# Patient Record
Sex: MALE | Race: WHITE | Employment: OTHER | URBAN - METROPOLITAN AREA
[De-identification: names, ages, dates, MRNs, and addresses within clinical notes are randomized per-mention and may not be internally consistent; named-entity substitution may affect disease eponyms.]

---

## 2017-01-04 PROBLEM — I48.91 ATRIAL FIBRILLATION WITH RVR (HCC): Status: ACTIVE | Noted: 2017-01-04

## 2017-01-04 PROBLEM — E87.20 LACTIC ACIDOSIS: Status: ACTIVE | Noted: 2017-01-04

## 2017-01-04 PROBLEM — A41.9 SEPSIS, UNSPECIFIED ORGANISM (HCC): Status: ACTIVE | Noted: 2017-01-04

## 2017-01-05 PROBLEM — E80.6 HYPERBILIRUBINEMIA: Status: ACTIVE | Noted: 2017-01-05

## 2017-01-10 ENCOUNTER — OFFICE VISIT (OUTPATIENT)
Dept: INTERNAL MEDICINE CLINIC | Age: 67
End: 2017-01-10

## 2017-01-10 VITALS
BODY MASS INDEX: 29.12 KG/M2 | RESPIRATION RATE: 16 BRPM | HEART RATE: 85 BPM | SYSTOLIC BLOOD PRESSURE: 102 MMHG | DIASTOLIC BLOOD PRESSURE: 70 MMHG | WEIGHT: 252 LBS | OXYGEN SATURATION: 97 %

## 2017-01-10 DIAGNOSIS — E79.0 ELEVATED URIC ACID IN BLOOD: ICD-10-CM

## 2017-01-10 DIAGNOSIS — M79.672 LEFT FOOT PAIN: ICD-10-CM

## 2017-01-10 DIAGNOSIS — A41.9 SEPSIS, DUE TO UNSPECIFIED ORGANISM: Primary | ICD-10-CM

## 2017-01-10 DIAGNOSIS — I48.91 ATRIAL FIBRILLATION, UNSPECIFIED TYPE (HCC): ICD-10-CM

## 2017-01-10 DIAGNOSIS — E80.6 HYPERBILIRUBINEMIA: ICD-10-CM

## 2017-01-10 DIAGNOSIS — E87.20 LACTIC ACIDOSIS: ICD-10-CM

## 2017-01-10 DIAGNOSIS — K80.20 CALCULUS OF GALLBLADDER WITHOUT CHOLECYSTITIS WITHOUT OBSTRUCTION: ICD-10-CM

## 2017-01-10 DIAGNOSIS — K06.9 DISEASE OF GINGIVA DUE TO RECURRENT ORAL HERPES SIMPLEX VIRUS (HSV) INFECTION: ICD-10-CM

## 2017-01-10 DIAGNOSIS — A41.9 SEPSIS, DUE TO UNSPECIFIED ORGANISM: ICD-10-CM

## 2017-01-10 DIAGNOSIS — B00.9 DISEASE OF GINGIVA DUE TO RECURRENT ORAL HERPES SIMPLEX VIRUS (HSV) INFECTION: ICD-10-CM

## 2017-01-10 LAB
A/G RATIO: 1.6 (ref 1.1–2.2)
ALBUMIN SERPL-MCNC: 4.4 G/DL (ref 3.4–5)
ALP BLD-CCNC: 58 U/L (ref 40–129)
ALT SERPL-CCNC: 23 U/L (ref 10–40)
ANION GAP SERPL CALCULATED.3IONS-SCNC: 17 MMOL/L (ref 3–16)
AST SERPL-CCNC: 25 U/L (ref 15–37)
BASOPHILS ABSOLUTE: 0.1 K/UL (ref 0–0.2)
BASOPHILS RELATIVE PERCENT: 0.9 %
BILIRUB SERPL-MCNC: 0.8 MG/DL (ref 0–1)
BUN BLDV-MCNC: 14 MG/DL (ref 7–20)
CALCIUM SERPL-MCNC: 9.7 MG/DL (ref 8.3–10.6)
CHLORIDE BLD-SCNC: 97 MMOL/L (ref 99–110)
CO2: 24 MMOL/L (ref 21–32)
CREAT SERPL-MCNC: 1 MG/DL (ref 0.8–1.3)
EOSINOPHILS ABSOLUTE: 0.2 K/UL (ref 0–0.6)
EOSINOPHILS RELATIVE PERCENT: 2 %
GFR AFRICAN AMERICAN: >60
GFR NON-AFRICAN AMERICAN: >60
GLOBULIN: 2.8 G/DL
GLUCOSE BLD-MCNC: 109 MG/DL (ref 70–99)
HCT VFR BLD CALC: 45.5 % (ref 40.5–52.5)
HEMOGLOBIN: 15.5 G/DL (ref 13.5–17.5)
INTERNATIONAL NORMALIZATION RATIO, POC: 1.5
LYMPHOCYTES ABSOLUTE: 1.3 K/UL (ref 1–5.1)
LYMPHOCYTES RELATIVE PERCENT: 12.3 %
MCH RBC QN AUTO: 32.2 PG (ref 26–34)
MCHC RBC AUTO-ENTMCNC: 34 G/DL (ref 31–36)
MCV RBC AUTO: 94.7 FL (ref 80–100)
MONOCYTES ABSOLUTE: 0.9 K/UL (ref 0–1.3)
MONOCYTES RELATIVE PERCENT: 8.7 %
NEUTROPHILS ABSOLUTE: 8 K/UL (ref 1.7–7.7)
NEUTROPHILS RELATIVE PERCENT: 76.1 %
PDW BLD-RTO: 13.7 % (ref 12.4–15.4)
PLATELET # BLD: 241 K/UL (ref 135–450)
PMV BLD AUTO: 8.2 FL (ref 5–10.5)
POTASSIUM SERPL-SCNC: 4.8 MMOL/L (ref 3.5–5.1)
PROTHROMBIN TIME, POC: NORMAL
RBC # BLD: 4.8 M/UL (ref 4.2–5.9)
SODIUM BLD-SCNC: 138 MMOL/L (ref 136–145)
TOTAL PROTEIN: 7.2 G/DL (ref 6.4–8.2)
URIC ACID, SERUM: 7.5 MG/DL (ref 3.5–7.2)
WBC # BLD: 10.6 K/UL (ref 4–11)

## 2017-01-10 PROCEDURE — 85610 PROTHROMBIN TIME: CPT | Performed by: INTERNAL MEDICINE

## 2017-01-10 PROCEDURE — 99213 OFFICE O/P EST LOW 20 MIN: CPT | Performed by: INTERNAL MEDICINE

## 2017-01-10 RX ORDER — ALLOPURINOL 100 MG/1
100 TABLET ORAL DAILY
Qty: 90 TABLET | Refills: 1 | Status: SHIPPED | OUTPATIENT
Start: 2017-01-10 | End: 2017-05-17 | Stop reason: SDUPTHER

## 2017-01-10 RX ORDER — ACYCLOVIR 400 MG/1
400 TABLET ORAL 3 TIMES DAILY
Qty: 30 TABLET | Refills: 3 | Status: SHIPPED | OUTPATIENT
Start: 2017-01-10 | End: 2017-05-12 | Stop reason: SDUPTHER

## 2017-02-15 ENCOUNTER — OFFICE VISIT (OUTPATIENT)
Dept: INTERNAL MEDICINE CLINIC | Age: 67
End: 2017-02-15

## 2017-02-15 VITALS
RESPIRATION RATE: 18 BRPM | SYSTOLIC BLOOD PRESSURE: 98 MMHG | HEART RATE: 97 BPM | DIASTOLIC BLOOD PRESSURE: 59 MMHG | BODY MASS INDEX: 29.01 KG/M2 | WEIGHT: 251 LBS

## 2017-02-15 DIAGNOSIS — E78.2 MIXED HYPERLIPIDEMIA: ICD-10-CM

## 2017-02-15 DIAGNOSIS — I42.0 DILATED CARDIOMYOPATHY (HCC): ICD-10-CM

## 2017-02-15 DIAGNOSIS — F51.01 PRIMARY INSOMNIA: ICD-10-CM

## 2017-02-15 DIAGNOSIS — Z00.00 ROUTINE GENERAL MEDICAL EXAMINATION AT A HEALTH CARE FACILITY: Primary | ICD-10-CM

## 2017-02-15 DIAGNOSIS — K63.5 COLONIC POLYP: ICD-10-CM

## 2017-02-15 DIAGNOSIS — J45.20 MILD INTERMITTENT ASTHMA WITHOUT COMPLICATION: ICD-10-CM

## 2017-02-15 DIAGNOSIS — I48.20 CHRONIC ATRIAL FIBRILLATION (HCC): ICD-10-CM

## 2017-02-15 DIAGNOSIS — K21.9 GASTROESOPHAGEAL REFLUX DISEASE WITHOUT ESOPHAGITIS: ICD-10-CM

## 2017-02-15 DIAGNOSIS — I48.91 ATRIAL FIBRILLATION WITH RVR (HCC): ICD-10-CM

## 2017-02-15 LAB
INTERNATIONAL NORMALIZATION RATIO, POC: 1.8
PROTHROMBIN TIME, POC: 21.2

## 2017-02-15 PROCEDURE — 85610 PROTHROMBIN TIME: CPT | Performed by: INTERNAL MEDICINE

## 2017-02-15 PROCEDURE — G0439 PPPS, SUBSEQ VISIT: HCPCS | Performed by: INTERNAL MEDICINE

## 2017-02-15 RX ORDER — FUROSEMIDE 20 MG/1
20 TABLET ORAL
Qty: 45 TABLET | Refills: 3 | Status: SHIPPED | OUTPATIENT
Start: 2017-02-15 | End: 2017-11-20 | Stop reason: SDUPTHER

## 2017-02-15 RX ORDER — POTASSIUM CHLORIDE 750 MG/1
10 TABLET, EXTENDED RELEASE ORAL
Qty: 45 TABLET | Refills: 1 | Status: SHIPPED | OUTPATIENT
Start: 2017-02-15 | End: 2017-11-20 | Stop reason: SDUPTHER

## 2017-02-15 RX ORDER — DILTIAZEM HYDROCHLORIDE 180 MG/1
CAPSULE, COATED, EXTENDED RELEASE ORAL
Qty: 180 CAPSULE | Refills: 1 | Status: SHIPPED | OUTPATIENT
Start: 2017-02-15 | End: 2017-08-17 | Stop reason: SDUPTHER

## 2017-02-15 RX ORDER — OMEPRAZOLE 20 MG/1
20 CAPSULE, DELAYED RELEASE ORAL DAILY
Qty: 90 CAPSULE | Refills: 1 | Status: SHIPPED | OUTPATIENT
Start: 2017-02-15 | End: 2017-08-17 | Stop reason: SDUPTHER

## 2017-02-15 RX ORDER — WARFARIN SODIUM 3 MG/1
TABLET ORAL
Qty: 90 TABLET | Refills: 1 | Status: SHIPPED | OUTPATIENT
Start: 2017-02-15 | End: 2017-10-16 | Stop reason: SDUPTHER

## 2017-02-15 RX ORDER — TRAZODONE HYDROCHLORIDE 50 MG/1
50 TABLET ORAL NIGHTLY PRN
Qty: 30 TABLET | Refills: 3 | Status: SHIPPED | OUTPATIENT
Start: 2017-02-15 | End: 2017-08-17 | Stop reason: SDUPTHER

## 2017-02-15 ASSESSMENT — ANXIETY QUESTIONNAIRES: GAD7 TOTAL SCORE: 0

## 2017-02-15 ASSESSMENT — PATIENT HEALTH QUESTIONNAIRE - PHQ9: SUM OF ALL RESPONSES TO PHQ QUESTIONS 1-9: 0

## 2017-02-15 ASSESSMENT — LIFESTYLE VARIABLES: HOW OFTEN DO YOU HAVE A DRINK CONTAINING ALCOHOL: 0

## 2017-02-16 DIAGNOSIS — Z00.00 ROUTINE GENERAL MEDICAL EXAMINATION AT A HEALTH CARE FACILITY: ICD-10-CM

## 2017-02-16 DIAGNOSIS — I48.20 CHRONIC ATRIAL FIBRILLATION (HCC): ICD-10-CM

## 2017-02-16 DIAGNOSIS — E78.2 MIXED HYPERLIPIDEMIA: ICD-10-CM

## 2017-02-16 LAB
A/G RATIO: 1.9 (ref 1.1–2.2)
ALBUMIN SERPL-MCNC: 4.4 G/DL (ref 3.4–5)
ALP BLD-CCNC: 55 U/L (ref 40–129)
ALT SERPL-CCNC: 17 U/L (ref 10–40)
ANION GAP SERPL CALCULATED.3IONS-SCNC: 13 MMOL/L (ref 3–16)
AST SERPL-CCNC: 22 U/L (ref 15–37)
BASOPHILS ABSOLUTE: 0.1 K/UL (ref 0–0.2)
BASOPHILS RELATIVE PERCENT: 1.2 %
BILIRUB SERPL-MCNC: 0.8 MG/DL (ref 0–1)
BUN BLDV-MCNC: 14 MG/DL (ref 7–20)
CALCIUM SERPL-MCNC: 9.8 MG/DL (ref 8.3–10.6)
CHLORIDE BLD-SCNC: 99 MMOL/L (ref 99–110)
CHOLESTEROL, TOTAL: 142 MG/DL (ref 0–199)
CO2: 28 MMOL/L (ref 21–32)
CREAT SERPL-MCNC: 1.2 MG/DL (ref 0.8–1.3)
EOSINOPHILS ABSOLUTE: 0.2 K/UL (ref 0–0.6)
EOSINOPHILS RELATIVE PERCENT: 4.5 %
GFR AFRICAN AMERICAN: >60
GFR NON-AFRICAN AMERICAN: >60
GLOBULIN: 2.3 G/DL
GLUCOSE BLD-MCNC: 113 MG/DL (ref 70–99)
HCT VFR BLD CALC: 44.9 % (ref 40.5–52.5)
HDLC SERPL-MCNC: 33 MG/DL (ref 40–60)
HEMOGLOBIN: 14.8 G/DL (ref 13.5–17.5)
HEPATITIS C ANTIBODY INTERPRETATION: NORMAL
LDL CHOLESTEROL CALCULATED: 71 MG/DL
LYMPHOCYTES ABSOLUTE: 0.9 K/UL (ref 1–5.1)
LYMPHOCYTES RELATIVE PERCENT: 16.5 %
MCH RBC QN AUTO: 32.4 PG (ref 26–34)
MCHC RBC AUTO-ENTMCNC: 33.1 G/DL (ref 31–36)
MCV RBC AUTO: 97.9 FL (ref 80–100)
MONOCYTES ABSOLUTE: 0.5 K/UL (ref 0–1.3)
MONOCYTES RELATIVE PERCENT: 10.3 %
NEUTROPHILS ABSOLUTE: 3.6 K/UL (ref 1.7–7.7)
NEUTROPHILS RELATIVE PERCENT: 67.5 %
PDW BLD-RTO: 15.7 % (ref 12.4–15.4)
PLATELET # BLD: 205 K/UL (ref 135–450)
PMV BLD AUTO: 7.8 FL (ref 5–10.5)
POTASSIUM SERPL-SCNC: 4.5 MMOL/L (ref 3.5–5.1)
RBC # BLD: 4.58 M/UL (ref 4.2–5.9)
SODIUM BLD-SCNC: 140 MMOL/L (ref 136–145)
TOTAL PROTEIN: 6.7 G/DL (ref 6.4–8.2)
TRIGL SERPL-MCNC: 191 MG/DL (ref 0–150)
TSH SERPL DL<=0.05 MIU/L-ACNC: 0.74 UIU/ML (ref 0.27–4.2)
VLDLC SERPL CALC-MCNC: 38 MG/DL
WBC # BLD: 5.3 K/UL (ref 4–11)

## 2017-05-12 DIAGNOSIS — K06.9 DISEASE OF GINGIVA DUE TO RECURRENT ORAL HERPES SIMPLEX VIRUS (HSV) INFECTION: ICD-10-CM

## 2017-05-12 DIAGNOSIS — B00.9 DISEASE OF GINGIVA DUE TO RECURRENT ORAL HERPES SIMPLEX VIRUS (HSV) INFECTION: ICD-10-CM

## 2017-05-12 RX ORDER — ACYCLOVIR 400 MG/1
400 TABLET ORAL 3 TIMES DAILY
Qty: 30 TABLET | Refills: 3 | Status: SHIPPED | OUTPATIENT
Start: 2017-05-12 | End: 2017-08-17 | Stop reason: SDUPTHER

## 2017-05-17 ENCOUNTER — OFFICE VISIT (OUTPATIENT)
Dept: INTERNAL MEDICINE CLINIC | Age: 67
End: 2017-05-17

## 2017-05-17 VITALS
WEIGHT: 260.8 LBS | BODY MASS INDEX: 30.18 KG/M2 | OXYGEN SATURATION: 96 % | HEIGHT: 78 IN | HEART RATE: 74 BPM | DIASTOLIC BLOOD PRESSURE: 62 MMHG | SYSTOLIC BLOOD PRESSURE: 132 MMHG

## 2017-05-17 DIAGNOSIS — D68.9 COAGULOPATHY (HCC): ICD-10-CM

## 2017-05-17 DIAGNOSIS — I48.20 CHRONIC ATRIAL FIBRILLATION (HCC): Primary | ICD-10-CM

## 2017-05-17 DIAGNOSIS — E79.0 ELEVATED URIC ACID IN BLOOD: ICD-10-CM

## 2017-05-17 DIAGNOSIS — J45.20 MILD INTERMITTENT ASTHMA WITHOUT COMPLICATION: ICD-10-CM

## 2017-05-17 PROBLEM — I48.91 ATRIAL FIBRILLATION WITH RVR (HCC): Status: RESOLVED | Noted: 2017-01-04 | Resolved: 2017-05-17

## 2017-05-17 PROBLEM — A41.9 SEPSIS, UNSPECIFIED ORGANISM (HCC): Status: RESOLVED | Noted: 2017-01-04 | Resolved: 2017-05-17

## 2017-05-17 PROBLEM — E87.20 LACTIC ACIDOSIS: Status: RESOLVED | Noted: 2017-01-04 | Resolved: 2017-05-17

## 2017-05-17 LAB
INTERNATIONAL NORMALIZATION RATIO, POC: 2.2
PROTHROMBIN TIME, POC: 26.3

## 2017-05-17 PROCEDURE — 85610 PROTHROMBIN TIME: CPT | Performed by: INTERNAL MEDICINE

## 2017-05-17 PROCEDURE — 3017F COLORECTAL CA SCREEN DOC REV: CPT | Performed by: INTERNAL MEDICINE

## 2017-05-17 PROCEDURE — 4040F PNEUMOC VAC/ADMIN/RCVD: CPT | Performed by: INTERNAL MEDICINE

## 2017-05-17 PROCEDURE — 1036F TOBACCO NON-USER: CPT | Performed by: INTERNAL MEDICINE

## 2017-05-17 PROCEDURE — 1123F ACP DISCUSS/DSCN MKR DOCD: CPT | Performed by: INTERNAL MEDICINE

## 2017-05-17 PROCEDURE — 99213 OFFICE O/P EST LOW 20 MIN: CPT | Performed by: INTERNAL MEDICINE

## 2017-05-17 PROCEDURE — G8417 CALC BMI ABV UP PARAM F/U: HCPCS | Performed by: INTERNAL MEDICINE

## 2017-05-17 PROCEDURE — G8427 DOCREV CUR MEDS BY ELIG CLIN: HCPCS | Performed by: INTERNAL MEDICINE

## 2017-05-17 RX ORDER — DIGOXIN 250 MCG
TABLET ORAL
Qty: 90 TABLET | Refills: 1 | Status: SHIPPED | OUTPATIENT
Start: 2017-05-17 | End: 2017-11-20 | Stop reason: SDUPTHER

## 2017-05-17 RX ORDER — ALLOPURINOL 100 MG/1
100 TABLET ORAL DAILY
Qty: 90 TABLET | Refills: 1 | Status: SHIPPED | OUTPATIENT
Start: 2017-05-17 | End: 2017-11-20 | Stop reason: SDUPTHER

## 2017-08-17 ENCOUNTER — OFFICE VISIT (OUTPATIENT)
Dept: INTERNAL MEDICINE CLINIC | Age: 67
End: 2017-08-17

## 2017-08-17 VITALS
SYSTOLIC BLOOD PRESSURE: 110 MMHG | DIASTOLIC BLOOD PRESSURE: 70 MMHG | RESPIRATION RATE: 18 BRPM | WEIGHT: 260 LBS | HEART RATE: 82 BPM | BODY MASS INDEX: 30.05 KG/M2

## 2017-08-17 DIAGNOSIS — E78.2 MIXED HYPERLIPIDEMIA: ICD-10-CM

## 2017-08-17 DIAGNOSIS — D68.9 COAGULOPATHY (HCC): ICD-10-CM

## 2017-08-17 DIAGNOSIS — J45.20 MILD INTERMITTENT ASTHMA WITHOUT COMPLICATION: Primary | ICD-10-CM

## 2017-08-17 DIAGNOSIS — I48.20 CHRONIC ATRIAL FIBRILLATION (HCC): ICD-10-CM

## 2017-08-17 DIAGNOSIS — K06.9 DISEASE OF GINGIVA DUE TO RECURRENT ORAL HERPES SIMPLEX VIRUS (HSV) INFECTION: ICD-10-CM

## 2017-08-17 DIAGNOSIS — F51.01 PRIMARY INSOMNIA: ICD-10-CM

## 2017-08-17 DIAGNOSIS — K21.9 GASTROESOPHAGEAL REFLUX DISEASE WITHOUT ESOPHAGITIS: ICD-10-CM

## 2017-08-17 DIAGNOSIS — B00.9 DISEASE OF GINGIVA DUE TO RECURRENT ORAL HERPES SIMPLEX VIRUS (HSV) INFECTION: ICD-10-CM

## 2017-08-17 PROCEDURE — 1123F ACP DISCUSS/DSCN MKR DOCD: CPT | Performed by: INTERNAL MEDICINE

## 2017-08-17 PROCEDURE — 4040F PNEUMOC VAC/ADMIN/RCVD: CPT | Performed by: INTERNAL MEDICINE

## 2017-08-17 PROCEDURE — 3017F COLORECTAL CA SCREEN DOC REV: CPT | Performed by: INTERNAL MEDICINE

## 2017-08-17 PROCEDURE — G8427 DOCREV CUR MEDS BY ELIG CLIN: HCPCS | Performed by: INTERNAL MEDICINE

## 2017-08-17 PROCEDURE — 1036F TOBACCO NON-USER: CPT | Performed by: INTERNAL MEDICINE

## 2017-08-17 PROCEDURE — G8417 CALC BMI ABV UP PARAM F/U: HCPCS | Performed by: INTERNAL MEDICINE

## 2017-08-17 PROCEDURE — 99214 OFFICE O/P EST MOD 30 MIN: CPT | Performed by: INTERNAL MEDICINE

## 2017-08-17 PROCEDURE — 85610 PROTHROMBIN TIME: CPT | Performed by: INTERNAL MEDICINE

## 2017-08-17 RX ORDER — ACYCLOVIR 400 MG/1
400 TABLET ORAL 3 TIMES DAILY
Qty: 30 TABLET | Refills: 3 | Status: SHIPPED | OUTPATIENT
Start: 2017-08-17 | End: 2017-08-27

## 2017-08-17 RX ORDER — TRAZODONE HYDROCHLORIDE 50 MG/1
50 TABLET ORAL NIGHTLY PRN
Qty: 30 TABLET | Refills: 3 | Status: SHIPPED | OUTPATIENT
Start: 2017-08-17 | End: 2017-11-20 | Stop reason: SDUPTHER

## 2017-08-17 RX ORDER — OMEPRAZOLE 20 MG/1
20 CAPSULE, DELAYED RELEASE ORAL DAILY
Qty: 90 CAPSULE | Refills: 1 | Status: SHIPPED | OUTPATIENT
Start: 2017-08-17 | End: 2017-11-20 | Stop reason: SDUPTHER

## 2017-08-17 RX ORDER — DILTIAZEM HYDROCHLORIDE 180 MG/1
CAPSULE, COATED, EXTENDED RELEASE ORAL
Qty: 180 CAPSULE | Refills: 1 | Status: SHIPPED | OUTPATIENT
Start: 2017-08-17 | End: 2017-11-20 | Stop reason: SDUPTHER

## 2017-08-18 DIAGNOSIS — E78.2 MIXED HYPERLIPIDEMIA: ICD-10-CM

## 2017-08-18 DIAGNOSIS — I48.20 CHRONIC ATRIAL FIBRILLATION (HCC): ICD-10-CM

## 2017-08-18 LAB
A/G RATIO: 2 (ref 1.1–2.2)
ALBUMIN SERPL-MCNC: 4.5 G/DL (ref 3.4–5)
ALP BLD-CCNC: 51 U/L (ref 40–129)
ALT SERPL-CCNC: 20 U/L (ref 10–40)
ANION GAP SERPL CALCULATED.3IONS-SCNC: 16 MMOL/L (ref 3–16)
AST SERPL-CCNC: 22 U/L (ref 15–37)
BILIRUB SERPL-MCNC: 0.6 MG/DL (ref 0–1)
BUN BLDV-MCNC: 14 MG/DL (ref 7–20)
CALCIUM SERPL-MCNC: 9.5 MG/DL (ref 8.3–10.6)
CHLORIDE BLD-SCNC: 102 MMOL/L (ref 99–110)
CHOLESTEROL, TOTAL: 134 MG/DL (ref 0–199)
CO2: 24 MMOL/L (ref 21–32)
CREAT SERPL-MCNC: 0.9 MG/DL (ref 0.8–1.3)
DIGOXIN LEVEL: 1 NG/ML (ref 0.8–2)
GFR AFRICAN AMERICAN: >60
GFR NON-AFRICAN AMERICAN: >60
GLOBULIN: 2.3 G/DL
GLUCOSE BLD-MCNC: 112 MG/DL (ref 70–99)
HCT VFR BLD CALC: 43.7 % (ref 40.5–52.5)
HDLC SERPL-MCNC: 31 MG/DL (ref 40–60)
HEMOGLOBIN: 15.1 G/DL (ref 13.5–17.5)
LDL CHOLESTEROL CALCULATED: 52 MG/DL
MCH RBC QN AUTO: 33 PG (ref 26–34)
MCHC RBC AUTO-ENTMCNC: 34.5 G/DL (ref 31–36)
MCV RBC AUTO: 95.6 FL (ref 80–100)
PDW BLD-RTO: 13.7 % (ref 12.4–15.4)
PLATELET # BLD: 207 K/UL (ref 135–450)
PMV BLD AUTO: 8 FL (ref 5–10.5)
POTASSIUM SERPL-SCNC: 4.4 MMOL/L (ref 3.5–5.1)
RBC # BLD: 4.57 M/UL (ref 4.2–5.9)
SODIUM BLD-SCNC: 142 MMOL/L (ref 136–145)
TOTAL PROTEIN: 6.8 G/DL (ref 6.4–8.2)
TRIGL SERPL-MCNC: 256 MG/DL (ref 0–150)
TSH SERPL DL<=0.05 MIU/L-ACNC: 0.91 UIU/ML (ref 0.27–4.2)
VLDLC SERPL CALC-MCNC: 51 MG/DL
WBC # BLD: 6.5 K/UL (ref 4–11)

## 2017-09-15 ENCOUNTER — OFFICE VISIT (OUTPATIENT)
Dept: INTERNAL MEDICINE CLINIC | Age: 67
End: 2017-09-15

## 2017-09-15 VITALS
HEART RATE: 78 BPM | RESPIRATION RATE: 17 BRPM | SYSTOLIC BLOOD PRESSURE: 130 MMHG | OXYGEN SATURATION: 96 % | DIASTOLIC BLOOD PRESSURE: 78 MMHG

## 2017-09-15 DIAGNOSIS — J45.21 MILD INTERMITTENT ASTHMA WITH ACUTE EXACERBATION: Primary | ICD-10-CM

## 2017-09-15 DIAGNOSIS — Z23 NEED FOR INFLUENZA VACCINATION: ICD-10-CM

## 2017-09-15 PROCEDURE — 1036F TOBACCO NON-USER: CPT | Performed by: INTERNAL MEDICINE

## 2017-09-15 PROCEDURE — 90662 IIV NO PRSV INCREASED AG IM: CPT | Performed by: INTERNAL MEDICINE

## 2017-09-15 PROCEDURE — G8417 CALC BMI ABV UP PARAM F/U: HCPCS | Performed by: INTERNAL MEDICINE

## 2017-09-15 PROCEDURE — G8427 DOCREV CUR MEDS BY ELIG CLIN: HCPCS | Performed by: INTERNAL MEDICINE

## 2017-09-15 PROCEDURE — 99213 OFFICE O/P EST LOW 20 MIN: CPT | Performed by: INTERNAL MEDICINE

## 2017-09-15 PROCEDURE — 1123F ACP DISCUSS/DSCN MKR DOCD: CPT | Performed by: INTERNAL MEDICINE

## 2017-09-15 PROCEDURE — G0008 ADMIN INFLUENZA VIRUS VAC: HCPCS | Performed by: INTERNAL MEDICINE

## 2017-09-15 PROCEDURE — 4040F PNEUMOC VAC/ADMIN/RCVD: CPT | Performed by: INTERNAL MEDICINE

## 2017-09-15 PROCEDURE — 3017F COLORECTAL CA SCREEN DOC REV: CPT | Performed by: INTERNAL MEDICINE

## 2017-09-15 RX ORDER — AZITHROMYCIN 250 MG/1
TABLET, FILM COATED ORAL
Qty: 6 TABLET | Refills: 0 | Status: SHIPPED | OUTPATIENT
Start: 2017-09-15 | End: 2017-11-08 | Stop reason: ALTCHOICE

## 2017-09-15 RX ORDER — CODEINE PHOSPHATE AND GUAIFENESIN 10; 100 MG/5ML; MG/5ML
5 SOLUTION ORAL 3 TIMES DAILY PRN
Qty: 150 ML | Refills: 0 | Status: SHIPPED | OUTPATIENT
Start: 2017-09-15 | End: 2017-11-08 | Stop reason: ALTCHOICE

## 2017-09-15 RX ORDER — PREDNISONE 1 MG/1
TABLET ORAL
Qty: 21 TABLET | Refills: 0 | Status: SHIPPED | OUTPATIENT
Start: 2017-09-15 | End: 2017-11-08 | Stop reason: ALTCHOICE

## 2017-10-16 DIAGNOSIS — I48.20 CHRONIC ATRIAL FIBRILLATION (HCC): ICD-10-CM

## 2017-10-16 RX ORDER — WARFARIN SODIUM 3 MG/1
TABLET ORAL
Qty: 90 TABLET | Refills: 1 | Status: SHIPPED | OUTPATIENT
Start: 2017-10-16 | End: 2018-02-20 | Stop reason: SDUPTHER

## 2017-11-08 ENCOUNTER — OFFICE VISIT (OUTPATIENT)
Dept: CARDIOLOGY CLINIC | Age: 67
End: 2017-11-08

## 2017-11-08 VITALS
HEART RATE: 68 BPM | BODY MASS INDEX: 31.19 KG/M2 | WEIGHT: 269.6 LBS | SYSTOLIC BLOOD PRESSURE: 110 MMHG | DIASTOLIC BLOOD PRESSURE: 80 MMHG | HEIGHT: 78 IN

## 2017-11-08 DIAGNOSIS — I48.91 ATRIAL FIBRILLATION, UNSPECIFIED TYPE (HCC): Primary | ICD-10-CM

## 2017-11-08 DIAGNOSIS — I35.0 AORTIC STENOSIS, MILD: ICD-10-CM

## 2017-11-08 DIAGNOSIS — E78.2 MIXED HYPERLIPIDEMIA: ICD-10-CM

## 2017-11-08 PROCEDURE — 4040F PNEUMOC VAC/ADMIN/RCVD: CPT | Performed by: INTERNAL MEDICINE

## 2017-11-08 PROCEDURE — 1036F TOBACCO NON-USER: CPT | Performed by: INTERNAL MEDICINE

## 2017-11-08 PROCEDURE — 99213 OFFICE O/P EST LOW 20 MIN: CPT | Performed by: INTERNAL MEDICINE

## 2017-11-08 PROCEDURE — G8484 FLU IMMUNIZE NO ADMIN: HCPCS | Performed by: INTERNAL MEDICINE

## 2017-11-08 PROCEDURE — G8427 DOCREV CUR MEDS BY ELIG CLIN: HCPCS | Performed by: INTERNAL MEDICINE

## 2017-11-08 PROCEDURE — 3017F COLORECTAL CA SCREEN DOC REV: CPT | Performed by: INTERNAL MEDICINE

## 2017-11-08 PROCEDURE — G8417 CALC BMI ABV UP PARAM F/U: HCPCS | Performed by: INTERNAL MEDICINE

## 2017-11-08 PROCEDURE — 1123F ACP DISCUSS/DSCN MKR DOCD: CPT | Performed by: INTERNAL MEDICINE

## 2017-11-08 NOTE — ASSESSMENT & PLAN NOTE
Clinically not symptomatic and no significant audible murmur. We will follow-up with a repeat echo next year.

## 2017-11-08 NOTE — ASSESSMENT & PLAN NOTE
Rate is well controlled on current medications and he is adequately anticoagulated for primary prevention of stroke.

## 2017-11-08 NOTE — PATIENT INSTRUCTIONS
Continue current cardiovascular medications which have been reviewed and discussed individually with you. Appropriate prescriptions if needed on this visit are addressed. After visit summery is provided. Questions answered and patient verbalizes understanding. Follow up in office in 12 with Echo quang assess AS and LA size, sooner if needed.

## 2017-11-08 NOTE — PROGRESS NOTES
Santiago Diego  1950  Pancho Taylor MD    Chief complaint and HPI:  Santiago Diego  is a 38-year-old pleasant gentleman follows up for primary prevention. Has a history of persistent atrial fibrillation and history of dilated cardiomyopathy which has resolved after better control of heart rate. He has been on long-term anticoagulation therapy in the follows up with PCP for ProTime regulation. He denies any increasing shortness of breath or dyspnea on exertion. He denies any orthopnea or paroxysmal nocturnal dyspnea. He denies any chest pain or any other cardiac symptoms. He denies any syncope or near-syncope. He denies any worsening leg swelling. He is physically very active he golfs regularly and walks for exercise. Rest of the Cardiovascular system review is otherwise unchanged from prior encounter. Past medical history:  has a past medical history of Aortic stenosis, mild; Asthma, intrinsic; Atrial fibrillation ([address] Cholelithiasis; Colonic polyp; DDD (degenerative disc disease), lumbar; Dilated cardiomyopathy ([address] GERD (gastroesophageal reflux disease); Gout; H/O cardiovascular stress test; History of cardiovascular stress test; History of complete electrocardiogram; History of echocardiogram; Hx of echocardiogram; Hyperlipidemia; Knee pain, bilateral; Knee pain, left; Long-term (current) use of anticoagulants; Peptic ulcer disease; and Testosterone deficiency. Past surgical history:  has a past surgical history that includes polypectomy (2/98- jaimie); polypectomy (9/10/02- dr Terrance Sampson); laparoscopy (3/08, dr Kimberly Espinoza); ventral hernia repair (10/08, dr Kimberly Espinoza); Wrist fracture surgery (Left, 2/6/14); and Inguinal hernia repair (Right, 11/2016).     Social History:   Social History   Substance Use Topics    Smoking status: Former Smoker     Packs/day: 1.00     Types: Cigarettes     Quit date: 1/1/2013    Smokeless tobacco: Never Used      Comment: reviewed 11/4/2015    Alcohol use Yes Comment: occasional wine/moderate     Family history: family history includes Diabetes in his father and mother; High Blood Pressure in his mother; Hypertension in his mother; Hypotension in his father; Other in his father and mother; Stroke in his brother. ALLERGIES:  Review of patient's allergies indicates no known allergies. Prior to Admission medications    Medication Sig Start Date End Date Taking? Authorizing Provider   warfarin (COUMADIN) 3 MG tablet Take 1 tablet by mouth  daily 10/16/17  Yes Hallie Swann MD   omeprazole (PRILOSEC) 20 MG delayed release capsule Take 1 capsule by mouth daily 8/17/17  Yes Hallie Swann MD   diltiazem (CARTIA XT) 180 MG extended release capsule TAKE ONE CAPSULE BY MOUTH TWICE A DAY 8/17/17  Yes Hallie Swann MD   traZODone (DESYREL) 50 MG tablet Take 1 tablet by mouth nightly as needed for Sleep Nightly as needed for sleep 8/17/17  Yes Hallie Swann MD   allopurinol (ZYLOPRIM) 100 MG tablet Take 1 tablet by mouth daily 5/17/17  Yes Hallie Swann MD   digoxin ([address] [employer]) 250 MCG tablet Take 1 tablet by mouth  daily 5/17/17  Yes Hallie Swann MD   fluticasone-salmeterol (ADVAIR DISKUS) 500-50 MCG/DOSE diskus inhaler Inhale 1 puff into the lungs 2 times daily Rinse mouth after use. 5/17/17  Yes Hallie Swann MD   potassium chloride (KLOR-CON M) 10 MEQ extended release tablet Take 1 tablet by mouth three times a week 01/04/17 Pt reports he take this on Mon/Wed/Fri 2/15/17  Yes Hallie Swann MD   furosemide (LASIX) 20 MG tablet Take 1 tablet by mouth three times a week 01/04/17 Pt reports he takes this on Mon/ Wed/Fri 2/15/17  Yes Hallie Swann MD   calcium-vitamin D (OSCAL-500) 500-200 MG-UNIT per tablet Take 1 tablet by mouth daily. Yes Historical Provider, MD     Constitutional:  /80   Pulse 68   Ht 6' 6\" (1.981 m)   Wt 269 lb 9.6 oz (122.3 kg)   BMI 31.16 kg/m²    Body mass index is 31.16 kg/m².   Wt Readings visit are addressed. After visit summery is provided. Questions answered and patient verbalizes understanding. Follow up in office in 12 with Echo quang assess AS and LA size, sooner if needed. Ivet Heller MD, 11/8/2017 11:58 AM     Please note this report has been partially produced using speech recognition software and may contain errors related to that system including errors in grammar, punctuation, and spelling, as well as words and phrases that may be inappropriate. If there are any questions or concerns please feel free to contact the dictating provider for clarification.

## 2017-11-20 ENCOUNTER — OFFICE VISIT (OUTPATIENT)
Dept: INTERNAL MEDICINE CLINIC | Age: 67
End: 2017-11-20

## 2017-11-20 VITALS
BODY MASS INDEX: 30.97 KG/M2 | DIASTOLIC BLOOD PRESSURE: 70 MMHG | RESPIRATION RATE: 16 BRPM | OXYGEN SATURATION: 95 % | SYSTOLIC BLOOD PRESSURE: 112 MMHG | HEART RATE: 87 BPM | WEIGHT: 268 LBS

## 2017-11-20 DIAGNOSIS — E78.2 MIXED HYPERLIPIDEMIA: ICD-10-CM

## 2017-11-20 DIAGNOSIS — K21.9 GASTROESOPHAGEAL REFLUX DISEASE WITHOUT ESOPHAGITIS: ICD-10-CM

## 2017-11-20 DIAGNOSIS — F51.01 PRIMARY INSOMNIA: ICD-10-CM

## 2017-11-20 DIAGNOSIS — I42.0 DILATED CARDIOMYOPATHY (HCC): ICD-10-CM

## 2017-11-20 DIAGNOSIS — I48.20 CHRONIC ATRIAL FIBRILLATION (HCC): Primary | ICD-10-CM

## 2017-11-20 DIAGNOSIS — J45.20 MILD INTERMITTENT ASTHMA WITHOUT COMPLICATION: ICD-10-CM

## 2017-11-20 DIAGNOSIS — E79.0 ELEVATED URIC ACID IN BLOOD: ICD-10-CM

## 2017-11-20 LAB
INTERNATIONAL NORMALIZATION RATIO, POC: 2
PROTHROMBIN TIME, POC: 24.3

## 2017-11-20 PROCEDURE — 85610 PROTHROMBIN TIME: CPT | Performed by: INTERNAL MEDICINE

## 2017-11-20 PROCEDURE — 4040F PNEUMOC VAC/ADMIN/RCVD: CPT | Performed by: INTERNAL MEDICINE

## 2017-11-20 PROCEDURE — 99214 OFFICE O/P EST MOD 30 MIN: CPT | Performed by: INTERNAL MEDICINE

## 2017-11-20 PROCEDURE — G8484 FLU IMMUNIZE NO ADMIN: HCPCS | Performed by: INTERNAL MEDICINE

## 2017-11-20 PROCEDURE — G8417 CALC BMI ABV UP PARAM F/U: HCPCS | Performed by: INTERNAL MEDICINE

## 2017-11-20 PROCEDURE — 1123F ACP DISCUSS/DSCN MKR DOCD: CPT | Performed by: INTERNAL MEDICINE

## 2017-11-20 PROCEDURE — G8427 DOCREV CUR MEDS BY ELIG CLIN: HCPCS | Performed by: INTERNAL MEDICINE

## 2017-11-20 PROCEDURE — 1036F TOBACCO NON-USER: CPT | Performed by: INTERNAL MEDICINE

## 2017-11-20 PROCEDURE — 3017F COLORECTAL CA SCREEN DOC REV: CPT | Performed by: INTERNAL MEDICINE

## 2017-11-20 RX ORDER — FUROSEMIDE 20 MG/1
20 TABLET ORAL
Qty: 45 TABLET | Refills: 3 | Status: SHIPPED | OUTPATIENT
Start: 2017-11-20 | End: 2018-06-07 | Stop reason: SDUPTHER

## 2017-11-20 RX ORDER — TRAZODONE HYDROCHLORIDE 50 MG/1
50 TABLET ORAL NIGHTLY PRN
Qty: 30 TABLET | Refills: 3 | Status: SHIPPED | OUTPATIENT
Start: 2017-11-20 | End: 2018-02-20 | Stop reason: SDUPTHER

## 2017-11-20 RX ORDER — DIGOXIN 250 MCG
TABLET ORAL
Qty: 90 TABLET | Refills: 1 | Status: SHIPPED | OUTPATIENT
Start: 2017-11-20 | End: 2018-06-07 | Stop reason: SDUPTHER

## 2017-11-20 RX ORDER — DILTIAZEM HYDROCHLORIDE 180 MG/1
CAPSULE, COATED, EXTENDED RELEASE ORAL
Qty: 180 CAPSULE | Refills: 1 | Status: SHIPPED | OUTPATIENT
Start: 2017-11-20 | End: 2018-02-20 | Stop reason: SDUPTHER

## 2017-11-20 RX ORDER — POTASSIUM CHLORIDE 750 MG/1
10 TABLET, EXTENDED RELEASE ORAL
Qty: 45 TABLET | Refills: 1 | Status: SHIPPED | OUTPATIENT
Start: 2017-11-20 | End: 2018-02-20 | Stop reason: SDUPTHER

## 2017-11-20 RX ORDER — OMEPRAZOLE 20 MG/1
20 CAPSULE, DELAYED RELEASE ORAL DAILY
Qty: 90 CAPSULE | Refills: 1 | Status: SHIPPED | OUTPATIENT
Start: 2017-11-20 | End: 2018-02-20 | Stop reason: SDUPTHER

## 2017-11-20 RX ORDER — ALLOPURINOL 100 MG/1
100 TABLET ORAL DAILY
Qty: 90 TABLET | Refills: 1 | Status: SHIPPED | OUTPATIENT
Start: 2017-11-20 | End: 2018-06-07 | Stop reason: SDUPTHER

## 2017-11-20 NOTE — PROGRESS NOTES
Demi Loomis  1950 11/20/17    SUBJECTIVE:  Asthma no recent wheezing or cough,     Afib- denies palpitations, INR ok at 2.0 on lower dose 3mg daily. GERD:  Is without sx of heartburn or dysphagia, abd pain. OBJECTIVE:    /70   Pulse 87   Resp 16   Wt 268 lb (121.6 kg)   SpO2 95%   BMI 30.97 kg/m²     Physical Exam   Constitutional: He appears well-developed and well-nourished. No distress. HENT:   Head: Normocephalic and atraumatic. Nose: Nose normal.   Mouth/Throat: Oropharynx is clear and moist. No oropharyngeal exudate. Eyes: Conjunctivae and EOM are normal. Pupils are equal, round, and reactive to light. Right eye exhibits no discharge. Left eye exhibits no discharge. No scleral icterus. Neck: Normal range of motion. Neck supple. No tracheal deviation present. Cardiovascular: Normal rate, normal heart sounds and intact distal pulses. Exam reveals no gallop and no friction rub. No murmur heard. IRR   Pulmonary/Chest: Effort normal and breath sounds normal. No respiratory distress. He has no wheezes. He has no rales. Abdominal: Soft. Bowel sounds are normal. He exhibits no distension and no mass. There is no tenderness. There is no rebound and no guarding. Musculoskeletal: He exhibits no edema or tenderness. Lymphadenopathy:     He has no cervical adenopathy. Neurological: He is alert. Skin: Skin is warm and dry. Psychiatric: He has a normal mood and affect. Vitals reviewed. ASSESSMENT:    1. Chronic atrial fibrillation ([address]    2. Dilated cardiomyopathy (HCC)    3. Elevated uric acid in blood    4. Gastroesophageal reflux disease without esophagitis    5. Mild intermittent asthma without complication    6. Primary insomnia    7. Mixed hyperlipidemia        PLAN:    Codie Barragan was seen today for 3 month follow-up.     Diagnoses and all orders for this visit:    Chronic atrial fibrillation ([address] -  Pt clinically w/o evidence of cardiovascular instability, cont

## 2018-01-04 ENCOUNTER — TELEPHONE (OUTPATIENT)
Dept: INTERNAL MEDICINE CLINIC | Age: 68
End: 2018-01-04

## 2018-01-04 ENCOUNTER — OFFICE VISIT (OUTPATIENT)
Dept: INTERNAL MEDICINE CLINIC | Age: 68
End: 2018-01-04

## 2018-01-04 VITALS
RESPIRATION RATE: 16 BRPM | BODY MASS INDEX: 30.97 KG/M2 | SYSTOLIC BLOOD PRESSURE: 114 MMHG | WEIGHT: 268 LBS | OXYGEN SATURATION: 93 % | HEART RATE: 79 BPM | DIASTOLIC BLOOD PRESSURE: 70 MMHG

## 2018-01-04 DIAGNOSIS — J45.21 MILD INTERMITTENT ASTHMA WITH ACUTE EXACERBATION: Primary | ICD-10-CM

## 2018-01-04 PROCEDURE — 3017F COLORECTAL CA SCREEN DOC REV: CPT | Performed by: INTERNAL MEDICINE

## 2018-01-04 PROCEDURE — 1123F ACP DISCUSS/DSCN MKR DOCD: CPT | Performed by: INTERNAL MEDICINE

## 2018-01-04 PROCEDURE — G8484 FLU IMMUNIZE NO ADMIN: HCPCS | Performed by: INTERNAL MEDICINE

## 2018-01-04 PROCEDURE — 99213 OFFICE O/P EST LOW 20 MIN: CPT | Performed by: INTERNAL MEDICINE

## 2018-01-04 PROCEDURE — 1036F TOBACCO NON-USER: CPT | Performed by: INTERNAL MEDICINE

## 2018-01-04 PROCEDURE — G8417 CALC BMI ABV UP PARAM F/U: HCPCS | Performed by: INTERNAL MEDICINE

## 2018-01-04 PROCEDURE — G8427 DOCREV CUR MEDS BY ELIG CLIN: HCPCS | Performed by: INTERNAL MEDICINE

## 2018-01-04 PROCEDURE — 4040F PNEUMOC VAC/ADMIN/RCVD: CPT | Performed by: INTERNAL MEDICINE

## 2018-01-04 RX ORDER — PREDNISONE 1 MG/1
TABLET ORAL
Qty: 36 TABLET | Refills: 0 | Status: SHIPPED | OUTPATIENT
Start: 2018-01-04 | End: 2018-02-20 | Stop reason: ALTCHOICE

## 2018-01-04 RX ORDER — AMOXICILLIN 500 MG/1
500 CAPSULE ORAL 2 TIMES DAILY
Qty: 14 CAPSULE | Refills: 0 | Status: SHIPPED | OUTPATIENT
Start: 2018-01-04 | End: 2018-01-11

## 2018-01-04 RX ORDER — CODEINE PHOSPHATE AND GUAIFENESIN 10; 100 MG/5ML; MG/5ML
5 SOLUTION ORAL 3 TIMES DAILY PRN
Qty: 150 ML | Refills: 0 | Status: SHIPPED | OUTPATIENT
Start: 2018-01-04 | End: 2018-01-11

## 2018-01-04 RX ORDER — AZITHROMYCIN 250 MG/1
TABLET, FILM COATED ORAL
Qty: 6 TABLET | Refills: 0 | Status: SHIPPED | OUTPATIENT
Start: 2018-01-04 | End: 2018-01-04

## 2018-01-04 NOTE — TELEPHONE ENCOUNTER
Severe drug interaction between [facility] & Digoxin. Can increase Digoxin levels and cause toxicity. Per Dr. Lucas Gitelman, send in Amoxicillin 500 BID for 7 days.

## 2018-01-04 NOTE — PROGRESS NOTES
Doron Black  1950 01/04/18    SUBJECTIVE:  3d hx of worsening sinus congestion and cough, wheezing. Sputum is clear, no fever or chills. Wife also ill the past two weeks. OBJECTIVE:    /70   Pulse 79   Resp 16   Wt 268 lb (121.6 kg)   SpO2 93%   BMI 30.97 kg/m²     Physical Exam   Constitutional: He appears well-developed and well-nourished. No distress. HENT:   Head: Normocephalic and atraumatic. Nose: Mucosal edema and rhinorrhea present. No nasal deformity. No epistaxis. Mouth/Throat: Oropharynx is clear and moist. No oropharyngeal exudate. Bilateral nasal congestion with clear discharge noted, no bilateral maxillary sinus tenderness   Eyes: Conjunctivae are normal. No scleral icterus. Neck: Neck supple. Cardiovascular: Normal rate, regular rhythm and normal heart sounds. No murmur heard. Pulmonary/Chest: Effort normal. No respiratory distress. He has wheezes. He has no rales. Abdominal: Soft. Bowel sounds are normal. He exhibits no distension. There is no tenderness. Lymphadenopathy:     He has no cervical adenopathy. Vitals reviewed. ASSESSMENT:    1. Mild intermittent asthma with acute exacerbation        PLAN:    Migdalia Miller was seen today for cough and congestion. Diagnoses and all orders for this visit:    Mild intermittent asthma with acute exacerbation- For ongoing sinusitis and asthma exacerbation will treat with course of atbs, steroid taper and as otherwise noted in medlist.  HAD INITIALLY PLANNED ZPAK BUT HAS INTERACTION W DIG, SWITCH TO AMOX    -     guaiFENesin-codeine (GUAIFENESIN AC) 100-10 MG/5ML liquid; Take 5 mLs by mouth 3 times daily as needed for Cough for up to 7 days. -     predniSONE (DELTASONE) 5 MG tablet; Take 8 pills, then 7,6,5,4,3,2,1  -     Discontinue: azithromycin (ZITHROMAX Z-ALMA) 250 MG tablet;  Take two tabs once then one tab daily till completed-- INTERACTS W DIG

## 2018-02-08 LAB
A/G RATIO: 1.9 (CALC) (ref 0.8–2.6)
ALBUMIN SERPL-MCNC: 4.6 GM/DL (ref 3.5–5.2)
ALP BLD-CCNC: 51 U/L (ref 23–144)
ALT SERPL-CCNC: 23 U/L (ref 0–60)
AST SERPL-CCNC: 25 U/L (ref 0–55)
BASOPHILS ABSOLUTE: 0 K/MM3 (ref 0–0.3)
BASOPHILS RELATIVE PERCENT: 0.5 % (ref 0–2)
BILIRUB SERPL-MCNC: 0.7 MG/DL (ref 0–1.2)
BUN / CREAT RATIO: 12 (CALC) (ref 7–25)
BUN BLDV-MCNC: 12 MG/DL (ref 3–29)
CALCIUM SERPL-MCNC: 9.6 MG/DL (ref 8.5–10.5)
CHLORIDE BLD-SCNC: 101 MEQ/L (ref 96–110)
CHOLESTEROL, TOTAL: 134 MG/DL
CO2: 29 MEQ/L (ref 19–32)
COPY(IES) SENT TO:: NORMAL
CREAT SERPL-MCNC: 1 MG/DL
DIGOXIN LEVEL: 0.9 NG/ML (ref 0.8–2)
EOSINOPHILS ABSOLUTE: 0.2 K/MM3 (ref 0–0.6)
EOSINOPHILS RELATIVE PERCENT: 3.8 % (ref 0–7)
GFR SERPL CREATININE-BSD FRML MDRD: 76 ML/MIN/1.73M2
GLOBULIN: 2.4 GM/DL (CALC) (ref 1.9–3.6)
GLUCOSE BLD-MCNC: 108 MG/DL
HCT VFR BLD CALC: 42.9 % (ref 41–50)
HDLC SERPL-MCNC: 31 MG/DL
HEMOGLOBIN: 14.9 G/DL (ref 13.8–17.2)
LDL CHOLESTEROL: 48 MG/DL (CALC)
LEUKOCYTES, UA: 5.2 K/MM3 (ref 3.8–10.8)
LYMPHOCYTES ABSOLUTE: 1 K/MM3 (ref 0.9–4.1)
LYMPHOCYTES RELATIVE PERCENT: 19.3 % (ref 14–51)
MCH RBC QN AUTO: 32.4 PG (ref 27–33)
MCHC RBC AUTO-ENTMCNC: 34.8 G/DL (ref 32–36)
MCV RBC AUTO: 93.2 FL (ref 80–100)
MONOCYTES ABSOLUTE: 0.6 K/MM3 (ref 0.2–1.1)
MONOCYTES RELATIVE PERCENT: 11.4 % (ref 0–14)
NEUTROPHILS ABSOLUTE: 3.4 K/MM3 (ref 1.5–7.8)
PDW BLD-RTO: 13.8 % (ref 9–15)
PLATELET # BLD: 219 K/MM3 (ref 130–400)
POTASSIUM SERPL-SCNC: 4.1 MEQ/L (ref 3.4–5.3)
RBC # BLD: 4.61 M/MM3 (ref 4.4–5.8)
SEGMENTED NEUTROPHILS RELATIVE PERCENT: 65 % (ref 40–76)
SODIUM BLD-SCNC: 145 MEQ/L (ref 135–148)
TOTAL PROTEIN: 7 GM/DL (ref 6–8.3)
TRIGL SERPL-MCNC: 276 MG/DL
TSH SERPL DL<=0.05 MIU/L-ACNC: 1.43 MICRO IU/ML (ref 0.4–4)
URIC ACID: 7.5 MG/DL (ref 4–8)
VLDLC SERPL CALC-MCNC: 55 MG/DL (CALC) (ref 4–38)

## 2018-02-20 ENCOUNTER — OFFICE VISIT (OUTPATIENT)
Dept: INTERNAL MEDICINE CLINIC | Age: 68
End: 2018-02-20

## 2018-02-20 VITALS
DIASTOLIC BLOOD PRESSURE: 72 MMHG | WEIGHT: 263 LBS | BODY MASS INDEX: 30.39 KG/M2 | SYSTOLIC BLOOD PRESSURE: 120 MMHG | HEART RATE: 74 BPM | RESPIRATION RATE: 16 BRPM | OXYGEN SATURATION: 96 %

## 2018-02-20 DIAGNOSIS — J45.20 MILD INTERMITTENT ASTHMA WITHOUT COMPLICATION: ICD-10-CM

## 2018-02-20 DIAGNOSIS — F51.01 PRIMARY INSOMNIA: ICD-10-CM

## 2018-02-20 DIAGNOSIS — K21.9 GASTROESOPHAGEAL REFLUX DISEASE WITHOUT ESOPHAGITIS: ICD-10-CM

## 2018-02-20 DIAGNOSIS — I42.0 DILATED CARDIOMYOPATHY (HCC): ICD-10-CM

## 2018-02-20 DIAGNOSIS — I48.91 ATRIAL FIBRILLATION, UNSPECIFIED TYPE (HCC): Primary | ICD-10-CM

## 2018-02-20 DIAGNOSIS — I48.20 CHRONIC ATRIAL FIBRILLATION (HCC): ICD-10-CM

## 2018-02-20 LAB
INTERNATIONAL NORMALIZATION RATIO, POC: 1.9
PROTHROMBIN TIME, POC: 22.7

## 2018-02-20 PROCEDURE — 3288F FALL RISK ASSESSMENT DOCD: CPT | Performed by: INTERNAL MEDICINE

## 2018-02-20 PROCEDURE — 4040F PNEUMOC VAC/ADMIN/RCVD: CPT | Performed by: INTERNAL MEDICINE

## 2018-02-20 PROCEDURE — 3017F COLORECTAL CA SCREEN DOC REV: CPT | Performed by: INTERNAL MEDICINE

## 2018-02-20 PROCEDURE — G8417 CALC BMI ABV UP PARAM F/U: HCPCS | Performed by: INTERNAL MEDICINE

## 2018-02-20 PROCEDURE — G8484 FLU IMMUNIZE NO ADMIN: HCPCS | Performed by: INTERNAL MEDICINE

## 2018-02-20 PROCEDURE — 99214 OFFICE O/P EST MOD 30 MIN: CPT | Performed by: INTERNAL MEDICINE

## 2018-02-20 PROCEDURE — 1123F ACP DISCUSS/DSCN MKR DOCD: CPT | Performed by: INTERNAL MEDICINE

## 2018-02-20 PROCEDURE — 85610 PROTHROMBIN TIME: CPT | Performed by: INTERNAL MEDICINE

## 2018-02-20 PROCEDURE — G8427 DOCREV CUR MEDS BY ELIG CLIN: HCPCS | Performed by: INTERNAL MEDICINE

## 2018-02-20 PROCEDURE — 1036F TOBACCO NON-USER: CPT | Performed by: INTERNAL MEDICINE

## 2018-02-20 PROCEDURE — G8510 SCR DEP NEG, NO PLAN REQD: HCPCS | Performed by: INTERNAL MEDICINE

## 2018-02-20 RX ORDER — POTASSIUM CHLORIDE 750 MG/1
10 TABLET, EXTENDED RELEASE ORAL
Qty: 45 TABLET | Refills: 1 | Status: ON HOLD | OUTPATIENT
Start: 2018-02-21 | End: 2018-06-05 | Stop reason: HOSPADM

## 2018-02-20 RX ORDER — OMEPRAZOLE 20 MG/1
20 CAPSULE, DELAYED RELEASE ORAL DAILY
Qty: 90 CAPSULE | Refills: 1 | Status: SHIPPED | OUTPATIENT
Start: 2018-02-20 | End: 2018-11-08 | Stop reason: SDUPTHER

## 2018-02-20 RX ORDER — DILTIAZEM HYDROCHLORIDE 180 MG/1
CAPSULE, COATED, EXTENDED RELEASE ORAL
Qty: 180 CAPSULE | Refills: 1 | Status: SHIPPED | OUTPATIENT
Start: 2018-02-20 | End: 2018-06-07 | Stop reason: SDUPTHER

## 2018-02-20 RX ORDER — TRAZODONE HYDROCHLORIDE 50 MG/1
50 TABLET ORAL NIGHTLY PRN
Qty: 30 TABLET | Refills: 3 | Status: SHIPPED | OUTPATIENT
Start: 2018-02-20 | End: 2018-06-07 | Stop reason: SDUPTHER

## 2018-02-20 RX ORDER — WARFARIN SODIUM 3 MG/1
TABLET ORAL
Qty: 90 TABLET | Refills: 1 | Status: SHIPPED | OUTPATIENT
Start: 2018-02-20 | End: 2018-08-24 | Stop reason: SDUPTHER

## 2018-02-20 ASSESSMENT — PATIENT HEALTH QUESTIONNAIRE - PHQ9
2. FEELING DOWN, DEPRESSED OR HOPELESS: 0
SUM OF ALL RESPONSES TO PHQ9 QUESTIONS 1 & 2: 0
SUM OF ALL RESPONSES TO PHQ QUESTIONS 1-9: 0
1. LITTLE INTEREST OR PLEASURE IN DOING THINGS: 0

## 2018-02-20 NOTE — PROGRESS NOTES
Sowmya Love  1950 02/20/18    SUBJECTIVE:    afib- INR sl low at 1.9, denies palpitations. rec for double coumadin dose today only. Recent dig level was ok on 2/8. Dil cardiomyopathy- no sx cp or sob noted ,no  ENRIQUE/PND/orthopnea. Dilation noted on echo 2016 but EF nl also at 60%. GERD:  Is without sx of heartburn or dysphagia, abd pain. Insomnia stable w prn trazodone. Asthma:  Patient denies significant chest pain/tightness, dyspnea, decreased exercise tolerance, cough, or wheezing on current regimen. Has cataract surgery planned 3/8, w Dr Diggs Figures, R then L for Apr.  Did not give rec holding coumadin. Did offer would be fine to hold coumadin 3d prior then restart 2d later. Total chol ok at 134 also 2/8. OBJECTIVE:    /72   Pulse 74   Resp 16   Wt 263 lb (119.3 kg)   SpO2 96%   BMI 30.39 kg/m²     Physical Exam   Constitutional: He appears well-developed and well-nourished. Neck: Neck supple. Cardiovascular: Normal rate and normal heart sounds. Exam reveals no gallop and no friction rub. No murmur heard. IRR   Pulmonary/Chest: Effort normal and breath sounds normal. No respiratory distress. He has no wheezes. He has no rales. Abdominal: Soft. Bowel sounds are normal. He exhibits no distension. There is no tenderness. Musculoskeletal: He exhibits no edema. Neurological: He is alert. Psychiatric: He has a normal mood and affect. Vitals reviewed. ASSESSMENT:    1. Atrial fibrillation, unspecified type ([address]    2. Dilated cardiomyopathy ([address]    3. Gastroesophageal reflux disease without esophagitis    4. Primary insomnia    5. Chronic atrial fibrillation (HCC)    6. Mild intermittent asthma without complication        PLAN:    Rowan Apodaca was seen today for 3 month follow-up. Diagnoses and all orders for this visit:    Atrial fibrillation, unspecified type ([address] - Pt clinically w/o evidence of cardiovascular instability, cont regimen.   inr sl low so

## 2018-03-08 ENCOUNTER — TELEPHONE (OUTPATIENT)
Dept: CARDIOLOGY CLINIC | Age: 68
End: 2018-03-08

## 2018-04-16 ENCOUNTER — ANTI-COAG VISIT (OUTPATIENT)
Dept: INTERNAL MEDICINE CLINIC | Age: 68
End: 2018-04-16

## 2018-05-17 PROBLEM — S32.411A: Status: ACTIVE | Noted: 2018-05-17

## 2018-05-17 PROBLEM — S32.414D CLOSED NONDISPLACED FRACTURE OF ANTERIOR WALL OF RIGHT ACETABULUM WITH ROUTINE HEALING: Status: ACTIVE | Noted: 2018-05-17

## 2018-05-22 ENCOUNTER — ANTI-COAG VISIT (OUTPATIENT)
Dept: INTERNAL MEDICINE CLINIC | Age: 68
End: 2018-05-22

## 2018-05-22 PROBLEM — S32.401G: Status: ACTIVE | Noted: 2018-05-22

## 2018-05-23 PROBLEM — R26.9 GAIT DISTURBANCE: Status: ACTIVE | Noted: 2018-05-22

## 2018-05-23 PROBLEM — R52 UNCONTROLLED PAIN: Status: ACTIVE | Noted: 2018-05-23

## 2018-05-23 PROBLEM — R29.898 LEG WEAKNESS, BILATERAL: Status: ACTIVE | Noted: 2018-05-23

## 2018-06-01 ENCOUNTER — ANTI-COAG VISIT (OUTPATIENT)
Dept: INTERNAL MEDICINE CLINIC | Age: 68
End: 2018-06-01

## 2018-06-06 ENCOUNTER — TELEPHONE (OUTPATIENT)
Dept: INTERNAL MEDICINE CLINIC | Age: 68
End: 2018-06-06

## 2018-06-06 ENCOUNTER — CARE COORDINATION (OUTPATIENT)
Dept: CASE MANAGEMENT | Age: 68
End: 2018-06-06

## 2018-06-06 DIAGNOSIS — S32.414D CLOSED NONDISPLACED FRACTURE OF ANTERIOR WALL OF RIGHT ACETABULUM WITH ROUTINE HEALING: Primary | ICD-10-CM

## 2018-06-07 ENCOUNTER — OFFICE VISIT (OUTPATIENT)
Dept: INTERNAL MEDICINE CLINIC | Age: 68
End: 2018-06-07

## 2018-06-07 VITALS
RESPIRATION RATE: 17 BRPM | SYSTOLIC BLOOD PRESSURE: 122 MMHG | DIASTOLIC BLOOD PRESSURE: 64 MMHG | BODY MASS INDEX: 29.47 KG/M2 | OXYGEN SATURATION: 95 % | WEIGHT: 255 LBS | HEART RATE: 88 BPM

## 2018-06-07 DIAGNOSIS — F51.01 PRIMARY INSOMNIA: ICD-10-CM

## 2018-06-07 DIAGNOSIS — S43.015A CLOSED ANTERIOR DISLOCATION OF LEFT SHOULDER, INITIAL ENCOUNTER: ICD-10-CM

## 2018-06-07 DIAGNOSIS — E79.0 ELEVATED URIC ACID IN BLOOD: ICD-10-CM

## 2018-06-07 DIAGNOSIS — I42.0 DILATED CARDIOMYOPATHY (HCC): ICD-10-CM

## 2018-06-07 DIAGNOSIS — I48.20 CHRONIC ATRIAL FIBRILLATION (HCC): ICD-10-CM

## 2018-06-07 DIAGNOSIS — S32.414D CLOSED NONDISPLACED FRACTURE OF ANTERIOR WALL OF RIGHT ACETABULUM WITH ROUTINE HEALING: Primary | ICD-10-CM

## 2018-06-07 DIAGNOSIS — S32.411A: ICD-10-CM

## 2018-06-07 PROCEDURE — 99495 TRANSJ CARE MGMT MOD F2F 14D: CPT | Performed by: INTERNAL MEDICINE

## 2018-06-07 RX ORDER — ALLOPURINOL 100 MG/1
100 TABLET ORAL DAILY
Qty: 90 TABLET | Refills: 1 | Status: SHIPPED | OUTPATIENT
Start: 2018-06-07 | End: 2018-11-08 | Stop reason: SDUPTHER

## 2018-06-07 RX ORDER — DIGOXIN 250 MCG
TABLET ORAL
Qty: 90 TABLET | Refills: 1 | Status: SHIPPED | OUTPATIENT
Start: 2018-06-07 | End: 2018-06-28 | Stop reason: SDUPTHER

## 2018-06-07 RX ORDER — DILTIAZEM HYDROCHLORIDE 180 MG/1
CAPSULE, COATED, EXTENDED RELEASE ORAL
Qty: 180 CAPSULE | Refills: 1 | Status: SHIPPED | OUTPATIENT
Start: 2018-06-07 | End: 2018-11-08 | Stop reason: SDUPTHER

## 2018-06-07 RX ORDER — OXYCODONE HYDROCHLORIDE AND ACETAMINOPHEN 5; 325 MG/1; MG/1
1 TABLET ORAL EVERY 6 HOURS PRN
Qty: 28 TABLET | Refills: 0 | Status: SHIPPED | OUTPATIENT
Start: 2018-06-07 | End: 2018-06-14

## 2018-06-07 RX ORDER — FUROSEMIDE 20 MG/1
20 TABLET ORAL
Qty: 45 TABLET | Refills: 3 | Status: SHIPPED | OUTPATIENT
Start: 2018-06-08 | End: 2018-11-08 | Stop reason: SDUPTHER

## 2018-06-07 RX ORDER — TRAZODONE HYDROCHLORIDE 50 MG/1
50 TABLET ORAL NIGHTLY PRN
Qty: 30 TABLET | Refills: 3 | Status: SHIPPED | OUTPATIENT
Start: 2018-06-07 | End: 2018-10-12 | Stop reason: SDUPTHER

## 2018-06-13 ENCOUNTER — CARE COORDINATION (OUTPATIENT)
Dept: CASE MANAGEMENT | Age: 68
End: 2018-06-13

## 2018-06-20 DIAGNOSIS — S32.401S CLOSED DISPLACED FRACTURE OF RIGHT ACETABULUM, UNSPECIFIED PORTION OF ACETABULUM, SEQUELA: Primary | ICD-10-CM

## 2018-06-20 RX ORDER — OXYCODONE HYDROCHLORIDE AND ACETAMINOPHEN 5; 325 MG/1; MG/1
1 TABLET ORAL EVERY 6 HOURS PRN
Qty: 28 TABLET | Refills: 0 | Status: SHIPPED | OUTPATIENT
Start: 2018-06-20 | End: 2018-06-28 | Stop reason: SDUPTHER

## 2018-06-22 ENCOUNTER — ANTI-COAG VISIT (OUTPATIENT)
Dept: INTERNAL MEDICINE CLINIC | Age: 68
End: 2018-06-22

## 2018-06-28 ENCOUNTER — OFFICE VISIT (OUTPATIENT)
Dept: INTERNAL MEDICINE CLINIC | Age: 68
End: 2018-06-28

## 2018-06-28 ENCOUNTER — ANTI-COAG VISIT (OUTPATIENT)
Dept: INTERNAL MEDICINE CLINIC | Age: 68
End: 2018-06-28

## 2018-06-28 VITALS
HEART RATE: 80 BPM | DIASTOLIC BLOOD PRESSURE: 68 MMHG | RESPIRATION RATE: 18 BRPM | OXYGEN SATURATION: 97 % | SYSTOLIC BLOOD PRESSURE: 104 MMHG

## 2018-06-28 DIAGNOSIS — I48.20 CHRONIC ATRIAL FIBRILLATION (HCC): ICD-10-CM

## 2018-06-28 DIAGNOSIS — S32.401S CLOSED DISPLACED FRACTURE OF RIGHT ACETABULUM, UNSPECIFIED PORTION OF ACETABULUM, SEQUELA: Primary | ICD-10-CM

## 2018-06-28 LAB
INTERNATIONAL NORMALIZATION RATIO, POC: 1.8
PROTHROMBIN TIME, POC: 21.6

## 2018-06-28 PROCEDURE — 3017F COLORECTAL CA SCREEN DOC REV: CPT | Performed by: INTERNAL MEDICINE

## 2018-06-28 PROCEDURE — G8427 DOCREV CUR MEDS BY ELIG CLIN: HCPCS | Performed by: INTERNAL MEDICINE

## 2018-06-28 PROCEDURE — 1111F DSCHRG MED/CURRENT MED MERGE: CPT | Performed by: INTERNAL MEDICINE

## 2018-06-28 PROCEDURE — 1123F ACP DISCUSS/DSCN MKR DOCD: CPT | Performed by: INTERNAL MEDICINE

## 2018-06-28 PROCEDURE — 85610 PROTHROMBIN TIME: CPT | Performed by: INTERNAL MEDICINE

## 2018-06-28 PROCEDURE — 1036F TOBACCO NON-USER: CPT | Performed by: INTERNAL MEDICINE

## 2018-06-28 PROCEDURE — 99213 OFFICE O/P EST LOW 20 MIN: CPT | Performed by: INTERNAL MEDICINE

## 2018-06-28 PROCEDURE — 4040F PNEUMOC VAC/ADMIN/RCVD: CPT | Performed by: INTERNAL MEDICINE

## 2018-06-28 PROCEDURE — G8417 CALC BMI ABV UP PARAM F/U: HCPCS | Performed by: INTERNAL MEDICINE

## 2018-06-28 RX ORDER — OXYCODONE HYDROCHLORIDE AND ACETAMINOPHEN 5; 325 MG/1; MG/1
1 TABLET ORAL EVERY 6 HOURS PRN
Qty: 28 TABLET | Refills: 0 | Status: SHIPPED | OUTPATIENT
Start: 2018-06-28 | End: 2018-07-23 | Stop reason: SDUPTHER

## 2018-06-28 RX ORDER — DIGOXIN 250 MCG
TABLET ORAL
Qty: 90 TABLET | Refills: 1 | Status: SHIPPED | OUTPATIENT
Start: 2018-06-28 | End: 2018-11-08 | Stop reason: SDUPTHER

## 2018-07-12 ENCOUNTER — TELEPHONE (OUTPATIENT)
Dept: INTERNAL MEDICINE CLINIC | Age: 68
End: 2018-07-12

## 2018-07-12 DIAGNOSIS — M25.551 PAIN OF RIGHT HIP JOINT: Primary | ICD-10-CM

## 2018-07-12 NOTE — TELEPHONE ENCOUNTER
We can order xray of R hip and pelvis, HE CAN DO AT [facility] AND [facility] MAKE SURE RESULTS ARE SENT TO DR COLLIER ALSO.   IF PAIN IS WORSENING, BEST ADVICE IS HE MAY NEED TO CALL FOR RE-EVAL W ORTHO- DR COLLIER OR ONE OF HIS GROUP, TO BE SEEN BEFORE THE WEEKEND

## 2018-07-13 DIAGNOSIS — M25.551 PAIN OF RIGHT HIP JOINT: ICD-10-CM

## 2018-07-23 DIAGNOSIS — S32.401S CLOSED DISPLACED FRACTURE OF RIGHT ACETABULUM, UNSPECIFIED PORTION OF ACETABULUM, SEQUELA: ICD-10-CM

## 2018-07-23 RX ORDER — OXYCODONE HYDROCHLORIDE AND ACETAMINOPHEN 5; 325 MG/1; MG/1
1 TABLET ORAL EVERY 6 HOURS PRN
Qty: 28 TABLET | Refills: 0 | Status: SHIPPED | OUTPATIENT
Start: 2018-07-23 | End: 2018-07-31 | Stop reason: SDUPTHER

## 2018-07-31 ENCOUNTER — OFFICE VISIT (OUTPATIENT)
Dept: INTERNAL MEDICINE CLINIC | Age: 68
End: 2018-07-31

## 2018-07-31 ENCOUNTER — ANTI-COAG VISIT (OUTPATIENT)
Dept: INTERNAL MEDICINE CLINIC | Age: 68
End: 2018-07-31

## 2018-07-31 VITALS
RESPIRATION RATE: 16 BRPM | SYSTOLIC BLOOD PRESSURE: 108 MMHG | WEIGHT: 250 LBS | DIASTOLIC BLOOD PRESSURE: 74 MMHG | BODY MASS INDEX: 28.89 KG/M2 | HEART RATE: 76 BPM | OXYGEN SATURATION: 97 %

## 2018-07-31 DIAGNOSIS — G89.4 CHRONIC PAIN SYNDROME: ICD-10-CM

## 2018-07-31 DIAGNOSIS — I48.91 ATRIAL FIBRILLATION, UNSPECIFIED TYPE (HCC): Primary | ICD-10-CM

## 2018-07-31 DIAGNOSIS — S32.401S CLOSED DISPLACED FRACTURE OF RIGHT ACETABULUM, UNSPECIFIED PORTION OF ACETABULUM, SEQUELA: ICD-10-CM

## 2018-07-31 LAB
INTERNATIONAL NORMALIZATION RATIO, POC: 2.2
PROTHROMBIN TIME, POC: 25.7

## 2018-07-31 PROCEDURE — G8417 CALC BMI ABV UP PARAM F/U: HCPCS | Performed by: INTERNAL MEDICINE

## 2018-07-31 PROCEDURE — 3017F COLORECTAL CA SCREEN DOC REV: CPT | Performed by: INTERNAL MEDICINE

## 2018-07-31 PROCEDURE — 99213 OFFICE O/P EST LOW 20 MIN: CPT | Performed by: INTERNAL MEDICINE

## 2018-07-31 PROCEDURE — 85610 PROTHROMBIN TIME: CPT | Performed by: INTERNAL MEDICINE

## 2018-07-31 PROCEDURE — 1101F PT FALLS ASSESS-DOCD LE1/YR: CPT | Performed by: INTERNAL MEDICINE

## 2018-07-31 PROCEDURE — 1036F TOBACCO NON-USER: CPT | Performed by: INTERNAL MEDICINE

## 2018-07-31 PROCEDURE — 4040F PNEUMOC VAC/ADMIN/RCVD: CPT | Performed by: INTERNAL MEDICINE

## 2018-07-31 PROCEDURE — G8427 DOCREV CUR MEDS BY ELIG CLIN: HCPCS | Performed by: INTERNAL MEDICINE

## 2018-07-31 PROCEDURE — 1123F ACP DISCUSS/DSCN MKR DOCD: CPT | Performed by: INTERNAL MEDICINE

## 2018-07-31 RX ORDER — OXYCODONE HYDROCHLORIDE AND ACETAMINOPHEN 5; 325 MG/1; MG/1
1 TABLET ORAL EVERY 6 HOURS PRN
Qty: 90 TABLET | Refills: 0 | Status: SHIPPED | OUTPATIENT
Start: 2018-07-31 | End: 2018-08-24 | Stop reason: SDUPTHER

## 2018-08-13 LAB
BASOPHILS ABSOLUTE: NORMAL /ΜL
BASOPHILS RELATIVE PERCENT: 0.1 %
BUN BLDV-MCNC: 12 MG/DL
CALCIUM SERPL-MCNC: 9.2 MG/DL
CHLORIDE BLD-SCNC: 100 MMOL/L
CO2: 29 MMOL/L
CREAT SERPL-MCNC: 1.07 MG/DL
EOSINOPHILS ABSOLUTE: NORMAL /ΜL
EOSINOPHILS RELATIVE PERCENT: 1.9 %
GFR CALCULATED: NORMAL
GLUCOSE BLD-MCNC: 147 MG/DL
HCT VFR BLD CALC: 41.3 % (ref 41–53)
HEMOGLOBIN: 14.9 G/DL (ref 13.5–17.5)
LYMPHOCYTES ABSOLUTE: NORMAL /ΜL
LYMPHOCYTES RELATIVE PERCENT: 11.4 %
MCH RBC QN AUTO: 32.3 PG
MCHC RBC AUTO-ENTMCNC: 36.2 G/DL
MCV RBC AUTO: 89.3 FL
MONOCYTES ABSOLUTE: NORMAL /ΜL
MONOCYTES RELATIVE PERCENT: 7.2 %
NEUTROPHILS ABSOLUTE: NORMAL /ΜL
NEUTROPHILS RELATIVE PERCENT: NORMAL %
PLATELET # BLD: 273 K/ΜL
PMV BLD AUTO: NORMAL FL
POTASSIUM SERPL-SCNC: 3.9 MMOL/L
RBC # BLD: NORMAL 10^6/ΜL
SODIUM BLD-SCNC: 140 MMOL/L
WBC # BLD: 8.2 10^3/ML

## 2018-08-23 ENCOUNTER — ANTI-COAG VISIT (OUTPATIENT)
Dept: INTERNAL MEDICINE CLINIC | Age: 68
End: 2018-08-23

## 2018-08-24 ENCOUNTER — TELEPHONE (OUTPATIENT)
Dept: INTERNAL MEDICINE CLINIC | Age: 68
End: 2018-08-24

## 2018-08-24 ENCOUNTER — OFFICE VISIT (OUTPATIENT)
Dept: INTERNAL MEDICINE CLINIC | Age: 68
End: 2018-08-24

## 2018-08-24 ENCOUNTER — ANTI-COAG VISIT (OUTPATIENT)
Dept: INTERNAL MEDICINE CLINIC | Age: 68
End: 2018-08-24

## 2018-08-24 VITALS
SYSTOLIC BLOOD PRESSURE: 108 MMHG | DIASTOLIC BLOOD PRESSURE: 60 MMHG | HEART RATE: 64 BPM | OXYGEN SATURATION: 97 % | RESPIRATION RATE: 16 BRPM | WEIGHT: 253 LBS | BODY MASS INDEX: 29.24 KG/M2

## 2018-08-24 DIAGNOSIS — S32.401S CLOSED DISPLACED FRACTURE OF RIGHT ACETABULUM, UNSPECIFIED PORTION OF ACETABULUM, SEQUELA: Primary | ICD-10-CM

## 2018-08-24 DIAGNOSIS — G89.4 CHRONIC PAIN SYNDROME: ICD-10-CM

## 2018-08-24 DIAGNOSIS — I48.91 ATRIAL FIBRILLATION, UNSPECIFIED TYPE (HCC): ICD-10-CM

## 2018-08-24 DIAGNOSIS — Z23 NEED FOR INFLUENZA VACCINATION: ICD-10-CM

## 2018-08-24 DIAGNOSIS — I48.20 CHRONIC ATRIAL FIBRILLATION (HCC): ICD-10-CM

## 2018-08-24 LAB
INTERNATIONAL NORMALIZATION RATIO, POC: 2.4
PROTHROMBIN TIME, POC: 28.1

## 2018-08-24 PROCEDURE — G0008 ADMIN INFLUENZA VIRUS VAC: HCPCS | Performed by: INTERNAL MEDICINE

## 2018-08-24 PROCEDURE — 1036F TOBACCO NON-USER: CPT | Performed by: INTERNAL MEDICINE

## 2018-08-24 PROCEDURE — 1123F ACP DISCUSS/DSCN MKR DOCD: CPT | Performed by: INTERNAL MEDICINE

## 2018-08-24 PROCEDURE — 99213 OFFICE O/P EST LOW 20 MIN: CPT | Performed by: INTERNAL MEDICINE

## 2018-08-24 PROCEDURE — 85610 PROTHROMBIN TIME: CPT | Performed by: INTERNAL MEDICINE

## 2018-08-24 PROCEDURE — 3017F COLORECTAL CA SCREEN DOC REV: CPT | Performed by: INTERNAL MEDICINE

## 2018-08-24 PROCEDURE — 4040F PNEUMOC VAC/ADMIN/RCVD: CPT | Performed by: INTERNAL MEDICINE

## 2018-08-24 PROCEDURE — G8427 DOCREV CUR MEDS BY ELIG CLIN: HCPCS | Performed by: INTERNAL MEDICINE

## 2018-08-24 PROCEDURE — 1101F PT FALLS ASSESS-DOCD LE1/YR: CPT | Performed by: INTERNAL MEDICINE

## 2018-08-24 PROCEDURE — G8417 CALC BMI ABV UP PARAM F/U: HCPCS | Performed by: INTERNAL MEDICINE

## 2018-08-24 PROCEDURE — 90662 IIV NO PRSV INCREASED AG IM: CPT | Performed by: INTERNAL MEDICINE

## 2018-08-24 RX ORDER — OXYCODONE HYDROCHLORIDE AND ACETAMINOPHEN 5; 325 MG/1; MG/1
1 TABLET ORAL EVERY 6 HOURS PRN
Qty: 90 TABLET | Refills: 0 | Status: SHIPPED | OUTPATIENT
Start: 2018-08-30 | End: 2018-11-08

## 2018-08-24 RX ORDER — WARFARIN SODIUM 3 MG/1
TABLET ORAL
Qty: 90 TABLET | Refills: 1 | Status: SHIPPED | OUTPATIENT
Start: 2018-08-24 | End: 2019-03-06 | Stop reason: SDUPTHER

## 2018-09-05 ENCOUNTER — OFFICE VISIT (OUTPATIENT)
Dept: CARDIOLOGY CLINIC | Age: 68
End: 2018-09-05

## 2018-09-05 VITALS
HEIGHT: 77 IN | HEART RATE: 66 BPM | BODY MASS INDEX: 30.32 KG/M2 | DIASTOLIC BLOOD PRESSURE: 64 MMHG | SYSTOLIC BLOOD PRESSURE: 118 MMHG | WEIGHT: 256.8 LBS

## 2018-09-05 DIAGNOSIS — Z01.818 PRE-OP EXAM: Primary | ICD-10-CM

## 2018-09-05 DIAGNOSIS — E80.6 HYPERBILIRUBINEMIA: ICD-10-CM

## 2018-09-05 DIAGNOSIS — I48.19 PERSISTENT ATRIAL FIBRILLATION (HCC): ICD-10-CM

## 2018-09-05 PROBLEM — I35.0 AORTIC STENOSIS, MILD: Status: RESOLVED | Noted: 2017-11-08 | Resolved: 2018-09-05

## 2018-09-05 PROCEDURE — 99214 OFFICE O/P EST MOD 30 MIN: CPT | Performed by: INTERNAL MEDICINE

## 2018-09-05 PROCEDURE — 1101F PT FALLS ASSESS-DOCD LE1/YR: CPT | Performed by: INTERNAL MEDICINE

## 2018-09-05 PROCEDURE — 1036F TOBACCO NON-USER: CPT | Performed by: INTERNAL MEDICINE

## 2018-09-05 PROCEDURE — G8417 CALC BMI ABV UP PARAM F/U: HCPCS | Performed by: INTERNAL MEDICINE

## 2018-09-05 PROCEDURE — 3017F COLORECTAL CA SCREEN DOC REV: CPT | Performed by: INTERNAL MEDICINE

## 2018-09-05 PROCEDURE — 1123F ACP DISCUSS/DSCN MKR DOCD: CPT | Performed by: INTERNAL MEDICINE

## 2018-09-05 PROCEDURE — 4040F PNEUMOC VAC/ADMIN/RCVD: CPT | Performed by: INTERNAL MEDICINE

## 2018-09-05 PROCEDURE — 93000 ELECTROCARDIOGRAM COMPLETE: CPT | Performed by: INTERNAL MEDICINE

## 2018-09-05 PROCEDURE — G8427 DOCREV CUR MEDS BY ELIG CLIN: HCPCS | Performed by: INTERNAL MEDICINE

## 2018-09-17 ENCOUNTER — OFFICE VISIT (OUTPATIENT)
Dept: INTERNAL MEDICINE CLINIC | Age: 68
End: 2018-09-17

## 2018-09-17 VITALS
HEART RATE: 60 BPM | OXYGEN SATURATION: 97 % | SYSTOLIC BLOOD PRESSURE: 118 MMHG | RESPIRATION RATE: 18 BRPM | DIASTOLIC BLOOD PRESSURE: 64 MMHG

## 2018-09-17 DIAGNOSIS — S32.414D CLOSED NONDISPLACED FRACTURE OF ANTERIOR WALL OF RIGHT ACETABULUM WITH ROUTINE HEALING: ICD-10-CM

## 2018-09-17 DIAGNOSIS — I48.19 PERSISTENT ATRIAL FIBRILLATION (HCC): ICD-10-CM

## 2018-09-17 DIAGNOSIS — J38.3 VOCAL CORD MASS: Primary | ICD-10-CM

## 2018-09-17 LAB
ANION GAP SERPL CALCULATED.3IONS-SCNC: 12 MMOL/L (ref 3–16)
BASOPHILS ABSOLUTE: 0.1 K/UL (ref 0–0.2)
BASOPHILS RELATIVE PERCENT: 0.6 %
BUN BLDV-MCNC: 14 MG/DL (ref 7–20)
CALCIUM SERPL-MCNC: 9.6 MG/DL (ref 8.3–10.6)
CHLORIDE BLD-SCNC: 97 MMOL/L (ref 99–110)
CO2: 31 MMOL/L (ref 21–32)
CREAT SERPL-MCNC: 1.1 MG/DL (ref 0.8–1.3)
EOSINOPHILS ABSOLUTE: 0.2 K/UL (ref 0–0.6)
EOSINOPHILS RELATIVE PERCENT: 2.1 %
GFR AFRICAN AMERICAN: >60
GFR NON-AFRICAN AMERICAN: >60
GLUCOSE BLD-MCNC: 104 MG/DL (ref 70–99)
HCT VFR BLD CALC: 38.2 % (ref 40.5–52.5)
HEMOGLOBIN: 12.9 G/DL (ref 13.5–17.5)
INTERNATIONAL NORMALIZATION RATIO, POC: 2.1
LYMPHOCYTES ABSOLUTE: 1.1 K/UL (ref 1–5.1)
LYMPHOCYTES RELATIVE PERCENT: 12 %
MCH RBC QN AUTO: 31.7 PG (ref 26–34)
MCHC RBC AUTO-ENTMCNC: 33.8 G/DL (ref 31–36)
MCV RBC AUTO: 93.8 FL (ref 80–100)
MONOCYTES ABSOLUTE: 0.9 K/UL (ref 0–1.3)
MONOCYTES RELATIVE PERCENT: 9.8 %
NEUTROPHILS ABSOLUTE: 6.9 K/UL (ref 1.7–7.7)
NEUTROPHILS RELATIVE PERCENT: 75.5 %
PDW BLD-RTO: 15.6 % (ref 12.4–15.4)
PLATELET # BLD: 241 K/UL (ref 135–450)
PMV BLD AUTO: 8.1 FL (ref 5–10.5)
POTASSIUM SERPL-SCNC: 4.1 MMOL/L (ref 3.5–5.1)
PROTHROMBIN TIME, POC: 31
RBC # BLD: 4.07 M/UL (ref 4.2–5.9)
SODIUM BLD-SCNC: 140 MMOL/L (ref 136–145)
WBC # BLD: 9.2 K/UL (ref 4–11)

## 2018-09-17 PROCEDURE — 3017F COLORECTAL CA SCREEN DOC REV: CPT | Performed by: INTERNAL MEDICINE

## 2018-09-17 PROCEDURE — G8417 CALC BMI ABV UP PARAM F/U: HCPCS | Performed by: INTERNAL MEDICINE

## 2018-09-17 PROCEDURE — 99214 OFFICE O/P EST MOD 30 MIN: CPT | Performed by: INTERNAL MEDICINE

## 2018-09-17 PROCEDURE — 1101F PT FALLS ASSESS-DOCD LE1/YR: CPT | Performed by: INTERNAL MEDICINE

## 2018-09-17 PROCEDURE — 4040F PNEUMOC VAC/ADMIN/RCVD: CPT | Performed by: INTERNAL MEDICINE

## 2018-09-17 PROCEDURE — G8427 DOCREV CUR MEDS BY ELIG CLIN: HCPCS | Performed by: INTERNAL MEDICINE

## 2018-09-17 PROCEDURE — 1036F TOBACCO NON-USER: CPT | Performed by: INTERNAL MEDICINE

## 2018-09-17 PROCEDURE — 1123F ACP DISCUSS/DSCN MKR DOCD: CPT | Performed by: INTERNAL MEDICINE

## 2018-09-17 PROCEDURE — 85610 PROTHROMBIN TIME: CPT | Performed by: INTERNAL MEDICINE

## 2018-09-17 RX ORDER — CELECOXIB 200 MG/1
200 CAPSULE ORAL DAILY
COMMUNITY
End: 2019-02-05

## 2018-09-17 NOTE — PROGRESS NOTES
Faxed referral to [facility] as an urgent request.
PLAN:    Murali Pepe was seen today for follow-up from hospital.    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    -     POCT INR  -     CBC Auto Differential; Future  -     BASIC METABOLIC PANEL; Future    Closed nondisplaced fracture of anterior wall of right acetabulum with routine healing- NOW S/P R THR AND HEALING WELL, FU LAB ESPEC FOR ANY POSTOP ANEMIA OR DEHYDRATION. SENDING FOR RECORDS  [place]  -     CBC Auto Differential; Future  -     BASIC METABOLIC PANEL;  Future

## 2018-09-19 ENCOUNTER — ANTI-COAG VISIT (OUTPATIENT)
Dept: INTERNAL MEDICINE CLINIC | Age: 68
End: 2018-09-19

## 2018-10-05 PROBLEM — Z01.818 PRE-OP EXAM: Status: RESOLVED | Noted: 2018-09-05 | Resolved: 2018-10-05

## 2018-10-12 ENCOUNTER — OFFICE VISIT (OUTPATIENT)
Dept: INTERNAL MEDICINE CLINIC | Age: 68
End: 2018-10-12
Payer: MEDICARE

## 2018-10-12 VITALS
HEART RATE: 80 BPM | RESPIRATION RATE: 18 BRPM | SYSTOLIC BLOOD PRESSURE: 114 MMHG | DIASTOLIC BLOOD PRESSURE: 64 MMHG | OXYGEN SATURATION: 97 %

## 2018-10-12 DIAGNOSIS — F51.01 PRIMARY INSOMNIA: ICD-10-CM

## 2018-10-12 DIAGNOSIS — I48.19 PERSISTENT ATRIAL FIBRILLATION (HCC): ICD-10-CM

## 2018-10-12 DIAGNOSIS — K52.9 ACUTE GASTROENTERITIS: Primary | ICD-10-CM

## 2018-10-12 LAB
INTERNATIONAL NORMALIZATION RATIO, POC: 1.4
PROTHROMBIN TIME, POC: 17.2

## 2018-10-12 PROCEDURE — 1101F PT FALLS ASSESS-DOCD LE1/YR: CPT | Performed by: INTERNAL MEDICINE

## 2018-10-12 PROCEDURE — 85610 PROTHROMBIN TIME: CPT | Performed by: INTERNAL MEDICINE

## 2018-10-12 PROCEDURE — G8417 CALC BMI ABV UP PARAM F/U: HCPCS | Performed by: INTERNAL MEDICINE

## 2018-10-12 PROCEDURE — G8482 FLU IMMUNIZE ORDER/ADMIN: HCPCS | Performed by: INTERNAL MEDICINE

## 2018-10-12 PROCEDURE — 1123F ACP DISCUSS/DSCN MKR DOCD: CPT | Performed by: INTERNAL MEDICINE

## 2018-10-12 PROCEDURE — 99213 OFFICE O/P EST LOW 20 MIN: CPT | Performed by: INTERNAL MEDICINE

## 2018-10-12 PROCEDURE — 3017F COLORECTAL CA SCREEN DOC REV: CPT | Performed by: INTERNAL MEDICINE

## 2018-10-12 PROCEDURE — 1036F TOBACCO NON-USER: CPT | Performed by: INTERNAL MEDICINE

## 2018-10-12 PROCEDURE — G8427 DOCREV CUR MEDS BY ELIG CLIN: HCPCS | Performed by: INTERNAL MEDICINE

## 2018-10-12 PROCEDURE — 4040F PNEUMOC VAC/ADMIN/RCVD: CPT | Performed by: INTERNAL MEDICINE

## 2018-10-12 RX ORDER — PROMETHAZINE HYDROCHLORIDE 25 MG/1
25 TABLET ORAL EVERY 8 HOURS PRN
Qty: 20 TABLET | Refills: 0 | Status: SHIPPED | OUTPATIENT
Start: 2018-10-12 | End: 2018-10-18

## 2018-10-12 RX ORDER — TRAZODONE HYDROCHLORIDE 50 MG/1
100 TABLET ORAL NIGHTLY PRN
Qty: 60 TABLET | Refills: 3 | Status: SHIPPED | OUTPATIENT
Start: 2018-10-12 | End: 2019-03-06 | Stop reason: SDUPTHER

## 2018-10-12 RX ORDER — DICYCLOMINE HYDROCHLORIDE 10 MG/1
10 CAPSULE ORAL 2 TIMES DAILY
Qty: 20 CAPSULE | Refills: 3 | Status: SHIPPED | OUTPATIENT
Start: 2018-10-12 | End: 2019-02-05

## 2018-10-12 NOTE — PROGRESS NOTES
Patient was instructed per Dr. Tavo Vargas to take Coumadin 6mg today only then resume at 3mg daily and recheck INR in one month.

## 2018-10-12 NOTE — PROGRESS NOTES
Echo Yarbrough  1950  10/12/18    SUBJECTIVE:    R hip now w/o pain. The past 3d [facility] [employer], onset of n/v. Took MOM w some loose stool but no fever, chills. One bout emesis only. Diffuse abd pain noted, sl better today. Appetite diminished. Afib- on coumadin, w decr appetite will check INR. Insomnia not better w 50mg trazodone, will now try 100mg prn    OBJECTIVE:    /64   Pulse 80   Resp 18   SpO2 97%     Physical Exam   Constitutional: He appears well-developed and well-nourished. Neck: Neck supple. Cardiovascular: Normal rate, regular rhythm and normal heart sounds. Exam reveals no gallop and no friction rub. No murmur heard. Pulmonary/Chest: Effort normal and breath sounds normal. No respiratory distress. He has no wheezes. He has no rales. Abdominal: Soft. Bowel sounds are normal. He exhibits no distension. There is no tenderness. There is no rebound. Musculoskeletal: He exhibits no edema. Neurological: He is alert. Psychiatric: He has a normal mood and affect. Vitals reviewed. ASSESSMENT:    1. Acute gastroenteritis    2. Persistent atrial fibrillation ([address]    3. Primary insomnia        PLAN:    Дмитрий Seay was seen today for nausea and other. Diagnoses and all orders for this visit:    Acute gastroenteritis- viral infection suspected, rec rx as below and fluids, rest.   GRAD INCR DIET AS WAYNE. To call back one week if not improving.      -     promethazine (PHENERGAN) 25 MG tablet; Take 1 tablet by mouth every 8 hours as needed for Nausea  -     dicyclomine (BENTYL) 10 MG capsule; Take 1 capsule by mouth 2 times daily    Persistent atrial fibrillation (HCC) - INR SL LOW 1.4, TO TAKE 2 TABS TODAY THEN BACK TO ONE DAY, RECHECK W VISIT ONE MONTH  -     POCT INR    Primary insomnia- TRIAL INCR FR 1-2 TABS QHS PRN  -     traZODone (DESYREL) 50 MG tablet;  Take 2 tablets by mouth nightly as needed for Sleep Nightly as needed for sleep

## 2018-10-18 ENCOUNTER — OFFICE VISIT (OUTPATIENT)
Dept: INTERNAL MEDICINE CLINIC | Age: 68
End: 2018-10-18
Payer: MEDICARE

## 2018-10-18 ENCOUNTER — HOSPITAL ENCOUNTER (OUTPATIENT)
Age: 68
Discharge: HOME | End: 2018-10-18
Payer: MEDICARE

## 2018-10-18 ENCOUNTER — HOSPITAL ENCOUNTER (OUTPATIENT)
Dept: GENERAL RADIOLOGY | Age: 68
Discharge: HOME | End: 2018-10-18
Payer: MEDICARE

## 2018-10-18 VITALS
RESPIRATION RATE: 16 BRPM | HEART RATE: 100 BPM | SYSTOLIC BLOOD PRESSURE: 120 MMHG | OXYGEN SATURATION: 95 % | TEMPERATURE: 97.7 F | DIASTOLIC BLOOD PRESSURE: 80 MMHG

## 2018-10-18 DIAGNOSIS — R14.0 ABDOMINAL BLOATING: ICD-10-CM

## 2018-10-18 DIAGNOSIS — R14.0 ABDOMINAL BLOATING: Primary | ICD-10-CM

## 2018-10-18 DIAGNOSIS — I48.19 PERSISTENT ATRIAL FIBRILLATION (HCC): ICD-10-CM

## 2018-10-18 LAB
A/G RATIO: 2 (ref 1.1–2.2)
ALBUMIN SERPL-MCNC: 4.8 G/DL (ref 3.4–5)
ALP BLD-CCNC: 80 U/L (ref 40–129)
ALT SERPL-CCNC: 14 U/L (ref 10–40)
AMYLASE: 41 U/L (ref 25–115)
ANION GAP SERPL CALCULATED.3IONS-SCNC: 15 MMOL/L (ref 3–16)
AST SERPL-CCNC: 17 U/L (ref 15–37)
BASOPHILS ABSOLUTE: 0.1 K/UL (ref 0–0.2)
BASOPHILS RELATIVE PERCENT: 1.1 %
BILIRUB SERPL-MCNC: 0.7 MG/DL (ref 0–1)
BILIRUBIN URINE: NEGATIVE
BLOOD, URINE: NEGATIVE
BUN BLDV-MCNC: 8 MG/DL (ref 7–20)
CALCIUM SERPL-MCNC: 10.3 MG/DL (ref 8.3–10.6)
CHLORIDE BLD-SCNC: 92 MMOL/L (ref 99–110)
CLARITY: CLEAR
CO2: 29 MMOL/L (ref 21–32)
COLOR: ABNORMAL
CREAT SERPL-MCNC: 1.1 MG/DL (ref 0.8–1.3)
CRYSTALS, UA: ABNORMAL /HPF
DIGOXIN LEVEL: 0.7 NG/ML (ref 0.8–2)
EOSINOPHILS ABSOLUTE: 0.2 K/UL (ref 0–0.6)
EOSINOPHILS RELATIVE PERCENT: 2.6 %
EPITHELIAL CELLS, UA: 1 /HPF (ref 0–5)
GFR AFRICAN AMERICAN: >60
GFR NON-AFRICAN AMERICAN: >60
GLOBULIN: 2.4 G/DL
GLUCOSE BLD-MCNC: 154 MG/DL (ref 70–99)
GLUCOSE URINE: NEGATIVE MG/DL
HCT VFR BLD CALC: 46 % (ref 40.5–52.5)
HEMOGLOBIN: 15.6 G/DL (ref 13.5–17.5)
HYALINE CASTS: 14 /LPF (ref 0–8)
KETONES, URINE: NEGATIVE MG/DL
LEUKOCYTE ESTERASE, URINE: NEGATIVE
LIPASE: 26 U/L (ref 13–60)
LYMPHOCYTES ABSOLUTE: 1.2 K/UL (ref 1–5.1)
LYMPHOCYTES RELATIVE PERCENT: 14.5 %
MCH RBC QN AUTO: 31.3 PG (ref 26–34)
MCHC RBC AUTO-ENTMCNC: 33.8 G/DL (ref 31–36)
MCV RBC AUTO: 92.5 FL (ref 80–100)
MICROSCOPIC EXAMINATION: YES
MONOCYTES ABSOLUTE: 0.9 K/UL (ref 0–1.3)
MONOCYTES RELATIVE PERCENT: 11.1 %
MUCUS: ABNORMAL /LPF
NEUTROPHILS ABSOLUTE: 5.8 K/UL (ref 1.7–7.7)
NEUTROPHILS RELATIVE PERCENT: 70.7 %
NITRITE, URINE: NEGATIVE
PDW BLD-RTO: 14.3 % (ref 12.4–15.4)
PH UA: 6
PLATELET # BLD: 267 K/UL (ref 135–450)
PMV BLD AUTO: 8.5 FL (ref 5–10.5)
POTASSIUM SERPL-SCNC: 3.5 MMOL/L (ref 3.5–5.1)
PROTEIN UA: ABNORMAL MG/DL
RBC # BLD: 4.98 M/UL (ref 4.2–5.9)
RBC UA: 4 /HPF (ref 0–4)
SODIUM BLD-SCNC: 136 MMOL/L (ref 136–145)
SPECIFIC GRAVITY UA: 1.02
TOTAL PROTEIN: 7.2 G/DL (ref 6.4–8.2)
URINE TYPE: ABNORMAL
UROBILINOGEN, URINE: 0.2 E.U./DL
WBC # BLD: 8.2 K/UL (ref 4–11)
WBC UA: 3 /HPF (ref 0–5)

## 2018-10-18 PROCEDURE — G8427 DOCREV CUR MEDS BY ELIG CLIN: HCPCS | Performed by: INTERNAL MEDICINE

## 2018-10-18 PROCEDURE — 1123F ACP DISCUSS/DSCN MKR DOCD: CPT | Performed by: INTERNAL MEDICINE

## 2018-10-18 PROCEDURE — 74018 RADEX ABDOMEN 1 VIEW: CPT

## 2018-10-18 PROCEDURE — 1036F TOBACCO NON-USER: CPT | Performed by: INTERNAL MEDICINE

## 2018-10-18 PROCEDURE — 3017F COLORECTAL CA SCREEN DOC REV: CPT | Performed by: INTERNAL MEDICINE

## 2018-10-18 PROCEDURE — 4040F PNEUMOC VAC/ADMIN/RCVD: CPT | Performed by: INTERNAL MEDICINE

## 2018-10-18 PROCEDURE — 1101F PT FALLS ASSESS-DOCD LE1/YR: CPT | Performed by: INTERNAL MEDICINE

## 2018-10-18 PROCEDURE — G8482 FLU IMMUNIZE ORDER/ADMIN: HCPCS | Performed by: INTERNAL MEDICINE

## 2018-10-18 PROCEDURE — G8417 CALC BMI ABV UP PARAM F/U: HCPCS | Performed by: INTERNAL MEDICINE

## 2018-10-18 PROCEDURE — 99213 OFFICE O/P EST LOW 20 MIN: CPT | Performed by: INTERNAL MEDICINE

## 2018-10-18 RX ORDER — ONDANSETRON 4 MG/1
4 TABLET, FILM COATED ORAL EVERY 12 HOURS PRN
Qty: 10 TABLET | Refills: 1 | Status: SHIPPED | OUTPATIENT
Start: 2018-10-18 | End: 2019-02-05

## 2018-10-19 DIAGNOSIS — R73.9 HYPERGLYCEMIA: Primary | ICD-10-CM

## 2018-10-19 LAB
ESTIMATED AVERAGE GLUCOSE: 108.3 MG/DL
HBA1C MFR BLD: 5.4 %

## 2018-10-23 ENCOUNTER — ANTI-COAG VISIT (OUTPATIENT)
Dept: INTERNAL MEDICINE CLINIC | Age: 68
End: 2018-10-23

## 2018-10-23 DIAGNOSIS — R73.9 HYPERGLYCEMIA: ICD-10-CM

## 2018-10-23 LAB
ANION GAP SERPL CALCULATED.3IONS-SCNC: 13 MMOL/L (ref 3–16)
BUN BLDV-MCNC: 7 MG/DL (ref 7–20)
CALCIUM SERPL-MCNC: 9.6 MG/DL (ref 8.3–10.6)
CHLORIDE BLD-SCNC: 101 MMOL/L (ref 99–110)
CO2: 30 MMOL/L (ref 21–32)
CREAT SERPL-MCNC: 1 MG/DL (ref 0.8–1.3)
GFR AFRICAN AMERICAN: >60
GFR NON-AFRICAN AMERICAN: >60
GLUCOSE BLD-MCNC: 121 MG/DL (ref 70–99)
POTASSIUM SERPL-SCNC: 3.8 MMOL/L (ref 3.5–5.1)
SODIUM BLD-SCNC: 144 MMOL/L (ref 136–145)

## 2018-10-24 ENCOUNTER — TELEPHONE (OUTPATIENT)
Dept: INTERNAL MEDICINE CLINIC | Age: 68
End: 2018-10-24

## 2018-10-24 LAB
ESTIMATED AVERAGE GLUCOSE: 105.4 MG/DL
HBA1C MFR BLD: 5.3 %

## 2018-10-24 NOTE — TELEPHONE ENCOUNTER
Patient's dentist calls- he had hip replacement 9/12/18. Does patient require any antibiotics prior to dental procedures? If so, please advise which ABX.

## 2018-10-24 NOTE — TELEPHONE ENCOUNTER
NORMALLY THIS IS REC BY ORTHOPEDICS BUT TO BE SAFE, IF NOT CLARIFIED BY THEM, REC AMOX 2000 MG 30MIN BEFORE PROCEDURE    WE CAN SEND SCRIPT IF NEEDED

## 2018-11-06 ENCOUNTER — TELEPHONE (OUTPATIENT)
Dept: CARDIOLOGY CLINIC | Age: 68
End: 2018-11-06

## 2018-11-06 ENCOUNTER — PROCEDURE VISIT (OUTPATIENT)
Dept: CARDIOLOGY CLINIC | Age: 68
End: 2018-11-06
Payer: MEDICARE

## 2018-11-06 DIAGNOSIS — I35.0 AORTIC STENOSIS, MILD: Primary | ICD-10-CM

## 2018-11-06 DIAGNOSIS — I48.91 ATRIAL FIBRILLATION, UNSPECIFIED TYPE (HCC): ICD-10-CM

## 2018-11-06 LAB
LV EF: 58 %
LVEF MODALITY: NORMAL

## 2018-11-06 PROCEDURE — 93306 TTE W/DOPPLER COMPLETE: CPT | Performed by: INTERNAL MEDICINE

## 2018-11-06 NOTE — TELEPHONE ENCOUNTER
Mildly dilated left ventricle with normal systolic function ejection   fraction is 55-60%.  Mild concentric left ventricular hypertrophy. Diastolic function could not   be evaluated due to arrhythmia.   Moderately to severely dilated left and right atrium and right ventricle.   Sclerotic and mildly stenotic aortic valve. The aortic valve area is 1.76   cm2 with a mean gradient of 15 mmHg.   Mitral annular calcification is present. Mild to Moderate mitral and mild   tricuspid regurgitation present.   Right ventricular systolic pressure of 35 mm Hg consistent with mild   pulmonary hypertension.   No evidence of pericardial effusion. Advised pt of echo results.

## 2018-11-08 ENCOUNTER — OFFICE VISIT (OUTPATIENT)
Dept: INTERNAL MEDICINE CLINIC | Age: 68
End: 2018-11-08
Payer: MEDICARE

## 2018-11-08 VITALS
RESPIRATION RATE: 18 BRPM | WEIGHT: 254 LBS | DIASTOLIC BLOOD PRESSURE: 72 MMHG | OXYGEN SATURATION: 97 % | HEART RATE: 68 BPM | SYSTOLIC BLOOD PRESSURE: 120 MMHG | BODY MASS INDEX: 30.12 KG/M2

## 2018-11-08 DIAGNOSIS — I48.20 CHRONIC ATRIAL FIBRILLATION (HCC): ICD-10-CM

## 2018-11-08 DIAGNOSIS — E78.2 HYPERLIPEMIA, MIXED: ICD-10-CM

## 2018-11-08 DIAGNOSIS — J45.20 MILD INTERMITTENT ASTHMA WITHOUT COMPLICATION: Primary | ICD-10-CM

## 2018-11-08 DIAGNOSIS — E79.0 ELEVATED URIC ACID IN BLOOD: ICD-10-CM

## 2018-11-08 DIAGNOSIS — I42.0 DILATED CARDIOMYOPATHY (HCC): ICD-10-CM

## 2018-11-08 DIAGNOSIS — S32.414D CLOSED NONDISPLACED FRACTURE OF ANTERIOR WALL OF RIGHT ACETABULUM WITH ROUTINE HEALING: ICD-10-CM

## 2018-11-08 DIAGNOSIS — K21.9 GASTROESOPHAGEAL REFLUX DISEASE WITHOUT ESOPHAGITIS: ICD-10-CM

## 2018-11-08 LAB
INTERNATIONAL NORMALIZATION RATIO, POC: 2.6
PROTHROMBIN TIME, POC: 27.4

## 2018-11-08 PROCEDURE — 99214 OFFICE O/P EST MOD 30 MIN: CPT | Performed by: INTERNAL MEDICINE

## 2018-11-08 PROCEDURE — G8482 FLU IMMUNIZE ORDER/ADMIN: HCPCS | Performed by: INTERNAL MEDICINE

## 2018-11-08 PROCEDURE — 85610 PROTHROMBIN TIME: CPT | Performed by: INTERNAL MEDICINE

## 2018-11-08 PROCEDURE — G8417 CALC BMI ABV UP PARAM F/U: HCPCS | Performed by: INTERNAL MEDICINE

## 2018-11-08 PROCEDURE — 1101F PT FALLS ASSESS-DOCD LE1/YR: CPT | Performed by: INTERNAL MEDICINE

## 2018-11-08 PROCEDURE — 1036F TOBACCO NON-USER: CPT | Performed by: INTERNAL MEDICINE

## 2018-11-08 PROCEDURE — 4040F PNEUMOC VAC/ADMIN/RCVD: CPT | Performed by: INTERNAL MEDICINE

## 2018-11-08 PROCEDURE — G8427 DOCREV CUR MEDS BY ELIG CLIN: HCPCS | Performed by: INTERNAL MEDICINE

## 2018-11-08 PROCEDURE — 1123F ACP DISCUSS/DSCN MKR DOCD: CPT | Performed by: INTERNAL MEDICINE

## 2018-11-08 PROCEDURE — 3017F COLORECTAL CA SCREEN DOC REV: CPT | Performed by: INTERNAL MEDICINE

## 2018-11-08 RX ORDER — FLUTICASONE PROPIONATE AND SALMETEROL 232; 14 UG/1; UG/1
1 POWDER, METERED RESPIRATORY (INHALATION) 2 TIMES DAILY
Qty: 1 EACH | Refills: 5 | Status: SHIPPED | OUTPATIENT
Start: 2018-11-08 | End: 2019-07-01 | Stop reason: SDUPTHER

## 2018-11-08 RX ORDER — FUROSEMIDE 20 MG/1
20 TABLET ORAL
Qty: 45 TABLET | Refills: 1 | Status: SHIPPED | OUTPATIENT
Start: 2018-11-09 | End: 2019-03-06 | Stop reason: SDUPTHER

## 2018-11-08 RX ORDER — DILTIAZEM HYDROCHLORIDE 180 MG/1
CAPSULE, COATED, EXTENDED RELEASE ORAL
Qty: 180 CAPSULE | Refills: 1 | Status: SHIPPED | OUTPATIENT
Start: 2018-11-08 | End: 2019-04-08 | Stop reason: SDUPTHER

## 2018-11-08 RX ORDER — OMEPRAZOLE 20 MG/1
20 CAPSULE, DELAYED RELEASE ORAL DAILY
Qty: 90 CAPSULE | Refills: 1 | Status: SHIPPED | OUTPATIENT
Start: 2018-11-08 | End: 2019-03-06 | Stop reason: SDUPTHER

## 2018-11-08 RX ORDER — DIGOXIN 250 MCG
TABLET ORAL
Qty: 90 TABLET | Refills: 1 | Status: SHIPPED | OUTPATIENT
Start: 2018-11-08 | End: 2019-07-01 | Stop reason: SDUPTHER

## 2018-11-08 RX ORDER — ALLOPURINOL 100 MG/1
100 TABLET ORAL DAILY
Qty: 90 TABLET | Refills: 1 | Status: SHIPPED | OUTPATIENT
Start: 2018-11-08 | End: 2019-07-01 | Stop reason: SDUPTHER

## 2018-11-29 ENCOUNTER — OFFICE VISIT (OUTPATIENT)
Dept: CARDIOLOGY CLINIC | Age: 68
End: 2018-11-29
Payer: MEDICARE

## 2018-11-29 VITALS
DIASTOLIC BLOOD PRESSURE: 68 MMHG | HEIGHT: 77 IN | HEART RATE: 78 BPM | SYSTOLIC BLOOD PRESSURE: 118 MMHG | BODY MASS INDEX: 31.01 KG/M2 | WEIGHT: 262.6 LBS

## 2018-11-29 DIAGNOSIS — I48.21 PERMANENT ATRIAL FIBRILLATION (HCC): Primary | ICD-10-CM

## 2018-11-29 DIAGNOSIS — I38 VHD (VALVULAR HEART DISEASE): ICD-10-CM

## 2018-11-29 PROCEDURE — 1036F TOBACCO NON-USER: CPT | Performed by: INTERNAL MEDICINE

## 2018-11-29 PROCEDURE — 4040F PNEUMOC VAC/ADMIN/RCVD: CPT | Performed by: INTERNAL MEDICINE

## 2018-11-29 PROCEDURE — 1123F ACP DISCUSS/DSCN MKR DOCD: CPT | Performed by: INTERNAL MEDICINE

## 2018-11-29 PROCEDURE — G8482 FLU IMMUNIZE ORDER/ADMIN: HCPCS | Performed by: INTERNAL MEDICINE

## 2018-11-29 PROCEDURE — G8427 DOCREV CUR MEDS BY ELIG CLIN: HCPCS | Performed by: INTERNAL MEDICINE

## 2018-11-29 PROCEDURE — 99213 OFFICE O/P EST LOW 20 MIN: CPT | Performed by: INTERNAL MEDICINE

## 2018-11-29 PROCEDURE — 3017F COLORECTAL CA SCREEN DOC REV: CPT | Performed by: INTERNAL MEDICINE

## 2018-11-29 PROCEDURE — G8417 CALC BMI ABV UP PARAM F/U: HCPCS | Performed by: INTERNAL MEDICINE

## 2018-11-29 PROCEDURE — 1101F PT FALLS ASSESS-DOCD LE1/YR: CPT | Performed by: INTERNAL MEDICINE

## 2018-11-29 NOTE — PROGRESS NOTES
reviewed 11/4/2015    Alcohol use Yes      Comment: occasional wine/moderate     Family history: family history includes Diabetes in his father; High Blood Pressure in his mother; Hypertension in his mother; Hypotension in his father; Other in his father and mother. ALLERGIES:  Patient has no known allergies. Prior to Admission medications    Medication Sig Start Date End Date Taking? Authorizing Provider   fluticasone-salmeterol (ADVAIR DISKUS) 500-50 MCG/DOSE diskus inhaler Inhale 1 puff into the lungs 2 times daily Rinse mouth after use.  11/8/18  Yes Brenda Ramirez MD   diltiazem (CARTIA XT) 180 MG extended release capsule TAKE ONE CAPSULE BY MOUTH TWICE A DAY 11/8/18  Yes Brenda Ramirez MD   furosemide (LASIX) 20 MG tablet Take 1 tablet by mouth three times a week 01/04/17 Pt reports he takes this on Mon/ Wed/Fri 11/9/18  Yes Brenda Ramirez MD   omeprazole (PRILOSEC) 20 MG delayed release capsule Take 1 capsule by mouth daily 11/8/18  Yes Brenda Ramirez MD   digoxin ([address] [employer] [address]) 250 MCG tablet Take 1 tablet by mouth  daily 11/8/18  Yes Brenda Ramirez MD   allopurinol (ZYLOPRIM) 100 MG tablet Take 1 tablet by mouth daily 11/8/18  Yes Brenda Ramirez MD   Fluticasone-Salmeterol (AIRDUO [address] 138/14) 363-57 MCG/ACT AEPB Inhale 1 puff into the lungs 2 times daily 11/8/18  Yes Brenda Ramirez MD   ondansetron (ZOFRAN) 4 MG tablet Take 1 tablet by mouth every 12 hours as needed for Nausea or Vomiting 10/18/18  Yes Brenda Ramirez MD   dicyclomine (BENTYL) 10 MG capsule Take 1 capsule by mouth 2 times daily 10/12/18  Yes Brenda Ramirez MD   traZODone (DESYREL) 50 MG tablet Take 2 tablets by mouth nightly as needed for Sleep Nightly as needed for sleep 10/12/18  Yes Brenda Ramirez MD   celecoxib (CELEBREX) 200 MG capsule Take 200 mg by mouth daily   Yes Historical Provider, MD   warfarin (COUMADIN) 3 MG tablet Take 1 tablet by mouth  daily 8/24/18  Yes Stanislaw Blue valve. The aortic valve area is 1.76   cm2 with a mean gradient of 15 mmHg.   Mitral annular calcification is present. Mild to Moderate mitral and mild   tricuspid regurgitation present.   Right ventricular systolic pressure of 35 mm Hg consistent with mild   pulmonary hypertension.   No evidence of pericardial effusion. We're to previous study from May mild aortic stenosis is not noted. LAB REVIEW:    CBC:   Lab Results   Component Value Date    WBC 8.2 10/18/2018    HGB 15.6 10/18/2018    HCT 46.0 10/18/2018     10/18/2018     Lipids:   Lab Results   Component Value Date    CHOL 134 02/08/2018    TRIG 276 (H) 02/08/2018    HDL 31 (L) 02/08/2018    LDLCALC 52 08/18/2017    LDLDIRECT 82 12/03/2013     Renal:   Lab Results   Component Value Date    BUN 7 10/23/2018    CREATININE 1.0 10/23/2018     10/23/2018    K 3.8 10/23/2018     PT/INR:   Lab Results   Component Value Date    INR 2.6 11/08/2018    INR 2.86 06/05/2018     IMPRESSION and RECOMMENDATIONS:      Atrial fibrillation ([address]  Rate is well controlled on current medications and he is adequately anticoagulated for primary prevention of stroke. VHD (valvular heart disease)  Asymptomatic. We will monitor clinically and repeat echo if needed in the future. Continue current cardiovascular medications which have been reviewed and discussed individually with you. Appropriate prescriptions if needed on this visit are addressed. After visit summery is provided. Questions answered and patient verbalizes understanding. Follow up in office in 6 months with ECG, sooner if needed. Killian Lopez MD, 11/29/2018 9:41 AM     Please note this report has been partially produced using speech recognition software and may contain errors related to that system including errors in grammar, punctuation, and spelling, as well as words and phrases that may be inappropriate.  If there are any questions or concerns please feel free to contact the dictating provider for clarification.

## 2019-01-07 ENCOUNTER — HOSPITAL ENCOUNTER (OUTPATIENT)
Dept: PHYSICAL THERAPY | Age: 69
Setting detail: THERAPIES SERIES
Discharge: HOME | End: 2019-01-07
Payer: MEDICARE

## 2019-01-07 PROCEDURE — 97140 MANUAL THERAPY 1/> REGIONS: CPT

## 2019-01-07 PROCEDURE — 97035 APP MDLTY 1+ULTRASOUND EA 15: CPT

## 2019-01-07 PROCEDURE — 97162 PT EVAL MOD COMPLEX 30 MIN: CPT

## 2019-01-07 PROCEDURE — 97110 THERAPEUTIC EXERCISES: CPT

## 2019-01-07 ASSESSMENT — PAIN DESCRIPTION - ORIENTATION: ORIENTATION: RIGHT

## 2019-01-07 ASSESSMENT — PAIN DESCRIPTION - LOCATION: LOCATION: HIP;LEG

## 2019-01-07 ASSESSMENT — PAIN DESCRIPTION - ONSET: ONSET: PROGRESSIVE

## 2019-01-07 ASSESSMENT — ACTIVITIES OF DAILY LIVING (ADL): EFFECT OF PAIN ON DAILY ACTIVITIES: AS ABOVE

## 2019-01-07 ASSESSMENT — PAIN DESCRIPTION - DESCRIPTORS: DESCRIPTORS: ACHING;SHARP

## 2019-01-07 ASSESSMENT — PAIN DESCRIPTION - FREQUENCY: FREQUENCY: CONTINUOUS

## 2019-01-07 ASSESSMENT — PAIN DESCRIPTION - PAIN TYPE: TYPE: CHRONIC PAIN

## 2019-01-07 ASSESSMENT — PAIN SCALES - GENERAL: PAINLEVEL_OUTOF10: 5

## 2019-01-07 ASSESSMENT — PAIN DESCRIPTION - PROGRESSION: CLINICAL_PROGRESSION: GRADUALLY WORSENING

## 2019-01-10 ENCOUNTER — HOSPITAL ENCOUNTER (OUTPATIENT)
Dept: PHYSICAL THERAPY | Age: 69
Setting detail: THERAPIES SERIES
Discharge: HOME | End: 2019-01-10
Payer: MEDICARE

## 2019-01-10 PROCEDURE — 97035 APP MDLTY 1+ULTRASOUND EA 15: CPT

## 2019-01-10 PROCEDURE — 97140 MANUAL THERAPY 1/> REGIONS: CPT

## 2019-01-12 ENCOUNTER — HOSPITAL ENCOUNTER (OUTPATIENT)
Dept: PHYSICAL THERAPY | Age: 69
Setting detail: THERAPIES SERIES
Discharge: HOME | End: 2019-01-12
Payer: MEDICARE

## 2019-01-12 PROCEDURE — 97140 MANUAL THERAPY 1/> REGIONS: CPT

## 2019-01-12 PROCEDURE — 97035 APP MDLTY 1+ULTRASOUND EA 15: CPT

## 2019-01-15 ENCOUNTER — HOSPITAL ENCOUNTER (OUTPATIENT)
Dept: PHYSICAL THERAPY | Age: 69
Setting detail: THERAPIES SERIES
Discharge: HOME | End: 2019-01-15
Payer: MEDICARE

## 2019-01-15 PROCEDURE — 97035 APP MDLTY 1+ULTRASOUND EA 15: CPT

## 2019-01-15 PROCEDURE — 97140 MANUAL THERAPY 1/> REGIONS: CPT

## 2019-01-16 ENCOUNTER — TELEPHONE (OUTPATIENT)
Dept: INTERNAL MEDICINE CLINIC | Age: 69
End: 2019-01-16

## 2019-01-17 ENCOUNTER — HOSPITAL ENCOUNTER (OUTPATIENT)
Dept: PHYSICAL THERAPY | Age: 69
Setting detail: THERAPIES SERIES
Discharge: HOME | End: 2019-01-17
Payer: MEDICARE

## 2019-01-17 PROCEDURE — 97035 APP MDLTY 1+ULTRASOUND EA 15: CPT

## 2019-01-17 PROCEDURE — 97110 THERAPEUTIC EXERCISES: CPT

## 2019-01-17 PROCEDURE — 97140 MANUAL THERAPY 1/> REGIONS: CPT

## 2019-01-18 ENCOUNTER — HOSPITAL ENCOUNTER (OUTPATIENT)
Dept: PHYSICAL THERAPY | Age: 69
Setting detail: THERAPIES SERIES
Discharge: HOME | End: 2019-01-18
Payer: MEDICARE

## 2019-01-18 PROCEDURE — 97140 MANUAL THERAPY 1/> REGIONS: CPT

## 2019-01-18 PROCEDURE — 97035 APP MDLTY 1+ULTRASOUND EA 15: CPT

## 2019-01-18 PROCEDURE — 97110 THERAPEUTIC EXERCISES: CPT

## 2019-01-22 ENCOUNTER — HOSPITAL ENCOUNTER (OUTPATIENT)
Dept: PHYSICAL THERAPY | Age: 69
Setting detail: THERAPIES SERIES
Discharge: HOME | End: 2019-01-22
Payer: MEDICARE

## 2019-01-22 PROCEDURE — 97035 APP MDLTY 1+ULTRASOUND EA 15: CPT

## 2019-01-22 PROCEDURE — 97140 MANUAL THERAPY 1/> REGIONS: CPT

## 2019-01-23 ENCOUNTER — HOSPITAL ENCOUNTER (OUTPATIENT)
Dept: PHYSICAL THERAPY | Age: 69
Setting detail: THERAPIES SERIES
Discharge: HOME | End: 2019-01-23
Payer: MEDICARE

## 2019-01-23 PROCEDURE — 97110 THERAPEUTIC EXERCISES: CPT

## 2019-01-23 PROCEDURE — 97140 MANUAL THERAPY 1/> REGIONS: CPT

## 2019-01-23 PROCEDURE — 97035 APP MDLTY 1+ULTRASOUND EA 15: CPT

## 2019-01-25 ENCOUNTER — HOSPITAL ENCOUNTER (OUTPATIENT)
Dept: PHYSICAL THERAPY | Age: 69
Setting detail: THERAPIES SERIES
Discharge: HOME | End: 2019-01-25
Payer: MEDICARE

## 2019-01-25 PROCEDURE — 97035 APP MDLTY 1+ULTRASOUND EA 15: CPT

## 2019-01-25 PROCEDURE — 97140 MANUAL THERAPY 1/> REGIONS: CPT

## 2019-01-25 PROCEDURE — 97110 THERAPEUTIC EXERCISES: CPT

## 2019-01-28 DIAGNOSIS — I48.20 CHRONIC ATRIAL FIBRILLATION (HCC): ICD-10-CM

## 2019-01-28 DIAGNOSIS — E79.0 ELEVATED URIC ACID IN BLOOD: ICD-10-CM

## 2019-01-28 DIAGNOSIS — E78.2 HYPERLIPEMIA, MIXED: ICD-10-CM

## 2019-01-28 LAB
A/G RATIO: 1.7 (ref 1.1–2.2)
ALBUMIN SERPL-MCNC: 4.3 G/DL (ref 3.4–5)
ALP BLD-CCNC: 57 U/L (ref 40–129)
ALT SERPL-CCNC: 16 U/L (ref 10–40)
ANION GAP SERPL CALCULATED.3IONS-SCNC: 18 MMOL/L (ref 3–16)
AST SERPL-CCNC: 20 U/L (ref 15–37)
BASOPHILS ABSOLUTE: 0.1 K/UL (ref 0–0.2)
BASOPHILS RELATIVE PERCENT: 0.8 %
BILIRUB SERPL-MCNC: 0.7 MG/DL (ref 0–1)
BUN BLDV-MCNC: 13 MG/DL (ref 7–20)
CALCIUM SERPL-MCNC: 9.8 MG/DL (ref 8.3–10.6)
CHLORIDE BLD-SCNC: 103 MMOL/L (ref 99–110)
CHOLESTEROL, TOTAL: 134 MG/DL (ref 0–199)
CO2: 25 MMOL/L (ref 21–32)
CREAT SERPL-MCNC: 1 MG/DL (ref 0.8–1.3)
DIGOXIN LEVEL: 0.8 NG/ML (ref 0.8–2)
EOSINOPHILS ABSOLUTE: 0.2 K/UL (ref 0–0.6)
EOSINOPHILS RELATIVE PERCENT: 3.2 %
GFR AFRICAN AMERICAN: >60
GFR NON-AFRICAN AMERICAN: >60
GLOBULIN: 2.5 G/DL
GLUCOSE BLD-MCNC: 131 MG/DL (ref 70–99)
HCT VFR BLD CALC: 42.6 % (ref 40.5–52.5)
HDLC SERPL-MCNC: 36 MG/DL (ref 40–60)
HEMOGLOBIN: 14.2 G/DL (ref 13.5–17.5)
LDL CHOLESTEROL CALCULATED: 57 MG/DL
LYMPHOCYTES ABSOLUTE: 0.9 K/UL (ref 1–5.1)
LYMPHOCYTES RELATIVE PERCENT: 13 %
MCH RBC QN AUTO: 30.4 PG (ref 26–34)
MCHC RBC AUTO-ENTMCNC: 33.5 G/DL (ref 31–36)
MCV RBC AUTO: 90.8 FL (ref 80–100)
MONOCYTES ABSOLUTE: 0.6 K/UL (ref 0–1.3)
MONOCYTES RELATIVE PERCENT: 8.7 %
NEUTROPHILS ABSOLUTE: 4.9 K/UL (ref 1.7–7.7)
NEUTROPHILS RELATIVE PERCENT: 74.3 %
PDW BLD-RTO: 14.9 % (ref 12.4–15.4)
PLATELET # BLD: 214 K/UL (ref 135–450)
PMV BLD AUTO: 7.9 FL (ref 5–10.5)
POTASSIUM SERPL-SCNC: 4.3 MMOL/L (ref 3.5–5.1)
RBC # BLD: 4.69 M/UL (ref 4.2–5.9)
SODIUM BLD-SCNC: 146 MMOL/L (ref 136–145)
TOTAL PROTEIN: 6.8 G/DL (ref 6.4–8.2)
TRIGL SERPL-MCNC: 203 MG/DL (ref 0–150)
TSH REFLEX: 1.08 UIU/ML (ref 0.27–4.2)
URIC ACID, SERUM: 7.6 MG/DL (ref 3.5–7.2)
VLDLC SERPL CALC-MCNC: 41 MG/DL
WBC # BLD: 6.6 K/UL (ref 4–11)

## 2019-01-29 DIAGNOSIS — R73.9 HYPERGLYCEMIA: ICD-10-CM

## 2019-01-29 DIAGNOSIS — R73.9 HYPERGLYCEMIA: Primary | ICD-10-CM

## 2019-01-29 LAB
ESTIMATED AVERAGE GLUCOSE: 128.4 MG/DL
HBA1C MFR BLD: 6.1 %

## 2019-02-05 ENCOUNTER — OFFICE VISIT (OUTPATIENT)
Dept: INTERNAL MEDICINE CLINIC | Age: 69
End: 2019-02-05
Payer: MEDICARE

## 2019-02-05 VITALS
DIASTOLIC BLOOD PRESSURE: 66 MMHG | WEIGHT: 276.6 LBS | SYSTOLIC BLOOD PRESSURE: 116 MMHG | RESPIRATION RATE: 16 BRPM | HEIGHT: 77 IN | BODY MASS INDEX: 32.66 KG/M2 | OXYGEN SATURATION: 94 % | HEART RATE: 70 BPM

## 2019-02-05 DIAGNOSIS — S80.11XS LEG HEMATOMA, RIGHT, SEQUELA: ICD-10-CM

## 2019-02-05 DIAGNOSIS — I48.91 ATRIAL FIBRILLATION, UNSPECIFIED TYPE (HCC): Primary | ICD-10-CM

## 2019-02-05 LAB
INTERNATIONAL NORMALIZATION RATIO, POC: 1.1
PROTHROMBIN TIME, POC: 12.8

## 2019-02-05 PROCEDURE — G8417 CALC BMI ABV UP PARAM F/U: HCPCS | Performed by: INTERNAL MEDICINE

## 2019-02-05 PROCEDURE — 3017F COLORECTAL CA SCREEN DOC REV: CPT | Performed by: INTERNAL MEDICINE

## 2019-02-05 PROCEDURE — 1123F ACP DISCUSS/DSCN MKR DOCD: CPT | Performed by: INTERNAL MEDICINE

## 2019-02-05 PROCEDURE — 1101F PT FALLS ASSESS-DOCD LE1/YR: CPT | Performed by: INTERNAL MEDICINE

## 2019-02-05 PROCEDURE — 4040F PNEUMOC VAC/ADMIN/RCVD: CPT | Performed by: INTERNAL MEDICINE

## 2019-02-05 PROCEDURE — 99213 OFFICE O/P EST LOW 20 MIN: CPT | Performed by: INTERNAL MEDICINE

## 2019-02-05 PROCEDURE — 85610 PROTHROMBIN TIME: CPT | Performed by: INTERNAL MEDICINE

## 2019-02-05 PROCEDURE — 1036F TOBACCO NON-USER: CPT | Performed by: INTERNAL MEDICINE

## 2019-02-05 PROCEDURE — G8482 FLU IMMUNIZE ORDER/ADMIN: HCPCS | Performed by: INTERNAL MEDICINE

## 2019-02-05 PROCEDURE — G8427 DOCREV CUR MEDS BY ELIG CLIN: HCPCS | Performed by: INTERNAL MEDICINE

## 2019-02-14 ENCOUNTER — ANTI-COAG VISIT (OUTPATIENT)
Dept: INTERNAL MEDICINE CLINIC | Age: 69
End: 2019-02-14

## 2019-02-21 DIAGNOSIS — J45.20 MILD INTERMITTENT ASTHMA WITHOUT COMPLICATION: ICD-10-CM

## 2019-03-06 ENCOUNTER — OFFICE VISIT (OUTPATIENT)
Dept: INTERNAL MEDICINE CLINIC | Age: 69
End: 2019-03-06
Payer: MEDICARE

## 2019-03-06 VITALS
SYSTOLIC BLOOD PRESSURE: 118 MMHG | OXYGEN SATURATION: 96 % | DIASTOLIC BLOOD PRESSURE: 62 MMHG | RESPIRATION RATE: 18 BRPM | BODY MASS INDEX: 31.9 KG/M2 | WEIGHT: 269 LBS | HEART RATE: 72 BPM

## 2019-03-06 DIAGNOSIS — I48.91 ATRIAL FIBRILLATION, UNSPECIFIED TYPE (HCC): ICD-10-CM

## 2019-03-06 DIAGNOSIS — I42.0 DILATED CARDIOMYOPATHY (HCC): ICD-10-CM

## 2019-03-06 DIAGNOSIS — I48.20 CHRONIC ATRIAL FIBRILLATION (HCC): Primary | ICD-10-CM

## 2019-03-06 DIAGNOSIS — F51.01 PRIMARY INSOMNIA: ICD-10-CM

## 2019-03-06 DIAGNOSIS — K21.9 GASTROESOPHAGEAL REFLUX DISEASE WITHOUT ESOPHAGITIS: ICD-10-CM

## 2019-03-06 LAB
INTERNATIONAL NORMALIZATION RATIO, POC: 1.4
PROTHROMBIN TIME, POC: 16.6

## 2019-03-06 PROCEDURE — 99213 OFFICE O/P EST LOW 20 MIN: CPT | Performed by: INTERNAL MEDICINE

## 2019-03-06 PROCEDURE — G8427 DOCREV CUR MEDS BY ELIG CLIN: HCPCS | Performed by: INTERNAL MEDICINE

## 2019-03-06 PROCEDURE — 1101F PT FALLS ASSESS-DOCD LE1/YR: CPT | Performed by: INTERNAL MEDICINE

## 2019-03-06 PROCEDURE — 85610 PROTHROMBIN TIME: CPT | Performed by: INTERNAL MEDICINE

## 2019-03-06 PROCEDURE — G8417 CALC BMI ABV UP PARAM F/U: HCPCS | Performed by: INTERNAL MEDICINE

## 2019-03-06 PROCEDURE — 3017F COLORECTAL CA SCREEN DOC REV: CPT | Performed by: INTERNAL MEDICINE

## 2019-03-06 PROCEDURE — 1036F TOBACCO NON-USER: CPT | Performed by: INTERNAL MEDICINE

## 2019-03-06 PROCEDURE — 4040F PNEUMOC VAC/ADMIN/RCVD: CPT | Performed by: INTERNAL MEDICINE

## 2019-03-06 PROCEDURE — 1123F ACP DISCUSS/DSCN MKR DOCD: CPT | Performed by: INTERNAL MEDICINE

## 2019-03-06 PROCEDURE — G8482 FLU IMMUNIZE ORDER/ADMIN: HCPCS | Performed by: INTERNAL MEDICINE

## 2019-03-06 RX ORDER — OMEPRAZOLE 20 MG/1
20 CAPSULE, DELAYED RELEASE ORAL DAILY
Qty: 90 CAPSULE | Refills: 1 | Status: SHIPPED | OUTPATIENT
Start: 2019-03-06 | End: 2019-10-16 | Stop reason: SDUPTHER

## 2019-03-06 RX ORDER — WARFARIN SODIUM 3 MG/1
TABLET ORAL
Qty: 135 TABLET | Refills: 1 | Status: SHIPPED | OUTPATIENT
Start: 2019-03-06 | End: 2019-10-16 | Stop reason: SDUPTHER

## 2019-03-06 RX ORDER — FUROSEMIDE 20 MG/1
20 TABLET ORAL
Qty: 45 TABLET | Refills: 1 | Status: SHIPPED | OUTPATIENT
Start: 2019-03-06 | End: 2019-04-08 | Stop reason: SDUPTHER

## 2019-03-06 RX ORDER — TRAZODONE HYDROCHLORIDE 50 MG/1
50 TABLET ORAL 2 TIMES DAILY
Qty: 180 TABLET | Refills: 1 | Status: SHIPPED | OUTPATIENT
Start: 2019-03-06 | End: 2019-07-01 | Stop reason: SDUPTHER

## 2019-03-06 ASSESSMENT — PATIENT HEALTH QUESTIONNAIRE - PHQ9
SUM OF ALL RESPONSES TO PHQ QUESTIONS 1-9: 0
SUM OF ALL RESPONSES TO PHQ QUESTIONS 1-9: 0
2. FEELING DOWN, DEPRESSED OR HOPELESS: 0
1. LITTLE INTEREST OR PLEASURE IN DOING THINGS: 0
SUM OF ALL RESPONSES TO PHQ9 QUESTIONS 1 & 2: 0

## 2019-04-08 ENCOUNTER — OFFICE VISIT (OUTPATIENT)
Dept: INTERNAL MEDICINE CLINIC | Age: 69
End: 2019-04-08
Payer: MEDICARE

## 2019-04-08 VITALS
HEART RATE: 70 BPM | OXYGEN SATURATION: 94 % | RESPIRATION RATE: 18 BRPM | SYSTOLIC BLOOD PRESSURE: 118 MMHG | DIASTOLIC BLOOD PRESSURE: 74 MMHG

## 2019-04-08 DIAGNOSIS — I48.91 ATRIAL FIBRILLATION, UNSPECIFIED TYPE (HCC): ICD-10-CM

## 2019-04-08 DIAGNOSIS — I48.20 CHRONIC ATRIAL FIBRILLATION (HCC): ICD-10-CM

## 2019-04-08 DIAGNOSIS — I42.0 DILATED CARDIOMYOPATHY (HCC): ICD-10-CM

## 2019-04-08 LAB
INTERNATIONAL NORMALIZATION RATIO, POC: 4.3
PROTHROMBIN TIME, POC: 51.5

## 2019-04-08 PROCEDURE — 1123F ACP DISCUSS/DSCN MKR DOCD: CPT | Performed by: INTERNAL MEDICINE

## 2019-04-08 PROCEDURE — 4040F PNEUMOC VAC/ADMIN/RCVD: CPT | Performed by: INTERNAL MEDICINE

## 2019-04-08 PROCEDURE — 3017F COLORECTAL CA SCREEN DOC REV: CPT | Performed by: INTERNAL MEDICINE

## 2019-04-08 PROCEDURE — 85610 PROTHROMBIN TIME: CPT | Performed by: INTERNAL MEDICINE

## 2019-04-08 PROCEDURE — G8427 DOCREV CUR MEDS BY ELIG CLIN: HCPCS | Performed by: INTERNAL MEDICINE

## 2019-04-08 PROCEDURE — G8417 CALC BMI ABV UP PARAM F/U: HCPCS | Performed by: INTERNAL MEDICINE

## 2019-04-08 PROCEDURE — 1036F TOBACCO NON-USER: CPT | Performed by: INTERNAL MEDICINE

## 2019-04-08 PROCEDURE — 99213 OFFICE O/P EST LOW 20 MIN: CPT | Performed by: INTERNAL MEDICINE

## 2019-04-08 RX ORDER — POTASSIUM CHLORIDE 750 MG/1
10 TABLET, EXTENDED RELEASE ORAL DAILY
Qty: 90 TABLET | Refills: 1 | Status: SHIPPED | OUTPATIENT
Start: 2019-04-08 | End: 2019-07-01 | Stop reason: SDUPTHER

## 2019-04-08 RX ORDER — FUROSEMIDE 20 MG/1
20 TABLET ORAL DAILY
Qty: 90 TABLET | Refills: 1 | Status: SHIPPED | OUTPATIENT
Start: 2019-04-08 | End: 2019-10-16 | Stop reason: SDUPTHER

## 2019-04-08 RX ORDER — DILTIAZEM HYDROCHLORIDE 180 MG/1
CAPSULE, COATED, EXTENDED RELEASE ORAL
Qty: 180 CAPSULE | Refills: 1 | Status: SHIPPED | OUTPATIENT
Start: 2019-04-08 | End: 2019-10-16 | Stop reason: SDUPTHER

## 2019-04-08 RX ORDER — WARFARIN SODIUM 1 MG/1
TABLET ORAL
Qty: 90 TABLET | Refills: 1 | Status: SHIPPED | OUTPATIENT
Start: 2019-04-08 | End: 2019-05-30

## 2019-05-03 ENCOUNTER — HOSPITAL ENCOUNTER (OUTPATIENT)
Dept: PHYSICAL THERAPY | Age: 69
Discharge: HOME | End: 2019-05-03

## 2019-05-03 NOTE — PROGRESS NOTES
Outpatient Physical Therapy           [place]           [x] Phone: [phone]   Fax: [phone]  [place]           [] Phone: [phone]   Fax: [phone]      To: Darnell       From: Margie Christina, PT     Patient: Henrik Quintana                  : 1950  Diagnosis:  R hip pain/bursitis    Date: 5/3/2019  Treatment Diagnosis: R ITB band, tightness, LE stiffness, weakness      []  Progress Note                [x]  Discharge Note    Evaluation Date:  19 Total Visits to date:   8 Cancels/No-shows to date:  0      Note below is from 19, pt not been in for over 30 days now, will d/c      Subjective:  Pain has not been too bad, feels the PT has really helped the bursitis. He can get up from chair better and go up stairs better. Plan of Care/Treatment to date:  [x] Therapeutic Exercise    [x] Modalities:  [x] Therapeutic Activity     [x] Ultrasound  [] Electrical Stimulation  [] Gait Training      [] Cervical Traction   [] Lumbar Traction  [x] Neuromuscular Re-education  [] Cold/hotpack [] Iontophoresis  [x] Instruction in HEP      Other:  [x] Manual Therapy       []  Vasopneumatic  [] Aquatic Therapy       []                    ? Objective/Significant Findings At Last Visit/Comments:  Hip ext in /SL now about 10°. Prone quad stretch 90° before stretching, 105° after. Hip abd 5-/5 now w/o pain on R hip. Assessment:   Pt tolerated Rx well and is making very good progress w/ most goals met including increased ROM/flexibility and strength for functional activity. He is independent w/ HEP and has surgery next week to remove the encapsulated blood/fluid in IT so we will hold PT at this and likely d/c but will hold 30 days pending procedure response. 0/0 pain after rx.       Goal Status:  [x] Achieved [] Partially Achieved  [] Not Achieved   Short term goals  Time Frame for Short term goals: 2 weeks, 19  Short term goal 1: Pt will be able to report at least 25% reduction in pain/symptoms Met    Short term goal 2: Pt will be able to advance HEP w/o increased symptoms Met     Long term goals  Time Frame for Long term goals : 4 weeks, 2/1/19  Long term goal 1: Pt will be independent w/ HEP and able to continue to manage his pain/make progress on his own after PT Met  Long term goal 2: Pt will be able to walk unlimited distances w/o pain Met  Long term goal 3: Pt will have improve hip ext and quad flexibility for improved functional mobility Met  Long term goal 4: Pt will have min pain w/ all usual activity Met       Frequency/Duration:  # Days per week: [] 1 day # Weeks: [] 1 week [] 4 weeks [] 8 weeks     [x] 2 days? [] 2 weeks [] 5 weeks [] 10 weeks     [] 3 days   [] 3 weeks [x] 6 weeks [] 12 weeks       Rehab Potential: [] Excellent [x] Good [] Fair  [] Poor         Patient Status: [] Continue per initial plan of Care        [x] Patient now discharged     [] Additional visits requested, Please re-certify for additional visits:      Requested frequency/duration:  /week for weeks    If we are requesting more visits, we fully anticipate the patient's condition is expected to improve within the treatment timeframe we are requesting. Electronically signed by:  April Green, PT, MPT, ATC  5/3/2019, 3:13 PM    5/3/2019, 3:13 PM  If you have any questions or concerns, please don't hesitate to call.   Thank you for your referral.

## 2019-05-08 ENCOUNTER — OFFICE VISIT (OUTPATIENT)
Dept: INTERNAL MEDICINE CLINIC | Age: 69
End: 2019-05-08
Payer: MEDICARE

## 2019-05-08 VITALS
WEIGHT: 265.4 LBS | SYSTOLIC BLOOD PRESSURE: 108 MMHG | OXYGEN SATURATION: 61 % | HEART RATE: 61 BPM | BODY MASS INDEX: 31.47 KG/M2 | DIASTOLIC BLOOD PRESSURE: 76 MMHG

## 2019-05-08 DIAGNOSIS — I48.91 ATRIAL FIBRILLATION, UNSPECIFIED TYPE (HCC): Primary | ICD-10-CM

## 2019-05-08 DIAGNOSIS — S32.411A: ICD-10-CM

## 2019-05-08 LAB
INTERNATIONAL NORMALIZATION RATIO, POC: 1.5
PROTHROMBIN TIME, POC: NORMAL

## 2019-05-08 PROCEDURE — 85610 PROTHROMBIN TIME: CPT | Performed by: INTERNAL MEDICINE

## 2019-05-08 PROCEDURE — 3017F COLORECTAL CA SCREEN DOC REV: CPT | Performed by: INTERNAL MEDICINE

## 2019-05-08 PROCEDURE — G8427 DOCREV CUR MEDS BY ELIG CLIN: HCPCS | Performed by: INTERNAL MEDICINE

## 2019-05-08 PROCEDURE — 4040F PNEUMOC VAC/ADMIN/RCVD: CPT | Performed by: INTERNAL MEDICINE

## 2019-05-08 PROCEDURE — G8417 CALC BMI ABV UP PARAM F/U: HCPCS | Performed by: INTERNAL MEDICINE

## 2019-05-08 PROCEDURE — 99213 OFFICE O/P EST LOW 20 MIN: CPT | Performed by: INTERNAL MEDICINE

## 2019-05-08 PROCEDURE — 1123F ACP DISCUSS/DSCN MKR DOCD: CPT | Performed by: INTERNAL MEDICINE

## 2019-05-08 PROCEDURE — 1036F TOBACCO NON-USER: CPT | Performed by: INTERNAL MEDICINE

## 2019-05-08 NOTE — PROGRESS NOTES
Sherrell Cast  1950 05/08/19    SUBJECTIVE:  atr fib, INR sl low now at 1.9, current dose is 3.5mg daily    F/u w Dr PATRICK BARRY is later this mo. His echo last yr had NL EF. Some miild ENRIQUE but getting better ,no sx cp or sob. Overall improved and can walk a mile easily      OBJECTIVE:    /76   Pulse 61   Wt 265 lb 6.4 oz (120.4 kg)   SpO2 (!) 61%   BMI 31.47 kg/m²     Physical Exam   Constitutional: He appears well-developed and well-nourished. Neck: Neck supple. Cardiovascular: Normal rate and normal heart sounds. Exam reveals no gallop and no friction rub. No murmur heard. IRR   Pulmonary/Chest: Effort normal and breath sounds normal. No respiratory distress. He has no wheezes. He has no rales. Abdominal: Soft. Bowel sounds are normal. He exhibits no distension. There is no tenderness. Musculoskeletal: He exhibits no edema. Neurological: He is alert. Psychiatric: He has a normal mood and affect. Vitals reviewed. ASSESSMENT:    1. Atrial fibrillation, unspecified type ([address]    2. Closed fracture of anterior lip of right acetabulum without additional fracture (HCC)        PLAN:  WALKING WELL NOW AND ALSO PRIOR LEG SWELLING RESOLVED. DENIES HIP PAIN. INR SL LOW AT 1.9- TO TAKE 6MG TODAY THEN RESUME 3.5MG DAILY. IF STILL LOW AT F/U APPT MAY NEED TO INCR TO 4MG DAILY AS NEXT STEP. Delores Dacosta was seen today for 1 month follow-up.     Diagnoses and all orders for this visit:    Atrial fibrillation, unspecified type ([address]  -     Cancel: POCT INR  -     POCT INR    Closed fracture of anterior lip of right acetabulum without additional fracture ([address]

## 2019-05-30 ENCOUNTER — OFFICE VISIT (OUTPATIENT)
Dept: CARDIOLOGY CLINIC | Age: 69
End: 2019-05-30
Payer: MEDICARE

## 2019-05-30 VITALS
WEIGHT: 267.6 LBS | HEART RATE: 78 BPM | HEIGHT: 77 IN | DIASTOLIC BLOOD PRESSURE: 70 MMHG | BODY MASS INDEX: 31.6 KG/M2 | SYSTOLIC BLOOD PRESSURE: 138 MMHG

## 2019-05-30 DIAGNOSIS — I48.21 PERMANENT ATRIAL FIBRILLATION (HCC): ICD-10-CM

## 2019-05-30 DIAGNOSIS — I42.0 DILATED CARDIOMYOPATHY (HCC): ICD-10-CM

## 2019-05-30 DIAGNOSIS — I38 VHD (VALVULAR HEART DISEASE): Primary | ICD-10-CM

## 2019-05-30 PROCEDURE — 99213 OFFICE O/P EST LOW 20 MIN: CPT | Performed by: NURSE PRACTITIONER

## 2019-05-30 PROCEDURE — 4040F PNEUMOC VAC/ADMIN/RCVD: CPT | Performed by: NURSE PRACTITIONER

## 2019-05-30 PROCEDURE — 1036F TOBACCO NON-USER: CPT | Performed by: NURSE PRACTITIONER

## 2019-05-30 PROCEDURE — G8417 CALC BMI ABV UP PARAM F/U: HCPCS | Performed by: NURSE PRACTITIONER

## 2019-05-30 PROCEDURE — G8427 DOCREV CUR MEDS BY ELIG CLIN: HCPCS | Performed by: NURSE PRACTITIONER

## 2019-05-30 PROCEDURE — 3017F COLORECTAL CA SCREEN DOC REV: CPT | Performed by: NURSE PRACTITIONER

## 2019-05-30 PROCEDURE — 1123F ACP DISCUSS/DSCN MKR DOCD: CPT | Performed by: NURSE PRACTITIONER

## 2019-05-30 NOTE — PROGRESS NOTES
[facility] ([facility], [address] [facility],[address]  Phone: [phone]    Fax [phone]                  Remi Marshall MD, Jeremiah Davis MD, [address], MD, MD Ankush Chaidez MD Mela Fairy, MD Julianne Orn, SLAVA Looney APRN  CARDIOLOGY  NOTE        5/30/2019    RE: Lucy Gayle  (1950)                               TO:  Dr. Neeta Zee MD  The primary cardiologist is Dr. Aye Layne    CC: VHD, Permanent Artial Fib, Dilated cardiomyopathy  Patient denies all of the following:  Chest Pain  Palpitations  Edema  Dizziness  Syncope      HPI: Thank you for involving me in taking care of your patient Lucy Gayle, who is a  77y.o. year old male with a history of VHD, Permanent Artial Fib, Dilated cardiomyopathy. He has noticed that within the last month he is getting short of breath going up the stairs. He states that it resolves with rest, but he has not been short of breath prior. His swelling has significantly decreased since starting Lasix weekly. He is not having any chest pain or pressure with the shortness of breath. He states that he is compliant with his medications and he is trying to do organized exercise of walking a few days a week.      Vitals:    05/30/19 0942   BP: 138/70   Pulse: 78       Current Outpatient Medications   Medication Sig Dispense Refill    diltiazem (CARTIA XT) 180 MG extended release capsule TAKE ONE CAPSULE BY MOUTH TWICE A  capsule 1    furosemide (LASIX) 20 MG tablet Take 1 tablet by mouth daily 90 tablet 1    potassium chloride (KLOR-CON M) 10 MEQ extended release tablet Take 1 tablet by mouth daily 90 tablet 1    omeprazole (PRILOSEC) 20 MG delayed release capsule Take 1 capsule by mouth daily 90 capsule 1    warfarin (COUMADIN) 3 MG tablet Take 1.5 tablet by mouth  daily 135 tablet 1    traZODone (DESYREL) 50 MG tablet Take 1 tablet by mouth mild stenosis,moderate left atrial dilation,mild mitral and tricusid regurg,EF55-60%    Hyperlipidemia     IFG (impaired fasting glucose)     2019- a1c 6.1 but fasting glc 131.     Knee pain, bilateral     Knee pain, left     Long-term (current) use of anticoagulants     Peptic ulcer disease     Pre-op evaluation 2018    Testosterone deficiency dx'd 2015    VHD (valvular heart disease) 2018    Mild aortic stenosis by echo 2018     Past Surgical History:   Procedure Laterality Date    HIP ARTHROPLASTY Right 2018    Dr Bradley Zamorano Right 2016    Dr Viktor Jalloh  3/08, dr Jorge Alvarado    lap assisted colon mass resection-benign    POLYPECTOMY  - jaimie    colonic polypectomy    POLYPECTOMY  9/10/02- dr Cheri Lira    colonic polypectomy    VENTRAL HERNIA REPAIR  10/08, dr [place] Left 14    ORIF Dr Pauly Thrasher     Family History   Problem Relation Age of Onset    Hypertension Mother     High Blood Pressure Mother     Other Mother         lung problems    Other Father         CHF, A. Fib    Hypotension Father     Diabetes Father      Social History     Tobacco Use    Smoking status: Former Smoker     Packs/day: 1.00     Types: Cigarettes     Last attempt to quit: 2013     Years since quittin.4    Smokeless tobacco: Never Used   Substance Use Topics    Alcohol use: Yes     Comment: occasional wine/moderate        Review of Systems - History obtained from the patient  General: negative for - fatigue, malaise, night sweats, weight gain  Psychological: negative for - anxiety, depression, sleep disturbances  Ophthalmic: negative for - blurry vision, loss of vision  ENT: negative for - headaches, vertigo, visual changes  Hematological and Lymphatic: negative for - bleeding problems, blood clots, bruising, fatigue or pallor  Endocrine: negative for - malaise/lethargy, palpitations, unexpected weight changes  Respiratory: negative for - cough, orthopnea, shortness of breath or tachypnea  Cardiovascular: negative for - chest pain, dyspnea on exertion, edema or palpitations  Gastrointestinal: no abdominal pain, change in bowel habits, or black or bloody stools  Genito-Urinary: no dysuria, trouble voiding, or hematuria  Musculoskeletal: negative for - gait disturbance, pain, swelling in bilateral leg, vascular discoloration noted. Neurological: negative for - confusion, dizziness, impaired coordination/balance or numbness/tingling    Objective:      Physical Exam:  /70   Pulse 78   Ht 6' 5\" (1.956 m)   Wt 267 lb 9.6 oz (121.4 kg)   BMI 31.73 kg/m²   Wt Readings from Last 3 Encounters:   05/30/19 267 lb 9.6 oz (121.4 kg)   05/08/19 265 lb 6.4 oz (120.4 kg)   03/06/19 269 lb (122 kg)     Body mass index is 31.73 kg/m². GENERAL - Alert, oriented, pleasant, in no apparent distress. Head unremarkable  Eyes - pupils equal and reactive to light - bilateral conjunctiva are pink: sclera are white   ENT - external ears intact, nose is intact:  tongue is midline pink and moist  Neck - Supple. No jugular venous distention noted. No carotid bruits appreciated. Cardiovascular - Normal S1 and S2:  murmur appreciated Yes, 2/6 aortic Root area, No gallop. Regular rate- Yes,  rhythm regular-Yes. Extremities - No cyanosis, clubbing, +1 edema to lower legs. Pulmonary - No respiratory distress. No wheezes or rales. Chest is clear  Pulses: Bilateral radial and pedal pulses normal  Abdomen -  Soft no tenderness, non distended   Musculoskeletal - Normal movement of all extremities   Neurologic - alert and oriented: There are no gross focal neurologic abnormalities.    Skin-  No rash: No echymosis   Affect- normal mood and pleasant       DATA:  Lab Results   Component Value Date    CKTOTAL 54 01/06/2017     BNP:  No results found for: BNP  PT/INR:  No results found for: Aureliant  Lab Results   Component Value Date LABA1C 6.1 01/28/2019    LABA1C 5.3 10/23/2018     Lab Results   Component Value Date    CHOL 134 01/28/2019    TRIG 203 (H) 01/28/2019    HDL 36 (L) 01/28/2019    LDLCALC 57 01/28/2019    LDLDIRECT 82 12/03/2013     Lab Results   Component Value Date    ALT 16 01/28/2019    AST 20 01/28/2019     TSH:    Lab Results   Component Value Date    TSH 1.430 02/08/2018         Assessment/ Plan:     Dilated Cardiomyopathy   Monitor weight daily   Limit sodium to <2000mg a day   Limit water to <64 oz in a day   Call the office if a weight gain of >3 lbs in 2 days or > 5 lbs in a week is noted. VHD  · Echo 11/6/2018 showed Mildly dilated left ventricle with normal systolic function ejection   fraction is 55-60%.  Mild concentric left ventricular hypertrophy. Diastolic function could not be evaluated due to arrhythmia.   Moderately to severely dilated left and right atrium and right ventricle.   Sclerotic and mildly stenotic aortic valve. The aortic valve area is 1.76 cm2 with a mean gradient of 15 mmHg.   Mitral annular calcification is present. Mild to Moderate mitral and mild tricuspid regurgitation present.   Right ventricular systolic pressure of 35 mm Hg consistent with mild pulmonary hypertension.   No evidence of pericardial effusion. · Echo ordered dt new shortness of breath     Atrial fib    Rate controlled yes.  He is  on anticoagulation. He has been having difficulty maintaining INR. He is going to discuss changing from coumadin to another medication in July with Dr. Blaire Newberry.  Continue medications     Patient seen, interviewed and examined. Testing was reviewed. Patient is encouraged to exercise even a brisk walk for 30 minutes at least 3 to 4 times a week. Lifestyle and risk factor modificatons discussed. Various goals are discussed and questions answered. Continue current medications. Appropriate prescriptions are addressed. Questions answered and patient verbalizes understanding.      Call

## 2019-05-30 NOTE — PROGRESS NOTES
[id number] Score for Atrial Fibrillation Stroke Risk   Risk   Factors  Component Value   C CHF No 0   H HTN No 0   A2 Age >= 76 No,  (67 y.o.) 0   D DM No 0   S2 Prior Stroke/TIA No 0   V Vascular Disease No 0   A Age 74-69 Yes,  (67 y.o.) 1   Sc Sex male 0    [id number]  Score  1   Score last updated 8/69/08 3:62 AM    Click here for a link to the UpToDate guideline \"Atrial Fibrillation: Anticoagulation therapy to prevent embolization    Disclaimer: Risk Score calculation is dependent on accuracy of patient problem list and past encounter diagnosis.

## 2019-05-31 ENCOUNTER — TELEPHONE (OUTPATIENT)
Dept: CARDIOLOGY CLINIC | Age: 69
End: 2019-05-31

## 2019-05-31 NOTE — TELEPHONE ENCOUNTER
Called patient to schedule Echo, no answer. Left message for patient to call office to schedule.  medicare NO AUTH NEEDED OK TO SCHEDULE

## 2019-06-13 ENCOUNTER — PROCEDURE VISIT (OUTPATIENT)
Dept: CARDIOLOGY CLINIC | Age: 69
End: 2019-06-13
Payer: MEDICARE

## 2019-06-13 DIAGNOSIS — I38 VHD (VALVULAR HEART DISEASE): Primary | ICD-10-CM

## 2019-06-13 DIAGNOSIS — I42.0 DILATED CARDIOMYOPATHY (HCC): ICD-10-CM

## 2019-06-13 LAB
LV EF: 58 %
LVEF MODALITY: NORMAL

## 2019-06-13 PROCEDURE — 93306 TTE W/DOPPLER COMPLETE: CPT | Performed by: INTERNAL MEDICINE

## 2019-06-17 ENCOUNTER — TELEPHONE (OUTPATIENT)
Dept: CARDIOLOGY CLINIC | Age: 69
End: 2019-06-17

## 2019-06-17 NOTE — TELEPHONE ENCOUNTER
Notified pt of results( last echo AS was 1.76 cm).            Summary   LV function and size are normal, Ejection Fraction 55-60 %.   Mild concentric left ventricular hypertrophy.   Diastolic dysfunction could not be evaluated due to arrhythmia.   Moderately dilated left and right atrium and right ventricle.   Aortic sclerosis with mild stenosis. The aortic valve area is 1.59 cm2 with   a mean gradient of 16 mmHg.   RVSP= 32 mmHg. IVC appears dilated, but maintains respiratory changes.   No evidence of pericardial effusion.

## 2019-07-01 ENCOUNTER — OFFICE VISIT (OUTPATIENT)
Dept: INTERNAL MEDICINE CLINIC | Age: 69
End: 2019-07-01
Payer: MEDICARE

## 2019-07-01 VITALS
OXYGEN SATURATION: 94 % | DIASTOLIC BLOOD PRESSURE: 64 MMHG | SYSTOLIC BLOOD PRESSURE: 130 MMHG | RESPIRATION RATE: 16 BRPM | HEART RATE: 64 BPM

## 2019-07-01 DIAGNOSIS — E79.0 ELEVATED URIC ACID IN BLOOD: ICD-10-CM

## 2019-07-01 DIAGNOSIS — J45.20 MILD INTERMITTENT ASTHMA WITHOUT COMPLICATION: ICD-10-CM

## 2019-07-01 DIAGNOSIS — I48.20 CHRONIC ATRIAL FIBRILLATION (HCC): ICD-10-CM

## 2019-07-01 DIAGNOSIS — I42.0 DILATED CARDIOMYOPATHY (HCC): ICD-10-CM

## 2019-07-01 DIAGNOSIS — I48.91 ATRIAL FIBRILLATION, UNSPECIFIED TYPE (HCC): Primary | ICD-10-CM

## 2019-07-01 DIAGNOSIS — F51.01 PRIMARY INSOMNIA: ICD-10-CM

## 2019-07-01 DIAGNOSIS — E78.2 HYPERLIPEMIA, MIXED: ICD-10-CM

## 2019-07-01 LAB
INTERNATIONAL NORMALIZATION RATIO, POC: 2.2
PROTHROMBIN TIME, POC: 26.5

## 2019-07-01 PROCEDURE — 3017F COLORECTAL CA SCREEN DOC REV: CPT | Performed by: INTERNAL MEDICINE

## 2019-07-01 PROCEDURE — G8427 DOCREV CUR MEDS BY ELIG CLIN: HCPCS | Performed by: INTERNAL MEDICINE

## 2019-07-01 PROCEDURE — 4040F PNEUMOC VAC/ADMIN/RCVD: CPT | Performed by: INTERNAL MEDICINE

## 2019-07-01 PROCEDURE — 99213 OFFICE O/P EST LOW 20 MIN: CPT | Performed by: INTERNAL MEDICINE

## 2019-07-01 PROCEDURE — 1036F TOBACCO NON-USER: CPT | Performed by: INTERNAL MEDICINE

## 2019-07-01 PROCEDURE — 85610 PROTHROMBIN TIME: CPT | Performed by: INTERNAL MEDICINE

## 2019-07-01 PROCEDURE — G8417 CALC BMI ABV UP PARAM F/U: HCPCS | Performed by: INTERNAL MEDICINE

## 2019-07-01 PROCEDURE — 1123F ACP DISCUSS/DSCN MKR DOCD: CPT | Performed by: INTERNAL MEDICINE

## 2019-07-01 RX ORDER — FLUTICASONE PROPIONATE AND SALMETEROL 232; 14 UG/1; UG/1
1 POWDER, METERED RESPIRATORY (INHALATION) 2 TIMES DAILY
Qty: 1 EACH | Refills: 5 | Status: SHIPPED | OUTPATIENT
Start: 2019-07-01 | End: 2019-08-14 | Stop reason: SDUPTHER

## 2019-07-01 RX ORDER — DIGOXIN 250 MCG
250 TABLET ORAL DAILY
Qty: 90 TABLET | Refills: 1 | Status: SHIPPED | OUTPATIENT
Start: 2019-07-01 | End: 2019-10-16 | Stop reason: SDUPTHER

## 2019-07-01 RX ORDER — POTASSIUM CHLORIDE 750 MG/1
10 TABLET, EXTENDED RELEASE ORAL DAILY
Qty: 90 TABLET | Refills: 1 | Status: SHIPPED | OUTPATIENT
Start: 2019-07-01 | End: 2020-01-16 | Stop reason: SDUPTHER

## 2019-07-01 RX ORDER — TRAZODONE HYDROCHLORIDE 50 MG/1
50 TABLET ORAL 2 TIMES DAILY
Qty: 180 TABLET | Refills: 1 | Status: SHIPPED | OUTPATIENT
Start: 2019-07-01 | End: 2019-11-07 | Stop reason: SDUPTHER

## 2019-07-01 RX ORDER — ALLOPURINOL 100 MG/1
100 TABLET ORAL DAILY
Qty: 90 TABLET | Refills: 1 | Status: SHIPPED | OUTPATIENT
Start: 2019-07-01 | End: 2019-10-16 | Stop reason: SDUPTHER

## 2019-07-01 NOTE — PROGRESS NOTES
blood-  REMAINS STABLE IN REMISSION W CURRENTSUPPRESSIVE THERAPY. WILL MONITOR URIC ACID LEVELS PERIODICALLY- prob annual check, Ur Ac in Jan 7.6. Beth Montana -     allopurinol (ZYLOPRIM) 100 MG tablet; Take 1 tablet by mouth daily    Mild intermittent asthma without complication - Asthma clinically stable w/o ongoing symptoms, will continue inhaler/medical regimen. -     Fluticasone-Salmeterol (Deana Rivers 432/72) 874-37 MCG/ACT AEPB; Inhale 1 puff into the lungs 2 times daily    Dilated cardiomyopathy ([facility]- Pt clinically w/o evidence of cardiovascular instability, cont regimen. -     potassium chloride (KLOR-CON M) 10 MEQ extended release tablet; Take 1 tablet by mouth daily  -     CBC Auto Differential; Future    Hyperlipemia, mixed - Pt will continue to work on a low fat diet and also exercise, wt loss as appropriate. Will continue periodic monitoring of fasting lipid profile, glucose, liver function.    -     Lipid Panel; Future  -     Comprehensive Metabolic Panel;  Future

## 2019-07-30 DIAGNOSIS — R73.01 IFG (IMPAIRED FASTING GLUCOSE): ICD-10-CM

## 2019-07-30 DIAGNOSIS — I42.0 DILATED CARDIOMYOPATHY (HCC): ICD-10-CM

## 2019-07-30 DIAGNOSIS — E78.2 HYPERLIPEMIA, MIXED: ICD-10-CM

## 2019-07-30 DIAGNOSIS — I48.20 CHRONIC ATRIAL FIBRILLATION (HCC): ICD-10-CM

## 2019-07-30 DIAGNOSIS — I48.91 ATRIAL FIBRILLATION, UNSPECIFIED TYPE (HCC): ICD-10-CM

## 2019-07-30 DIAGNOSIS — R73.01 IFG (IMPAIRED FASTING GLUCOSE): Primary | ICD-10-CM

## 2019-07-30 LAB
A/G RATIO: 2.3 (ref 1.1–2.2)
ALBUMIN SERPL-MCNC: 4.5 G/DL (ref 3.4–5)
ALP BLD-CCNC: 49 U/L (ref 40–129)
ALT SERPL-CCNC: 15 U/L (ref 10–40)
ANION GAP SERPL CALCULATED.3IONS-SCNC: 15 MMOL/L (ref 3–16)
AST SERPL-CCNC: 19 U/L (ref 15–37)
BASOPHILS ABSOLUTE: 0.1 K/UL (ref 0–0.2)
BASOPHILS RELATIVE PERCENT: 0.9 %
BILIRUB SERPL-MCNC: 0.5 MG/DL (ref 0–1)
BUN BLDV-MCNC: 16 MG/DL (ref 7–20)
CALCIUM SERPL-MCNC: 9.4 MG/DL (ref 8.3–10.6)
CHLORIDE BLD-SCNC: 106 MMOL/L (ref 99–110)
CHOLESTEROL, TOTAL: 116 MG/DL (ref 0–199)
CO2: 26 MMOL/L (ref 21–32)
CREAT SERPL-MCNC: 1 MG/DL (ref 0.8–1.3)
DIGOXIN LEVEL: 0.9 NG/ML (ref 0.8–2)
EOSINOPHILS ABSOLUTE: 0.2 K/UL (ref 0–0.6)
EOSINOPHILS RELATIVE PERCENT: 4.2 %
GFR AFRICAN AMERICAN: >60
GFR NON-AFRICAN AMERICAN: >60
GLOBULIN: 2 G/DL
GLUCOSE BLD-MCNC: 123 MG/DL (ref 70–99)
HCT VFR BLD CALC: 39 % (ref 40.5–52.5)
HDLC SERPL-MCNC: 31 MG/DL (ref 40–60)
HEMOGLOBIN: 12.8 G/DL (ref 13.5–17.5)
INR BLD: 3.02 (ref 0.86–1.14)
LDL CHOLESTEROL CALCULATED: 38 MG/DL
LYMPHOCYTES ABSOLUTE: 0.9 K/UL (ref 1–5.1)
LYMPHOCYTES RELATIVE PERCENT: 15.3 %
MCH RBC QN AUTO: 27.7 PG (ref 26–34)
MCHC RBC AUTO-ENTMCNC: 32.8 G/DL (ref 31–36)
MCV RBC AUTO: 84.5 FL (ref 80–100)
MONOCYTES ABSOLUTE: 0.5 K/UL (ref 0–1.3)
MONOCYTES RELATIVE PERCENT: 8.9 %
NEUTROPHILS ABSOLUTE: 4.1 K/UL (ref 1.7–7.7)
NEUTROPHILS RELATIVE PERCENT: 70.7 %
PDW BLD-RTO: 16.3 % (ref 12.4–15.4)
PLATELET # BLD: 195 K/UL (ref 135–450)
PMV BLD AUTO: 8.1 FL (ref 5–10.5)
POTASSIUM SERPL-SCNC: 4.3 MMOL/L (ref 3.5–5.1)
PROTHROMBIN TIME: 34.4 SEC (ref 9.8–13)
RBC # BLD: 4.61 M/UL (ref 4.2–5.9)
SODIUM BLD-SCNC: 147 MMOL/L (ref 136–145)
TOTAL PROTEIN: 6.5 G/DL (ref 6.4–8.2)
TRIGL SERPL-MCNC: 233 MG/DL (ref 0–150)
TSH SERPL DL<=0.05 MIU/L-ACNC: 0.96 UIU/ML (ref 0.27–4.2)
VLDLC SERPL CALC-MCNC: 47 MG/DL
WBC # BLD: 5.8 K/UL (ref 4–11)

## 2019-07-31 LAB
ESTIMATED AVERAGE GLUCOSE: 142.7 MG/DL
HBA1C MFR BLD: 6.6 %

## 2019-08-01 ENCOUNTER — ANTI-COAG VISIT (OUTPATIENT)
Dept: INTERNAL MEDICINE CLINIC | Age: 69
End: 2019-08-01

## 2019-08-14 ENCOUNTER — OFFICE VISIT (OUTPATIENT)
Dept: INTERNAL MEDICINE CLINIC | Age: 69
End: 2019-08-14
Payer: MEDICARE

## 2019-08-14 VITALS
WEIGHT: 263 LBS | BODY MASS INDEX: 31.19 KG/M2 | DIASTOLIC BLOOD PRESSURE: 80 MMHG | SYSTOLIC BLOOD PRESSURE: 116 MMHG | RESPIRATION RATE: 16 BRPM

## 2019-08-14 DIAGNOSIS — I48.91 ATRIAL FIBRILLATION, UNSPECIFIED TYPE (HCC): ICD-10-CM

## 2019-08-14 DIAGNOSIS — I42.0 DILATED CARDIOMYOPATHY (HCC): ICD-10-CM

## 2019-08-14 DIAGNOSIS — J45.20 MILD INTERMITTENT ASTHMA WITHOUT COMPLICATION: ICD-10-CM

## 2019-08-14 DIAGNOSIS — E11.40 TYPE 2 DIABETES MELLITUS WITH DIABETIC NEUROPATHY, WITHOUT LONG-TERM CURRENT USE OF INSULIN (HCC): Primary | ICD-10-CM

## 2019-08-14 DIAGNOSIS — F51.01 PRIMARY INSOMNIA: ICD-10-CM

## 2019-08-14 LAB
INTERNATIONAL NORMALIZATION RATIO, POC: 2.2
PROTHROMBIN TIME, POC: 26.8

## 2019-08-14 PROCEDURE — 3044F HG A1C LEVEL LT 7.0%: CPT | Performed by: INTERNAL MEDICINE

## 2019-08-14 PROCEDURE — G8427 DOCREV CUR MEDS BY ELIG CLIN: HCPCS | Performed by: INTERNAL MEDICINE

## 2019-08-14 PROCEDURE — 85610 PROTHROMBIN TIME: CPT | Performed by: INTERNAL MEDICINE

## 2019-08-14 PROCEDURE — 1036F TOBACCO NON-USER: CPT | Performed by: INTERNAL MEDICINE

## 2019-08-14 PROCEDURE — 2022F DILAT RTA XM EVC RTNOPTHY: CPT | Performed by: INTERNAL MEDICINE

## 2019-08-14 PROCEDURE — 99214 OFFICE O/P EST MOD 30 MIN: CPT | Performed by: INTERNAL MEDICINE

## 2019-08-14 PROCEDURE — G8417 CALC BMI ABV UP PARAM F/U: HCPCS | Performed by: INTERNAL MEDICINE

## 2019-08-14 PROCEDURE — 1123F ACP DISCUSS/DSCN MKR DOCD: CPT | Performed by: INTERNAL MEDICINE

## 2019-08-14 PROCEDURE — 3017F COLORECTAL CA SCREEN DOC REV: CPT | Performed by: INTERNAL MEDICINE

## 2019-08-14 PROCEDURE — 4040F PNEUMOC VAC/ADMIN/RCVD: CPT | Performed by: INTERNAL MEDICINE

## 2019-08-14 RX ORDER — TRAZODONE HYDROCHLORIDE 50 MG/1
50 TABLET ORAL 2 TIMES DAILY
Qty: 180 TABLET | Refills: 1 | Status: CANCELLED | OUTPATIENT
Start: 2019-08-14

## 2019-08-14 RX ORDER — LISINOPRIL 2.5 MG/1
2.5 TABLET ORAL DAILY
Qty: 90 TABLET | Refills: 3 | Status: SHIPPED | OUTPATIENT
Start: 2019-08-14 | End: 2019-08-19

## 2019-08-14 RX ORDER — FUROSEMIDE 20 MG/1
20 TABLET ORAL DAILY
Qty: 90 TABLET | Refills: 1 | Status: CANCELLED | OUTPATIENT
Start: 2019-08-14 | End: 2019-11-12

## 2019-08-14 RX ORDER — FLUTICASONE PROPIONATE AND SALMETEROL 232; 14 UG/1; UG/1
1 POWDER, METERED RESPIRATORY (INHALATION) 2 TIMES DAILY
Qty: 1 EACH | Refills: 5 | Status: SHIPPED | OUTPATIENT
Start: 2019-08-14 | End: 2019-09-05

## 2019-08-14 RX ORDER — SIMVASTATIN 5 MG
5 TABLET ORAL EVERY EVENING
Qty: 90 TABLET | Refills: 3 | Status: SHIPPED | OUTPATIENT
Start: 2019-08-14 | End: 2020-05-28

## 2019-08-14 RX ORDER — POTASSIUM CHLORIDE 750 MG/1
10 TABLET, EXTENDED RELEASE ORAL DAILY
Qty: 90 TABLET | Refills: 1 | Status: CANCELLED | OUTPATIENT
Start: 2019-08-14

## 2019-08-14 NOTE — PATIENT INSTRUCTIONS
too.  · Use cookbooks or online recipes to plan several main meals. Plan some quick meals for busy nights. You also can double some recipes that freeze well. Then you can save half for other busy nights when you don't have time to cook. · Make sure you have the ingredients you need for your recipes. If you're running low on basic items, put these items on your shopping list too. · List foods that you use to make breakfasts, lunches, and snacks. List plenty of fruits and vegetables. · Post this list on the refrigerator. Add to it as you think of more things you need. · Take the list to the store to do your weekly shopping. Follow-up care is a key part of your treatment and safety. Be sure to make and go to all appointments, and call your doctor if you are having problems. It's also a good idea to know your test results and keep a list of the medicines you take. Where can you learn more? Go to [email or web address] org and sign in to your [email or web address] account. Enter G719 in the eSpace box to learn more about \"Learning About Meal Planning for Diabetes. \"     If you do not have an account, please click on the \"Sign Up Now\" link. Current as of: July 25, 2018  Content Version: 12.1  © 4147-8096 Healthwise, Incorporated. Care instructions adapted under license by [facility] ([facility]). If you have questions about a medical condition or this instruction, always ask your healthcare professional. [place] [address] any warranty or liability for your use of this information. Patient Education         Diabetes: Food and Your Blood Sugar (00:43)  Your health professional recommends that you watch this short online health video. Learn how your body turns carbohydrate foods into blood sugar. How to watch the video    Scan the QR code   OR Visit the website    https://hwi. se/r/[email or web address]   Current as of: July 25, 2018  Content Version: 12.1  © 2157-0241 Healthwise,

## 2019-08-14 NOTE — PROGRESS NOTES
Merlin Curiel  1950 08/14/19    SUBJECTIVE:    Father w DM late in life, his glc in Jan was 131 but then last mo 123, a1c in Jan 6.1 but now POSITIVE AT 6.6. Lab Results   Component Value Date    LABA1C 6.6 07/30/2019    LABA1C 6.1 01/28/2019    LABA1C 5.3 10/23/2018     Lab Results   Component Value Date    GLUF 107 (H) 01/06/2017    LABMICR YES 10/18/2018    LDLCALC 38 07/30/2019    CREATININE 1.0 07/30/2019     atr fib, seeing Dr Ayse Thomas. INR 2.2. Denies palpitations but w chr irreg rhythm. Hypertension: Stable. Denies CP, SOB, cough, visual changes, dizziness, palpitations or HA. Asthma:  Patient denies significant chest pain/tightness, cough, or wheezing on current regimen. SOME ENRIQUE W STAIRS, GOLF. USING INHALER PRN. OBJECTIVE:    /80   Resp 16   Wt 263 lb (119.3 kg)   BMI 31.19 kg/m²     Physical Exam   Constitutional: He appears well-developed and well-nourished. No distress. HENT:   Head: Normocephalic and atraumatic. Nose: Nose normal.   Mouth/Throat: Oropharynx is clear and moist. No oropharyngeal exudate. Eyes: Pupils are equal, round, and reactive to light. Conjunctivae and EOM are normal. Right eye exhibits no discharge. Left eye exhibits no discharge. No scleral icterus. Neck: Normal range of motion. Neck supple. No tracheal deviation present. Cardiovascular: Normal rate, normal heart sounds and intact distal pulses. Exam reveals no gallop and no friction rub. No murmur heard. irr   Pulmonary/Chest: Effort normal and breath sounds normal. No respiratory distress. He has no wheezes. He has no rales. Abdominal: Soft. Bowel sounds are normal. He exhibits no distension and no mass. There is no tenderness. There is no rebound and no guarding. Musculoskeletal: He exhibits no edema or tenderness. Lymphadenopathy:     He has no cervical adenopathy. Neurological: He is alert. He has normal reflexes. Skin: Skin is warm and dry.    FOOT EXAM  Visual insomnia    Dilated cardiomyopathy ([facility]- CONT F/U CARDIO AND NO SIGNS/SX CHF AT THIS TIME    Controlled type 2 diabetes mellitus without complication, without long-term current use of insulin ([facility]

## 2019-08-19 ENCOUNTER — TELEPHONE (OUTPATIENT)
Dept: INTERNAL MEDICINE CLINIC | Age: 69
End: 2019-08-19

## 2019-09-05 DIAGNOSIS — J45.20 MILD INTERMITTENT ASTHMA WITHOUT COMPLICATION: ICD-10-CM

## 2019-09-16 ENCOUNTER — ANTI-COAG VISIT (OUTPATIENT)
Dept: INTERNAL MEDICINE CLINIC | Age: 69
End: 2019-09-16

## 2019-09-18 ENCOUNTER — OFFICE VISIT (OUTPATIENT)
Dept: INTERNAL MEDICINE CLINIC | Age: 69
End: 2019-09-18
Payer: MEDICARE

## 2019-09-18 VITALS
HEART RATE: 81 BPM | TEMPERATURE: 98.7 F | OXYGEN SATURATION: 95 % | RESPIRATION RATE: 18 BRPM | WEIGHT: 259 LBS | DIASTOLIC BLOOD PRESSURE: 60 MMHG | BODY MASS INDEX: 30.71 KG/M2 | SYSTOLIC BLOOD PRESSURE: 130 MMHG

## 2019-09-18 DIAGNOSIS — J45.21 MILD INTERMITTENT ASTHMA WITH ACUTE EXACERBATION: Primary | ICD-10-CM

## 2019-09-18 DIAGNOSIS — I48.21 PERMANENT ATRIAL FIBRILLATION (HCC): ICD-10-CM

## 2019-09-18 PROCEDURE — 1036F TOBACCO NON-USER: CPT | Performed by: INTERNAL MEDICINE

## 2019-09-18 PROCEDURE — 85610 PROTHROMBIN TIME: CPT | Performed by: INTERNAL MEDICINE

## 2019-09-18 PROCEDURE — G8417 CALC BMI ABV UP PARAM F/U: HCPCS | Performed by: INTERNAL MEDICINE

## 2019-09-18 PROCEDURE — 99213 OFFICE O/P EST LOW 20 MIN: CPT | Performed by: INTERNAL MEDICINE

## 2019-09-18 PROCEDURE — 1123F ACP DISCUSS/DSCN MKR DOCD: CPT | Performed by: INTERNAL MEDICINE

## 2019-09-18 PROCEDURE — 96372 THER/PROPH/DIAG INJ SC/IM: CPT | Performed by: INTERNAL MEDICINE

## 2019-09-18 PROCEDURE — 4040F PNEUMOC VAC/ADMIN/RCVD: CPT | Performed by: INTERNAL MEDICINE

## 2019-09-18 PROCEDURE — 3017F COLORECTAL CA SCREEN DOC REV: CPT | Performed by: INTERNAL MEDICINE

## 2019-09-18 PROCEDURE — G8427 DOCREV CUR MEDS BY ELIG CLIN: HCPCS | Performed by: INTERNAL MEDICINE

## 2019-09-18 RX ORDER — METHYLPREDNISOLONE ACETATE 80 MG/ML
80 INJECTION, SUSPENSION INTRA-ARTICULAR; INTRALESIONAL; INTRAMUSCULAR; SOFT TISSUE ONCE
Status: COMPLETED | OUTPATIENT
Start: 2019-09-18 | End: 2019-09-18

## 2019-09-18 RX ORDER — AZITHROMYCIN 250 MG/1
250 TABLET, FILM COATED ORAL SEE ADMIN INSTRUCTIONS
Qty: 6 TABLET | Refills: 0 | Status: SHIPPED | OUTPATIENT
Start: 2019-09-18 | End: 2019-09-23

## 2019-09-18 RX ORDER — PREDNISONE 1 MG/1
TABLET ORAL
Qty: 36 TABLET | Refills: 0 | Status: SHIPPED | OUTPATIENT
Start: 2019-09-18 | End: 2019-10-16 | Stop reason: ALTCHOICE

## 2019-09-18 RX ADMIN — METHYLPREDNISOLONE ACETATE 80 MG: 80 INJECTION, SUSPENSION INTRA-ARTICULAR; INTRALESIONAL; INTRAMUSCULAR; SOFT TISSUE at 09:02

## 2019-09-30 RX ORDER — LEVOFLOXACIN 500 MG/1
500 TABLET, FILM COATED ORAL DAILY
Qty: 5 TABLET | Refills: 0 | Status: SHIPPED | OUTPATIENT
Start: 2019-09-30 | End: 2019-10-05

## 2019-09-30 RX ORDER — PREDNISONE 1 MG/1
TABLET ORAL
Qty: 72 TABLET | Refills: 0 | Status: SHIPPED | OUTPATIENT
Start: 2019-09-30 | End: 2019-10-16 | Stop reason: ALTCHOICE

## 2019-10-01 ENCOUNTER — ANTI-COAG VISIT (OUTPATIENT)
Dept: INTERNAL MEDICINE CLINIC | Age: 69
End: 2019-10-01

## 2019-10-14 DIAGNOSIS — E11.40 TYPE 2 DIABETES MELLITUS WITH DIABETIC NEUROPATHY, WITHOUT LONG-TERM CURRENT USE OF INSULIN (HCC): ICD-10-CM

## 2019-10-14 LAB
A/G RATIO: 2.9 (ref 1.1–2.2)
ALBUMIN SERPL-MCNC: 4.7 G/DL (ref 3.4–5)
ALP BLD-CCNC: 54 U/L (ref 40–129)
ALT SERPL-CCNC: 27 U/L (ref 10–40)
ANION GAP SERPL CALCULATED.3IONS-SCNC: 13 MMOL/L (ref 3–16)
AST SERPL-CCNC: 19 U/L (ref 15–37)
BILIRUB SERPL-MCNC: 0.7 MG/DL (ref 0–1)
BUN BLDV-MCNC: 20 MG/DL (ref 7–20)
CALCIUM SERPL-MCNC: 9.7 MG/DL (ref 8.3–10.6)
CHLORIDE BLD-SCNC: 101 MMOL/L (ref 99–110)
CHOLESTEROL, TOTAL: 145 MG/DL (ref 0–199)
CO2: 30 MMOL/L (ref 21–32)
CREAT SERPL-MCNC: 1 MG/DL (ref 0.8–1.3)
CREATININE URINE: 153.6 MG/DL (ref 39–259)
GFR AFRICAN AMERICAN: >60
GFR NON-AFRICAN AMERICAN: >60
GLOBULIN: 1.6 G/DL
GLUCOSE BLD-MCNC: 113 MG/DL (ref 70–99)
HDLC SERPL-MCNC: 71 MG/DL (ref 40–60)
LDL CHOLESTEROL CALCULATED: 44 MG/DL
MICROALBUMIN UR-MCNC: <1.2 MG/DL
MICROALBUMIN/CREAT UR-RTO: NORMAL MG/G (ref 0–30)
POTASSIUM SERPL-SCNC: 4.3 MMOL/L (ref 3.5–5.1)
SODIUM BLD-SCNC: 144 MMOL/L (ref 136–145)
TOTAL PROTEIN: 6.3 G/DL (ref 6.4–8.2)
TRIGL SERPL-MCNC: 148 MG/DL (ref 0–150)
VLDLC SERPL CALC-MCNC: 30 MG/DL

## 2019-10-15 LAB
DIABETIC RETINOPATHY: NEGATIVE
ESTIMATED AVERAGE GLUCOSE: 145.6 MG/DL
HBA1C MFR BLD: 6.7 %

## 2019-10-16 ENCOUNTER — OFFICE VISIT (OUTPATIENT)
Dept: INTERNAL MEDICINE CLINIC | Age: 69
End: 2019-10-16
Payer: MEDICARE

## 2019-10-16 VITALS
RESPIRATION RATE: 16 BRPM | HEART RATE: 77 BPM | SYSTOLIC BLOOD PRESSURE: 122 MMHG | OXYGEN SATURATION: 97 % | BODY MASS INDEX: 30.59 KG/M2 | DIASTOLIC BLOOD PRESSURE: 70 MMHG | WEIGHT: 258 LBS

## 2019-10-16 DIAGNOSIS — K21.9 GASTROESOPHAGEAL REFLUX DISEASE WITHOUT ESOPHAGITIS: ICD-10-CM

## 2019-10-16 DIAGNOSIS — J45.20 MILD INTERMITTENT ASTHMA WITHOUT COMPLICATION: ICD-10-CM

## 2019-10-16 DIAGNOSIS — I48.21 PERMANENT ATRIAL FIBRILLATION (HCC): Primary | ICD-10-CM

## 2019-10-16 DIAGNOSIS — E79.0 ELEVATED URIC ACID IN BLOOD: ICD-10-CM

## 2019-10-16 DIAGNOSIS — I42.0 DILATED CARDIOMYOPATHY (HCC): ICD-10-CM

## 2019-10-16 DIAGNOSIS — I48.20 CHRONIC ATRIAL FIBRILLATION (HCC): ICD-10-CM

## 2019-10-16 DIAGNOSIS — Z23 NEED FOR INFLUENZA VACCINATION: ICD-10-CM

## 2019-10-16 DIAGNOSIS — E11.9 CONTROLLED TYPE 2 DIABETES MELLITUS WITHOUT COMPLICATION, WITHOUT LONG-TERM CURRENT USE OF INSULIN (HCC): ICD-10-CM

## 2019-10-16 LAB
INTERNATIONAL NORMALIZATION RATIO, POC: 2.5
PROTHROMBIN TIME, POC: 29.8

## 2019-10-16 PROCEDURE — G8427 DOCREV CUR MEDS BY ELIG CLIN: HCPCS | Performed by: INTERNAL MEDICINE

## 2019-10-16 PROCEDURE — 3017F COLORECTAL CA SCREEN DOC REV: CPT | Performed by: INTERNAL MEDICINE

## 2019-10-16 PROCEDURE — 3044F HG A1C LEVEL LT 7.0%: CPT | Performed by: INTERNAL MEDICINE

## 2019-10-16 PROCEDURE — 4040F PNEUMOC VAC/ADMIN/RCVD: CPT | Performed by: INTERNAL MEDICINE

## 2019-10-16 PROCEDURE — G0008 ADMIN INFLUENZA VIRUS VAC: HCPCS | Performed by: INTERNAL MEDICINE

## 2019-10-16 PROCEDURE — 99214 OFFICE O/P EST MOD 30 MIN: CPT | Performed by: INTERNAL MEDICINE

## 2019-10-16 PROCEDURE — 1123F ACP DISCUSS/DSCN MKR DOCD: CPT | Performed by: INTERNAL MEDICINE

## 2019-10-16 PROCEDURE — 85610 PROTHROMBIN TIME: CPT | Performed by: INTERNAL MEDICINE

## 2019-10-16 PROCEDURE — 2022F DILAT RTA XM EVC RTNOPTHY: CPT | Performed by: INTERNAL MEDICINE

## 2019-10-16 PROCEDURE — G8482 FLU IMMUNIZE ORDER/ADMIN: HCPCS | Performed by: INTERNAL MEDICINE

## 2019-10-16 PROCEDURE — 90662 IIV NO PRSV INCREASED AG IM: CPT | Performed by: INTERNAL MEDICINE

## 2019-10-16 PROCEDURE — G8417 CALC BMI ABV UP PARAM F/U: HCPCS | Performed by: INTERNAL MEDICINE

## 2019-10-16 PROCEDURE — 1036F TOBACCO NON-USER: CPT | Performed by: INTERNAL MEDICINE

## 2019-10-16 RX ORDER — DIGOXIN 250 MCG
250 TABLET ORAL DAILY
Qty: 90 TABLET | Refills: 1 | Status: SHIPPED | OUTPATIENT
Start: 2019-10-16 | End: 2020-04-15 | Stop reason: SDUPTHER

## 2019-10-16 RX ORDER — OMEPRAZOLE 20 MG/1
20 CAPSULE, DELAYED RELEASE ORAL DAILY
Qty: 90 CAPSULE | Refills: 1 | Status: SHIPPED | OUTPATIENT
Start: 2019-10-16 | End: 2020-04-15 | Stop reason: SDUPTHER

## 2019-10-16 RX ORDER — WARFARIN SODIUM 3 MG/1
TABLET ORAL
Qty: 135 TABLET | Refills: 1 | Status: SHIPPED | OUTPATIENT
Start: 2019-10-16 | End: 2020-05-20 | Stop reason: DRUGHIGH

## 2019-10-16 RX ORDER — FUROSEMIDE 20 MG/1
20 TABLET ORAL DAILY
Qty: 90 TABLET | Refills: 1 | Status: SHIPPED | OUTPATIENT
Start: 2019-10-16 | End: 2020-01-16 | Stop reason: SDUPTHER

## 2019-10-16 RX ORDER — ALLOPURINOL 100 MG/1
100 TABLET ORAL DAILY
Qty: 90 TABLET | Refills: 1 | Status: SHIPPED | OUTPATIENT
Start: 2019-10-16 | End: 2020-04-15 | Stop reason: SDUPTHER

## 2019-10-16 RX ORDER — WARFARIN SODIUM 1 MG/1
1 TABLET ORAL DAILY
Qty: 90 TABLET | Refills: 1 | Status: SHIPPED | OUTPATIENT
Start: 2019-10-16 | End: 2020-04-15 | Stop reason: SDUPTHER

## 2019-10-16 RX ORDER — DILTIAZEM HYDROCHLORIDE 180 MG/1
CAPSULE, COATED, EXTENDED RELEASE ORAL
Qty: 180 CAPSULE | Refills: 1 | Status: SHIPPED | OUTPATIENT
Start: 2019-10-16 | End: 2020-04-15 | Stop reason: SDUPTHER

## 2019-10-23 ENCOUNTER — TELEPHONE (OUTPATIENT)
Dept: CARDIOLOGY CLINIC | Age: 69
End: 2019-10-23

## 2019-11-06 ENCOUNTER — OFFICE VISIT (OUTPATIENT)
Dept: CARDIOLOGY CLINIC | Age: 69
End: 2019-11-06
Payer: MEDICARE

## 2019-11-06 VITALS
HEART RATE: 58 BPM | SYSTOLIC BLOOD PRESSURE: 120 MMHG | BODY MASS INDEX: 31.2 KG/M2 | DIASTOLIC BLOOD PRESSURE: 60 MMHG | WEIGHT: 264.2 LBS | HEIGHT: 77 IN

## 2019-11-06 DIAGNOSIS — I47.20 VT (VENTRICULAR TACHYCARDIA): ICD-10-CM

## 2019-11-06 DIAGNOSIS — E11.21 CONTROLLED TYPE 2 DIABETES MELLITUS WITH DIABETIC NEPHROPATHY, WITHOUT LONG-TERM CURRENT USE OF INSULIN (HCC): ICD-10-CM

## 2019-11-06 DIAGNOSIS — R06.02 SOB (SHORTNESS OF BREATH): Primary | ICD-10-CM

## 2019-11-06 DIAGNOSIS — I48.19 PERSISTENT ATRIAL FIBRILLATION (HCC): ICD-10-CM

## 2019-11-06 PROCEDURE — 3044F HG A1C LEVEL LT 7.0%: CPT | Performed by: INTERNAL MEDICINE

## 2019-11-06 PROCEDURE — 93000 ELECTROCARDIOGRAM COMPLETE: CPT | Performed by: INTERNAL MEDICINE

## 2019-11-06 PROCEDURE — 1036F TOBACCO NON-USER: CPT | Performed by: INTERNAL MEDICINE

## 2019-11-06 PROCEDURE — G8427 DOCREV CUR MEDS BY ELIG CLIN: HCPCS | Performed by: INTERNAL MEDICINE

## 2019-11-06 PROCEDURE — 1123F ACP DISCUSS/DSCN MKR DOCD: CPT | Performed by: INTERNAL MEDICINE

## 2019-11-06 PROCEDURE — G8482 FLU IMMUNIZE ORDER/ADMIN: HCPCS | Performed by: INTERNAL MEDICINE

## 2019-11-06 PROCEDURE — G8417 CALC BMI ABV UP PARAM F/U: HCPCS | Performed by: INTERNAL MEDICINE

## 2019-11-06 PROCEDURE — 4040F PNEUMOC VAC/ADMIN/RCVD: CPT | Performed by: INTERNAL MEDICINE

## 2019-11-06 PROCEDURE — 2022F DILAT RTA XM EVC RTNOPTHY: CPT | Performed by: INTERNAL MEDICINE

## 2019-11-06 PROCEDURE — 3017F COLORECTAL CA SCREEN DOC REV: CPT | Performed by: INTERNAL MEDICINE

## 2019-11-06 PROCEDURE — 99213 OFFICE O/P EST LOW 20 MIN: CPT | Performed by: INTERNAL MEDICINE

## 2019-11-07 DIAGNOSIS — F51.01 PRIMARY INSOMNIA: ICD-10-CM

## 2019-11-07 RX ORDER — TRAZODONE HYDROCHLORIDE 50 MG/1
50 TABLET ORAL 2 TIMES DAILY
Qty: 180 TABLET | Refills: 1 | Status: SHIPPED | OUTPATIENT
Start: 2019-11-07 | End: 2019-11-08 | Stop reason: SDUPTHER

## 2019-11-08 DIAGNOSIS — F51.01 PRIMARY INSOMNIA: ICD-10-CM

## 2019-11-08 RX ORDER — TRAZODONE HYDROCHLORIDE 50 MG/1
100 TABLET ORAL NIGHTLY
Qty: 180 TABLET | Refills: 1 | Status: SHIPPED | OUTPATIENT
Start: 2019-11-08 | End: 2020-04-15 | Stop reason: SDUPTHER

## 2020-01-06 LAB
A/G RATIO: 2 (ref 1.1–2.2)
ALBUMIN SERPL-MCNC: 4.5 G/DL (ref 3.4–5)
ALP BLD-CCNC: 46 U/L (ref 40–129)
ALT SERPL-CCNC: 15 U/L (ref 10–40)
ANION GAP SERPL CALCULATED.3IONS-SCNC: 18 MMOL/L (ref 3–16)
AST SERPL-CCNC: 16 U/L (ref 15–37)
BASOPHILS ABSOLUTE: 0 K/UL (ref 0–0.2)
BASOPHILS RELATIVE PERCENT: 0.6 %
BILIRUB SERPL-MCNC: 0.7 MG/DL (ref 0–1)
BUN BLDV-MCNC: 14 MG/DL (ref 7–20)
CALCIUM SERPL-MCNC: 9.4 MG/DL (ref 8.3–10.6)
CHLORIDE BLD-SCNC: 100 MMOL/L (ref 99–110)
CHOLESTEROL, TOTAL: 108 MG/DL (ref 0–199)
CO2: 24 MMOL/L (ref 21–32)
CREAT SERPL-MCNC: 1.2 MG/DL (ref 0.8–1.3)
CREATININE URINE: 155.1 MG/DL (ref 39–259)
EOSINOPHILS ABSOLUTE: 0.2 K/UL (ref 0–0.6)
EOSINOPHILS RELATIVE PERCENT: 3 %
GFR AFRICAN AMERICAN: >60
GFR NON-AFRICAN AMERICAN: 60
GLOBULIN: 2.2 G/DL
GLUCOSE BLD-MCNC: 136 MG/DL (ref 70–99)
HCT VFR BLD CALC: 40 % (ref 40.5–52.5)
HDLC SERPL-MCNC: 33 MG/DL (ref 40–60)
HEMOGLOBIN: 13.6 G/DL (ref 13.5–17.5)
LDL CHOLESTEROL CALCULATED: 37 MG/DL
LYMPHOCYTES ABSOLUTE: 0.9 K/UL (ref 1–5.1)
LYMPHOCYTES RELATIVE PERCENT: 12.8 %
MCH RBC QN AUTO: 29.6 PG (ref 26–34)
MCHC RBC AUTO-ENTMCNC: 34.1 G/DL (ref 31–36)
MCV RBC AUTO: 87 FL (ref 80–100)
MICROALBUMIN UR-MCNC: <1.2 MG/DL
MICROALBUMIN/CREAT UR-RTO: NORMAL MG/G (ref 0–30)
MONOCYTES ABSOLUTE: 0.6 K/UL (ref 0–1.3)
MONOCYTES RELATIVE PERCENT: 9 %
NEUTROPHILS ABSOLUTE: 5.3 K/UL (ref 1.7–7.7)
NEUTROPHILS RELATIVE PERCENT: 74.6 %
PDW BLD-RTO: 15.4 % (ref 12.4–15.4)
PLATELET # BLD: 209 K/UL (ref 135–450)
PMV BLD AUTO: 8.2 FL (ref 5–10.5)
POTASSIUM SERPL-SCNC: 4.1 MMOL/L (ref 3.5–5.1)
RBC # BLD: 4.6 M/UL (ref 4.2–5.9)
SODIUM BLD-SCNC: 142 MMOL/L (ref 136–145)
TOTAL PROTEIN: 6.7 G/DL (ref 6.4–8.2)
TRIGL SERPL-MCNC: 192 MG/DL (ref 0–150)
TSH SERPL DL<=0.05 MIU/L-ACNC: 1.56 UIU/ML (ref 0.27–4.2)
VLDLC SERPL CALC-MCNC: 38 MG/DL
WBC # BLD: 7 K/UL (ref 4–11)

## 2020-01-06 PROCEDURE — 36415 COLL VENOUS BLD VENIPUNCTURE: CPT | Performed by: INTERNAL MEDICINE

## 2020-01-07 DIAGNOSIS — E11.21 CONTROLLED TYPE 2 DIABETES MELLITUS WITH DIABETIC NEPHROPATHY, WITHOUT LONG-TERM CURRENT USE OF INSULIN (HCC): ICD-10-CM

## 2020-01-07 LAB
ESTIMATED AVERAGE GLUCOSE: 137 MG/DL
HBA1C MFR BLD: 6.4 %

## 2020-01-16 ENCOUNTER — OFFICE VISIT (OUTPATIENT)
Dept: INTERNAL MEDICINE CLINIC | Age: 70
End: 2020-01-16
Payer: MEDICARE

## 2020-01-16 VITALS
HEART RATE: 65 BPM | SYSTOLIC BLOOD PRESSURE: 124 MMHG | OXYGEN SATURATION: 95 % | DIASTOLIC BLOOD PRESSURE: 66 MMHG | BODY MASS INDEX: 31.29 KG/M2 | HEIGHT: 77 IN | WEIGHT: 265 LBS

## 2020-01-16 LAB
INTERNATIONAL NORMALIZATION RATIO, POC: 2
PROTHROMBIN TIME, POC: 23.8

## 2020-01-16 PROCEDURE — 1123F ACP DISCUSS/DSCN MKR DOCD: CPT | Performed by: INTERNAL MEDICINE

## 2020-01-16 PROCEDURE — G8482 FLU IMMUNIZE ORDER/ADMIN: HCPCS | Performed by: INTERNAL MEDICINE

## 2020-01-16 PROCEDURE — 3017F COLORECTAL CA SCREEN DOC REV: CPT | Performed by: INTERNAL MEDICINE

## 2020-01-16 PROCEDURE — 85610 PROTHROMBIN TIME: CPT | Performed by: INTERNAL MEDICINE

## 2020-01-16 PROCEDURE — 3044F HG A1C LEVEL LT 7.0%: CPT | Performed by: INTERNAL MEDICINE

## 2020-01-16 PROCEDURE — 4040F PNEUMOC VAC/ADMIN/RCVD: CPT | Performed by: INTERNAL MEDICINE

## 2020-01-16 PROCEDURE — G0439 PPPS, SUBSEQ VISIT: HCPCS | Performed by: INTERNAL MEDICINE

## 2020-01-16 RX ORDER — FLUTICASONE PROPIONATE AND SALMETEROL 232; 14 UG/1; UG/1
1 POWDER, METERED RESPIRATORY (INHALATION) 2 TIMES DAILY
Qty: 1 EACH | Refills: 5 | Status: SHIPPED | OUTPATIENT
Start: 2020-01-16 | End: 2020-01-16 | Stop reason: SDUPTHER

## 2020-01-16 RX ORDER — POTASSIUM CHLORIDE 750 MG/1
10 TABLET, EXTENDED RELEASE ORAL DAILY
Qty: 90 TABLET | Refills: 1 | Status: SHIPPED | OUTPATIENT
Start: 2020-01-16 | End: 2020-09-21

## 2020-01-16 RX ORDER — FLUTICASONE PROPIONATE AND SALMETEROL 232; 14 UG/1; UG/1
1 POWDER, METERED RESPIRATORY (INHALATION) 2 TIMES DAILY
Qty: 1 EACH | Refills: 5 | Status: SHIPPED | OUTPATIENT
Start: 2020-01-16 | End: 2020-07-16

## 2020-01-16 RX ORDER — FUROSEMIDE 20 MG/1
20 TABLET ORAL DAILY
Qty: 90 TABLET | Refills: 1 | Status: SHIPPED | OUTPATIENT
Start: 2020-01-16 | End: 2020-04-15 | Stop reason: SDUPTHER

## 2020-01-16 ASSESSMENT — LIFESTYLE VARIABLES
AUDIT-C TOTAL SCORE: 4
HOW OFTEN DO YOU HAVE SIX OR MORE DRINKS ON ONE OCCASION: 0
HAVE YOU OR SOMEONE ELSE BEEN INJURED AS A RESULT OF YOUR DRINKING: 0
HOW MANY STANDARD DRINKS CONTAINING ALCOHOL DO YOU HAVE ON A TYPICAL DAY: 0
HOW OFTEN DURING THE LAST YEAR HAVE YOU FAILED TO DO WHAT WAS NORMALLY EXPECTED FROM YOU BECAUSE OF DRINKING: 0
HOW OFTEN DO YOU HAVE A DRINK CONTAINING ALCOHOL: 4
HAS A RELATIVE, FRIEND, DOCTOR, OR ANOTHER HEALTH PROFESSIONAL EXPRESSED CONCERN ABOUT YOUR DRINKING OR SUGGESTED YOU CUT DOWN: 0
HOW OFTEN DURING THE LAST YEAR HAVE YOU BEEN UNABLE TO REMEMBER WHAT HAPPENED THE NIGHT BEFORE BECAUSE YOU HAD BEEN DRINKING: 0
HOW OFTEN DURING THE LAST YEAR HAVE YOU HAD A FEELING OF GUILT OR REMORSE AFTER DRINKING: 0
AUDIT TOTAL SCORE: 4
HOW OFTEN DURING THE LAST YEAR HAVE YOU FOUND THAT YOU WERE NOT ABLE TO STOP DRINKING ONCE YOU HAD STARTED: 0
HOW OFTEN DURING THE LAST YEAR HAVE YOU NEEDED AN ALCOHOLIC DRINK FIRST THING IN THE MORNING TO GET YOURSELF GOING AFTER A NIGHT OF HEAVY DRINKING: 0

## 2020-01-16 ASSESSMENT — PATIENT HEALTH QUESTIONNAIRE - PHQ9
SUM OF ALL RESPONSES TO PHQ QUESTIONS 1-9: 0
SUM OF ALL RESPONSES TO PHQ QUESTIONS 1-9: 0

## 2020-01-16 NOTE — PROGRESS NOTES
Habits/Nutrition:  Health Habits/Nutrition  Do you exercise for at least 20 minutes 2-3 times per week?: Yes  Have you lost any weight without trying in the past 3 months?: No  Do you eat fewer than 2 meals per day?: No  Have you seen a dentist within the past year?: Yes  Body mass index is 31.22 kg/m². Health Habits/Nutrition Interventions:  · losing wt w DM diet    Safety:  Safety  Do you have working smoke detectors?: Yes  Have all throw rugs been removed or fastened?: (!) No  Do you have non-slip mats or surfaces in all bathtubs/showers?: (!) No  Do all of your stairways have a railing or banister?: Yes  Are your doorways, halls and stairs free of clutter?: Yes  Do you always fasten your seatbelt when you are in a car?: Yes  Safety Interventions:  · to secure throw rugs and tub is safe as walk in shower    Personalized Preventive Plan   Current Health Maintenance Status  Immunization History   Administered Date(s) Administered    Influenza 09/10/2012, 09/05/2013    Influenza, High Dose (Fluzone 65 yrs and older) 09/11/2014, 11/15/2016, 09/15/2017, 08/24/2018, 10/16/2019    Pneumococcal Conjugate 13-valent ([id number]) 11/15/2016    Pneumococcal Polysaccharide (Uheevmtfx63) 09/05/2013, 12/03/2015    Tdap (Boostrix, Adacel) 09/11/2014    Zoster Live (Zostavax) 08/17/2016        Health Maintenance   Topic Date Due    Shingles Vaccine (2 of 3) 10/12/2016    Diabetic foot exam  08/14/2020    A1C test (Diabetic or Prediabetic)  01/06/2021    Diabetic microalbuminuria test  01/06/2021    Lipid screen  01/06/2021    Diabetic retinal exam  10/15/2021    DTaP/Tdap/Td vaccine (2 - Td) 09/11/2024    Colon cancer screen colonoscopy  03/08/2027    Flu vaccine  Completed    Pneumococcal 65+ years Vaccine  Completed    AAA screen  Completed    Hepatitis C screen  Completed     Recommendations for Preventive Services Due: see orders and patient instructions/AVS.  .   Recommended screening schedule for the next 5-10 years is provided to the patient in written form: see Patient Jayesh Alicia was seen today for medicare awv. Diagnoses and all orders for this visit:    Routine general medical examination at a health care facility - remains independent, functional and active, no indications/needs for other interventions noted at this time- except as noted below and also findings noted on screening medicare questions. Dilated cardiomyopathy ([address] - Pt clinically w/o evidence of cardiovascular instability, cont regimen. SL ENRIQUE, CV AND PUL STATUS STABLE SO TO CONT WORK W REGULAR EXERCISE AND CONDITIONING, CONT F/U DR Cris Nina  -     furosemide (LASIX) 20 MG tablet; Take 1 tablet by mouth daily  -     potassium chloride (KLOR-CON M) 10 MEQ extended release tablet; Take 1 tablet by mouth daily  -     CBC Auto Differential; Future    Mild intermittent asthma without complication- Asthma clinically stable w/o ongoing symptoms, will continue inhaler/medical regimen. -     Discontinue: Fluticasone-Salmeterol (Leotis Diver 074/76) 654-11 MCG/ACT AEPB; Inhale 1 puff into the lungs 2 times daily  -     Fluticasone-Salmeterol (Leotis Diver 437/07) 491-44 MCG/ACT AEPB; Inhale 1 puff into the lungs 2 times daily    Permanent atrial fibrillation - Pt clinically w/o evidence of cardiovascular instability, cont regimen.    -     POCT INR  -     MAGNESIUM; Future  -     DIGOXIN LEVEL; Future    Type 2 diabetes mellitus with diabetic neuropathy, without long-term current use of insulin (HCC) - GOOD WORK W LOWER SUGARS, CONT DIET. DM relatively well controlled and will continue current regimen. Screening reviewed. See orders.    -     CBC Auto Differential; Future  -     Lipid Panel; Future  -     Comprehensive Metabolic Panel; Future  -     Hemoglobin A1C; Future    Idiopathic gout of foot, unspecified chronicity, unspecified laterality- NO RECENT FLARES, MONITOR UR ACID  -     URIC ACID;  Future    Coagulopathy

## 2020-03-11 ENCOUNTER — OFFICE VISIT (OUTPATIENT)
Dept: INTERNAL MEDICINE CLINIC | Age: 70
End: 2020-03-11
Payer: MEDICARE

## 2020-03-11 VITALS
SYSTOLIC BLOOD PRESSURE: 132 MMHG | DIASTOLIC BLOOD PRESSURE: 74 MMHG | TEMPERATURE: 98.5 F | HEART RATE: 65 BPM | OXYGEN SATURATION: 96 % | RESPIRATION RATE: 16 BRPM

## 2020-03-11 LAB
INTERNATIONAL NORMALIZATION RATIO, POC: 3
PROTHROMBIN TIME, POC: 36.6
S PYO AG THROAT QL: NORMAL

## 2020-03-11 PROCEDURE — G8482 FLU IMMUNIZE ORDER/ADMIN: HCPCS | Performed by: INTERNAL MEDICINE

## 2020-03-11 PROCEDURE — 99213 OFFICE O/P EST LOW 20 MIN: CPT | Performed by: INTERNAL MEDICINE

## 2020-03-11 PROCEDURE — G8427 DOCREV CUR MEDS BY ELIG CLIN: HCPCS | Performed by: INTERNAL MEDICINE

## 2020-03-11 PROCEDURE — G8417 CALC BMI ABV UP PARAM F/U: HCPCS | Performed by: INTERNAL MEDICINE

## 2020-03-11 PROCEDURE — 1123F ACP DISCUSS/DSCN MKR DOCD: CPT | Performed by: INTERNAL MEDICINE

## 2020-03-11 PROCEDURE — 4040F PNEUMOC VAC/ADMIN/RCVD: CPT | Performed by: INTERNAL MEDICINE

## 2020-03-11 PROCEDURE — 3017F COLORECTAL CA SCREEN DOC REV: CPT | Performed by: INTERNAL MEDICINE

## 2020-03-11 PROCEDURE — 1036F TOBACCO NON-USER: CPT | Performed by: INTERNAL MEDICINE

## 2020-03-11 PROCEDURE — 85610 PROTHROMBIN TIME: CPT | Performed by: INTERNAL MEDICINE

## 2020-03-11 PROCEDURE — 87880 STREP A ASSAY W/OPTIC: CPT | Performed by: INTERNAL MEDICINE

## 2020-03-11 RX ORDER — PREDNISONE 1 MG/1
TABLET ORAL
Qty: 36 TABLET | Refills: 0 | Status: SHIPPED | OUTPATIENT
Start: 2020-03-11 | End: 2020-03-17 | Stop reason: SDUPTHER

## 2020-03-11 RX ORDER — AZITHROMYCIN 250 MG/1
250 TABLET, FILM COATED ORAL SEE ADMIN INSTRUCTIONS
Qty: 6 TABLET | Refills: 0 | Status: SHIPPED | OUTPATIENT
Start: 2020-03-11 | End: 2020-03-17 | Stop reason: SDUPTHER

## 2020-03-17 ENCOUNTER — TELEPHONE (OUTPATIENT)
Dept: INTERNAL MEDICINE CLINIC | Age: 70
End: 2020-03-17

## 2020-03-17 RX ORDER — PREDNISONE 1 MG/1
TABLET ORAL
Qty: 36 TABLET | Refills: 0 | Status: SHIPPED | OUTPATIENT
Start: 2020-03-17 | End: 2020-04-15

## 2020-03-17 RX ORDER — AZITHROMYCIN 250 MG/1
250 TABLET, FILM COATED ORAL SEE ADMIN INSTRUCTIONS
Qty: 6 TABLET | Refills: 0 | Status: SHIPPED | OUTPATIENT
Start: 2020-03-17 | End: 2020-03-22

## 2020-03-17 NOTE — TELEPHONE ENCOUNTER
[id number] Vanna Fitzpatrick for one more round zpak and pred, would have him hold two days of coumadin just in case protime is rising w additional abx.

## 2020-03-17 NOTE — TELEPHONE ENCOUNTER
Pt is due to complete the zpak and prednisone tomorrow. He still has cough, no fever, congestion etc. He would like another round of meds or if you advise otherwise. Please advise.

## 2020-04-15 ENCOUNTER — OFFICE VISIT (OUTPATIENT)
Dept: INTERNAL MEDICINE CLINIC | Age: 70
End: 2020-04-15
Payer: MEDICARE

## 2020-04-15 VITALS
OXYGEN SATURATION: 92 % | WEIGHT: 268 LBS | DIASTOLIC BLOOD PRESSURE: 68 MMHG | BODY MASS INDEX: 31.57 KG/M2 | RESPIRATION RATE: 16 BRPM | SYSTOLIC BLOOD PRESSURE: 119 MMHG | HEART RATE: 63 BPM

## 2020-04-15 LAB
INTERNATIONAL NORMALIZATION RATIO, POC: 2.3
PROTHROMBIN TIME, POC: 27.3

## 2020-04-15 PROCEDURE — 1123F ACP DISCUSS/DSCN MKR DOCD: CPT | Performed by: INTERNAL MEDICINE

## 2020-04-15 PROCEDURE — 4040F PNEUMOC VAC/ADMIN/RCVD: CPT | Performed by: INTERNAL MEDICINE

## 2020-04-15 PROCEDURE — 99214 OFFICE O/P EST MOD 30 MIN: CPT | Performed by: INTERNAL MEDICINE

## 2020-04-15 PROCEDURE — G8417 CALC BMI ABV UP PARAM F/U: HCPCS | Performed by: INTERNAL MEDICINE

## 2020-04-15 PROCEDURE — 85610 PROTHROMBIN TIME: CPT | Performed by: INTERNAL MEDICINE

## 2020-04-15 PROCEDURE — 1036F TOBACCO NON-USER: CPT | Performed by: INTERNAL MEDICINE

## 2020-04-15 PROCEDURE — 3017F COLORECTAL CA SCREEN DOC REV: CPT | Performed by: INTERNAL MEDICINE

## 2020-04-15 PROCEDURE — G8427 DOCREV CUR MEDS BY ELIG CLIN: HCPCS | Performed by: INTERNAL MEDICINE

## 2020-04-15 RX ORDER — DIGOXIN 250 MCG
250 TABLET ORAL DAILY
Qty: 90 TABLET | Refills: 1 | Status: SHIPPED | OUTPATIENT
Start: 2020-04-15 | End: 2020-11-25 | Stop reason: SDUPTHER

## 2020-04-15 RX ORDER — OMEPRAZOLE 20 MG/1
20 CAPSULE, DELAYED RELEASE ORAL DAILY
Qty: 90 CAPSULE | Refills: 1 | Status: SHIPPED | OUTPATIENT
Start: 2020-04-15 | End: 2020-11-16 | Stop reason: SDUPTHER

## 2020-04-15 RX ORDER — PREDNISONE 10 MG/1
10 TABLET ORAL DAILY
Qty: 30 TABLET | Refills: 1 | Status: SHIPPED | OUTPATIENT
Start: 2020-04-15 | End: 2020-05-20 | Stop reason: SDUPTHER

## 2020-04-15 RX ORDER — FUROSEMIDE 20 MG/1
20 TABLET ORAL DAILY
Qty: 90 TABLET | Refills: 1 | Status: SHIPPED | OUTPATIENT
Start: 2020-04-15 | End: 2020-10-05 | Stop reason: SDUPTHER

## 2020-04-15 RX ORDER — DILTIAZEM HYDROCHLORIDE 180 MG/1
CAPSULE, COATED, EXTENDED RELEASE ORAL
Qty: 180 CAPSULE | Refills: 1 | Status: SHIPPED | OUTPATIENT
Start: 2020-04-15 | End: 2020-11-25 | Stop reason: SDUPTHER

## 2020-04-15 RX ORDER — TRAZODONE HYDROCHLORIDE 50 MG/1
100 TABLET ORAL NIGHTLY
Qty: 180 TABLET | Refills: 1 | Status: SHIPPED | OUTPATIENT
Start: 2020-04-15 | End: 2020-11-25 | Stop reason: SDUPTHER

## 2020-04-15 RX ORDER — WARFARIN SODIUM 1 MG/1
0.5 TABLET ORAL DAILY
Qty: 90 TABLET | Refills: 1 | Status: SHIPPED | OUTPATIENT
Start: 2020-04-15 | End: 2020-11-25 | Stop reason: SDUPTHER

## 2020-04-15 RX ORDER — ALLOPURINOL 100 MG/1
100 TABLET ORAL DAILY
Qty: 90 TABLET | Refills: 1 | Status: SHIPPED | OUTPATIENT
Start: 2020-04-15 | End: 2020-11-25 | Stop reason: SDUPTHER

## 2020-04-15 NOTE — PROGRESS NOTES
(tr) present. Lymphadenopathy:      Cervical: No cervical adenopathy. Skin:     General: Skin is warm and dry. Neurological:      Mental Status: He is alert. Psychiatric:         Mood and Affect: Mood normal.         ASSESSMENT:    1. Mild intermittent asthma without complication    2. Gastroesophageal reflux disease without esophagitis    3. Dilated cardiomyopathy ([address]    4. Primary insomnia    5. Chronic atrial fibrillation    6. Elevated uric acid in blood    7. Permanent atrial fibrillation        PLAN:    Betsy Oakley was seen today for office visit for anticoagulation management and shortness of breath. Diagnoses and all orders for this visit:    Mild intermittent asthma without complication - W PRIOR STRONG RESPONSE TO PRED TAPER, SUSPECT HAS ONGOING UNCONTROLLED ASTHMA DESPITE LABA. WILL TRY LOW DOSE DAILY PRED 10MG, HOWEVER IF CONTINUES TO HAVE ENRIQUE W/O IMPROVEMENT IN NEXT COUPLE OF WEEKS WE DISCUSSED THEN PLAN FOR CT CARDIAC SCORING FOR R/O CAD/ISCHEMIA, EVEN THOUGH HAD BENIGN STRESS TESTING 2018. -     predniSONE (DELTASONE) 10 MG tablet; Take 1 tablet by mouth daily    Gastroesophageal reflux disease without esophagitis - GERD controlled on meds and will refill, monitor for any recurrent or worsening sx.    -     omeprazole (PRILOSEC) 20 MG delayed release capsule; Take 1 capsule by mouth daily    Dilated cardiomyopathy ([address]- **we could also consider screen BNP if SOB persists w/o improvement on pred 10mg daily in two weeks. -     furosemide (LASIX) 20 MG tablet; Take 1 tablet by mouth daily    Primary insomnia- The current medical regimen is effective;  continue present plan and medications. -     traZODone (DESYREL) 50 MG tablet; Take 2 tablets by mouth nightly    Chronic atrial fibrillation- inr ok and cont coumadin dosing, rf meds  -     warfarin (COUMADIN) 1 MG tablet;  Take 0.5 tablets by mouth daily  -     dilTIAZem (CARTIA XT) 180 MG extended release capsule; TAKE ONE CAPSULE BY MOUTH

## 2020-05-20 ENCOUNTER — OFFICE VISIT (OUTPATIENT)
Dept: INTERNAL MEDICINE CLINIC | Age: 70
End: 2020-05-20
Payer: MEDICARE

## 2020-05-20 VITALS
OXYGEN SATURATION: 96 % | RESPIRATION RATE: 18 BRPM | SYSTOLIC BLOOD PRESSURE: 128 MMHG | BODY MASS INDEX: 31.81 KG/M2 | HEART RATE: 84 BPM | WEIGHT: 270 LBS | DIASTOLIC BLOOD PRESSURE: 68 MMHG

## 2020-05-20 LAB
HBA1C MFR BLD: 7.2 %
INTERNATIONAL NORMALIZATION RATIO, POC: 2.8
PROTHROMBIN TIME, POC: 33.8

## 2020-05-20 PROCEDURE — G8417 CALC BMI ABV UP PARAM F/U: HCPCS | Performed by: INTERNAL MEDICINE

## 2020-05-20 PROCEDURE — 3051F HG A1C>EQUAL 7.0%<8.0%: CPT | Performed by: INTERNAL MEDICINE

## 2020-05-20 PROCEDURE — 1036F TOBACCO NON-USER: CPT | Performed by: INTERNAL MEDICINE

## 2020-05-20 PROCEDURE — 83036 HEMOGLOBIN GLYCOSYLATED A1C: CPT | Performed by: INTERNAL MEDICINE

## 2020-05-20 PROCEDURE — G8427 DOCREV CUR MEDS BY ELIG CLIN: HCPCS | Performed by: INTERNAL MEDICINE

## 2020-05-20 PROCEDURE — 3017F COLORECTAL CA SCREEN DOC REV: CPT | Performed by: INTERNAL MEDICINE

## 2020-05-20 PROCEDURE — 99214 OFFICE O/P EST MOD 30 MIN: CPT | Performed by: INTERNAL MEDICINE

## 2020-05-20 PROCEDURE — 1123F ACP DISCUSS/DSCN MKR DOCD: CPT | Performed by: INTERNAL MEDICINE

## 2020-05-20 PROCEDURE — 4040F PNEUMOC VAC/ADMIN/RCVD: CPT | Performed by: INTERNAL MEDICINE

## 2020-05-20 PROCEDURE — 2022F DILAT RTA XM EVC RTNOPTHY: CPT | Performed by: INTERNAL MEDICINE

## 2020-05-20 PROCEDURE — 85610 PROTHROMBIN TIME: CPT | Performed by: INTERNAL MEDICINE

## 2020-05-20 RX ORDER — PREDNISONE 10 MG/1
10 TABLET ORAL DAILY
Qty: 30 TABLET | Refills: 1 | Status: CANCELLED | OUTPATIENT
Start: 2020-05-20 | End: 2020-06-19

## 2020-05-20 RX ORDER — PREDNISONE 10 MG/1
10 TABLET ORAL DAILY
Qty: 90 TABLET | Refills: 0 | Status: SHIPPED | OUTPATIENT
Start: 2020-05-20 | End: 2020-07-16

## 2020-05-20 RX ORDER — WARFARIN SODIUM 3 MG/1
TABLET ORAL
Qty: 90 TABLET | Refills: 1 | Status: SHIPPED
Start: 2020-05-20 | End: 2020-10-05 | Stop reason: SDUPTHER

## 2020-05-20 NOTE — PROGRESS NOTES
Eran Crowell  1950 05/20/20    SUBJECTIVE:    Asthma- exertion and SOB/wheezing much better since been on pred 10mg/daily. We discussed that there are significant risks w chr use, incl acceleration of osteoporosis, DM/hyperglycemia, HTN, cataracts. He will try to use up to 10mg daily but also prn, so sometimes can try 5mg or none. Does not have a pulmonologist currently, we will also seek an opinion from Dr Leena Hdez. Atrial Fibrillation:  Patient currently denies palpitations, dizziness, syncope, chest pain and shortness of breath. INR ok at 2.8. DM- due for f/u lab, will check a1c today espec since has been on pred. Lab Results   Component Value Date    LABA1C 6.4 01/06/2020    LABA1C 6.7 10/14/2019    LABA1C 6.6 07/30/2019     Lab Results   Component Value Date    GLUF 107 (H) 01/06/2017    LABMICR <1.20 01/06/2020    LDLCALC 37 01/06/2020    CREATININE 1.2 01/06/2020       OBJECTIVE:    /68   Pulse 84   Resp 18   Wt 270 lb (122.5 kg)   SpO2 96%   BMI 31.81 kg/m²     Physical Exam  Vitals signs reviewed. Constitutional:       General: He is not in acute distress. Appearance: He is well-developed. HENT:      Head: Normocephalic and atraumatic. Nose: Nose normal.      Mouth/Throat:      Mouth: Mucous membranes are moist.      Pharynx: Oropharynx is clear. No oropharyngeal exudate. Eyes:      General: No scleral icterus. Right eye: No discharge. Left eye: No discharge. Conjunctiva/sclera: Conjunctivae normal.      Pupils: Pupils are equal, round, and reactive to light. Neck:      Musculoskeletal: Normal range of motion and neck supple. Thyroid: No thyromegaly. Vascular: No JVD. Trachea: No tracheal deviation. Cardiovascular:      Rate and Rhythm: Normal rate. Rhythm irregular. Heart sounds: Normal heart sounds. No murmur. No friction rub. No gallop.     Pulmonary:      Effort: Pulmonary effort is normal. No respiratory distress. Breath sounds: Normal breath sounds. No wheezing or rales. Abdominal:      General: Bowel sounds are normal. There is no distension. Palpations: Abdomen is soft. There is no mass. Tenderness: There is no abdominal tenderness. There is no guarding or rebound. Musculoskeletal:         General: No tenderness. Lymphadenopathy:      Cervical: No cervical adenopathy. Skin:     General: Skin is warm and dry. Findings: No rash. Neurological:      Mental Status: He is alert. Psychiatric:         Mood and Affect: Mood normal.         ASSESSMENT:    1. Controlled type 2 diabetes mellitus with diabetic nephropathy, without long-term current use of insulin ([address]    2. Permanent atrial fibrillation    3. Mild intermittent asthma without complication    4. Chronic atrial fibrillation        PLAN:    Wilbert Sapp was seen today for office visit for anticoagulation management. Diagnoses and all orders for this visit:    Controlled type 2 diabetes mellitus with diabetic nephropathy, without long-term current use of insulin ([address]- HIS A1C IS HIGHER TO 7.2 FR PRIOR 6S RANGE SO PRED IS CAUSING WORSENING CONTROL. HE WILL CONT WORK ON DIET, IF A1C RISES FURTHER MAY NEED ADJUSTMENT OF MEDS. ALSO SEE BELOW  -     POCT glycosylated hemoglobin (Hb A1C)  -     Comprehensive Metabolic Panel; Future  -     CBC Auto Differential; Future  -     Lipid Panel; Future  -     Hemoglobin A1C; Future  -     Microalbumin / Creatinine Urine Ratio; Future    Permanent atrial fibrillation - Pt clinically w/o evidence of cardiovascular instability, cont regimen.    -     POCT INR  -     Comprehensive Metabolic Panel; Future  -     CBC Auto Differential; Future    Mild intermittent asthma without complication- CLEARLY SX MUCH BETTER CONTROLLED ON PRED BUT WILL ALSO ASK DR MULLEN FOR EVAL/INPUT OF OTHER OPTIONS ARE PRESENT TO FURTHER OPTIMIZE OTHER MEDS AND PERHAPS HE CAN LATER WEAN PRED DOSING BACK TO PRN.   - predniSONE (DELTASONE) 10 MG tablet; Take 1 tablet by mouth daily  -     Mary Martinez MD, Pulmonology, [place]    Chronic atrial fibrillation  -     warfarin (COUMADIN) 3 MG tablet; Take daily  -     TSH without Reflex;  Future

## 2020-05-28 ENCOUNTER — INITIAL CONSULT (OUTPATIENT)
Dept: PULMONOLOGY | Age: 70
End: 2020-05-28
Payer: MEDICARE

## 2020-05-28 VITALS
DIASTOLIC BLOOD PRESSURE: 72 MMHG | HEART RATE: 87 BPM | OXYGEN SATURATION: 93 % | WEIGHT: 268 LBS | RESPIRATION RATE: 16 BRPM | SYSTOLIC BLOOD PRESSURE: 116 MMHG | BODY MASS INDEX: 31.64 KG/M2 | HEIGHT: 77 IN

## 2020-05-28 PROCEDURE — G8926 SPIRO NO PERF OR DOC: HCPCS | Performed by: NURSE PRACTITIONER

## 2020-05-28 PROCEDURE — 3023F SPIROM DOC REV: CPT | Performed by: NURSE PRACTITIONER

## 2020-05-28 PROCEDURE — G8427 DOCREV CUR MEDS BY ELIG CLIN: HCPCS | Performed by: NURSE PRACTITIONER

## 2020-05-28 PROCEDURE — 99213 OFFICE O/P EST LOW 20 MIN: CPT | Performed by: NURSE PRACTITIONER

## 2020-05-28 PROCEDURE — G8417 CALC BMI ABV UP PARAM F/U: HCPCS | Performed by: NURSE PRACTITIONER

## 2020-05-28 RX ORDER — ALBUTEROL SULFATE 90 UG/1
2 AEROSOL, METERED RESPIRATORY (INHALATION) EVERY 6 HOURS PRN
Qty: 1 INHALER | Refills: 3 | Status: SHIPPED | OUTPATIENT
Start: 2020-05-28 | End: 2020-12-16 | Stop reason: SDUPTHER

## 2020-05-28 RX ORDER — SIMVASTATIN 5 MG
TABLET ORAL
COMMUNITY
Start: 2020-05-15 | End: 2020-11-18 | Stop reason: SINTOL

## 2020-05-28 RX ORDER — MONTELUKAST SODIUM 10 MG/1
10 TABLET ORAL DAILY
Qty: 30 TABLET | Refills: 3 | Status: SHIPPED | OUTPATIENT
Start: 2020-05-28 | End: 2020-09-21 | Stop reason: SDUPTHER

## 2020-05-28 ASSESSMENT — SLEEP AND FATIGUE QUESTIONNAIRES
HOW LIKELY ARE YOU TO NOD OFF OR FALL ASLEEP WHILE SITTING AND TALKING TO SOMEONE: 0
SLEEP APNEA SCREENER SCORE: 1
DO YOU WAKE UP MORE THAN ONCE A NIGHT TO URINATE: 0
DO YOU HAVE CONGESTIVE HEART FAILURE (CHF), HYPERTENSION, CORONARY DISEASE, CARDIAC ARRYTHMIAS, DIABETES, OR HAD A STROKE: 0
HOW LIKELY ARE YOU TO NOD OFF OR FALL ASLEEP WHILE WATCHING TV: 2
HOW LIKELY ARE YOU TO NOD OFF OR FALL ASLEEP WHILE SITTING QUIETLY AFTER LUNCH WITHOUT ALCOHOL: 0
DO YOU WAKE UP TIRED, ARE YOU TIRED DURING THE DAY, OR DO YOU TAKE NAPS: 1
HAVE YOU BEEN TOLD YOU SNORE, EVEN INTERMITTENTLY: 0
HOW LIKELY ARE YOU TO NOD OFF OR FALL ASLEEP WHILE LYING DOWN TO REST IN THE AFTERNOON WHEN CIRCUMSTANCES PERMIT: 0
ESS TOTAL SCORE: 4
HOW LIKELY ARE YOU TO NOD OFF OR FALL ASLEEP WHILE SITTING INACTIVE IN A PUBLIC PLACE: 0
HOW LIKELY ARE YOU TO NOD OFF OR FALL ASLEEP WHEN YOU ARE A PASSENGER IN A CAR FOR AN HOUR WITHOUT A BREAK: 0
HAVE YOU EVER BEEN TOLD YOU STOP BREATHING OR HOLD YOUR BREATH WHILE SLEEPING: 0
NECK CIRCUMFERENCE (INCHES): 16.5
HOW LIKELY ARE YOU TO NOD OFF OR FALL ASLEEP WHILE SITTING AND READING: 2
HOW LIKELY ARE YOU TO NOD OFF OR FALL ASLEEP IN A CAR, WHILE STOPPED FOR A FEW MINUTES IN TRAFFIC: 0

## 2020-05-28 NOTE — PROGRESS NOTES
Subjective:   CHIEF COMPLAINT / HPI:       Pola Irving is a 65 y.o. male with history of asthma, COPD, A. 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 States that he is a former smoker, quit 7 years ago, states he is occasionally exposed to secondhand smoke. He denies ever having a baseline CT scan lung screen. He is currently on Advair and albuterol, initiated by his primary care provider at the time. He states he is rinsing his mouth out well after using the Advair, he states he uses albuterol rescue inhaler some days once other days 2-3 times. He states his use of his albuterol rescue inhaler depends upon his shortness of breath. He has noticed that he has had increased shortness of breath over the last several months. States he does have an intermittent cough, he states the cough does not interfere with his normal activities nor rest.  He does not recall ever having a pulmonary function test.    States he has been practicing social distancing during this coronavirus pandemic. He has been wearing a mask when he goes out in public, washing his hands frequently or using hand .   He denies any recent known contact with persons with upper respiratory infection, positive for coronavirus, under investigation for possible coronavirus exposure      Influenza 10/16/2019  Pneumococcal immunization: 9/5/2013 -Pneumovax 23, 12/3/2015 -Pneumovax 23, 11/15/2016 Prevnar 13  Former Smoker quit 7 years ago,  Smoked 1 ppd for 39 years, denies use of smokeless products or vapor  PCP Skip Sheets Md    Past Medical History:  Past Medical History:   Diagnosis Date    Aortic stenosis, mild 11/8/2017    By echo 10/2016    Asthma, intrinsic     Atrial fibrillation ([facility]     Cholelithiasis     Dr Truman Bradford evaluating    Colonic polyp 05/2009    Dr Branden Duran- also done 4/16/2012- AND 3/8/17-TUBULAR Current Medications:      Current Outpatient Medications:     simvastatin (ZOCOR) 5 MG tablet, , Disp: , Rfl:     albuterol sulfate HFA (PROVENTIL HFA) 108 (90 Base) MCG/ACT inhaler, Inhale 2 puffs into the lungs every 6 hours as needed for Wheezing, Disp: 1 Inhaler, Rfl: 3    Spacer/Aero-Holding Chambers ALICE, 1 Device by Does not apply route daily as needed (use with albuterol rescue inhaler), Disp: 1 Device, Rfl: 0    montelukast (SINGULAIR) 10 MG tablet, Take 1 tablet by mouth daily, Disp: 30 tablet, Rfl: 3    warfarin (COUMADIN) 3 MG tablet, Take daily, Disp: 90 tablet, Rfl: 1    predniSONE (DELTASONE) 10 MG tablet, Take 1 tablet by mouth daily, Disp: 90 tablet, Rfl: 0    omeprazole (PRILOSEC) 20 MG delayed release capsule, Take 1 capsule by mouth daily, Disp: 90 capsule, Rfl: 1    furosemide (LASIX) 20 MG tablet, Take 1 tablet by mouth daily, Disp: 90 tablet, Rfl: 1    traZODone (DESYREL) 50 MG tablet, Take 2 tablets by mouth nightly, Disp: 180 tablet, Rfl: 1    warfarin (COUMADIN) 1 MG tablet, Take 0.5 tablets by mouth daily, Disp: 90 tablet, Rfl: 1    dilTIAZem (CARTIA XT) 180 MG extended release capsule, TAKE ONE CAPSULE BY MOUTH TWICE A DAY, Disp: 180 capsule, Rfl: 1    allopurinol (ZYLOPRIM) 100 MG tablet, Take 1 tablet by mouth daily, Disp: 90 tablet, Rfl: 1    digoxin (DIGOX) 250 MCG tablet, Take 1 tablet by mouth daily Take 1 tablet by mouth  daily, Disp: 90 tablet, Rfl: 1    potassium chloride (KLOR-CON M) 10 MEQ extended release tablet, Take 1 tablet by mouth daily, Disp: 90 tablet, Rfl: 1    fluticasone-salmeterol (ADVAIR DISKUS) 500-50 MCG/DOSE diskus inhaler, Inhale 1 puff into the lungs 2 times daily Rinse mouth after use., Disp: 1 Inhaler, Rfl: 5    calcium-vitamin D (OSCAL-500) 500-200 MG-UNIT per tablet, Take 1 tablet by mouth daily. , Disp: , Rfl:     Fluticasone-Salmeterol (AIRDUO RESPICLICK 539/68) 823-39 MCG/ACT AEPB, Inhale 1 puff into the lungs 2 times daily, Disp: 1 each, Rfl: 5    Allergies   Allergen Reactions    Lisinopril      Chest tightness       Social History:    Social History     Socioeconomic History    Marital status:      Spouse name: None    Number of children: None    Years of education: None    Highest education level: None   Occupational History    Occupation: [employer]   Social Needs    Financial resource strain: None    Food insecurity     Worry: None     Inability: None    Transportation needs     Medical: None     Non-medical: None   Tobacco Use    Smoking status: Former Smoker     Packs/day: 1.00     Years: 39.00     Pack years: 39.00     Types: Cigarettes     Start date:      Last attempt to quit: 2013     Years since quittin.4    Smokeless tobacco: Never Used   Substance and Sexual Activity    Alcohol use: Yes     Comment: occasional wine/moderate    Drug use: No    Sexual activity: Yes     Partners: Female   Lifestyle    Physical activity     Days per week: None     Minutes per session: None    Stress: None   Relationships    Social connections     Talks on phone: None     Gets together: None     Attends Shinto service: None     Active member of club or organization: None     Attends meetings of clubs or organizations: None     Relationship status: None    Intimate partner violence     Fear of current or ex partner: None     Emotionally abused: None     Physically abused: None     Forced sexual activity: None   Other Topics Concern    None   Social History Narrative    Exercise:    Seat Belt :       Family History:    Family History   Problem Relation Age of Onset    Hypertension Mother     High Blood Pressure Mother     Other Mother         lung problems    Other Father         CHF, A. Fib    Hypotension Father     Diabetes Father          REVIEW OF SYSTEMS:    CONSTITUTIONAL:  negative for fevers, chills, diaphoresis, activity change, appetite change, night sweats and unexpected weight change. axillary or supraclavicular adenopathy. No cervical adenopathy  EXTREMITIES: No edema, no inflammation, no tenderness, no clubbing of digits  NEURO: Oriented X 3, No focal deficits  SKIN: warm and dry    LAB:CBC with Differential:    Lab Results   Component Value Date    WBC 8.0 05/29/2020    RBC 4.74 05/29/2020    HGB 13.2 05/29/2020    HCT 42.8 05/29/2020     05/29/2020    MCV 90.3 05/29/2020    MCH 27.8 05/29/2020    MCHC 30.8 05/29/2020    RDW 16.2 05/29/2020    SEGSPCT 87.4 05/29/2020    BANDSPCT 6 01/04/2017    LYMPHOPCT 5.8 05/29/2020    MONOPCT 5.1 05/29/2020    EOSPCT 1.9 08/13/2018    BASOPCT 0.5 05/29/2020    MONOSABS 0.4 05/29/2020    LYMPHSABS 0.5 05/29/2020    EOSABS 0.0 05/29/2020    BASOSABS 0.0 05/29/2020    DIFFTYPE AUTOMATED DIFFERENTIAL 05/29/2020     BMP:    Lab Results   Component Value Date     01/06/2020    K 4.1 01/06/2020     01/06/2020    CO2 24 01/06/2020    BUN 14 01/06/2020    CREATININE 1.2 01/06/2020    CALCIUM 9.4 01/06/2020    GFRAA >60 01/06/2020    GFRAA >60 05/22/2013    LABGLOM 60 01/06/2020    LABGLOM 76 02/08/2018    GLUCOSE 136 01/06/2020     Hepatic Function Panel:    Lab Results   Component Value Date    ALKPHOS 46 01/06/2020    ALT 15 01/06/2020    AST 16 01/06/2020    PROT 6.7 01/06/2020    PROT 6.6 12/21/2012    BILITOT 0.7 01/06/2020    BILIDIR 0.5 01/06/2017     ABG:  No results found for: XHU0ESE, BEART, H1ZREYZB, PHART, THGBART, XGZ3URU, PO2ART, QFG4EAC    RADIOLOGY:  No recent cardiopulmonary x-rays  CT of chest to be obtained    PFT: To be obtained    Assessment      1. Mild asthma without complication, unspecified whether persistent  2. Chronic obstructive pulmonary disease, unspecified COPD type ([facility]  3. Former smoker  4. Pre-op testing  5. Health care maintenance    Plan:  Eric Joseph has never had a pulmonary function test we will order pulmonary function test to be completed within the next few weeks.   He is aware he will need to have a COVID-19 screen 48 hours to 72 hours prior to his pulmonary function test.    This time we will continue his Advair, I will add Singulair and albuterol rescue inhaler with spacer until we are aware of his PFT results. At that time I will make what ever bronchodilator therapy changes need to be made. He is advised to continue rinsing his mouth out well after using his Advair. He is a former smoker quit 7 years ago has a 39-year pack history, he is never had a CT lung screening. We will proceed with a CT of his chest to assess for pulmonary disease. I have low suspicion at this time that he has a lung cancer however we will get CT of his chest to check for nodule and masses additionally. We have discussed the need to maintain yearly flu immunization, pneumococcal vaccination. We have discussed Coronavirus precaution including handwashing practice, wiping items touched in public such as gas pumps, door handles, shopping carts, etc. Self monitoring for infection - fever, chills, cough, SOB. Should they develop symptoms they should call office for further instructions. Return in about 6 weeks (around 7/9/2020) for Follow Up, PFT. This dictation was performed with a verbal recognition program and it was checked for errors. It is possible that there are still dictated errors within this office note. Any errors should be brought immediately to my attention for correction. All efforts were made to ensure that this office note is accurate.        Electronically signed by SLAVA Chen CNP on 6/30/2020 at 12:47 PM

## 2020-05-29 ENCOUNTER — HOSPITAL ENCOUNTER (OUTPATIENT)
Age: 70
Discharge: HOME | End: 2020-05-29
Payer: MEDICARE

## 2020-05-29 ENCOUNTER — HOSPITAL ENCOUNTER (OUTPATIENT)
Dept: CT IMAGING | Age: 70
Discharge: HOME | End: 2020-05-29
Payer: MEDICARE

## 2020-05-29 LAB
BASOPHILS ABSOLUTE: 0 K/CU MM
BASOPHILS RELATIVE PERCENT: 0.5 % (ref 0–1)
DIFFERENTIAL TYPE: ABNORMAL
EOSINOPHILS ABSOLUTE: 0 K/CU MM
EOSINOPHILS RELATIVE PERCENT: 0.4 % (ref 0–3)
HCT VFR BLD CALC: 42.8 % (ref 42–52)
HEMOGLOBIN: 13.2 GM/DL (ref 13.5–18)
IMMATURE NEUTROPHIL %: 0.8 % (ref 0–0.43)
LYMPHOCYTES ABSOLUTE: 0.5 K/CU MM
LYMPHOCYTES RELATIVE PERCENT: 5.8 % (ref 24–44)
MCH RBC QN AUTO: 27.8 PG (ref 27–31)
MCHC RBC AUTO-ENTMCNC: 30.8 % (ref 32–36)
MCV RBC AUTO: 90.3 FL (ref 78–100)
MONOCYTES ABSOLUTE: 0.4 K/CU MM
MONOCYTES RELATIVE PERCENT: 5.1 % (ref 0–4)
NUCLEATED RBC %: 0 %
PDW BLD-RTO: 16.2 % (ref 11.7–14.9)
PLATELET # BLD: 232 K/CU MM (ref 140–440)
PMV BLD AUTO: 10.2 FL (ref 7.5–11.1)
RBC # BLD: 4.74 M/CU MM (ref 4.6–6.2)
SEGMENTED NEUTROPHILS ABSOLUTE COUNT: 7 K/CU MM
SEGMENTED NEUTROPHILS RELATIVE PERCENT: 87.4 % (ref 36–66)
TOTAL IMMATURE NEUTOROPHIL: 0.06 K/CU MM
TOTAL NUCLEATED RBC: 0 K/CU MM
WBC # BLD: 8 K/CU MM (ref 4–10.5)

## 2020-05-29 PROCEDURE — 85025 COMPLETE CBC W/AUTO DIFF WBC: CPT

## 2020-05-29 PROCEDURE — 36415 COLL VENOUS BLD VENIPUNCTURE: CPT

## 2020-05-29 PROCEDURE — 71250 CT THORAX DX C-: CPT

## 2020-05-29 PROCEDURE — 86003 ALLG SPEC IGE CRUDE XTRC EA: CPT

## 2020-05-29 PROCEDURE — 84075 ASSAY ALKALINE PHOSPHATASE: CPT

## 2020-05-30 ENCOUNTER — TELEPHONE (OUTPATIENT)
Dept: FAMILY MEDICINE CLINIC | Age: 70
End: 2020-05-30

## 2020-05-30 NOTE — TELEPHONE ENCOUNTER
Spoke with Jimmy Ordaz concerning his CT scan and his CBC results. He states he has his PFT scheduled for June 9 and is aware he needs to have his COVID testing prior to his PFT.

## 2020-06-01 LAB
2000687N OAK TREE IGE: <0.1 KU/L
ALLERGEN ASPERGILLUS FUMIGATUS IGE: <0.1 KU/L
ALLERGEN BERMUDA GRASS IGE: <0.1 KU/L
ALLERGEN BIRCH IGE: <0.1 KU/L
ALLERGEN CAT DANDER IGE: 0.1 KU/L
ALLERGEN COMMON SHORT RAGWEED IGE: 0.63 KU/L
ALLERGEN DOG DANDER IGE: <0.1 KU/L
ALLERGEN ELM IGE: <0.1 KU/L
ALLERGEN FUNGI/MOLD A. ALTERNATA IGE: <0.1 KU/L
ALLERGEN FUNGI/MOLD M.RACEMOSUS IGE: <0.1 KU/L
ALLERGEN GERMAN COCKROACH IGE: 0.12 KU/L
ALLERGEN HORMODENDRUM HORDEI IGE: <0.1 KU/L
ALLERGEN MAPLE/BOX ELDER IGE: <0.1 KU/L
ALLERGEN MITE DUST FARINAE IGE: 0.24 KU/L
ALLERGEN MITE DUST PTERONYSSINUS IGE: 0.25 KU/L
ALLERGEN MOUNTAIN CEDAR: <0.1 KU/L
ALLERGEN MOUSE EPITHELIA IGE: <0.1 KU/L
ALLERGEN PECAN TREE IGE: <0.1 KU/L
ALLERGEN PENICILLIUM NOTATUM: <0.1 KU/L
ALLERGEN PIGWEED ROUGH IGE: <0.1 KU/L
ALLERGEN RUSSIAN THISTLE IGE: <0.1 KU/L
ALLERGEN SHEEP SORREL (W18) IGE: <0.1 KU/L
ALLERGEN TIMOTHY GRASS: <0.1 KU/L
ALLERGEN TREE SYCAMORE: <0.1 KU/L
ALLERGEN WALNUT TREE IGE: <0.1 KU/L
ALLERGEN WHITE MULBERRY TREE, IGE: <0.1 KU/L
ALLERGEN, TREE, WHITE ASH IGE: <0.1 KU/L
COTTONWOOD TREE: <0.1 KU/L
IMMUNOCAP SCORE: NORMAL
IMMUNOGLOBULIN E: 58 KU/L

## 2020-06-01 NOTE — PROGRESS NOTES
Attempted to call patient in regards to needing a COVID-19 test before having his PFT completed.   No answer

## 2020-06-04 ENCOUNTER — HOSPITAL ENCOUNTER (OUTPATIENT)
Age: 70
Setting detail: SPECIMEN
Discharge: HOME | End: 2020-06-04
Payer: MEDICARE

## 2020-06-04 PROCEDURE — U0002 COVID-19 LAB TEST NON-CDC: HCPCS

## 2020-06-05 LAB
SARS-COV-2: NOT DETECTED
SOURCE: NORMAL

## 2020-06-09 ENCOUNTER — TELEPHONE (OUTPATIENT)
Dept: PULMONOLOGY | Age: 70
End: 2020-06-09

## 2020-06-09 ENCOUNTER — HOSPITAL ENCOUNTER (OUTPATIENT)
Dept: PULMONOLOGY | Age: 70
Discharge: HOME | End: 2020-06-09
Payer: MEDICARE

## 2020-06-09 ENCOUNTER — TELEPHONE (OUTPATIENT)
Dept: INTERNAL MEDICINE CLINIC | Age: 70
End: 2020-06-09

## 2020-06-09 LAB
DLCO %PRED: 43 %
DLCO PRED: NORMAL
DLCO/VA %PRED: NORMAL
DLCO/VA PRED: NORMAL
DLCO/VA: NORMAL
DLCO: NORMAL
EXPIRATORY TIME-POST: NORMAL
EXPIRATORY TIME: NORMAL
FEF 25-75% %CHNG: NORMAL
FEF 25-75% %PRED-POST: NORMAL
FEF 25-75% %PRED-PRE: NORMAL
FEF 25-75% PRED: NORMAL
FEF 25-75%-POST: NORMAL
FEF 25-75%-PRE: NORMAL
FEV1 %PRED-POST: 47 %
FEV1 %PRED-PRE: 46 %
FEV1 PRED: NORMAL
FEV1-POST: NORMAL
FEV1-PRE: NORMAL
FEV1/FVC %PRED-POST: NORMAL
FEV1/FVC %PRED-PRE: NORMAL
FEV1/FVC PRED: NORMAL
FEV1/FVC-POST: 49 %
FEV1/FVC-PRE: 47 %
FVC %PRED-POST: NORMAL
FVC %PRED-PRE: NORMAL
FVC PRED: NORMAL
FVC-POST: NORMAL
FVC-PRE: NORMAL
GAW %PRED: NORMAL
GAW PRED: NORMAL
GAW: NORMAL
IC %PRED: NORMAL
IC PRED: NORMAL
IC: NORMAL
MEP: NORMAL
MIP: NORMAL
MVV %PRED-PRE: NORMAL
MVV PRED: NORMAL
MVV-PRE: NORMAL
PEF %PRED-POST: NORMAL
PEF %PRED-PRE: NORMAL
PEF PRED: NORMAL
PEF%CHNG: NORMAL
PEF-POST: NORMAL
PEF-PRE: NORMAL
RAW %PRED: NORMAL
RAW PRED: NORMAL
RAW: NORMAL
RV %PRED: NORMAL
RV PRED: NORMAL
RV: NORMAL
SVC %PRED: NORMAL
SVC PRED: NORMAL
SVC: NORMAL
TLC %PRED: 107 %
TLC PRED: NORMAL
TLC: NORMAL
VA %PRED: NORMAL
VA PRED: NORMAL
VA: NORMAL
VTG %PRED: NORMAL
VTG PRED: NORMAL
VTG: NORMAL

## 2020-06-09 PROCEDURE — 94060 EVALUATION OF WHEEZING: CPT

## 2020-06-09 PROCEDURE — 94726 PLETHYSMOGRAPHY LUNG VOLUMES: CPT

## 2020-06-09 PROCEDURE — 94729 DIFFUSING CAPACITY: CPT

## 2020-06-09 ASSESSMENT — PULMONARY FUNCTION TESTS
FEV1_PERCENT_PREDICTED_POST: 47
FEV1/FVC_PRE: 47
FEV1_PERCENT_PREDICTED_PRE: 46
FEV1/FVC_POST: 49

## 2020-06-09 NOTE — TELEPHONE ENCOUNTER
Pt called stating since he has been taking the singulair his legs and knees are very swollen. We also received pts PFT results, they are under the media tab. Please advise.

## 2020-06-15 ENCOUNTER — OFFICE VISIT (OUTPATIENT)
Dept: INTERNAL MEDICINE CLINIC | Age: 70
End: 2020-06-15
Payer: MEDICARE

## 2020-06-15 VITALS
DIASTOLIC BLOOD PRESSURE: 70 MMHG | BODY MASS INDEX: 31.42 KG/M2 | RESPIRATION RATE: 16 BRPM | HEART RATE: 70 BPM | WEIGHT: 265 LBS | SYSTOLIC BLOOD PRESSURE: 120 MMHG | OXYGEN SATURATION: 98 %

## 2020-06-15 LAB
INTERNATIONAL NORMALIZATION RATIO, POC: 2.6
PROTHROMBIN TIME, POC: 31.6

## 2020-06-15 PROCEDURE — 85610 PROTHROMBIN TIME: CPT | Performed by: INTERNAL MEDICINE

## 2020-06-15 PROCEDURE — 1036F TOBACCO NON-USER: CPT | Performed by: INTERNAL MEDICINE

## 2020-06-15 PROCEDURE — 3051F HG A1C>EQUAL 7.0%<8.0%: CPT | Performed by: INTERNAL MEDICINE

## 2020-06-15 PROCEDURE — G8427 DOCREV CUR MEDS BY ELIG CLIN: HCPCS | Performed by: INTERNAL MEDICINE

## 2020-06-15 PROCEDURE — 4040F PNEUMOC VAC/ADMIN/RCVD: CPT | Performed by: INTERNAL MEDICINE

## 2020-06-15 PROCEDURE — 3023F SPIROM DOC REV: CPT | Performed by: INTERNAL MEDICINE

## 2020-06-15 PROCEDURE — 2022F DILAT RTA XM EVC RTNOPTHY: CPT | Performed by: INTERNAL MEDICINE

## 2020-06-15 PROCEDURE — 1123F ACP DISCUSS/DSCN MKR DOCD: CPT | Performed by: INTERNAL MEDICINE

## 2020-06-15 PROCEDURE — 99213 OFFICE O/P EST LOW 20 MIN: CPT | Performed by: INTERNAL MEDICINE

## 2020-06-15 PROCEDURE — G8417 CALC BMI ABV UP PARAM F/U: HCPCS | Performed by: INTERNAL MEDICINE

## 2020-06-15 PROCEDURE — G8926 SPIRO NO PERF OR DOC: HCPCS | Performed by: INTERNAL MEDICINE

## 2020-06-15 PROCEDURE — 3017F COLORECTAL CA SCREEN DOC REV: CPT | Performed by: INTERNAL MEDICINE

## 2020-06-15 NOTE — PROGRESS NOTES
Sarah Beckett  1950  06/15/20    SUBJECTIVE:  Leg edema improved w incr lasix a few days. Atr fib, INR ok also today at 2.6. No sx cp or sob. Wheezing and SOB improved, now on singulair too which has helped. Appears his SOB and asthma have progressed. Now w some upper lobe COPD>  Off prednisone now. Has f/u w Dr Chelsey Jain for July 16th. DM-  Last a1c ok. HAS LAB SHEET FOR F/U PRIOR TO NEXT APPT. Lab Results   Component Value Date    LABA1C 7.2 05/20/2020    LABA1C 6.4 01/06/2020    LABA1C 6.7 10/14/2019     Lab Results   Component Value Date    GLUF 107 (H) 01/06/2017    LABMICR <1.20 01/06/2020    LDLCALC 37 01/06/2020    CREATININE 1.2 01/06/2020          OBJECTIVE:    /70   Pulse 70   Resp 16   Wt 265 lb (120.2 kg)   SpO2 98%   BMI 31.42 kg/m²     Physical Exam  Vitals signs reviewed. Constitutional:       Appearance: He is well-developed. Neck:      Musculoskeletal: Neck supple. Cardiovascular:      Rate and Rhythm: Normal rate and regular rhythm. Heart sounds: Normal heart sounds. No murmur. No friction rub. No gallop. Pulmonary:      Effort: Pulmonary effort is normal. No respiratory distress. Breath sounds: Normal breath sounds. No wheezing or rales. Abdominal:      General: Bowel sounds are normal. There is no distension. Palpations: Abdomen is soft. Tenderness: There is no abdominal tenderness. Musculoskeletal:      Right lower leg: Edema present. Left lower leg: No edema. Comments: Healing lac noted L medial calf region, some chr venous stasis noted. Skin:         Neurological:      Mental Status: He is alert. ASSESSMENT:    1. Persistent atrial fibrillation    2. Peripheral edema    3. Controlled type 2 diabetes mellitus with diabetic nephropathy, without long-term current use of insulin ([address]    4. Mild intermittent asthma without complication    5.  Simple chronic bronchitis ([address]        PLAN:    Andreea Saavedra was seen today for

## 2020-06-15 NOTE — PATIENT INSTRUCTIONS
Check weight at home daily the next week. If leg swelling returns and wt goes up >3-5#, call us back MON one week. If needed prior to next week, can take additional lasix if needed.

## 2020-06-16 NOTE — PROGRESS NOTES
David Park  1950 04/08/19    SUBJECTIVE:    Hematoma resolved R thigh after surgery. Afib, INR is sl high today  On 4.5mg at 4.3 today. Was prev on 3mg. Legs have been swelling bilat the past month. HIS WIFE STATES ALSO HAS BEEN DRINKING TWO LARGE SPORTS DRINKS AND SEVERAL DIET SODAS/DAY. OBJECTIVE:    /74   Pulse 70   Resp 18   SpO2 94%     Physical Exam   Constitutional: He appears well-developed and well-nourished. Neck: Neck supple. Cardiovascular: Normal rate and normal heart sounds. Exam reveals no gallop and no friction rub. No murmur heard. IRR   Pulmonary/Chest: Effort normal and breath sounds normal. No respiratory distress. He has no wheezes. He has no rales. Abdominal: Soft. Bowel sounds are normal. He exhibits no distension. There is no tenderness. Musculoskeletal: He exhibits edema (1-2+ ankles). Neurological: He is alert. Psychiatric: He has a normal mood and affect. Vitals reviewed. ASSESSMENT:    1. Atrial fibrillation, unspecified type ([address]    2. Chronic atrial fibrillation (HCC)    3. Dilated cardiomyopathy ([address]        PLAN:    Mohini Vergara was seen today for shortness of breath, edema and office visit for anticoagulation management. Diagnoses and all orders for this visit:    Atrial fibrillation, unspecified type ([address]- INR now sl high, advised hold one day then will decr dose fr 4.5 to 3.5mg daily. Recheck one month INR  -     POCT INR    Chronic atrial fibrillation ([address]- Pt clinically w/o evidence of cardiovascular instability, cont regimen.    -     diltiazem (CARTIA XT) 180 MG extended release capsule; TAKE ONE CAPSULE BY MOUTH TWICE A DAY  -     warfarin (COUMADIN) 1 MG tablet; 1/2 to 1 tab daily as directed. Dilated cardiomyopathy ([address]- some leg swelling could be fr his ccb but also suspect w his ENRIQUE/SOB may have mild fluid overload and will incr lasix fr 3x/week to daily and also add low dose kdur. F/u one month closely.   HE WILL ALSO
details…

## 2020-06-30 PROBLEM — Z00.00 HEALTH CARE MAINTENANCE: Status: ACTIVE | Noted: 2020-06-30

## 2020-07-06 ENCOUNTER — TELEPHONE (OUTPATIENT)
Dept: PULMONOLOGY | Age: 70
End: 2020-07-06

## 2020-07-16 ENCOUNTER — OFFICE VISIT (OUTPATIENT)
Dept: PULMONOLOGY | Age: 70
End: 2020-07-16
Payer: MEDICARE

## 2020-07-16 VITALS
DIASTOLIC BLOOD PRESSURE: 66 MMHG | BODY MASS INDEX: 30.7 KG/M2 | HEART RATE: 90 BPM | HEIGHT: 77 IN | WEIGHT: 260 LBS | SYSTOLIC BLOOD PRESSURE: 122 MMHG | OXYGEN SATURATION: 93 %

## 2020-07-16 PROCEDURE — 3023F SPIROM DOC REV: CPT | Performed by: NURSE PRACTITIONER

## 2020-07-16 PROCEDURE — 3017F COLORECTAL CA SCREEN DOC REV: CPT | Performed by: NURSE PRACTITIONER

## 2020-07-16 PROCEDURE — G8417 CALC BMI ABV UP PARAM F/U: HCPCS | Performed by: NURSE PRACTITIONER

## 2020-07-16 PROCEDURE — 99213 OFFICE O/P EST LOW 20 MIN: CPT | Performed by: NURSE PRACTITIONER

## 2020-07-16 PROCEDURE — 1036F TOBACCO NON-USER: CPT | Performed by: NURSE PRACTITIONER

## 2020-07-16 PROCEDURE — G8926 SPIRO NO PERF OR DOC: HCPCS | Performed by: NURSE PRACTITIONER

## 2020-07-16 PROCEDURE — G8427 DOCREV CUR MEDS BY ELIG CLIN: HCPCS | Performed by: NURSE PRACTITIONER

## 2020-07-16 PROCEDURE — 1123F ACP DISCUSS/DSCN MKR DOCD: CPT | Performed by: NURSE PRACTITIONER

## 2020-07-16 PROCEDURE — 4040F PNEUMOC VAC/ADMIN/RCVD: CPT | Performed by: NURSE PRACTITIONER

## 2020-07-16 RX ORDER — TIOTROPIUM BROMIDE 18 UG/1
18 CAPSULE ORAL; RESPIRATORY (INHALATION) DAILY
Qty: 90 CAPSULE | Refills: 1 | Status: SHIPPED | OUTPATIENT
Start: 2020-07-16 | End: 2021-01-19 | Stop reason: SDUPTHER

## 2020-07-16 NOTE — PROGRESS NOTES
Pulmonary Clinic Office Follow up     Steve De Luna is a 65 y.o. male with history of COPD, A fib, DM2, diabetic neuropathy, GERD, dilated cardiomyopathy, VHD, presents today to the pulmonary clinic for follow up. Edwin Dykes states that his insurance did approve his Trelegy but the cost is keeping him from refilling the prescription. He states he has checked with his insurance and the combination which is most affordable for him and has worked in the past he feels was Advair and Spiriva. He states he occasionally uses his rescue inhaled with activity. He had been using before playing golf and did notice a slight improvement in his SOB. He states he is still having pain in his legs with walking and is keeping him from playing golf lately. Edwin Dykes did complete his PFT 6/9/2020 which indicated severe obstructive defect, there was no significant response to bronchodilators given. Ct of chest obtained 5/29/2020 demonstrated emphysema but no other significant findings. Edwin Dykes is practicing social distancing when he is in public, wearing a mask as appropriate, washing hands frequently or using hand . He states he has not been in contact with anyone with upper respiratory infection, positive COVID or under investigation for possible COVID exposure.   He denies any fever, chills, new onset shortness of breath, cough, malaise, change in sensation of taste or smell    Influenza 10/16/2019  Pneumovax Prenvar 13 11/15/2016, Pneumonvax 12/3/2015, 9/13/2013  Smoker quit 2013, 39 pack year history  PCP: Angel Huang MD    Past Medical History:  Past Medical History:   Diagnosis Date    Aortic stenosis, mild 11/8/2017    By echo 10/2016    Asthma, intrinsic     Atrial fibrillation (HCC)     Cholelithiasis     Dr Aislinn Cantu evaluating    Colonic polyp 05/2009    Dr Bar Peralta- also done 4/16/2012- AND 3/8/17-TUBULAR ADENOMA,-RECHECK 5YRS    COPD (chronic obstructive pulmonary disease) (HCC)     noted on PFTs 6/2020    DDD (degenerative disc disease), lumbar     Diabetes mellitus type 2, controlled ([facility]     Dr Beatrice Deng    Diabetic neuropathy, type II diabetes mellitus ([facility]     DECR MONOFIL SENSATION    Dilated cardiomyopathy ([facility] [address] 11/4/2015    GERD (gastroesophageal reflux disease)     Gout     H/O cardiovascular stress test 12/10/2015    lexiscan-normal,EF65%    H/O echocardiogram 06/13/2019    EF 55-60%, Mild AS,  Moderately dilated left and right atrium and right ventricle.  History of cardiovascular stress test 1/8/08 1/8/08- normal exercise performance without angina, the patient was noted to have nonspecific ST and T wave changes in the inferior and lateral leads. The nuclear segment of the report will be dicated seperately.  History of complete electrocardiogram 5/16/11    History of echocardiogram 11/14/14,11/2/11, 5/12/10, 5/20/09, 7/9/02 11/14/14- Four chamber dialtation with mild left ventricular systolic function Mild MR and TR, mild pulmonary HTN  EF 45-50%    History of total right hip arthroplasty     Dr [place] Whitley 9/2018- does get antibiotic prophylaxis w dental work- Dr Sebastian Burns Hx of Doppler echocardiogram 11/06/2018    EF55-60%,moderately to severly dilated left and right atrium and right ventricle,mild to moderate mitral and mild tricuspid regurg, mild PHTN    Hx of echocardiogram 10/26/2016    aortic sclerosis with mild stenosis,moderate left atrial dilation,mild mitral and tricusid regurg,EF55-60%    Hyperlipidemia     IFG (impaired fasting glucose)     Jan 2019- a1c 6.1 but fasting glc 131.     Knee pain, bilateral     Knee pain, left     Long-term (current) use of anticoagulants     Peptic ulcer disease     Pre-op evaluation 9/5/2018    Testosterone deficiency dx'd 9/2015    VHD (valvular heart disease) 11/29/2018    Mild aortic stenosis by echo November 2018       Current Medications:      Current Outpatient Medications:     tiotropium (SPIRIVA HANDIHALER) 18 MCG inhalation capsule, Inhale 1 capsule into the lungs daily, Disp: 90 capsule, Rfl: 1    simvastatin (ZOCOR) 5 MG tablet, , Disp: , Rfl:     albuterol sulfate HFA (PROVENTIL HFA) 108 (90 Base) MCG/ACT inhaler, Inhale 2 puffs into the lungs every 6 hours as needed for Wheezing, Disp: 1 Inhaler, Rfl: 3    Spacer/Aero-Holding Chambers ALICE, 1 Device by Does not apply route daily as needed (use with albuterol rescue inhaler), Disp: 1 Device, Rfl: 0    montelukast (SINGULAIR) 10 MG tablet, Take 1 tablet by mouth daily, Disp: 30 tablet, Rfl: 3    warfarin (COUMADIN) 3 MG tablet, Take daily, Disp: 90 tablet, Rfl: 1    omeprazole (PRILOSEC) 20 MG delayed release capsule, Take 1 capsule by mouth daily, Disp: 90 capsule, Rfl: 1    furosemide (LASIX) 20 MG tablet, Take 1 tablet by mouth daily, Disp: 90 tablet, Rfl: 1    traZODone (DESYREL) 50 MG tablet, Take 2 tablets by mouth nightly, Disp: 180 tablet, Rfl: 1    warfarin (COUMADIN) 1 MG tablet, Take 0.5 tablets by mouth daily, Disp: 90 tablet, Rfl: 1    dilTIAZem (CARTIA XT) 180 MG extended release capsule, TAKE ONE CAPSULE BY MOUTH TWICE A DAY, Disp: 180 capsule, Rfl: 1    allopurinol (ZYLOPRIM) 100 MG tablet, Take 1 tablet by mouth daily, Disp: 90 tablet, Rfl: 1    digoxin (DIGOX) 250 MCG tablet, Take 1 tablet by mouth daily Take 1 tablet by mouth  daily, Disp: 90 tablet, Rfl: 1    potassium chloride (KLOR-CON M) 10 MEQ extended release tablet, Take 1 tablet by mouth daily, Disp: 90 tablet, Rfl: 1    fluticasone-salmeterol (ADVAIR DISKUS) 500-50 MCG/DOSE diskus inhaler, Inhale 1 puff into the lungs 2 times daily Rinse mouth after use., Disp: 1 Inhaler, Rfl: 5    calcium-vitamin D (OSCAL-500) 500-200 MG-UNIT per tablet, Take 1 tablet by mouth daily. , Disp: , Rfl:     Allergies   Allergen Reactions    Lisinopril      Chest tightness Social History:    Social History     Socioeconomic History    Marital status:      Spouse name: None    Number of children: None    Years of education: None    Highest education level: None   Occupational History    Occupation: [employer]   Social Needs    Financial resource strain: None    Food insecurity     Worry: None     Inability: None    Transportation needs     Medical: None     Non-medical: None   Tobacco Use    Smoking status: Former Smoker     Packs/day: 1.00     Years: 39.00     Pack years: 39.00     Types: Cigarettes     Start date:      Last attempt to quit: 2013     Years since quittin.5    Smokeless tobacco: Never Used   Substance and Sexual Activity    Alcohol use: Yes     Comment: occasional wine/moderate    Drug use: No    Sexual activity: Yes     Partners: Female   Lifestyle    Physical activity     Days per week: None     Minutes per session: None    Stress: None   Relationships    Social connections     Talks on phone: None     Gets together: None     Attends Faith service: None     Active member of club or organization: None     Attends meetings of clubs or organizations: None     Relationship status: None    Intimate partner violence     Fear of current or ex partner: None     Emotionally abused: None     Physically abused: None     Forced sexual activity: None   Other Topics Concern    None   Social History Narrative    Exercise:    Seat Belt :       Family History:    Family History   Problem Relation Age of Onset    Hypertension Mother     High Blood Pressure Mother     Other Mother         lung problems    Other Father         CHF, A. Fib    Hypotension Father     Diabetes Father          REVIEW OF SYSTEMS:    CONSTITUTIONAL:  negative for fevers, chills, diaphoresis, activity change, appetite change, night sweats and unexpected weight change.    HEENT:  negative for hearing loss, sinus pressure, nasal congestion, epistaxis and snoring  RESPIRATORY:  + SOBOE, negative cough, negative wheeze  CARDIOVASCULAR:  Negative for chest pain, palpitations, exertional chest pressure/discomfort, edema, syncope  GASTROINTESTINAL: negative for nausea, vomiting, diarrhea, constipation, blood in stool and abdominal pain  GENITOURINARY:  negative for frequency, dysuria and hematuria  HEMATOLOGIC/LYMPHATIC:  negative for easy bruising, bleeding and lymphadenopathy  ALLERGIC/IMMUNOLOGIC:  negative for recurrent infections, angioedema, anaphylaxis and drug reaction  MUSCULOSKELETAL:  + bilateral lower leg pain, negative joint swelling, decreased range of motion and muscle weakness  NEURO: negative for headache, AMS, decrease sensations  SKIN: Negative for rashes or lesions      Physical Exam:  /66   Pulse 90   Ht 6' 5\" (1.956 m)   Wt 260 lb (117.9 kg)   SpO2 93%   BMI 30.83 kg/m²    Body mass index is 30.83 kg/m². Constitutional:  He appears well developed and well-nourished. Neck:  Supple, no palpable lymphadenopathy, no JVD  Cardiovascular:  S1, S2 Normal, Regular rhythm, no murmurs or gallops, no pericardial  rubs. Pulmonary:  BBS CTA, no wheeze, no rhonchi, no rales,no cough  Abdomen: No epigastric pain, no distention, + bowel sounds   Extremities: No edema, no calf tenderness, no clubbing of digits  Neurologic:  Awake and alert, no focal deficits  Skin: warm and dry    Radiology:   5/29/2020 CT Chest  Emphysema, no other acute findings      PFT: 6/9/2020  FVC 69, FEV1 46, FEV1/FVC 64, FEF 25-75% 18, , , DLCO 43  Following administration of bronchodilator there was no response to bronchodilator. Overall testing indicates severe obstructive defect    ASSESSMENT:    1. Chronic obstructive pulmonary disease, unspecified COPD type ([address]    2. Former smoker    3. Healthcare maintenance        PLAN:   Due to the cost prohibitive of the Trelegy for Kenneth Yang we will try Advair and Spiriva.   Kenneth Yang is aware of the severity of his COPD, and that I feel Trelegy is the best for him but understand the cost of the medication. We have discussed the use of his albuterol rescue inhaler before any exertional activity. I will repeat a pulmonary function test next June he is following up with his primary care provider in regards to his leg pain. We have discussed the need to maintain yearly flu immunization, pneumococcal vaccination. We have discussed Coronavirus precaution including: social distancing when needing to be in public, handwashing practice, wiping items touched in public such as gas pumps, door handles, shopping carts, etc. Self monitoring for infection - fever, chills, cough, SOB. Should they develop symptoms they should call office for further instructions. Return in 6 months (on 1/16/2021) for Follow Up. This dictation was performed with a verbal recognition program and it was checked for errors. It is possible that there are still dictated errors within this office note. Any errors should be brought immediately to my attention for correction. All efforts were made to ensure that this office note is accurate.

## 2020-07-27 ENCOUNTER — TELEPHONE (OUTPATIENT)
Dept: PULMONOLOGY | Age: 70
End: 2020-07-27

## 2020-07-27 NOTE — TELEPHONE ENCOUNTER
Pt called stating his insurance will not cover Symbicort but will cover Anoro or Dulera. Please call in one or the other to [place] on [address]. Thank you.

## 2020-07-28 NOTE — PATIENT INSTRUCTIONS
For coumadin, hold one day then decr to 3 and 1/2 mg daily  (one 3mg and 1/2 of the 1mg tabs daily) Quality 226: Preventive Care And Screening: Tobacco Use: Screening And Cessation Intervention: Patient screened for tobacco use and is an ex/non-smoker Quality 111:Pneumonia Vaccination Status For Older Adults: Pneumococcal Vaccination Previously Received Detail Level: Detailed

## 2020-07-30 PROBLEM — Z00.00 HEALTH CARE MAINTENANCE: Status: RESOLVED | Noted: 2020-06-30 | Resolved: 2020-07-30

## 2020-08-17 LAB
A/G RATIO: 1.9 (ref 1.1–2.2)
ALBUMIN SERPL-MCNC: 4.3 G/DL (ref 3.4–5)
ALP BLD-CCNC: 56 U/L (ref 40–129)
ALT SERPL-CCNC: 16 U/L (ref 10–40)
ANION GAP SERPL CALCULATED.3IONS-SCNC: 10 MMOL/L (ref 3–16)
AST SERPL-CCNC: 18 U/L (ref 15–37)
BASOPHILS ABSOLUTE: 0.1 K/UL (ref 0–0.2)
BASOPHILS RELATIVE PERCENT: 0.9 %
BILIRUB SERPL-MCNC: 0.4 MG/DL (ref 0–1)
BUN BLDV-MCNC: 12 MG/DL (ref 7–20)
CALCIUM SERPL-MCNC: 9.3 MG/DL (ref 8.3–10.6)
CHLORIDE BLD-SCNC: 105 MMOL/L (ref 99–110)
CHOLESTEROL, TOTAL: 120 MG/DL (ref 0–199)
CO2: 27 MMOL/L (ref 21–32)
CREAT SERPL-MCNC: 1.1 MG/DL (ref 0.8–1.3)
CREATININE URINE: 108.1 MG/DL (ref 39–259)
EOSINOPHILS ABSOLUTE: 0.3 K/UL (ref 0–0.6)
EOSINOPHILS RELATIVE PERCENT: 5.3 %
GFR AFRICAN AMERICAN: >60
GFR NON-AFRICAN AMERICAN: >60
GLOBULIN: 2.3 G/DL
GLUCOSE BLD-MCNC: 144 MG/DL (ref 70–99)
HCT VFR BLD CALC: 40.1 % (ref 40.5–52.5)
HDLC SERPL-MCNC: 27 MG/DL (ref 40–60)
HEMOGLOBIN: 13.1 G/DL (ref 13.5–17.5)
LDL CHOLESTEROL CALCULATED: 39 MG/DL
LYMPHOCYTES ABSOLUTE: 0.8 K/UL (ref 1–5.1)
LYMPHOCYTES RELATIVE PERCENT: 14.1 %
MCH RBC QN AUTO: 28 PG (ref 26–34)
MCHC RBC AUTO-ENTMCNC: 32.8 G/DL (ref 31–36)
MCV RBC AUTO: 85.3 FL (ref 80–100)
MICROALBUMIN UR-MCNC: <1.2 MG/DL
MICROALBUMIN/CREAT UR-RTO: NORMAL MG/G (ref 0–30)
MONOCYTES ABSOLUTE: 0.6 K/UL (ref 0–1.3)
MONOCYTES RELATIVE PERCENT: 9.8 %
NEUTROPHILS ABSOLUTE: 4.1 K/UL (ref 1.7–7.7)
NEUTROPHILS RELATIVE PERCENT: 69.9 %
PDW BLD-RTO: 15 % (ref 12.4–15.4)
PLATELET # BLD: 191 K/UL (ref 135–450)
PMV BLD AUTO: 8.5 FL (ref 5–10.5)
POTASSIUM SERPL-SCNC: 4.1 MMOL/L (ref 3.5–5.1)
RBC # BLD: 4.69 M/UL (ref 4.2–5.9)
SODIUM BLD-SCNC: 142 MMOL/L (ref 136–145)
TOTAL PROTEIN: 6.6 G/DL (ref 6.4–8.2)
TRIGL SERPL-MCNC: 268 MG/DL (ref 0–150)
TSH SERPL DL<=0.05 MIU/L-ACNC: 0.94 UIU/ML (ref 0.27–4.2)
VLDLC SERPL CALC-MCNC: 54 MG/DL
WBC # BLD: 5.9 K/UL (ref 4–11)

## 2020-08-17 PROCEDURE — 36415 COLL VENOUS BLD VENIPUNCTURE: CPT | Performed by: INTERNAL MEDICINE

## 2020-08-18 LAB
ESTIMATED AVERAGE GLUCOSE: 159.9 MG/DL
HBA1C MFR BLD: 7.2 %

## 2020-08-18 NOTE — RESULT ENCOUNTER NOTE
Chol, sugars, labs appear in stable range, DM is controlled.    notify pt please.  - THYROID FXN ALSO NL

## 2020-08-24 ENCOUNTER — OFFICE VISIT (OUTPATIENT)
Dept: INTERNAL MEDICINE CLINIC | Age: 70
End: 2020-08-24
Payer: MEDICARE

## 2020-08-24 VITALS
WEIGHT: 267 LBS | BODY MASS INDEX: 31.66 KG/M2 | SYSTOLIC BLOOD PRESSURE: 124 MMHG | RESPIRATION RATE: 16 BRPM | HEART RATE: 69 BPM | DIASTOLIC BLOOD PRESSURE: 74 MMHG | OXYGEN SATURATION: 97 %

## 2020-08-24 LAB
INTERNATIONAL NORMALIZATION RATIO, POC: 2.9
PROTHROMBIN TIME, POC: 32.1

## 2020-08-24 PROCEDURE — 3017F COLORECTAL CA SCREEN DOC REV: CPT | Performed by: INTERNAL MEDICINE

## 2020-08-24 PROCEDURE — 3051F HG A1C>EQUAL 7.0%<8.0%: CPT | Performed by: INTERNAL MEDICINE

## 2020-08-24 PROCEDURE — 1123F ACP DISCUSS/DSCN MKR DOCD: CPT | Performed by: INTERNAL MEDICINE

## 2020-08-24 PROCEDURE — G8417 CALC BMI ABV UP PARAM F/U: HCPCS | Performed by: INTERNAL MEDICINE

## 2020-08-24 PROCEDURE — 4040F PNEUMOC VAC/ADMIN/RCVD: CPT | Performed by: INTERNAL MEDICINE

## 2020-08-24 PROCEDURE — 2022F DILAT RTA XM EVC RTNOPTHY: CPT | Performed by: INTERNAL MEDICINE

## 2020-08-24 PROCEDURE — 99214 OFFICE O/P EST MOD 30 MIN: CPT | Performed by: INTERNAL MEDICINE

## 2020-08-24 PROCEDURE — G8427 DOCREV CUR MEDS BY ELIG CLIN: HCPCS | Performed by: INTERNAL MEDICINE

## 2020-08-24 PROCEDURE — 1036F TOBACCO NON-USER: CPT | Performed by: INTERNAL MEDICINE

## 2020-08-24 PROCEDURE — 85610 PROTHROMBIN TIME: CPT | Performed by: INTERNAL MEDICINE

## 2020-08-24 NOTE — PROGRESS NOTES
Palpations: Abdomen is soft. There is no mass. Tenderness: There is no abdominal tenderness. There is no guarding or rebound. Musculoskeletal:         General: No tenderness. Lymphadenopathy:      Cervical: No cervical adenopathy. Skin:     General: Skin is warm and dry. Neurological:      Mental Status: He is alert. Psychiatric:         Mood and Affect: Mood normal.         ASSESSMENT:    1. Permanent atrial fibrillation    2. Controlled type 2 diabetes mellitus with diabetic nephropathy, without long-term current use of insulin ([address]    3. VT (ventricular tachycardia) ([address]    4. Mild intermittent asthma without complication        PLAN:    Nancy Arenas was seen today for office visit for anticoagulation management. Diagnoses and all orders for this visit:    Permanent atrial fibrillation - Pt clinically w/o evidence of cardiovascular instability, cont regimen. INR OK AT 2.9, CONT COUMADIN    -     POCT INR    Controlled type 2 diabetes mellitus with diabetic nephropathy, without long-term current use of insulin ([address]- START METFORMIN, TO WATCH FOR DIARRHEA, F/U LAB PRIOR TO APPT  THREE MONTHS  -     metFORMIN (GLUCOPHAGE) 500 MG tablet; Take 1 tablet by mouth daily (with breakfast)  -     Comprehensive Metabolic Panel; Future  -     CBC Auto Differential; Future  -     Lipid Panel; Future  -     Hemoglobin A1C; Future    VT (ventricular tachycardia) (HCC)- NO RECENT SX AND CONT F/U DR Filomena Maharaj    Mild intermittent asthma without complication- IMPROVED W INHALER REGIMEN, CONT F/U PULM  -     Comprehensive Metabolic Panel; Future  -     CBC Auto Differential; Future  -     Lipid Panel;  Future

## 2020-09-21 RX ORDER — POTASSIUM CHLORIDE 750 MG/1
TABLET, EXTENDED RELEASE ORAL
Qty: 90 TABLET | Refills: 0 | Status: SHIPPED | OUTPATIENT
Start: 2020-09-21 | End: 2020-11-25 | Stop reason: SDUPTHER

## 2020-09-22 RX ORDER — MONTELUKAST SODIUM 10 MG/1
10 TABLET ORAL DAILY
Qty: 90 TABLET | Refills: 3 | Status: SHIPPED | OUTPATIENT
Start: 2020-09-22 | End: 2021-09-22 | Stop reason: SDUPTHER

## 2020-10-05 RX ORDER — FUROSEMIDE 20 MG/1
20 TABLET ORAL DAILY
Qty: 90 TABLET | Refills: 1 | Status: SHIPPED | OUTPATIENT
Start: 2020-10-05 | End: 2021-02-25 | Stop reason: SDUPTHER

## 2020-10-05 RX ORDER — WARFARIN SODIUM 3 MG/1
TABLET ORAL
Qty: 90 TABLET | Refills: 1 | Status: ON HOLD
Start: 2020-10-05 | End: 2020-12-28 | Stop reason: HOSPADM

## 2020-10-12 ENCOUNTER — TELEPHONE (OUTPATIENT)
Dept: INTERNAL MEDICINE CLINIC | Age: 70
End: 2020-10-12

## 2020-10-12 RX ORDER — PREDNISONE 1 MG/1
TABLET ORAL
Qty: 21 TABLET | Refills: 0 | Status: SHIPPED | OUTPATIENT
Start: 2020-10-12 | End: 2020-11-18 | Stop reason: ALTCHOICE

## 2020-10-12 NOTE — TELEPHONE ENCOUNTER
Pt states he had his flu shot done at [facility] 2 weeks ago. He states the pain is still present, radiating up into the shoulder. He denies any redness, raised injection site or hot to touch. Please advise.

## 2020-10-12 NOTE — TELEPHONE ENCOUNTER
I've seen rotator cuff sprains and tendinitis occur after immunizations, or could be localized reaction to the shot itself. Suggest 1- pred taper 5mg/6day, also trying rotator cuff gentle range of motion exercises--- he can either come by to  a copy of the RC exercises sheet, or look up on internet, you tube.     However, if pain no better later this week, suggest either f/u w me by WED or FRI, before the weekend

## 2020-11-11 ENCOUNTER — VIRTUAL VISIT (OUTPATIENT)
Dept: CARDIOLOGY CLINIC | Age: 70
End: 2020-11-11
Payer: MEDICARE

## 2020-11-11 PROBLEM — I35.0 NONRHEUMATIC AORTIC VALVE STENOSIS: Status: ACTIVE | Noted: 2020-11-11

## 2020-11-11 PROCEDURE — 2022F DILAT RTA XM EVC RTNOPTHY: CPT | Performed by: INTERNAL MEDICINE

## 2020-11-11 PROCEDURE — G8484 FLU IMMUNIZE NO ADMIN: HCPCS | Performed by: INTERNAL MEDICINE

## 2020-11-11 PROCEDURE — G8417 CALC BMI ABV UP PARAM F/U: HCPCS | Performed by: INTERNAL MEDICINE

## 2020-11-11 PROCEDURE — 99214 OFFICE O/P EST MOD 30 MIN: CPT | Performed by: INTERNAL MEDICINE

## 2020-11-11 PROCEDURE — 1123F ACP DISCUSS/DSCN MKR DOCD: CPT | Performed by: INTERNAL MEDICINE

## 2020-11-11 PROCEDURE — 1036F TOBACCO NON-USER: CPT | Performed by: INTERNAL MEDICINE

## 2020-11-11 PROCEDURE — 3051F HG A1C>EQUAL 7.0%<8.0%: CPT | Performed by: INTERNAL MEDICINE

## 2020-11-11 PROCEDURE — 3017F COLORECTAL CA SCREEN DOC REV: CPT | Performed by: INTERNAL MEDICINE

## 2020-11-11 PROCEDURE — 4040F PNEUMOC VAC/ADMIN/RCVD: CPT | Performed by: INTERNAL MEDICINE

## 2020-11-11 PROCEDURE — G8427 DOCREV CUR MEDS BY ELIG CLIN: HCPCS | Performed by: INTERNAL MEDICINE

## 2020-11-11 NOTE — PROGRESS NOTES
2020    TELEHEALTH EVALUATION -- Audio/Visual (During ENTCA-73 public health emergency)    HPI:   Chief Complaint   Patient presents with    6 Month Follow-Up     pt has a phone visit. Pt denies any cardiac concerns at this time. Suly Bustillo (:  1950) has requested an audio/video evaluation for chronic atrial fibrillation and has a heart murmur and mild aortic stenosis, hypertension and hyperlipidemia and type 2 diabetes. Denies any new cardiac symptoms. .  He sees me once a year. Stays active for his age. He is diagnosed to have COPD recently. He doesn't smoke. He does have history of chronic mild degree of asthma. PT/INR is regulated by PCP. Denies any chest pain, palpitations, syncope or near-syncope. ALLERGIES:  Lisinopril  Prior to Admission medications    Medication Sig Start Date End Date Taking?  Authorizing Provider   predniSONE (DELTASONE) 5 MG tablet Take 6 tablets by mouth on day 1, 5 on day 2, 4 on day 3, 3 on day 4, 2 on day 5, 1 on day 6. 10/12/20  Yes Kecia Morel MD   furosemide (LASIX) 20 MG tablet Take 1 tablet by mouth daily 10/5/20 1/3/21 Yes Kecia Morel MD   montelukast (SINGULAIR) 10 MG tablet Take 1 tablet by mouth daily 20  Yes SLAVA Kelly CNP   potassium chloride (KLOR-CON M) 10 MEQ extended release tablet TAKE ONE TABLET BY MOUTH DAILY 20  Yes Kecia Morel MD   metFORMIN (GLUCOPHAGE) 500 MG tablet Take 1 tablet by mouth daily (with breakfast) 20  Yes Kecia Morel MD   DULERA 100-5 MCG/ACT inhaler Inhale 2 puffs into the lungs 2 times daily 20  Yes SLAVA Kelly CNP   tiotropium (SPIRIVA HANDIHALER) 18 MCG inhalation capsule Inhale 1 capsule into the lungs daily 20  Yes SLAVA Kelly CNP   simvastatin (ZOCOR) 5 MG tablet  5/15/20  Yes Historical Provider, MD   albuterol sulfate HFA (PROVENTIL HFA) 108 (90 Base) MCG/ACT inhaler Inhale 2 puffs into the lungs every 6 hours as needed for Wheezing 20  Yes Derrill Drivers SLAVA Ledesma CNP   Spacer/Aero-Holding Pattricia Idol 1 Device by Does not apply route daily as needed (use with albuterol rescue inhaler) 20  Yes Lonnierill SLAVA Clay CNP   omeprazole (PRILOSEC) 20 MG delayed release capsule Take 1 capsule by mouth daily 4/15/20  Yes Mainor Lara MD   traZODone (DESYREL) 50 MG tablet Take 2 tablets by mouth nightly 4/15/20  Yes Mainor Lara MD   warfarin (COUMADIN) 1 MG tablet Take 0.5 tablets by mouth daily  Patient taking differently: Take 3.5 mg by mouth daily  4/15/20  Yes Mainor Lara MD   dilTIAZem (CARTIA XT) 180 MG extended release capsule TAKE ONE CAPSULE BY MOUTH TWICE A DAY 4/15/20  Yes Mainor Lara MD   allopurinol (ZYLOPRIM) 100 MG tablet Take 1 tablet by mouth daily 4/15/20  Yes Mainor Lara MD   digoxin ([address] [facility]) 250 MCG tablet Take 1 tablet by mouth daily Take 1 tablet by mouth  daily 4/15/20  Yes Mainor Lara MD   fluticasone-salmeterol (ADVAIR DISKUS) 500-50 MCG/DOSE diskus inhaler Inhale 1 puff into the lungs 2 times daily Rinse mouth after use. 10/16/19  Yes Mainor Lara MD   calcium-vitamin D (OSCAL-500) 500-200 MG-UNIT per tablet Take 1 tablet by mouth daily.    Yes Historical Provider, MD   warfarin (COUMADIN) 3 MG tablet Take daily  Patient not taking: Reported on 2020 10/5/20   Mainor Lara MD     Social History     Tobacco Use    Smoking status: Former Smoker     Packs/day: 1.00     Years: 39.00     Pack years: 39.00     Types: Cigarettes     Start date:      Last attempt to quit: 2013     Years since quittin.8    Smokeless tobacco: Never Used   Substance Use Topics    Alcohol use: Yes     Comment: occasional wine/moderate     PHYSICAL EXAMINATION:  [ INSTRUCTIONS:  \"[x]\" Indicates a positive item  \"[]\" Indicates a negative item  -- DELETE ALL ITEMS NOT EXAMINED]  Vital Signs: (As obtained by patient/caregiver or practitioner observation)  Patient-Reported Vitals 11/11/2020   Patient-Reported Weight 260. lb   Patient-Reported Height 6 5   Patient-Reported Systolic 461   Patient-Reported Diastolic 70   Patient-Reported Pulse 65       Wt Readings from Last 3 Encounters:   08/24/20 267 lb (121.1 kg)   07/16/20 260 lb (117.9 kg)   06/15/20 265 lb (120.2 kg)     Constitutional: [x] Appears well-developed and well-nourished [x] No apparent distress      [] Abnormal-   Mental status  [x] Alert and awake  [x] Oriented to person/place/time [x]Able to follow commands      Eyes:  EOM    [x]  Normal  [] Abnormal-  Sclera  []  Normal  [] Abnormal -         Discharge []  None visible  [] Abnormal -    HENT:   [x] Normocephalic, atraumatic. [] Abnormal   [] Mouth/Throat: Mucous membranes are moist.     External Ears [x] Normal  [] Abnormal-     Pulmonary/Chest: [x] Respiratory effort normal.  [x] No visualized signs of difficulty breathing or respiratory distress        [] Abnormal-      Neurological:        [x] No Facial Asymmetry (Cranial nerve 7 motor function) (limited exam to video visit)          [] No gaze palsy        [] Abnormal-         Skin:        [x] No significant exanthematous lesions or discoloration noted on facial skin         [] Abnormal-            Psychiatric:       [x] Normal Affect [] No Hallucinations        [] Abnormal-   Other pertinent observable physical exam findings-     Due to this being a TeleHealth encounter, evaluation of the following organ systems is limited: Vitals/Constitutional/EENT/Resp/CV/GI//MS/Neuro/Skin/Heme-Lymph-Imm.     Lab Results   Component Value Date    WBC 5.9 08/17/2020    HGB 13.1 08/17/2020    HCT 40.1 08/17/2020     08/17/2020     Lab Results   Component Value Date    CHOL 120 08/17/2020    TRIG 268 (H) 08/17/2020    HDL 27 (L) 08/17/2020    LDLCALC 39 08/17/2020    LDLDIRECT 82 12/03/2013     Lab Results   Component Value Date    BUN 12 08/17/2020    CREATININE 1.1 08/17/2020     08/17/2020    K 4.1 08/17/2020     Lab Results   Component Value Date    INR 2.9 08/24/2020    INR 3.02 07/30/2019     Lab Results   Component Value Date    LABA1C 7.2 08/17/2020     ASSESSMENT/PLAN:    Atrial fibrillation (HCC)  Rate is well controlled and he is anticoagulated. Continue the same. Diabetes mellitus type 2, controlled ([address]  Recent hemoglobin A1c was 7.2 suggesting fairly well-controlled diabetes status. Hyperbilirubinemia  Last LDL was 39 suggesting lipid status is fairly well controlled. Nonrheumatic aortic valve stenosis  Repeat echo next year prior to next office visit to evaluate it further. Continue current cardiovascular medications which have been reviewed and discussed individually with you. Primary/secondary prevention is the goal by aggressive risk modification, healthy and therapeutic life style changes for cardiovascular risk reduction. Various goals are discussed and questions answered. Multiple questions are answered and patient verbalized understanding. Follow up office visit in 6 months with Echo. An  electronic signature was used to authenticate this note. --Porter Lawton MD on 11/11/2020 at 9:56 AM        Pursuant to the emergency declaration under the [facility] waiver authority and the [employer] General Act, this Virtual  Visit was conducted, with patient's consent, to reduce the patient's risk of exposure to COVID-19 and provide continuity of care for an established patient. Services were provided through a video synchronous discussion virtually to substitute for in-person clinic visit.

## 2020-11-11 NOTE — PATIENT INSTRUCTIONS
Continue current cardiovascular medications which have been reviewed and discussed individually with you. Primary/secondary prevention is the goal by aggressive risk modification, healthy and therapeutic life style changes for cardiovascular risk reduction. Various goals are discussed and questions answered. Multiple questions are answered and patient verbalized understanding. Follow up office visit in 6 months with Echo.

## 2020-11-16 RX ORDER — OMEPRAZOLE 20 MG/1
20 CAPSULE, DELAYED RELEASE ORAL DAILY
Qty: 90 CAPSULE | Refills: 1 | Status: SHIPPED | OUTPATIENT
Start: 2020-11-16 | End: 2021-02-25 | Stop reason: SDUPTHER

## 2020-11-17 LAB
A/G RATIO: 1.9 (ref 1.1–2.2)
ALBUMIN SERPL-MCNC: 4.3 G/DL (ref 3.4–5)
ALP BLD-CCNC: 51 U/L (ref 40–129)
ALT SERPL-CCNC: 23 U/L (ref 10–40)
ANION GAP SERPL CALCULATED.3IONS-SCNC: 12 MMOL/L (ref 3–16)
AST SERPL-CCNC: 20 U/L (ref 15–37)
BASOPHILS ABSOLUTE: 0.1 K/UL (ref 0–0.2)
BASOPHILS RELATIVE PERCENT: 1.1 %
BILIRUB SERPL-MCNC: 0.6 MG/DL (ref 0–1)
BUN BLDV-MCNC: 13 MG/DL (ref 7–20)
CALCIUM SERPL-MCNC: 9.3 MG/DL (ref 8.3–10.6)
CHLORIDE BLD-SCNC: 103 MMOL/L (ref 99–110)
CHOLESTEROL, TOTAL: 143 MG/DL (ref 0–199)
CO2: 25 MMOL/L (ref 21–32)
CREAT SERPL-MCNC: 0.9 MG/DL (ref 0.8–1.3)
EOSINOPHILS ABSOLUTE: 0.1 K/UL (ref 0–0.6)
EOSINOPHILS RELATIVE PERCENT: 2.4 %
GFR AFRICAN AMERICAN: >60
GFR NON-AFRICAN AMERICAN: >60
GLOBULIN: 2.3 G/DL
GLUCOSE BLD-MCNC: 159 MG/DL (ref 70–99)
HCT VFR BLD CALC: 40.8 % (ref 40.5–52.5)
HDLC SERPL-MCNC: 32 MG/DL (ref 40–60)
HEMOGLOBIN: 13.6 G/DL (ref 13.5–17.5)
LDL CHOLESTEROL CALCULATED: ABNORMAL MG/DL
LDL CHOLESTEROL DIRECT: 54 MG/DL
LYMPHOCYTES ABSOLUTE: 0.9 K/UL (ref 1–5.1)
LYMPHOCYTES RELATIVE PERCENT: 15.7 %
MCH RBC QN AUTO: 27.7 PG (ref 26–34)
MCHC RBC AUTO-ENTMCNC: 33.3 G/DL (ref 31–36)
MCV RBC AUTO: 83.1 FL (ref 80–100)
MONOCYTES ABSOLUTE: 0.6 K/UL (ref 0–1.3)
MONOCYTES RELATIVE PERCENT: 10.5 %
NEUTROPHILS ABSOLUTE: 4.2 K/UL (ref 1.7–7.7)
NEUTROPHILS RELATIVE PERCENT: 70.3 %
PDW BLD-RTO: 16.1 % (ref 12.4–15.4)
PLATELET # BLD: 217 K/UL (ref 135–450)
PMV BLD AUTO: 7.9 FL (ref 5–10.5)
POTASSIUM SERPL-SCNC: 4.4 MMOL/L (ref 3.5–5.1)
RBC # BLD: 4.91 M/UL (ref 4.2–5.9)
SODIUM BLD-SCNC: 140 MMOL/L (ref 136–145)
TOTAL PROTEIN: 6.6 G/DL (ref 6.4–8.2)
TRIGL SERPL-MCNC: 413 MG/DL (ref 0–150)
VLDLC SERPL CALC-MCNC: ABNORMAL MG/DL
WBC # BLD: 6 K/UL (ref 4–11)

## 2020-11-17 PROCEDURE — 36415 COLL VENOUS BLD VENIPUNCTURE: CPT | Performed by: INTERNAL MEDICINE

## 2020-11-18 LAB
ESTIMATED AVERAGE GLUCOSE: 174.3 MG/DL
HBA1C MFR BLD: 7.7 %

## 2020-11-18 NOTE — RESULT ENCOUNTER NOTE
Call pt, labs ok AND STEADY OVERALL X SL HIGHER SUGAR AND TRIGLYCERIDE CHOL NOTED.   A1C AVG SUGAR RISING FROM 7.2--7.7 AND TRIGLYCERIDES FROM [phone] SO DO REC BRITTNEE WORK A LITTLE HARDER W BOTH LOW FAT AND LOW CARB DIET, MAINTAIN DAILY EXERCISE AND AIM FOR 10# WT LOSS IF POSSIBLE

## 2020-11-25 ENCOUNTER — OFFICE VISIT (OUTPATIENT)
Dept: INTERNAL MEDICINE CLINIC | Age: 70
End: 2020-11-25
Payer: MEDICARE

## 2020-11-25 VITALS
HEART RATE: 88 BPM | BODY MASS INDEX: 32.14 KG/M2 | OXYGEN SATURATION: 94 % | SYSTOLIC BLOOD PRESSURE: 120 MMHG | WEIGHT: 271 LBS | DIASTOLIC BLOOD PRESSURE: 70 MMHG | RESPIRATION RATE: 16 BRPM

## 2020-11-25 LAB
INTERNATIONAL NORMALIZATION RATIO, POC: 2.3
PROTHROMBIN TIME, POC: 27.7

## 2020-11-25 PROCEDURE — 1036F TOBACCO NON-USER: CPT | Performed by: INTERNAL MEDICINE

## 2020-11-25 PROCEDURE — 3017F COLORECTAL CA SCREEN DOC REV: CPT | Performed by: INTERNAL MEDICINE

## 2020-11-25 PROCEDURE — G8484 FLU IMMUNIZE NO ADMIN: HCPCS | Performed by: INTERNAL MEDICINE

## 2020-11-25 PROCEDURE — 99214 OFFICE O/P EST MOD 30 MIN: CPT | Performed by: INTERNAL MEDICINE

## 2020-11-25 PROCEDURE — G8417 CALC BMI ABV UP PARAM F/U: HCPCS | Performed by: INTERNAL MEDICINE

## 2020-11-25 PROCEDURE — 3051F HG A1C>EQUAL 7.0%<8.0%: CPT | Performed by: INTERNAL MEDICINE

## 2020-11-25 PROCEDURE — 4040F PNEUMOC VAC/ADMIN/RCVD: CPT | Performed by: INTERNAL MEDICINE

## 2020-11-25 PROCEDURE — 1123F ACP DISCUSS/DSCN MKR DOCD: CPT | Performed by: INTERNAL MEDICINE

## 2020-11-25 PROCEDURE — 2022F DILAT RTA XM EVC RTNOPTHY: CPT | Performed by: INTERNAL MEDICINE

## 2020-11-25 PROCEDURE — 85610 PROTHROMBIN TIME: CPT | Performed by: INTERNAL MEDICINE

## 2020-11-25 PROCEDURE — G8427 DOCREV CUR MEDS BY ELIG CLIN: HCPCS | Performed by: INTERNAL MEDICINE

## 2020-11-25 RX ORDER — ALLOPURINOL 100 MG/1
100 TABLET ORAL DAILY
Qty: 90 TABLET | Refills: 1 | Status: SHIPPED | OUTPATIENT
Start: 2020-11-25 | End: 2021-05-26 | Stop reason: SDUPTHER

## 2020-11-25 RX ORDER — TRAZODONE HYDROCHLORIDE 50 MG/1
100 TABLET ORAL NIGHTLY
Qty: 180 TABLET | Refills: 1 | Status: SHIPPED | OUTPATIENT
Start: 2020-11-25 | End: 2021-05-26 | Stop reason: SDUPTHER

## 2020-11-25 RX ORDER — WARFARIN SODIUM 1 MG/1
3.5 TABLET ORAL DAILY
Qty: 315 TABLET | Refills: 1 | Status: ON HOLD
Start: 2020-11-25 | End: 2020-12-28 | Stop reason: HOSPADM

## 2020-11-25 RX ORDER — DIGOXIN 250 MCG
250 TABLET ORAL DAILY
Qty: 90 TABLET | Refills: 1 | Status: ON HOLD | OUTPATIENT
Start: 2020-11-25 | End: 2020-12-28 | Stop reason: HOSPADM

## 2020-11-25 RX ORDER — POTASSIUM CHLORIDE 750 MG/1
10 TABLET, EXTENDED RELEASE ORAL DAILY
Qty: 90 TABLET | Refills: 1 | Status: SHIPPED | OUTPATIENT
Start: 2020-11-25 | End: 2021-05-26 | Stop reason: SDUPTHER

## 2020-11-25 RX ORDER — DILTIAZEM HYDROCHLORIDE 180 MG/1
180 CAPSULE, COATED, EXTENDED RELEASE ORAL DAILY
Qty: 180 CAPSULE | Refills: 1 | Status: ON HOLD
Start: 2020-11-25 | End: 2020-12-28 | Stop reason: HOSPADM

## 2020-11-25 NOTE — PROGRESS NOTES
Darnella Phalen  1950 11/25/20    SUBJECTIVE:    AFIB, inr ok at 2.3. Constant IRR. Saw Dr Florina Turk, planned for echo next yr    DM- some wt loss noted,   Lab Results   Component Value Date    LABA1C 7.7 11/17/2020    LABA1C 7.2 08/17/2020    LABA1C 7.2 05/20/2020     Lab Results   Component Value Date    GLUF 107 (H) 01/06/2017    LABMICR <1.20 08/17/2020    [facility] see below 11/17/2020    CREATININE 0.9 11/17/2020     Shoulder still aches after flu shot. Constant ache, had temporary relief w pred taper. Heating pad not help, no weakness or numbness, no limitation of ROM. Bumped L forearm and now w sl swelling, small sore. OBJECTIVE:    /70   Pulse 88   Resp 16   Wt 271 lb (122.9 kg)   SpO2 94%   BMI 32.14 kg/m²     Physical Exam  Vitals signs reviewed. Constitutional:       General: He is not in acute distress. Appearance: He is well-developed. HENT:      Head: Normocephalic and atraumatic. Eyes:      General: No scleral icterus. Right eye: No discharge. Left eye: No discharge. Conjunctiva/sclera: Conjunctivae normal.      Pupils: Pupils are equal, round, and reactive to light. Neck:      Musculoskeletal: Normal range of motion and neck supple. Thyroid: No thyromegaly. Vascular: No JVD. Trachea: No tracheal deviation. Cardiovascular:      Rate and Rhythm: Normal rate and regular rhythm. Heart sounds: Normal heart sounds. No murmur. No friction rub. No gallop. Pulmonary:      Effort: Pulmonary effort is normal. No respiratory distress. Breath sounds: Normal breath sounds. No wheezing or rales. Abdominal:      General: Bowel sounds are normal. There is no distension. Palpations: Abdomen is soft. There is no mass. Tenderness: There is no abdominal tenderness. There is no guarding or rebound. Musculoskeletal:         General: No tenderness (no tenderness on palpation L shoulder and ROM intact).    Lymphadenopathy: Cervical: No cervical adenopathy. Skin:     General: Skin is warm and dry. Comments: L forearm w small healing red sore/nodule   Neurological:      Mental Status: He is alert. Psychiatric:         Mood and Affect: Mood normal.         ASSESSMENT:    1. Controlled type 2 diabetes mellitus with diabetic nephropathy, without long-term current use of insulin ([address]    2. Permanent atrial fibrillation (HCC)    3. Elevated uric acid in blood    4. Dilated cardiomyopathy ([address]    5. Chronic atrial fibrillation (HCC)    6. Primary insomnia    7. Acute pain of left shoulder    8. Cellulitis of forearm        PLAN:    Delphine Winters was seen today for office visit for anticoagulation management and abrasion. Diagnoses and all orders for this visit:    Controlled type 2 diabetes mellitus with diabetic nephropathy, without long-term current use of insulin ([address] - DM relatively well controlled and will continue current regimen. Screening reviewed. See orders. -     metFORMIN (GLUCOPHAGE) 500 MG tablet; Take 1 tablet by mouth daily (with breakfast)  -     Lipid Panel; Future  -     CBC Auto Differential; Future  -     Comprehensive Metabolic Panel; Future  -     Hemoglobin A1C; Future  -     Microalbumin / Creatinine Urine Ratio; Future    Permanent atrial fibrillation (HCC) - Pt clinically w/o evidence of cardiovascular instability, cont regimen. CONT F/U  [facility]    -     POCT INR  -     DIGOXIN LEVEL; Future  -     CBC Auto Differential; Future  -     TSH without Reflex; Future    Elevated uric acid in blood  - CONT MONITOR ON ALLOPURINOL. -     allopurinol (ZYLOPRIM) 100 MG tablet; Take 1 tablet by mouth daily    Dilated cardiomyopathy (HCC) - NO SIGNS CHF AT THIS TIME  -     potassium chloride (KLOR-CON M) 10 MEQ extended release tablet; Take 1 tablet by mouth daily    Chronic atrial fibrillation (HCC)  -     dilTIAZem (CARTIA XT) 180 MG extended release capsule;  Take 1 capsule by mouth daily  -     warfarin

## 2020-12-04 ENCOUNTER — OFFICE VISIT (OUTPATIENT)
Dept: ORTHOPEDIC SURGERY | Age: 70
End: 2020-12-04
Payer: MEDICARE

## 2020-12-04 VITALS — OXYGEN SATURATION: 96 % | BODY MASS INDEX: 32 KG/M2 | HEIGHT: 77 IN | WEIGHT: 271 LBS | HEART RATE: 76 BPM

## 2020-12-04 PROCEDURE — 99203 OFFICE O/P NEW LOW 30 MIN: CPT | Performed by: PHYSICIAN ASSISTANT

## 2020-12-04 PROCEDURE — 4040F PNEUMOC VAC/ADMIN/RCVD: CPT | Performed by: PHYSICIAN ASSISTANT

## 2020-12-04 PROCEDURE — 1123F ACP DISCUSS/DSCN MKR DOCD: CPT | Performed by: PHYSICIAN ASSISTANT

## 2020-12-04 PROCEDURE — 1036F TOBACCO NON-USER: CPT | Performed by: PHYSICIAN ASSISTANT

## 2020-12-04 PROCEDURE — 3017F COLORECTAL CA SCREEN DOC REV: CPT | Performed by: PHYSICIAN ASSISTANT

## 2020-12-04 PROCEDURE — G8417 CALC BMI ABV UP PARAM F/U: HCPCS | Performed by: PHYSICIAN ASSISTANT

## 2020-12-04 PROCEDURE — 20610 DRAIN/INJ JOINT/BURSA W/O US: CPT | Performed by: PHYSICIAN ASSISTANT

## 2020-12-04 PROCEDURE — G8484 FLU IMMUNIZE NO ADMIN: HCPCS | Performed by: PHYSICIAN ASSISTANT

## 2020-12-04 PROCEDURE — G8427 DOCREV CUR MEDS BY ELIG CLIN: HCPCS | Performed by: PHYSICIAN ASSISTANT

## 2020-12-04 ASSESSMENT — ENCOUNTER SYMPTOMS
EYES NEGATIVE: 1
GASTROINTESTINAL NEGATIVE: 1
RESPIRATORY NEGATIVE: 1
ALLERGIC/IMMUNOLOGIC NEGATIVE: 1

## 2020-12-04 NOTE — PROGRESS NOTES
[address] [facility]      HPI:  Raymond Gregorio is a 65 y.o. male who complains of left shoulder pain. The patient states his left shoulder pain started after receiving a flu shot in late September. Patient rates his pain 5/10 describes as a sharp sensation. Patient states moving his arm up and out worsens his pain. Patient states he tried Tylenol which helps some. Patient states he was prescribed Voltaren cream and has been using it for the last 2 weeks and states it has been helping with his pain. He states he also did take a steroid taper in September and also another steroid taper about 2 weeks ago. Patient states he does note that he continues to have shoulder pain. Patient states he does note some popping sensations of his left shoulder. Patient also states he has a history of a left shoulder dislocation which occurred in 2018. Patient states he is right-hand dominant. Patient denies any numbness or tingling in his left upper extremity. He denies any allergies to steroids or numbing medication. Patient states he is a diabetic. The patient was referred by Daxa Jean MD for evaluation of right shoulder pain.     Past Medical History:   Diagnosis Date    Aortic stenosis, mild 11/8/2017    By echo 10/2016    Asthma, intrinsic     Atrial fibrillation ([address]     Cholelithiasis     Dr Kendell Jones evaluating    Colonic polyp 05/2009    Dr Powers Dural- also done 4/16/2012- AND 3/8/17-TUBULAR ADENOMA,-RECHECK 5YRS    COPD (chronic obstructive pulmonary disease) ([address]     noted on PFTs 6/2020    DDD (degenerative disc disease), lumbar     Diabetes mellitus type 2, controlled ([address]     Dr Peter Garcia    Diabetic neuropathy, type II diabetes mellitus ([address]     DECR MONOFIL SENSATION    Dilated cardiomyopathy ([address] 11/4/2015    GERD (gastroesophageal reflux disease)     Gout     H/O cardiovascular stress test 12/10/2015    lexiscan-normal,EF65%    H/O echocardiogram 06/13/2019    EF SURGERY Left 14    [facility] Dr Lashaun Woodall       Family History   Problem Relation Age of Onset    Hypertension Mother     High Blood Pressure Mother     Other Mother         lung problems    Other Father         CHF, A. Fib    Hypotension Father     Diabetes Father        Social History     Socioeconomic History    Marital status:      Spouse name: Not on file    Number of children: Not on file    Years of education: Not on file    Highest education level: Not on file   Occupational History    Occupation: [employer]   Social Needs    Financial resource strain: Not on file    Food insecurity     Worry: Not on file     Inability: Not on file   [employer] needs     Medical: Not on file     Non-medical: Not on file   Tobacco Use    Smoking status: Former Smoker     Packs/day: 1.00     Years: 39.00     Pack years: 39.00     Types: Cigarettes     Start date:      Last attempt to quit: 2013     Years since quittin.9    Smokeless tobacco: Never Used   Substance and Sexual Activity    Alcohol use: Yes     Comment: occasional wine/moderate    Drug use: No    Sexual activity: Yes     Partners: Female   Lifestyle    Physical activity     Days per week: Not on file     Minutes per session: Not on file    Stress: Not on file   Relationships    Social connections     Talks on phone: Not on file     Gets together: Not on file     Attends Jewish service: Not on file     Active member of club or organization: Not on file     Attends meetings of clubs or organizations: Not on file     Relationship status: Not on file    Intimate partner violence     Fear of current or ex partner: Not on file     Emotionally abused: Not on file     Physically abused: Not on file     Forced sexual activity: Not on file   Other Topics Concern    Not on file   Social History Narrative    Exercise:    Seat Belt :       Current Outpatient Medications   Medication Sig Dispense Refill    traZODone ([facility]) 50 MG tablet Take 2 tablets by mouth nightly 180 tablet 1    metFORMIN (GLUCOPHAGE) 500 MG tablet Take 1 tablet by mouth daily (with breakfast) 90 tablet 1    allopurinol (ZYLOPRIM) 100 MG tablet Take 1 tablet by mouth daily 90 tablet 1    potassium chloride (KLOR-CON M) 10 MEQ extended release tablet Take 1 tablet by mouth daily 90 tablet 1    dilTIAZem (CARTIA XT) 180 MG extended release capsule Take 1 capsule by mouth daily 180 capsule 1    warfarin (COUMADIN) 1 MG tablet Take 3.5 tablets by mouth daily 315 tablet 1    digoxin (DIGOX) 250 MCG tablet Take 1 tablet by mouth daily Take 1 tablet by mouth  daily 90 tablet 1    omeprazole (PRILOSEC) 20 MG delayed release capsule Take 1 capsule by mouth daily 90 capsule 1    furosemide (LASIX) 20 MG tablet Take 1 tablet by mouth daily 90 tablet 1    warfarin (COUMADIN) 3 MG tablet Take daily 90 tablet 1    montelukast (SINGULAIR) 10 MG tablet Take 1 tablet by mouth daily 90 tablet 3    DULERA 100-5 MCG/ACT inhaler Inhale 2 puffs into the lungs 2 times daily 1 Inhaler 5    tiotropium (SPIRIVA HANDIHALER) 18 MCG inhalation capsule Inhale 1 capsule into the lungs daily 90 capsule 1    albuterol sulfate HFA (PROVENTIL HFA) 108 (90 Base) MCG/ACT inhaler Inhale 2 puffs into the lungs every 6 hours as needed for Wheezing 1 Inhaler 3    Spacer/Aero-Holding Chambers ALIEC 1 Device by Does not apply route daily as needed (use with albuterol rescue inhaler) 1 Device 0    fluticasone-salmeterol (ADVAIR DISKUS) 500-50 MCG/DOSE diskus inhaler Inhale 1 puff into the lungs 2 times daily Rinse mouth after use. 1 Inhaler 5    calcium-vitamin D (OSCAL-500) 500-200 MG-UNIT per tablet Take 1 tablet by mouth daily. No current facility-administered medications for this visit.         Allergies   Allergen Reactions    Lisinopril      Chest tightness    Simvastatin Other (See Comments)     Muscle cramps     Vitals:    12/04/20 0959   Pulse: 76   SpO2: 96%   Weight: 271 lb (122.9 kg)   Height: 6' 5\" (1.956 m)     Review of Systems:   Review of Systems   Constitutional: Negative. HENT: Negative. Eyes: Negative. Respiratory: Negative. Cardiovascular: Negative. Gastrointestinal: Negative. Endocrine: Negative. Genitourinary: Negative. Musculoskeletal: Positive for arthralgias. Skin: Negative. Allergic/Immunologic: Negative. Neurological: Negative. Hematological: Negative. Psychiatric/Behavioral: Negative. Physical Exam:   Gen/Psych:Examination reveals a pleasant individual in no acute distress. The patient is oriented to time, place and person. The patient's mood and affect are appropriate. Patient appears well nourished. HEENT: Head is atraumatic normocephalic, ears are symmetric, eyes show equal pupils bilaterally, extraocular muscles intact. Hearing is intact. Lymph: No obvious lymphedema in left upper extremity     Skin: Intact in left upper extremity with no ulcerations, lesions, rash, erythema. Vascular: There are no obvious varicosities in left upper extremity, sensation intact to light touch over left upper extremity. Capillary refill less than 3. Radial pulses equal and intact bilaterally. Musculoskeletal:  Left shoulder exam:  Skin:  Clear with no erythema, there is no significant joint effusion. Deformity: None  Atrophy: None  Tenderness: Tenderness to palpation over the lateral aspect of the left shoulder. Nontender palpation of posterior shoulder. Minimal tenderness over the [facility] joint region.   Active ROM:   FE: 180 degrees   Ad/Abduction: 150 degrees  IR side: L5      ER side: 70 degrees  Passive ROM:   F/E: 180 degrees   IR side: 75 degrees   ER side: 75 degrees   Ad/Abduction: 160 degrees  Strength:   F/E:5/5  Abd: 5/5        Neer: negative  Parker: positive   Empty Can: negative  Sevier:  negative  Cross over test: negative  The patient can perform an OK sign, perform thumbs up, touch each finger to thumb, abduct and adduct the fingers, perform .  strength: 5/5  Midline bony cervical spine tenderness: no    Outside Record review: Primary care provider notes from 2020 were reviewed. Imagin views of the left shoulder were obtained and showed no acute fracture or dislocation. Degenerative changes of the [facility] joint noted. The official read and interpretation of these x-rays will be done by the the [facility]. Please see their impression below. Impression    1. Degenerative changes in the [facility] joint and glenohumeral joint    2. No acute bony or joint abnormality           Assessment:   1. Arthritis of the left AC joint  2. Left rotator cuff strain    Plan:   The patient was seen in clinic today for evaluation of his left shoulder pain. Patient states his pain started after receiving a flu injection in late September. Patient states he has continued to have pain. Patient states he has a history of shoulder dislocation but denies any feelings of instability in the office today. Patient states he works on exercising his upper extremities regularly. X-ray imaging of the left shoulder was obtained and showed no acute fracture dislocation, degenerative changes of the left AC joint noted. On physical exam, patient tender to palpation over the lateral shoulder region. Patient was noted to have some difficulty with abduction. Treatment options regarding the patient's left shoulder pain were discussed including Tylenol use, steroid injection, and physical therapy. Patient elected to have a steroid injection, please see procedure note below. Patient was also agreeable to physical therapy and physical therapy was ordered for him. The patient will follow-up in 6 weeks for recheck of his left shoulder pain.     Continue gentle range of motion exercises  Take Tylenol as needed for pain  Ice or elevate as needed   Steroid injection given today  Physical therapy ordered  Follow-up in 6 weeks Left Subacromial Injection Procedure:  Multiple treatment options were discussed. This injection was recommended as a part of the overall treatment plan. Details of the procedure, potential risks, and potential benefits were discussed. Patient's questions were answered. Patient elected to proceed with procedure. Medication: Kenalog (40 MG/ML) 1ml, 1% plain lidocaine 4ml  Procedure:  Sterile technique was used as the skin over the injection site was prepped with alcohol. The Left subacromial bursa was then injected with the above listed medication. A sterile bandage was placed over the injection site. The patient tolerated the procedure well without complication. Please note this report has been partially produced using speech recognition Dragon software and may contain errors related to that system including errors in grammar, punctuation, and spelling, as well as words and phrases that may be inappropriate.  If there are any questions or concerns please feel free to contact the dictating provider for clarification

## 2020-12-04 NOTE — PATIENT INSTRUCTIONS
Continue gentle range of motion exercises  Take Tylenol as needed for pain  Ice or elevate as needed   Steroid injection given today  Physical therapy ordered  Follow-up in 6 weeks

## 2020-12-15 RX ORDER — PREDNISONE 1 MG/1
TABLET ORAL
Qty: 21 TABLET | Refills: 0 | Status: ON HOLD
Start: 2020-12-15 | End: 2020-12-28 | Stop reason: HOSPADM

## 2020-12-16 RX ORDER — ALBUTEROL SULFATE 90 UG/1
2 AEROSOL, METERED RESPIRATORY (INHALATION) EVERY 6 HOURS PRN
Qty: 1 INHALER | Refills: 3 | Status: SHIPPED | OUTPATIENT
Start: 2020-12-16 | End: 2021-11-09

## 2020-12-17 ENCOUNTER — HOSPITAL ENCOUNTER (INPATIENT)
Age: 70
LOS: 1 days | Discharge: HOME | DRG: 177 | End: 2020-12-18
Attending: INTERNAL MEDICINE | Admitting: INTERNAL MEDICINE
Payer: MEDICARE

## 2020-12-17 ENCOUNTER — APPOINTMENT (OUTPATIENT)
Dept: GENERAL RADIOLOGY | Age: 70
DRG: 177 | End: 2020-12-17
Payer: MEDICARE

## 2020-12-17 PROBLEM — J12.82 PNEUMONIA DUE TO 2019-NCOV: Status: ACTIVE | Noted: 2020-12-17

## 2020-12-17 PROBLEM — U07.1 PNEUMONIA DUE TO 2019-NCOV: Status: ACTIVE | Noted: 2020-12-17

## 2020-12-17 LAB
ABO/RH: NORMAL
ALBUMIN SERPL-MCNC: 4.2 GM/DL (ref 3.4–5)
ALP BLD-CCNC: 50 IU/L (ref 40–129)
ALT SERPL-CCNC: 29 U/L (ref 10–40)
ANION GAP SERPL CALCULATED.3IONS-SCNC: 12 MMOL/L (ref 4–16)
ANTIBODY SCREEN: NEGATIVE
APTT: 31.4 SECONDS (ref 25.1–37.1)
AST SERPL-CCNC: 28 IU/L (ref 15–37)
BASOPHILS ABSOLUTE: 0 K/CU MM
BASOPHILS RELATIVE PERCENT: 0.2 % (ref 0–1)
BILIRUB SERPL-MCNC: 0.5 MG/DL (ref 0–1)
BUN BLDV-MCNC: 18 MG/DL (ref 6–23)
CALCIUM SERPL-MCNC: 9.3 MG/DL (ref 8.3–10.6)
CHLORIDE BLD-SCNC: 94 MMOL/L (ref 99–110)
CO2: 23 MMOL/L (ref 21–32)
CREAT SERPL-MCNC: 1.1 MG/DL (ref 0.9–1.3)
D DIMER: <200 NG/ML(DDU)
DIFFERENTIAL TYPE: ABNORMAL
EOSINOPHILS ABSOLUTE: 0 K/CU MM
EOSINOPHILS RELATIVE PERCENT: 0.2 % (ref 0–3)
GFR AFRICAN AMERICAN: >60 ML/MIN/1.73M2
GFR NON-AFRICAN AMERICAN: >60 ML/MIN/1.73M2
GLUCOSE BLD-MCNC: 262 MG/DL (ref 70–99)
GLUCOSE BLD-MCNC: 418 MG/DL (ref 70–99)
HCT VFR BLD CALC: 42 % (ref 42–52)
HEMOGLOBIN: 13.8 GM/DL (ref 13.5–18)
IMMATURE NEUTROPHIL %: 0.6 % (ref 0–0.43)
INR BLD: 1.51 INDEX
LYMPHOCYTES ABSOLUTE: 0.2 K/CU MM
LYMPHOCYTES RELATIVE PERCENT: 2.6 % (ref 24–44)
MCH RBC QN AUTO: 27.9 PG (ref 27–31)
MCHC RBC AUTO-ENTMCNC: 32.9 % (ref 32–36)
MCV RBC AUTO: 84.8 FL (ref 78–100)
MONOCYTES ABSOLUTE: 0.9 K/CU MM
MONOCYTES RELATIVE PERCENT: 10.1 % (ref 0–4)
NUCLEATED RBC %: 0 %
PDW BLD-RTO: 16.3 % (ref 11.7–14.9)
PLATELET # BLD: 204 K/CU MM (ref 140–440)
PMV BLD AUTO: 8.7 FL (ref 7.5–11.1)
POTASSIUM SERPL-SCNC: 4.6 MMOL/L (ref 3.5–5.1)
PRO-BNP: 300.6 PG/ML
PROTHROMBIN TIME: 18.3 SECONDS (ref 11.7–14.5)
RBC # BLD: 4.95 M/CU MM (ref 4.6–6.2)
SARS-COV-2, NAAT: DETECTED
SEGMENTED NEUTROPHILS ABSOLUTE COUNT: 8 K/CU MM
SEGMENTED NEUTROPHILS RELATIVE PERCENT: 86.3 % (ref 36–66)
SODIUM BLD-SCNC: 129 MMOL/L (ref 135–145)
SOURCE: ABNORMAL
TOTAL IMMATURE NEUTOROPHIL: 0.06 K/CU MM
TOTAL NUCLEATED RBC: 0 K/CU MM
TOTAL PROTEIN: 7.2 GM/DL (ref 6.4–8.2)
TROPONIN T: <0.01 NG/ML
WBC # BLD: 9.3 K/CU MM (ref 4–10.5)

## 2020-12-17 PROCEDURE — 96374 THER/PROPH/DIAG INJ IV PUSH: CPT

## 2020-12-17 PROCEDURE — 6370000000 HC RX 637 (ALT 250 FOR IP): Performed by: INTERNAL MEDICINE

## 2020-12-17 PROCEDURE — 86850 RBC ANTIBODY SCREEN: CPT

## 2020-12-17 PROCEDURE — 99284 EMERGENCY DEPT VISIT MOD MDM: CPT

## 2020-12-17 PROCEDURE — 85730 THROMBOPLASTIN TIME PARTIAL: CPT

## 2020-12-17 PROCEDURE — 1200000000 HC SEMI PRIVATE

## 2020-12-17 PROCEDURE — 2580000003 HC RX 258: Performed by: INTERNAL MEDICINE

## 2020-12-17 PROCEDURE — XW033E5 INTRODUCTION OF REMDESIVIR ANTI-INFECTIVE INTO PERIPHERAL VEIN, PERCUTANEOUS APPROACH, NEW TECHNOLOGY GROUP 5: ICD-10-PCS | Performed by: INTERNAL MEDICINE

## 2020-12-17 PROCEDURE — 83880 ASSAY OF NATRIURETIC PEPTIDE: CPT

## 2020-12-17 PROCEDURE — 82962 GLUCOSE BLOOD TEST: CPT

## 2020-12-17 PROCEDURE — 80053 COMPREHEN METABOLIC PANEL: CPT

## 2020-12-17 PROCEDURE — 86901 BLOOD TYPING SEROLOGIC RH(D): CPT

## 2020-12-17 PROCEDURE — 6360000002 HC RX W HCPCS: Performed by: INTERNAL MEDICINE

## 2020-12-17 PROCEDURE — 86900 BLOOD TYPING SEROLOGIC ABO: CPT

## 2020-12-17 PROCEDURE — 85379 FIBRIN DEGRADATION QUANT: CPT

## 2020-12-17 PROCEDURE — U0002 COVID-19 LAB TEST NON-CDC: HCPCS

## 2020-12-17 PROCEDURE — 71045 X-RAY EXAM CHEST 1 VIEW: CPT

## 2020-12-17 PROCEDURE — 85610 PROTHROMBIN TIME: CPT

## 2020-12-17 PROCEDURE — 84484 ASSAY OF TROPONIN QUANT: CPT

## 2020-12-17 PROCEDURE — 93005 ELECTROCARDIOGRAM TRACING: CPT | Performed by: PHYSICIAN ASSISTANT

## 2020-12-17 PROCEDURE — 85025 COMPLETE CBC W/AUTO DIFF WBC: CPT

## 2020-12-17 PROCEDURE — 6360000002 HC RX W HCPCS: Performed by: PHYSICIAN ASSISTANT

## 2020-12-17 RX ORDER — ACETAMINOPHEN 650 MG/1
650 SUPPOSITORY RECTAL EVERY 6 HOURS PRN
Status: DISCONTINUED | OUTPATIENT
Start: 2020-12-17 | End: 2020-12-18 | Stop reason: HOSPADM

## 2020-12-17 RX ORDER — DEXTROSE MONOHYDRATE 50 MG/ML
100 INJECTION, SOLUTION INTRAVENOUS PRN
Status: DISCONTINUED | OUTPATIENT
Start: 2020-12-17 | End: 2020-12-18 | Stop reason: HOSPADM

## 2020-12-17 RX ORDER — NICOTINE POLACRILEX 4 MG
15 LOZENGE BUCCAL PRN
Status: DISCONTINUED | OUTPATIENT
Start: 2020-12-17 | End: 2020-12-18 | Stop reason: HOSPADM

## 2020-12-17 RX ORDER — ALLOPURINOL 100 MG/1
100 TABLET ORAL DAILY
Status: DISCONTINUED | OUTPATIENT
Start: 2020-12-18 | End: 2020-12-18 | Stop reason: HOSPADM

## 2020-12-17 RX ORDER — ACETAMINOPHEN 325 MG/1
650 TABLET ORAL EVERY 6 HOURS PRN
Status: DISCONTINUED | OUTPATIENT
Start: 2020-12-17 | End: 2020-12-18 | Stop reason: HOSPADM

## 2020-12-17 RX ORDER — WARFARIN SODIUM 4 MG/1
4 TABLET ORAL ONCE
Status: COMPLETED | OUTPATIENT
Start: 2020-12-17 | End: 2020-12-17

## 2020-12-17 RX ORDER — TRAZODONE HYDROCHLORIDE 50 MG/1
100 TABLET ORAL NIGHTLY
Status: DISCONTINUED | OUTPATIENT
Start: 2020-12-17 | End: 2020-12-18 | Stop reason: HOSPADM

## 2020-12-17 RX ORDER — DEXAMETHASONE SODIUM PHOSPHATE 10 MG/ML
10 INJECTION, SOLUTION INTRAMUSCULAR; INTRAVENOUS ONCE
Status: COMPLETED | OUTPATIENT
Start: 2020-12-17 | End: 2020-12-17

## 2020-12-17 RX ORDER — PANTOPRAZOLE SODIUM 40 MG/1
40 TABLET, DELAYED RELEASE ORAL
Status: DISCONTINUED | OUTPATIENT
Start: 2020-12-18 | End: 2020-12-18 | Stop reason: HOSPADM

## 2020-12-17 RX ORDER — BUDESONIDE AND FORMOTEROL FUMARATE DIHYDRATE 80; 4.5 UG/1; UG/1
2 AEROSOL RESPIRATORY (INHALATION) 2 TIMES DAILY
Status: DISCONTINUED | OUTPATIENT
Start: 2020-12-17 | End: 2020-12-18 | Stop reason: HOSPADM

## 2020-12-17 RX ORDER — DILTIAZEM HYDROCHLORIDE 180 MG/1
180 CAPSULE, COATED, EXTENDED RELEASE ORAL DAILY
Status: DISCONTINUED | OUTPATIENT
Start: 2020-12-18 | End: 2020-12-18 | Stop reason: HOSPADM

## 2020-12-17 RX ORDER — FUROSEMIDE 20 MG/1
20 TABLET ORAL DAILY
Status: DISCONTINUED | OUTPATIENT
Start: 2020-12-18 | End: 2020-12-18 | Stop reason: HOSPADM

## 2020-12-17 RX ORDER — SODIUM CHLORIDE 9 MG/ML
INJECTION, SOLUTION INTRAVENOUS CONTINUOUS
Status: ACTIVE | OUTPATIENT
Start: 2020-12-17 | End: 2020-12-18

## 2020-12-17 RX ORDER — DEXTROSE MONOHYDRATE 25 G/50ML
12.5 INJECTION, SOLUTION INTRAVENOUS PRN
Status: DISCONTINUED | OUTPATIENT
Start: 2020-12-17 | End: 2020-12-18 | Stop reason: HOSPADM

## 2020-12-17 RX ORDER — POTASSIUM CHLORIDE 20 MEQ/1
10 TABLET, EXTENDED RELEASE ORAL DAILY
Status: DISCONTINUED | OUTPATIENT
Start: 2020-12-17 | End: 2020-12-18 | Stop reason: HOSPADM

## 2020-12-17 RX ORDER — WARFARIN SODIUM 1 MG/1
0.5 TABLET ORAL
Status: DISCONTINUED | OUTPATIENT
Start: 2020-12-18 | End: 2020-12-17 | Stop reason: DRUGHIGH

## 2020-12-17 RX ORDER — ALBUTEROL SULFATE 90 UG/1
2 AEROSOL, METERED RESPIRATORY (INHALATION) EVERY 6 HOURS PRN
Status: DISCONTINUED | OUTPATIENT
Start: 2020-12-17 | End: 2020-12-18 | Stop reason: HOSPADM

## 2020-12-17 RX ORDER — DIGOXIN 250 MCG
250 TABLET ORAL DAILY
Status: DISCONTINUED | OUTPATIENT
Start: 2020-12-18 | End: 2020-12-18 | Stop reason: HOSPADM

## 2020-12-17 RX ORDER — MONTELUKAST SODIUM 10 MG/1
10 TABLET ORAL DAILY
Status: DISCONTINUED | OUTPATIENT
Start: 2020-12-18 | End: 2020-12-18 | Stop reason: HOSPADM

## 2020-12-17 RX ORDER — 0.9 % SODIUM CHLORIDE 0.9 %
20 INTRAVENOUS SOLUTION INTRAVENOUS ONCE
Status: DISCONTINUED | OUTPATIENT
Start: 2020-12-17 | End: 2020-12-18 | Stop reason: HOSPADM

## 2020-12-17 RX ADMIN — TRAZODONE HYDROCHLORIDE 100 MG: 50 TABLET ORAL at 22:19

## 2020-12-17 RX ADMIN — DEXAMETHASONE 6 MG: 4 TABLET ORAL at 22:39

## 2020-12-17 RX ADMIN — ENOXAPARIN SODIUM 30 MG: 30 INJECTION SUBCUTANEOUS at 22:20

## 2020-12-17 RX ADMIN — POTASSIUM CHLORIDE 10 MEQ: 1500 TABLET, EXTENDED RELEASE ORAL at 22:20

## 2020-12-17 RX ADMIN — DEXAMETHASONE SODIUM PHOSPHATE 10 MG: 10 INJECTION, SOLUTION INTRAMUSCULAR; INTRAVENOUS at 15:34

## 2020-12-17 RX ADMIN — SODIUM CHLORIDE: 9 INJECTION, SOLUTION INTRAVENOUS at 22:19

## 2020-12-17 RX ADMIN — INSULIN LISPRO 3 UNITS: 100 INJECTION, SOLUTION INTRAVENOUS; SUBCUTANEOUS at 22:38

## 2020-12-17 RX ADMIN — WARFARIN SODIUM 4 MG: 4 TABLET ORAL at 22:19

## 2020-12-17 ASSESSMENT — PAIN SCALES - GENERAL: PAINLEVEL_OUTOF10: 0

## 2020-12-17 NOTE — H&P
History and Physical      Name:  Darnella Phalen /Age/Sex: 1950  (65 y.o. male)   MRN & CSN:  [id number] & [id number] Admission Date/Time: 2020  2:30 PM   Location:  ED21/ED-21 PCP: Kymberly Isbell MD       Hospital Day: 1    Assessment and Plan:   64 y/o M with a PMH of COPD, DM, permanent afib, GERD, Gout,  insomnia that is presenting with shortness of breath. Acute Hypoxemic Respiratory Failure  COVID Positive  History of COPD with likely exacerbation  - Case d/w ED; patient initially presented in distress with oxygen saturations in the 80's; placed on nasal cannula with improvement to mid 90's. - CXR reviewed by myself with no discrete opacities or consolidations  - EKG reviewed by myself showing afib, not with RVR  - Will admit to COVID unit  - Will transfuse convolescent plasma- patient in agreement to receive  - Remdesmivir; pharmacy to dose  - COVID labs  - Given no discrete opacities/consolidation will hold on abx for now  - Decadron  - Continue home inhalers    Hyponatremia  - Sodium 129  - Repeat BMP ordered for morning  - Gentle IVF    Permanent Atrial Fibrillation  - Previous Echo from 2019; EF 55-60%, mild aortic stenosis   - Continue home Digoxin, Cardizem  - On Warfarin for a/c, pharmacy consulted to assist in dosing    Gout  - Continue Allopurinol    DM  - SSI with hypoglycemia protocol    GERD  - PPI daily    Insomnia  - Continue Trazodone    Diet No diet orders on file   DVT Prophylaxis [] Lovenox, []  Heparin, [] SCDs, [] Ambulation   GI Prophylaxis [] PPI,  [] H2 Blocker,  [] Carafate,  [] Diet/Tube Feeds   Code Status Prior   Disposition Patient requires continued admission due to COVID, respiratory failure   MDM [] Low, [] Moderate,[]  High  Patient's risk as above due to      History of Present Illness: This is a 64 y/o M with a PMH of COPD, DM, permanent afib, GERD, Gout,  insomnia that is presenting with shortness of breath.   Patient states that recently his wife tested positive for COVID and due them living in a small condo together has had significant exposure. States that today was the first day he experienced symptoms. States that he got up this morning and began to experience significant shortness of breath and put his pulse-ox on finding his oxygen saturation initially being read at 82% and then once again at 85%. Due to this patient decided to come to the hospital.  Denies any abdominal pain, n/v/, fevers, or diarrhea. Ten point ROS reviewed negative, unless as noted above. In ED patient was afebrile but initially saturating in the 80's per sign out from the ED. Patient was then placed on nasal cannula with improvement into the mid 90's. Labs significant for sodium of 129. CXR with no discrete opacities or consolidations. Objective:   No intake or output data in the 24 hours ending 12/17/20 1619   Vitals:   Vitals:    12/17/20 1302   BP: 137/80   Pulse: 113   Resp: 19   Temp: 98.7 °F (37.1 °C)   SpO2: 92%     Physical Exam:   GEN Awake male, sitting upright in bed in no apparent distress. Appears given age. EYES Pupils are equally round. No scleral erythema, discharge, or conjunctivitis. HENT Mucous membranes are moist.   NECK Supple, no apparent thyromegaly or masses. RESP Clear to auscultation, no wheezes, rales or rhonchi. Symmetric chest movement while on 2L NC  CARDIO/VASC S1/S2 auscultated. Regular rate without appreciable murmurs, rubs, or gallops. No JVD. Peripheral pulses equal bilaterally and palpable. No peripheral edema. GI Abdomen is soft without significant tenderness, masses, or guarding.  No costovertebral angle tenderness. Normal appearing external genitalia. HEME/LYMPH  No petechiae or ecchymoses. MSK No gross joint deformities. SKIN Normal coloration, warm, dry. NEURO Cranial nerves appear grossly intact, normal speech  PSYCH Awake, alert, oriented x 4. Affect appropriate.     Past Medical History:      Past Medical History:   Diagnosis Date    Aortic stenosis, mild 11/8/2017    By echo 10/2016    Asthma, intrinsic     Atrial fibrillation ([address]     Cholelithiasis     Dr Sole Randolph evaluating    Colonic polyp 05/2009    Dr Kailey Garcia- also done 4/16/2012- AND 3/8/17-TUBULAR ADENOMA,-RECHECK 5YRS    COPD (chronic obstructive pulmonary disease) ([address]     noted on PFTs 6/2020    DDD (degenerative disc disease), lumbar     Diabetes mellitus type 2, controlled ([address]     Dr Lisa aSlvador    Diabetic neuropathy, type II diabetes mellitus ([address]     DECR MONOFIL SENSATION    Dilated cardiomyopathy ([address] 11/4/2015    GERD (gastroesophageal reflux disease)     Gout     H/O cardiovascular stress test 12/10/2015    lexiscan-normal,EF65%    H/O echocardiogram 06/13/2019    EF 55-60%, Mild AS,  Moderately dilated left and right atrium and right ventricle.  History of cardiovascular stress test 1/8/08 1/8/08- normal exercise performance without angina, the patient was noted to have nonspecific ST and T wave changes in the inferior and lateral leads. The nuclear segment of the report will be dicated seperately.  History of complete electrocardiogram 5/16/11    History of echocardiogram 11/14/14,11/2/11, 5/12/10, 5/20/09, 7/9/02 11/14/14- Four chamber dialtation with mild left ventricular systolic function Mild MR and TR, mild pulmonary HTN  EF 45-50%    History of total right hip arthroplasty     Dr Danii Terry 9/2018- does get antibiotic prophylaxis w dental work- Dr Stefan Tinoco Hx of Doppler echocardiogram 11/06/2018    EF55-60%,moderately to severly dilated left and right atrium and right ventricle,mild to moderate mitral and mild tricuspid regurg, mild PHTN    Hx of echocardiogram 10/26/2016    aortic sclerosis with mild stenosis,moderate left atrial dilation,mild mitral and tricusid regurg,EF55-60%    Hyperlipidemia     IFG (impaired fasting glucose)     Jan 2019- a1c 6.1 but fasting glc 131.     Knee pain, bilateral     Knee pain, left     Long-term (current) use of anticoagulants     Peptic ulcer disease     Pre-op evaluation 2018    Testosterone deficiency dx'd 2015    Ventricular tachycardia ([address]     mentioned by Dr Florina Turk 2019 note-who monitors him    VHD (valvular heart disease) 2018    Mild aortic stenosis by echo 2018     PSHX:  has a past surgical history that includes polypectomy (- jaimie); polypectomy (9/10/02- dr Saint Mechanic); laparoscopy (3/08, dr Taurus Rockwell); ventral hernia repair (10/08, dr Taurus Rockwell); Wrist fracture surgery (Left, 14); Inguinal hernia repair (Right, 2016); and Hip Arthroplasty (Right, 2018). Allergies: Allergies   Allergen Reactions    Lisinopril      Chest tightness    Simvastatin Other (See Comments)     Muscle cramps       FAM HX: family history includes Diabetes in his father; High Blood Pressure in his mother; Hypertension in his mother; Hypotension in his father; Other in his father and mother.   Soc HX:   Social History     Socioeconomic History    Marital status:      Spouse name: None    Number of children: None    Years of education: None    Highest education level: None   Occupational History    Occupation: [employer]   Social Needs    Financial resource strain: None    Food insecurity     Worry: None     Inability: None    Transportation needs     Medical: None     Non-medical: None   Tobacco Use    Smoking status: Former Smoker     Packs/day: 1.00     Years: 39.00     Pack years: 39.00     Types: Cigarettes     Start date:      Quit date: 2013     Years since quittin.9    Smokeless tobacco: Never Used   Substance and Sexual Activity    Alcohol use: Yes     Comment: occasional wine/moderate    Drug use: No    Sexual activity: Yes     Partners: Female   Lifestyle    Physical activity     Days per week: None     Minutes per session: None    Stress: None   Relationships    Social connections Talks on phone: None     Gets together: None     Attends Gnosticism service: None     Active member of club or organization: None     Attends meetings of clubs or organizations: None     Relationship status: None    Intimate partner violence     Fear of current or ex partner: None     Emotionally abused: None     Physically abused: None     Forced sexual activity: None   Other Topics Concern    None   Social History Narrative    Exercise:    Seat Belt :       Medications:   Medications:    Infusions:   PRN Meds:       Electronically signed by Eric Oconnell MD on 12/17/2020 at 4:19 PM

## 2020-12-17 NOTE — ED PROVIDER NOTES
[place]      PCP: Felizardo Boas, MD    CHIEF COMPLAINT    Chief Complaint   Patient presents with    Chest Pain    Shortness of Breath       This patient was not evaluated by the attending physician. I have independently evaluated this patient. HPI    Mindy Waite is a 65 y.o. male who presents with substernal chest pain, shortness of breath and cough since this morning. Patient states he woke up and his oxygen level is in the 80s. Patient has history of COPD, does not typically need to wear oxygen. Patient states his wife did test positive this past week for Covid. Cough is mildly productive of phlegm. Patient denies abdominal pain, vomiting, diarrhea, urinary symptoms. Patient denies lower extremity pain or swelling. Patient denies any fever, headache, dizziness. Patient has history of A. fib and is on Coumadin. Patient denies history of blood clots, recent travel or surgery. REVIEW OF SYSTEMS    Constitutional:  Denies fever  HENT:  Denies sore throat or ear pain   Cardiovascular:  See HPI No syncope  Respiratory:  See HPI.    GI:  Denies abdominal pain, vomiting, or diarrhea  :  Denies any urinary symptoms    Musculoskeletal:  Denies back pain   Skin:  Denies rash  Neurologic:  Denies headache, focal weakness or sensory changes   Lymphatic:  Denies swollen glands     All other review of systems are negative  See HPI and nursing notes for additional information       PAST MEDICAL & SURGICAL HISTORY    Past Medical History:   Diagnosis Date    Aortic stenosis, mild 11/8/2017    By echo 10/2016    Asthma, intrinsic     Atrial fibrillation ([address]     Cholelithiasis     Dr Amador Torres evaluating    Colonic polyp 05/2009    Dr Reshma Shepherd- also done 4/16/2012- AND 3/8/17-TUBULAR ADENOMA,-RECHECK 5YRS    COPD (chronic obstructive pulmonary disease) ([address]     noted on PFTs 6/2020    DDD (degenerative disc disease), lumbar     Diabetes mellitus type 2, controlled ([address]      [facility]    Diabetic neuropathy, type II diabetes mellitus ([facility]     DECR MONOFIL SENSATION    Dilated cardiomyopathy ([facility] 11/4/2015    GERD (gastroesophageal reflux disease)     Gout     H/O cardiovascular stress test 12/10/2015    lexiscan-normal,EF65%    H/O echocardiogram 06/13/2019    EF 55-60%, Mild AS,  Moderately dilated left and right atrium and right ventricle.  History of cardiovascular stress test 1/8/08 1/8/08- normal exercise performance without angina, the patient was noted to have nonspecific ST and T wave changes in the inferior and lateral leads. The nuclear segment of the report will be dicated seperately.  History of complete electrocardiogram 5/16/11    History of echocardiogram 11/14/14,11/2/11, 5/12/10, 5/20/09, 7/9/02 11/14/14- Four chamber dialtation with mild left ventricular systolic function Mild MR and TR, mild pulmonary HTN  EF 45-50%    History of total right hip arthroplasty     Dr Cali Blanco 9/2018- does get antibiotic prophylaxis w dental work- Dr Phoenix Todd Hx of Doppler echocardiogram 11/06/2018    EF55-60%,moderately to severly dilated left and right atrium and right ventricle,mild to moderate mitral and mild tricuspid regurg, mild PHTN    Hx of echocardiogram 10/26/2016    aortic sclerosis with mild stenosis,moderate left atrial dilation,mild mitral and tricusid regurg,EF55-60%    Hyperlipidemia     IFG (impaired fasting glucose)     Jan 2019- a1c 6.1 but fasting glc 131.     Knee pain, bilateral     Knee pain, left     Long-term (current) use of anticoagulants     Peptic ulcer disease     Pre-op evaluation 9/5/2018    Testosterone deficiency dx'd 9/2015    Ventricular tachycardia [facility])     mentioned by Dr Mojgan Spencer 11/2019 note-who monitors him    VHD (valvular heart disease) 11/29/2018    Mild aortic stenosis by echo November 2018     Past Surgical History:   Procedure Laterality Date    HIP ARTHROPLASTY Right 09/12/2018    Dr Jalil Byrnes INGUINAL HERNIA REPAIR Right 11/2016    Dr Yaima Gage  3/08, dr Luis Aguilar    lap assisted colon mass resection-benign    POLYPECTOMY  2/98- jaimie    colonic polypectomy    POLYPECTOMY  9/10/02- dr Germaine Perez    colonic polypectomy    VENTRAL HERNIA REPAIR  10/08, dr Nayeli Langston Left 2/6/14    [facility] Dr Yaw Pena    Current Outpatient Rx   Medication Sig Dispense Refill    albuterol sulfate HFA (PROVENTIL HFA) 108 (90 Base) MCG/ACT inhaler Inhale 2 puffs into the lungs every 6 hours as needed for Wheezing 1 Inhaler 3    predniSONE (DELTASONE) 5 MG tablet Take 6 tablets by mouth on day 1, 5 on day 2, 4 on day 3, 3 on day 4, 2 on day 5, 1 on day 6. 21 tablet 0    traZODone (DESYREL) 50 MG tablet Take 2 tablets by mouth nightly 180 tablet 1    metFORMIN (GLUCOPHAGE) 500 MG tablet Take 1 tablet by mouth daily (with breakfast) 90 tablet 1    allopurinol (ZYLOPRIM) 100 MG tablet Take 1 tablet by mouth daily 90 tablet 1    potassium chloride (KLOR-CON M) 10 MEQ extended release tablet Take 1 tablet by mouth daily 90 tablet 1    dilTIAZem (CARTIA XT) 180 MG extended release capsule Take 1 capsule by mouth daily 180 capsule 1    warfarin (COUMADIN) 1 MG tablet Take 3.5 tablets by mouth daily 315 tablet 1    digoxin (DIGOX) 250 MCG tablet Take 1 tablet by mouth daily Take 1 tablet by mouth  daily 90 tablet 1    omeprazole (PRILOSEC) 20 MG delayed release capsule Take 1 capsule by mouth daily 90 capsule 1    furosemide (LASIX) 20 MG tablet Take 1 tablet by mouth daily 90 tablet 1    warfarin (COUMADIN) 3 MG tablet Take daily 90 tablet 1    montelukast (SINGULAIR) 10 MG tablet Take 1 tablet by mouth daily 90 tablet 3    DULERA 100-5 MCG/ACT inhaler Inhale 2 puffs into the lungs 2 times daily 1 Inhaler 5    tiotropium (SPIRIVA HANDIHALER) 18 MCG inhalation capsule Inhale 1 capsule into the lungs daily 90 capsule 1    Spacer/Aero-Holding Chambers ALICE 1 Device by Does not apply route daily as needed (use with albuterol rescue inhaler) 1 Device 0    fluticasone-salmeterol (ADVAIR DISKUS) 500-50 MCG/DOSE diskus inhaler Inhale 1 puff into the lungs 2 times daily Rinse mouth after use. 1 Inhaler 5    calcium-vitamin D (OSCAL-500) 500-200 MG-UNIT per tablet Take 1 tablet by mouth daily.          ALLERGIES    Allergies   Allergen Reactions    Lisinopril      Chest tightness    Simvastatin Other (See Comments)     Muscle cramps       SOCIAL & FAMILY HISTORY    Social History     Socioeconomic History    Marital status:      Spouse name: None    Number of children: None    Years of education: None    Highest education level: None   Occupational History    Occupation: [employer]   Social Needs    Financial resource strain: None    Food insecurity     Worry: None     Inability: None    Transportation needs     Medical: None     Non-medical: None   Tobacco Use    Smoking status: Former Smoker     Packs/day: 1.00     Years: 39.00     Pack years: 39.00     Types: Cigarettes     Start date:      Quit date: 2013     Years since quittin.9    Smokeless tobacco: Never Used   Substance and Sexual Activity    Alcohol use: Yes     Comment: occasional wine/moderate    Drug use: No    Sexual activity: Yes     Partners: Female   Lifestyle    Physical activity     Days per week: None     Minutes per session: None    Stress: None   Relationships    Social connections     Talks on phone: None     Gets together: None     Attends Mandaeism service: None     Active member of club or organization: None     Attends meetings of clubs or organizations: None     Relationship status: None    Intimate partner violence     Fear of current or ex partner: None     Emotionally abused: None     Physically abused: None     Forced sexual activity: None   Other Topics Concern    None   Social History Narrative    Exercise:    Seat Belt :     Family History   Problem Relation Age of Onset    Hypertension Mother     High Blood Pressure Mother     Other Mother         lung problems    Other Father         CHF, A. Fib    Hypotension Father     Diabetes Father        PHYSICAL EXAM    VITAL SIGNS: /80   Pulse 113   Temp 98.7 °F (37.1 °C) (Oral)   Resp 19   Ht 6' 5\" (1.956 m)   Wt 270 lb (122.5 kg)   SpO2 92%   BMI 32.02 kg/m²    Constitutional:  Well developed, well nourished, no acute distress   HENT:  Atraumatic, moist mucus membranes, oropharynx is clear  Neck/Lymphatics: supple, no JVD, no swollen nodes  Respiratory:  Nonlabored breathing.   Lungs mildly decreased with no expiratory wheezes or rhonchi noted, no retractions   Cardiovascular: Irregular rate and rhythm  GI:  Soft, nontender, normal bowel sounds  Musculoskeletal:  No edema, no acute deformities  Integument:  Skin is warm and dry, no petechiae   Neurologic:  Alert & oriented, no slurred speech  Psych: Pleasant affect, no hallucinations      EKG      EKG Interpretation  Please see ED physician's note for EKG interpretation        LABS:  Results for orders placed or performed during the hospital encounter of 12/17/20   CBC Auto Differential   Result Value Ref Range    WBC 9.3 4.0 - 10.5 K/CU MM    RBC 4.95 4.6 - 6.2 M/CU MM    Hemoglobin 13.8 13.5 - 18.0 GM/DL    Hematocrit 42.0 42 - 52 %    MCV 84.8 78 - 100 FL    MCH 27.9 27 - 31 PG    MCHC 32.9 32.0 - 36.0 %    RDW 16.3 (H) 11.7 - 14.9 %    Platelets 885 376 - 222 K/CU MM    MPV 8.7 7.5 - 11.1 FL    Differential Type AUTOMATED DIFFERENTIAL     Segs Relative 86.3 (H) 36 - 66 %    Lymphocytes % 2.6 (L) 24 - 44 %    Monocytes % 10.1 (H) 0 - 4 %    Eosinophils % 0.2 0 - 3 %    Basophils % 0.2 0 - 1 %    Segs Absolute 8.0 K/CU MM    Lymphocytes Absolute 0.2 K/CU MM    Monocytes Absolute 0.9 K/CU MM    Eosinophils Absolute 0.0 K/CU MM    Basophils Absolute 0.0 K/CU MM    Nucleated RBC % 0.0 %    Total Nucleated RBC 0.0 K/CU MM    Total Immature Neutrophil 0.06 for EKG reading. Rapid Covid is positive. D-dimer is less than 200. INR is subtherapeutic at 1.51. Troponin negative. CBC within normal limits. CMP with glucose of 262. BNP mildly elevated 300.6. Chest x-ray shows bibasilar scarring/atelectatic changes without confluent airspace opacity. I discussed labs and imaging with patient day. I believe patient require admission due to hypoxia and Covid positive. Patient provided IV Decadron. Consult hospitalist who accepts admission. Clinical  IMPRESSION    1. COVID-19    2. Hypoxia    3. Chest pain, unspecified type        Patient admitted    Comment: Please note this report has been produced using speech recognition software and may contain errors related to that system including errors in grammar, punctuation, and spelling, as well as words and phrases that may be inappropriate. If there are any questions or concerns please feel free to contact the dictating provider for clarification.                             Bharti Roth PA-C  12/17/20 [id number]

## 2020-12-17 NOTE — ED PROVIDER NOTES
As tele physician-in-triage, I performed a medical screening history on this patient with the use of a live telehealth program. Nursing staff may have been used as a surrogate for any part of the physical exam which may be unable to be performed by myself. HISTORY OF PRESENT ILLNESS  Bernie Ferguson is a 65 y.o. male shortness of breath, cough, fatigue. States symptoms started Monday. Reports he was tested for COVID yesterday as his wife has it. States he does not have a result yet. PHYSICAL EXAM  /80   Pulse 113   Temp 98.7 °F (37.1 °C) (Oral)   Resp 19   Ht 6' 5\" (1.956 m)   Wt 270 lb (122.5 kg)   SpO2 92%   BMI 32.02 kg/m²     On exam, the patient appears well-hydrated, well-nourished, and in no acute distress. Speech is clear. Breathing is unlabored. Mental status is normal. The patient moves all extremities, and is without facial droop. Comment: Please note this report has been produced using speech recognition software and may contain errors related to that system including errors in grammar, punctuation, and spelling, as well as words and phrases that may be inappropriate. If there are any questions or concerns please feel free to contact the dictating provider for clarification.       Sully Jaramillo,   12/17/20 5720

## 2020-12-17 NOTE — ED TRIAGE NOTES
Pt to ED with complaints of chest pain and shortness of breath. Pt states he was tested yesterday for COVID.

## 2020-12-17 NOTE — ED PROVIDER NOTES
Did not see patient, interpreting EKG only: The Ekg interpreted by me shows  atrial fibrillation with a rate of 109  Axis is   Normal  QTc is  normal  Intervals and Durations are unremarkable.       ST Segments: no acute change  No significant change from prior EKG dated 11-6-2019           Zee Douglas MD  12/17/20 9639

## 2020-12-18 VITALS
BODY MASS INDEX: 31.88 KG/M2 | OXYGEN SATURATION: 94 % | DIASTOLIC BLOOD PRESSURE: 67 MMHG | WEIGHT: 270 LBS | RESPIRATION RATE: 16 BRPM | TEMPERATURE: 98 F | HEIGHT: 77 IN | HEART RATE: 89 BPM | SYSTOLIC BLOOD PRESSURE: 131 MMHG

## 2020-12-18 PROBLEM — J12.82 PNEUMONIA DUE TO 2019-NCOV: Status: RESOLVED | Noted: 2020-12-17 | Resolved: 2020-12-18

## 2020-12-18 PROBLEM — U07.1 PNEUMONIA DUE TO 2019-NCOV: Status: RESOLVED | Noted: 2020-12-17 | Resolved: 2020-12-18

## 2020-12-18 LAB
ALBUMIN SERPL-MCNC: 3.9 GM/DL (ref 3.4–5)
ALP BLD-CCNC: 47 IU/L (ref 40–129)
ALT SERPL-CCNC: 24 U/L (ref 10–40)
ANION GAP SERPL CALCULATED.3IONS-SCNC: 14 MMOL/L (ref 4–16)
AST SERPL-CCNC: 23 IU/L (ref 15–37)
BASOPHILS ABSOLUTE: 0 K/CU MM
BASOPHILS RELATIVE PERCENT: 0.2 % (ref 0–1)
BILIRUB SERPL-MCNC: 0.4 MG/DL (ref 0–1)
BUN BLDV-MCNC: 18 MG/DL (ref 6–23)
CALCIUM SERPL-MCNC: 9 MG/DL (ref 8.3–10.6)
CHLORIDE BLD-SCNC: 96 MMOL/L (ref 99–110)
CO2: 21 MMOL/L (ref 21–32)
CREAT SERPL-MCNC: 0.9 MG/DL (ref 0.9–1.3)
D DIMER: <200 NG/ML(DDU)
DIFFERENTIAL TYPE: ABNORMAL
EOSINOPHILS ABSOLUTE: 0 K/CU MM
EOSINOPHILS RELATIVE PERCENT: 0 % (ref 0–3)
GFR AFRICAN AMERICAN: >60 ML/MIN/1.73M2
GFR NON-AFRICAN AMERICAN: >60 ML/MIN/1.73M2
GLUCOSE BLD-MCNC: 214 MG/DL (ref 70–99)
GLUCOSE BLD-MCNC: 258 MG/DL (ref 70–99)
GLUCOSE BLD-MCNC: 304 MG/DL (ref 70–99)
HCT VFR BLD CALC: 43 % (ref 42–52)
HEMOGLOBIN: 13.4 GM/DL (ref 13.5–18)
IMMATURE NEUTROPHIL %: 0.5 % (ref 0–0.43)
INR BLD: 1.28 INDEX
LYMPHOCYTES ABSOLUTE: 0.4 K/CU MM
LYMPHOCYTES RELATIVE PERCENT: 6.3 % (ref 24–44)
MCH RBC QN AUTO: 27.7 PG (ref 27–31)
MCHC RBC AUTO-ENTMCNC: 31.2 % (ref 32–36)
MCV RBC AUTO: 88.8 FL (ref 78–100)
MONOCYTES ABSOLUTE: 0.6 K/CU MM
MONOCYTES RELATIVE PERCENT: 9.3 % (ref 0–4)
NUCLEATED RBC %: 0 %
PDW BLD-RTO: 16.1 % (ref 11.7–14.9)
PLATELET # BLD: 173 K/CU MM (ref 140–440)
PMV BLD AUTO: 8.8 FL (ref 7.5–11.1)
POTASSIUM SERPL-SCNC: 4.7 MMOL/L (ref 3.5–5.1)
PROCALCITONIN: 0.08
PROTHROMBIN TIME: 15.5 SECONDS (ref 11.7–14.5)
RBC # BLD: 4.84 M/CU MM (ref 4.6–6.2)
SEGMENTED NEUTROPHILS ABSOLUTE COUNT: 4.9 K/CU MM
SEGMENTED NEUTROPHILS RELATIVE PERCENT: 83.7 % (ref 36–66)
SODIUM BLD-SCNC: 131 MMOL/L (ref 135–145)
TOTAL IMMATURE NEUTOROPHIL: 0.03 K/CU MM
TOTAL NUCLEATED RBC: 0 K/CU MM
TOTAL PROTEIN: 6.4 GM/DL (ref 6.4–8.2)
WBC # BLD: 5.9 K/CU MM (ref 4–10.5)

## 2020-12-18 PROCEDURE — 94761 N-INVAS EAR/PLS OXIMETRY MLT: CPT

## 2020-12-18 PROCEDURE — 6360000002 HC RX W HCPCS: Performed by: INTERNAL MEDICINE

## 2020-12-18 PROCEDURE — 85379 FIBRIN DEGRADATION QUANT: CPT

## 2020-12-18 PROCEDURE — 80053 COMPREHEN METABOLIC PANEL: CPT

## 2020-12-18 PROCEDURE — 6370000000 HC RX 637 (ALT 250 FOR IP): Performed by: INTERNAL MEDICINE

## 2020-12-18 PROCEDURE — 93010 ELECTROCARDIOGRAM REPORT: CPT | Performed by: INTERNAL MEDICINE

## 2020-12-18 PROCEDURE — 85610 PROTHROMBIN TIME: CPT

## 2020-12-18 PROCEDURE — 84145 PROCALCITONIN (PCT): CPT

## 2020-12-18 PROCEDURE — 94640 AIRWAY INHALATION TREATMENT: CPT

## 2020-12-18 PROCEDURE — 85025 COMPLETE CBC W/AUTO DIFF WBC: CPT

## 2020-12-18 PROCEDURE — 82962 GLUCOSE BLOOD TEST: CPT

## 2020-12-18 RX ORDER — AZITHROMYCIN 250 MG/1
250 TABLET, FILM COATED ORAL SEE ADMIN INSTRUCTIONS
Qty: 6 TABLET | Refills: 0 | Status: ON HOLD | OUTPATIENT
Start: 2020-12-18 | End: 2020-12-28 | Stop reason: HOSPADM

## 2020-12-18 RX ADMIN — POTASSIUM CHLORIDE 10 MEQ: 1500 TABLET, EXTENDED RELEASE ORAL at 09:50

## 2020-12-18 RX ADMIN — TIOTROPIUM BROMIDE INHALATION SPRAY 2 PUFF: 3.12 SPRAY, METERED RESPIRATORY (INHALATION) at 10:04

## 2020-12-18 RX ADMIN — ENOXAPARIN SODIUM 30 MG: 30 INJECTION SUBCUTANEOUS at 09:50

## 2020-12-18 RX ADMIN — FUROSEMIDE 20 MG: 20 TABLET ORAL at 09:49

## 2020-12-18 RX ADMIN — MONTELUKAST 10 MG: 10 TABLET, FILM COATED ORAL at 09:50

## 2020-12-18 RX ADMIN — BUDESONIDE AND FORMOTEROL FUMARATE DIHYDRATE 2 PUFF: 80; 4.5 AEROSOL RESPIRATORY (INHALATION) at 10:05

## 2020-12-18 RX ADMIN — PANTOPRAZOLE SODIUM 40 MG: 40 TABLET, DELAYED RELEASE ORAL at 06:39

## 2020-12-18 RX ADMIN — DILTIAZEM HYDROCHLORIDE 180 MG: 180 CAPSULE, COATED, EXTENDED RELEASE ORAL at 09:49

## 2020-12-18 RX ADMIN — DEXAMETHASONE 6 MG: 4 TABLET ORAL at 09:49

## 2020-12-18 RX ADMIN — ALLOPURINOL 100 MG: 100 TABLET ORAL at 09:49

## 2020-12-18 NOTE — DISCHARGE SUMMARY
Discharge Summary    Name:  Joe Dee /Age/Sex: 1950  (65 y.o. male)   MRN & CSN:  [id number] & [id number] Admission Date/Time: 2020  2:30 PM   Attending:  Adela Parker MD Discharging Physician: Adela Parker MD     Hospital Course:   66 y/o with PMH of COPD that presented with SOB, found to be COVID positive. Patient was initially on 2L NC in ED but was then taken off prior to transport to the medical floor. Patient remained off oxygen overnight and was saturating in mid to high 90's. Due to being on room air patient did not qualify for CP or Remdesivir treatment. Patient was discharged with his usual inhalers and instructed to continue his steroid taper prescribed by his PCP. A script for a Z-pack was also given to the patient as well.    66 y/o M with a PMH of COPD, DM, permanent afib, GERD, Gout,  insomnia that is presenting with shortness of breath.      Acute Hypoxemic Respiratory Failure  COVID Positive  History of COPD with likely exacerbation  - Resolved, on room air doing well  - d/c with Prednisone taper and Z-pack  - f/u PCP     Hyponatremia  - Given IVF overnight  - f/u PCP     Permanent Atrial Fibrillation  - Continue home meds and Coumadin     Gout  - Continue Allopurinol     DM  - SSI with hypoglycemia protocol     GERD  - PPI daily     Insomnia  - Continue Trazodone        The patient expressed appropriate understanding of and agreement with the discharge recommendations, medications, and plan.      Consults this admission:  IP CONSULT TO HOSPITALIST  IP CONSULT TO PHARMACY  IP CONSULT TO PHARMACY    Discharge Instruction:   Follow up appointments: PCP  Primary care physician:  within 2 weeks    Diet:  diabetic diet   Activity: activity as tolerated  Disposition: Discharged to:   [x]Home, []C, []SNF, []Acute Rehab, []Hospice   Condition on discharge: Stable    Discharge Medications:      Merilee Jaksch \"Ramy\"   Home Medication Instructions [facility]:[id number]    Printed on:12/18/20 1104   Medication Information                      albuterol sulfate HFA (PROVENTIL HFA) 108 (90 Base) MCG/ACT inhaler  Inhale 2 puffs into the lungs every 6 hours as needed for Wheezing             allopurinol (ZYLOPRIM) 100 MG tablet  Take 1 tablet by mouth daily             azithromycin (ZITHROMAX) 250 MG tablet  Take 1 tablet by mouth See Admin Instructions for 5 days 500mg on day 1 followed by 250mg on days 2 - 5             calcium-vitamin D (OSCAL-500) 500-200 MG-UNIT per tablet  Take 1 tablet by mouth daily. digoxin (DIGOX) 250 MCG tablet  Take 1 tablet by mouth daily Take 1 tablet by mouth  daily             dilTIAZem (CARTIA XT) 180 MG extended release capsule  Take 1 capsule by mouth daily             DULERA 100-5 MCG/ACT inhaler  Inhale 2 puffs into the lungs 2 times daily             fluticasone-salmeterol (ADVAIR DISKUS) 500-50 MCG/DOSE diskus inhaler  Inhale 1 puff into the lungs 2 times daily Rinse mouth after use. furosemide (LASIX) 20 MG tablet  Take 1 tablet by mouth daily             metFORMIN (GLUCOPHAGE) 500 MG tablet  Take 1 tablet by mouth daily (with breakfast)             montelukast (SINGULAIR) 10 MG tablet  Take 1 tablet by mouth daily             omeprazole (PRILOSEC) 20 MG delayed release capsule  Take 1 capsule by mouth daily             potassium chloride (KLOR-CON M) 10 MEQ extended release tablet  Take 1 tablet by mouth daily             predniSONE (DELTASONE) 5 MG tablet  Take 6 tablets by mouth on day 1, 5 on day 2, 4 on day 3, 3 on day 4, 2 on day 5, 1 on day 6.              Spacer/Aero-Holding Chambers ALICE  1 Device by Does not apply route daily as needed (use with albuterol rescue inhaler)             tiotropium (SPIRIVA HANDIHALER) 18 MCG inhalation capsule  Inhale 1 capsule into the lungs daily             traZODone (DESYREL) 50 MG tablet  Take 2 tablets by mouth nightly             warfarin (COUMADIN) 1 MG tablet  Take 3.5 tablets by mouth daily             warfarin (COUMADIN) 3 MG tablet  Take daily                 Objective Findings at Discharge:   /67   Pulse 89   Temp 98 °F (36.7 °C) (Oral)   Resp 16   Ht 6' 5\" (1.956 m)   Wt 270 lb (122.5 kg)   SpO2 94%   BMI 32.02 kg/m²            GEN    Awake male, sitting upright in bed in no apparent distress. Appears given age. EYES   Pupils are equally round. No scleral erythema, discharge, or conjunctivitis. HENT  Mucous membranes are moist.   NECK  Supple, no apparent thyromegaly or masses. RESP  Clear to auscultation, no wheezes, rales or rhonchi. Symmetric chest movement while on 2L NC  CARDIO/VASC           S1/S2 auscultated. Regular rate without appreciable murmurs, rubs, or gallops. No JVD. Peripheral pulses equal bilaterally and palpable. No peripheral edema. GI        Abdomen is soft without significant tenderness, masses, or guarding.        No costovertebral angle tenderness. Normal appearing external genitalia. HEME/LYMPH             No petechiae or ecchymoses. MSK    No gross joint deformities. SKIN    Normal coloration, warm, dry. NEURO           Cranial nerves appear grossly intact, normal speech  PSYCH            Awake, alert, oriented x 4. Affect appropriate. BMP/CBC  Recent Labs     12/17/20  1441   *   K 4.6   CL 94*   CO2 23   BUN 18   CREATININE 1.1   WBC 9.3   HCT 42.0          IMAGING:  CXR:  Bibasilar scarring/atelectatic changes without confluent airspace opacity   seen.  Minimal blunting of the left costophrenic angle, which may reflect   trace pleural effusion or scarring.        Discharge Time of 35 minutes    Electronically signed by Price Hidalgo MD on 12/18/2020 at 11:04 AM

## 2020-12-18 NOTE — PROGRESS NOTES
[facility] CLINICAL PHARMACY NOTE: MEDS TO [address] Select Patient?: No  Total # of Prescriptions Filled: 1   The following medications were delivered to the patient:   azithromycin 250 MG tablet     Total # of Interventions Completed: 1  Time Spent (min): 15    Additional Documentation:

## 2020-12-18 NOTE — CARE COORDINATION
Pt has met initial interqual clinical criteria for inpatient admission for Pneumonia positive COVID-19.  DACIA,RN/CM

## 2020-12-18 NOTE — CARE COORDINATION
Reviewed chart , attempted to call in room but phone not working. Pt's nurse Rachelle Mineret in room and spoke with pt for CM . Pt lives with his wife, he is totally independent. Wife will assist him as needed. He has a PCP and insurance that covers medications. Declined HC. Plan is home today with no needs.

## 2020-12-18 NOTE — CONSULTS
Pharmacy Consult for Remdesivir Initiation per Dr. Red Bruce for Use:  Covid (+) - Yes (12/17/2020)  Requiring supplemental oxygen - No (patient needed O2 prior to admission with EMS, but has had O2 saturations >92% while at [facility]; patient has NOT had supplemental oxygen thus far while in the ED). Requiring high-flow oxygen (>15L/min), non-invasive (BiPAP), or invasive mechanical ventilation - No  Use of 1 pressor or less - Yes  EGFR >30mL/min - Yes  ALT <5x ULN - Yes    Recommendations are consistent with the [facility] criteria for use published on the Hub with consideration from multiple clinical trials. https://hub.[facility]. org/sites/Quality/COVID-19/Clinical%20Care/Pharmaceutical/[facility]%20COVID%2019%20Treatment%20Summary%20-%20Hub%20Edition. pdf      Not requiring supplemental oxygen: \"Remdesivir is not recommended in patients not requiring supplemental oxygen. Hospitalized patients not requiring supplemental oxygen did not achieve a faster recovery rate when compared to placebo. Recommendation per [facility] Clinical Guideline is not to initiate therapy at this time. \"     Based on the above criteria, the patient is NOT a candidate for remdesivir therapy. RENAL LAB EVALUATION  Estimated Creatinine Clearance: 88 mL/min (based on SCr of 1.1 mg/dL).   Recent Labs     12/17/20  1441   BUN 18   CREATININE 1.1       LIVER FUNCTION LAB EVALUATION  Recent Labs     12/17/20  1441 12/17/20  1445   AST 28  --    ALT 29  --    ALKPHOS 50  --    BILITOT 0.5  --    INR  --  1.51       PLATELETS  Platelets   Date Value Ref Range Status   12/17/2020 204 140 - 440 K/CU MM Final   11/17/2020 217 135 - 450 K/uL Final   08/17/2020 191 135 - 450 K/uL Final   05/29/2020 232 140 - 440 K/CU MM Final   01/06/2020 209 135 - 450 K/uL Final         Thank you for the consult,  Jairo Montano, Rudolph, ADITYA, BCPS  12/17/2020   7:50 PM

## 2020-12-19 ENCOUNTER — CARE COORDINATION (OUTPATIENT)
Dept: CASE MANAGEMENT | Age: 70
End: 2020-12-19

## 2020-12-19 LAB
COMMENT: ABNORMAL
SARS-COV-2 RNA, RT PCR: DETECTED
SOURCE: ABNORMAL

## 2020-12-19 NOTE — CARE COORDINATION
[facility] Transitions Initial Follow Up Call    Call within 2 business days of discharge: Yes    Patient: Amarilys Lopez Patient : 1950   MRN: <[id number]>  Reason for Admission:   Discharge Date: 20 RARS: Readmission Risk Score: 14      Last Discharge [facility]       Complaint Diagnosis Description Type Department Provider    20 Chest Pain; Shortness of Breath COVID-19 . .. ED to Hosp-Admission (Discharged) (ADMITTED) [facility] Jere Young MD           Non-face-to-face services provided:  Obtained and reviewed discharge summary and/or continuity of care documents      Challenges to be reviewed by the provider   Additional needs identified to be addressed with provider No  none    Discussed COVID-19 related testing which was available at this time. Test results were positive. Patient informed of results, if available? Yes         Method of communication with provider : none    Advance Care Planning:   Does patient have an Advance Directive:  reviewed and current. Was this a readmission? No  Patient stated reason for admission: CP, SOB  Patients top risk factors for readmission: medical condition    Care Transition Nurse (CTN) contacted the patient by telephone to perform post hospital discharge assessment. Verified name and  with patient as identifiers. Provided introduction to self, and explanation of the CTN role. CTN reviewed discharge instructions, medical action plan and red flags with patient who verbalized understanding. Patient given an opportunity to ask questions and does not have any further questions or concerns at this time. Were discharge instructions available to patient? Yes. Reviewed appropriate site of care based on symptoms and resources available to patient including: When to call 911. The patient agrees to contact the PCP office for questions related to their healthcare.      Medication reconciliation was performed with patient, who verbalizes

## 2020-12-21 ENCOUNTER — HOSPITAL ENCOUNTER (INPATIENT)
Age: 70
LOS: 6 days | Discharge: HOME | DRG: 177 | End: 2020-12-28
Attending: INTERNAL MEDICINE | Admitting: INTERNAL MEDICINE
Payer: MEDICARE

## 2020-12-21 PROCEDURE — 36415 COLL VENOUS BLD VENIPUNCTURE: CPT

## 2020-12-21 PROCEDURE — 96374 THER/PROPH/DIAG INJ IV PUSH: CPT

## 2020-12-21 PROCEDURE — 83605 ASSAY OF LACTIC ACID: CPT

## 2020-12-21 PROCEDURE — 80053 COMPREHEN METABOLIC PANEL: CPT

## 2020-12-21 PROCEDURE — 85379 FIBRIN DEGRADATION QUANT: CPT

## 2020-12-21 PROCEDURE — 93005 ELECTROCARDIOGRAM TRACING: CPT | Performed by: PHYSICIAN ASSISTANT

## 2020-12-21 PROCEDURE — 85025 COMPLETE CBC W/AUTO DIFF WBC: CPT

## 2020-12-21 PROCEDURE — 99284 EMERGENCY DEPT VISIT MOD MDM: CPT

## 2020-12-21 RX ORDER — PREDNISONE 1 MG/1
TABLET ORAL
Qty: 36 TABLET | Refills: 0 | Status: ON HOLD
Start: 2020-12-21 | End: 2020-12-28 | Stop reason: HOSPADM

## 2020-12-21 NOTE — PROGRESS NOTES
Patient notified, f/u Covid test scheduled 12/22/20 @ 10 am. Home O2 eval is scheduled 12/29/20. Per scheduling, patient has to have a negative covid test prior to have the home O2 eval done.

## 2020-12-22 ENCOUNTER — APPOINTMENT (OUTPATIENT)
Dept: CT IMAGING | Age: 70
DRG: 177 | End: 2020-12-22
Payer: MEDICARE

## 2020-12-22 ENCOUNTER — APPOINTMENT (OUTPATIENT)
Dept: GENERAL RADIOLOGY | Age: 70
DRG: 177 | End: 2020-12-22
Payer: MEDICARE

## 2020-12-22 PROBLEM — J96.00 ACUTE RESPIRATORY FAILURE (HCC): Status: ACTIVE | Noted: 2020-12-22

## 2020-12-22 PROBLEM — I51.3 ATRIAL THROMBUS: Status: ACTIVE | Noted: 2020-12-22

## 2020-12-22 LAB
ABO/RH: NORMAL
ALBUMIN SERPL-MCNC: 3.6 GM/DL (ref 3.4–5)
ALBUMIN SERPL-MCNC: 3.6 GM/DL (ref 3.4–5)
ALP BLD-CCNC: 43 IU/L (ref 40–129)
ALP BLD-CCNC: 43 IU/L (ref 40–129)
ALT SERPL-CCNC: 19 U/L (ref 10–40)
ALT SERPL-CCNC: 21 U/L (ref 10–40)
ANION GAP SERPL CALCULATED.3IONS-SCNC: 12 MMOL/L (ref 4–16)
ANION GAP SERPL CALCULATED.3IONS-SCNC: 14 MMOL/L (ref 4–16)
ANTIBODY SCREEN: NEGATIVE
APTT: 41 SECONDS (ref 25.1–37.1)
AST SERPL-CCNC: 17 IU/L (ref 15–37)
AST SERPL-CCNC: 19 IU/L (ref 15–37)
BASOPHILS ABSOLUTE: 0 K/CU MM
BASOPHILS ABSOLUTE: 0 K/CU MM
BASOPHILS RELATIVE PERCENT: 0.3 % (ref 0–1)
BASOPHILS RELATIVE PERCENT: 0.3 % (ref 0–1)
BILIRUB SERPL-MCNC: 0.7 MG/DL (ref 0–1)
BILIRUB SERPL-MCNC: 0.7 MG/DL (ref 0–1)
BUN BLDV-MCNC: 19 MG/DL (ref 6–23)
BUN BLDV-MCNC: 21 MG/DL (ref 6–23)
CALCIUM SERPL-MCNC: 8.2 MG/DL (ref 8.3–10.6)
CALCIUM SERPL-MCNC: 8.8 MG/DL (ref 8.3–10.6)
CHLORIDE BLD-SCNC: 94 MMOL/L (ref 99–110)
CHLORIDE BLD-SCNC: 95 MMOL/L (ref 99–110)
CO2: 23 MMOL/L (ref 21–32)
CO2: 25 MMOL/L (ref 21–32)
CREAT SERPL-MCNC: 0.9 MG/DL (ref 0.9–1.3)
CREAT SERPL-MCNC: 1 MG/DL (ref 0.9–1.3)
D DIMER: 264 NG/ML(DDU)
D DIMER: 272 NG/ML(DDU)
DIFFERENTIAL TYPE: ABNORMAL
DIFFERENTIAL TYPE: ABNORMAL
EKG ATRIAL RATE: 141 BPM
EKG ATRIAL RATE: 44 BPM
EKG DIAGNOSIS: NORMAL
EKG DIAGNOSIS: NORMAL
EKG Q-T INTERVAL: 316 MS
EKG Q-T INTERVAL: 350 MS
EKG QRS DURATION: 80 MS
EKG QRS DURATION: 94 MS
EKG QTC CALCULATION (BAZETT): 401 MS
EKG QTC CALCULATION (BAZETT): 425 MS
EKG R AXIS: 62 DEGREES
EKG R AXIS: 71 DEGREES
EKG T AXIS: 33 DEGREES
EKG T AXIS: 39 DEGREES
EKG VENTRICULAR RATE: 109 BPM
EKG VENTRICULAR RATE: 79 BPM
EOSINOPHILS ABSOLUTE: 0 K/CU MM
EOSINOPHILS ABSOLUTE: 0 K/CU MM
EOSINOPHILS RELATIVE PERCENT: 0 % (ref 0–3)
EOSINOPHILS RELATIVE PERCENT: 0.1 % (ref 0–3)
FERRITIN: 281 NG/ML (ref 30–400)
FIBRINOGEN LEVEL: 492 MG/DL (ref 196.9–442.1)
GFR AFRICAN AMERICAN: >60 ML/MIN/1.73M2
GFR AFRICAN AMERICAN: >60 ML/MIN/1.73M2
GFR NON-AFRICAN AMERICAN: >60 ML/MIN/1.73M2
GFR NON-AFRICAN AMERICAN: >60 ML/MIN/1.73M2
GLUCOSE BLD-MCNC: 133 MG/DL (ref 70–99)
GLUCOSE BLD-MCNC: 272 MG/DL (ref 70–99)
GLUCOSE BLD-MCNC: 278 MG/DL (ref 70–99)
GLUCOSE BLD-MCNC: 292 MG/DL (ref 70–99)
GLUCOSE BLD-MCNC: 306 MG/DL (ref 70–99)
GLUCOSE BLD-MCNC: 332 MG/DL (ref 70–99)
HCT VFR BLD CALC: 42.6 % (ref 42–52)
HCT VFR BLD CALC: 44.6 % (ref 42–52)
HEMOGLOBIN: 13.8 GM/DL (ref 13.5–18)
HEMOGLOBIN: 14.4 GM/DL (ref 13.5–18)
HIGH SENSITIVE C-REACTIVE PROTEIN: 65.6 MG/L
IMMATURE NEUTROPHIL %: 1.7 % (ref 0–0.43)
IMMATURE NEUTROPHIL %: 2 % (ref 0–0.43)
INR BLD: 1.39 INDEX
LACTATE DEHYDROGENASE: 264 IU/L (ref 120–246)
LACTATE: 2.7 MMOL/L (ref 0.4–2)
LACTATE: 3.9 MMOL/L (ref 0.4–2)
LYMPHOCYTES ABSOLUTE: 0.4 K/CU MM
LYMPHOCYTES ABSOLUTE: 0.8 K/CU MM
LYMPHOCYTES RELATIVE PERCENT: 5.4 % (ref 24–44)
LYMPHOCYTES RELATIVE PERCENT: 8.4 % (ref 24–44)
MCH RBC QN AUTO: 28 PG (ref 27–31)
MCH RBC QN AUTO: 28.2 PG (ref 27–31)
MCHC RBC AUTO-ENTMCNC: 32.3 % (ref 32–36)
MCHC RBC AUTO-ENTMCNC: 32.4 % (ref 32–36)
MCV RBC AUTO: 86.4 FL (ref 78–100)
MCV RBC AUTO: 87.5 FL (ref 78–100)
MONOCYTES ABSOLUTE: 0.3 K/CU MM
MONOCYTES ABSOLUTE: 0.8 K/CU MM
MONOCYTES RELATIVE PERCENT: 3.9 % (ref 0–4)
MONOCYTES RELATIVE PERCENT: 8.7 % (ref 0–4)
NUCLEATED RBC %: 0 %
NUCLEATED RBC %: 0 %
PDW BLD-RTO: 16.2 % (ref 11.7–14.9)
PDW BLD-RTO: 16.3 % (ref 11.7–14.9)
PLATELET # BLD: 161 K/CU MM (ref 140–440)
PLATELET # BLD: 161 K/CU MM (ref 140–440)
PMV BLD AUTO: 8.9 FL (ref 7.5–11.1)
PMV BLD AUTO: 9.7 FL (ref 7.5–11.1)
POTASSIUM SERPL-SCNC: 4.1 MMOL/L (ref 3.5–5.1)
POTASSIUM SERPL-SCNC: 4.1 MMOL/L (ref 3.5–5.1)
PROCALCITONIN: 0.09
PROTHROMBIN TIME: 16.9 SECONDS (ref 11.7–14.5)
RBC # BLD: 4.93 M/CU MM (ref 4.6–6.2)
RBC # BLD: 5.1 M/CU MM (ref 4.6–6.2)
SEGMENTED NEUTROPHILS ABSOLUTE COUNT: 6.4 K/CU MM
SEGMENTED NEUTROPHILS ABSOLUTE COUNT: 7.4 K/CU MM
SEGMENTED NEUTROPHILS RELATIVE PERCENT: 80.5 % (ref 36–66)
SEGMENTED NEUTROPHILS RELATIVE PERCENT: 88.7 % (ref 36–66)
SODIUM BLD-SCNC: 131 MMOL/L (ref 135–145)
SODIUM BLD-SCNC: 132 MMOL/L (ref 135–145)
TOTAL IMMATURE NEUTOROPHIL: 0.12 K/CU MM
TOTAL IMMATURE NEUTOROPHIL: 0.18 K/CU MM
TOTAL NUCLEATED RBC: 0 K/CU MM
TOTAL NUCLEATED RBC: 0 K/CU MM
TOTAL PROTEIN: 6.1 GM/DL (ref 6.4–8.2)
TOTAL PROTEIN: 6.8 GM/DL (ref 6.4–8.2)
WBC # BLD: 7.2 K/CU MM (ref 4–10.5)
WBC # BLD: 9.2 K/CU MM (ref 4–10.5)

## 2020-12-22 PROCEDURE — 2060000000 HC ICU INTERMEDIATE R&B

## 2020-12-22 PROCEDURE — 86900 BLOOD TYPING SEROLOGIC ABO: CPT

## 2020-12-22 PROCEDURE — 2580000003 HC RX 258: Performed by: HOSPITALIST

## 2020-12-22 PROCEDURE — 6360000002 HC RX W HCPCS: Performed by: INTERNAL MEDICINE

## 2020-12-22 PROCEDURE — 87081 CULTURE SCREEN ONLY: CPT

## 2020-12-22 PROCEDURE — 86141 C-REACTIVE PROTEIN HS: CPT

## 2020-12-22 PROCEDURE — 94761 N-INVAS EAR/PLS OXIMETRY MLT: CPT

## 2020-12-22 PROCEDURE — 6370000000 HC RX 637 (ALT 250 FOR IP): Performed by: INTERNAL MEDICINE

## 2020-12-22 PROCEDURE — 83605 ASSAY OF LACTIC ACID: CPT

## 2020-12-22 PROCEDURE — 2580000003 HC RX 258

## 2020-12-22 PROCEDURE — 6360000004 HC RX CONTRAST MEDICATION

## 2020-12-22 PROCEDURE — 86850 RBC ANTIBODY SCREEN: CPT

## 2020-12-22 PROCEDURE — 94640 AIRWAY INHALATION TREATMENT: CPT

## 2020-12-22 PROCEDURE — 71045 X-RAY EXAM CHEST 1 VIEW: CPT

## 2020-12-22 PROCEDURE — 82728 ASSAY OF FERRITIN: CPT

## 2020-12-22 PROCEDURE — 2580000003 HC RX 258: Performed by: PHYSICIAN ASSISTANT

## 2020-12-22 PROCEDURE — 6360000002 HC RX W HCPCS: Performed by: PHYSICIAN ASSISTANT

## 2020-12-22 PROCEDURE — 36430 TRANSFUSION BLD/BLD COMPNT: CPT

## 2020-12-22 PROCEDURE — 85610 PROTHROMBIN TIME: CPT

## 2020-12-22 PROCEDURE — 71275 CT ANGIOGRAPHY CHEST: CPT

## 2020-12-22 PROCEDURE — 99223 1ST HOSP IP/OBS HIGH 75: CPT | Performed by: INTERNAL MEDICINE

## 2020-12-22 PROCEDURE — 85025 COMPLETE CBC W/AUTO DIFF WBC: CPT

## 2020-12-22 PROCEDURE — 6370000000 HC RX 637 (ALT 250 FOR IP): Performed by: HOSPITALIST

## 2020-12-22 PROCEDURE — XW033E5 INTRODUCTION OF REMDESIVIR ANTI-INFECTIVE INTO PERIPHERAL VEIN, PERCUTANEOUS APPROACH, NEW TECHNOLOGY GROUP 5: ICD-10-PCS | Performed by: HOSPITALIST

## 2020-12-22 PROCEDURE — 2700000000 HC OXYGEN THERAPY PER DAY

## 2020-12-22 PROCEDURE — 85730 THROMBOPLASTIN TIME PARTIAL: CPT

## 2020-12-22 PROCEDURE — 86901 BLOOD TYPING SEROLOGIC RH(D): CPT

## 2020-12-22 PROCEDURE — 84145 PROCALCITONIN (PCT): CPT

## 2020-12-22 PROCEDURE — 83615 LACTATE (LD) (LDH) ENZYME: CPT

## 2020-12-22 PROCEDURE — XW13325 TRANSFUSION OF CONVALESCENT PLASMA (NONAUTOLOGOUS) INTO PERIPHERAL VEIN, PERCUTANEOUS APPROACH, NEW TECHNOLOGY GROUP 5: ICD-10-PCS | Performed by: INTERNAL MEDICINE

## 2020-12-22 PROCEDURE — 2580000003 HC RX 258: Performed by: INTERNAL MEDICINE

## 2020-12-22 PROCEDURE — 87804 INFLUENZA ASSAY W/OPTIC: CPT

## 2020-12-22 PROCEDURE — 85384 FIBRINOGEN ACTIVITY: CPT

## 2020-12-22 PROCEDURE — 82962 GLUCOSE BLOOD TEST: CPT

## 2020-12-22 PROCEDURE — 93010 ELECTROCARDIOGRAM REPORT: CPT | Performed by: INTERNAL MEDICINE

## 2020-12-22 PROCEDURE — 2500000003 HC RX 250 WO HCPCS: Performed by: HOSPITALIST

## 2020-12-22 RX ORDER — SODIUM CHLORIDE 0.9 % (FLUSH) 0.9 %
10 SYRINGE (ML) INJECTION PRN
Status: DISCONTINUED | OUTPATIENT
Start: 2020-12-22 | End: 2020-12-28 | Stop reason: HOSPADM

## 2020-12-22 RX ORDER — ONDANSETRON 2 MG/ML
4 INJECTION INTRAMUSCULAR; INTRAVENOUS EVERY 6 HOURS PRN
Status: DISCONTINUED | OUTPATIENT
Start: 2020-12-22 | End: 2020-12-28 | Stop reason: HOSPADM

## 2020-12-22 RX ORDER — SODIUM CHLORIDE 9 MG/ML
INJECTION, SOLUTION INTRAVENOUS CONTINUOUS
Status: DISCONTINUED | OUTPATIENT
Start: 2020-12-22 | End: 2020-12-22

## 2020-12-22 RX ORDER — ALBUTEROL SULFATE 90 UG/1
2 AEROSOL, METERED RESPIRATORY (INHALATION) EVERY 6 HOURS PRN
Status: DISCONTINUED | OUTPATIENT
Start: 2020-12-22 | End: 2020-12-22

## 2020-12-22 RX ORDER — DEXAMETHASONE 4 MG/1
6 TABLET ORAL DAILY
Status: DISCONTINUED | OUTPATIENT
Start: 2020-12-22 | End: 2020-12-28 | Stop reason: HOSPADM

## 2020-12-22 RX ORDER — DEXAMETHASONE SODIUM PHOSPHATE 10 MG/ML
4 INJECTION, SOLUTION INTRAMUSCULAR; INTRAVENOUS EVERY 6 HOURS
Status: DISCONTINUED | OUTPATIENT
Start: 2020-12-22 | End: 2020-12-22

## 2020-12-22 RX ORDER — 0.9 % SODIUM CHLORIDE 0.9 %
20 INTRAVENOUS SOLUTION INTRAVENOUS ONCE
Status: DISCONTINUED | OUTPATIENT
Start: 2020-12-22 | End: 2020-12-28 | Stop reason: HOSPADM

## 2020-12-22 RX ORDER — ALBUTEROL SULFATE 90 UG/1
2 AEROSOL, METERED RESPIRATORY (INHALATION)
Status: DISCONTINUED | OUTPATIENT
Start: 2020-12-22 | End: 2020-12-28 | Stop reason: HOSPADM

## 2020-12-22 RX ORDER — TRAZODONE HYDROCHLORIDE 50 MG/1
100 TABLET ORAL NIGHTLY
Status: DISCONTINUED | OUTPATIENT
Start: 2020-12-22 | End: 2020-12-28 | Stop reason: HOSPADM

## 2020-12-22 RX ORDER — PANTOPRAZOLE SODIUM 40 MG/1
40 TABLET, DELAYED RELEASE ORAL
Status: DISCONTINUED | OUTPATIENT
Start: 2020-12-22 | End: 2020-12-28 | Stop reason: HOSPADM

## 2020-12-22 RX ORDER — BUDESONIDE AND FORMOTEROL FUMARATE DIHYDRATE 80; 4.5 UG/1; UG/1
2 AEROSOL RESPIRATORY (INHALATION) 2 TIMES DAILY
Status: DISCONTINUED | OUTPATIENT
Start: 2020-12-22 | End: 2020-12-28 | Stop reason: HOSPADM

## 2020-12-22 RX ORDER — SODIUM CHLORIDE 0.9 % (FLUSH) 0.9 %
10 SYRINGE (ML) INJECTION EVERY 12 HOURS SCHEDULED
Status: DISCONTINUED | OUTPATIENT
Start: 2020-12-22 | End: 2020-12-28 | Stop reason: HOSPADM

## 2020-12-22 RX ORDER — FUROSEMIDE 20 MG/1
20 TABLET ORAL DAILY
Status: DISCONTINUED | OUTPATIENT
Start: 2020-12-22 | End: 2020-12-28 | Stop reason: HOSPADM

## 2020-12-22 RX ORDER — FAMOTIDINE 20 MG/1
20 TABLET, FILM COATED ORAL 2 TIMES DAILY
Status: CANCELLED | OUTPATIENT
Start: 2020-12-22

## 2020-12-22 RX ORDER — ACETAMINOPHEN 325 MG/1
650 TABLET ORAL EVERY 6 HOURS PRN
Status: DISCONTINUED | OUTPATIENT
Start: 2020-12-22 | End: 2020-12-28 | Stop reason: HOSPADM

## 2020-12-22 RX ORDER — SODIUM CHLORIDE 0.9 % (FLUSH) 0.9 %
10 SYRINGE (ML) INJECTION 2 TIMES DAILY
Status: DISCONTINUED | OUTPATIENT
Start: 2020-12-22 | End: 2020-12-22

## 2020-12-22 RX ORDER — POLYETHYLENE GLYCOL 3350 17 G/17G
17 POWDER, FOR SOLUTION ORAL DAILY PRN
Status: DISCONTINUED | OUTPATIENT
Start: 2020-12-22 | End: 2020-12-28 | Stop reason: HOSPADM

## 2020-12-22 RX ORDER — ALLOPURINOL 100 MG/1
100 TABLET ORAL DAILY
Status: DISCONTINUED | OUTPATIENT
Start: 2020-12-22 | End: 2020-12-28 | Stop reason: HOSPADM

## 2020-12-22 RX ORDER — DILTIAZEM HYDROCHLORIDE 180 MG/1
180 CAPSULE, COATED, EXTENDED RELEASE ORAL DAILY
Status: DISCONTINUED | OUTPATIENT
Start: 2020-12-22 | End: 2020-12-28 | Stop reason: HOSPADM

## 2020-12-22 RX ORDER — ACETAMINOPHEN 650 MG/1
650 SUPPOSITORY RECTAL EVERY 6 HOURS PRN
Status: DISCONTINUED | OUTPATIENT
Start: 2020-12-22 | End: 2020-12-28 | Stop reason: HOSPADM

## 2020-12-22 RX ORDER — MONTELUKAST SODIUM 10 MG/1
10 TABLET ORAL DAILY
Status: DISCONTINUED | OUTPATIENT
Start: 2020-12-22 | End: 2020-12-28 | Stop reason: HOSPADM

## 2020-12-22 RX ORDER — 0.9 % SODIUM CHLORIDE 0.9 %
30 INTRAVENOUS SOLUTION INTRAVENOUS PRN
Status: ACTIVE | OUTPATIENT
Start: 2020-12-22 | End: 2020-12-27

## 2020-12-22 RX ORDER — 0.9 % SODIUM CHLORIDE 0.9 %
500 INTRAVENOUS SOLUTION INTRAVENOUS ONCE
Status: COMPLETED | OUTPATIENT
Start: 2020-12-22 | End: 2020-12-22

## 2020-12-22 RX ORDER — DIGOXIN 250 MCG
250 TABLET ORAL DAILY
Status: DISCONTINUED | OUTPATIENT
Start: 2020-12-22 | End: 2020-12-28 | Stop reason: HOSPADM

## 2020-12-22 RX ORDER — PROMETHAZINE HYDROCHLORIDE 25 MG/1
12.5 TABLET ORAL EVERY 6 HOURS PRN
Status: DISCONTINUED | OUTPATIENT
Start: 2020-12-22 | End: 2020-12-28 | Stop reason: HOSPADM

## 2020-12-22 RX ADMIN — ALBUTEROL SULFATE 2 PUFF: 90 AEROSOL, METERED RESPIRATORY (INHALATION) at 15:47

## 2020-12-22 RX ADMIN — ALLOPURINOL 100 MG: 100 TABLET ORAL at 11:26

## 2020-12-22 RX ADMIN — VANCOMYCIN HYDROCHLORIDE 2000 MG: 1 INJECTION, POWDER, LYOPHILIZED, FOR SOLUTION INTRAVENOUS at 08:21

## 2020-12-22 RX ADMIN — DEXAMETHASONE SODIUM PHOSPHATE 4 MG: 10 INJECTION, SOLUTION INTRAMUSCULAR; INTRAVENOUS at 01:00

## 2020-12-22 RX ADMIN — IOPAMIDOL 100 ML: 755 INJECTION, SOLUTION INTRAVENOUS at 02:40

## 2020-12-22 RX ADMIN — BUDESONIDE AND FORMOTEROL FUMARATE DIHYDRATE 2 PUFF: 80; 4.5 AEROSOL RESPIRATORY (INHALATION) at 19:58

## 2020-12-22 RX ADMIN — SODIUM CHLORIDE 200 MG: 9 INJECTION, SOLUTION INTRAVENOUS at 16:51

## 2020-12-22 RX ADMIN — SODIUM CHLORIDE, PRESERVATIVE FREE 10 ML: 5 INJECTION INTRAVENOUS at 03:04

## 2020-12-22 RX ADMIN — INSULIN LISPRO 8 UNITS: 100 INJECTION, SOLUTION INTRAVENOUS; SUBCUTANEOUS at 17:00

## 2020-12-22 RX ADMIN — FUROSEMIDE 20 MG: 20 TABLET ORAL at 11:25

## 2020-12-22 RX ADMIN — OYSTER SHELL CALCIUM WITH VITAMIN D 1 TABLET: 500; 200 TABLET, FILM COATED ORAL at 16:52

## 2020-12-22 RX ADMIN — ENOXAPARIN SODIUM 120 MG: 120 INJECTION SUBCUTANEOUS at 05:50

## 2020-12-22 RX ADMIN — TIOTROPIUM BROMIDE INHALATION SPRAY 2 PUFF: 3.12 SPRAY, METERED RESPIRATORY (INHALATION) at 08:47

## 2020-12-22 RX ADMIN — CEFEPIME 2 G: 2 INJECTION, POWDER, FOR SOLUTION INTRAVENOUS at 16:51

## 2020-12-22 RX ADMIN — DEXAMETHASONE 6 MG: 4 TABLET ORAL at 11:26

## 2020-12-22 RX ADMIN — DIGOXIN 250 MCG: 250 TABLET ORAL at 11:26

## 2020-12-22 RX ADMIN — ALBUTEROL SULFATE 2 PUFF: 90 AEROSOL, METERED RESPIRATORY (INHALATION) at 08:45

## 2020-12-22 RX ADMIN — SODIUM CHLORIDE 500 ML: 9 INJECTION, SOLUTION INTRAVENOUS at 04:00

## 2020-12-22 RX ADMIN — CEFEPIME 2 G: 2 INJECTION, POWDER, FOR SOLUTION INTRAVENOUS at 11:25

## 2020-12-22 RX ADMIN — ENOXAPARIN SODIUM 120 MG: 120 INJECTION SUBCUTANEOUS at 16:52

## 2020-12-22 RX ADMIN — BUDESONIDE AND FORMOTEROL FUMARATE DIHYDRATE 2 PUFF: 80; 4.5 AEROSOL RESPIRATORY (INHALATION) at 08:46

## 2020-12-22 RX ADMIN — DILTIAZEM HYDROCHLORIDE 180 MG: 180 CAPSULE, COATED, EXTENDED RELEASE ORAL at 11:25

## 2020-12-22 RX ADMIN — SODIUM CHLORIDE: 9 INJECTION, SOLUTION INTRAVENOUS at 04:30

## 2020-12-22 RX ADMIN — TRAZODONE HYDROCHLORIDE 100 MG: 50 TABLET ORAL at 20:56

## 2020-12-22 RX ADMIN — PANTOPRAZOLE SODIUM 40 MG: 40 TABLET, DELAYED RELEASE ORAL at 11:26

## 2020-12-22 RX ADMIN — MONTELUKAST 10 MG: 10 TABLET, FILM COATED ORAL at 11:26

## 2020-12-22 RX ADMIN — VANCOMYCIN HYDROCHLORIDE 1500 MG: 5 INJECTION, POWDER, LYOPHILIZED, FOR SOLUTION INTRAVENOUS at 20:55

## 2020-12-22 RX ADMIN — ALBUTEROL SULFATE 2 PUFF: 90 AEROSOL, METERED RESPIRATORY (INHALATION) at 19:58

## 2020-12-22 ASSESSMENT — PAIN SCALES - GENERAL
PAINLEVEL_OUTOF10: 0

## 2020-12-22 NOTE — PROGRESS NOTES
CLINICAL PHARMACY NOTE: [address] Select Patient?: No  Total # of Prescriptions Filled: 1   The following medications were delivered to the patient:  · Prednisone 5mg  Total # of Interventions Completed: 0  Time Spent (min): 15    Additional Documentation:

## 2020-12-22 NOTE — ED NOTES
Bed: 02TR-02  Expected date:   Expected time:   Means of arrival:   Comments:  Medic 7 sob covid positive     Diana Trujillo RN  12/21/20 6066

## 2020-12-22 NOTE — PROGRESS NOTES
[address] [facility]  consulted by Dr. Latricia Denis for monitoring and adjustment. Indication for treatment: CAP  Goal trough: 15 mcg/mL    Pertinent Laboratory Values:   Temp Readings from Last 3 Encounters:   12/22/20 97.5 °F (36.4 °C) (Oral)   12/18/20 98 °F (36.7 °C) (Oral)   03/11/20 98.5 °F (36.9 °C)     Recent Labs     12/21/20  2351   WBC 9.2   LACTATE 2.7*     Recent Labs     12/21/20  2351   BUN 21   CREATININE 1.0     Estimated Creatinine Clearance: 94 mL/min (based on SCr of 1 mg/dL). Intake/Output Summary (Last 24 hours) at 12/22/2020 0657  Last data filed at 12/22/2020 0554  Gross per 24 hour   Intake 100 ml   Output --   Net 100 ml       Pertinent Cultures:  Date    Source    Results  12/22   Nasal MRSA Screen  Pending  12/22   Respiratory   Pending  12/22                           Rapid Flu A/B                        Pending  12/16                           COVID-19                              Detected    Assessment:  WBC WNL @ 9.2; Afebrile  Renal function appears stable: SCr = 1, BUN = 21, Incomplete I/O data  Day(s) of therapy: 1  Vancomycin concentration:   12/23 - To be drawn    Plan:  Vancomycin 2,000 mg IV initial dose; Follow with Vancomycin 1,500 mg IV Q 12 Hours  Check Vancomycin trough prior to fourth dose  Pharmacy will continue to monitor patient and adjust therapy as indicated    Hayley 3 12/23/2020 @ 20:30    Thank you for the consult.   Shaylee Moreno [place]   12/22/2020 6:57 AM

## 2020-12-22 NOTE — H&P
History and Physical      Name:  Michelle Goyal /Age/Sex: 1950  (65 y.o. male)   MRN & CSN:  [id number] & [id number] Admission Date/Time: 2020 11:53 PM   Location:  [place] [address] PCP: Monse Cadet MD       Hospital Day: 2    Assessment and Plan:   Michelle Goyal is a 65 y.o.  male  who presents with Acute respiratory failure ([facility] [address]    Acute Hypoxic and Hypercapneic Respiratory Failure:   · due to Covid 19 pneumonia  · Requiring O2 via high flow nasal cannula, not on oxygen at home  · CT chest with patchy areas of groundglass opacities can also order rapid flu  ·  Pulse oximetry continuously. COVID 19 Pneumonia   Presented with worsening shortness of breath, cough   Onset    CT chest as above   Inflammatory markers pending   Started on cefepime and vancomycin   Remdesivir not given because of high oxygen requirement   Convalescent plasma   Dexamethasone   DVT prophylaxis    Left atrial thrombus: On Coumadin for atrial fibrillation. Start therapeutic Lovenox. Cardiology on consult. Paroxysmal atrial fibrillation: Rate controlled. On Cardizem, digoxin. Anticoagulation with Lovenox for now. Hyponatremia: We will monitor. COPD: Not in exacerbation. Continue Spiriva, Advair. Continue Singulair. Diet No diet orders on file   DVT Prophylaxis [x] Lovenox, []  Heparin, [] SCDs, [] Warfarin  [] NOAC     GI Prophylaxis [x] PPI,  [] H2 Blocker,  [] Carafate,  [] Diet/Tube Feeds   Code Status Prior   MDM [] Low, [] Moderate,[x]  High     History of Present Illness:     Chief Complaint: Acute respiratory failure ([facility] [address]  Michelle Goyal is a 65 y.o.  male, with past medical history significant for PAF, DM, GERD,  who presented to the ED from home due worsening shortness of breath. The present condition started on 2020 when he experienced shortness of breath after being exposed to her wife who is Covid positive.   He was admitted to the hospital for 1 day and was given steroid. He was never given convalescent plasma or remdesivir because his oxygen saturation improved. He was discharged on azithromycin and prednisone. Since discharge, his shortness of breath has gotten worse, worse with exertion. He also reported more productive cough. He denied fever or chills. He denied chest pain. The patient was brought to the ED and was subsequently admitted. Ten point ROS reviewed negative, unless as noted above    Objective:   No intake or output data in the 24 hours ending 12/22/20 0500   Vitals:   Vitals:    12/21/20 2350   BP: (!) 160/64   Pulse: 80   Resp: 18   SpO2: 91%     Physical Exam:   GEN Awake male, not in distress  EYES Pupils are equally round. No scleral erythema, discharge, or conjunctivitis. HENT Mucous membranes are moist. Oral pharynx without exudates, no evidence of thrush. NECK Supple, no apparent thyromegaly or masses. RESP Clear to auscultation, no wheezes, rales or rhonchi. Symmetric chest movement while on room air. CARDIO/VASC S1/S2 auscultated. Regular rate and rhythm, No JVD. Peripheral pulses equal bilaterally and palpable. GI Abdomen is soft, no tenderness, masses, or guarding. Bowel sounds present. MSK No gross joint deformities. SKIN Normal coloration, warm, dry. NEURO Cranial nerves appear grossly intact, normal speech, no lateralizing weakness. PSYCH Awake, alert, oriented x 4.     Past Medical History:      Past Medical History:   Diagnosis Date    Aortic stenosis, mild 11/8/2017    By echo 10/2016    Asthma, intrinsic     Atrial fibrillation (HCC)     Cholelithiasis     Dr Cassidy Ferguson evaluating    Colonic polyp 05/2009    Dr Edmundo Llamas- also done 4/16/2012- AND 3/8/17-TUBULAR ADENOMA,-RECHECK 5YRS    COPD (chronic obstructive pulmonary disease) ([address]     noted on PFTs 6/2020    DDD (degenerative disc disease), lumbar     Diabetes mellitus type 2, controlled ([address]     Dr Amber Buckner    Diabetic neuropathy, type II diabetes mellitus ([address]     DECR MONOFIL SENSATION    Dilated cardiomyopathy ([facility] [address] 11/4/2015    GERD (gastroesophageal reflux disease)     Gout     H/O cardiovascular stress test 12/10/2015    lexiscan-normal,EF65%    H/O echocardiogram 06/13/2019    EF 55-60%, Mild AS,  Moderately dilated left and right atrium and right ventricle.  History of cardiovascular stress test 1/8/08 1/8/08- normal exercise performance without angina, the patient was noted to have nonspecific ST and T wave changes in the inferior and lateral leads. The nuclear segment of the report will be dicated seperately.  History of complete electrocardiogram 5/16/11    History of echocardiogram 11/14/14,11/2/11, 5/12/10, 5/20/09, 7/9/02 11/14/14- Four chamber dialtation with mild left ventricular systolic function Mild MR and TR, mild pulmonary HTN  EF 45-50%    History of total right hip arthroplasty     Dr Mary Miranda 9/2018- does get antibiotic prophylaxis w dental work- Dr Karri Jacobson Hx of Doppler echocardiogram 11/06/2018    EF55-60%,moderately to severly dilated left and right atrium and right ventricle,mild to moderate mitral and mild tricuspid regurg, mild PHTN    Hx of echocardiogram 10/26/2016    aortic sclerosis with mild stenosis,moderate left atrial dilation,mild mitral and tricusid regurg,EF55-60%    Hyperlipidemia     IFG (impaired fasting glucose)     Jan 2019- a1c 6.1 but fasting glc 131.     Knee pain, bilateral     Knee pain, left     Long-term (current) use of anticoagulants     Peptic ulcer disease     Pre-op evaluation 9/5/2018    Testosterone deficiency dx'd 9/2015    Ventricular tachycardia ([facility] [address]     mentioned by Dr Kenton Frank 11/2019 note-who monitors him    VHD (valvular heart disease) 11/29/2018    Mild aortic stenosis by echo November 2018     PSHX:  has a past surgical history that includes polypectomy (2/98- jaimie); polypectomy (9/10/02- dr Flakita Escoto); laparoscopy (3/08, dr Gutierrez November); ventral hernia repair medications    Medication Sig Start Date End Date Taking?  Authorizing Provider   predniSONE (DELTASONE) 5 MG tablet Take 8x1d, 7x1d, 6x1d, 5x1d, 4x1d, 3x1d, 2x1d, 1x1d. 12/21/20 12/31/20 Yes Mainor Lara MD   azithromycin (ZITHROMAX) 250 MG tablet Take 1 tablet by mouth See Admin Instructions for 5 days 500mg on day 1 followed by 250mg on days 2 - 5 12/18/20 12/23/20 Yes Eric Oconnell MD   albuterol sulfate HFA (PROVENTIL HFA) 108 (90 Base) MCG/ACT inhaler Inhale 2 puffs into the lungs every 6 hours as needed for Wheezing 12/16/20  Yes Mainor Lara MD   predniSONE (DELTASONE) 5 MG tablet Take 6 tablets by mouth on day 1, 5 on day 2, 4 on day 3, 3 on day 4, 2 on day 5, 1 on day 6. 12/15/20  Yes Mainor Lara MD   traZODone (DESYREL) 50 MG tablet Take 2 tablets by mouth nightly 11/25/20  Yes Mainor Lara MD   metFORMIN (GLUCOPHAGE) 500 MG tablet Take 1 tablet by mouth daily (with breakfast) 11/25/20  Yes Mainor Lara MD   allopurinol (ZYLOPRIM) 100 MG tablet Take 1 tablet by mouth daily 11/25/20  Yes Mainor Lara MD   potassium chloride (KLOR-CON M) 10 MEQ extended release tablet Take 1 tablet by mouth daily 11/25/20  Yes Mainor Lara MD   dilTIAZem (CARTIA XT) 180 MG extended release capsule Take 1 capsule by mouth daily 11/25/20  Yes Mainor Lara MD   warfarin (COUMADIN) 1 MG tablet Take 3.5 tablets by mouth daily 11/25/20 2/23/21 Yes Mainor Lara MD   digoxin ([address] [facility]) 250 MCG tablet Take 1 tablet by mouth daily Take 1 tablet by mouth  daily 11/25/20  Yes Mainor Lara MD   omeprazole (PRILOSEC) 20 MG delayed release capsule Take 1 capsule by mouth daily 11/16/20  Yes Mainor Lara MD   furosemide (LASIX) 20 MG tablet Take 1 tablet by mouth daily 10/5/20 1/3/21 Yes Mainor Lara MD   warfarin (COUMADIN) 3 MG tablet Take daily 10/5/20  Yes Mainor Lara MD   montelukast (SINGULAIR) 10 MG tablet Take 1 tablet by mouth daily 9/22/20 Yes SLAVA Horne CNP   DULERA 100-5 MCG/ACT inhaler Inhale 2 puffs into the lungs 2 times daily 7/28/20  Yes SLAVA Horne CNP   tiotropium (Betty Ny) 18 MCG inhalation capsule Inhale 1 capsule into the lungs daily 7/16/20  Yes SLAVA Horne CNP   Spacer/Aero-Holding Alicia Feeling 1 Device by Does not apply route daily as needed (use with albuterol rescue inhaler) 5/28/20  Yes SLAVA Horne CNP   fluticasone-salmeterol (ADVAIR DISKUS) 500-50 MCG/DOSE diskus inhaler Inhale 1 puff into the lungs 2 times daily Rinse mouth after use. 10/16/19  Yes Deepa Estevez MD   calcium-vitamin D (OSCAL-500) 500-200 MG-UNIT per tablet Take 1 tablet by mouth daily.    Yes Historical Provider, MD     Medications:    dexamethasone  4 mg Intravenous Q6H    sodium chloride flush  10 mL Intravenous BID    sodium chloride  500 mL Intravenous Once    enoxaparin  1 mg/kg Subcutaneous Once      Infusions:    sodium chloride       PRN Meds:      Recent Labs     12/21/20  2351   WBC 9.2   HGB 14.4   HCT 44.6         Recent Labs     12/21/20  2351   *   K 4.1   CL 95*   CO2 25   BUN 21   CREATININE 1.0     Recent Labs     12/21/20  2351   AST 19   ALT 21   BILITOT 0.7   ALKPHOS 43     Imaging reviewed    Electronically signed by Gilford Rivet, MD on 12/22/2020 at 5:00 AM

## 2020-12-22 NOTE — PROGRESS NOTES
Pharmacy Consult for Remdesivir Initiation per Dr. Marisol Mccann for Use:   Covid (+) - Yes   Requiring supplemental oxygen - Yes   Requiring high-flow oxygen (>15L/min), non-invasive (BiPAP), or invasive mechanical ventilation - No   Use of 1 pressor or less - Yes   EGFR >30mL/min - Yes   ALT <5x ULN - Yes    Recommendations are consistent with the [facility] criteria for use published on the Hub with consideration from multiple clinical trials. https://hub.[facility]. org/sites/Quality/COVID-19/Clinical%20Care/Pharmaceutical/[facility]%20COVID%2019%20Treatment%20Summary%20-%20Hub%20Edition. pdf    Based on the above criteria, the patient is a candidate for remdesivir therapy. Remdesivir 200 mg on day 1 followed by 100 mg daily on days 2-5. The following test will also be ordered:   BMP x5 days   CBC x5 days   LFT x5 days    RENAL LAB EVALUATION  Estimated Creatinine Clearance: 104 mL/min (based on SCr of 0.9 mg/dL).   Recent Labs     12/21/20  2351 12/22/20  1240   BUN 21 19   CREATININE 1.0 0.9       LIVER FUNCTION LAB EVALUATION  Recent Labs     12/22/20  1240   AST 17   ALT 19   ALKPHOS 43   BILITOT 0.7   INR 1.39       PLATELETS  Platelets   Date Value Ref Range Status   12/22/2020 161 140 - 440 K/CU MM Final   12/21/2020 161 140 - 440 K/CU MM Final   12/18/2020 173 140 - 440 K/CU MM Final   12/17/2020 204 140 - 440 K/CU MM Final   11/17/2020 217 135 - 450 K/uL Final         Thank you for the consult,  Estefanía Worthington, PharmD, [facility]

## 2020-12-22 NOTE — CONSULTS
CARDIOLOGY CONSULT NOTE   Reason for consultation:  Left atrial thrombus    Referring physician:  Kimo Friend MD     Primary care physician: Daniele Tyson MD      Dear  Dr. Kimo Friend MD   Thanks for the consult. Chief Complaints :  Chief Complaint   Patient presents with    Shortness of Breath     covid + on 12/16, 82 % on arrival by EMS        History of present illness:Og is a 65 y. o.year old who presents with complains of shortness of breath he was diagnosed of Covid pneumonia a week ago but at home noted his oxygen levels were low. His wife is also sick. In the ED he was noted to be 82% on room air CT chest incidentally noted left atrial appendage thrombus. He is on Coumadin for A. fib but INR is subtherapeutic  Confirms that he was not taking Coumadin regularly. Breathing is somewhat better  He does have history of atrial fibrillation he sees Dr. Shyanne Daniels .  EF is normal left ventricular hypertrophy on echo. Aortic valve has mild sclerosis and stenosis    Past medical history:    has a past medical history of Aortic stenosis, mild, Asthma, intrinsic, Atrial fibrillation ([address] Cholelithiasis, Colonic polyp, COPD (chronic obstructive pulmonary disease) ([address] DDD (degenerative disc disease), lumbar, Diabetes mellitus type 2, controlled ([address] Diabetic neuropathy, type II diabetes mellitus ([address] Dilated cardiomyopathy ([address] GERD (gastroesophageal reflux disease), Gout, H/O cardiovascular stress test, H/O echocardiogram, History of cardiovascular stress test, History of complete electrocardiogram, History of echocardiogram, History of total right hip arthroplasty, Hx of Doppler echocardiogram, Hx of echocardiogram, Hyperlipidemia, IFG (impaired fasting glucose), Knee pain, bilateral, Knee pain, left, Long-term (current) use of anticoagulants, Peptic ulcer disease, Pre-op evaluation, Testosterone deficiency, Ventricular tachycardia ([address] and VHD (valvular heart disease).   Past surgical history:   has a past surgical history that includes polypectomy (2/98- jaimie); polypectomy (9/10/02- dr Hardy Blue); laparoscopy (3/08, dr Sulma Chilel); ventral hernia repair (10/08, dr Sulma Chilel); Wrist fracture surgery (Left, 2/6/14); Inguinal hernia repair (Right, 11/2016); and Hip Arthroplasty (Right, 09/12/2018). Social History:   reports that he quit smoking about 7 years ago. His smoking use included cigarettes. He started smoking about 57 years ago. He has a 39.00 pack-year smoking history. He has never used smokeless tobacco. He reports current alcohol use. He reports that he does not use drugs.   Family history:   no family history of CAD, STROKE of DM at early age    Allergies   Allergen Reactions    Lisinopril      Chest tightness    Simvastatin Other (See Comments)     Muscle cramps           sodium chloride flush 0.9 % injection 10 mL, 2 times per day      sodium chloride flush 0.9 % injection 10 mL, PRN      promethazine (PHENERGAN) tablet 12.5 mg, Q6H PRN    Or      ondansetron (ZOFRAN) injection 4 mg, Q6H PRN      polyethylene glycol (GLYCOLAX) packet 17 g, Daily PRN      acetaminophen (TYLENOL) tablet 650 mg, Q6H PRN    Or      acetaminophen (TYLENOL) suppository 650 mg, Q6H PRN      dexamethasone (DECADRON) tablet 6 mg, Daily      0.9 % sodium chloride bolus, Once      cefepime (MAXIPIME) 2 g IVPB minibag, Q8H      vancomycin (VANCOCIN) 1,500 mg in dextrose 5 % 500 mL IVPB, Q12H      allopurinol (ZYLOPRIM) tablet 100 mg, Daily      calcium-vitamin D 500-200 MG-UNIT per tablet 1 tablet, Daily      albuterol sulfate  (90 Base) MCG/ACT inhaler 2 puff, Q6H PRN      digoxin (LANOXIN) tablet 250 mcg, Daily      dilTIAZem (CARDIZEM CD) extended release capsule 180 mg, Daily      budesonide-formoterol (SYMBICORT) 80-4.5 MCG/ACT inhaler 2 puff, BID      furosemide (LASIX) tablet 20 mg, Daily      insulin lispro (HUMALOG) injection vial 0-12 Units, TID WC      insulin lispro 12/22/20 1126    dilTIAZem (CARDIZEM CD) extended release capsule 180 mg  180 mg Oral Daily Julisa Carcamo MD   180 mg at 12/22/20 1125    budesonide-formoterol (SYMBICORT) 80-4.5 MCG/ACT inhaler 2 puff  2 puff Inhalation BID Julisa Carcamo MD   2 puff at 12/22/20 0846    furosemide (LASIX) tablet 20 mg  20 mg Oral Daily Julisa Carcamo MD   20 mg at 12/22/20 1125    insulin lispro (HUMALOG) injection vial 0-12 Units  0-12 Units Subcutaneous TID WC Julisa Carcamo MD        insulin lispro (HUMALOG) injection vial 0-6 Units  0-6 Units Subcutaneous Nightly Julisa Carcamo MD        montelukast (SINGULAIR) tablet 10 mg  10 mg Oral Daily Julisa Carcamo MD   10 mg at 12/22/20 1126    pantoprazole (PROTONIX) tablet 40 mg  40 mg Oral QAM AC Julisa Carcamo MD   40 mg at 12/22/20 1126    tiotropium (SPIRIVA RESPIMAT) 2.5 MCG/ACT inhaler 2 puff  2 puff Inhalation Daily Julisa Carcamo MD   2 puff at 12/22/20 0847    traZODone (DESYREL) tablet 100 mg  100 mg Oral Nightly Julisa Carcamo MD        enoxaparin (LOVENOX) injection 120 mg  1 mg/kg Subcutaneous Q12H Julisa Carcamo MD         Review of Systems:   · Constitutional: No Fever or Weight Loss   · Eyes: No Decreased Vision  · ENT: No Headaches, Hearing Loss or Vertigo  · Cardiovascular: As per HPI  · Respiratory: As per HPI  · Gastrointestinal: No abdominal pain, appetite loss, blood in stools, constipation, diarrhea or heartburn  · Genitourinary: No dysuria, trouble voiding, or hematuria  · Musculoskeletal:  No gait disturbance, weakness or joint complaints  · Integumentary: No rash or pruritis  · Neurological: No TIA or stroke symptoms  · Psychiatric: No anxiety or depression  · Endocrine: No malaise, fatigue or temperature intolerance  · Hematologic/Lymphatic: No bleeding problems, blood clots or swollen lymph nodes  · Allergic/Immunologic: No nasal congestion or hives  All systems negative except as marked.         Physical Examination:    Vitals:    12/22/20 0544 12/22/20 0620 12/22/20 0803 12/22/20 0845   BP:  (!) 154/88 (!) 140/72    Pulse:  69 68    Resp:  22 16 15   Temp: 98.9 °F (37.2 °C) 97.5 °F (36.4 °C) 97.5 °F (36.4 °C)    TempSrc:  Oral Oral    SpO2:  91% 94% 92%   Weight:  253 lb 15.5 oz (115.2 kg)     Height:           General Appearance:  No distress, conversant    Constitutional:  Well developed, Well nourished, No acute distress, Non-toxic appearance. HENT:  Normocephalic, Atraumatic, Bilateral external ears normal, Oropharynx moist, No oral exudates, Nose normal. Neck- Normal range of motion, No tenderness, Supple, No stridor,no apical-carotid delay  Lymphatics : no palpable lymph nodes  Eyes:  PERRL, EOMI, Conjunctiva normal, No discharge. Respiratory:  Normal breath sounds, No respiratory distress, No wheezing, No chest tenderness. ,no use of accessory muscles, crackles Absent   Cardiovascular: (PMI) apex non displaced,no lifts no thrills, ankle swelling Absent  , 1+, s1 and s2 audible,Murmur. present , JVD not noted    Abdomen /GI:  Bowel sounds normal, Soft, No tenderness, No masses, No gross visceromegaly   :  No costovertebral angle tenderness   Musculoskeletal:  No edema, no tenderness, no deformities. Back- no tenderness  Integument:  Well hydrated, no rash   Lymphatic:  No lymphadenopathy noted   Neurologic:  Alert & oriented x 3, CN 2-12 normal, normal motor function, normal sensory function, no focal deficits noted           Medical decision making and Data review:    Lab Review   Recent Labs     12/21/20  2351   WBC 9.2   HGB 14.4   HCT 44.6         Recent Labs     12/21/20  2351   *   K 4.1   CL 95*   CO2 25   BUN 21   CREATININE 1.0     Recent Labs     12/21/20  2351   AST 19   ALT 21   BILITOT 0.7   ALKPHOS 43     No results for input(s): TROPONINT in the last 72 hours. No results for input(s): PROBNP in the last 72 hours.   Lab Results   Component Value Date    INR 1.28 12/18/2020 Tissues/Bones: No acute bone or soft tissue abnormality. 1. There is no evidence for a pulmonary embolism. There is thrombus within the left atrial appendage. Left atrial enlargement is noted. 2. Patchy areas of ground-glass opacities in a peripheral distribution are seen within the bilateral upper lobes and in the right middle lobe with more focal consolidations in the bilateral lower lobes. Findings are concerning for an atypical/multifocal pneumonia. Commonly reported imaging features of COVID-19 pneumonia are present. Other processes such as influenza pneumonia and organizing pneumonia, as can be seen with drug toxicity and connective tissue disease, can cause a similar imaging pattern. PneTyp 3. Small bilateral pleural effusions. Bibasilar atelectasis is noted. 4. Bronchial wall thickening is seen involving the bilateral lower lobes, likely related to acute bronchitis. Findings were discussed with Amadeo Martin at 3:28 am on 12/22/2020. All labs, medications and tests reviewed by myself including data  from outside source , patient and available family . Continue all other medications of all above medical condition listed as is. Impression:  Principal Problem:    Acute respiratory failure (HCC)  Active Problems:    Atrial fibrillation (HCC)    NSVT (nonsustained ventricular tachycardia) (HCC)    Atrial thrombus  Resolved Problems:    * No resolved hospital problems. *      Assessment: 65 y. o.year old with PMH of  has a past medical history of Aortic stenosis, mild, Asthma, intrinsic, Atrial fibrillation ([address] Cholelithiasis, Colonic polyp, COPD (chronic obstructive pulmonary disease) ([address] DDD (degenerative disc disease), lumbar, Diabetes mellitus type 2, controlled ([address] Diabetic neuropathy, type II diabetes mellitus ([address] Dilated cardiomyopathy ([address] GERD (gastroesophageal reflux disease), Gout, H/O cardiovascular stress test, H/O echocardiogram, History of cardiovascular stress test, History of complete electrocardiogram, History of echocardiogram, History of total right hip arthroplasty, Hx of Doppler echocardiogram, Hx of echocardiogram, Hyperlipidemia, IFG (impaired fasting glucose), Knee pain, bilateral, Knee pain, left, Long-term (current) use of anticoagulants, Peptic ulcer disease, Pre-op evaluation, Testosterone deficiency, Ventricular tachycardia ([address] and VHD (valvular heart disease). Plan and Recommendations:    Left atrial thrombus in the setting of subtherapeutic INR not compliant with Coumadin in the setting of Covid pneumonia  Start Lovenox 1 mg/kg twice daily and then he can be switched over to Eliquis 5 mg twice daily at time of discharge  Repeat echo in 6 to 8 weeks  Covid pneumonia treatment as per primary team supportive care  DVT prophylaxis if no contraindication  6. Dyslipidemia: continue statins           Thank you  much for consult and giving us the opportunity in contributing in the care of this patient. Please feel free to call me for any questions.        Triston Allison MD, 12/22/2020 1:45 PM

## 2020-12-22 NOTE — ED PROVIDER NOTES
Triage Chief Complaint:   Shortness of Breath (covid + on 12/16, 82 % on arrival by EMS)    [place]:  Today in the ED I had the pleasure of caring for Sneha Leavitt who is a 65 y.o. male that presents today to the emergency department. Diagnosed with Covid just 1 week ago. Seen here in the emergency department. Admitted to the hospital.  Was discharged. Was doing well at home. However he has noticed that over the last 2 days his oxygen is not been above 90. And he had profound shortness of breath today. So EMS was called. Upon arrival patient was 82% on room air. He is typically does not require oxygen. Denies any chest pain. Has been eating and drinking baseline limiting baseline. Does endorse subjective chills. No fevers however. No flank pain back pain abdominal pain. No generalized or focal weakness. ROS:  REVIEW OF SYSTEMS    At least 10 systems reviewed      All other review of systems are negative  See HPI and nursing notes for additional information       Past Medical History:   Diagnosis Date    Aortic stenosis, mild 11/8/2017    By echo 10/2016    Asthma, intrinsic     Atrial fibrillation ([address]     Cholelithiasis     Dr Franklin Ruiz evaluating    Colonic polyp 05/2009    Dr Dannie Mccabe- also done 4/16/2012- AND 3/8/17-TUBULAR ADENOMA,-RECHECK 5YRS    COPD (chronic obstructive pulmonary disease) ([address]     noted on PFTs 6/2020    DDD (degenerative disc disease), lumbar     Diabetes mellitus type 2, controlled ([address]     Dr Antonina Oakley    Diabetic neuropathy, type II diabetes mellitus ([address]     DECR MONOFIL SENSATION    Dilated cardiomyopathy ([address] 11/4/2015    GERD (gastroesophageal reflux disease)     Gout     H/O cardiovascular stress test 12/10/2015    lexiscan-normal,EF65%    H/O echocardiogram 06/13/2019    EF 55-60%, Mild AS,  Moderately dilated left and right atrium and right ventricle.     History of cardiovascular stress test 1/8/08 1/8/08- normal exercise performance without angina, the patient was noted to have nonspecific ST and T wave changes in the inferior and lateral leads. The nuclear segment of the report will be dicated seperately.  History of complete electrocardiogram 5/16/11    History of echocardiogram 11/14/14,11/2/11, 5/12/10, 5/20/09, 7/9/02 11/14/14- Four chamber dialtation with mild left ventricular systolic function Mild MR and TR, mild pulmonary HTN  EF 45-50%    History of total right hip arthroplasty     Dr Ila Dunbar 9/2018- does get antibiotic prophylaxis w dental work- Dr Meggan Bowen Hx of Doppler echocardiogram 11/06/2018    EF55-60%,moderately to severly dilated left and right atrium and right ventricle,mild to moderate mitral and mild tricuspid regurg, mild PHTN    Hx of echocardiogram 10/26/2016    aortic sclerosis with mild stenosis,moderate left atrial dilation,mild mitral and tricusid regurg,EF55-60%    Hyperlipidemia     IFG (impaired fasting glucose)     Jan 2019- a1c 6.1 but fasting glc 131.     Knee pain, bilateral     Knee pain, left     Long-term (current) use of anticoagulants     Peptic ulcer disease     Pre-op evaluation 9/5/2018    Testosterone deficiency dx'd 9/2015    Ventricular tachycardia ([address]     mentioned by Dr Heydi Fragoso 11/2019 note-who monitors him    VHD (valvular heart disease) 11/29/2018    Mild aortic stenosis by echo November 2018     Past Surgical History:   Procedure Laterality Date    HIP ARTHROPLASTY Right 09/12/2018    Dr Jose D Pabon Right 11/2016    Dr Cunningham Pac  3/08, dr Brandon Segura    lap assisted colon mass resection-benign    POLYPECTOMY  2/98- jaimie    colonic polypectomy    POLYPECTOMY  9/10/02- dr Elena Gerber    colonic polypectomy    VENTRAL HERNIA REPAIR  10/08, dr Burt Flores Left 2/6/14    ORIF Dr Mccormack Rater     Family History   Problem Relation Age of Onset    Hypertension Mother     High Blood Pressure Mother     Other Mother         lung problems    BID Babs Ashley, RN   10 mL at 12/22/20 0304    0.9 % sodium chloride bolus  500 mL Intravenous Once Nickolas Covarrubias PA-C        0.9 % sodium chloride infusion   Intravenous Continuous Nickolas Covarrubias PA-C         Current Outpatient Medications   Medication Sig Dispense Refill    predniSONE (DELTASONE) 5 MG tablet Take 8x1d, 7x1d, 6x1d, 5x1d, 4x1d, 3x1d, 2x1d, 1x1d. 36 tablet 0    azithromycin (ZITHROMAX) 250 MG tablet Take 1 tablet by mouth See Admin Instructions for 5 days 500mg on day 1 followed by 250mg on days 2 - 5 6 tablet 0    albuterol sulfate HFA (PROVENTIL HFA) 108 (90 Base) MCG/ACT inhaler Inhale 2 puffs into the lungs every 6 hours as needed for Wheezing 1 Inhaler 3    predniSONE (DELTASONE) 5 MG tablet Take 6 tablets by mouth on day 1, 5 on day 2, 4 on day 3, 3 on day 4, 2 on day 5, 1 on day 6. 21 tablet 0    traZODone (DESYREL) 50 MG tablet Take 2 tablets by mouth nightly 180 tablet 1    metFORMIN (GLUCOPHAGE) 500 MG tablet Take 1 tablet by mouth daily (with breakfast) 90 tablet 1    allopurinol (ZYLOPRIM) 100 MG tablet Take 1 tablet by mouth daily 90 tablet 1    potassium chloride (KLOR-CON M) 10 MEQ extended release tablet Take 1 tablet by mouth daily 90 tablet 1    dilTIAZem (CARTIA XT) 180 MG extended release capsule Take 1 capsule by mouth daily 180 capsule 1    warfarin (COUMADIN) 1 MG tablet Take 3.5 tablets by mouth daily 315 tablet 1    digoxin (DIGOX) 250 MCG tablet Take 1 tablet by mouth daily Take 1 tablet by mouth  daily 90 tablet 1    omeprazole (PRILOSEC) 20 MG delayed release capsule Take 1 capsule by mouth daily 90 capsule 1    furosemide (LASIX) 20 MG tablet Take 1 tablet by mouth daily 90 tablet 1    warfarin (COUMADIN) 3 MG tablet Take daily 90 tablet 1    montelukast (SINGULAIR) 10 MG tablet Take 1 tablet by mouth daily 90 tablet 3    DULERA 100-5 MCG/ACT inhaler Inhale 2 puffs into the lungs 2 times daily 1 Inhaler 5    tiotropium (SPIRIVA HANDIHALER) 18 MCG inhalation capsule Inhale 1 capsule into the lungs daily 90 capsule 1    Spacer/Aero-Holding Chambers ALICE 1 Device by Does not apply route daily as needed (use with albuterol rescue inhaler) 1 Device 0    fluticasone-salmeterol (ADVAIR DISKUS) 500-50 MCG/DOSE diskus inhaler Inhale 1 puff into the lungs 2 times daily Rinse mouth after use. 1 Inhaler 5    calcium-vitamin D (OSCAL-500) 500-200 MG-UNIT per tablet Take 1 tablet by mouth daily. Allergies   Allergen Reactions    Lisinopril      Chest tightness    Simvastatin Other (See Comments)     Muscle cramps       Nursing Notes Reviewed    Physical Exam:  ED Triage Vitals   Enc Vitals Group      BP 12/21/20 2350 (!) 160/64      Pulse 12/21/20 2350 80      Resp 12/21/20 2350 18      Temp --       Temp src --       SpO2 12/21/20 2350 91 %      Weight 12/21/20 2348 270 lb (122.5 kg)      Height 12/21/20 2348 6' 5\" (1.956 m)      Head Circumference --       Peak Flow --       Pain Score --       Pain Loc --       Pain Edu? --       Excl. in [address]? --      General :Patient is awake alert oriented person place and time no acute distress nontoxic appearing  HEENT: Pupils are equally round and reactive to light extraocular motors are intact conjunctivae clear sclerae white there is no injection no icterus. Nose without any rhinorrhea or epistaxis. Oral mucosa is moist no exudate buccal mucosa shows no ulcerations. Uvula is midline    Neck: Neck is supple full range of motion trachea midline thyroid nonpalpable  Cardiac: Heart regular rate rhythm no murmurs rubs clicks or gallops  Lungs: Lungs are clear to auscultation there is no wheezing rhonchi or rales. There is no use of accessory muscles no nasal flaring identified. Chest wall: There is no tenderness to palpation over the chest wall or over ribs  Abdomen: Abdomen is soft nontender nondistended.  There is no firm or pulsatile masses no rebound rigidity or guarding negative Daly's negative McBurney, no peritoneal signs  Suprapubic:  there is no tenderness to palpation over the external bladder   Musculoskeletal: 5 out of 5 strength in all 4 extremities full flexion extension abduction and adduction supination pronation of all extremities and all digits. No obvious muscle atrophy is noted. No focal muscle deficits are appreciated  Dermatology: Skin is warm and dry there is no obvious abscesses lacerations or lesions noted  Psych: Mentation is grossly normal cognition is grossly normal. Affect is appropriate  Neuro: Motor intact sensory intact cranial nerves II through XII are intact level of consciousness is normal cerebellar function is normal reflexes are grossly normal. No evidence of incontinence or loss of bowel or bladder no saddle anesthesia noted Lymphatic: There is no submandibular or cervical adenopathy appreciated.         I have reviewed and interpreted all of the currently available lab results from this visit (if applicable):  Results for orders placed or performed during the hospital encounter of 12/21/20   CBC auto diff   Result Value Ref Range    WBC 9.2 4.0 - 10.5 K/CU MM    RBC 5.10 4.6 - 6.2 M/CU MM    Hemoglobin 14.4 13.5 - 18.0 GM/DL    Hematocrit 44.6 42 - 52 %    MCV 87.5 78 - 100 FL    MCH 28.2 27 - 31 PG    MCHC 32.3 32.0 - 36.0 %    RDW 16.2 (H) 11.7 - 14.9 %    Platelets 137 958 - 767 K/CU MM    MPV 8.9 7.5 - 11.1 FL    Differential Type AUTOMATED DIFFERENTIAL     Segs Relative 80.5 (H) 36 - 66 %    Lymphocytes % 8.4 (L) 24 - 44 %    Monocytes % 8.7 (H) 0 - 4 %    Eosinophils % 0.1 0 - 3 %    Basophils % 0.3 0 - 1 %    Segs Absolute 7.4 K/CU MM    Lymphocytes Absolute 0.8 K/CU MM    Monocytes Absolute 0.8 K/CU MM    Eosinophils Absolute 0.0 K/CU MM    Basophils Absolute 0.0 K/CU MM    Nucleated RBC % 0.0 %    Total Nucleated RBC 0.0 K/CU MM    Total Immature Neutrophil 0.18 K/CU MM    Immature Neutrophil % 2.0 (H) 0 - 0.43 %   CMP   Result Value Ref Range    Sodium 132 (L) 135 - 145 MMOL/L    Potassium 4.1 3.5 - 5.1 MMOL/L    Chloride 95 (L) 99 - 110 mMol/L    CO2 25 21 - 32 MMOL/L    BUN 21 6 - 23 MG/DL    CREATININE 1.0 0.9 - 1.3 MG/DL    Glucose 133 (H) 70 - 99 MG/DL    Calcium 8.8 8.3 - 10.6 MG/DL    Alb 3.6 3.4 - 5.0 GM/DL    Total Protein 6.8 6.4 - 8.2 GM/DL    Total Bilirubin 0.7 0.0 - 1.0 MG/DL    ALT 21 10 - 40 U/L    AST 19 15 - 37 IU/L    Alkaline Phosphatase 43 40 - 129 IU/L    GFR Non-African American >60 >60 mL/min/1.73m2    GFR African American >60 >60 mL/min/1.73m2    Anion Gap 12 4 - 16   Lactic Acid, Plasma   Result Value Ref Range    Lactate 2.7 (HH) 0.4 - 2.0 mMOL/L   D-Dimer, Quantitative   Result Value Ref Range    D-Dimer, Quant 272 (H) <230 NG/mL(DDU)   EKG 12 Lead   Result Value Ref Range    Ventricular Rate 79 BPM    Atrial Rate 44 BPM    QRS Duration 94 ms    Q-T Interval 350 ms    QTc Calculation (Bazett) 401 ms    R Axis 62 degrees    T Axis 39 degrees    Diagnosis       Atrial fibrillation  Abnormal ECG  When compared with ECG of 17-DEC-2020 13:04,  No significant change was found        Radiographs (if obtained):  [] The following radiograph was interpreted by myself in the absence of a radiologist:   [] Radiologist's Report Reviewed:  CTA PULMONARY W CONTRAST   Preliminary Result   1. There is no evidence for a pulmonary embolism. There is thrombus within   the left atrial appendage. Left atrial enlargement is noted. 2. Patchy areas of ground-glass opacities in a peripheral distribution are   seen within the bilateral upper lobes and in the right middle lobe with more   focal consolidations in the bilateral lower lobes. Findings are concerning   for an atypical/multifocal pneumonia. Commonly reported imaging features of   COVID-19 pneumonia are present. Other processes such as influenza pneumonia   and organizing pneumonia, as can be seen with drug toxicity and connective   tissue disease, can cause a similar imaging pattern. PneTyp   3.  Small bilateral pleural effusions. Bibasilar atelectasis is noted. 4. Bronchial wall thickening is seen involving the bilateral lower lobes,   likely related to acute bronchitis. Findings were discussed with Jimmy Lay at 3:28 am on 12/22/2020. XR CHEST PORTABLE   Final Result   Grossly stable appearance of the lungs compared to the prior exam of December 17, 2020. EKG (if obtained):   Please See Note of attending physician for EKG interpretation. Chart review shows recent radiograph(s):  Xr Shoulder Left (min 2 Views)    Result Date: 12/4/2020  EXAMINATION: THREE XRAY VIEWS OF THE LEFT SHOULDER 12/4/2020 10:06 am COMPARISON: 05/17/2018 HISTORY: ORDERING SYSTEM PROVIDED HISTORY: Pain FINDINGS: Anatomic alignment. No acute fracture. No destructive bony abnormality. Degenerative changes seen in glenohumeral joint. Mild AC joint degenerative changes. 1. Degenerative changes in the [facility] joint and glenohumeral joint 2. No acute bony or joint abnormality     Xr Chest Portable    Result Date: 12/17/2020  EXAMINATION: ONE XRAY VIEW OF THE CHEST 12/17/2020 2:19 pm COMPARISON: 01/04/2017. Correlation was also made to chest CT dated 05/29/2020. HISTORY: ORDERING SYSTEM PROVIDED HISTORY: chest pain TECHNOLOGIST PROVIDED HISTORY: Reason for exam:->chest pain Reason for Exam: chest pain Acuity: Acute Type of Exam: Initial Additional signs and symptoms: na Relevant Medical/Surgical History: diabetes, copd FINDINGS: The cardiac silhouette appears magnified. The aorta is uncoiled. Scarring/atelectatic changes are seen at the lung bases without confluent airspace opacity seen. There is mild blunting of the left costophrenic angle, which may reflect underlying scarring or trace pleural effusion. No perceptible pneumothorax is seen. Bibasilar scarring/atelectatic changes without confluent airspace opacity seen.   Minimal blunting of the left costophrenic angle, which may reflect trace pleural effusion or scarring. MDM:   The dynamically stable patient presents today to the emergency department complaining of shortness of breath. He is noted to be hypoxic. On room air. Vapotherm is provided. Patient is noted lung sounds are clear. He does not seem to be extremely dyspneic on exam.  Labs were ordered including lactic acid was elevated. Repeat lactic pending. No profound leukocytosis or left shift. Metabolic panel is are unremarkable for any acuity require emergent intervention. CT scan of the chest revealed no evidence of pulmonary embolism. There was a left atrial thrombus noted. Patient is on warfarin. PT/INR pending at this time  Total critical care time today provided was at least  40  minutes. This excludes seperately billable procedure. Critical care time provided for Acute respiratory process requiring with hypoxia that required close evaluation and/or intervention with concern for patient decompensation. Did speak to on-call cardiologist Dr. Arnie Coleman who advised Lovenox  On-call physician Was consulted regarding patient. After thorough discussion regarding patient's history, physical exam.  laboratory values, radiographic evidence (if applicable  theymyself as well as my attending physician agreed Given the patient's presenting concerns, medical history and clinical findings, the patient will be admitted at this time to undergo further evaluation and disposition. . During patient's entire stay in the ED patient remained stable and comfortable. Analgesia is well-controlled. Patient will be admitted for all information regarding ongoing management and care of patient please see note of Admitting physician. All EKG interpretations are performed by Attending physician       I independently managed patient today in the ED      BP (!) 160/64   Pulse 80   Resp 18   Ht 6' 5\" (1.956 m)   Wt 270 lb (122.5 kg)   SpO2 91%   BMI 32.02 kg/m²       Clinical Impression:  1.

## 2020-12-22 NOTE — CARE COORDINATION
[address] Transitions Interview     2020    Patient: Ariella Huntley Patient : 1950   MRN: [id number]  Reason for Admission:   SOB/ hypoxia/ COVID-19  RARS      Readmission Risk  Patient Active Problem List   Diagnosis    Atrial fibrillation ([facility]    GERD (gastroesophageal reflux disease)    Asthma    Knee pain, bilateral    DDD (degenerative disc disease), lumbar    Testosterone deficiency    Dilated cardiomyopathy ([facility]    Gout    Hyperbilirubinemia    Mild intermittent asthma without complication    Cholelithiasis    Colonic polyp    Closed fracture of anterior lip of right acetabulum without additional fracture (HCC)    Closed anterior dislocation of left shoulder    VT (ventricular tachycardia) (HCC)    SVT (supraventricular tachycardia) (HCC)    NS[facility]onsustained ventricular [facility]) [facility]    Gait disturbance    Leg weakness, bilateral    Uncontrolled pain    VHD (valvular heart disease)    IFG (impaired fasting glucose)    Diabetes mellitus type 2, controlled ([facility]    Diabetic neuropathy, type II diabetes mellitus ([facility]    COPD (chronic obstructive pulmonary disease) ([facility]    Nonrheumatic aortic valve stenosis    Acute respiratory failure [facility])       Inpatient Assessment  Care Transitions Summary    Care Transitions Inpatient Review  Medication Review  Do you have all of your prescriptions and are they filled?: Yes   Housing Review  Social Support  Durable Medical Equipment  Functional Review  Hearing and Vision  Care Transitions Interventions         Follow Up:  Per chart readmission noted;  SOB/ COVID-19+. CT to follow at discharge if criteria is met.   Future Appointments   Date Time Provider Kaylee Dean   2020 10:10 AM Khanh Lira COVID SCREEN [facility] COV [place]   2020 10:30 AM [facility] PFT 2 [facility] PFT [place]   1/15/2021  9:30 AM Efren Sadler PA-C [facility]   2021  1:15 PM SLAVA Howard - [place] [facility] PULM [facility]   2/25/2021  9:45 AM Roseline Guillermo MD [address] [facility] [place] [address] [facility]   5/12/2021 10:40 AM Mariel Varner MD [address] [facility]       Health [facility]  There are no preventive care reminders to display for this patient.     Severiano Connell RN

## 2020-12-22 NOTE — PROGRESS NOTES
Patient seen and examined, appears comfortable on current 11 liters of [place] oxygen.  Agree with H and Ps assessment and plan- adjusted orders as necessary

## 2020-12-22 NOTE — ED PROVIDER NOTES
EKG Interpretation    EKG performed on 12/21/2020 at 2358    Interpreted by emergency department physician    Rhythm: atrial fibrillation - controlled  Rate: normal  Axis: normal  Ectopy: none  Conduction: normal  ST Segments: normal  T Waves: normal  Q Waves: none    Clinical Impression: Atrial fibrillation. EKG compared to previous EKG performed on 12/17/2020 at which time patient was in atrial fibrillation with rapid ventricular response.      Dion Jerry DO  12/22/20 0217

## 2020-12-22 NOTE — PROGRESS NOTES
Updated son who is also physician-  had concerns about delayed convalescent plasma transfusion, loc- questions answered. Promised to give him daily updates on the progress.

## 2020-12-22 NOTE — ED NOTES
Patients wife Gayle Jolly updated after patients approval. Answered questions and told her which room number he was going to.      Robb Yoder RN  12/22/20 0600

## 2020-12-23 LAB
ALBUMIN SERPL-MCNC: 3.7 GM/DL (ref 3.4–5)
ALP BLD-CCNC: 44 IU/L (ref 40–128)
ALT SERPL-CCNC: 18 U/L (ref 10–40)
ANION GAP SERPL CALCULATED.3IONS-SCNC: 11 MMOL/L (ref 4–16)
APTT: 37 SECONDS (ref 25.1–37.1)
AST SERPL-CCNC: 17 IU/L (ref 15–37)
BASOPHILS ABSOLUTE: 0 K/CU MM
BASOPHILS RELATIVE PERCENT: 0.1 % (ref 0–1)
BILIRUB SERPL-MCNC: 0.7 MG/DL (ref 0–1)
BUN BLDV-MCNC: 25 MG/DL (ref 6–23)
CALCIUM SERPL-MCNC: 8.7 MG/DL (ref 8.3–10.6)
CHLORIDE BLD-SCNC: 98 MMOL/L (ref 99–110)
CO2: 27 MMOL/L (ref 21–32)
COMPONENT: NORMAL
CREAT SERPL-MCNC: 0.9 MG/DL (ref 0.9–1.3)
D DIMER: 249 NG/ML(DDU)
DIFFERENTIAL TYPE: ABNORMAL
DOSE AMOUNT: ABNORMAL
DOSE TIME: ABNORMAL
EOSINOPHILS ABSOLUTE: 0 K/CU MM
EOSINOPHILS RELATIVE PERCENT: 0 % (ref 0–3)
FIBRINOGEN LEVEL: 431 MG/DL (ref 196.9–442.1)
GFR AFRICAN AMERICAN: >60 ML/MIN/1.73M2
GFR NON-AFRICAN AMERICAN: >60 ML/MIN/1.73M2
GLUCOSE BLD-MCNC: 230 MG/DL (ref 70–99)
GLUCOSE BLD-MCNC: 251 MG/DL (ref 70–99)
GLUCOSE BLD-MCNC: 259 MG/DL (ref 70–99)
GLUCOSE BLD-MCNC: 263 MG/DL (ref 70–99)
GLUCOSE BLD-MCNC: 354 MG/DL (ref 70–99)
HCT VFR BLD CALC: 42.1 % (ref 42–52)
HEMOGLOBIN: 13.3 GM/DL (ref 13.5–18)
IMMATURE NEUTROPHIL %: 1.4 % (ref 0–0.43)
INR BLD: 1.38 INDEX
LV EF: 55 %
LVEF MODALITY: NORMAL
LYMPHOCYTES ABSOLUTE: 0.5 K/CU MM
LYMPHOCYTES RELATIVE PERCENT: 5.7 % (ref 24–44)
Lab: 2
MCH RBC QN AUTO: 27.9 PG (ref 27–31)
MCHC RBC AUTO-ENTMCNC: 31.6 % (ref 32–36)
MCV RBC AUTO: 88.3 FL (ref 78–100)
MONOCYTES ABSOLUTE: 0.5 K/CU MM
MONOCYTES RELATIVE PERCENT: 5.7 % (ref 0–4)
NUCLEATED RBC %: 0 %
PDW BLD-RTO: 15.9 % (ref 11.7–14.9)
PLATELET # BLD: 153 K/CU MM (ref 140–440)
PMV BLD AUTO: 9.4 FL (ref 7.5–11.1)
POTASSIUM SERPL-SCNC: 4.8 MMOL/L (ref 3.5–5.1)
PRO-BNP: 552.9 PG/ML
PROCALCITONIN: 0.06
PROTHROMBIN TIME: 16.7 SECONDS (ref 11.7–14.5)
RAPID INFLUENZA  B AGN: NEGATIVE
RAPID INFLUENZA A AGN: NEGATIVE
RBC # BLD: 4.77 M/CU MM (ref 4.6–6.2)
SEGMENTED NEUTROPHILS ABSOLUTE COUNT: 7 K/CU MM
SEGMENTED NEUTROPHILS RELATIVE PERCENT: 87.1 % (ref 36–66)
SODIUM BLD-SCNC: 136 MMOL/L (ref 135–145)
STATUS: NORMAL
STATUS: NORMAL
TOTAL IMMATURE NEUTOROPHIL: 0.11 K/CU MM
TOTAL NUCLEATED RBC: 0 K/CU MM
TOTAL PROTEIN: 6.2 GM/DL (ref 6.4–8.2)
TRANSFUSION STATUS: NORMAL
TRANSFUSION STATUS: NORMAL
UNIT DIVISION: NORMAL
UNIT DIVISION: NORMAL
UNIT NUMBER: NORMAL
UNIT NUMBER: NORMAL
VANCOMYCIN TROUGH: 4.7 UG/ML (ref 10–20)
WBC # BLD: 8.1 K/CU MM (ref 4–10.5)

## 2020-12-23 PROCEDURE — 2500000003 HC RX 250 WO HCPCS: Performed by: HOSPITALIST

## 2020-12-23 PROCEDURE — 2580000003 HC RX 258: Performed by: HOSPITALIST

## 2020-12-23 PROCEDURE — 93306 TTE W/DOPPLER COMPLETE: CPT

## 2020-12-23 PROCEDURE — 85384 FIBRINOGEN ACTIVITY: CPT

## 2020-12-23 PROCEDURE — 99233 SBSQ HOSP IP/OBS HIGH 50: CPT | Performed by: INTERNAL MEDICINE

## 2020-12-23 PROCEDURE — 6360000002 HC RX W HCPCS: Performed by: INTERNAL MEDICINE

## 2020-12-23 PROCEDURE — 6370000000 HC RX 637 (ALT 250 FOR IP): Performed by: INTERNAL MEDICINE

## 2020-12-23 PROCEDURE — 85379 FIBRIN DEGRADATION QUANT: CPT

## 2020-12-23 PROCEDURE — 94761 N-INVAS EAR/PLS OXIMETRY MLT: CPT

## 2020-12-23 PROCEDURE — 2060000000 HC ICU INTERMEDIATE R&B

## 2020-12-23 PROCEDURE — 94640 AIRWAY INHALATION TREATMENT: CPT

## 2020-12-23 PROCEDURE — 80053 COMPREHEN METABOLIC PANEL: CPT

## 2020-12-23 PROCEDURE — 2700000000 HC OXYGEN THERAPY PER DAY

## 2020-12-23 PROCEDURE — 82962 GLUCOSE BLOOD TEST: CPT

## 2020-12-23 PROCEDURE — 83880 ASSAY OF NATRIURETIC PEPTIDE: CPT

## 2020-12-23 PROCEDURE — 86141 C-REACTIVE PROTEIN HS: CPT

## 2020-12-23 PROCEDURE — 85610 PROTHROMBIN TIME: CPT

## 2020-12-23 PROCEDURE — 2580000003 HC RX 258: Performed by: INTERNAL MEDICINE

## 2020-12-23 PROCEDURE — 85730 THROMBOPLASTIN TIME PARTIAL: CPT

## 2020-12-23 PROCEDURE — 84145 PROCALCITONIN (PCT): CPT

## 2020-12-23 PROCEDURE — 85025 COMPLETE CBC W/AUTO DIFF WBC: CPT

## 2020-12-23 PROCEDURE — 80202 ASSAY OF VANCOMYCIN: CPT

## 2020-12-23 RX ORDER — OYSTER SHELL CALCIUM WITH VITAMIN D 500; 200 MG/1; [IU]/1
1 TABLET, FILM COATED ORAL DAILY
Status: DISCONTINUED | OUTPATIENT
Start: 2020-12-23 | End: 2020-12-28 | Stop reason: HOSPADM

## 2020-12-23 RX ADMIN — ALLOPURINOL 100 MG: 100 TABLET ORAL at 08:48

## 2020-12-23 RX ADMIN — DILTIAZEM HYDROCHLORIDE 180 MG: 180 CAPSULE, COATED, EXTENDED RELEASE ORAL at 08:48

## 2020-12-23 RX ADMIN — SODIUM CHLORIDE, PRESERVATIVE FREE 10 ML: 5 INJECTION INTRAVENOUS at 08:50

## 2020-12-23 RX ADMIN — CEFEPIME 2 G: 2 INJECTION, POWDER, FOR SOLUTION INTRAVENOUS at 08:51

## 2020-12-23 RX ADMIN — ENOXAPARIN SODIUM 120 MG: 120 INJECTION SUBCUTANEOUS at 16:37

## 2020-12-23 RX ADMIN — CEFEPIME 2 G: 2 INJECTION, POWDER, FOR SOLUTION INTRAVENOUS at 00:00

## 2020-12-23 RX ADMIN — BUDESONIDE AND FORMOTEROL FUMARATE DIHYDRATE 2 PUFF: 80; 4.5 AEROSOL RESPIRATORY (INHALATION) at 22:30

## 2020-12-23 RX ADMIN — DIGOXIN 250 MCG: 250 TABLET ORAL at 08:50

## 2020-12-23 RX ADMIN — ALBUTEROL SULFATE 2 PUFF: 90 AEROSOL, METERED RESPIRATORY (INHALATION) at 22:30

## 2020-12-23 RX ADMIN — VANCOMYCIN HYDROCHLORIDE 1500 MG: 5 INJECTION, POWDER, LYOPHILIZED, FOR SOLUTION INTRAVENOUS at 20:57

## 2020-12-23 RX ADMIN — ALBUTEROL SULFATE 2 PUFF: 90 AEROSOL, METERED RESPIRATORY (INHALATION) at 07:34

## 2020-12-23 RX ADMIN — CEFEPIME 2 G: 2 INJECTION, POWDER, FOR SOLUTION INTRAVENOUS at 16:32

## 2020-12-23 RX ADMIN — DEXAMETHASONE 6 MG: 4 TABLET ORAL at 08:49

## 2020-12-23 RX ADMIN — INSULIN LISPRO 6 UNITS: 100 INJECTION, SOLUTION INTRAVENOUS; SUBCUTANEOUS at 18:16

## 2020-12-23 RX ADMIN — FUROSEMIDE 20 MG: 20 TABLET ORAL at 08:50

## 2020-12-23 RX ADMIN — ALBUTEROL SULFATE 2 PUFF: 90 AEROSOL, METERED RESPIRATORY (INHALATION) at 15:19

## 2020-12-23 RX ADMIN — TRAZODONE HYDROCHLORIDE 100 MG: 50 TABLET ORAL at 20:56

## 2020-12-23 RX ADMIN — OYSTER SHELL CALCIUM WITH VITAMIN D 1 TABLET: 500; 200 TABLET, FILM COATED ORAL at 16:31

## 2020-12-23 RX ADMIN — SODIUM CHLORIDE 100 MG: 9 INJECTION, SOLUTION INTRAVENOUS at 13:31

## 2020-12-23 RX ADMIN — PANTOPRAZOLE SODIUM 40 MG: 40 TABLET, DELAYED RELEASE ORAL at 06:40

## 2020-12-23 RX ADMIN — ENOXAPARIN SODIUM 120 MG: 120 INJECTION SUBCUTANEOUS at 06:00

## 2020-12-23 RX ADMIN — INSULIN LISPRO 10 UNITS: 100 INJECTION, SOLUTION INTRAVENOUS; SUBCUTANEOUS at 12:29

## 2020-12-23 RX ADMIN — SODIUM CHLORIDE, PRESERVATIVE FREE 10 ML: 5 INJECTION INTRAVENOUS at 20:56

## 2020-12-23 RX ADMIN — INSULIN LISPRO 6 UNITS: 100 INJECTION, SOLUTION INTRAVENOUS; SUBCUTANEOUS at 08:44

## 2020-12-23 RX ADMIN — VANCOMYCIN HYDROCHLORIDE 1500 MG: 5 INJECTION, POWDER, LYOPHILIZED, FOR SOLUTION INTRAVENOUS at 09:09

## 2020-12-23 RX ADMIN — TIOTROPIUM BROMIDE INHALATION SPRAY 2 PUFF: 3.12 SPRAY, METERED RESPIRATORY (INHALATION) at 07:36

## 2020-12-23 RX ADMIN — BUDESONIDE AND FORMOTEROL FUMARATE DIHYDRATE 2 PUFF: 80; 4.5 AEROSOL RESPIRATORY (INHALATION) at 07:38

## 2020-12-23 ASSESSMENT — PAIN SCALES - GENERAL
PAINLEVEL_OUTOF10: 0

## 2020-12-23 NOTE — PROGRESS NOTES
[address] [place] [address]  HOSPITALIST PROGRESS NOTE                       Name:  Mindy Waite /Age/Sex: 1950  (65 y.o. male)   MRN & CSN:  [id number] & [id number] Admission Date/Time: 2020 11:53 PM   Location:  -A Attending:  Sylvain Trevizo MD                                                  HPI  Mindy Waite is a 65 y.o. male who presents with acute respiratory failure    SUBJECTIVE  On 13 liters of oxygen when seen, subjectively denies shortness of breath/fever/chills. 10 point review of systems reviewed and negative unless noted above. ALLERGIES:   Allergies   Allergen Reactions    Lisinopril      Chest tightness    Simvastatin Other (See Comments)     Muscle cramps       PCP: Felizardo Boas, MD    PAST MEDICAL HISTORY, SURGICAL HISTORY, SOCIAL HISTORY and  HOME MEDICATIONS all reviewed. OBJECTIVE  Vitals:    20 0738 20 0924 20 0935 20 0938   BP:  (!) 145/73     Pulse:  82 81 82   Resp: 24 18 14 17   Temp:  98 °F (36.7 °C)     TempSrc:  Oral     SpO2: 90% 94% 94% 93%   Weight:       Height:           PHYSICAL EXAM   GEN Awake male, sitting upright in bed in no apparent distress. EYES Pupils are equally round. No scleral erythema, discharge, or conjunctivitis. HENT Mucous membranes are moist. Oral pharynx without exudates, no evidence of thrush. NECK Supple, no apparent thyromegaly or masses. RESP Unlabored respiration, bilateral coarse breath sounds  CARDIO/VASC S1/S2 auscultated. Regular rate without appreciable murmurs, rubs, or gallops. No JVD or carotid bruits. Peripheral pulses equal bilaterally and palpable. No peripheral edema. GI Abdomen is soft without significant tenderness, masses, or guarding. Bowel sounds are normoactive. Rectal exam deferred.  No costovertebral angle tenderness. Normal appearing external genitalia. HEME/LYMPH No palpable cervical lymphadenopathy and no hepatosplenomegaly.  No petechiae or ecchymoses. MSK Spontaneous movement of all extremities. No gross joint deformities. SKIN Normal coloration, warm, dry. NEURO Cranial nerves appear grossly intact, normal speech, no lateralizing weakness. PSYCH Awake, alert, oriented x 4. Affect appropriate. INTAKE: In: 650 [P.O.:300; Blood:300]  Out: 800   OUTPUT: In: 650   Out: 800 [Urine:800]    LABS  Recent Labs     12/21/20  2351 12/22/20  1240 12/23/20  0405   WBC 9.2 7.2 8.1   HGB 14.4 13.8 13.3*   HCT 44.6 42.6 42.1    161 153      Recent Labs     12/21/20  2351 12/22/20  1240 12/23/20  0405   * 131* 136   K 4.1 4.1 4.8   CL 95* 94* 98*   CO2 25 23 27   BUN 21 19 25*   CREATININE 1.0 0.9 0.9     Recent Labs     12/21/20  2351 12/22/20  1240 12/23/20  0405   AST 19 17 17   ALT 21 19 18   BILITOT 0.7 0.7 0.7   ALKPHOS 43 43 44     Recent Labs     12/22/20  1240 12/23/20  0405   INR 1.39 1.38     No results for input(s): CKTOTAL, CKMB, CKMBINDEX, TROPONINT in the last 72 hours.        Abnormal labs for today noted      Imaging:     ECHO:    Microbiology:  Blood culture:    Urine culture:    Sputum culture:    Procedures done this admission:    MEDS  Scheduled Meds:   sodium chloride flush  10 mL Intravenous 2 times per day    dexamethasone  6 mg Oral Daily    sodium chloride  20 mL Intravenous Once    cefepime  2 g Intravenous Q8H    vancomycin  1,500 mg Intravenous Q12H    allopurinol  100 mg Oral Daily    calcium-vitamin D  1 tablet Oral Daily    digoxin  250 mcg Oral Daily    dilTIAZem  180 mg Oral Daily    budesonide-formoterol  2 puff Inhalation BID    furosemide  20 mg Oral Daily    insulin lispro  0-12 Units Subcutaneous TID WC    insulin lispro  0-6 Units Subcutaneous Nightly    montelukast  10 mg Oral Daily    pantoprazole  40 mg Oral QAM AC    tiotropium  2 puff Inhalation Daily    traZODone  100 mg Oral Nightly    enoxaparin  1 mg/kg Subcutaneous Q12H    remdesivir IVPB  100 mg Intravenous Q24H    albuterol sulfate HFA  2 puff Inhalation Q6H WA     Continuous Infusions:  PRN Meds:sodium chloride flush, promethazine **OR** ondansetron, polyethylene glycol, acetaminophen **OR** acetaminophen, sodium chloride        ASSESSMENT and [facility] Day: 3     1-Acute hypoxic respiratory failure due to covid pneumonia with superimposed bacterail pneumonia  -Covid positive on 12/17  -received plasma  -started remdesevir on 12/22, discussed proning daily   -on decadron and also empiric IV abx while monitoring inflammatory markers  -check BNP and echo as well  -ID consulted. 2-Hyponatremia improved      Other issues as below     Left atrial thrombus: On Coumadin for atrial fibrillation. Started therapeutic Lovenox. Cardiology on consult. Will likely switch to eliquis     Paroxysmal atrial fibrillation: Rate controlled. On Cardizem, digoxin. Anticoagulation with Lovenox for now.     Hyponatremia: We will monitor.     COPD: Not in exacerbation. Continue Spiriva, Advair.   Continue Singulair        Updated son Erlinda Longoria over the phone today         Disp:     Diet DIET LOW SODIUM 2 GM;   DVT Prophylaxis [] Lovenox, []  Heparin, [] SCDs, [] Ambulation   GI Prophylaxis [] PPI,  [] H2 Blocker,  [] Carafate,  [] Diet/Tube Feeds   Code Status Full Code   Disposition Patient requires continued admission due to acute respiratory failure   CMS Level of Risk [] Low, [] Moderate,[x]  High  Patient's risk as above due to acute respiratory failure     YENNY SMITH MD 12/23/2020 10:22 AM

## 2020-12-23 NOTE — CARE COORDINATION
.CM called pt for d/c planning. Introduced self and explained reason for call. Pt lives with spouse and is independent with ADL's. Pt drives, has a PCP, has insurance, and is able to afford  medication. Pt denies having any DME or services. [facility] offered and pt refused. Pt denies any needs at this time. D/c plan is home with spouse, no needs. Notify CM if any d/c needs arise.   TE

## 2020-12-23 NOTE — PROGRESS NOTES
Cardiology Progress Note     Admit Date:  12/21/2020    Consult reason/ Seen today for :   Left atrial thrombus     Subjective and  Overnight Events : Breathing is better although still requiring oxygen, he tells me that he was taking Coumadin regularly      Chief complain on admission : 65 y. o.year old who is admitted for  Chief Complaint   Patient presents with    Shortness of Breath     covid + on 12/16, 82 % on arrival by EMS      Assessment / Plan:  Left atrial thrombus in setting of subtherapeutic INR but he says he was taking Coumadin regularly  Start Eliquis twice daily at discharge  Continue Lovenox while he is in the hospital  Discontinue Coumadin  Covid pneumonia treatment as per primary team  Discussed plan of anticoagulation with patient he is in agreement with switching to Eliquis  HTN: stable, continue To titrate up medication as needed  DVT Prophylaxis if no contraindication  Follow-up with primary cardiologist  Will need repeat imaging either CT scan or TIM eventually in 6 to 8 weeks    Past medical history:    has a past medical history of Aortic stenosis, mild, Asthma, intrinsic, Atrial fibrillation ([address] Cholelithiasis, Colonic polyp, COPD (chronic obstructive pulmonary disease) ([address] DDD (degenerative disc disease), lumbar, Diabetes mellitus type 2, controlled ([address] Diabetic neuropathy, type II diabetes mellitus ([address] Dilated cardiomyopathy ([address] GERD (gastroesophageal reflux disease), Gout, H/O cardiovascular stress test, H/O echocardiogram, History of cardiovascular stress test, History of complete electrocardiogram, History of echocardiogram, History of total right hip arthroplasty, Hx of Doppler echocardiogram, Hx of echocardiogram, Hyperlipidemia, IFG (impaired fasting glucose), Knee pain, bilateral, Knee pain, left, Long-term (current) use of anticoagulants, Peptic ulcer disease, Pre-op evaluation, Testosterone deficiency, Ventricular tachycardia ([facility] and VHD (valvular heart disease). Past surgical history:   has a past surgical history that includes polypectomy (2/98- jaimie); polypectomy (9/10/02- dr Clarice Clarke); laparoscopy (3/08, dr Ana Garrison); ventral hernia repair (10/08, dr Ana Garrison); Wrist fracture surgery (Left, 2/6/14); Inguinal hernia repair (Right, 11/2016); and Hip Arthroplasty (Right, 09/12/2018). Social History:   reports that he quit smoking about 7 years ago. His smoking use included cigarettes. He started smoking about 57 years ago. He has a 39.00 pack-year smoking history. He has never used smokeless tobacco. He reports current alcohol use. He reports that he does not use drugs. Family history:  family history includes Diabetes in his father; High Blood Pressure in his mother; Hypertension in his mother; Hypotension in his father; Other in his father and mother. Allergies   Allergen Reactions    Lisinopril      Chest tightness    Simvastatin Other (See Comments)     Muscle cramps       Review of Systems:    All 14 systems were reviewed and are negative  Except for the positive findings  which as documented     BP (!) 145/73   Pulse 82   Temp 98 °F (36.7 °C) (Oral)   Resp 17   Ht 6' 5\" (1.956 m)   Wt 253 lb 15.5 oz (115.2 kg)   SpO2 93%   BMI 30.12 kg/m²       Intake/Output Summary (Last 24 hours) at 12/23/2020 1107  Last data filed at 12/23/2020 6460  Gross per 24 hour   Intake 650 ml   Output 800 ml   Net -150 ml     Physical Exam:  Constitutional:  Well developed, Well nourished, No acute distress, Non-toxic appearance. HENT:  Normocephalic, Atraumatic, Bilateral external ears normal, Oropharynx moist, No oral exudates, Nose normal. Neck- Normal range of motion, No tenderness, Supple, No stridor. Eyes:  PERRL, EOMI, Conjunctiva normal, No discharge. Respiratory:  Normal breath sounds, No respiratory distress, No wheezing, No chest tenderness.    Cardiovascular:  Normal heart rate, Normal rhythm, No murmurs, No rubs, No gallops, JVP not elevated  Abdomen/GI:  Bowel sounds normal, Soft, No tenderness, No masses, No pulsatile masses. Musculoskeletal:  Intact distal pulses, No edema, No tenderness, No cyanosis, No clubbing. Good range of motion in all major joints. No tenderness to palpation or major deformities noted. Back- No tenderness. Integument:  Warm, Dry, No erythema, No rash. Lymphatic:  No lymphadenopathy noted. Neurologic:  Alert & oriented x 3, Normal motor function, Normal sensory function, No focal deficits noted.    Psychiatric:  Affect  and  Mood :no change    Medications:    sodium chloride flush  10 mL Intravenous 2 times per day    dexamethasone  6 mg Oral Daily    sodium chloride  20 mL Intravenous Once    cefepime  2 g Intravenous Q8H    vancomycin  1,500 mg Intravenous Q12H    allopurinol  100 mg Oral Daily    calcium-vitamin D  1 tablet Oral Daily    digoxin  250 mcg Oral Daily    dilTIAZem  180 mg Oral Daily    budesonide-formoterol  2 puff Inhalation BID    furosemide  20 mg Oral Daily    insulin lispro  0-12 Units Subcutaneous TID WC    insulin lispro  0-6 Units Subcutaneous Nightly    montelukast  10 mg Oral Daily    pantoprazole  40 mg Oral QAM AC    tiotropium  2 puff Inhalation Daily    traZODone  100 mg Oral Nightly    enoxaparin  1 mg/kg Subcutaneous Q12H    remdesivir IVPB  100 mg Intravenous Q24H    albuterol sulfate HFA  2 puff Inhalation Q6H WA       sodium chloride flush, promethazine **OR** ondansetron, polyethylene glycol, acetaminophen **OR** acetaminophen, sodium chloride    Lab Data:  CBC:   Recent Labs     12/21/20  2351 12/22/20  1240 12/23/20  0405   WBC 9.2 7.2 8.1   HGB 14.4 13.8 13.3*   HCT 44.6 42.6 42.1   MCV 87.5 86.4 88.3    161 153     BMP:   Recent Labs     12/21/20  2351 12/22/20  1240 12/23/20  0405   * 131* 136   K 4.1 4.1 4.8   CL 95* 94* 98*   CO2 25 23 27   BUN 21 19 25*   CREATININE 1.0 0.9 0.9     PT/INR:   Recent Labs     12/22/20  1240 12/23/20  0405   PROTIME 16.9* 16.7*   INR 1.39 1.38     BNP:  No results for input(s): PROBNP in the last 72 hours. TROPONIN: No results for input(s): TROPONINT in the last 72 hours. ECHO :   echocardiogram    Assessment:  65 y. o.year old who is admitted for  Chief Complaint   Patient presents with    Shortness of Breath     covid + on 12/16, 82 % on arrival by EMS    , active issues as noted below:  Impression:  Principal Problem:    Acute respiratory failure ([facility]  Active Problems:    Atrial fibrillation ([facility]    NSVT (nonsustained ventricular tachycardia) ([facility])    Atrial thrombus  Resolved Problems:    * No resolved hospital problems. *            All labs, medications and tests reviewed by myself , continue all other medications of all above medical condition listed as is except for changes mentioned above. Thank you very much for consult , please call with questions.     Tracey Suero MD 12/23/2020 11:07 AM

## 2020-12-24 LAB
ALBUMIN SERPL-MCNC: 3.4 GM/DL (ref 3.4–5)
ALP BLD-CCNC: 42 IU/L (ref 40–128)
ALT SERPL-CCNC: 18 U/L (ref 10–40)
ANION GAP SERPL CALCULATED.3IONS-SCNC: 12 MMOL/L (ref 4–16)
APTT: 36.1 SECONDS (ref 25.1–37.1)
AST SERPL-CCNC: 19 IU/L (ref 15–37)
BASOPHILS ABSOLUTE: 0 K/CU MM
BASOPHILS RELATIVE PERCENT: 0.1 % (ref 0–1)
BILIRUB SERPL-MCNC: 0.7 MG/DL (ref 0–1)
BUN BLDV-MCNC: 23 MG/DL (ref 6–23)
CALCIUM SERPL-MCNC: 8 MG/DL (ref 8.3–10.6)
CHLORIDE BLD-SCNC: 98 MMOL/L (ref 99–110)
CO2: 24 MMOL/L (ref 21–32)
CREAT SERPL-MCNC: 0.8 MG/DL (ref 0.9–1.3)
D DIMER: 287 NG/ML(DDU)
DIFFERENTIAL TYPE: ABNORMAL
DIGOXIN LEVEL: 1.4 NG/ML (ref 0.8–2)
DOSE AMOUNT: NORMAL
DOSE TIME: NORMAL
EOSINOPHILS ABSOLUTE: 0 K/CU MM
EOSINOPHILS RELATIVE PERCENT: 0 % (ref 0–3)
FIBRINOGEN LEVEL: 361 MG/DL (ref 196.9–442.1)
GFR AFRICAN AMERICAN: >60 ML/MIN/1.73M2
GFR NON-AFRICAN AMERICAN: >60 ML/MIN/1.73M2
GLUCOSE BLD-MCNC: 219 MG/DL (ref 70–99)
GLUCOSE BLD-MCNC: 223 MG/DL (ref 70–99)
GLUCOSE BLD-MCNC: 305 MG/DL (ref 70–99)
GLUCOSE BLD-MCNC: 328 MG/DL (ref 70–99)
GLUCOSE BLD-MCNC: 352 MG/DL (ref 70–99)
HCT VFR BLD CALC: 38.1 % (ref 42–52)
HEMOGLOBIN: 12.7 GM/DL (ref 13.5–18)
HIGH SENSITIVE C-REACTIVE PROTEIN: 36.2 MG/L
HIGH SENSITIVE C-REACTIVE PROTEIN: 51 MG/L
IMMATURE NEUTROPHIL %: 2.2 % (ref 0–0.43)
INR BLD: 1.42 INDEX
LYMPHOCYTES ABSOLUTE: 0.4 K/CU MM
LYMPHOCYTES RELATIVE PERCENT: 5 % (ref 24–44)
MCH RBC QN AUTO: 28.5 PG (ref 27–31)
MCHC RBC AUTO-ENTMCNC: 33.3 % (ref 32–36)
MCV RBC AUTO: 85.4 FL (ref 78–100)
MONOCYTES ABSOLUTE: 0.4 K/CU MM
MONOCYTES RELATIVE PERCENT: 5.1 % (ref 0–4)
NUCLEATED RBC %: 0 %
PDW BLD-RTO: 15.9 % (ref 11.7–14.9)
PLATELET # BLD: 162 K/CU MM (ref 140–440)
PMV BLD AUTO: 9.9 FL (ref 7.5–11.1)
POTASSIUM SERPL-SCNC: 4.1 MMOL/L (ref 3.5–5.1)
PROCALCITONIN: 0.07
PROTHROMBIN TIME: 17.2 SECONDS (ref 11.7–14.5)
RBC # BLD: 4.46 M/CU MM (ref 4.6–6.2)
SEGMENTED NEUTROPHILS ABSOLUTE COUNT: 7.5 K/CU MM
SEGMENTED NEUTROPHILS RELATIVE PERCENT: 87.6 % (ref 36–66)
SODIUM BLD-SCNC: 134 MMOL/L (ref 135–145)
TOTAL IMMATURE NEUTOROPHIL: 0.19 K/CU MM
TOTAL NUCLEATED RBC: 0 K/CU MM
TOTAL PROTEIN: 5.6 GM/DL (ref 6.4–8.2)
WBC # BLD: 8.6 K/CU MM (ref 4–10.5)

## 2020-12-24 PROCEDURE — 2060000000 HC ICU INTERMEDIATE R&B

## 2020-12-24 PROCEDURE — 2580000003 HC RX 258: Performed by: HOSPITALIST

## 2020-12-24 PROCEDURE — 85379 FIBRIN DEGRADATION QUANT: CPT

## 2020-12-24 PROCEDURE — 2580000003 HC RX 258: Performed by: INTERNAL MEDICINE

## 2020-12-24 PROCEDURE — 94761 N-INVAS EAR/PLS OXIMETRY MLT: CPT

## 2020-12-24 PROCEDURE — 2500000003 HC RX 250 WO HCPCS: Performed by: HOSPITALIST

## 2020-12-24 PROCEDURE — 85025 COMPLETE CBC W/AUTO DIFF WBC: CPT

## 2020-12-24 PROCEDURE — 80162 ASSAY OF DIGOXIN TOTAL: CPT

## 2020-12-24 PROCEDURE — 6370000000 HC RX 637 (ALT 250 FOR IP): Performed by: INTERNAL MEDICINE

## 2020-12-24 PROCEDURE — 86141 C-REACTIVE PROTEIN HS: CPT

## 2020-12-24 PROCEDURE — 2700000000 HC OXYGEN THERAPY PER DAY

## 2020-12-24 PROCEDURE — 99233 SBSQ HOSP IP/OBS HIGH 50: CPT | Performed by: INTERNAL MEDICINE

## 2020-12-24 PROCEDURE — 94640 AIRWAY INHALATION TREATMENT: CPT

## 2020-12-24 PROCEDURE — 85730 THROMBOPLASTIN TIME PARTIAL: CPT

## 2020-12-24 PROCEDURE — 82962 GLUCOSE BLOOD TEST: CPT

## 2020-12-24 PROCEDURE — 6360000002 HC RX W HCPCS: Performed by: INTERNAL MEDICINE

## 2020-12-24 PROCEDURE — 84145 PROCALCITONIN (PCT): CPT

## 2020-12-24 PROCEDURE — 85610 PROTHROMBIN TIME: CPT

## 2020-12-24 PROCEDURE — 85384 FIBRINOGEN ACTIVITY: CPT

## 2020-12-24 PROCEDURE — 80053 COMPREHEN METABOLIC PANEL: CPT

## 2020-12-24 RX ORDER — ZINC SULFATE 50(220)MG
50 CAPSULE ORAL DAILY
Status: DISCONTINUED | OUTPATIENT
Start: 2020-12-24 | End: 2020-12-28 | Stop reason: HOSPADM

## 2020-12-24 RX ORDER — DEXTROSE MONOHYDRATE 25 G/50ML
INJECTION, SOLUTION INTRAVENOUS
Status: DISPENSED
Start: 2020-12-24 | End: 2020-12-24

## 2020-12-24 RX ADMIN — BUDESONIDE AND FORMOTEROL FUMARATE DIHYDRATE 2 PUFF: 80; 4.5 AEROSOL RESPIRATORY (INHALATION) at 08:30

## 2020-12-24 RX ADMIN — FUROSEMIDE 20 MG: 20 TABLET ORAL at 10:05

## 2020-12-24 RX ADMIN — MONTELUKAST 10 MG: 10 TABLET, FILM COATED ORAL at 10:05

## 2020-12-24 RX ADMIN — CEFEPIME 2 G: 2 INJECTION, POWDER, FOR SOLUTION INTRAVENOUS at 10:05

## 2020-12-24 RX ADMIN — OYSTER SHELL CALCIUM WITH VITAMIN D 1 TABLET: 500; 200 TABLET, FILM COATED ORAL at 10:09

## 2020-12-24 RX ADMIN — CEFEPIME 2 G: 2 INJECTION, POWDER, FOR SOLUTION INTRAVENOUS at 23:36

## 2020-12-24 RX ADMIN — PANTOPRAZOLE SODIUM 40 MG: 40 TABLET, DELAYED RELEASE ORAL at 05:20

## 2020-12-24 RX ADMIN — ALLOPURINOL 100 MG: 100 TABLET ORAL at 10:05

## 2020-12-24 RX ADMIN — ENOXAPARIN SODIUM 120 MG: 120 INJECTION SUBCUTANEOUS at 05:20

## 2020-12-24 RX ADMIN — VANCOMYCIN HYDROCHLORIDE 1750 MG: 5 INJECTION, POWDER, LYOPHILIZED, FOR SOLUTION INTRAVENOUS at 20:38

## 2020-12-24 RX ADMIN — ZINC SULFATE 220 MG (50 MG) CAPSULE 50 MG: CAPSULE at 12:12

## 2020-12-24 RX ADMIN — SODIUM CHLORIDE, PRESERVATIVE FREE 10 ML: 5 INJECTION INTRAVENOUS at 20:38

## 2020-12-24 RX ADMIN — TRAZODONE HYDROCHLORIDE 100 MG: 50 TABLET ORAL at 20:37

## 2020-12-24 RX ADMIN — DIGOXIN 250 MCG: 250 TABLET ORAL at 10:05

## 2020-12-24 RX ADMIN — ALBUTEROL SULFATE 2 PUFF: 90 AEROSOL, METERED RESPIRATORY (INHALATION) at 12:14

## 2020-12-24 RX ADMIN — ALBUTEROL SULFATE 2 PUFF: 90 AEROSOL, METERED RESPIRATORY (INHALATION) at 08:30

## 2020-12-24 RX ADMIN — TIOTROPIUM BROMIDE INHALATION SPRAY 2 PUFF: 3.12 SPRAY, METERED RESPIRATORY (INHALATION) at 08:30

## 2020-12-24 RX ADMIN — CEFEPIME 2 G: 2 INJECTION, POWDER, FOR SOLUTION INTRAVENOUS at 00:05

## 2020-12-24 RX ADMIN — INSULIN LISPRO 8 UNITS: 100 INJECTION, SOLUTION INTRAVENOUS; SUBCUTANEOUS at 18:04

## 2020-12-24 RX ADMIN — VANCOMYCIN HYDROCHLORIDE 1750 MG: 5 INJECTION, POWDER, LYOPHILIZED, FOR SOLUTION INTRAVENOUS at 10:14

## 2020-12-24 RX ADMIN — DEXAMETHASONE 6 MG: 4 TABLET ORAL at 10:05

## 2020-12-24 RX ADMIN — CEFEPIME 2 G: 2 INJECTION, POWDER, FOR SOLUTION INTRAVENOUS at 16:54

## 2020-12-24 RX ADMIN — INSULIN LISPRO 8 UNITS: 100 INJECTION, SOLUTION INTRAVENOUS; SUBCUTANEOUS at 12:08

## 2020-12-24 RX ADMIN — ENOXAPARIN SODIUM 120 MG: 120 INJECTION SUBCUTANEOUS at 18:03

## 2020-12-24 RX ADMIN — SODIUM CHLORIDE, PRESERVATIVE FREE 10 ML: 5 INJECTION INTRAVENOUS at 10:06

## 2020-12-24 RX ADMIN — INSULIN LISPRO 4 UNITS: 100 INJECTION, SOLUTION INTRAVENOUS; SUBCUTANEOUS at 08:22

## 2020-12-24 RX ADMIN — SODIUM CHLORIDE 100 MG: 9 INJECTION, SOLUTION INTRAVENOUS at 13:53

## 2020-12-24 RX ADMIN — DILTIAZEM HYDROCHLORIDE 180 MG: 180 CAPSULE, COATED, EXTENDED RELEASE ORAL at 10:05

## 2020-12-24 ASSESSMENT — PAIN SCALES - GENERAL
PAINLEVEL_OUTOF10: 0

## 2020-12-24 NOTE — PROGRESS NOTES
anticoagulants, Peptic ulcer disease, Pre-op evaluation, Testosterone deficiency, Ventricular tachycardia ([address] and VHD (valvular heart disease). Past surgical history:   has a past surgical history that includes polypectomy (2/98- jaimie); polypectomy (9/10/02- dr Jessica Gunter); laparoscopy (3/08, dr Janice Batista); ventral hernia repair (10/08, dr Janice Batista); Wrist fracture surgery (Left, 2/6/14); Inguinal hernia repair (Right, 11/2016); and Hip Arthroplasty (Right, 09/12/2018). Social History:   reports that he quit smoking about 7 years ago. His smoking use included cigarettes. He started smoking about 57 years ago. He has a 39.00 pack-year smoking history. He has never used smokeless tobacco. He reports current alcohol use. He reports that he does not use drugs. Family history:  family history includes Diabetes in his father; High Blood Pressure in his mother; Hypertension in his mother; Hypotension in his father; Other in his father and mother. Allergies   Allergen Reactions    Lisinopril      Chest tightness    Simvastatin Other (See Comments)     Muscle cramps       Review of Systems:    All 14 systems were reviewed and are negative  Except for the positive findings  which as documented     /70   Pulse 71   Temp 97.7 °F (36.5 °C) (Oral)   Resp 12   Ht 6' 5\" (1.956 m)   Wt 289 lb 0.4 oz (131.1 kg)   SpO2 92%   BMI 34.27 kg/m²       Intake/Output Summary (Last 24 hours) at 12/24/2020 1019  Last data filed at 12/24/2020 1005  Gross per 24 hour   Intake 1423 ml   Output 4900 ml   Net -3477 ml     Physical Exam:  Constitutional:  Well developed, Well nourished, No acute distress, Non-toxic appearance. HENT:  Normocephalic, Atraumatic, Bilateral external ears normal, Oropharynx moist, No oral exudates, Nose normal. Neck- Normal range of motion, No tenderness, Supple, No stridor. Eyes:  PERRL, EOMI, Conjunctiva normal, No discharge.    Respiratory:  Normal breath sounds, No respiratory distress, No K 4.1 4.8 4.1   CL 94* 98* 98*   CO2 23 27 24   BUN 19 25* 23   CREATININE 0.9 0.9 0.8*     PT/INR:   Recent Labs     12/22/20  1240 12/23/20  0405 12/24/20  0511   PROTIME 16.9* 16.7* 17.2*   INR 1.39 1.38 1.42     BNP:    Recent Labs     12/23/20  0835   PROBNP 552.9*     TROPONIN: No results for input(s): TROPONINT in the last 72 hours. ECHO :   echocardiogram    Assessment:  65 y. o.year old who is admitted for  Chief Complaint   Patient presents with    Shortness of Breath     covid + on 12/16, 82 % on arrival by EMS    , active issues as noted below:  Impression:  Principal Problem:    Acute respiratory failure ([facility]  Active Problems:    Atrial fibrillation ([facility]    NSVT (nonsustained ventricular tachycardia) ([facility])    Atrial thrombus  Resolved Problems:    * No resolved hospital problems. *            All labs, medications and tests reviewed by myself , continue all other medications of all above medical condition listed as is except for changes mentioned above. Thank you very much for consult , please call with questions.     Elie Gurrola MD 12/24/2020 10:19 AM

## 2020-12-24 NOTE — CONSULTS
[address] [place] [address], [address] [place] [address]                                  CONSULTATION    PATIENT NAME: Pinky Jordan                       :        1950  MED REC NO:   [id number]                          ROOM:       2013  ACCOUNT NO:   [de-identified]                           ADMIT DATE: 2020  PROVIDER:     Hal Ann MD    CONSULT DATE:  2020    HISTORY OF PRESENT ILLNESS:  The patient is a 64-year-old gentleman with  multiple medical problems including gastroesophageal reflux disease,  diabetes, paroxysmal atrial fibrillation who was admitted through the  emergency room with complaints of increasing shortness of breath and  nonproductive cough. The patient was exposed to his wife who recently  tested COVID positive. The patient had a COVID test, which came back  positive. He was subsequently admitted to the hospital.  He denied any  hemoptysis. He denied any fever or chills. He denied any nausea or  vomiting. He denied any chest pains. PAST MEDICAL HISTORY:  Significant for atrial fibrillation,  gastroesophageal reflux disease, hyperlipidemia and COPD. PAST SURGICAL HISTORY:  Remarkable for polypectomy, laparoscopy, ventral  hernia repair, inguinal hernia repair and hip arthroplasty. FAMILY HISTORY:  Reveals that his mother had hypertension. His father  had diabetes. SOCIAL HISTORY:  Reveals that he quit smoking in , but has 39-pack  year smoking history. He drinks alcohol on and off. No history of drug  abuse. MEDICATIONS:  Reviewed. ALLERGIES:  He is allergic to LISINOPRIL and SIMVASTATIN. REVIEW OF SYSTEMS:  10- to 14-point review of systems was reviewed and  is negative except for what is mentioned in the history of present  illness. PHYSICAL EXAMINATION:  GENERAL:  The patient is alert, oriented x3, in no acute respiratory  distress. VITAL SIGNS:  His blood pressure is 139/70 mmHg, pulse of 65 per minute  and respiratory rate of 17 per minute. He is afebrile. His saturation  is 93% on high flow nasal cannula. HEENT:  Essentially unremarkable. NECK:  There is no JVD, no lymphadenopathy. The neck is supple. LUNGS:  Revealed diminished breath sounds. Occasional basilar crackles. HEART:  Showed normal S1 and S2. There was no S3 or S4 noted. ABDOMEN:  Benign. There was no evidence of any organomegaly. The bowel  sounds are present. NEUROLOGIC:  Reveals that he is awake and responsive, answering  questions appropriately and moving his extremities. His chest x-ray showed plate like atelectasis versus scar in the left  lower lobe, mild pulmonary vascular congestion. LABORATORY DATA:  His proBNP was 552. His CBC showed a white count of  8.6, hemoglobin 12.7, hematocrit 38.1. His electrolytes showed a sodium  of 134, potassium 4.1, chloride 98, carbon dioxide 24, BUN 23,  creatinine 0.8. His procalcitonin level was 0.066. IMPRESSION:  1. Acute respiratory failure secondary to COVID pneumonia left lower  lobe. 2.  COVID pneumonia left lower lobe. 3.  COPD. PLAN:  1. The patient already received convalescent plasma and he is on  remdesivir. 2.  Sputum for culture and sensitivity. 3.  We will start zinc supplement. 4.  Check mag and phos. 5.  Continue present bronchodilator regimen. 6.  Continue Decadron 6 mg once a day. 7.  Wean FiO2 to keep saturation greater than 90%. 8.  As per orders.         Maeve Lin MD    D: 12/24/2020 11:14:37       T: 12/24/2020 12:45:38     /[email or web address]  Job#: [id number]     Doc#: [id number]    CC:

## 2020-12-24 NOTE — PROGRESS NOTES
[address]  consulted by Dr. Fifi Hooper for monitoring and adjustment. Indication for treatment: CAP  Goal trough: 15 mcg/mL    Pertinent Laboratory Values:   Temp Readings from Last 3 Encounters:   12/24/20 97.7 °F (36.5 °C) (Oral)   12/18/20 98 °F (36.7 °C) (Oral)   03/11/20 98.5 °F (36.9 °C)     Recent Labs     12/21/20  2351 12/22/20  1240 12/23/20  0405 12/24/20  0511   WBC 9.2 7.2 8.1 8.6   LACTATE 2.7* 3.9*  --   --      Recent Labs     12/22/20  1240 12/23/20  0405 12/24/20  0511   BUN 19 25* 23   CREATININE 0.9 0.9 0.8*     Estimated Creatinine Clearance: 125 mL/min (A) (based on SCr of 0.8 mg/dL (L)). Intake/Output Summary (Last 24 hours) at 12/24/2020 0731  Last data filed at 12/24/2020 0306  Gross per 24 hour   Intake 1523 ml   Output 4825 ml   Net -3302 ml       Pertinent Cultures:  Date    Source    Results  12/22   Nasal MRSA Screen  Pending  12/22   Respiratory   Ordered  12/22                           Rapid Flu A/B   Negative  12/16                           COVID-19   Detected    VANCOMYCIN TROUGH:    Recent Labs     12/23/20 2050   VANCOTROUGH 4.7*     VANCOMYCIN RANDOM:  No results for input(s): VANCORANDOM in the last 72 hours. Assessment:  · WBC WNL @ 8.6;  Afebrile  · Renal function appears stable: SCr = 0.8, BUN = 23  · Day(s) of therapy: 3  · Vancomycin concentration:   · 12/23: 4.7, sub-therapeutic    Plan:  · Increase vancomycin dosing to 1750 mg IVPB q12h  · Given advanced age and elevated BMI, I am hesitant to shorten dosing interval <12h due to risk of ROMAIN and anticipated accumulation  · Repeat next trough level in 48h  · Pharmacy will continue to monitor patient and adjust therapy as indicated    Hayley 3 12/25/2020 @ 2030    Thank you for the consult,  Gordo Abbasi, [facility]   12/24/2020 7:31 AM

## 2020-12-24 NOTE — PROGRESS NOTES
Hospitalist Progress Note      Name:  Lb Villa /Age/Sex: 1950  (65 y.o. male)   MRN & CSN:  [id number] & [id number] Admission Date/Time: 2020 11:53 PM   Location:  -A PCP: Latanya Arellano MD         Hospital Day: 4    History of Present Illness:     Chief Complaint: Acute respiratory failure ([address]  Lb Villa is a 65 y.o.  male  who presents with SOB     The patient seen and examined at bedside. He says feeling better with breathing today. Denies having any chest pain. He says has dry cough unable to bring up anything. Ten point ROS reviewed negative, unless as noted above    Objective:        Intake/Output Summary (Last 24 hours) at 2020 1364  Last data filed at 2020 9110  Gross per 24 hour   Intake 1523 ml   Output 4825 ml   Net -3302 ml      Vitals:   Vitals:    20 0511   BP: (!) 149/70   Pulse: 60   Resp: 17   Temp: 97.7 °F (36.5 °C)   SpO2: 93%     Physical Exam:   General Appearance: alert and oriented to person, place and time, in no acute distress  Cardiovascular: normal rate, irregular rhythm, normal S1 and S2  Pulmonary/Chest: Bilateral air entry present with coarse breath sounds  Abdomen: soft, non-tender, non-distended, normal bowel sounds, no masses   Extremities: no cyanosis, clubbing or edema, pulse   Skin: warm and dry, no rash or erythema  Head: normocephalic and atraumatic  Eyes: pupils equal, round, and reactive to light  Neck: supple and non-tender without mass, no thyromegaly   Musculoskeletal: normal range of motion, no joint swelling, deformity or tenderness  Neurological: alert, oriented, normal speech, no focal findings or movement disorder noted    Medications:   Medications:    dextrose        calcium-vitamin D  1 tablet Oral Daily    sodium chloride flush  10 mL Intravenous 2 times per day    dexamethasone  6 mg Oral Daily    sodium chloride  20 mL Intravenous Once    cefepime  2 g Intravenous Q8H    vancomycin  1,500 mg Intravenous Q12H    allopurinol  100 mg Oral Daily    digoxin  250 mcg Oral Daily    dilTIAZem  180 mg Oral Daily    budesonide-formoterol  2 puff Inhalation BID    furosemide  20 mg Oral Daily    insulin lispro  0-12 Units Subcutaneous TID WC    insulin lispro  0-6 Units Subcutaneous Nightly    montelukast  10 mg Oral Daily    pantoprazole  40 mg Oral QAM AC    tiotropium  2 puff Inhalation Daily    traZODone  100 mg Oral Nightly    enoxaparin  1 mg/kg Subcutaneous Q12H    remdesivir IVPB  100 mg Intravenous Q24H    albuterol sulfate HFA  2 puff Inhalation Q6H WA      Infusions:   PRN Meds:     sodium chloride flush, 10 mL, PRN      promethazine, 12.5 mg, Q6H PRN    Or      ondansetron, 4 mg, Q6H PRN      polyethylene glycol, 17 g, Daily PRN      acetaminophen, 650 mg, Q6H PRN    Or      acetaminophen, 650 mg, Q6H PRN      sodium chloride, 30 mL, PRN            Pertinent New Labs & Imaging Studies     CBC with Differential:    Lab Results   Component Value Date    WBC 8.6 12/24/2020    RBC 4.46 12/24/2020    HGB 12.7 12/24/2020    HCT 38.1 12/24/2020     12/24/2020    MCV 85.4 12/24/2020    MCH 28.5 12/24/2020    MCHC 33.3 12/24/2020    RDW 15.9 12/24/2020    SEGSPCT 87.6 12/24/2020    BANDSPCT 6 01/04/2017    LYMPHOPCT 5.0 12/24/2020    MONOPCT 5.1 12/24/2020    EOSPCT 1.9 08/13/2018    BASOPCT 0.1 12/24/2020    MONOSABS 0.4 12/24/2020    LYMPHSABS 0.4 12/24/2020    EOSABS 0.0 12/24/2020    BASOSABS 0.0 12/24/2020    DIFFTYPE AUTOMATED DIFFERENTIAL 12/24/2020     CMP:    Lab Results   Component Value Date     12/24/2020    K 4.1 12/24/2020    CL 98 12/24/2020    CO2 24 12/24/2020    BUN 23 12/24/2020    CREATININE 0.8 12/24/2020    GFRAA >60 12/24/2020    GFRAA >60 05/22/2013    AGRATIO 1.9 11/17/2020    LABGLOM >60 12/24/2020    LABGLOM 76 02/08/2018    GLUCOSE 219 12/24/2020    PROT 5.6 12/24/2020    PROT 6.6 12/21/2012    LABALBU 3.4 12/24/2020    CALCIUM 8.0 12/24/2020 BILITOT 0.7 12/24/2020    ALKPHOS 42 12/24/2020    AST 19 12/24/2020    ALT 18 12/24/2020     Xr Chest Portable    Result Date: 12/22/2020  EXAMINATION: ONE XRAY VIEW OF THE CHEST 12/22/2020 12:03 am COMPARISON: December 17, 2020 HISTORY: ORDERING SYSTEM PROVIDED HISTORY: covid increas sob TECHNOLOGIST PROVIDED HISTORY: Reason for exam:->covid increas sob Reason for Exam: covid increas sob Acuity: Unknown Type of Exam: Initial FINDINGS: There is bandlike atelectasis versus scar within the left lower lobe. Mildly prominent pulmonary vascular lung markings are again noted. There is no focal consolidation. The heart size is magnified by portable technique. Both lateral costophrenic angles have been partially excluded from view, limiting evaluation. Grossly stable appearance of the lungs compared to the prior exam of December 17, 2020. Cta Pulmonary W Contrast    Result Date: 12/22/2020  EXAMINATION: CTA OF THE CHEST 12/22/2020 2:40 am TECHNIQUE: CTA of the chest was performed after the administration of intravenous contrast.  Multiplanar reformatted images are provided for review. MIP images are provided for review. Dose modulation, iterative reconstruction, and/or weight based adjustment of the mA/kV was utilized to reduce the radiation dose to as low as reasonably achievable. COMPARISON: None. HISTORY: ORDERING SYSTEM PROVIDED HISTORY: sob TECHNOLOGIST PROVIDED HISTORY: Reason for exam:->sob Reason for Exam: barb d-dimer/sob/copd FINDINGS: Pulmonary Arteries: Pulmonary arteries are adequately opacified for evaluation. No evidence of intraluminal filling defect to suggest pulmonary embolism. Main pulmonary artery is normal in caliber. Mediastinum: Cardiomegaly is noted. Left atrial enlargement is seen. Thrombus is seen within the left atrial appendage. There is no pericardial effusion. There is no mediastinal lymphadenopathy.  Lungs/pleura: Patchy areas of ground-glass opacities are noted in a peripheral distribution within the bilateral upper lobes and the right middle lobe. Focal consolidations are seen within the bilateral lower lobes. Findings are concerning for an atypical/multifocal pneumonia. There is no evidence of pneumothorax. There is a small left pneumothorax and trace right pneumothorax. Bronchial wall thickening is seen within the bilateral lower lobes. Upper Abdomen: Limited images of the upper abdomen are unremarkable. Soft Tissues/Bones: No acute bone or soft tissue abnormality. 1. There is no evidence for a pulmonary embolism. There is thrombus within the left atrial appendage. Left atrial enlargement is noted. 2. Patchy areas of ground-glass opacities in a peripheral distribution are seen within the bilateral upper lobes and in the right middle lobe with more focal consolidations in the bilateral lower lobes. Findings are concerning for an atypical/multifocal pneumonia. Commonly reported imaging features of COVID-19 pneumonia are present. Other processes such as influenza pneumonia and organizing pneumonia, as can be seen with drug toxicity and connective tissue disease, can cause a similar imaging pattern. PneTyp 3. Small bilateral pleural effusions. Bibasilar atelectasis is noted. 4. Bronchial wall thickening is seen involving the bilateral lower lobes, likely related to acute bronchitis. Findings were discussed with Ginette Res at 3:28 am on 12/22/2020. Assessment and Plan:   Richi Larson is a 65 y.o.  male  who presents with Acute respiratory failure ([address]    Acute hypoxemic respiratory failure  COVID-19 pneumonia   Probable super imposed bacterial infection   Covid positive on 12/17  Currently needing 11 L of oxygen  S/p convalescent plasma  Continue remdesivir, started on 12/22  Continue Decadron  Continue empiric antibiotics  Echo with preserved LV function but dilated atria  On therapeutic Lovenox for A. Fib    Left atrial thrombus  Persistent A. Fib  Was on Coumadin, currently discontinued due to subtherapeutic levels  Continue therapeutic Lovenox  Continue Cardizem for rate control  Cardiology recommendations appreciated-plan to switch to Eliquis on discharge    Hyponatremia, mild  Monitor BMP    Gout- Allopurinol    Type 2 DM  Sliding scale insulin  Hypoglycemia precautions    COPD-continue Spiriva, Advair and Singulair    Diet DIET LOW SODIUM 2 GM;   DVT Prophylaxis [x] Lovenox, []  Heparin, [] SCDs, [] Ambulation   GI Prophylaxis [] PPI,  [] H2 Blocker,  [] Carafate,  [x] Diet/Tube Feeds   Code Status Full Code   Disposition Patient requires continued admission due to Respiratory failure   MDM [] Low, [x] Moderate,[]  High     Electronically signed by Monsterrat Langley MD on 12/24/2020 at 7:22 AM

## 2020-12-25 LAB
ALBUMIN SERPL-MCNC: 3.3 GM/DL (ref 3.4–5)
ALP BLD-CCNC: 41 IU/L (ref 40–128)
ALT SERPL-CCNC: 23 U/L (ref 10–40)
ANION GAP SERPL CALCULATED.3IONS-SCNC: 9 MMOL/L (ref 4–16)
APTT: 31.3 SECONDS (ref 25.1–37.1)
AST SERPL-CCNC: 21 IU/L (ref 15–37)
BASOPHILS ABSOLUTE: 0 K/CU MM
BASOPHILS RELATIVE PERCENT: 0.3 % (ref 0–1)
BILIRUB SERPL-MCNC: 0.6 MG/DL (ref 0–1)
BUN BLDV-MCNC: 23 MG/DL (ref 6–23)
CALCIUM SERPL-MCNC: 7.8 MG/DL (ref 8.3–10.6)
CHLORIDE BLD-SCNC: 105 MMOL/L (ref 99–110)
CO2: 24 MMOL/L (ref 21–32)
CREAT SERPL-MCNC: 0.8 MG/DL (ref 0.9–1.3)
D DIMER: <200 NG/ML(DDU)
DIFFERENTIAL TYPE: ABNORMAL
EOSINOPHILS ABSOLUTE: 0 K/CU MM
EOSINOPHILS RELATIVE PERCENT: 0 % (ref 0–3)
FIBRINOGEN LEVEL: 343 MG/DL (ref 196.9–442.1)
GFR AFRICAN AMERICAN: >60 ML/MIN/1.73M2
GFR NON-AFRICAN AMERICAN: >60 ML/MIN/1.73M2
GLUCOSE BLD-MCNC: 196 MG/DL (ref 70–99)
GLUCOSE BLD-MCNC: 229 MG/DL (ref 70–99)
GLUCOSE BLD-MCNC: 240 MG/DL (ref 70–99)
GLUCOSE BLD-MCNC: 264 MG/DL (ref 70–99)
GLUCOSE BLD-MCNC: 281 MG/DL (ref 70–99)
HCT VFR BLD CALC: 38.9 % (ref 42–52)
HEMOGLOBIN: 12.8 GM/DL (ref 13.5–18)
HIGH SENSITIVE C-REACTIVE PROTEIN: 14.5 MG/L
IMMATURE NEUTROPHIL %: 2.1 % (ref 0–0.43)
INR BLD: 1.27 INDEX
LYMPHOCYTES ABSOLUTE: 0.6 K/CU MM
LYMPHOCYTES RELATIVE PERCENT: 5.9 % (ref 24–44)
MAGNESIUM: 2.3 MG/DL (ref 1.8–2.4)
MCH RBC QN AUTO: 28.2 PG (ref 27–31)
MCHC RBC AUTO-ENTMCNC: 32.9 % (ref 32–36)
MCV RBC AUTO: 85.7 FL (ref 78–100)
MONOCYTES ABSOLUTE: 0.5 K/CU MM
MONOCYTES RELATIVE PERCENT: 5 % (ref 0–4)
NUCLEATED RBC %: 0 %
PDW BLD-RTO: 16.1 % (ref 11.7–14.9)
PHOSPHORUS: 3.2 MG/DL (ref 2.5–4.9)
PLATELET # BLD: 157 K/CU MM (ref 140–440)
PMV BLD AUTO: 9.4 FL (ref 7.5–11.1)
POTASSIUM SERPL-SCNC: 4.2 MMOL/L (ref 3.5–5.1)
PRO-BNP: 530.6 PG/ML
PROCALCITONIN: 0.1
PROTHROMBIN TIME: 15.4 SECONDS (ref 11.7–14.5)
RBC # BLD: 4.54 M/CU MM (ref 4.6–6.2)
SEGMENTED NEUTROPHILS ABSOLUTE COUNT: 8.4 K/CU MM
SEGMENTED NEUTROPHILS RELATIVE PERCENT: 86.7 % (ref 36–66)
SODIUM BLD-SCNC: 138 MMOL/L (ref 135–145)
TOTAL IMMATURE NEUTOROPHIL: 0.2 K/CU MM
TOTAL NUCLEATED RBC: 0 K/CU MM
TOTAL PROTEIN: 5.4 GM/DL (ref 6.4–8.2)
WBC # BLD: 9.7 K/CU MM (ref 4–10.5)

## 2020-12-25 PROCEDURE — 80202 ASSAY OF VANCOMYCIN: CPT

## 2020-12-25 PROCEDURE — 2580000003 HC RX 258: Performed by: INTERNAL MEDICINE

## 2020-12-25 PROCEDURE — 82962 GLUCOSE BLOOD TEST: CPT

## 2020-12-25 PROCEDURE — 94761 N-INVAS EAR/PLS OXIMETRY MLT: CPT

## 2020-12-25 PROCEDURE — 2700000000 HC OXYGEN THERAPY PER DAY

## 2020-12-25 PROCEDURE — 85384 FIBRINOGEN ACTIVITY: CPT

## 2020-12-25 PROCEDURE — 2500000003 HC RX 250 WO HCPCS: Performed by: HOSPITALIST

## 2020-12-25 PROCEDURE — 84145 PROCALCITONIN (PCT): CPT

## 2020-12-25 PROCEDURE — 2060000000 HC ICU INTERMEDIATE R&B

## 2020-12-25 PROCEDURE — 85379 FIBRIN DEGRADATION QUANT: CPT

## 2020-12-25 PROCEDURE — 80053 COMPREHEN METABOLIC PANEL: CPT

## 2020-12-25 PROCEDURE — 84100 ASSAY OF PHOSPHORUS: CPT

## 2020-12-25 PROCEDURE — 6370000000 HC RX 637 (ALT 250 FOR IP): Performed by: INTERNAL MEDICINE

## 2020-12-25 PROCEDURE — 85025 COMPLETE CBC W/AUTO DIFF WBC: CPT

## 2020-12-25 PROCEDURE — 86141 C-REACTIVE PROTEIN HS: CPT

## 2020-12-25 PROCEDURE — 83735 ASSAY OF MAGNESIUM: CPT

## 2020-12-25 PROCEDURE — 94640 AIRWAY INHALATION TREATMENT: CPT

## 2020-12-25 PROCEDURE — 83880 ASSAY OF NATRIURETIC PEPTIDE: CPT

## 2020-12-25 PROCEDURE — 6360000002 HC RX W HCPCS: Performed by: INTERNAL MEDICINE

## 2020-12-25 PROCEDURE — 85610 PROTHROMBIN TIME: CPT

## 2020-12-25 PROCEDURE — 2580000003 HC RX 258: Performed by: HOSPITALIST

## 2020-12-25 PROCEDURE — 85730 THROMBOPLASTIN TIME PARTIAL: CPT

## 2020-12-25 RX ADMIN — SODIUM CHLORIDE, PRESERVATIVE FREE 10 ML: 5 INJECTION INTRAVENOUS at 09:47

## 2020-12-25 RX ADMIN — VANCOMYCIN HYDROCHLORIDE 1750 MG: 5 INJECTION, POWDER, LYOPHILIZED, FOR SOLUTION INTRAVENOUS at 01:00

## 2020-12-25 RX ADMIN — PANTOPRAZOLE SODIUM 40 MG: 40 TABLET, DELAYED RELEASE ORAL at 05:00

## 2020-12-25 RX ADMIN — FUROSEMIDE 20 MG: 20 TABLET ORAL at 09:47

## 2020-12-25 RX ADMIN — DILTIAZEM HYDROCHLORIDE 180 MG: 180 CAPSULE, COATED, EXTENDED RELEASE ORAL at 09:47

## 2020-12-25 RX ADMIN — INSULIN LISPRO 2 UNITS: 100 INJECTION, SOLUTION INTRAVENOUS; SUBCUTANEOUS at 09:54

## 2020-12-25 RX ADMIN — OYSTER SHELL CALCIUM WITH VITAMIN D 1 TABLET: 500; 200 TABLET, FILM COATED ORAL at 09:47

## 2020-12-25 RX ADMIN — ENOXAPARIN SODIUM 120 MG: 120 INJECTION SUBCUTANEOUS at 05:00

## 2020-12-25 RX ADMIN — ENOXAPARIN SODIUM 120 MG: 120 INJECTION SUBCUTANEOUS at 17:15

## 2020-12-25 RX ADMIN — MONTELUKAST 10 MG: 10 TABLET, FILM COATED ORAL at 09:46

## 2020-12-25 RX ADMIN — DIGOXIN 250 MCG: 250 TABLET ORAL at 09:46

## 2020-12-25 RX ADMIN — BUDESONIDE AND FORMOTEROL FUMARATE DIHYDRATE 2 PUFF: 80; 4.5 AEROSOL RESPIRATORY (INHALATION) at 20:31

## 2020-12-25 RX ADMIN — INSULIN LISPRO 6 UNITS: 100 INJECTION, SOLUTION INTRAVENOUS; SUBCUTANEOUS at 11:42

## 2020-12-25 RX ADMIN — ALBUTEROL SULFATE 2 PUFF: 90 AEROSOL, METERED RESPIRATORY (INHALATION) at 15:02

## 2020-12-25 RX ADMIN — SODIUM CHLORIDE, PRESERVATIVE FREE 10 ML: 5 INJECTION INTRAVENOUS at 22:00

## 2020-12-25 RX ADMIN — INSULIN LISPRO 6 UNITS: 100 INJECTION, SOLUTION INTRAVENOUS; SUBCUTANEOUS at 16:21

## 2020-12-25 RX ADMIN — CEFEPIME 2 G: 2 INJECTION, POWDER, FOR SOLUTION INTRAVENOUS at 15:10

## 2020-12-25 RX ADMIN — VANCOMYCIN HYDROCHLORIDE 1750 MG: 5 INJECTION, POWDER, LYOPHILIZED, FOR SOLUTION INTRAVENOUS at 09:49

## 2020-12-25 RX ADMIN — ZINC SULFATE 220 MG (50 MG) CAPSULE 50 MG: CAPSULE at 09:46

## 2020-12-25 RX ADMIN — CEFEPIME 2 G: 2 INJECTION, POWDER, FOR SOLUTION INTRAVENOUS at 09:49

## 2020-12-25 RX ADMIN — SODIUM CHLORIDE 100 MG: 9 INJECTION, SOLUTION INTRAVENOUS at 14:19

## 2020-12-25 RX ADMIN — DEXAMETHASONE 6 MG: 4 TABLET ORAL at 09:46

## 2020-12-25 RX ADMIN — TRAZODONE HYDROCHLORIDE 100 MG: 50 TABLET ORAL at 22:15

## 2020-12-25 RX ADMIN — ALLOPURINOL 100 MG: 100 TABLET ORAL at 09:47

## 2020-12-25 RX ADMIN — ALBUTEROL SULFATE 2 PUFF: 90 AEROSOL, METERED RESPIRATORY (INHALATION) at 20:30

## 2020-12-25 ASSESSMENT — PAIN SCALES - GENERAL
PAINLEVEL_OUTOF10: 0

## 2020-12-25 ASSESSMENT — PAIN SCALES - WONG BAKER: WONGBAKER_NUMERICALRESPONSE: 0

## 2020-12-25 ASSESSMENT — PAIN - FUNCTIONAL ASSESSMENT
PAIN_FUNCTIONAL_ASSESSMENT: ACTIVITIES ARE NOT PREVENTED
PAIN_FUNCTIONAL_ASSESSMENT: ACTIVITIES ARE NOT PREVENTED

## 2020-12-25 ASSESSMENT — PAIN DESCRIPTION - PROGRESSION
CLINICAL_PROGRESSION: GRADUALLY IMPROVING

## 2020-12-25 NOTE — PROGRESS NOTES
[address] [facility]  consulted by Dr. Gonzalo Luna for monitoring and adjustment. Indication for treatment: CAP  Goal trough: 15 mcg/mL    Pertinent Laboratory Values:   Temp Readings from Last 3 Encounters:   12/25/20 97.6 °F (36.4 °C) (Oral)   12/18/20 98 °F (36.7 °C) (Oral)   03/11/20 98.5 °F (36.9 °C)     Recent Labs     12/23/20  0405 12/24/20  0511 12/25/20  0500   WBC 8.1 8.6 9.7     Recent Labs     12/23/20  0405 12/24/20  0511 12/25/20  0500   BUN 25* 23 23   CREATININE 0.9 0.8* 0.8*     Estimated Creatinine Clearance: 125 mL/min (A) (based on SCr of 0.8 mg/dL (L)). Intake/Output Summary (Last 24 hours) at 12/25/2020 1700  Last data filed at 12/25/2020 1620  Gross per 24 hour   Intake 1930 ml   Output 3600 ml   Net -1670 ml       Pertinent Cultures:  Date    Source    Results  12/22   Nasal MRSA Screen  Pending  12/22   Respiratory   Ordered  12/22                           Rapid Flu A/B   Negative  12/16                           COVID-19   Detected    VANCOMYCIN TROUGH:    Recent Labs     12/23/20 2050   VANCOTROUGH 4.7*     VANCOMYCIN RANDOM:  No results for input(s): VANCORANDOM in the last 72 hours. Assessment:  · WBC remains normal, pt remains afebrile  · SCR remains normal, UOP good  · Day(s) of therapy: 4  · Vancomycin concentration:   · 12/23: 4.7, sub-therapeutic    Plan:  · Renal trends remain normal  · Vanco level subtherapeutic on 12/23, dose increased  · Continue increased vancomycin dose of 1750 mg IVPB q12h  · Given advanced age and elevated BMI, I am hesitant to shorten dosing interval <12h due to risk of ROMAIN and anticipated accumulation  · Repeat next trough level before tonight's dose  · Pharmacy will continue to monitor patient and adjust therapy as indicated    Hayley 3 12/25/2020 @ 2030    Thank you for the consult,  Betsy Sanchez   12/25/2020 5:00 PM

## 2020-12-25 NOTE — PROGRESS NOTES
Pulmonary and Critical Care  Progress Note    Subjective: The patient has slightly improved  Shortness of breath has slightly improved  Chest pain none  Addressing respiratory complaints Patient is negative for  hemoptysis and cyanosis  CONSTITUTIONAL:  negative for fevers and chills      Past Medical History:     has a past medical history of Aortic stenosis, mild, Asthma, intrinsic, Atrial fibrillation ([address] Cholelithiasis, Colonic polyp, COPD (chronic obstructive pulmonary disease) ([address] DDD (degenerative disc disease), lumbar, Diabetes mellitus type 2, controlled ([address] Diabetic neuropathy, type II diabetes mellitus ([address] Dilated cardiomyopathy ([address] GERD (gastroesophageal reflux disease), Gout, H/O cardiovascular stress test, H/O echocardiogram, History of cardiovascular stress test, History of complete electrocardiogram, History of echocardiogram, History of total right hip arthroplasty, Hx of Doppler echocardiogram, Hx of echocardiogram, Hyperlipidemia, IFG (impaired fasting glucose), Knee pain, bilateral, Knee pain, left, Long-term (current) use of anticoagulants, Peptic ulcer disease, Pre-op evaluation, Testosterone deficiency, Ventricular tachycardia ([address] and VHD (valvular heart disease). has a past surgical history that includes polypectomy (2/98- jaimie); polypectomy (9/10/02- dr Sharene Gitelman); laparoscopy (3/08, dr Wing Villa); ventral hernia repair (10/08, dr Wing Villa); Wrist fracture surgery (Left, 2/6/14); Inguinal hernia repair (Right, 11/2016); and Hip Arthroplasty (Right, 09/12/2018). reports that he quit smoking about 7 years ago. His smoking use included cigarettes. He started smoking about 57 years ago. He has a 39.00 pack-year smoking history. He has never used smokeless tobacco. He reports current alcohol use. He reports that he does not use drugs.   Family history:  family history includes Diabetes in his father; High Blood Pressure in his mother; Hypertension in his mother; Hypotension in his father; Other in his father and mother. Allergies   Allergen Reactions    Lisinopril      Chest tightness    Simvastatin Other (See Comments)     Muscle cramps     Social History:    Reviewed; no changes    Objective:   PHYSICAL EXAM:        VITALS:  BP (!) 145/84   Pulse 71   Temp 97.5 °F (36.4 °C) (Oral)   Resp 22   Ht 6' 5\" (1.956 m)   Wt 287 lb 7.7 oz (130.4 kg)   SpO2 92%   BMI 34.09 kg/m²     24HR INTAKE/OUTPUT:      Intake/Output Summary (Last 24 hours) at 12/25/2020 1145  Last data filed at 12/25/2020 0500  Gross per 24 hour   Intake 2170 ml   Output 3850 ml   Net -1680 ml       CONSTITUTIONAL:  Awake. LUNGS:  decreased breath sounds, occ basilar crackles. CARDIOVASCULAR:  normal S1 and S2 and negative JVD  ABD:Abdomen soft, non-tender. BS normal. No masses,  No organomegaly  NEURO:Awake. DATA:    CBC:  Recent Labs     12/23/20  0405 12/24/20  0511 12/25/20  0500   WBC 8.1 8.6 9.7   RBC 4.77 4.46* 4.54*   HGB 13.3* 12.7* 12.8*   HCT 42.1 38.1* 38.9*    162 157   MCV 88.3 85.4 85.7   MCH 27.9 28.5 28.2   MCHC 31.6* 33.3 32.9   RDW 15.9* 15.9* 16.1*   SEGSPCT 87.1* 87.6* 86.7*      BMP:  Recent Labs     12/23/20  0405 12/24/20  0511 12/25/20  0500    134* 138   K 4.8 4.1 4.2   CL 98* 98* 105   CO2 27 24 24   BUN 25* 23 23   CREATININE 0.9 0.8* 0.8*   CALCIUM 8.7 8.0* 7.8*   GLUCOSE 230* 219* 229*      ABG:  No results for input(s): PH, PO2ART, [id number], HCO3, BEART, O2SAT in the last 72 hours. Lab Results   Component Value Date    PROBNP 530.6 (H) 12/25/2020    PROBNP 552.9 (H) 12/23/2020    PROBNP 300.6 (H) 12/17/2020     No results found for: [address] [facility]    Radiology Review:  Pertinent images / reports were reviewed as a part of this visit.     Assessment:     Patient Active Problem List   Diagnosis    Atrial fibrillation (HCC)    GERD (gastroesophageal reflux disease)    Asthma    Knee pain, bilateral    DDD (degenerative disc disease), lumbar    Testosterone deficiency    Dilated cardiomyopathy ([facility]    Gout    Hyperbilirubinemia    Mild intermittent asthma without complication    Cholelithiasis    Colonic polyp    Closed fracture of anterior lip of right acetabulum without additional fracture (HCC)    Closed anterior dislocation of left shoulder    VT (ventricular tachycardia) (HCC)    SVT (supraventricular tachycardia) (HCC)    NSVT (nonsustained ventricular tachycardia) (HCC)    Gait disturbance    Leg weakness, bilateral    Uncontrolled pain    VHD (valvular heart disease)    IFG (impaired fasting glucose)    Diabetes mellitus type 2, controlled ([facility]    Diabetic neuropathy, type II diabetes mellitus ([facility]    COPD (chronic obstructive pulmonary disease) (HCC)    Nonrheumatic aortic valve stenosis    Acute respiratory failure (HCC)    Atrial thrombus       Plan:   1. Overall the patient has slightly improved. 2. [address] Activity.     Silvestre Fernandez MD  12/25/2020  11:45 AM

## 2020-12-25 NOTE — PROGRESS NOTES
Hospitalist Progress Note      Name:  Amarilys Lopez /Age/Sex: 1950  (65 y.o. male)   MRN & CSN:  [id number] & [id number] Admission Date/Time: 2020 11:53 PM   Location:  -A PCP: Varsha Barajas MD         Hospital Day: 5    History of Present Illness:     Chief Complaint: Acute respiratory failure ([address]  Amarilys Lopez is a 65 y.o.  male  who presents with SOB     The patient seen and examined at bedside. He says continues to feel better with breathing today. Denies having any chest pain. Ten point ROS reviewed negative, unless as noted above    Objective:        Intake/Output Summary (Last 24 hours) at 2020 0741  Last data filed at 2020 0500  Gross per 24 hour   Intake 2420 ml   Output 4225 ml   Net -1805 ml      Vitals:   Vitals:    20 0400   BP: (!) 140/76   Pulse: 55   Resp: 14   Temp: 98 °F (36.7 °C)   SpO2: 94%     Physical Exam:   General Appearance: alert and oriented to person, place and time, in no acute distress  Cardiovascular: normal rate, irregular rhythm, normal S1 and S2  Pulmonary/Chest: Bilateral air entry present with coarse breath sounds  Abdomen: soft, non-tender, non-distended, normal bowel sounds, no masses   Extremities: no cyanosis, clubbing or edema, pulse   Skin: warm and dry, no rash or erythema  Head: normocephalic and atraumatic  Eyes: pupils equal, round, and reactive to light  Neck: supple and non-tender without mass, no thyromegaly   Musculoskeletal: normal range of motion, no joint swelling, deformity or tenderness  Neurological: alert, oriented, normal speech, no focal findings or movement disorder noted    Medications:   Medications:    vancomycin  1,750 mg Intravenous Q12H    zinc sulfate  50 mg Oral Daily    calcium-vitamin D  1 tablet Oral Daily    sodium chloride flush  10 mL Intravenous 2 times per day    dexamethasone  6 mg Oral Daily    sodium chloride  20 mL Intravenous Once    cefepime  2 g Intravenous Q8H    ALKPHOS 41 12/25/2020    AST 21 12/25/2020    ALT 23 12/25/2020     Xr Chest Portable    Result Date: 12/22/2020  EXAMINATION: ONE XRAY VIEW OF THE CHEST 12/22/2020 12:03 am COMPARISON: December 17, 2020 HISTORY: ORDERING SYSTEM PROVIDED HISTORY: covid increas sob TECHNOLOGIST PROVIDED HISTORY: Reason for exam:->covid increas sob Reason for Exam: covid increas sob Acuity: Unknown Type of Exam: Initial FINDINGS: There is bandlike atelectasis versus scar within the left lower lobe. Mildly prominent pulmonary vascular lung markings are again noted. There is no focal consolidation. The heart size is magnified by portable technique. Both lateral costophrenic angles have been partially excluded from view, limiting evaluation. Grossly stable appearance of the lungs compared to the prior exam of December 17, 2020. Cta Pulmonary W Contrast    Result Date: 12/22/2020  EXAMINATION: CTA OF THE CHEST 12/22/2020 2:40 am TECHNIQUE: CTA of the chest was performed after the administration of intravenous contrast.  Multiplanar reformatted images are provided for review. MIP images are provided for review. Dose modulation, iterative reconstruction, and/or weight based adjustment of the mA/kV was utilized to reduce the radiation dose to as low as reasonably achievable. COMPARISON: None. HISTORY: ORDERING SYSTEM PROVIDED HISTORY: sob TECHNOLOGIST PROVIDED HISTORY: Reason for exam:->sob Reason for Exam: barb d-dimer/sob/copd FINDINGS: Pulmonary Arteries: Pulmonary arteries are adequately opacified for evaluation. No evidence of intraluminal filling defect to suggest pulmonary embolism. Main pulmonary artery is normal in caliber. Mediastinum: Cardiomegaly is noted. Left atrial enlargement is seen. Thrombus is seen within the left atrial appendage. There is no pericardial effusion. There is no mediastinal lymphadenopathy.  Lungs/pleura: Patchy areas of ground-glass opacities are noted in a peripheral distribution within currently discontinued due to subtherapeutic levels  Continue therapeutic Lovenox  Continue Cardizem for rate control  Cardiology recommendations appreciated-plan to switch to Eliquis on discharge    Hyponatremia, mild  Monitor BMP    Gout- Allopurinol    Type 2 DM  Sliding scale insulin  Hypoglycemia precautions    COPD-continue Spiriva, Advair and Singulair    Discussed with son.   Diet DIET LOW SODIUM 2 GM;   DVT Prophylaxis [x] Lovenox, []  Heparin, [] SCDs, [] Ambulation   GI Prophylaxis [] PPI,  [] H2 Blocker,  [] Carafate,  [x] Diet/Tube Feeds   Code Status Full Code   Disposition Patient requires continued admission due to Respiratory failure   MDM [] Low, [x] Moderate,[]  High     Electronically signed by Martir Hernandez MD on 12/25/2020 at 7:41 AM

## 2020-12-25 NOTE — PROGRESS NOTES
Pt experienced a 5 beat run of V-Tach. Dr. Nuvia You notified via perfect serve. Waiting for response.

## 2020-12-25 NOTE — PLAN OF CARE
Problem: Airway Clearance - Ineffective  Goal: Achieve or maintain patent airway  12/25/2020 0818 by Krystian Waller RN  Outcome: Ongoing  12/24/2020 2325 by Mayur Florence RN  Outcome: Ongoing     Problem: Gas Exchange - Impaired  Goal: Absence of hypoxia  12/25/2020 0818 by Krystian Waller RN  Outcome: Ongoing  12/24/2020 2325 by Mayur Florence RN  Outcome: Ongoing  Goal: Promote optimal lung function  12/25/2020 0818 by Krystian Waller RN  Outcome: Ongoing  12/24/2020 2325 by Mayur Florence RN  Outcome: Ongoing     Problem: Breathing Pattern - Ineffective  Goal: Ability to achieve and maintain a regular respiratory rate  12/25/2020 0818 by Krystian Waller RN  Outcome: Ongoing  12/24/2020 2325 by Mayur Florence RN  Outcome: Ongoing     Problem:  Body Temperature -  Risk of, Imbalanced  Goal: Ability to maintain a body temperature within defined limits  12/25/2020 0818 by Krystian Waller RN  Outcome: Ongoing  12/24/2020 2325 by Mayur Florence RN  Outcome: Ongoing  Goal: Will regain or maintain usual level of consciousness  12/25/2020 0818 by Krystian Waller RN  Outcome: Ongoing  12/24/2020 2325 by Mayur Florence RN  Outcome: Ongoing  Goal: Complications related to the disease process, condition or treatment will be avoided or minimized  12/25/2020 0818 by Krystian Waller RN  Outcome: Ongoing  12/24/2020 2325 by Mayur Florence RN  Outcome: Ongoing     Problem: Isolation Precautions - Risk of Spread of Infection  Goal: Prevent transmission of infection  12/25/2020 0818 by Krystian Waller RN  Outcome: Ongoing  12/24/2020 2325 by Mayur Florence RN  Outcome: Ongoing     Problem: Nutrition Deficits  Goal: Optimize nutrtional status  12/25/2020 0818 by Krystian Waller RN  Outcome: Ongoing  12/24/2020 2325 by Mayur Florence RN  Outcome: Ongoing     Problem: Risk for Fluid Volume Deficit  Goal: Maintain normal heart rhythm  12/25/2020 0818 by Krystian Waller RN  Outcome: Ongoing  12/24/2020 2325 by Mayur Florence RN  Outcome: Ongoing  Goal: Maintain absence of muscle cramping  12/25/2020 0818 by Coty Cox RN  Outcome: Ongoing  12/24/2020 2325 by Mai Jameson RN  Outcome: Ongoing  Goal: Maintain normal serum potassium, sodium, calcium, phosphorus, and pH  12/25/2020 0818 by Coty Cox RN  Outcome: Ongoing  12/24/2020 2325 by Mai Jameson RN  Outcome: Ongoing     Problem: Loneliness or Risk for Loneliness  Goal: Demonstrate positive use of time alone when socialization is not possible  12/25/2020 0818 by Coty Cox RN  Outcome: Ongoing  12/24/2020 2325 by Mai Jameson RN  Outcome: Ongoing     Problem: Fatigue  Goal: Verbalize increase energy and improved vitality  12/25/2020 0818 by Coty Cox RN  Outcome: Ongoing  12/24/2020 2325 by Mai Jameson RN  Outcome: Ongoing     Problem: Patient Education: Go to Patient Education Activity  Goal: Patient/Family Education  12/25/2020 0818 by Coty Cox RN  Outcome: Ongoing  12/24/2020 2325 by Mai Jameson RN  Outcome: Ongoing     Problem: Pain:  Goal: Pain level will decrease  Description: Pain level will decrease  12/25/2020 0818 by Coty Cox RN  Outcome: Ongoing  12/24/2020 2325 by Mai Jameson RN  Outcome: Ongoing  Goal: Control of acute pain  Description: Control of acute pain  12/25/2020 0818 by Coty Cox RN  Outcome: Ongoing  12/24/2020 2325 by Mai Jameson RN  Outcome: Ongoing  Goal: Control of chronic pain  Description: Control of chronic pain  12/25/2020 0818 by Coty Cox RN  Outcome: Ongoing  12/24/2020 2325 by Mai Jameson RN  Outcome: Ongoing

## 2020-12-26 LAB
ALBUMIN SERPL-MCNC: 3.6 GM/DL (ref 3.4–5)
ALP BLD-CCNC: 47 IU/L (ref 40–128)
ALT SERPL-CCNC: 28 U/L (ref 10–40)
ANION GAP SERPL CALCULATED.3IONS-SCNC: 11 MMOL/L (ref 4–16)
APTT: 32.2 SECONDS (ref 25.1–37.1)
AST SERPL-CCNC: 23 IU/L (ref 15–37)
BASOPHILS ABSOLUTE: 0 K/CU MM
BASOPHILS RELATIVE PERCENT: 0.2 % (ref 0–1)
BILIRUB SERPL-MCNC: 0.6 MG/DL (ref 0–1)
BUN BLDV-MCNC: 24 MG/DL (ref 6–23)
CALCIUM SERPL-MCNC: 8.4 MG/DL (ref 8.3–10.6)
CHLORIDE BLD-SCNC: 101 MMOL/L (ref 99–110)
CO2: 23 MMOL/L (ref 21–32)
CREAT SERPL-MCNC: 0.9 MG/DL (ref 0.9–1.3)
CULTURE: NORMAL
D DIMER: <200 NG/ML(DDU)
DIFFERENTIAL TYPE: ABNORMAL
DOSE AMOUNT: NORMAL
DOSE TIME: NORMAL
EOSINOPHILS ABSOLUTE: 0 K/CU MM
EOSINOPHILS RELATIVE PERCENT: 0 % (ref 0–3)
FIBRINOGEN LEVEL: 302 MG/DL (ref 196.9–442.1)
GFR AFRICAN AMERICAN: >60 ML/MIN/1.73M2
GFR NON-AFRICAN AMERICAN: >60 ML/MIN/1.73M2
GLUCOSE BLD-MCNC: 192 MG/DL (ref 70–99)
GLUCOSE BLD-MCNC: 221 MG/DL (ref 70–99)
GLUCOSE BLD-MCNC: 232 MG/DL (ref 70–99)
GLUCOSE BLD-MCNC: 286 MG/DL (ref 70–99)
GLUCOSE BLD-MCNC: 291 MG/DL (ref 70–99)
HCT VFR BLD CALC: 39 % (ref 42–52)
HEMOGLOBIN: 13 GM/DL (ref 13.5–18)
HIGH SENSITIVE C-REACTIVE PROTEIN: 8.5 MG/L
IMMATURE NEUTROPHIL %: 2.9 % (ref 0–0.43)
INR BLD: 1.21 INDEX
LYMPHOCYTES ABSOLUTE: 0.7 K/CU MM
LYMPHOCYTES RELATIVE PERCENT: 6.3 % (ref 24–44)
Lab: NORMAL
MCH RBC QN AUTO: 28.5 PG (ref 27–31)
MCHC RBC AUTO-ENTMCNC: 33.3 % (ref 32–36)
MCV RBC AUTO: 85.5 FL (ref 78–100)
MONOCYTES ABSOLUTE: 0.4 K/CU MM
MONOCYTES RELATIVE PERCENT: 4.1 % (ref 0–4)
NUCLEATED RBC %: 0 %
PDW BLD-RTO: 16.1 % (ref 11.7–14.9)
PLATELET # BLD: 178 K/CU MM (ref 140–440)
PMV BLD AUTO: 9.4 FL (ref 7.5–11.1)
POTASSIUM SERPL-SCNC: 4.3 MMOL/L (ref 3.5–5.1)
PROCALCITONIN: 0.08
PROTHROMBIN TIME: 14.7 SECONDS (ref 11.7–14.5)
RBC # BLD: 4.56 M/CU MM (ref 4.6–6.2)
SEGMENTED NEUTROPHILS ABSOLUTE COUNT: 9.3 K/CU MM
SEGMENTED NEUTROPHILS RELATIVE PERCENT: 86.5 % (ref 36–66)
SODIUM BLD-SCNC: 135 MMOL/L (ref 135–145)
SPECIMEN: NORMAL
TOTAL IMMATURE NEUTOROPHIL: 0.31 K/CU MM
TOTAL NUCLEATED RBC: 0 K/CU MM
TOTAL PROTEIN: 5.9 GM/DL (ref 6.4–8.2)
VANCOMYCIN TROUGH: 11.1 UG/ML (ref 10–20)
WBC # BLD: 10.8 K/CU MM (ref 4–10.5)

## 2020-12-26 PROCEDURE — 6370000000 HC RX 637 (ALT 250 FOR IP): Performed by: INTERNAL MEDICINE

## 2020-12-26 PROCEDURE — 94761 N-INVAS EAR/PLS OXIMETRY MLT: CPT

## 2020-12-26 PROCEDURE — 85379 FIBRIN DEGRADATION QUANT: CPT

## 2020-12-26 PROCEDURE — 84145 PROCALCITONIN (PCT): CPT

## 2020-12-26 PROCEDURE — 2500000003 HC RX 250 WO HCPCS: Performed by: HOSPITALIST

## 2020-12-26 PROCEDURE — 85610 PROTHROMBIN TIME: CPT

## 2020-12-26 PROCEDURE — 6360000002 HC RX W HCPCS: Performed by: INTERNAL MEDICINE

## 2020-12-26 PROCEDURE — 94640 AIRWAY INHALATION TREATMENT: CPT

## 2020-12-26 PROCEDURE — 86141 C-REACTIVE PROTEIN HS: CPT

## 2020-12-26 PROCEDURE — 2580000003 HC RX 258: Performed by: INTERNAL MEDICINE

## 2020-12-26 PROCEDURE — 85384 FIBRINOGEN ACTIVITY: CPT

## 2020-12-26 PROCEDURE — 85730 THROMBOPLASTIN TIME PARTIAL: CPT

## 2020-12-26 PROCEDURE — 2700000000 HC OXYGEN THERAPY PER DAY

## 2020-12-26 PROCEDURE — 85025 COMPLETE CBC W/AUTO DIFF WBC: CPT

## 2020-12-26 PROCEDURE — 2060000000 HC ICU INTERMEDIATE R&B

## 2020-12-26 PROCEDURE — 2580000003 HC RX 258: Performed by: HOSPITALIST

## 2020-12-26 PROCEDURE — 80053 COMPREHEN METABOLIC PANEL: CPT

## 2020-12-26 PROCEDURE — 82962 GLUCOSE BLOOD TEST: CPT

## 2020-12-26 RX ADMIN — ALBUTEROL SULFATE 2 PUFF: 90 AEROSOL, METERED RESPIRATORY (INHALATION) at 16:34

## 2020-12-26 RX ADMIN — CEFEPIME 2 G: 2 INJECTION, POWDER, FOR SOLUTION INTRAVENOUS at 02:45

## 2020-12-26 RX ADMIN — DIGOXIN 250 MCG: 250 TABLET ORAL at 09:32

## 2020-12-26 RX ADMIN — SODIUM CHLORIDE 100 MG: 9 INJECTION, SOLUTION INTRAVENOUS at 13:25

## 2020-12-26 RX ADMIN — SODIUM CHLORIDE, PRESERVATIVE FREE 10 ML: 5 INJECTION INTRAVENOUS at 13:31

## 2020-12-26 RX ADMIN — INSULIN LISPRO 2 UNITS: 100 INJECTION, SOLUTION INTRAVENOUS; SUBCUTANEOUS at 09:37

## 2020-12-26 RX ADMIN — SODIUM CHLORIDE, PRESERVATIVE FREE 10 ML: 5 INJECTION INTRAVENOUS at 23:34

## 2020-12-26 RX ADMIN — VANCOMYCIN HYDROCHLORIDE 1750 MG: 5 INJECTION, POWDER, LYOPHILIZED, FOR SOLUTION INTRAVENOUS at 10:18

## 2020-12-26 RX ADMIN — SODIUM CHLORIDE, PRESERVATIVE FREE 10 ML: 5 INJECTION INTRAVENOUS at 09:44

## 2020-12-26 RX ADMIN — TRAZODONE HYDROCHLORIDE 100 MG: 50 TABLET ORAL at 23:16

## 2020-12-26 RX ADMIN — CEFEPIME 2 G: 2 INJECTION, POWDER, FOR SOLUTION INTRAVENOUS at 09:31

## 2020-12-26 RX ADMIN — OYSTER SHELL CALCIUM WITH VITAMIN D 1 TABLET: 500; 200 TABLET, FILM COATED ORAL at 09:31

## 2020-12-26 RX ADMIN — DEXAMETHASONE 6 MG: 4 TABLET ORAL at 09:32

## 2020-12-26 RX ADMIN — FUROSEMIDE 20 MG: 20 TABLET ORAL at 09:32

## 2020-12-26 RX ADMIN — PANTOPRAZOLE SODIUM 40 MG: 40 TABLET, DELAYED RELEASE ORAL at 09:47

## 2020-12-26 RX ADMIN — INSULIN LISPRO 6 UNITS: 100 INJECTION, SOLUTION INTRAVENOUS; SUBCUTANEOUS at 12:43

## 2020-12-26 RX ADMIN — ALLOPURINOL 100 MG: 100 TABLET ORAL at 09:32

## 2020-12-26 RX ADMIN — CEFEPIME 2 G: 2 INJECTION, POWDER, FOR SOLUTION INTRAVENOUS at 15:37

## 2020-12-26 RX ADMIN — ALBUTEROL SULFATE 2 PUFF: 90 AEROSOL, METERED RESPIRATORY (INHALATION) at 21:07

## 2020-12-26 RX ADMIN — ALBUTEROL SULFATE 2 PUFF: 90 AEROSOL, METERED RESPIRATORY (INHALATION) at 09:30

## 2020-12-26 RX ADMIN — ZINC SULFATE 220 MG (50 MG) CAPSULE 50 MG: CAPSULE at 09:32

## 2020-12-26 RX ADMIN — BUDESONIDE AND FORMOTEROL FUMARATE DIHYDRATE 2 PUFF: 80; 4.5 AEROSOL RESPIRATORY (INHALATION) at 09:30

## 2020-12-26 RX ADMIN — DILTIAZEM HYDROCHLORIDE 180 MG: 180 CAPSULE, COATED, EXTENDED RELEASE ORAL at 09:32

## 2020-12-26 RX ADMIN — MONTELUKAST 10 MG: 10 TABLET, FILM COATED ORAL at 09:32

## 2020-12-26 RX ADMIN — INSULIN LISPRO 6 UNITS: 100 INJECTION, SOLUTION INTRAVENOUS; SUBCUTANEOUS at 17:32

## 2020-12-26 RX ADMIN — ENOXAPARIN SODIUM 120 MG: 120 INJECTION SUBCUTANEOUS at 17:32

## 2020-12-26 RX ADMIN — VANCOMYCIN HYDROCHLORIDE 1750 MG: 5 INJECTION, POWDER, LYOPHILIZED, FOR SOLUTION INTRAVENOUS at 23:30

## 2020-12-26 RX ADMIN — BUDESONIDE AND FORMOTEROL FUMARATE DIHYDRATE 2 PUFF: 80; 4.5 AEROSOL RESPIRATORY (INHALATION) at 21:07

## 2020-12-26 RX ADMIN — CEFEPIME 2 G: 2 INJECTION, POWDER, FOR SOLUTION INTRAVENOUS at 23:17

## 2020-12-26 ASSESSMENT — PAIN DESCRIPTION - PROGRESSION
CLINICAL_PROGRESSION: GRADUALLY IMPROVING
CLINICAL_PROGRESSION: NOT CHANGED
CLINICAL_PROGRESSION: GRADUALLY IMPROVING
CLINICAL_PROGRESSION: NOT CHANGED
CLINICAL_PROGRESSION: NOT CHANGED
CLINICAL_PROGRESSION: GRADUALLY IMPROVING
CLINICAL_PROGRESSION: GRADUALLY IMPROVING
CLINICAL_PROGRESSION: NOT CHANGED
CLINICAL_PROGRESSION: GRADUALLY IMPROVING
CLINICAL_PROGRESSION: NOT CHANGED
CLINICAL_PROGRESSION: GRADUALLY IMPROVING

## 2020-12-26 ASSESSMENT — PAIN - FUNCTIONAL ASSESSMENT
PAIN_FUNCTIONAL_ASSESSMENT: ACTIVITIES ARE NOT PREVENTED

## 2020-12-26 ASSESSMENT — PAIN SCALES - GENERAL
PAINLEVEL_OUTOF10: 0

## 2020-12-26 ASSESSMENT — PAIN SCALES - WONG BAKER
WONGBAKER_NUMERICALRESPONSE: 0
WONGBAKER_NUMERICALRESPONSE: 0

## 2020-12-26 NOTE — PROGRESS NOTES
Pulmonary and Critical Care  Progress Note    Subjective: The patient has improved. On N/C. Shortness of breath has improved. Chest pain none  Addressing respiratory complaints Patient is negative for  hemoptysis and cyanosis  CONSTITUTIONAL:  negative for fevers and chills      Past Medical History:     has a past medical history of Aortic stenosis, mild, Asthma, intrinsic, Atrial fibrillation ([address] Cholelithiasis, Colonic polyp, COPD (chronic obstructive pulmonary disease) ([address] DDD (degenerative disc disease), lumbar, Diabetes mellitus type 2, controlled ([address] Diabetic neuropathy, type II diabetes mellitus ([address] Dilated cardiomyopathy ([address] GERD (gastroesophageal reflux disease), Gout, H/O cardiovascular stress test, H/O echocardiogram, History of cardiovascular stress test, History of complete electrocardiogram, History of echocardiogram, History of total right hip arthroplasty, Hx of Doppler echocardiogram, Hx of echocardiogram, Hyperlipidemia, IFG (impaired fasting glucose), Knee pain, bilateral, Knee pain, left, Long-term (current) use of anticoagulants, Peptic ulcer disease, Pre-op evaluation, Testosterone deficiency, Ventricular tachycardia ([address] and VHD (valvular heart disease). has a past surgical history that includes polypectomy (2/98- jaimie); polypectomy (9/10/02- dr Germaine Perez); laparoscopy (3/08, dr Luis Aguilar); ventral hernia repair (10/08, dr Luis Aguilar); Wrist fracture surgery (Left, 2/6/14); Inguinal hernia repair (Right, 11/2016); and Hip Arthroplasty (Right, 09/12/2018). reports that he quit smoking about 7 years ago. His smoking use included cigarettes. He started smoking about 57 years ago. He has a 39.00 pack-year smoking history. He has never used smokeless tobacco. He reports current alcohol use. He reports that he does not use drugs.   Family history:  family history includes Diabetes in his father; High Blood Pressure in his mother; Hypertension in his mother; Hypotension in his [facility]    Radiology Review:  Pertinent images / reports were reviewed as a part of this visit. Assessment:     Patient Active Problem List   Diagnosis    Atrial fibrillation (HCC)    GERD (gastroesophageal reflux disease)    Asthma    Knee pain, bilateral    DDD (degenerative disc disease), lumbar    Testosterone deficiency    Dilated cardiomyopathy ([address]    Gout    Hyperbilirubinemia    Mild intermittent asthma without complication    Cholelithiasis    Colonic polyp    Closed fracture of anterior lip of right acetabulum without additional fracture ([address]    Closed anterior dislocation of left shoulder    VT (ventricular tachycardia) (HCC)    SVT (supraventricular tachycardia) (HCC)    NSVT (nonsustained ventricular tachycardia) (HCC)    Gait disturbance    Leg weakness, bilateral    Uncontrolled pain    VHD (valvular heart disease)    IFG (impaired fasting glucose)    Diabetes mellitus type 2, controlled ([address]    Diabetic neuropathy, type II diabetes mellitus ([address]    COPD (chronic obstructive pulmonary disease) (HCC)    Nonrheumatic aortic valve stenosis    Acute respiratory failure (HCC)    Atrial thrombus       Plan:   1. Overall the patient has improved. 2. [address] Activity.     Martir Rivas MD  12/26/2020  1:11 PM

## 2020-12-26 NOTE — PROGRESS NOTES
upper lobes and the right middle lobe. Focal consolidations are seen within the bilateral lower lobes. Findings are concerning for an atypical/multifocal pneumonia. There is no evidence of pneumothorax. There is a small left pneumothorax and trace right pneumothorax. Bronchial wall thickening is seen within the bilateral lower lobes. Upper Abdomen: Limited images of the upper abdomen are unremarkable. Soft Tissues/Bones: No acute bone or soft tissue abnormality. 1. There is no evidence for a pulmonary embolism. There is thrombus within the left atrial appendage. Left atrial enlargement is noted. 2. Patchy areas of ground-glass opacities in a peripheral distribution are seen within the bilateral upper lobes and in the right middle lobe with more focal consolidations in the bilateral lower lobes. Findings are concerning for an atypical/multifocal pneumonia. Commonly reported imaging features of COVID-19 pneumonia are present. Other processes such as influenza pneumonia and organizing pneumonia, as can be seen with drug toxicity and connective tissue disease, can cause a similar imaging pattern. PneTyp 3. Small bilateral pleural effusions. Bibasilar atelectasis is noted. 4. Bronchial wall thickening is seen involving the bilateral lower lobes, likely related to acute bronchitis. Findings were discussed with Karlos Lin at 3:28 am on 12/22/2020. Assessment and Plan:   Raul Bowden is a 65 y.o.  male  who presents with Acute respiratory failure ([address]    Acute hypoxemic respiratory failure  COVID-19 pneumonia   Probable super imposed bacterial infection   Covid positive on 12/17  Currently weaning off oxygen, at 7 L now  S/p convalescent plasma  Continue remdesivir, started on 12/22  Continue Decadron  Continue empiric antibiotics  Echo with preserved LV function but dilated atria  On therapeutic Lovenox for A. Fib    Left atrial thrombus  Persistent A.  Fib  Was on Coumadin, currently discontinued due to subtherapeutic levels  Continue therapeutic Lovenox  Continue Cardizem for rate control  Cardiology recommendations appreciated-plan to switch to Eliquis on discharge    Hyponatremia, mild  Monitor BMP    Gout- Allopurinol    Type 2 DM  Sliding scale insulin  Hypoglycemia precautions    COPD-continue Spiriva, Advair and Singulair    Weaning off oxygen, will DC if would be able to wean below 5 L  Diet DIET LOW SODIUM 2 GM;   DVT Prophylaxis [x] Lovenox, []  Heparin, [] SCDs, [] Ambulation   GI Prophylaxis [] PPI,  [] H2 Blocker,  [] Carafate,  [x] Diet/Tube Feeds   Code Status Full Code   Disposition Patient requires continued admission due to Respiratory failure   MDM [] Low, [x] Moderate,[]  High     Electronically signed by Montserrat Langley MD on 12/26/2020 at 1:45 PM

## 2020-12-26 NOTE — PROGRESS NOTES
[address] [facility]  consulted by Dr. Maxi Toribio for monitoring and adjustment. Indication for treatment: CAP  Goal trough: 15 mcg/mL    Pertinent Laboratory Values:   Temp Readings from Last 3 Encounters:   12/26/20 98.1 °F (36.7 °C) (Oral)   12/18/20 98 °F (36.7 °C) (Oral)   03/11/20 98.5 °F (36.9 °C)     Recent Labs     12/24/20  0511 12/25/20  0500 12/26/20  0320   WBC 8.6 9.7 10.8*     Recent Labs     12/24/20  0511 12/25/20  0500 12/26/20  0320   BUN 23 23 24*   CREATININE 0.8* 0.8* 0.9     Estimated Creatinine Clearance: 111 mL/min (based on SCr of 0.9 mg/dL). Intake/Output Summary (Last 24 hours) at 12/26/2020 1545  Last data filed at 12/26/2020 0539  Gross per 24 hour   Intake 660 ml   Output 3900 ml   Net -3240 ml       Pertinent Cultures:  Date    Source    Results  12/22   Nasal MRSA Screen  Negative  12/22   Respiratory   Ordered  12/22                           Rapid Flu A/B   Negative  12/16                           COVID-19   Detected    VANCOMYCIN TROUGH:    Recent Labs     12/23/20  2050 12/25/20  2300   VANCOTROUGH 4.7* 11.1     VANCOMYCIN RANDOM:  No results for input(s): VANCORANDOM in the last 72 hours.     Assessment:  · WBC slightly elevated (on Dexamethasone); patient remains afebrile  · SCR remains normal, UOP good  · Day(s) of therapy: 5  · Vancomycin concentration:   · 12/23: 4.7, sub-therapeutic  · 12/26:  11.1, Ok to re-dose    Plan:  · Renal trends remain normal  · Vanco level therapeutic @ 11.1  · Continue Vancomycin 1750 mg IVPB q12h  · Given advanced age and elevated BMI, I am hesitant to shorten dosing interval <12h due to risk of ROMAIN and anticipated accumulation  · Repeat trough level on 12/27  · Pharmacy will continue to monitor patient and adjust therapy as indicated    Hayley 3 12/27/2020 @ 8121    Thank you for the consult,  Kaylan Candelaria RPh   12/26/2020 3:45 PM

## 2020-12-27 LAB
ALBUMIN SERPL-MCNC: 3.5 GM/DL (ref 3.4–5)
ALP BLD-CCNC: 46 IU/L (ref 40–129)
ALT SERPL-CCNC: 31 U/L (ref 10–40)
ANION GAP SERPL CALCULATED.3IONS-SCNC: 11 MMOL/L (ref 4–16)
ANISOCYTOSIS: ABNORMAL
APTT: 28.5 SECONDS (ref 25.1–37.1)
AST SERPL-CCNC: 21 IU/L (ref 15–37)
BANDED NEUTROPHILS ABSOLUTE COUNT: 0.64 K/CU MM
BANDED NEUTROPHILS RELATIVE PERCENT: 6 % (ref 5–11)
BILIRUB SERPL-MCNC: 0.6 MG/DL (ref 0–1)
BUN BLDV-MCNC: 22 MG/DL (ref 6–23)
CALCIUM SERPL-MCNC: 8.6 MG/DL (ref 8.3–10.6)
CHLORIDE BLD-SCNC: 97 MMOL/L (ref 99–110)
CO2: 24 MMOL/L (ref 21–32)
CREAT SERPL-MCNC: 0.8 MG/DL (ref 0.9–1.3)
D DIMER: <200 NG/ML(DDU)
DIFFERENTIAL TYPE: ABNORMAL
DOSE AMOUNT: NORMAL
DOSE TIME: NORMAL
EKG ATRIAL RATE: 277 BPM
EKG ATRIAL RATE: 74 BPM
EKG DIAGNOSIS: NORMAL
EKG DIAGNOSIS: NORMAL
EKG Q-T INTERVAL: 448 MS
EKG Q-T INTERVAL: 458 MS
EKG QRS DURATION: 96 MS
EKG QRS DURATION: 96 MS
EKG QTC CALCULATION (BAZETT): 396 MS
EKG QTC CALCULATION (BAZETT): 420 MS
EKG R AXIS: 60 DEGREES
EKG R AXIS: 62 DEGREES
EKG T AXIS: 62 DEGREES
EKG T AXIS: 65 DEGREES
EKG VENTRICULAR RATE: 45 BPM
EKG VENTRICULAR RATE: 53 BPM
FIBRINOGEN LEVEL: 275 MG/DL (ref 196.9–442.1)
GFR AFRICAN AMERICAN: >60 ML/MIN/1.73M2
GFR NON-AFRICAN AMERICAN: >60 ML/MIN/1.73M2
GLUCOSE BLD-MCNC: 191 MG/DL (ref 70–99)
GLUCOSE BLD-MCNC: 199 MG/DL (ref 70–99)
GLUCOSE BLD-MCNC: 244 MG/DL (ref 70–99)
GLUCOSE BLD-MCNC: 260 MG/DL (ref 70–99)
GLUCOSE BLD-MCNC: 313 MG/DL (ref 70–99)
HCT VFR BLD CALC: 40 % (ref 42–52)
HEMOGLOBIN: 13 GM/DL (ref 13.5–18)
HIGH SENSITIVE C-REACTIVE PROTEIN: 4.7 MG/L
INR BLD: 1.07 INDEX
LYMPHOCYTES ABSOLUTE: 0.6 K/CU MM
LYMPHOCYTES RELATIVE PERCENT: 6 % (ref 24–44)
MCH RBC QN AUTO: 28.3 PG (ref 27–31)
MCHC RBC AUTO-ENTMCNC: 32.5 % (ref 32–36)
MCV RBC AUTO: 87 FL (ref 78–100)
METAMYELOCYTES ABSOLUTE COUNT: 0.11 K/CU MM
METAMYELOCYTES PERCENT: 1 %
MONOCYTES ABSOLUTE: 0.4 K/CU MM
MONOCYTES RELATIVE PERCENT: 4 % (ref 0–4)
PDW BLD-RTO: 16.1 % (ref 11.7–14.9)
PLATELET # BLD: 184 K/CU MM (ref 140–440)
PMV BLD AUTO: 9.5 FL (ref 7.5–11.1)
POLYCHROMASIA: ABNORMAL
POTASSIUM SERPL-SCNC: 4.2 MMOL/L (ref 3.5–5.1)
PROCALCITONIN: 0.07
PROTHROMBIN TIME: 13 SECONDS (ref 11.7–14.5)
RBC # BLD: 4.6 M/CU MM (ref 4.6–6.2)
SEGMENTED NEUTROPHILS ABSOLUTE COUNT: 8.9 K/CU MM
SEGMENTED NEUTROPHILS RELATIVE PERCENT: 83 % (ref 36–66)
SODIUM BLD-SCNC: 132 MMOL/L (ref 135–145)
TOTAL PROTEIN: 5.7 GM/DL (ref 6.4–8.2)
TOXIC GRANULATION: PRESENT
VANCOMYCIN TROUGH: 15.6 UG/ML (ref 10–20)
WBC # BLD: 10.6 K/CU MM (ref 4–10.5)

## 2020-12-27 PROCEDURE — 80202 ASSAY OF VANCOMYCIN: CPT

## 2020-12-27 PROCEDURE — 85379 FIBRIN DEGRADATION QUANT: CPT

## 2020-12-27 PROCEDURE — 84145 PROCALCITONIN (PCT): CPT

## 2020-12-27 PROCEDURE — 85610 PROTHROMBIN TIME: CPT

## 2020-12-27 PROCEDURE — 6360000002 HC RX W HCPCS: Performed by: INTERNAL MEDICINE

## 2020-12-27 PROCEDURE — 2060000000 HC ICU INTERMEDIATE R&B

## 2020-12-27 PROCEDURE — 86141 C-REACTIVE PROTEIN HS: CPT

## 2020-12-27 PROCEDURE — 85007 BL SMEAR W/DIFF WBC COUNT: CPT

## 2020-12-27 PROCEDURE — 6370000000 HC RX 637 (ALT 250 FOR IP): Performed by: INTERNAL MEDICINE

## 2020-12-27 PROCEDURE — 82962 GLUCOSE BLOOD TEST: CPT

## 2020-12-27 PROCEDURE — 85027 COMPLETE CBC AUTOMATED: CPT

## 2020-12-27 PROCEDURE — 94761 N-INVAS EAR/PLS OXIMETRY MLT: CPT

## 2020-12-27 PROCEDURE — 80053 COMPREHEN METABOLIC PANEL: CPT

## 2020-12-27 PROCEDURE — 93005 ELECTROCARDIOGRAM TRACING: CPT | Performed by: PHYSICIAN ASSISTANT

## 2020-12-27 PROCEDURE — 93010 ELECTROCARDIOGRAM REPORT: CPT | Performed by: INTERNAL MEDICINE

## 2020-12-27 PROCEDURE — 85730 THROMBOPLASTIN TIME PARTIAL: CPT

## 2020-12-27 PROCEDURE — 2700000000 HC OXYGEN THERAPY PER DAY

## 2020-12-27 PROCEDURE — 94640 AIRWAY INHALATION TREATMENT: CPT

## 2020-12-27 PROCEDURE — 2580000003 HC RX 258: Performed by: INTERNAL MEDICINE

## 2020-12-27 PROCEDURE — 99233 SBSQ HOSP IP/OBS HIGH 50: CPT | Performed by: INTERNAL MEDICINE

## 2020-12-27 PROCEDURE — 85384 FIBRINOGEN ACTIVITY: CPT

## 2020-12-27 RX ADMIN — ENOXAPARIN SODIUM 120 MG: 120 INJECTION SUBCUTANEOUS at 18:31

## 2020-12-27 RX ADMIN — INSULIN LISPRO 8 UNITS: 100 INJECTION, SOLUTION INTRAVENOUS; SUBCUTANEOUS at 12:44

## 2020-12-27 RX ADMIN — CEFEPIME 2 G: 2 INJECTION, POWDER, FOR SOLUTION INTRAVENOUS at 07:53

## 2020-12-27 RX ADMIN — ALLOPURINOL 100 MG: 100 TABLET ORAL at 08:40

## 2020-12-27 RX ADMIN — BUDESONIDE AND FORMOTEROL FUMARATE DIHYDRATE 2 PUFF: 80; 4.5 AEROSOL RESPIRATORY (INHALATION) at 08:40

## 2020-12-27 RX ADMIN — SODIUM CHLORIDE, PRESERVATIVE FREE 10 ML: 5 INJECTION INTRAVENOUS at 08:59

## 2020-12-27 RX ADMIN — TRAZODONE HYDROCHLORIDE 100 MG: 50 TABLET ORAL at 20:46

## 2020-12-27 RX ADMIN — DEXAMETHASONE 6 MG: 4 TABLET ORAL at 08:40

## 2020-12-27 RX ADMIN — FUROSEMIDE 20 MG: 20 TABLET ORAL at 08:40

## 2020-12-27 RX ADMIN — ENOXAPARIN SODIUM 120 MG: 120 INJECTION SUBCUTANEOUS at 06:19

## 2020-12-27 RX ADMIN — ALBUTEROL SULFATE 2 PUFF: 90 AEROSOL, METERED RESPIRATORY (INHALATION) at 08:39

## 2020-12-27 RX ADMIN — VANCOMYCIN HYDROCHLORIDE 1750 MG: 5 INJECTION, POWDER, LYOPHILIZED, FOR SOLUTION INTRAVENOUS at 20:46

## 2020-12-27 RX ADMIN — INSULIN LISPRO 2 UNITS: 100 INJECTION, SOLUTION INTRAVENOUS; SUBCUTANEOUS at 08:45

## 2020-12-27 RX ADMIN — PANTOPRAZOLE SODIUM 40 MG: 40 TABLET, DELAYED RELEASE ORAL at 06:19

## 2020-12-27 RX ADMIN — CEFEPIME 2 G: 2 INJECTION, POWDER, FOR SOLUTION INTRAVENOUS at 16:59

## 2020-12-27 RX ADMIN — TIOTROPIUM BROMIDE INHALATION SPRAY 2 PUFF: 3.12 SPRAY, METERED RESPIRATORY (INHALATION) at 08:40

## 2020-12-27 RX ADMIN — VANCOMYCIN HYDROCHLORIDE 1750 MG: 5 INJECTION, POWDER, LYOPHILIZED, FOR SOLUTION INTRAVENOUS at 10:13

## 2020-12-27 RX ADMIN — SODIUM CHLORIDE, PRESERVATIVE FREE 10 ML: 5 INJECTION INTRAVENOUS at 20:47

## 2020-12-27 RX ADMIN — ALBUTEROL SULFATE 2 PUFF: 90 AEROSOL, METERED RESPIRATORY (INHALATION) at 12:03

## 2020-12-27 RX ADMIN — OYSTER SHELL CALCIUM WITH VITAMIN D 1 TABLET: 500; 200 TABLET, FILM COATED ORAL at 08:45

## 2020-12-27 RX ADMIN — MONTELUKAST 10 MG: 10 TABLET, FILM COATED ORAL at 08:40

## 2020-12-27 RX ADMIN — INSULIN LISPRO 6 UNITS: 100 INJECTION, SOLUTION INTRAVENOUS; SUBCUTANEOUS at 17:36

## 2020-12-27 RX ADMIN — ZINC SULFATE 220 MG (50 MG) CAPSULE 50 MG: CAPSULE at 08:40

## 2020-12-27 ASSESSMENT — PAIN DESCRIPTION - PROGRESSION
CLINICAL_PROGRESSION: GRADUALLY IMPROVING
CLINICAL_PROGRESSION: GRADUALLY IMPROVING

## 2020-12-27 ASSESSMENT — PAIN SCALES - GENERAL
PAINLEVEL_OUTOF10: 0

## 2020-12-27 NOTE — PROGRESS NOTES
Cardiology Progress Note     Admit Date:  12/21/2020    Consult reason/ Seen today for :   Left atrial thrombus   Atrial fibrillation with slow ventricular response. Subjective and  Overnight Events :     Overall condition is improved. Overnight patient was noted to have A. fib with slow ventricular response with heart rate in the range of 30-40. Telemetry reviewed: No significant pauses noted. Chief complain on admission : 65 y. o.year old who is admitted for  Chief Complaint   Patient presents with    Shortness of Breath     covid + on 12/16, 82 % on arrival by EMS      Assessment / Plan:    Reconsulted to evaluate patient for bradycardia    Left atrial thrombus in setting of subtherapeutic INR but he says he was taking Coumadin regularly, Start Eliquis twice daily at discharge continue Lovenox for now  Atrial fibrillation with slow ventricular response. Current heart rate is 55 to 60 bpm.  Will hold digoxin and diltiazem. Patient remains asymptomatic. Nocturnal bradycardia is expected. Watch out for any prolonged pauses. Discontinue Coumadin. Currently on Lovenox. Patient can be switched over to Eliquis 5 mg twice daily. Covid pneumonia treatment as per primary team  HTN: stable, continue Lasix.   DVT Prophylaxis if no contraindication  Follow-up with primary cardiologist  Will need repeat imaging either CT scan or TIM eventually in 6 to 8 weeks          Past medical history:    has a past medical history of Aortic stenosis, mild, Asthma, intrinsic, Atrial fibrillation ([address] Cholelithiasis, Colonic polyp, COPD (chronic obstructive pulmonary disease) ([address] DDD (degenerative disc disease), lumbar, Diabetes mellitus type 2, controlled ([address] Diabetic neuropathy, type II diabetes mellitus ([address] Dilated cardiomyopathy ([address] GERD (gastroesophageal reflux disease), Gout, H/O cardiovascular stress test, H/O echocardiogram, History of cardiovascular stress test, History of complete electrocardiogram, History of echocardiogram, History of total right hip arthroplasty, Hx of Doppler echocardiogram, Hx of echocardiogram, Hyperlipidemia, IFG (impaired fasting glucose), Knee pain, bilateral, Knee pain, left, Long-term (current) use of anticoagulants, Peptic ulcer disease, Pre-op evaluation, Testosterone deficiency, Ventricular tachycardia ([address] and VHD (valvular heart disease). Past surgical history:   has a past surgical history that includes polypectomy (2/98- jaimie); polypectomy (9/10/02- dr Germaine Perez); laparoscopy (3/08, dr Luis Aguilar); ventral hernia repair (10/08, dr Luis Aguilar); Wrist fracture surgery (Left, 2/6/14); Inguinal hernia repair (Right, 11/2016); and Hip Arthroplasty (Right, 09/12/2018). Social History:   reports that he quit smoking about 7 years ago. His smoking use included cigarettes. He started smoking about 57 years ago. He has a 39.00 pack-year smoking history. He has never used smokeless tobacco. He reports current alcohol use. He reports that he does not use drugs. Family history:  family history includes Diabetes in his father; High Blood Pressure in his mother; Hypertension in his mother; Hypotension in his father; Other in his father and mother.     Allergies   Allergen Reactions    Lisinopril      Chest tightness    Simvastatin Other (See Comments)     Muscle cramps       Review of Systems:    All 14 systems were reviewed and are negative  Except for the positive findings  which as documented     /73   Pulse 64   Temp 97.8 °F (36.6 °C) (Oral)   Resp 17   Ht 6' 5\" (1.956 m)   Wt 287 lb 7.7 oz (130.4 kg)   SpO2 94%   BMI 34.09 kg/m²       Intake/Output Summary (Last 24 hours) at 12/27/2020 1531  Last data filed at 12/27/2020 1425  Gross per 24 hour   Intake 580 ml   Output 3725 ml   Net -3145 ml     Physical Exam:  Constitutional:  Well developed, Well nourished, No acute distress, Non-toxic appearance. HENT:  Normocephalic, Atraumatic, Bilateral external ears normal, Oropharynx moist, No oral exudates, Nose normal. Neck- Normal range of motion, No tenderness, Supple, No stridor. Eyes:  PERRL, EOMI, Conjunctiva normal, No discharge. Respiratory:  Normal breath sounds, No respiratory distress, No wheezing, No chest tenderness. Cardiovascular:  Normal heart rate, IRIR rhythm, No murmurs, No rubs, No gallops, JVP not elevated  Abdomen/GI:  Bowel sounds normal, Soft, No tenderness, No masses, No pulsatile masses. Musculoskeletal:  Intact distal pulses, No edema, No tenderness, No cyanosis, No clubbing. Good range of motion in all major joints. No tenderness to palpation or major deformities noted. Back- No tenderness. Integument:  Warm, Dry, No erythema, No rash. Lymphatic:  No lymphadenopathy noted. Neurologic:  Alert & oriented x 3, Normal motor function, Normal sensory function, No focal deficits noted.    Psychiatric:  Affect  and  Mood :no change    Medications:    vancomycin  1,750 mg Intravenous Q12H    zinc sulfate  50 mg Oral Daily    calcium-vitamin D  1 tablet Oral Daily    sodium chloride flush  10 mL Intravenous 2 times per day    dexamethasone  6 mg Oral Daily    sodium chloride  20 mL Intravenous Once    cefepime  2 g Intravenous Q8H    allopurinol  100 mg Oral Daily    digoxin  250 mcg Oral Daily    dilTIAZem  180 mg Oral Daily    budesonide-formoterol  2 puff Inhalation BID    furosemide  20 mg Oral Daily    insulin lispro  0-12 Units Subcutaneous TID WC    insulin lispro  0-6 Units Subcutaneous Nightly    montelukast  10 mg Oral Daily    pantoprazole  40 mg Oral QAM AC    tiotropium  2 puff Inhalation Daily    traZODone  100 mg Oral Nightly    enoxaparin  1 mg/kg Subcutaneous Q12H    albuterol sulfate HFA  2 puff Inhalation Q6H WA       sodium chloride flush, promethazine **OR** ondansetron, polyethylene glycol, acetaminophen **OR** acetaminophen, sodium chloride    Lab Data:  CBC:   Recent Labs     12/25/20  0500 12/26/20  0320 12/27/20  0600   WBC 9.7 10.8* 10.6*   HGB 12.8* 13.0* 13.0*   HCT 38.9* 39.0* 40.0*   MCV 85.7 85.5 87.0    178 184     BMP:   Recent Labs     12/25/20  0500 12/26/20  0320 12/27/20  0600    135 132*   K 4.2 4.3 4.2    101 97*   CO2 24 23 24   PHOS 3.2  --   --    BUN 23 24* 22   CREATININE 0.8* 0.9 0.8*     PT/INR:   Recent Labs     12/25/20  0500 12/26/20  0320 12/27/20  0600   PROTIME 15.4* 14.7* 13.0   INR 1.27 1.21 1.07     BNP:    Recent Labs     12/25/20  0500   PROBNP 530.6*     TROPONIN: No results for input(s): TROPONINT in the last 72 hours. ECHO :   echocardiogram    Assessment:  65 y. o.year old who is admitted for  Chief Complaint   Patient presents with    Shortness of Breath     covid + on 12/16, 82 % on arrival by EMS    , active issues as noted below:  Impression:  Principal Problem:    Acute respiratory failure ([address]  Active Problems:    Atrial fibrillation ([address]    NSVT (nonsustained ventricular tachycardia) (HCC)    Atrial thrombus  Resolved Problems:    * No resolved hospital problems.  Fritz Renae MD 12/27/2020 3:31 PM

## 2020-12-27 NOTE — PROGRESS NOTES
Hospitalist Progress Note      Name:  Sneha Leavitt /Age/Sex: 1950  (65 y.o. male)   MRN & CSN:  [id number] & [id number] Admission Date/Time: 2020 11:53 PM   Location:  -A PCP: Sandi Rivera MD         Hospital Day: 7    History of Present Illness:     Chief Complaint: Acute respiratory failure ([address]  Sneha Leavitt is a 65 y.o.  male  who presents with SOB     The patient seen and examined at bedside. He says continues to feel better with breathing today. Denies having any chest pain. Bradycardia overnight with HR high 30's and low 40's per nurse. Ten point ROS reviewed negative, unless as noted above    Objective:        Intake/Output Summary (Last 24 hours) at 2020 1136  Last data filed at 2020 1100  Gross per 24 hour   Intake 580 ml   Output 2600 ml   Net -2020 ml      Vitals:   Vitals:    20 0842   BP:    Pulse:    Resp: 14   Temp:    SpO2: 93%     Physical Exam:   General Appearance: alert and oriented to person, place and time, in no acute distress  Cardiovascular: normal rate, irregular rhythm, normal S1 and S2  Pulmonary/Chest: Bilateral air entry present with coarse breath sounds  Abdomen: soft, non-tender, non-distended, normal bowel sounds, no masses   Extremities: no cyanosis, clubbing or edema, pulse   Skin: warm and dry, no rash or erythema  Head: normocephalic and atraumatic  Eyes: pupils equal, round, and reactive to light  Neck: supple and non-tender without mass, no thyromegaly   Musculoskeletal: normal range of motion, no joint swelling, deformity or tenderness  Neurological: alert, oriented, normal speech, no focal findings or movement disorder noted    Medications:   Medications:    vancomycin  1,750 mg Intravenous Q12H    zinc sulfate  50 mg Oral Daily    calcium-vitamin D  1 tablet Oral Daily    sodium chloride flush  10 mL Intravenous 2 times per day    dexamethasone  6 mg Oral Daily    sodium chloride  20 mL Intravenous Once    cefepime  2 g Intravenous Q8H    allopurinol  100 mg Oral Daily    digoxin  250 mcg Oral Daily    dilTIAZem  180 mg Oral Daily    budesonide-formoterol  2 puff Inhalation BID    furosemide  20 mg Oral Daily    insulin lispro  0-12 Units Subcutaneous TID WC    insulin lispro  0-6 Units Subcutaneous Nightly    montelukast  10 mg Oral Daily    pantoprazole  40 mg Oral QAM AC    tiotropium  2 puff Inhalation Daily    traZODone  100 mg Oral Nightly    enoxaparin  1 mg/kg Subcutaneous Q12H    albuterol sulfate HFA  2 puff Inhalation Q6H WA      Infusions:   PRN Meds:     sodium chloride flush, 10 mL, PRN      promethazine, 12.5 mg, Q6H PRN    Or      ondansetron, 4 mg, Q6H PRN      polyethylene glycol, 17 g, Daily PRN      acetaminophen, 650 mg, Q6H PRN    Or      acetaminophen, 650 mg, Q6H PRN      sodium chloride, 30 mL, PRN            Pertinent New Labs & Imaging Studies     CBC with Differential:    Lab Results   Component Value Date    WBC 10.6 12/27/2020    RBC 4.60 12/27/2020    HGB 13.0 12/27/2020    HCT 40.0 12/27/2020     12/27/2020    MCV 87.0 12/27/2020    MCH 28.3 12/27/2020    MCHC 32.5 12/27/2020    RDW 16.1 12/27/2020    SEGSPCT 83.0 12/27/2020    BANDSPCT 6 12/27/2020    LYMPHOPCT 6.0 12/27/2020    MONOPCT 4.0 12/27/2020    EOSPCT 1.9 08/13/2018    BASOPCT 0.2 12/26/2020    MONOSABS 0.4 12/27/2020    LYMPHSABS 0.6 12/27/2020    EOSABS 0.0 12/26/2020    BASOSABS 0.0 12/26/2020    DIFFTYPE MANUAL DIFFERENTIAL 12/27/2020     CMP:    Lab Results   Component Value Date     12/27/2020    K 4.2 12/27/2020    CL 97 12/27/2020    CO2 24 12/27/2020    BUN 22 12/27/2020    CREATININE 0.8 12/27/2020    GFRAA >60 12/27/2020    GFRAA >60 05/22/2013    AGRATIO 1.9 11/17/2020    LABGLOM >60 12/27/2020    LABGLOM 76 02/08/2018    GLUCOSE 191 12/27/2020    PROT 5.7 12/27/2020    PROT 6.6 12/21/2012    LABALBU 3.5 12/27/2020    CALCIUM 8.6 12/27/2020    BILITOT 0.6 12/27/2020    ALKPHOS 46 upper lobes and the right middle lobe. Focal consolidations are seen within the bilateral lower lobes. Findings are concerning for an atypical/multifocal pneumonia. There is no evidence of pneumothorax. There is a small left pneumothorax and trace right pneumothorax. Bronchial wall thickening is seen within the bilateral lower lobes. Upper Abdomen: Limited images of the upper abdomen are unremarkable. Soft Tissues/Bones: No acute bone or soft tissue abnormality. 1. There is no evidence for a pulmonary embolism. There is thrombus within the left atrial appendage. Left atrial enlargement is noted. 2. Patchy areas of ground-glass opacities in a peripheral distribution are seen within the bilateral upper lobes and in the right middle lobe with more focal consolidations in the bilateral lower lobes. Findings are concerning for an atypical/multifocal pneumonia. Commonly reported imaging features of COVID-19 pneumonia are present. Other processes such as influenza pneumonia and organizing pneumonia, as can be seen with drug toxicity and connective tissue disease, can cause a similar imaging pattern. PneTyp 3. Small bilateral pleural effusions. Bibasilar atelectasis is noted. 4. Bronchial wall thickening is seen involving the bilateral lower lobes, likely related to acute bronchitis. Findings were discussed with Alvaro Godoy at 3:28 am on 12/22/2020. Assessment and Plan:   Ariella Huntley is a 65 y.o.  male  who presents with Acute respiratory failure ([address]    Acute hypoxemic respiratory failure  COVID-19 pneumonia   Probable super imposed bacterial infection   Covid positive on 12/17  Currently weaning off oxygen, at 6 L now>.weaning off further  S/p convalescent plasma  Continue remdesivir, started on 12/22  Continue Decadron  Continue empiric antibiotics  Echo with preserved LV function but dilated atria  On therapeutic Lovenox for A. Fib    Left atrial thrombus  Persistent A.  Fib  Was on Coumadin, currently discontinued due to subtherapeutic levels  Continue therapeutic Lovenox  Continue Cardizem for rate control  Cardiology recommendations appreciated-plan to switch to Eliquis on discharge    Bradycardia, Asymptomatic  Holding Digoxin and Cardizem for now  Re consulted cardiology    Hyponatremia, mild  Monitor BMP    Gout- Allopurinol    Type 2 DM  Sliding scale insulin  Hypoglycemia precautions     COPD-continue Spiriva, Advair and Singulair    Weaning off oxygen, will DC if would be able to wean below 5 L  Diet DIET LOW SODIUM 2 GM;   DVT Prophylaxis [x] Lovenox, []  Heparin, [] SCDs, [] Ambulation   GI Prophylaxis [] PPI,  [] H2 Blocker,  [] Carafate,  [x] Diet/Tube Feeds   Code Status Full Code   Disposition Patient requires continued admission due to Respiratory failure   MDM [] Low, [x] Moderate,[]  High     Electronically signed by Alexandro Gillis MD on 12/27/2020 at 11:36 AM

## 2020-12-27 NOTE — PROGRESS NOTES
[address] [facility]  consulted by Dr. Isabel Rao for monitoring and adjustment. Indication for treatment: CAP  Goal trough: 15 mcg/mL    Pertinent Laboratory Values:   Temp Readings from Last 3 Encounters:   12/27/20 97.7 °F (36.5 °C) (Oral)   12/18/20 98 °F (36.7 °C) (Oral)   03/11/20 98.5 °F (36.9 °C)     Recent Labs     12/25/20  0500 12/26/20  0320 12/27/20  0600   WBC 9.7 10.8* 10.6*     Recent Labs     12/25/20  0500 12/26/20  0320 12/27/20  0600   BUN 23 24* 22   CREATININE 0.8* 0.9 0.8*     Estimated Creatinine Clearance: 125 mL/min (A) (based on SCr of 0.8 mg/dL (L)). Intake/Output Summary (Last 24 hours) at 12/27/2020 1143  Last data filed at 12/27/2020 1100  Gross per 24 hour   Intake 580 ml   Output 2600 ml   Net -2020 ml       Pertinent Cultures:  Date    Source    Results  12/22   Nasal MRSA Screen  Negative  12/22   Respiratory   Ordered  12/22                           Rapid Flu A/B   Negative  12/16                           COVID-19   Detected    VANCOMYCIN TROUGH:    Recent Labs     12/25/20  2300 12/27/20  0845   VANCOTROUGH 11.1 15.6     VANCOMYCIN RANDOM:  No results for input(s): VANCORANDOM in the last 72 hours.     Assessment:  · WBC slightly elevated (on Dexamethasone); patient remains afebrile  · SCR remains normal, UOP good  · Day(s) of therapy: 5  · Vancomycin concentration:   · 12/23: 4.7, sub-therapeutic  · 12/26:  11.1, Ok to re-dose  · 12/27:  15.6, OK to re-dose    Plan:  · Renal trends remain normal  · Vanco level therapeutic @ 15.6  · Continue Vancomycin 1750 mg IVPB q12h  · Given advanced age and elevated BMI, I am hesitant to shorten dosing interval <12h due to risk of ROMAIN and anticipated accumulation  · Repeat trough level on 12/29  · Pharmacy will continue to monitor patient and adjust therapy as indicated    Hayley 3 12/29/2020 @ 0830    Thank you for the consult,  Glenny Blanco RPh   12/27/2020 11:43 AM

## 2020-12-27 NOTE — PROGRESS NOTES
Pulmonary and Critical Care  Progress Note    Subjective: The patient is better. Shortness of breath has improved. Chest pain none  Addressing respiratory complaints Patient is negative for  hemoptysis and cyanosis  CONSTITUTIONAL:  negative for fevers and chills      Past Medical History:     has a past medical history of Aortic stenosis, mild, Asthma, intrinsic, Atrial fibrillation ([address] Cholelithiasis, Colonic polyp, COPD (chronic obstructive pulmonary disease) ([address] DDD (degenerative disc disease), lumbar, Diabetes mellitus type 2, controlled ([address] Diabetic neuropathy, type II diabetes mellitus ([address] Dilated cardiomyopathy ([address] GERD (gastroesophageal reflux disease), Gout, H/O cardiovascular stress test, H/O echocardiogram, History of cardiovascular stress test, History of complete electrocardiogram, History of echocardiogram, History of total right hip arthroplasty, Hx of Doppler echocardiogram, Hx of echocardiogram, Hyperlipidemia, IFG (impaired fasting glucose), Knee pain, bilateral, Knee pain, left, Long-term (current) use of anticoagulants, Peptic ulcer disease, Pre-op evaluation, Testosterone deficiency, Ventricular tachycardia ([address] and VHD (valvular heart disease). has a past surgical history that includes polypectomy (2/98- jaimie); polypectomy (9/10/02- dr Germaine Perez); laparoscopy (3/08, dr Luis Aguilar); ventral hernia repair (10/08, dr Luis Aguilar); Wrist fracture surgery (Left, 2/6/14); Inguinal hernia repair (Right, 11/2016); and Hip Arthroplasty (Right, 09/12/2018). reports that he quit smoking about 7 years ago. His smoking use included cigarettes. He started smoking about 57 years ago. He has a 39.00 pack-year smoking history. He has never used smokeless tobacco. He reports current alcohol use. He reports that he does not use drugs. Family history:  family history includes Diabetes in his father; High Blood Pressure in his mother; Hypertension in his mother; Hypotension in his father;  Other in his father and mother. Allergies   Allergen Reactions    Lisinopril      Chest tightness    Simvastatin Other (See Comments)     Muscle cramps     Social History:    Reviewed; no changes    Objective:   PHYSICAL EXAM:        VITALS:  /76   Pulse 56   Temp 97.7 °F (36.5 °C) (Oral)   Resp 15   Ht 6' 5\" (1.956 m)   Wt 287 lb 7.7 oz (130.4 kg)   SpO2 94%   BMI 34.09 kg/m²     24HR INTAKE/OUTPUT:      Intake/Output Summary (Last 24 hours) at 12/27/2020 1333  Last data filed at 12/27/2020 1215  Gross per 24 hour   Intake 580 ml   Output 2975 ml   Net -2395 ml       CONSTITUTIONAL:  Awake. LUNGS:  decreased breath sounds, occ basilar crackles. CARDIOVASCULAR:  normal S1 and S2 and negative JVD  ABD:Abdomen soft, non-tender. BS normal. No masses,  No organomegaly  NEURO:Awake. DATA:    CBC:  Recent Labs     12/25/20  0500 12/26/20  0320 12/27/20  0600   WBC 9.7 10.8* 10.6*   RBC 4.54* 4.56* 4.60   HGB 12.8* 13.0* 13.0*   HCT 38.9* 39.0* 40.0*    178 184   MCV 85.7 85.5 87.0   MCH 28.2 28.5 28.3   MCHC 32.9 33.3 32.5   RDW 16.1* 16.1* 16.1*   SEGSPCT 86.7* 86.5* 83.0*   BANDSPCT  --   --  6      BMP:  Recent Labs     12/25/20  0500 12/26/20  0320 12/27/20  0600    135 132*   K 4.2 4.3 4.2    101 97*   CO2 24 23 24   BUN 23 24* 22   CREATININE 0.8* 0.9 0.8*   CALCIUM 7.8* 8.4 8.6   GLUCOSE 229* 221* 191*      ABG:  No results for input(s): PH, PO2ART, CBI9RWI, HCO3, BEART, O2SAT in the last 72 hours. Lab Results   Component Value Date    PROBNP 530.6 (H) 12/25/2020    PROBNP 552.9 (H) 12/23/2020    PROBNP 300.6 (H) 12/17/2020     No results found for: [facility]    Radiology Review:  Pertinent images / reports were reviewed as a part of this visit.     Assessment:     Patient Active Problem List   Diagnosis    Atrial fibrillation (HCC)    GERD (gastroesophageal reflux disease)    Asthma    Knee pain, bilateral    DDD (degenerative disc disease), lumbar    Testosterone deficiency    [facility]ilated [facility]    Gout    Hyperbilirubinemia    Mild intermittent asthma without complication    Cholelithiasis    Colonic polyp    Closed fracture of anterior lip of right acetabulum without additional fracture (HCC)    Closed anterior dislocation of left shoulder    VT (ventricular tachycardia) (HCC)    SVT (supraventricular tachycardia) (HCC)    NSVT (nonsustained ventricular tachycardia) (HCC)    Gait disturbance    Leg weakness, bilateral    Uncontrolled pain    VHD (valvular heart disease)    IFG (impaired fasting glucose)    Diabetes mellitus type 2, controlled ([facility]    Diabetic neuropathy, type II diabetes mellitus ([facility]    COPD (chronic obstructive pulmonary disease) (HCC)    Nonrheumatic aortic valve stenosis    Acute respiratory failure (HCC)    Atrial thrombus       Plan:   1. Overall the patient has improved. 2. [address] Activity.     Pamela Barrios MD  12/27/2020  1:33 PM

## 2020-12-28 VITALS
BODY MASS INDEX: 33.94 KG/M2 | HEIGHT: 77 IN | SYSTOLIC BLOOD PRESSURE: 125 MMHG | WEIGHT: 287.48 LBS | OXYGEN SATURATION: 92 % | HEART RATE: 87 BPM | RESPIRATION RATE: 18 BRPM | DIASTOLIC BLOOD PRESSURE: 62 MMHG | TEMPERATURE: 98 F

## 2020-12-28 LAB
ALBUMIN SERPL-MCNC: 3.3 GM/DL (ref 3.4–5)
ALP BLD-CCNC: 42 IU/L (ref 40–128)
ALT SERPL-CCNC: 31 U/L (ref 10–40)
ANION GAP SERPL CALCULATED.3IONS-SCNC: 10 MMOL/L (ref 4–16)
ANISOCYTOSIS: ABNORMAL
APTT: 30.7 SECONDS (ref 25.1–37.1)
AST SERPL-CCNC: 21 IU/L (ref 15–37)
BANDED NEUTROPHILS ABSOLUTE COUNT: 0.18 K/CU MM
BANDED NEUTROPHILS RELATIVE PERCENT: 2 % (ref 5–11)
BILIRUB SERPL-MCNC: 0.6 MG/DL (ref 0–1)
BUN BLDV-MCNC: 22 MG/DL (ref 6–23)
CALCIUM SERPL-MCNC: 7.7 MG/DL (ref 8.3–10.6)
CHLORIDE BLD-SCNC: 101 MMOL/L (ref 99–110)
CO2: 24 MMOL/L (ref 21–32)
CREAT SERPL-MCNC: 0.8 MG/DL (ref 0.9–1.3)
D DIMER: <200 NG/ML(DDU)
DIFFERENTIAL TYPE: ABNORMAL
FIBRINOGEN LEVEL: 255 MG/DL (ref 196.9–442.1)
GFR AFRICAN AMERICAN: >60 ML/MIN/1.73M2
GFR NON-AFRICAN AMERICAN: >60 ML/MIN/1.73M2
GLUCOSE BLD-MCNC: 165 MG/DL (ref 70–99)
GLUCOSE BLD-MCNC: 183 MG/DL (ref 70–99)
GLUCOSE BLD-MCNC: 264 MG/DL (ref 70–99)
GLUCOSE BLD-MCNC: 266 MG/DL (ref 70–99)
HCT VFR BLD CALC: 38.6 % (ref 42–52)
HEMOGLOBIN: 12.4 GM/DL (ref 13.5–18)
HIGH SENSITIVE C-REACTIVE PROTEIN: 3 MG/L
INR BLD: 1.07 INDEX
LYMPHOCYTES ABSOLUTE: 0.5 K/CU MM
LYMPHOCYTES RELATIVE PERCENT: 5 % (ref 24–44)
MCH RBC QN AUTO: 28 PG (ref 27–31)
MCHC RBC AUTO-ENTMCNC: 32.1 % (ref 32–36)
MCV RBC AUTO: 87.1 FL (ref 78–100)
METAMYELOCYTES ABSOLUTE COUNT: 0.09 K/CU MM
METAMYELOCYTES PERCENT: 1 %
MONOCYTES ABSOLUTE: 0.5 K/CU MM
MONOCYTES RELATIVE PERCENT: 5 % (ref 0–4)
OVALOCYTES: ABNORMAL
PDW BLD-RTO: 16.1 % (ref 11.7–14.9)
PLATELET # BLD: 176 K/CU MM (ref 140–440)
PMV BLD AUTO: 9.4 FL (ref 7.5–11.1)
POLYCHROMASIA: ABNORMAL
POTASSIUM SERPL-SCNC: 3.7 MMOL/L (ref 3.5–5.1)
PROCALCITONIN: 0.04
PROTHROMBIN TIME: 13 SECONDS (ref 11.7–14.5)
RBC # BLD: 4.43 M/CU MM (ref 4.6–6.2)
SEGMENTED NEUTROPHILS ABSOLUTE COUNT: 7.8 K/CU MM
SEGMENTED NEUTROPHILS RELATIVE PERCENT: 87 % (ref 36–66)
SODIUM BLD-SCNC: 135 MMOL/L (ref 135–145)
TOTAL PROTEIN: 5.4 GM/DL (ref 6.4–8.2)
TOXIC GRANULATION: PRESENT
WBC # BLD: 9.1 K/CU MM (ref 4–10.5)

## 2020-12-28 PROCEDURE — 84145 PROCALCITONIN (PCT): CPT

## 2020-12-28 PROCEDURE — 85384 FIBRINOGEN ACTIVITY: CPT

## 2020-12-28 PROCEDURE — 94640 AIRWAY INHALATION TREATMENT: CPT

## 2020-12-28 PROCEDURE — 85027 COMPLETE CBC AUTOMATED: CPT

## 2020-12-28 PROCEDURE — 6360000002 HC RX W HCPCS: Performed by: INTERNAL MEDICINE

## 2020-12-28 PROCEDURE — 6370000000 HC RX 637 (ALT 250 FOR IP): Performed by: INTERNAL MEDICINE

## 2020-12-28 PROCEDURE — 85007 BL SMEAR W/DIFF WBC COUNT: CPT

## 2020-12-28 PROCEDURE — 85610 PROTHROMBIN TIME: CPT

## 2020-12-28 PROCEDURE — 80053 COMPREHEN METABOLIC PANEL: CPT

## 2020-12-28 PROCEDURE — 94761 N-INVAS EAR/PLS OXIMETRY MLT: CPT

## 2020-12-28 PROCEDURE — 99233 SBSQ HOSP IP/OBS HIGH 50: CPT | Performed by: INTERNAL MEDICINE

## 2020-12-28 PROCEDURE — 2580000003 HC RX 258: Performed by: INTERNAL MEDICINE

## 2020-12-28 PROCEDURE — 82962 GLUCOSE BLOOD TEST: CPT

## 2020-12-28 PROCEDURE — 2700000000 HC OXYGEN THERAPY PER DAY

## 2020-12-28 PROCEDURE — 85379 FIBRIN DEGRADATION QUANT: CPT

## 2020-12-28 PROCEDURE — 94618 PULMONARY STRESS TESTING: CPT

## 2020-12-28 PROCEDURE — 86141 C-REACTIVE PROTEIN HS: CPT

## 2020-12-28 PROCEDURE — 85730 THROMBOPLASTIN TIME PARTIAL: CPT

## 2020-12-28 RX ORDER — PREDNISONE 10 MG/1
TABLET ORAL
Qty: 30 TABLET | Refills: 0 | Status: SHIPPED | OUTPATIENT
Start: 2020-12-28 | End: 2021-01-07

## 2020-12-28 RX ADMIN — APIXABAN 5 MG: 5 TABLET, FILM COATED ORAL at 12:34

## 2020-12-28 RX ADMIN — FUROSEMIDE 20 MG: 20 TABLET ORAL at 09:47

## 2020-12-28 RX ADMIN — ENOXAPARIN SODIUM 120 MG: 120 INJECTION SUBCUTANEOUS at 05:20

## 2020-12-28 RX ADMIN — PANTOPRAZOLE SODIUM 40 MG: 40 TABLET, DELAYED RELEASE ORAL at 05:20

## 2020-12-28 RX ADMIN — ALBUTEROL SULFATE 2 PUFF: 90 AEROSOL, METERED RESPIRATORY (INHALATION) at 15:20

## 2020-12-28 RX ADMIN — ALLOPURINOL 100 MG: 100 TABLET ORAL at 09:47

## 2020-12-28 RX ADMIN — INSULIN LISPRO 6 UNITS: 100 INJECTION, SOLUTION INTRAVENOUS; SUBCUTANEOUS at 12:35

## 2020-12-28 RX ADMIN — VANCOMYCIN HYDROCHLORIDE 1750 MG: 5 INJECTION, POWDER, LYOPHILIZED, FOR SOLUTION INTRAVENOUS at 10:42

## 2020-12-28 RX ADMIN — BUDESONIDE AND FORMOTEROL FUMARATE DIHYDRATE 2 PUFF: 80; 4.5 AEROSOL RESPIRATORY (INHALATION) at 07:39

## 2020-12-28 RX ADMIN — ALBUTEROL SULFATE 2 PUFF: 90 AEROSOL, METERED RESPIRATORY (INHALATION) at 07:39

## 2020-12-28 RX ADMIN — INSULIN LISPRO 6 UNITS: 100 INJECTION, SOLUTION INTRAVENOUS; SUBCUTANEOUS at 17:57

## 2020-12-28 RX ADMIN — OYSTER SHELL CALCIUM WITH VITAMIN D 1 TABLET: 500; 200 TABLET, FILM COATED ORAL at 09:47

## 2020-12-28 RX ADMIN — DEXAMETHASONE 6 MG: 4 TABLET ORAL at 09:47

## 2020-12-28 RX ADMIN — CEFEPIME 2 G: 2 INJECTION, POWDER, FOR SOLUTION INTRAVENOUS at 09:47

## 2020-12-28 RX ADMIN — ZINC SULFATE 220 MG (50 MG) CAPSULE 50 MG: CAPSULE at 09:48

## 2020-12-28 RX ADMIN — MONTELUKAST 10 MG: 10 TABLET, FILM COATED ORAL at 09:47

## 2020-12-28 RX ADMIN — CEFEPIME 2 G: 2 INJECTION, POWDER, FOR SOLUTION INTRAVENOUS at 00:00

## 2020-12-28 RX ADMIN — SODIUM CHLORIDE, PRESERVATIVE FREE 10 ML: 5 INJECTION INTRAVENOUS at 09:48

## 2020-12-28 RX ADMIN — TIOTROPIUM BROMIDE INHALATION SPRAY 2 PUFF: 3.12 SPRAY, METERED RESPIRATORY (INHALATION) at 07:39

## 2020-12-28 ASSESSMENT — PAIN SCALES - GENERAL
PAINLEVEL_OUTOF10: 0

## 2020-12-28 NOTE — PROGRESS NOTES
Cardiology Progress Note     Admit Date:  12/21/2020    Consult reason/ Seen today for :   Left atrial thrombus   Atrial fibrillation with slow ventricular response. Subjective and  Overnight Events :     Overall condition is improved. Telemetry reviewed: Afib @ 70/min       Chief complain on admission : 65 y. o.year old who is admitted for  Chief Complaint   Patient presents with    Shortness of Breath     covid + on 12/16, 82 % on arrival by EMS      Assessment / Plan:    Reconsulted to evaluate patient for bradycardia    Left atrial thrombus in setting of subtherapeutic INR but he says he was taking Coumadin regularly, Start Eliquis twice daily   Atrial fibrillation with slow ventricular response. Current heart rate is 55 to 60 bpm.  STOP digoxin and diltiazem. Patient remains asymptomatic. Nocturnal bradycardia is expected. Watch out for any prolonged pauses. Discontinue Coumadin and Lovenox. Switch over to Eliquis 5 mg twice daily. Covid pneumonia treatment as per primary team  HTN: stable, continue Lasix.   Follow-up with primary cardiologist  Will need repeat imaging either CT scan or TIM eventually in 6 to 8 weeks    Please call us with further questions         Past medical history:    has a past medical history of Aortic stenosis, mild, Asthma, intrinsic, Atrial fibrillation ([address] Cholelithiasis, Colonic polyp, COPD (chronic obstructive pulmonary disease) ([address] DDD (degenerative disc disease), lumbar, Diabetes mellitus type 2, controlled ([address] Diabetic neuropathy, type II diabetes mellitus ([address] Dilated cardiomyopathy ([address] GERD (gastroesophageal reflux disease), Gout, H/O cardiovascular stress test, H/O echocardiogram, History of cardiovascular stress test, History of complete electrocardiogram, History of echocardiogram, History of total right hip arthroplasty, Hx of Doppler echocardiogram, Hx of echocardiogram, Hyperlipidemia, IFG (impaired fasting glucose), Knee pain, bilateral, Knee pain, left, Long-term (current) use of anticoagulants, Peptic ulcer disease, Pre-op evaluation, Testosterone deficiency, Ventricular tachycardia ([address] and VHD (valvular heart disease). Past surgical history:   has a past surgical history that includes polypectomy (2/98- jaimie); polypectomy (9/10/02- dr Chayo Scott); laparoscopy (3/08, dr Poornima Menjivar); ventral hernia repair (10/08, dr Poornima Menjivar); Wrist fracture surgery (Left, 2/6/14); Inguinal hernia repair (Right, 11/2016); and Hip Arthroplasty (Right, 09/12/2018). Social History:   reports that he quit smoking about 7 years ago. His smoking use included cigarettes. He started smoking about 57 years ago. He has a 39.00 pack-year smoking history. He has never used smokeless tobacco. He reports current alcohol use. He reports that he does not use drugs. Family history:  family history includes Diabetes in his father; High Blood Pressure in his mother; Hypertension in his mother; Hypotension in his father; Other in his father and mother. Allergies   Allergen Reactions    Lisinopril      Chest tightness    Simvastatin Other (See Comments)     Muscle cramps       Review of Systems:    All 14 systems were reviewed and are negative  Except for the positive findings  which as documented     BP (!) 105/57   Pulse 95   Temp 97.7 °F (36.5 °C) (Oral)   Resp 16   Ht 6' 5\" (1.956 m)   Wt 287 lb 7.7 oz (130.4 kg)   SpO2 91%   BMI 34.09 kg/m²       Intake/Output Summary (Last 24 hours) at 12/28/2020 1140  Last data filed at 12/28/2020 0948  Gross per 24 hour   Intake 990 ml   Output 4525 ml   Net -3535 ml     Physical Exam:  Constitutional:  Well developed, Well nourished, No acute distress, Non-toxic appearance.    HENT:  Normocephalic, Atraumatic, Bilateral external ears normal, Oropharynx moist, No oral exudates, Nose normal. Neck- Normal range of motion, No tenderness, Supple, No MCV 85.5 87.0 87.1    184 176     BMP:   Recent Labs     12/26/20  0320 12/27/20  0600 12/28/20  0530    132* 135   K 4.3 4.2 3.7    97* 101   CO2 23 24 24   BUN 24* 22 22   CREATININE 0.9 0.8* 0.8*     PT/INR:   Recent Labs     12/26/20  0320 12/27/20  0600 12/28/20  0530   PROTIME 14.7* 13.0 13.0   INR 1.21 1.07 1.07     BNP:    No results for input(s): PROBNP in the last 72 hours. TROPONIN: No results for input(s): TROPONINT in the last 72 hours. ECHO :   echocardiogram    Assessment:  65 y. o.year old who is admitted for  Chief Complaint   Patient presents with    Shortness of Breath     covid + on 12/16, 82 % on arrival by EMS    , active issues as noted below:  Impression:  Principal Problem:    Acute respiratory failure ([address]  Active Problems:    Atrial fibrillation ([address]    NSVT (nonsustained ventricular tachycardia) ([address]    Atrial thrombosis  Resolved Problems:    * No resolved hospital problems.  Vimal Yates MD 12/28/2020 11:40 AM

## 2020-12-28 NOTE — PROGRESS NOTES
[address]  consulted by Dr. Onel Bullard for monitoring and adjustment. Indication for treatment: CAP  Goal trough: 15 mcg/mL    Pertinent Laboratory Values:   Temp Readings from Last 3 Encounters:   12/28/20 97.7 °F (36.5 °C) (Oral)   12/18/20 98 °F (36.7 °C) (Oral)   03/11/20 98.5 °F (36.9 °C)     Recent Labs     12/26/20  0320 12/27/20  0600 12/28/20  0530   WBC 10.8* 10.6* 9.1     Recent Labs     12/26/20  0320 12/27/20  0600 12/28/20  0530   BUN 24* 22 22   CREATININE 0.9 0.8* 0.8*     Estimated Creatinine Clearance: 125 mL/min (A) (based on SCr of 0.8 mg/dL (L)). Intake/Output Summary (Last 24 hours) at 12/28/2020 1527  Last data filed at 12/28/2020 0948  Gross per 24 hour   Intake 490 ml   Output 3400 ml   Net -2910 ml       Pertinent Cultures:  Date    Source    Results  12/22   Nasal MRSA Screen  Negative  12/22   Respiratory   Ordered  12/22                           Rapid Flu A/B   Negative  12/16                           COVID-19   Detected    VANCOMYCIN TROUGH:    Recent Labs     12/25/20  2300 12/27/20  0845   VANCOTROUGH 11.1 15.6     VANCOMYCIN RANDOM:  No results for input(s): VANCORANDOM in the last 72 hours.     Assessment:  · WBC WNL (on Dexamethasone); afebrile  · Scr stable, WNL; +UOP  · Day(s) of therapy: 6  · Vancomycin concentration:   · 12/23: 4.7, sub-therapeutic  · 12/26:  11.1, Ok to re-dose  · 12/27:  15.6, therapeutic    Plan:  · Continue Vancomycin 1750 mg IVPB q12h with a therapeutic trough  · Patient appears to be accumulating likely 2/2 advanced age and elevated BMI with trough trends increasing  · Continue frequent trough monitoring  · Repeat next trough level tomorrow AM  · Pharmacy will continue to monitor patient and adjust therapy as indicated    [address] 12/29/2020 @ 0830    Thank you for the consult,  Angela Morocho, [address]   12/28/2020 3:27 PM

## 2020-12-28 NOTE — PROGRESS NOTES
12/28/2020 12:27 PM  Patient Room #: 5583/0312-G  Patient Name: Taqueria Peters    (Step 1 Done by RN if possible otherwise call Pulmonary Diagnostics)  1. Place patient on room air at rest for at least 30 minutes. If patient falls below 88% before 30 minutes then you can record the level and stop. Record room air saturation level _84_ %. If patient is at 88% or below, they will qualify for home oxygen and you can stop. If level does not fall below 88%, fill in level above. If indicated continue to Step 2. Signature:__Ginna Sapp RRT_ Date: __12/28/2020_  (Step 2&3 Done by P)  2. Ambulate patient on room air until saturation falls below 89%. Record level of room air saturation with ambulation___ %. Next, place patient back on ___lpm oxygen and ambulate, record level __%. (Note:  this level must show improvement from room air level done with ambulation.)  If patients saturation on room air with ambulation is 88% or below AND patient shows improvement with oxygen during ambulation, they will qualify for home oxygen and you can stop. If patient does not drop below 89%, then patient should have an overnight oximetry trending on room air to see if level falls below 88%. Complete level in Step 3 below. 3. Room air overnight oximetry level 88 % for___  cumulative minutes. If patients room air oxygen level is < 89% for at least 5 cumulative minutes, patient will qualify for home oxygen and you can stop. (Attach Night Trending Report)    Complete order below: Diagnosis:_COPD, COVID-19_  Home oxygen at:  Length of Need: X Lifetime ?  3 Months     _2__lpm or __%   via  [x] nasal cannula  []mask  [] other:___         [x]continuous [x]  with activity  [x]  Nocturnal   [x] Portable Tanks [x]  Concentrator        Therapist Signature:_Ginna Sapp RRT__     Date:  __12/28/2020_  Physician Signature:  __Electronically Signed in EMR_    Date:___  Physician Printed Name:  Ordering User: Keven Parham MD  NPI:  [id number]    [x] Patient Qualifies      [] Patient Does NOT qualify

## 2020-12-28 NOTE — PROGRESS NOTES
father; Other in his father and mother. Allergies   Allergen Reactions    Lisinopril      Chest tightness    Simvastatin Other (See Comments)     Muscle cramps     Social History:    Reviewed; no changes    Objective:   PHYSICAL EXAM:        VITALS:  BP (!) 105/57   Pulse 95   Temp 97.7 °F (36.5 °C) (Oral)   Resp 18   Ht 6' 5\" (1.956 m)   Wt 287 lb 7.7 oz (130.4 kg)   SpO2 91%   BMI 34.09 kg/m²     24HR INTAKE/OUTPUT:      Intake/Output Summary (Last 24 hours) at 12/28/2020 1529  Last data filed at 12/28/2020 0948  Gross per 24 hour   Intake 490 ml   Output 3400 ml   Net -2910 ml       CONSTITUTIONAL:  Awake. LUNGS:  decreased breath sounds, occ basilar crackles. CARDIOVASCULAR:  normal S1 and S2 and negative JVD  ABD:Abdomen soft, non-tender. BS normal. No masses,  No organomegaly  NEURO:Awake. DATA:    CBC:  Recent Labs     12/26/20  0320 12/27/20  0600 12/28/20  0530   WBC 10.8* 10.6* 9.1   RBC 4.56* 4.60 4.43*   HGB 13.0* 13.0* 12.4*   HCT 39.0* 40.0* 38.6*    184 176   MCV 85.5 87.0 87.1   MCH 28.5 28.3 28.0   MCHC 33.3 32.5 32.1   RDW 16.1* 16.1* 16.1*   SEGSPCT 86.5* 83.0* 87.0*   BANDSPCT  --  6 2*      BMP:  Recent Labs     12/26/20  0320 12/27/20  0600 12/28/20  0530    132* 135   K 4.3 4.2 3.7    97* 101   CO2 23 24 24   BUN 24* 22 22   CREATININE 0.9 0.8* 0.8*   CALCIUM 8.4 8.6 7.7*   GLUCOSE 221* 191* 183*      ABG:  No results for input(s): PH, PO2ART, XUQ6HWT, HCO3, BEART, O2SAT in the last 72 hours. Lab Results   Component Value Date    PROBNP 530.6 (H) 12/25/2020    PROBNP 552.9 (H) 12/23/2020    PROBNP 300.6 (H) 12/17/2020     No results found for: [facility]    Radiology Review:  Pertinent images / reports were reviewed as a part of this visit.     Assessment:     Patient Active Problem List   Diagnosis    Atrial fibrillation (HCC)    GERD (gastroesophageal reflux disease)    Asthma    Knee pain, bilateral    DDD (degenerative disc disease), lumbar    Testosterone deficiency    D[facility] ([facility]    Gout    Hyperbilirubinemia    Mild intermittent asthma without complication    Cholelithiasis    Colonic polyp    Closed fracture of anterior lip of right acetabulum without additional fracture (HCC)    Closed anterior dislocation of left shoulder    VT (ventricular tachycardia) (HCC)    SVT (supraventricular tachycardia) (HCC)    NSVT (nonsustained ventricular tachycardia) (HCC)    Gait disturbance    Leg weakness, bilateral    Uncontrolled pain    VHD (valvular heart disease)    IFG (impaired fasting glucose)    Diabetes mellitus type 2, controlled ([facility]    Diabetic neuropathy, type II diabetes mellitus ([facility]    COPD (chronic obstructive pulmonary disease) (HCC)    Nonrheumatic aortic valve stenosis    Acute respiratory failure (HCC)    Atrial thrombosis       Plan:   1. Overall the patient has improved. 2. [address] Activity.     Romelia Ha MD  12/28/2020  3:29 PM

## 2020-12-28 NOTE — PROGRESS NOTES
Hospitalist Progress Note      Name:  Wauneta Lesches /Age/Sex: 1950  (65 y.o. male)   MRN & CSN:  [id number] & [id number] Admission Date/Time: 2020 11:53 PM   Location:  -A PCP: Gaby Escamilla MD         Hospital Day: 8    History of Present Illness:     Chief Complaint: Acute respiratory failure ([address]  Wauneta Lesches is a 65 y.o.  male  who presents with SOB     The patient seen and examined at bedside. He says continues to feel better with breathing today. Denies having any chest pain. Ten point ROS reviewed negative, unless as noted above    Objective:        Intake/Output Summary (Last 24 hours) at 2020 1151  Last data filed at 2020 0948  Gross per 24 hour   Intake 990 ml   Output 4525 ml   Net -3535 ml      Vitals:   Vitals:    20 0933   BP: (!) 105/57   Pulse: 95   Resp: 16   Temp: 97.7 °F (36.5 °C)   SpO2:      Physical Exam:   General Appearance: alert and oriented to person, place and time, in no acute distress  Cardiovascular: normal rate, irregular rhythm, normal S1 and S2  Pulmonary/Chest: Bilateral air entry present with coarse breath sounds  Abdomen: soft, non-tender, non-distended, normal bowel sounds, no masses   Extremities: no cyanosis, clubbing or edema, pulse   Skin: warm and dry, no rash or erythema  Head: normocephalic and atraumatic  Eyes: pupils equal, round, and reactive to light  Neck: supple and non-tender without mass, no thyromegaly   Musculoskeletal: normal range of motion, no joint swelling, deformity or tenderness  Neurological: alert, oriented, normal speech, no focal findings or movement disorder noted    Medications:   Medications:    apixaban  5 mg Oral BID    vancomycin  1,750 mg Intravenous Q12H    zinc sulfate  50 mg Oral Daily    calcium-vitamin D  1 tablet Oral Daily    sodium chloride flush  10 mL Intravenous 2 times per day    dexamethasone  6 mg Oral Daily    sodium chloride  20 mL Intravenous Once    cefepime  2 g Intravenous Q8H    allopurinol  100 mg Oral Daily    [Held by provider] digoxin  250 mcg Oral Daily    [Held by provider] dilTIAZem  180 mg Oral Daily    budesonide-formoterol  2 puff Inhalation BID    furosemide  20 mg Oral Daily    insulin lispro  0-12 Units Subcutaneous TID WC    insulin lispro  0-6 Units Subcutaneous Nightly    montelukast  10 mg Oral Daily    pantoprazole  40 mg Oral QAM AC    tiotropium  2 puff Inhalation Daily    traZODone  100 mg Oral Nightly    albuterol sulfate HFA  2 puff Inhalation Q6H WA      Infusions:   PRN Meds:     sodium chloride flush, 10 mL, PRN      promethazine, 12.5 mg, Q6H PRN    Or      ondansetron, 4 mg, Q6H PRN      polyethylene glycol, 17 g, Daily PRN      acetaminophen, 650 mg, Q6H PRN    Or      acetaminophen, 650 mg, Q6H PRN            Pertinent New Labs & Imaging Studies     CBC with Differential:    Lab Results   Component Value Date    WBC 9.1 12/28/2020    RBC 4.43 12/28/2020    HGB 12.4 12/28/2020    HCT 38.6 12/28/2020     12/28/2020    MCV 87.1 12/28/2020    MCH 28.0 12/28/2020    MCHC 32.1 12/28/2020    RDW 16.1 12/28/2020    SEGSPCT 87.0 12/28/2020    BANDSPCT 2 12/28/2020    LYMPHOPCT 5.0 12/28/2020    MONOPCT 5.0 12/28/2020    EOSPCT 1.9 08/13/2018    BASOPCT 0.2 12/26/2020    MONOSABS 0.5 12/28/2020    LYMPHSABS 0.5 12/28/2020    EOSABS 0.0 12/26/2020    BASOSABS 0.0 12/26/2020    DIFFTYPE MANUAL DIFFERENTIAL 12/28/2020     CMP:    Lab Results   Component Value Date     12/28/2020    K 3.7 12/28/2020     12/28/2020    CO2 24 12/28/2020    BUN 22 12/28/2020    CREATININE 0.8 12/28/2020    GFRAA >60 12/28/2020    GFRAA >60 05/22/2013    AGRATIO 1.9 11/17/2020    LABGLOM >60 12/28/2020    LABGLOM 76 02/08/2018    GLUCOSE 183 12/28/2020    PROT 5.4 12/28/2020    PROT 6.6 12/21/2012    LABALBU 3.3 12/28/2020    CALCIUM 7.7 12/28/2020    BILITOT 0.6 12/28/2020    ALKPHOS 42 12/28/2020    AST 21 12/28/2020    ALT 31 12/28/2020 Xr Chest Portable    Result Date: 12/22/2020  EXAMINATION: ONE XRAY VIEW OF THE CHEST 12/22/2020 12:03 am COMPARISON: December 17, 2020 HISTORY: ORDERING SYSTEM PROVIDED HISTORY: covid increas sob TECHNOLOGIST PROVIDED HISTORY: Reason for exam:->covid increas sob Reason for Exam: covid increas sob Acuity: Unknown Type of Exam: Initial FINDINGS: There is bandlike atelectasis versus scar within the left lower lobe. Mildly prominent pulmonary vascular lung markings are again noted. There is no focal consolidation. The heart size is magnified by portable technique. Both lateral costophrenic angles have been partially excluded from view, limiting evaluation. Grossly stable appearance of the lungs compared to the prior exam of December 17, 2020. Cta Pulmonary W Contrast    Result Date: 12/22/2020  EXAMINATION: CTA OF THE CHEST 12/22/2020 2:40 am TECHNIQUE: CTA of the chest was performed after the administration of intravenous contrast.  Multiplanar reformatted images are provided for review. MIP images are provided for review. Dose modulation, iterative reconstruction, and/or weight based adjustment of the mA/kV was utilized to reduce the radiation dose to as low as reasonably achievable. COMPARISON: None. HISTORY: ORDERING SYSTEM PROVIDED HISTORY: sob TECHNOLOGIST PROVIDED HISTORY: Reason for exam:->sob Reason for Exam: barb d-dimer/sob/copd FINDINGS: Pulmonary Arteries: Pulmonary arteries are adequately opacified for evaluation. No evidence of intraluminal filling defect to suggest pulmonary embolism. Main pulmonary artery is normal in caliber. Mediastinum: Cardiomegaly is noted. Left atrial enlargement is seen. Thrombus is seen within the left atrial appendage. There is no pericardial effusion. There is no mediastinal lymphadenopathy. Lungs/pleura: Patchy areas of ground-glass opacities are noted in a peripheral distribution within the bilateral upper lobes and the right middle lobe.   Focal consolidations are seen within the bilateral lower lobes. Findings are concerning for an atypical/multifocal pneumonia. There is no evidence of pneumothorax. There is a small left pneumothorax and trace right pneumothorax. Bronchial wall thickening is seen within the bilateral lower lobes. Upper Abdomen: Limited images of the upper abdomen are unremarkable. Soft Tissues/Bones: No acute bone or soft tissue abnormality. 1. There is no evidence for a pulmonary embolism. There is thrombus within the left atrial appendage. Left atrial enlargement is noted. 2. Patchy areas of ground-glass opacities in a peripheral distribution are seen within the bilateral upper lobes and in the right middle lobe with more focal consolidations in the bilateral lower lobes. Findings are concerning for an atypical/multifocal pneumonia. Commonly reported imaging features of COVID-19 pneumonia are present. Other processes such as influenza pneumonia and organizing pneumonia, as can be seen with drug toxicity and connective tissue disease, can cause a similar imaging pattern. PneTyp 3. Small bilateral pleural effusions. Bibasilar atelectasis is noted. 4. Bronchial wall thickening is seen involving the bilateral lower lobes, likely related to acute bronchitis. Findings were discussed with Bren Sandoval at 3:28 am on 12/22/2020. Assessment and Plan:   Porter Ponce is a 65 y.o.  male  who presents with Acute respiratory failure ([address]    Acute hypoxemic respiratory failure  COVID-19 pneumonia   Probable super imposed bacterial infection   Covid positive on 12/17  Currently weaning off oxygen, at 6 L now>.weaning off further  S/p convalescent plasma  Continue remdesivir, started on 12/22  Continue Decadron  Continue empiric antibiotics  Echo with preserved LV function but dilated atria  On therapeutic Lovenox for A. Fib    Left atrial thrombus  Persistent A.  Fib  Was on Coumadin, currently discontinued due to subtherapeutic

## 2020-12-28 NOTE — PROGRESS NOTES
Home O2 paperwork faxed to [facility]. Please do not send pt home without oxygen. If IGO has no been delivered prior to pt ready to discharge, please call PFT @ [phone] or Respiratory Therapy @ [phone].  Thanks

## 2020-12-28 NOTE — PROGRESS NOTES
CLINICAL PHARMACY NOTE: MEDS TO [address] Select Patient?: No  Total # of Prescriptions Filled: 2   The following medications were delivered to the patient:  · Eliquis 5mg  · Prednisone 10mg  Total # of Interventions Completed: 1  Time Spent (min): 15    Additional Documentation:  We used a 1 time  coupon that paid for the eliquis today.

## 2020-12-28 NOTE — PROGRESS NOTES
Spoke to pt RN, Erick Luis to alert her that pt has qualified for Home O2 and can be placed on 2 LPM at this time.  Thanks

## 2020-12-28 NOTE — PROGRESS NOTES
RN received report from previous RN. Patient is alert and oriented x 4, follows commands, and answers RN questions appropriately. Patient is on Vapotherm with no signs of distress. Patient in cardiac and SPO2 monitoring. Patient voices no concerns at this time and call light is at bedside. RN educated patient to use call light before exiting bed or chair. Patient ambulates in room with no assistance.

## 2020-12-28 NOTE — DISCHARGE SUMMARY
Discharge Summary Note  Patient ID:  Mindy Waite  [id number]  87 y.o.  1950    Admit date: 12/21/2020    Discharge date and time: No discharge date for patient encounter. Admitting Physician: Paarg Srinivasan MD     Discharge Physician: Virginie Bautista MD    Admission Diagnoses:   Acute respiratory failure ([facility] [address] [J96.00]  Hypoxia [R09.02]  Atrial thrombosis [I51.3]  Pneumonia due to COVID-19 virus [U07.1, J12.89]    Discharge Diagnoses and Hospital Course:   Mindy Waite is a 65 y.o.  male  who presents with Acute respiratory failure ([facility] [address]     Acute hypoxemic respiratory failure  COVID-19 pneumonia   Probable super imposed bacterial infection   Covid positive on 12/17  Weaned oxygen, sending home with oxygen with home O2 evaluation  S/p convalescent plasma  s/p remdesivir  Decadron>>Taper steroids on discharge  Empiric antibiotics, to DC at discharge  Echo with preserved LV function but dilated atria  To DC on Eliquis for AFIB  Recommendations to stay in quarantine till 1/5/2021      Left atrial thrombus  Persistent AJesika Chambers  Was on Coumadin, currently discontinued due to subtherapeutic levels>>added Eliquis at discharge  Continue Cardizem for rate control  Cardiology recommendations appreciated-to continue Eliquis at discharge     Bradycardia, Asymptomatic  Could be potential side effect of Remdesivir, given several reports or could be with AFIB  Discontinued Digoxin and Cardizem for now  Cardiology recommendations appreciated-To follow up as out patient to see if needed any medications     Hyponatremia, mild     Gout- Allopurinol     Type 2 DM  To continue home medications     COPD-continue Spiriva, Advair and Singulair     Home O2 evalv as below     Patient was seen in hospital for  COPD, COVID-19   .  I am prescribing oxygen because the diagnosis and testing requires the patient to have oxygen in the home.  Conditions will improve or be benefited by oxygen use.  The patient is able to perform good mobility and therefore requires the use of a portable oxygen system for ambulation.      Admission Condition: poor    Discharged Condition: stable    Significant Diagnostic Studies:   CBC with Differential:    Lab Results   Component Value Date    WBC 9.1 12/28/2020    RBC 4.43 12/28/2020    HGB 12.4 12/28/2020    HCT 38.6 12/28/2020     12/28/2020    MCV 87.1 12/28/2020    MCH 28.0 12/28/2020    MCHC 32.1 12/28/2020    RDW 16.1 12/28/2020    SEGSPCT 87.0 12/28/2020    BANDSPCT 2 12/28/2020    LYMPHOPCT 5.0 12/28/2020    MONOPCT 5.0 12/28/2020    EOSPCT 1.9 08/13/2018    BASOPCT 0.2 12/26/2020    MONOSABS 0.5 12/28/2020    LYMPHSABS 0.5 12/28/2020    EOSABS 0.0 12/26/2020    BASOSABS 0.0 12/26/2020    DIFFTYPE MANUAL DIFFERENTIAL 12/28/2020     CMP:    Lab Results   Component Value Date     12/28/2020    K 3.7 12/28/2020     12/28/2020    CO2 24 12/28/2020    BUN 22 12/28/2020    CREATININE 0.8 12/28/2020    GFRAA >60 12/28/2020    GFRAA >60 05/22/2013    AGRATIO 1.9 11/17/2020    LABGLOM >60 12/28/2020    LABGLOM 76 02/08/2018    GLUCOSE 183 12/28/2020    PROT 5.4 12/28/2020    PROT 6.6 12/21/2012    LABALBU 3.3 12/28/2020    CALCIUM 7.7 12/28/2020    BILITOT 0.6 12/28/2020    ALKPHOS 42 12/28/2020    AST 21 12/28/2020    ALT 31 12/28/2020     Xr Chest Portable    Result Date: 12/22/2020  EXAMINATION: ONE XRAY VIEW OF THE CHEST 12/22/2020 12:03 am COMPARISON: December 17, 2020 HISTORY: ORDERING SYSTEM PROVIDED HISTORY: covid increas sob TECHNOLOGIST PROVIDED HISTORY: Reason for exam:->covid increas sob Reason for Exam: covid krystleas sob Acuity: Unknown Type of Exam: Initial FINDINGS: There is bandlike atelectasis versus scar within the left lower lobe. Mildly prominent pulmonary vascular lung markings are again noted. There is no focal consolidation. The heart size is magnified by portable technique.  Both lateral costophrenic angles have been partially excluded from view, limiting evaluation. Grossly stable appearance of the lungs compared to the prior exam of December 17, 2020. Cta Pulmonary W Contrast    Result Date: 12/22/2020  EXAMINATION: CTA OF THE CHEST 12/22/2020 2:40 am TECHNIQUE: CTA of the chest was performed after the administration of intravenous contrast.  Multiplanar reformatted images are provided for review. MIP images are provided for review. Dose modulation, iterative reconstruction, and/or weight based adjustment of the mA/kV was utilized to reduce the radiation dose to as low as reasonably achievable. COMPARISON: None. HISTORY: ORDERING SYSTEM PROVIDED HISTORY: sob TECHNOLOGIST PROVIDED HISTORY: Reason for exam:->sob Reason for Exam: barb d-dimer/sob/copd FINDINGS: Pulmonary Arteries: Pulmonary arteries are adequately opacified for evaluation. No evidence of intraluminal filling defect to suggest pulmonary embolism. Main pulmonary artery is normal in caliber. Mediastinum: Cardiomegaly is noted. Left atrial enlargement is seen. Thrombus is seen within the left atrial appendage. There is no pericardial effusion. There is no mediastinal lymphadenopathy. Lungs/pleura: Patchy areas of ground-glass opacities are noted in a peripheral distribution within the bilateral upper lobes and the right middle lobe. Focal consolidations are seen within the bilateral lower lobes. Findings are concerning for an atypical/multifocal pneumonia. There is no evidence of pneumothorax. There is a small left pneumothorax and trace right pneumothorax. Bronchial wall thickening is seen within the bilateral lower lobes. Upper Abdomen: Limited images of the upper abdomen are unremarkable. Soft Tissues/Bones: No acute bone or soft tissue abnormality. 1. There is no evidence for a pulmonary embolism. There is thrombus within the left atrial appendage. Left atrial enlargement is noted.  2. Patchy areas of ground-glass opacities in a peripheral distribution are seen within the bilateral upper lobes and in the right middle lobe with more focal consolidations in the bilateral lower lobes. Findings are concerning for an atypical/multifocal pneumonia. Commonly reported imaging features of COVID-19 pneumonia are present. Other processes such as influenza pneumonia and organizing pneumonia, as can be seen with drug toxicity and connective tissue disease, can cause a similar imaging pattern. PneTyp 3. Small bilateral pleural effusions. Bibasilar atelectasis is noted. 4. Bronchial wall thickening is seen involving the bilateral lower lobes, likely related to acute bronchitis. Findings were discussed with Alvaro Godoy at 3:28 am on 12/22/2020.      Discharge Exam:  General Appearance: alert and oriented to person, place and time, in no acute distress  Cardiovascular: normal rate, irregular rhythm, normal S1 and S2  Pulmonary/Chest: Bilateral air entry present with coarse breath sounds(improved)  Abdomen: soft, non-tender, non-distended, normal bowel sounds, no masses   Extremities: no cyanosis, clubbing or edema, pulse   Skin: warm and dry, no rash or erythema  Head: normocephalic and atraumatic  Eyes: pupils equal, round, and reactive to light  Neck: supple and non-tender without mass, no thyromegaly   Musculoskeletal: normal range of motion, no joint swelling, deformity or tenderness  Neurological: alert, oriented, normal speech, no focal findings or movement disorder noted    Disposition: home    Patient Instructions:     Discharge Medications:   French Brochure \"Ramy\"   Home Medication Instructions DELIO:[id number]    Printed on:12/28/20 3738   Medication Information                      albuterol sulfate HFA (PROVENTIL HFA) 108 (90 Base) MCG/ACT inhaler  Inhale 2 puffs into the lungs every 6 hours as needed for Wheezing             allopurinol (ZYLOPRIM) 100 MG tablet  Take 1 tablet by mouth daily             apixaban (ELIQUIS) 5 MG TABS tablet  Take 1 tablet by mouth 2 times daily calcium-vitamin D (OSCAL-500) 500-200 MG-UNIT per tablet  Take 1 tablet by mouth daily. DULERA 100-5 MCG/ACT inhaler  Inhale 2 puffs into the lungs 2 times daily             fluticasone-salmeterol (ADVAIR DISKUS) 500-50 MCG/DOSE diskus inhaler  Inhale 1 puff into the lungs 2 times daily Rinse mouth after use. furosemide (LASIX) 20 MG tablet  Take 1 tablet by mouth daily             metFORMIN (GLUCOPHAGE) 500 MG tablet  Take 1 tablet by mouth daily (with breakfast)             montelukast (SINGULAIR) 10 MG tablet  Take 1 tablet by mouth daily             omeprazole (PRILOSEC) 20 MG delayed release capsule  Take 1 capsule by mouth daily             potassium chloride (KLOR-CON M) 10 MEQ extended release tablet  Take 1 tablet by mouth daily             predniSONE (DELTASONE) 10 MG tablet  5 tabs/day x 2 days, then 4 tabs/day x 2 days, then 3 tabs/day x 2 days, then 2 tabs/ay x 2 days, then 1 tab/day x 2 days.              Spacer/Aero-Holding Chambers ALICE  1 Device by Does not apply route daily as needed (use with albuterol rescue inhaler)             tiotropium (SPIRIVA HANDIHALER) 18 MCG inhalation capsule  Inhale 1 capsule into the lungs daily             traZODone (DESYREL) 50 MG tablet  Take 2 tablets by mouth nightly                 Activity: activity as tolerated    Diet: diabetic diet    Wound Care: none needed    Follow-up:  Cardiology, Dr Jessica Arteaga in 3 weeks  PCP 2 weeks    Time Spent Doing discharge 34 min  Electronically signed by Veronique Wilson MD  on 12/28/20 at 1:02 PM EST

## 2020-12-29 ENCOUNTER — TELEPHONE (OUTPATIENT)
Dept: INTERNAL MEDICINE CLINIC | Age: 70
End: 2020-12-29

## 2020-12-29 ENCOUNTER — CARE COORDINATION (OUTPATIENT)
Dept: CASE MANAGEMENT | Age: 70
End: 2020-12-29

## 2020-12-29 NOTE — TELEPHONE ENCOUNTER
No need for insulin, but should watch his carbohydrates - his overall DM control is OK    Stay off the dig, cartia, and warfarin.  Have pt check BP and pulse at home and f/u with Jerrod next week

## 2020-12-29 NOTE — TELEPHONE ENCOUNTER
She needs to be checked for covid - I put in an order and she can go to the flu clinic today, or she can be seen by a provider tomorrow morning

## 2020-12-29 NOTE — TELEPHONE ENCOUNTER
Mirela 45 Transitions Initial Follow Up Call    Outreach made within 2 business days of discharge: yes    Patient: Rick Massey Patient : 1950   MRN: [id number]  Reason for Admission: There are no discharge diagnoses documented for the most recent discharge. Discharge Date: 20       Spoke with:  stephen Franks wife    Discharge department/facility: [facility]    TCM Interactive Patient Contact:  Was patient able to fill all prescriptions: yes  Was patient instructed to bring all medications to the follow-up visit: yes  Is patient taking all medications as directed in the discharge summary? yes  Does patient understand their discharge instructions: yes  Does patient have questions or concerns that need addressed prior to 7-14 day follow up office visit: yes and they were already addressed with Dr. Brijesh Medeiros.     Scheduled appointment with PCP within 7-14 days    Follow Up  Future Appointments   Date Time Provider Kaylee Dean   2021  8:30 AM Roseline Guillermo MD [address] IM [facility]   1/15/2021  9:30 AM Leno Richmond [address] SPM [facility]   2021  1:15 PM Poonam Ledesma, APRN - CNP [address] PULM [facility]   2021  9:45 AM Roseline Guillermo MD [address] IM [facility]   2021 10:40 AM Mariel Varner  [address] Heart [facility]       Staci Casiano MA

## 2020-12-29 NOTE — CARE COORDINATION
Mirela 45 Transitions Initial Follow Up Call    Call within 2 business days of discharge: Yes    Patient: Darnella Phalen Patient : 1950   MRN: [id number]  Reason for Admission: Covid19 + Pna, Ac Resp Failure, Atrial Thrombosis  Discharge Date: 20 RARS: Readmission Risk Score: 20  Facility: [address] [place] [address]       Complaint Diagnosis Description Type Department Provider    20 Shortness of Breath Pneumonia due to COVID-19 virus . .. ED to Hosp-Admission (Discharged) (ADMITTED) Marcel Barboza MD; Will. ..       Non-face-to-face services provided:  Obtained and reviewed discharge summary and/or continuity of care documents  Education of patient/family/caregiver/guardian to support self-management-Covid19 Pna  Assessment and support for treatment adherence and medication management-Eliquis; multi d/c rx    Care Transitions 24 Hour Call    Schedule Follow Up Appointment with PCP: Arun Corbett you have any ongoing symptoms?: No  Do you have a copy of your discharge instructions?: Yes  Do you have all of your prescriptions and are they filled?: Yes  Have you been contacted by a [facility] Pharmacist?: No  Have you scheduled your follow up appointment?: No (Comment: Patient to schedule, no assistance needed)  Were you discharged with any Home Care or Post Acute Services: No  Post Acute Services: Home Health (Comment: [facility] [phone])  Do you feel like you have everything you need to keep you well at home?: Yes  Care Transitions Interventions  No Identified Needs   Home Care Waiver: Declined        DME Assistance: Declined        Follow Up  Future Appointments   Date Time Provider Kaylee Dean   2021  8:30 AM Kymberly Isbell MD [address] IM [facility]   1/15/2021  9:30 AM Eleanor Pyle [address] [place] SPM [facility]   2021  1:15 PM SLAVA Cardoso - CNP [address] [facility]   2021  9:45 AM MD [address] E S IM [facility]   2021 10:40 AM Cloretta Alpers, MD [facility]   . Spoke w/ Patient for initial [address] discharge call. Patient reports \"feeling great, really improving\". Denies cough, sob, fever, chills or other Covid19 related sx. Using O2 @ 2L continuously as directed. Confirmed copy of AVS received, reviewed. Obtained and taking Eliquis. Has stopped all discharge meds as directed. Eliquis education provided, stressed importance of not skipping doses and continuing until further advised by MD.     Reviewed discharge instructions, medical action plan and Covid19 red flags such as increased shortness of breath, increasing fever and signs of decompensation. Discussed increased risk of blood clots associated w/ Covid19, sx and preventative measures. Patient verbalizes understanding. Discussed exposure protocols and quarantine with CDC Guidelines What to do if you are sick with coronavirus disease 2019.  Patient reports adhering to quarantine guidelines. Has family/friends who is able to drop off any needed supplies or food. Advised to contact [facility]/PCP re: any additional Covid19 questions. Patient to schedule 7 day f/u appt w/ PCP, denies need for assistance, has transportation for appts. Advised to contact PCP 24/7 re: any new/worsening Covid19 sx or other health concerns. Denies resource needs including hhc, dme. HCDM: Verified information on file up to date. Patient given information for [employer] and agrees to enroll. Patient's preferred e-mail:  [email or web address]  Patient's preferred phone number: [phone]   Based on Loop alert triggers, patient will be contacted by nurse care manager for worsening symptoms. Note routed to [email or web address] for enrollment.        Stefanie Haile RN

## 2020-12-29 NOTE — TELEPHONE ENCOUNTER
Patient's wife calls- patient was discharged from hospital 12/28. Digoxin, Cartia and Warfarin was discontinued and changed to Eliquis and patient was to follow up with cardiologist in 3 weeks. Patient was concerned about stopping all three of those medications. Should patient just wait and discuss this with Dr. Richard Rudd at his visit next week? Patient also mentioned he was on insulin in the hospital and wanted to know if he should be on a certain diet then at home?

## 2021-01-02 ENCOUNTER — TELEPHONE (OUTPATIENT)
Dept: INTERNAL MEDICINE CLINIC | Age: 71
End: 2021-01-02

## 2021-01-07 ENCOUNTER — OFFICE VISIT (OUTPATIENT)
Dept: INTERNAL MEDICINE CLINIC | Age: 71
End: 2021-01-07
Payer: MEDICARE

## 2021-01-07 ENCOUNTER — TELEPHONE (OUTPATIENT)
Dept: CARDIOLOGY CLINIC | Age: 71
End: 2021-01-07

## 2021-01-07 VITALS
HEART RATE: 71 BPM | RESPIRATION RATE: 16 BRPM | DIASTOLIC BLOOD PRESSURE: 80 MMHG | SYSTOLIC BLOOD PRESSURE: 120 MMHG | OXYGEN SATURATION: 92 %

## 2021-01-07 DIAGNOSIS — J12.82 PNEUMONIA DUE TO COVID-19 VIRUS: ICD-10-CM

## 2021-01-07 DIAGNOSIS — I48.11 LONGSTANDING PERSISTENT ATRIAL FIBRILLATION (HCC): ICD-10-CM

## 2021-01-07 DIAGNOSIS — J96.01 ACUTE RESPIRATORY FAILURE WITH HYPOXIA (HCC): ICD-10-CM

## 2021-01-07 DIAGNOSIS — U07.1 PNEUMONIA DUE TO COVID-19 VIRUS: Primary | ICD-10-CM

## 2021-01-07 DIAGNOSIS — E11.21 CONTROLLED TYPE 2 DIABETES MELLITUS WITH DIABETIC NEPHROPATHY, WITHOUT LONG-TERM CURRENT USE OF INSULIN (HCC): ICD-10-CM

## 2021-01-07 DIAGNOSIS — U07.1 PNEUMONIA DUE TO COVID-19 VIRUS: ICD-10-CM

## 2021-01-07 DIAGNOSIS — I51.3 ATRIAL THROMBOSIS: ICD-10-CM

## 2021-01-07 DIAGNOSIS — J12.82 PNEUMONIA DUE TO COVID-19 VIRUS: Primary | ICD-10-CM

## 2021-01-07 DIAGNOSIS — B37.0 THRUSH: ICD-10-CM

## 2021-01-07 LAB
A/G RATIO: 1.9 (ref 1.1–2.2)
ALBUMIN SERPL-MCNC: 4.4 G/DL (ref 3.4–5)
ALP BLD-CCNC: 54 U/L (ref 40–129)
ALT SERPL-CCNC: 48 U/L (ref 10–40)
ANION GAP SERPL CALCULATED.3IONS-SCNC: 14 MMOL/L (ref 3–16)
AST SERPL-CCNC: 27 U/L (ref 15–37)
BASOPHILS ABSOLUTE: 0.1 K/UL (ref 0–0.2)
BASOPHILS RELATIVE PERCENT: 1.2 %
BILIRUB SERPL-MCNC: 1.1 MG/DL (ref 0–1)
BUN BLDV-MCNC: 12 MG/DL (ref 7–20)
CALCIUM SERPL-MCNC: 9.7 MG/DL (ref 8.3–10.6)
CHLORIDE BLD-SCNC: 99 MMOL/L (ref 99–110)
CO2: 26 MMOL/L (ref 21–32)
CREAT SERPL-MCNC: 1.1 MG/DL (ref 0.8–1.3)
EOSINOPHILS ABSOLUTE: 0.1 K/UL (ref 0–0.6)
EOSINOPHILS RELATIVE PERCENT: 1.9 %
GFR AFRICAN AMERICAN: >60
GFR NON-AFRICAN AMERICAN: >60
GLOBULIN: 2.3 G/DL
GLUCOSE BLD-MCNC: 129 MG/DL (ref 70–99)
HCT VFR BLD CALC: 41.6 % (ref 40.5–52.5)
HEMOGLOBIN: 13.9 G/DL (ref 13.5–17.5)
LYMPHOCYTES ABSOLUTE: 0.7 K/UL (ref 1–5.1)
LYMPHOCYTES RELATIVE PERCENT: 13 %
MCH RBC QN AUTO: 28.9 PG (ref 26–34)
MCHC RBC AUTO-ENTMCNC: 33.4 G/DL (ref 31–36)
MCV RBC AUTO: 86.4 FL (ref 80–100)
MONOCYTES ABSOLUTE: 0.5 K/UL (ref 0–1.3)
MONOCYTES RELATIVE PERCENT: 9.7 %
NEUTROPHILS ABSOLUTE: 3.9 K/UL (ref 1.7–7.7)
NEUTROPHILS RELATIVE PERCENT: 74.2 %
PDW BLD-RTO: 19.6 % (ref 12.4–15.4)
PLATELET # BLD: 173 K/UL (ref 135–450)
PMV BLD AUTO: 7.8 FL (ref 5–10.5)
POTASSIUM SERPL-SCNC: 3.7 MMOL/L (ref 3.5–5.1)
RBC # BLD: 4.81 M/UL (ref 4.2–5.9)
SODIUM BLD-SCNC: 139 MMOL/L (ref 136–145)
TOTAL PROTEIN: 6.7 G/DL (ref 6.4–8.2)
WBC # BLD: 5.3 K/UL (ref 4–11)

## 2021-01-07 PROCEDURE — 36415 COLL VENOUS BLD VENIPUNCTURE: CPT | Performed by: INTERNAL MEDICINE

## 2021-01-07 PROCEDURE — 1111F DSCHRG MED/CURRENT MED MERGE: CPT | Performed by: INTERNAL MEDICINE

## 2021-01-07 PROCEDURE — 99495 TRANSJ CARE MGMT MOD F2F 14D: CPT | Performed by: INTERNAL MEDICINE

## 2021-01-07 RX ORDER — FLUCONAZOLE 100 MG/1
100 TABLET ORAL DAILY
Qty: 7 TABLET | Refills: 0 | Status: SHIPPED | OUTPATIENT
Start: 2021-01-07 | End: 2021-01-14

## 2021-01-07 ASSESSMENT — PATIENT HEALTH QUESTIONNAIRE - PHQ9
1. LITTLE INTEREST OR PLEASURE IN DOING THINGS: 0
SUM OF ALL RESPONSES TO PHQ QUESTIONS 1-9: 0

## 2021-01-07 NOTE — PROGRESS NOTES
Post-Discharge Transitional Care Management Services or Hospital Follow Up      Scottie Olsen   YOB: 1950    Date of Office Visit:  1/7/2021  Date of Hospital Admission: 12/21/20  Date of Hospital Discharge: 12/28/20  Readmission Risk Score(high >=14%.  Medium >=10%):Readmission Risk Score: 20      Care management risk score Rising risk (score 2-5) and Complex Care (Scores >=6): 7     Non face to face  following discharge, date last encounter closed (first attempt may have been earlier): 12/29/2020  2:55 PM 12/29/2020  2:55 PM    Call initiated 2 business days of discharge: Yes     Patient Active Problem List   Diagnosis    Atrial fibrillation ([address]    GERD (gastroesophageal reflux disease)    Asthma    Knee pain, bilateral    DDD (degenerative disc disease), lumbar    Testosterone deficiency    Dilated cardiomyopathy ([address]    Gout    Hyperbilirubinemia    Mild intermittent asthma without complication    Cholelithiasis    Colonic polyp    Closed fracture of anterior lip of right acetabulum without additional fracture ([address]    Closed anterior dislocation of left shoulder    VT (ventricular tachycardia) ([address]    SVT (supraventricular tachycardia) ([address]    NSVT (nonsustained ventricular tachycardia) ([address]    Gait disturbance    Leg weakness, bilateral    Uncontrolled pain    VHD (valvular heart disease)    IFG (impaired fasting glucose)    Diabetes mellitus type 2, controlled ([address]    Diabetic neuropathy, type II diabetes mellitus ([address]    COPD (chronic obstructive pulmonary disease) ([address]    Nonrheumatic aortic valve stenosis    Acute respiratory failure (HCC)    Atrial thrombosis       Allergies   Allergen Reactions    Lisinopril      Chest tightness    Simvastatin Other (See Comments)     Muscle cramps       Medications listed as ordered at the time of discharge from hospital   Bridget Bennett \"Ramy\"   Home Medication Instructions STEPHANY:    Printed on:01/07/21 4402 Medication Information                      albuterol sulfate HFA (PROVENTIL HFA) 108 (90 Base) MCG/ACT inhaler  Inhale 2 puffs into the lungs every 6 hours as needed for Wheezing             allopurinol (ZYLOPRIM) 100 MG tablet  Take 1 tablet by mouth daily             apixaban (ELIQUIS) 5 MG TABS tablet  Take 1 tablet by mouth 2 times daily             calcium-vitamin D (OSCAL-500) 500-200 MG-UNIT per tablet  Take 1 tablet by mouth daily. DULERA 100-5 MCG/ACT inhaler  Inhale 2 puffs into the lungs 2 times daily             fluticasone-salmeterol (ADVAIR DISKUS) 500-50 MCG/DOSE diskus inhaler  Inhale 1 puff into the lungs 2 times daily Rinse mouth after use. furosemide (LASIX) 20 MG tablet  Take 1 tablet by mouth daily             metFORMIN (GLUCOPHAGE) 500 MG tablet  Take 1 tablet by mouth daily (with breakfast)             montelukast (SINGULAIR) 10 MG tablet  Take 1 tablet by mouth daily             omeprazole (PRILOSEC) 20 MG delayed release capsule  Take 1 capsule by mouth daily             potassium chloride (KLOR-CON M) 10 MEQ extended release tablet  Take 1 tablet by mouth daily             predniSONE (DELTASONE) 10 MG tablet  5 tabs/day x 2 days, then 4 tabs/day x 2 days, then 3 tabs/day x 2 days, then 2 tabs/ay x 2 days, then 1 tab/day x 2 days.              Spacer/Aero-Holding Chambers ALICE  1 Device by Does not apply route daily as needed (use with albuterol rescue inhaler)             tiotropium (SPIRIVA HANDIHALER) 18 MCG inhalation capsule  Inhale 1 capsule into the lungs daily             traZODone (DESYREL) 50 MG tablet  Take 2 tablets by mouth nightly                   Medications marked \"taking\" at this time  Outpatient Medications Marked as Taking for the 1/7/21 encounter (Office Visit) with Miko Hicks MD   Medication Sig Dispense Refill  predniSONE (DELTASONE) 10 MG tablet 5 tabs/day x 2 days, then 4 tabs/day x 2 days, then 3 tabs/day x 2 days, then 2 tabs/ay x 2 days, then 1 tab/day x 2 days. 30 tablet 0    apixaban (ELIQUIS) 5 MG TABS tablet Take 1 tablet by mouth 2 times daily 60 tablet 2    albuterol sulfate HFA (PROVENTIL HFA) 108 (90 Base) MCG/ACT inhaler Inhale 2 puffs into the lungs every 6 hours as needed for Wheezing 1 Inhaler 3    traZODone (DESYREL) 50 MG tablet Take 2 tablets by mouth nightly 180 tablet 1    metFORMIN (GLUCOPHAGE) 500 MG tablet Take 1 tablet by mouth daily (with breakfast) 90 tablet 1    allopurinol (ZYLOPRIM) 100 MG tablet Take 1 tablet by mouth daily 90 tablet 1    potassium chloride (KLOR-CON M) 10 MEQ extended release tablet Take 1 tablet by mouth daily 90 tablet 1    omeprazole (PRILOSEC) 20 MG delayed release capsule Take 1 capsule by mouth daily 90 capsule 1    montelukast (SINGULAIR) 10 MG tablet Take 1 tablet by mouth daily 90 tablet 3    DULERA 100-5 MCG/ACT inhaler Inhale 2 puffs into the lungs 2 times daily 1 Inhaler 5    tiotropium (SPIRIVA HANDIHALER) 18 MCG inhalation capsule Inhale 1 capsule into the lungs daily 90 capsule 1    Spacer/Aero-Holding Chambers ALICE 1 Device by Does not apply route daily as needed (use with albuterol rescue inhaler) 1 Device 0    fluticasone-salmeterol (ADVAIR DISKUS) 500-50 MCG/DOSE diskus inhaler Inhale 1 puff into the lungs 2 times daily Rinse mouth after use. 1 Inhaler 5    calcium-vitamin D (OSCAL-500) 500-200 MG-UNIT per tablet Take 1 tablet by mouth daily. Medications patient taking as of now reconciled against medications ordered at time of hospital discharge: Yes    Chief Complaint   Patient presents with    [facility]Up from [facility]. Other     wears oxygen at home; for how long? HPI    Inpatient course: Discharge summary reviewed- see chart.   Admit date: 12/21/2020    Discharge date and time: No discharge date for patient encounter.      Admitting Physician: Mari Lindquist MD      Discharge Physician: Randolph Gardiner MD     Admission Diagnoses:   Acute respiratory failure ([facility] [J96.00]  Hypoxia [R09.02]  Atrial thrombosis [I51.3]  Pneumonia due to COVID-19 virus [U07.1, J12.89]     Discharge Diagnoses and Hospital Course:   Shayy Ponce a 65 y. o.  male  who presents with Acute respiratory failure ([facility]     Acute hypoxemic respiratory failure  COVID-19 pneumonia   Probable super imposed bacterial infection   Covid positive on 12/17  Weaned oxygen, sending home with oxygen with home O2 evaluation  S/p convalescent plasma  s/p remdesivir  Decadron>>Taper steroids on discharge  Empiric antibiotics, to DC at discharge  Echo with preserved LV function but dilated atria  To DC on Eliquis for AFIB  Recommendations to stay in quarantine till 1/5/2021      Left atrial thrombus  Persistent AJesika Chambers  Was on Coumadin, currently discontinued due to subtherapeutic levels>>added Eliquis at discharge  Continue Cardizem for rate control  Cardiology recommendations appreciated-to continue Eliquis at discharge     Bradycardia, Asymptomatic  Could be potential side effect of Remdesivir, given several reports or could be with AFIB  Discontinued Digoxin and Cardizem for now  Cardiology recommendations appreciated-To follow up as out patient to see if needed any medications     Hyponatremia, mild     Gout- Allopurinol     Type 2 DM  To continue home medications     COPD-continue Spiriva, Advair and Singulair     Home O2 evalv as below     Patient was seen in hospital for  COPD, COVID-19   .  I am prescribing oxygen because the diagnosis and testing requires the patient to have oxygen in the home.  Conditions will improve or be benefited by oxygen use.  The patient is able to perform good mobility and therefore requires the use of a portable oxygen system for ambulation.      Interval history/Current status:   Now fully recovered from COVID after plasma and no SOB, fever, chills. Has chr thrombus in L atrium. Switched to eliquis and off coumadin. Dm- sugars higher on steroid in hospital.     Lab Results   Component Value Date    LABA1C 7.7 11/17/2020    LABA1C 7.2 08/17/2020    LABA1C 7.2 05/20/2020     Lab Results   Component Value Date    GLUF 107 (H) 01/06/2017    LABMICR <1.20 08/17/2020    LDLCALC see below 11/17/2020    CREATININE 0.8 (L) 12/28/2020     Also severe thrush noted after abx, not responding to nystatin. Review of Systems    Vitals:    01/07/21 0829   BP: 120/80   Pulse: 71   Resp: 16   SpO2: 92%     There is no height or weight on file to calculate BMI. Wt Readings from Last 3 Encounters:   12/25/20 287 lb 7.7 oz (130.4 kg)   12/17/20 270 lb (122.5 kg)   12/04/20 271 lb (122.9 kg)     BP Readings from Last 3 Encounters:   01/07/21 120/80   12/28/20 125/62   12/18/20 131/67       Physical Exam  Vitals signs reviewed. Constitutional:       General: He is not in acute distress. Appearance: He is well-developed. HENT:      Head: Normocephalic and atraumatic. Nose: Nose normal.      Mouth/Throat:      Pharynx: Oropharyngeal exudate (TR THRUSH ON TONGUE) present. Eyes:      General: No scleral icterus. Right eye: No discharge. Left eye: No discharge. Conjunctiva/sclera: Conjunctivae normal.      Pupils: Pupils are equal, round, and reactive to light. Neck:      Musculoskeletal: Normal range of motion and neck supple. Thyroid: No thyromegaly. Vascular: No JVD. Trachea: No tracheal deviation. Cardiovascular:      Rate and Rhythm: Normal rate and regular rhythm. Heart sounds: Normal heart sounds. No murmur. No friction rub. No gallop. Pulmonary:      Effort: Pulmonary effort is normal. No respiratory distress. Breath sounds: Normal breath sounds. No wheezing or rales.    Abdominal: General: Bowel sounds are normal. There is no distension. Palpations: Abdomen is soft. There is no mass. Tenderness: There is no abdominal tenderness. There is no guarding or rebound. Musculoskeletal:         General: No tenderness. Lymphadenopathy:      Cervical: No cervical adenopathy. Skin:     General: Skin is warm and dry. Neurological:      Mental Status: He is alert. Psychiatric:         Mood and Affect: Mood normal.             Assessment/Plan:  1. Pneumonia due to COVID-19 virus  EXCELLENT RESPONSE TO REMDESIVIR AND ALSO PLASMA, NOW STABLE AND TO DISCUSS W PULMONARY AT F/U IF CAN LATER STOP HOME O2.    - Comprehensive Metabolic Panel; Future  - CBC Auto Differential; Future  - SD DISCHARGE MEDS RECONCILED W/ CURRENT OUTPATIENT MED LIST    2. Acute respiratory failure with hypoxia (HCC)  RESOLVED AND CLINICALLY STABILIZED  - SD DISCHARGE MEDS RECONCILED W/ CURRENT OUTPATIENT MED LIST    3. Controlled type 2 diabetes mellitus with diabetic nephropathy, without long-term current use of insulin (HCC)  F/U [facility], JUST OFF STEROIDS SO MAY STILL BE A LITTLE HIGH  - Comprehensive Metabolic Panel; Future  - CBC Auto Differential; Future  - SD DISCHARGE MEDS RECONCILED W/ CURRENT OUTPATIENT MED LIST    4. Atrial thrombosis  CONT F/U CARDIO, SEES DR DAVIES, NOW ON ELIQUIS INSTEAD OF COUMADIN  - apixaban (ELIQUIS) 5 MG TABS tablet; Take 1 tablet by mouth 2 times daily  Dispense: 180 tablet; Refill: 3  - Angie Lynne MD, Cardiology, [address] [place] [address] CURRENT OUTPATIENT MED LIST    5. Longstanding persistent atrial fibrillation (HCC)  ALSO OFF DIGOXIN AND HR STABLE  - apixaban (ELIQUIS) 5 MG TABS tablet; Take 1 tablet by mouth 2 times daily  Dispense: 180 tablet; Refill: 3  - Angie Lynne MD, Cardiology, [address] [place] [address] CURRENT OUTPATIENT MED LIST    6.  Thrush  TREAT MORE AGGRESSIVELY W COURSE DIFLUCAN

## 2021-01-08 NOTE — RESULT ENCOUNTER NOTE
Call pt, labs indicate nl blood counts and also sugar is much improved fr the hospital, dropping from 183 to 129.

## 2021-01-13 ENCOUNTER — NURSE ONLY (OUTPATIENT)
Dept: CARDIOLOGY CLINIC | Age: 71
End: 2021-01-13
Payer: MEDICARE

## 2021-01-13 ENCOUNTER — OFFICE VISIT (OUTPATIENT)
Dept: CARDIOLOGY CLINIC | Age: 71
End: 2021-01-13
Payer: MEDICARE

## 2021-01-13 VITALS
BODY MASS INDEX: 29.64 KG/M2 | HEIGHT: 77 IN | DIASTOLIC BLOOD PRESSURE: 62 MMHG | WEIGHT: 251 LBS | SYSTOLIC BLOOD PRESSURE: 138 MMHG | HEART RATE: 88 BPM

## 2021-01-13 DIAGNOSIS — E11.21 CONTROLLED TYPE 2 DIABETES MELLITUS WITH DIABETIC NEPHROPATHY, WITHOUT LONG-TERM CURRENT USE OF INSULIN (HCC): ICD-10-CM

## 2021-01-13 DIAGNOSIS — J43.1 PANLOBULAR EMPHYSEMA (HCC): ICD-10-CM

## 2021-01-13 DIAGNOSIS — I48.19 PERSISTENT ATRIAL FIBRILLATION (HCC): Primary | ICD-10-CM

## 2021-01-13 DIAGNOSIS — I48.21 PERMANENT ATRIAL FIBRILLATION (HCC): Primary | ICD-10-CM

## 2021-01-13 DIAGNOSIS — R06.02 SOB (SHORTNESS OF BREATH) ON EXERTION: ICD-10-CM

## 2021-01-13 DIAGNOSIS — I48.21 PERMANENT ATRIAL FIBRILLATION (HCC): ICD-10-CM

## 2021-01-13 PROCEDURE — 93228 REMOTE 30 DAY ECG REV/REPORT: CPT | Performed by: INTERNAL MEDICINE

## 2021-01-13 PROCEDURE — 4040F PNEUMOC VAC/ADMIN/RCVD: CPT | Performed by: INTERNAL MEDICINE

## 2021-01-13 PROCEDURE — 3046F HEMOGLOBIN A1C LEVEL >9.0%: CPT | Performed by: INTERNAL MEDICINE

## 2021-01-13 PROCEDURE — 3023F SPIROM DOC REV: CPT | Performed by: INTERNAL MEDICINE

## 2021-01-13 PROCEDURE — 1111F DSCHRG MED/CURRENT MED MERGE: CPT | Performed by: INTERNAL MEDICINE

## 2021-01-13 PROCEDURE — G8428 CUR MEDS NOT DOCUMENT: HCPCS | Performed by: INTERNAL MEDICINE

## 2021-01-13 PROCEDURE — G8926 SPIRO NO PERF OR DOC: HCPCS | Performed by: INTERNAL MEDICINE

## 2021-01-13 PROCEDURE — 99214 OFFICE O/P EST MOD 30 MIN: CPT | Performed by: INTERNAL MEDICINE

## 2021-01-13 PROCEDURE — 1123F ACP DISCUSS/DSCN MKR DOCD: CPT | Performed by: INTERNAL MEDICINE

## 2021-01-13 PROCEDURE — 3017F COLORECTAL CA SCREEN DOC REV: CPT | Performed by: INTERNAL MEDICINE

## 2021-01-13 PROCEDURE — 1036F TOBACCO NON-USER: CPT | Performed by: INTERNAL MEDICINE

## 2021-01-13 PROCEDURE — G8484 FLU IMMUNIZE NO ADMIN: HCPCS | Performed by: INTERNAL MEDICINE

## 2021-01-13 PROCEDURE — 2022F DILAT RTA XM EVC RTNOPTHY: CPT | Performed by: INTERNAL MEDICINE

## 2021-01-13 PROCEDURE — G8417 CALC BMI ABV UP PARAM F/U: HCPCS | Performed by: INTERNAL MEDICINE

## 2021-01-13 NOTE — PROGRESS NOTES
Deon Archer (:  1950) is a 65 y.o. male,     Patient is here for further evaluation and follow up for chronic atrial fibrillation was recently as per last recorded 19 pneumonia and respiratory failure discharge on 2020. I have reviewed the [facility] records. He is treated with the REM deciliter Decadron and the convalescent plasma and the Coumadin was switched to Eliquis) evaluation for chronic A. fib. Patient was seen by my associate Dr. Debbie De Guzman for cardiology consultation in the hospital and he had an echocardiogram reported ejection fraction of 55% with moderate left ventricular hypertrophy and severe bilateral enlargement. Aortic wall sclerosis with mild stenosis and trace aortic regurgitation is reported. Chest x-ray and CT chest on 2020 were consistent with coronary 19 pneumonia. CT chest also reported left atrial thrombus for which he is on Eliquis now. His INR was 1.5 on admission on warfarin therapy which was subtherapeutic. Patient is on oxygen at home. He reports his oxygen saturation 92 at rest on room air and 94-95 on oxygen. He is slowly returning to normal activities and he is exercising 15 minutes a day and he experiences more shortness of breath on climbing stairs and doing exercise than 6 months ago. Denies any dizziness lightheadedness syncope or near syncope. He is tolerating Eliquis well. He has been taken off of digoxin and Cardizem during admission due to bradycardia and he questions about these medications with her the need to be resumed. He reports his heart rate between 80 and 90 at rest at home on his pulse oximeter. Allergies   Allergen Reactions    Lisinopril      Chest tightness    Simvastatin Other (See Comments)     Muscle cramps     Prior to Admission medications    Medication Sig Start Date End Date Taking?  Authorizing Provider apixaban (ELIQUIS) 5 MG TABS tablet Take 1 tablet by mouth 2 times daily 1/7/21  Yes Carly Estes MD   albuterol sulfate HFA (PROVENTIL HFA) 108 (90 Base) MCG/ACT inhaler Inhale 2 puffs into the lungs every 6 hours as needed for Wheezing 12/16/20  Yes Carly Estes MD   traZODone (DESYREL) 50 MG tablet Take 2 tablets by mouth nightly 11/25/20  Yes Carly Estes MD   metFORMIN (GLUCOPHAGE) 500 MG tablet Take 1 tablet by mouth daily (with breakfast) 11/25/20  Yes Carly Estes MD   allopurinol (ZYLOPRIM) 100 MG tablet Take 1 tablet by mouth daily 11/25/20  Yes Carly Estes MD   potassium chloride (KLOR-CON M) 10 MEQ extended release tablet Take 1 tablet by mouth daily 11/25/20  Yes Carly Estes MD   omeprazole (PRILOSEC) 20 MG delayed release capsule Take 1 capsule by mouth daily 11/16/20  Yes Carly Estes MD   montelukast (SINGULAIR) 10 MG tablet Take 1 tablet by mouth daily 9/22/20  Yes SLAVA Cuba CNP   DULERA 100-5 MCG/ACT inhaler Inhale 2 puffs into the lungs 2 times daily 7/28/20  Yes SLAVA Cuba CNP   tiotropium (Mortimer Pae) 18 MCG inhalation capsule Inhale 1 capsule into the lungs daily 7/16/20  Yes SLAVA Cuba CNP   Spacer/Aero-Holding Jameson Colette 1 Device by Does not apply route daily as needed (use with albuterol rescue inhaler) 5/28/20  Yes SLAVA Cuba CNP   calcium-vitamin D (OSCAL-500) 500-200 MG-UNIT per tablet Take 1 tablet by mouth daily. Yes Historical Provider, MD   fluconazole (DIFLUCAN) 100 MG tablet Take 1 tablet by mouth daily for 7 days  Patient not taking: Reported on 1/13/2021 1/7/21 1/14/21  Carly Estes MD   furosemide (LASIX) 20 MG tablet Take 1 tablet by mouth daily 10/5/20 1/3/21  Carly Estes MD   fluticasone-salmeterol (ADVAIR DISKUS) 500-50 MCG/DOSE diskus inhaler Inhale 1 puff into the lungs 2 times daily Rinse mouth after use.  IFG (impaired fasting glucose)     Jan 2019- a1c 6.1 but fasting glc 131.  Knee pain, bilateral     Knee pain, left     Long-term (current) use of anticoagulants     Peptic ulcer disease     Pre-op evaluation 9/5/2018    Testosterone deficiency dx'd 9/2015    Ventricular tachycardia ([address]     mentioned by Dr Magi Bray 11/2019 note-who monitors him    VHD (valvular heart disease) 11/29/2018    Mild aortic stenosis by echo November 2018      Vitals:    01/13/21 0818   BP: 138/62   Pulse: 88   Weight: 251 lb (113.9 kg)   Height: 6' 5\" (1.956 m)      Body mass index is 29.76 kg/m². Wt Readings from Last 3 Encounters:   01/13/21 251 lb (113.9 kg)   12/25/20 287 lb 7.7 oz (130.4 kg)   12/17/20 270 lb (122.5 kg)     Constitutional:  Patient is mildly overweight, pleasant male lost the 13 pounds since the November 6, 2019 office visit. HEENT: He is wearing a facemask and glasses. Cardiovascular: Auscultation: Normal S1 and S2. No significant murmurs audible. Carotids are negative for bruits. Abdominal aorta is is normal.  No epigastric bruit noted. Peripheral pulses: 1+ pedal pulses equal in both feet. Respiratory:  Respiratory effort is normal. Breath sounds are diminished bilaterally without any wheezing or rhonchi or crepitations. Extremities: Bilateral trace ankle edema noted. Abdomen:  No masses or tenderness. No organomegaly noted. Neurologic:  Oriented to time, place, and person and non-anxious. No focal neurological deficit noted. Psychiatric: Normal mood and effect. Pertinent records reviewed and discussed with patient and results are as follow:    Chest x-ray on December 22, 2020 reported  Grossly stable appearance of the lungs compared to the prior exam of December 17, 2020.      CT chest on December 22, 2020 reported 1. There is no evidence for a pulmonary embolism. There is thrombus within the left atrial appendage. Left atrial enlargement is noted. 2. Patchy areas of ground-glass opacities in a peripheral distribution are seen within the bilateral upper lobes and in the right middle lobe with more focal consolidations in the bilateral lower lobes. Findings are concerning for an atypical/multifocal pneumonia. Commonly reported imaging features of COVID-19 pneumonia are present. Other processes such as influenza pneumonia and organizing pneumonia, as can be seen with drug toxicity and connective tissue disease, can cause a similar imaging pattern. PneTyp 3. Small bilateral pleural effusions. Bibasilar atelectasis is noted. 4. Bronchial wall thickening is seen involving the bilateral lower lobes, likely related to acute bronchitis. Findings were discussed with Graylon Prader at 3:28 am on 2020. Expedited imaging was used to obtain protocol images to reduce the   sonographer's exposure during COVID-19 pandemic. Mediastinum: Cardiomegaly is noted.  Left atrial enlargement is seen. Thrombus is seen within the left atrial appendage.  There is no pericardial   effusion. Maudie Larve is no mediastinal lymphadenopathy. Echocardiogram on the  of this 2020 reported   Left ventricular systolic function is normal.   Ejection fraction is visually estimated at 55%. Moderate left ventricular hypertrophy. Severe bilateral atrial enlargement. Moderately dilated right ventricle. Sclerotic, mildly stenotic aortic valve (mean P mmHg). Trace aortic regurgitation. No evidence of any pericardial effusion.      Lab Results   Component Value Date    WBC 5.3 2021    HGB 13.9 2021    HCT 41.6 2021     2021     Lab Results   Component Value Date    CHOL 143 2020    TRIG 413 (H) 2020    HDL 32 (L) 2020    LDLCALC see below 2020     Lab Results

## 2021-01-13 NOTE — ASSESSMENT & PLAN NOTE
Rate is controlled off of digoxin and Cardizem now. Continue Eliquis for left atrial thrombus and to minimize the risk of embolic stroke.   The 24 hour Holter monitor to assess rate control

## 2021-01-13 NOTE — PATIENT INSTRUCTIONS
24 hour Holter to assess heart rate control during exercise and rest. Continue current medications. Multiple questions answered. Follow-up in 4 weeks with [facility] for results, sooner if needed. Please be informed that if you contact our office outside of normal business hours the physician on call cannot help with any scheduling or rescheduling issues, procedure instruction questions or any type of medication issue. We advise you for any urgent/emergency that you go to the nearest emergency room!     PLEASE CALL OUR OFFICE DURING NORMAL BUSINESS HOURS    Monday - Friday   8 am to 5 pm    [address] [address]: [phone]    [place]:  [phone]

## 2021-01-13 NOTE — ASSESSMENT & PLAN NOTE
Secondary to underlying COPD and recent the carotid 19 pneumonia. Will evaluate it further if poor heart rate control is playing a role as he is off of digoxin and Cardizem since recent admission.

## 2021-01-15 ENCOUNTER — OFFICE VISIT (OUTPATIENT)
Dept: ORTHOPEDIC SURGERY | Age: 71
End: 2021-01-15
Payer: MEDICARE

## 2021-01-15 VITALS
RESPIRATION RATE: 16 BRPM | HEART RATE: 74 BPM | HEIGHT: 77 IN | WEIGHT: 271 LBS | OXYGEN SATURATION: 91 % | BODY MASS INDEX: 32 KG/M2

## 2021-01-15 DIAGNOSIS — M19.012 ARTHRITIS OF LEFT ACROMIOCLAVICULAR JOINT: Primary | ICD-10-CM

## 2021-01-15 DIAGNOSIS — S46.012A ROTATOR CUFF STRAIN, LEFT, INITIAL ENCOUNTER: ICD-10-CM

## 2021-01-15 PROCEDURE — G8417 CALC BMI ABV UP PARAM F/U: HCPCS | Performed by: PHYSICIAN ASSISTANT

## 2021-01-15 PROCEDURE — G8484 FLU IMMUNIZE NO ADMIN: HCPCS | Performed by: PHYSICIAN ASSISTANT

## 2021-01-15 PROCEDURE — 1111F DSCHRG MED/CURRENT MED MERGE: CPT | Performed by: PHYSICIAN ASSISTANT

## 2021-01-15 PROCEDURE — 99213 OFFICE O/P EST LOW 20 MIN: CPT | Performed by: PHYSICIAN ASSISTANT

## 2021-01-15 PROCEDURE — 1123F ACP DISCUSS/DSCN MKR DOCD: CPT | Performed by: PHYSICIAN ASSISTANT

## 2021-01-15 PROCEDURE — 3017F COLORECTAL CA SCREEN DOC REV: CPT | Performed by: PHYSICIAN ASSISTANT

## 2021-01-15 PROCEDURE — 1036F TOBACCO NON-USER: CPT | Performed by: PHYSICIAN ASSISTANT

## 2021-01-15 PROCEDURE — G8427 DOCREV CUR MEDS BY ELIG CLIN: HCPCS | Performed by: PHYSICIAN ASSISTANT

## 2021-01-15 PROCEDURE — 4040F PNEUMOC VAC/ADMIN/RCVD: CPT | Performed by: PHYSICIAN ASSISTANT

## 2021-01-15 RX ORDER — POTASSIUM CHLORIDE 7.45 MG/ML
10 INJECTION INTRAVENOUS ONCE
COMMUNITY
End: 2021-04-15

## 2021-01-15 RX ORDER — PYRIDOXINE HCL (VITAMIN B6) 100 MG
1 TABLET ORAL DAILY
COMMUNITY

## 2021-01-15 ASSESSMENT — ENCOUNTER SYMPTOMS
RESPIRATORY NEGATIVE: 1
ALLERGIC/IMMUNOLOGIC NEGATIVE: 1
GASTROINTESTINAL NEGATIVE: 1
EYES NEGATIVE: 1

## 2021-01-15 NOTE — PROGRESS NOTES
Patient returns to the office for a follow up to discuss right shoulder pain post physical therapy and steroid injection. Patient received steroid injection on 12/4/2020 which patient continues to receive relief from, despite not being able to begin physical therapy due to hospitalization and quarantine period after testing positive for Covid. Patient has been working on ROM and strengthening exercises at home with improved ROM. He continues to have some aching within the [facility] joint with one episode of pain radiating down the arm which has subsided. No pain is reported in office today and no new injury.

## 2021-01-15 NOTE — PROGRESS NOTES
[place] [address] and Sports Medicine    HPI:  Alan Carrel is a 65 y.o. male who presents for follow-up of his left shoulder pain post steroid injection. The patient received a steroid injection on 12/04/2020. Patient states he has a history of dislocation of his left shoulder. Patient rates his pain 0/10 today and states it has improved. He states he had recently was diagnosed with COVID-19 and was hospitalized and has been doing better but was unable to go to physical therapy. Patient states his pain has resolved and would like to wait on physical therapy at this time. The patient states he did note improvement of his shoulder pain with the steroid injection. Patient states he is not on a steroid taper or using Voltaren at this time. He denies any fevers or chills today. Past Medical History:   Diagnosis Date    Aortic stenosis, mild 11/8/2017    By echo 10/2016    Asthma, intrinsic     Atrial fibrillation ([address]     Cholelithiasis     Dr Steve Hirsch evaluating    Colonic polyp 05/2009    Dr Lopes [facility]- also done 4/16/2012- AND 3/8/17-TUBULAR ADENOMA,-RECHECK 5YRS    COPD (chronic obstructive pulmonary disease) ([address]     noted on PFTs 6/2020    DDD (degenerative disc disease), lumbar     Diabetes mellitus type 2, controlled ([address]     Dr Deena Sims    Diabetic neuropathy, type II diabetes mellitus ([address]     DECR MONOFIL SENSATION    Dilated cardiomyopathy ([address] 11/4/2015    GERD (gastroesophageal reflux disease)     Gout     H/O cardiovascular stress test 12/10/2015    lexiscan-normal,EF65%    H/O echocardiogram 06/13/2019    EF 55-60%, Mild AS,  Moderately dilated left and right atrium and right ventricle.  History of cardiovascular stress test 1/8/08 1/8/08- normal exercise performance without angina, the patient was noted to have nonspecific ST and T wave changes in the inferior and lateral leads. The nuclear segment of the report will be dicated seperately.  History of complete electrocardiogram 5/16/11    History of echocardiogram 11/14/14,11/2/11, 5/12/10, 5/20/09, 7/9/02 11/14/14- Four chamber dialtation with mild left ventricular systolic function Mild MR and TR, mild pulmonary HTN  EF 45-50%    History of total right hip arthroplasty     Dr Brandyn Alcantara 9/2018- does get antibiotic prophylaxis w dental work- Dr Yessi Beaver Hx of Doppler echocardiogram 11/06/2018    EF55-60%,moderately to severly dilated left and right atrium and right ventricle,mild to moderate mitral and mild tricuspid regurg, mild PHTN    Hx of echocardiogram 10/26/2016    aortic sclerosis with mild stenosis,moderate left atrial dilation,mild mitral and tricusid regurg,EF55-60%    Hyperlipidemia     IFG (impaired fasting glucose)     Jan 2019- a1c 6.1 but fasting glc 131.     Knee pain, bilateral     Knee pain, left     Long-term (current) use of anticoagulants     Peptic ulcer disease     Pre-op evaluation 9/5/2018    Testosterone deficiency dx'd 9/2015    Ventricular tachycardia ([address]     mentioned by Dr Ness Part 11/2019 note-who monitors him    VHD (valvular heart disease) 11/29/2018    Mild aortic stenosis by echo November 2018       Past Surgical History:   Procedure Laterality Date    HIP ARTHROPLASTY Right 09/12/2018    Dr Mahin Crabtree Right 11/2016    Dr Shawna Quintana  3/08, dr Mario Seymour    lap assisted colon mass resection-benign    POLYPECTOMY  2/98- jaimie    colonic polypectomy    POLYPECTOMY  9/10/02- dr Kaylyn Escalante    colonic polypectomy    VENTRAL HERNIA REPAIR  10/08, dr Rochelle Talley Left 2/6/14    [facility] Dr Coley Spatz       Family History   Problem Relation Age of Onset    Hypertension Mother     High Blood Pressure Mother     Other Mother         lung problems    Other Father         CHF, A. Fib    Hypotension Father     Diabetes Father        Social History     Socioeconomic History    Marital status:   metFORMIN (GLUCOPHAGE) 500 MG tablet Take 1 tablet by mouth daily (with breakfast) 90 tablet 1    allopurinol (ZYLOPRIM) 100 MG tablet Take 1 tablet by mouth daily 90 tablet 1    potassium chloride (KLOR-CON M) 10 MEQ extended release tablet Take 1 tablet by mouth daily 90 tablet 1    omeprazole (PRILOSEC) 20 MG delayed release capsule Take 1 capsule by mouth daily 90 capsule 1    montelukast (SINGULAIR) 10 MG tablet Take 1 tablet by mouth daily 90 tablet 3    DULERA 100-5 MCG/ACT inhaler Inhale 2 puffs into the lungs 2 times daily 1 Inhaler 5    tiotropium (SPIRIVA HANDIHALER) 18 MCG inhalation capsule Inhale 1 capsule into the lungs daily 90 capsule 1    Spacer/Aero-Holding Chambers ALICE 1 Device by Does not apply route daily as needed (use with albuterol rescue inhaler) 1 Device 0    furosemide (LASIX) 20 MG tablet Take 1 tablet by mouth daily 90 tablet 1    fluticasone-salmeterol (ADVAIR DISKUS) 500-50 MCG/DOSE diskus inhaler Inhale 1 puff into the lungs 2 times daily Rinse mouth after use. (Patient not taking: Reported on 1/15/2021) 1 Inhaler 5    calcium-vitamin D (OSCAL-500) 500-200 MG-UNIT per tablet Take 1 tablet by mouth daily. No current facility-administered medications for this visit. Allergies   Allergen Reactions    Lisinopril      Chest tightness    Simvastatin Other (See Comments)     Muscle cramps       Vitals:    01/15/21 0924   Pulse: 74   Resp: 16   SpO2: 91%   Weight: 271 lb (122.9 kg)   Height: 6' 5\" (1.956 m)     Review of Systems:   Review of Systems   Constitutional: Negative. HENT: Negative. Eyes: Negative. Respiratory: Negative. Cardiovascular: Negative. Gastrointestinal: Negative. Endocrine: Negative. Genitourinary: Negative. Musculoskeletal: Positive for arthralgias. Skin: Negative. Allergic/Immunologic: Negative. Neurological: Negative. Hematological: Negative. Psychiatric/Behavioral: Negative.        Physical Exam: Gen/Psych:Examination reveals a pleasant individual in no acute distress. The patient is oriented to time, place and person. The patient's mood and affect are appropriate. Patient appears well nourished. HEENT: Head is atraumatic normocephalic, ears are symmetric, eyes show equal pupils bilaterally, extraocular muscles intact. Hearing is intact. Lymph: No obvious lymphedema in left upper extremity     Skin: Intact in left upper extremity with no ulcerations, lesions, rash, erythema. Vascular: There are no obvious varicosities in left upper extremity, sensation intact to light touch over left upper extremity. Capillary refill less than 3. Radial pulses equal and intact bilaterally. Musculoskeletal:  Left shoulder exam:  Skin:  Clear with no erythema, there is no significant joint effusion. Deformity: None  Atrophy: None  Tenderness: Active ROM:   FE:180  degrees   Ad/Abduction: 180 degrees  IR side: to T7           ER side: 70 degrees   Passive ROM:    F/E: 180 degrees   IR side: 75 degrees   ER side: 75 degrees   Ad/Abduction: 170 degrees  Strength:   F/E:5/5         [place]:  Negative  [place]:  Negative  [place]: Negative  [place]: Negative  Cross over test: Negative  The patient can perform an OK sign, perform thumbs up, touch each finger to thumb, abduct and adduct the fingers, perform .  strength: 5/5  Midline bony cervical spine tenderness: no    Imaging: No new images were obtained today. X-ray imaging of the left shoulder from 12/4/2020 was reviewed, please see radiology report below. Impression   1. Degenerative changes in the [facility] joint and glenohumeral joint   2. No acute bony or joint abnormality         Assessment:   1. Osteoarthritis of the left AC joint  2.  Rotator cuff strain    Plan: The patient was seen in clinic today for follow-up of his left shoulder pain posterior injection. Patient received a steroid injection on 12/04/2020. Patient states he was hospitalized for COVID-19 and was unable to go to physical therapy. Patient states his pain has resolved and he feels he does not need physical therapy at this time. He states he is doing well. X-ray imaging was re-reviewed with the patient which showed degenerative changes in the before meals and glenohumeral joint. On physical exam, patient was nontender to palpation over the left shoulder region. Nontender to palpation over the left [facility] [place] joint or lateral shoulder region. No midline bony cervical spine tenderness. Radial pulses equal and intact bilaterally. Capillary refill less than 3. Sensation intact to light touch over the left upper extremity. Patient was informed that the steroid injection can be given every 3 months and that if his pain returns in the future, the steroid injection can be given again at that time. The patient was informed that he can take Tylenol or his Voltaren gel as needed for his pain. Patient can follow-up as needed. Continue range of motion as tolerated  May take tylenol as needed  Rest, ice, and elevate as needed  Weightbearing and activities as tolerated  Follow up as needed    Please note this report has been partially produced using speech recognition Dragon software and may contain errors related to that system including errors in grammar, punctuation, and spelling, as well as words and phrases that may be inappropriate.  If there are any questions or concerns please feel free to contact the dictating provider for clarification

## 2021-01-19 ENCOUNTER — OFFICE VISIT (OUTPATIENT)
Dept: PULMONOLOGY | Age: 71
End: 2021-01-19
Payer: MEDICARE

## 2021-01-19 VITALS
WEIGHT: 271 LBS | BODY MASS INDEX: 32 KG/M2 | HEIGHT: 77 IN | SYSTOLIC BLOOD PRESSURE: 118 MMHG | DIASTOLIC BLOOD PRESSURE: 64 MMHG | HEART RATE: 96 BPM | OXYGEN SATURATION: 97 %

## 2021-01-19 DIAGNOSIS — J44.9 CHRONIC OBSTRUCTIVE PULMONARY DISEASE, UNSPECIFIED COPD TYPE (HCC): ICD-10-CM

## 2021-01-19 DIAGNOSIS — J96.01 ACUTE RESPIRATORY FAILURE WITH HYPOXIA (HCC): Primary | ICD-10-CM

## 2021-01-19 DIAGNOSIS — J45.909 MILD ASTHMA WITHOUT COMPLICATION, UNSPECIFIED WHETHER PERSISTENT: ICD-10-CM

## 2021-01-19 DIAGNOSIS — Z00.00 HEALTHCARE MAINTENANCE: ICD-10-CM

## 2021-01-19 DIAGNOSIS — U07.1 COVID-19 VIRUS INFECTION: ICD-10-CM

## 2021-01-19 PROCEDURE — 99214 OFFICE O/P EST MOD 30 MIN: CPT | Performed by: NURSE PRACTITIONER

## 2021-01-19 PROCEDURE — 4040F PNEUMOC VAC/ADMIN/RCVD: CPT | Performed by: NURSE PRACTITIONER

## 2021-01-19 PROCEDURE — G8417 CALC BMI ABV UP PARAM F/U: HCPCS | Performed by: NURSE PRACTITIONER

## 2021-01-19 PROCEDURE — 1123F ACP DISCUSS/DSCN MKR DOCD: CPT | Performed by: NURSE PRACTITIONER

## 2021-01-19 PROCEDURE — 1111F DSCHRG MED/CURRENT MED MERGE: CPT | Performed by: NURSE PRACTITIONER

## 2021-01-19 PROCEDURE — G8427 DOCREV CUR MEDS BY ELIG CLIN: HCPCS | Performed by: NURSE PRACTITIONER

## 2021-01-19 PROCEDURE — 3023F SPIROM DOC REV: CPT | Performed by: NURSE PRACTITIONER

## 2021-01-19 PROCEDURE — 3017F COLORECTAL CA SCREEN DOC REV: CPT | Performed by: NURSE PRACTITIONER

## 2021-01-19 PROCEDURE — 1036F TOBACCO NON-USER: CPT | Performed by: NURSE PRACTITIONER

## 2021-01-19 PROCEDURE — G8484 FLU IMMUNIZE NO ADMIN: HCPCS | Performed by: NURSE PRACTITIONER

## 2021-01-19 PROCEDURE — G8926 SPIRO NO PERF OR DOC: HCPCS | Performed by: NURSE PRACTITIONER

## 2021-01-19 RX ORDER — TIOTROPIUM BROMIDE 18 UG/1
18 CAPSULE ORAL; RESPIRATORY (INHALATION) DAILY
Qty: 90 CAPSULE | Refills: 1 | Status: SHIPPED | OUTPATIENT
Start: 2021-01-19 | End: 2021-01-19

## 2021-01-19 NOTE — PROGRESS NOTES
Senia Mckeon states they are practicing social distancing, wearing a mask when out in public, washing hands frequently or using hand . Denies any fever, chills, malaise, change in sensation of taste or smell, headache or lightheadedness. Denies any known contacts with persons with respiratory infection, positive for coronavirus or under investigation for possible coronavirus exposure. Influenza 10/20/2020  Pneumovax PPV 23 on 12/3/2015, 9/5/2013. Prevnar 13 on 11/15/2016  Smoker former, quit 1/1/2013, 39-pack-year history  PCP Esther Ortiz MD    Past Medical History:  Past Medical History:   Diagnosis Date    Aortic stenosis, mild 11/8/2017    By echo 10/2016    Asthma, intrinsic     Atrial fibrillation ([address]     Cholelithiasis     Dr Rachell Clinton evaluating    Colonic polyp 05/2009    Dr Kimberly Bauer- also done 4/16/2012- AND 3/8/17-TUBULAR ADENOMA,-RECHECK 5YRS    COPD (chronic obstructive pulmonary disease) ([address]     noted on PFTs 6/2020    DDD (degenerative disc disease), lumbar     Diabetes mellitus type 2, controlled ([address]     Dr Chakraborty Reusing    Diabetic neuropathy, type II diabetes mellitus ([address]     DECR MONOFIL SENSATION    Dilated cardiomyopathy ([address] 11/4/2015    GERD (gastroesophageal reflux disease)     Gout     H/O cardiovascular stress test 12/10/2015    lexiscan-normal,EF65%    H/O echocardiogram 06/13/2019    EF 55-60%, Mild AS,  Moderately dilated left and right atrium and right ventricle.  History of cardiovascular stress test 1/8/08 1/8/08- normal exercise performance without angina, the patient was noted to have nonspecific ST and T wave changes in the inferior and lateral leads. The nuclear segment of the report will be dicated seperately.     History of complete electrocardiogram 5/16/11    History of echocardiogram 11/14/14,11/2/11, 5/12/10, 5/20/09, 7/9/02 11/14/14- Four chamber dialtation with mild left ventricular systolic function Mild MR and TR, mild pulmonary HTN  EF 45-50%    History of total right hip arthroplasty     Dr Alonso Son 9/2018- does get antibiotic prophylaxis w dental work- Dr Angelika Carcamo    Hx of Doppler echocardiogram 11/06/2018    EF55-60%,moderately to severly dilated left and right atrium and right ventricle,mild to moderate mitral and mild tricuspid regurg, mild PHTN    Hx of echocardiogram 10/26/2016    aortic sclerosis with mild stenosis,moderate left atrial dilation,mild mitral and tricusid regurg,EF55-60%    Hyperlipidemia     IFG (impaired fasting glucose)     Jan 2019- a1c 6.1 but fasting glc 131.     Knee pain, bilateral     Knee pain, left     Long-term (current) use of anticoagulants     Peptic ulcer disease     Pre-op evaluation 9/5/2018    Testosterone deficiency dx'd 9/2015    Ventricular tachycardia ([address]     mentioned by Dr Mir Arce 11/2019 note-who monitors him    VHD (valvular heart disease) 11/29/2018    Mild aortic stenosis by echo November 2018       Current Medications:      Current Outpatient Medications:     fluticasone-umeclidin-vilant (TRELEGY ELLIPTA) 100-62.5-25 MCG/INH AEPB, Inhale 1 puff into the lungs daily, Disp: 1 each, Rfl: 5    calcium carb-cholecalciferol 600-200 MG-UNIT TABS tablet, Take 1 tablet by mouth daily, Disp: , Rfl:     potassium chloride 10 MEQ/100ML, Infuse 10 mEq intravenously once, Disp: , Rfl:     apixaban (ELIQUIS) 5 MG TABS tablet, Take 1 tablet by mouth 2 times daily, Disp: 180 tablet, Rfl: 3    albuterol sulfate HFA (PROVENTIL HFA) 108 (90 Base) MCG/ACT inhaler, Inhale 2 puffs into the lungs every 6 hours as needed for Wheezing, Disp: 1 Inhaler, Rfl: 3    traZODone (DESYREL) 50 MG tablet, Take 2 tablets by mouth nightly, Disp: 180 tablet, Rfl: 1    metFORMIN (GLUCOPHAGE) 500 MG tablet, Take 1 tablet by mouth daily (with breakfast), Disp: 90 tablet, Rfl: 1   allopurinol (ZYLOPRIM) 100 MG tablet, Take 1 tablet by mouth daily, Disp: 90 tablet, Rfl: 1    potassium chloride (KLOR-CON M) 10 MEQ extended release tablet, Take 1 tablet by mouth daily, Disp: 90 tablet, Rfl: 1    omeprazole (PRILOSEC) 20 MG delayed release capsule, Take 1 capsule by mouth daily, Disp: 90 capsule, Rfl: 1    montelukast (SINGULAIR) 10 MG tablet, Take 1 tablet by mouth daily, Disp: 90 tablet, Rfl: 3    Spacer/Aero-Holding Chambers ALICE, 1 Device by Does not apply route daily as needed (use with albuterol rescue inhaler), Disp: 1 Device, Rfl: 0    fluticasone-salmeterol (ADVAIR DISKUS) 500-50 MCG/DOSE diskus inhaler, Inhale 1 puff into the lungs 2 times daily Rinse mouth after use., Disp: 1 Inhaler, Rfl: 5    calcium-vitamin D (OSCAL-500) 500-200 MG-UNIT per tablet, Take 1 tablet by mouth daily. , Disp: , Rfl:     furosemide (LASIX) 20 MG tablet, Take 1 tablet by mouth daily, Disp: 90 tablet, Rfl: 1    Allergies   Allergen Reactions    Lisinopril      Chest tightness    Simvastatin Other (See Comments)     Muscle cramps       Social History:    Social History     Socioeconomic History    Marital status:      Spouse name: None    Number of children: None    Years of education: None    Highest education level: None   Occupational History    Occupation: [employer]   Social Needs    Financial resource strain: None    Food insecurity     Worry: None     Inability: None    Transportation needs     Medical: None     Non-medical: None   Tobacco Use    Smoking status: Former Smoker     Packs/day: 1.00     Years: 39.00     Pack years: 39.00     Types: Cigarettes     Start date:      Quit date: 2013     Years since quittin.0    Smokeless tobacco: Never Used   Substance and Sexual Activity    Alcohol use: Yes     Comment: occasional wine/moderate    Drug use: No    Sexual activity: Yes     Partners: Female   Lifestyle    Physical activity     Days per week: None Minutes per session: None    Stress: None   Relationships    Social connections     Talks on phone: None     Gets together: None     Attends Mandaen service: None     Active member of club or organization: None     Attends meetings of clubs or organizations: None     Relationship status: None    Intimate partner violence     Fear of current or ex partner: None     Emotionally abused: None     Physically abused: None     Forced sexual activity: None   Other Topics Concern    None   Social History Narrative    Exercise:    Seat Belt :       Family History:    Family History   Problem Relation Age of Onset    Hypertension Mother     High Blood Pressure Mother     Other Mother         lung problems    Other Father         CHF, A. Fib    Hypotension Father     Diabetes Father          REVIEW OF SYSTEMS:    CONSTITUTIONAL:  negative for fevers, chills, diaphoresis, activity change, appetite change, night sweats and unexpected weight change.    HEENT:  negative for hearing loss, sinus pressure, nasal congestion, epistaxis and snoring  RESPIRATORY: Positive shortness of breath on exertion, positive dry cough, negative wheeze  CARDIOVASCULAR:  Negative for chest pain, palpitations, exertional chest pressure/discomfort, edema, syncope  GASTROINTESTINAL: negative for nausea, vomiting, diarrhea, constipation, blood in stool and abdominal pain  GENITOURINARY:  negative for frequency, dysuria and hematuria  HEMATOLOGIC/LYMPHATIC:  negative for easy bruising, bleeding and lymphadenopathy  ALLERGIC/IMMUNOLOGIC:  negative for recurrent infections, angioedema, anaphylaxis and drug reaction  MUSCULOSKELETAL:  negative for pain, joint swelling, decreased range of motion and muscle weakness  NEURO: negative for headache, AMS, decrease sensations  SKIN: Negative for rashes or lesions      Physical Exam: /64 (Site: Right Upper Arm, Position: Sitting, Cuff Size: Large Adult)   Pulse 96   Ht 6' 5\" (1.956 m)   Wt 271 lb (122.9 kg)   SpO2 97%   BMI 32.14 kg/m²    Body mass index is 32.14 kg/m². Constitutional:  He appears well developed and well-nourished. Neck:  Supple, no palpable lymphadenopathy, no JVD  Cardiovascular:  S1, S2 Normal, Regular rhythm, no murmurs or gallops, no pericardial  rubs. Pulmonary: Bilateral breath sounds clear and equal to auscultation, good air movement, no use of accessory muscles to support respiratory effort. No forced expiratory wheeze, no rales, no rhonchi. Dry cough noted during exam  Abdomen: No epigastric pain, no distention, + bowel sounds   Extremities: No edema, no calf tenderness, no clubbing of digits  Neurologic:  Awake and alert, no focal deficits  Skin: warm and dry    Radiology:   12/22/2020 CTA with  1. There is no evidence for a pulmonary embolism. Herchel Jefry is thrombus within   the left atrial appendage.  Left atrial enlargement is noted. 2. Patchy areas of ground-glass opacities in a peripheral distribution are   seen within the bilateral upper lobes and in the right middle lobe with more   focal consolidations in the bilateral lower lobes.  Findings are concerning   for an atypical/multifocal pneumonia.  Commonly reported imaging features of   COVID-19 pneumonia are present.  Other processes such as influenza pneumonia   and organizing pneumonia, as can be seen with drug toxicity and connective   tissue disease, can cause a similar imaging pattern. PneTyp   3. Small bilateral pleural effusions.  Bibasilar atelectasis is noted. 4. Bronchial wall thickening is seen involving the bilateral lower lobes,   likely related to acute bronchitis.      PFT: 06/09/2020  FVC 69% / 68% predicted, FEV1 46% / 47% predicted, FEV1/FVC 64% / 66% predicted, FEF 25-75% 18% / 20% predicted, % predicted, % predicted, DLCO 43% predicted Following administration of bronchodilator there was no significant response to bronchodilator. Overall testing indicates severe obstructive airway disease with severe diffusion defect    ASSESSMENT:    1. Acute respiratory failure with hypoxia ([facility]    2. Chronic obstructive pulmonary disease, unspecified COPD type ([facility]    3. COVID-19 virus infection    4. Mild asthma without complication, unspecified whether persistent    5. Healthcare maintenance        PLAN:   Has been on supplemental oxygen at 2 L/min via nasal cannula since discharge from the hospital with coronavirus pneumonia. He states he has a large concentrator at home but is unable to take smaller concentrator with him when he has medical appointments or necessary trips away from home so he does not wear O2 when away from home. He is short of breath when he is not wearing his oxygen. I feel he would significantly benefit from a small portable concentrator unit and we will submit orders for testing with [facility] home medical equipment. With a smaller unit I feel he will be more compliant with wearing the oxygen that he requires at this time. We have discussed the length of time that he may need oxygen, I have informed him that we will retest his oxygen requirement we will repeat his pulmonary function test in June. We have tried bronchodilator therapy for his severe obstructive disease as required by his insurance company. At this time I feel he would most benefit from Trelegy and we will represcribe Trelegy and complete prior authorization required by his insurance. He was on warfarin for his A. fib but due to recent echo finding of left atrial thrombosis he was changed to Eliquis. He is following up with cardiologist at [facility]. We have discussed the coronavirus and the need for him to receive vaccination even though he has had the virus. I have informed him that he will be receiving a [facility] along with the phone number for the health department when he is eligible to receive the vaccination. We have discussed the need to maintain yearly flu immunization, pneumococcal vaccination. We have discussed Coronavirus precaution including: social distancing when needing to be in public, handwashing practice, wiping items touched in public such as gas pumps, door handles, shopping carts, etc. Self monitoring for infection - fever, chills, cough, SOB. Should they develop symptoms they should call office for further instructions. No follow-ups on file. This dictation was performed with a verbal recognition program and it was checked for errors. It is possible that there are still dictated errors within this office note. Any errors should be brought immediately to my attention for correction. All efforts were made to ensure that this office note is accurate.

## 2021-01-20 ENCOUNTER — TELEPHONE (OUTPATIENT)
Dept: CARDIOLOGY CLINIC | Age: 71
End: 2021-01-20

## 2021-01-20 DIAGNOSIS — Z01.812 ENCOUNTER FOR PREOPERATIVE SCREENING LABORATORY TESTING FOR COVID-19 VIRUS: Primary | ICD-10-CM

## 2021-01-20 DIAGNOSIS — Z20.822 ENCOUNTER FOR PREOPERATIVE SCREENING LABORATORY TESTING FOR COVID-19 VIRUS: Primary | ICD-10-CM

## 2021-01-20 NOTE — TELEPHONE ENCOUNTER
Received call from keyla, critical alert of 10 beat ventricular run.  T Pablo to follow up with Dr Shook Blocker

## 2021-01-25 ENCOUNTER — HOSPITAL ENCOUNTER (OUTPATIENT)
Age: 71
Setting detail: SPECIMEN
Discharge: HOME | End: 2021-01-25
Payer: MEDICARE

## 2021-01-25 ENCOUNTER — OFFICE VISIT (OUTPATIENT)
Dept: INTERNAL MEDICINE CLINIC | Age: 71
End: 2021-01-25
Payer: MEDICARE

## 2021-01-25 VITALS
OXYGEN SATURATION: 94 % | RESPIRATION RATE: 18 BRPM | DIASTOLIC BLOOD PRESSURE: 68 MMHG | SYSTOLIC BLOOD PRESSURE: 122 MMHG | HEART RATE: 69 BPM

## 2021-01-25 DIAGNOSIS — R09.1 PLEURISY: ICD-10-CM

## 2021-01-25 DIAGNOSIS — R09.1 PLEURISY: Primary | ICD-10-CM

## 2021-01-25 LAB
BASOPHILS ABSOLUTE: 0.1 K/UL (ref 0–0.2)
BASOPHILS RELATIVE PERCENT: 1.2 %
D DIMER: <200 NG/ML(DDU)
EOSINOPHILS ABSOLUTE: 0.1 K/UL (ref 0–0.6)
EOSINOPHILS RELATIVE PERCENT: 1.1 %
HCT VFR BLD CALC: 38.7 % (ref 40.5–52.5)
HEMOGLOBIN: 13.1 G/DL (ref 13.5–17.5)
LYMPHOCYTES ABSOLUTE: 1.1 K/UL (ref 1–5.1)
LYMPHOCYTES RELATIVE PERCENT: 18.6 %
MCH RBC QN AUTO: 29.9 PG (ref 26–34)
MCHC RBC AUTO-ENTMCNC: 33.9 G/DL (ref 31–36)
MCV RBC AUTO: 88.3 FL (ref 80–100)
MONOCYTES ABSOLUTE: 0.8 K/UL (ref 0–1.3)
MONOCYTES RELATIVE PERCENT: 13.1 %
NEUTROPHILS ABSOLUTE: 4 K/UL (ref 1.7–7.7)
NEUTROPHILS RELATIVE PERCENT: 66 %
PDW BLD-RTO: 19.4 % (ref 12.4–15.4)
PLATELET # BLD: 333 K/UL (ref 135–450)
PMV BLD AUTO: 7.7 FL (ref 5–10.5)
RBC # BLD: 4.38 M/UL (ref 4.2–5.9)
WBC # BLD: 6.1 K/UL (ref 4–11)

## 2021-01-25 PROCEDURE — 1123F ACP DISCUSS/DSCN MKR DOCD: CPT | Performed by: INTERNAL MEDICINE

## 2021-01-25 PROCEDURE — 85379 FIBRIN DEGRADATION QUANT: CPT

## 2021-01-25 PROCEDURE — G8484 FLU IMMUNIZE NO ADMIN: HCPCS | Performed by: INTERNAL MEDICINE

## 2021-01-25 PROCEDURE — 1036F TOBACCO NON-USER: CPT | Performed by: INTERNAL MEDICINE

## 2021-01-25 PROCEDURE — 1111F DSCHRG MED/CURRENT MED MERGE: CPT | Performed by: INTERNAL MEDICINE

## 2021-01-25 PROCEDURE — 99213 OFFICE O/P EST LOW 20 MIN: CPT | Performed by: INTERNAL MEDICINE

## 2021-01-25 PROCEDURE — 3017F COLORECTAL CA SCREEN DOC REV: CPT | Performed by: INTERNAL MEDICINE

## 2021-01-25 PROCEDURE — G8417 CALC BMI ABV UP PARAM F/U: HCPCS | Performed by: INTERNAL MEDICINE

## 2021-01-25 PROCEDURE — 4040F PNEUMOC VAC/ADMIN/RCVD: CPT | Performed by: INTERNAL MEDICINE

## 2021-01-25 PROCEDURE — G8427 DOCREV CUR MEDS BY ELIG CLIN: HCPCS | Performed by: INTERNAL MEDICINE

## 2021-01-25 PROCEDURE — 36415 COLL VENOUS BLD VENIPUNCTURE: CPT | Performed by: INTERNAL MEDICINE

## 2021-01-25 RX ORDER — PREDNISONE 1 MG/1
TABLET ORAL
Qty: 21 TABLET | Refills: 0 | Status: SHIPPED | OUTPATIENT
Start: 2021-01-25 | End: 2021-02-10

## 2021-01-25 NOTE — PROGRESS NOTES
Xavi Abler  1950 01/25/21    SUBJECTIVE:  HAS HAD ONSET OF R SIDED PLEURISY THE PAST 5D, NO FEVER OR CHILLS, COUGHING. SX SLOWLY BETTER. O2 SAT OK AT 94% RA. OBJECTIVE:    /68   Pulse 69   Resp 18   SpO2 94%     Physical Exam  Vitals signs reviewed. Constitutional:       Appearance: He is well-developed. Neck:      Musculoskeletal: Neck supple. Cardiovascular:      Rate and Rhythm: Normal rate. Rhythm irregular. Heart sounds: Normal heart sounds. No murmur. No friction rub. No gallop. Pulmonary:      Effort: Pulmonary effort is normal. No respiratory distress. Breath sounds: Normal breath sounds. No wheezing or rales. Abdominal:      General: Bowel sounds are normal. There is no distension. Palpations: Abdomen is soft. Tenderness: There is no abdominal tenderness. Neurological:      Mental Status: He is alert. ASSESSMENT:    1. Pleurisy        PLAN:    Arya Daniels was seen today for pleurisy. Diagnoses and all orders for this visit:    Pleurisy- DESPITE ANTICOAGULATION, GIVEN SUDDEN ONSET OF PLEURISY WE'L ALSO CHECK DDIMER TO SCR FOR PE.  ALSO CHECK CBC. EMPIRIC RX PRED TAPER. IF NO BETTER LATER THIS WEEK ALSO F/U CXR  To call back one week if not improving.      -     Cancel: D-DIMER, QUANTITATIVE; Future  -     CBC Auto Differential; Future  -     predniSONE (DELTASONE) 5 MG tablet; Take 6 tablets by mouth on day 1, 5 on day 2, 4 on day 3, 3 on day 4, 2 on day 5, 1 on day 6.  -     XR CHEST STANDARD (2 VW);  Future

## 2021-01-26 ENCOUNTER — HOSPITAL ENCOUNTER (OUTPATIENT)
Dept: GENERAL RADIOLOGY | Age: 71
Discharge: HOME | End: 2021-01-26
Payer: MEDICARE

## 2021-01-26 ENCOUNTER — HOSPITAL ENCOUNTER (OUTPATIENT)
Age: 71
Discharge: HOME | End: 2021-01-26
Payer: MEDICARE

## 2021-01-26 DIAGNOSIS — R09.1 PLEURISY: ICD-10-CM

## 2021-01-26 PROCEDURE — 71046 X-RAY EXAM CHEST 2 VIEWS: CPT

## 2021-01-26 RX ORDER — LEVOFLOXACIN 500 MG/1
500 TABLET, FILM COATED ORAL DAILY
Qty: 10 TABLET | Refills: 0 | Status: SHIPPED | OUTPATIENT
Start: 2021-01-26 | End: 2021-02-05

## 2021-01-27 DIAGNOSIS — J18.9 ACUTE PNEUMONIA: Primary | ICD-10-CM

## 2021-01-27 DIAGNOSIS — Z01.818 PREOP TESTING: Primary | ICD-10-CM

## 2021-01-27 NOTE — PROGRESS NOTES
Reviewed CXR recently completed, pcp started on Levaquin. Will change scheduled PFT, CT scan to March 8 at 1:00PM and COVID  testing on March 2 at 1200.  Left voice message for patient to call office for additional information and instructions

## 2021-02-10 ENCOUNTER — VIRTUAL VISIT (OUTPATIENT)
Dept: CARDIOLOGY CLINIC | Age: 71
End: 2021-02-10
Payer: MEDICARE

## 2021-02-10 DIAGNOSIS — I47.20 VT (VENTRICULAR TACHYCARDIA): ICD-10-CM

## 2021-02-10 DIAGNOSIS — E11.21 CONTROLLED TYPE 2 DIABETES MELLITUS WITH DIABETIC NEPHROPATHY, WITHOUT LONG-TERM CURRENT USE OF INSULIN (HCC): ICD-10-CM

## 2021-02-10 DIAGNOSIS — I48.21 PERMANENT ATRIAL FIBRILLATION (HCC): ICD-10-CM

## 2021-02-10 DIAGNOSIS — J45.21 MILD INTERMITTENT ASTHMA WITH ACUTE EXACERBATION: ICD-10-CM

## 2021-02-10 PROCEDURE — G8484 FLU IMMUNIZE NO ADMIN: HCPCS | Performed by: INTERNAL MEDICINE

## 2021-02-10 PROCEDURE — G8427 DOCREV CUR MEDS BY ELIG CLIN: HCPCS | Performed by: INTERNAL MEDICINE

## 2021-02-10 PROCEDURE — 2022F DILAT RTA XM EVC RTNOPTHY: CPT | Performed by: INTERNAL MEDICINE

## 2021-02-10 PROCEDURE — 3017F COLORECTAL CA SCREEN DOC REV: CPT | Performed by: INTERNAL MEDICINE

## 2021-02-10 PROCEDURE — 1123F ACP DISCUSS/DSCN MKR DOCD: CPT | Performed by: INTERNAL MEDICINE

## 2021-02-10 PROCEDURE — G8417 CALC BMI ABV UP PARAM F/U: HCPCS | Performed by: INTERNAL MEDICINE

## 2021-02-10 PROCEDURE — 3046F HEMOGLOBIN A1C LEVEL >9.0%: CPT | Performed by: INTERNAL MEDICINE

## 2021-02-10 PROCEDURE — 99213 OFFICE O/P EST LOW 20 MIN: CPT | Performed by: INTERNAL MEDICINE

## 2021-02-10 PROCEDURE — 4040F PNEUMOC VAC/ADMIN/RCVD: CPT | Performed by: INTERNAL MEDICINE

## 2021-02-10 PROCEDURE — 1036F TOBACCO NON-USER: CPT | Performed by: INTERNAL MEDICINE

## 2021-02-10 RX ORDER — DILTIAZEM HYDROCHLORIDE 120 MG/1
120 CAPSULE, COATED, EXTENDED RELEASE ORAL DAILY
Qty: 90 CAPSULE | Refills: 3 | Status: SHIPPED | OUTPATIENT
Start: 2021-02-10 | End: 2021-04-15

## 2021-02-10 NOTE — PROGRESS NOTES
Jun Merida (:  1950) is a 65 y.o. male,Established patient, here for evaluation of the following chief complaint(s): Procedure (Pt states his SOB is getting better with since he got a new inhaler, no other cardiac complaints at this time. Pt walks 30 mintues on the treadmill every day. Pt drinks 2 cups of coffee daily. Went through pt medications to make sure it was correct in our system. ) and Other (holter results)      Prior to Admission medications    Medication Sig Start Date End Date Taking?  Authorizing Provider   dilTIAZem (CARDIZEM CD) 120 MG extended release capsule Take 1 capsule by mouth daily 2/10/21  Yes Kahlil Hernández MD   fluticasone-umeclidin-vilant (TRELEGY ELLIPTA) 750-05.8-15 MCG/INH AEPB Inhale 1 puff into the lungs daily 21  Yes SLAVA Wilson - CNP   calcium carb-cholecalciferol 600-200 MG-UNIT TABS tablet Take 1 tablet by mouth daily   Yes Historical Provider, MD   potassium chloride 10 MEQ/100ML Infuse 10 mEq intravenously once   Yes Historical Provider, MD   apixaban (ELIQUIS) 5 MG TABS tablet Take 1 tablet by mouth 2 times daily 21  Yes Kayley Plascencia MD   albuterol sulfate HFA (PROVENTIL HFA) 108 (90 Base) MCG/ACT inhaler Inhale 2 puffs into the lungs every 6 hours as needed for Wheezing 20  Yes Kayley Plascencia MD   traZODone (DESYREL) 50 MG tablet Take 2 tablets by mouth nightly 20  Yes Kayley Plascencia MD   metFORMIN (GLUCOPHAGE) 500 MG tablet Take 1 tablet by mouth daily (with breakfast) 20  Yes Kayley Plascencia MD   allopurinol (ZYLOPRIM) 100 MG tablet Take 1 tablet by mouth daily 20  Yes Kayley Plascencia MD   potassium chloride (KLOR-CON M) 10 MEQ extended release tablet Take 1 tablet by mouth daily 20  Yes Kayley Plascencia MD   omeprazole (PRILOSEC) 20 MG delayed release capsule Take 1 capsule by mouth daily 20  Yes Kayley Plascencia MD   montelukast (SINGULAIR) 10 MG tablet Take 1 tablet by mouth daily 9/22/20  Yes SLAVA Baugh CNP   Spacer/Aero-Holding Lisa JENKINS 1 Device by Does not apply route daily as needed (use with albuterol rescue inhaler) 5/28/20  Yes SLAVA Baugh CNP   calcium-vitamin D (OSCAL-500) 500-200 MG-UNIT per tablet Take 1 tablet by mouth daily. Yes Historical Provider, MD   furosemide (LASIX) 20 MG tablet Take 1 tablet by mouth daily 10/5/20 1/3/21  Rosalie Castillo MD       Allergies   Allergen Reactions    Lisinopril      Chest tightness    Simvastatin Other (See Comments)     Muscle cramps       SUBJECTIVE/OBJECTIVE:  HPI   53-year-old male follows up for chronic atrial fibrillation and shortness of breath and has asthma. He had been off of digoxin as well as Cartia and 80 twice a day for bradycardia when he was in the hospital.  He reports dyspnea on exertion and shortness breath has improved significantly since he was started on Trelegy. Recent Holter monitor results are reviewed with patient. Review of Systems unchanged from last visit    Patient-Reported Vitals 2/10/2021   Patient-Reported Weight 250lbs   Patient-Reported Height 6 5   Patient-Reported Systolic 997   Patient-Reported Diastolic 70   Patient-Reported Pulse 100      Physical Exam   Is in no apparent distress. His vital signs are stable. He responds very appropriately to all the questions and is alert awake and oriented ×3.   Recent Holter monitor reported  Continuous for 3 days done to evaluate shortness of breath and atrial fibrillation rate control.  Patient is in persistent atrial fibrillation at average rate of 98 bpm.  Neurologic rate of 60 bpm occurred at 2:26 AM the fastest rate was 171 bpm occurred at 2:55 PM.  Frequent PVCs were noted with one nonsustained 10 beat wide-complex tachycardia noted at the level and 30 1 AM on January 13, 2021.  Patient reported shortness breath heart rates of 9215 bpm.     Conclusion: Abnormal Holter findings suggesting persistent atrial fibrillation with frequent PVCs and suboptimal rate control. Lab Results   Component Value Date    WBC 6.1 01/25/2021    HGB 13.1 01/25/2021    HCT 38.7 01/25/2021     01/25/2021     Lab Results   Component Value Date    CHOL 143 11/17/2020    TRIG 413 (H) 11/17/2020    HDL 32 (L) 11/17/2020    LDLCALC see below 11/17/2020     Lab Results   Component Value Date    BUN 12 01/07/2021    CREATININE 1.1 01/07/2021     01/07/2021    K 3.7 01/07/2021     Lab Results   Component Value Date    LABA1C 7.7 11/17/2020     ASSESSMENT/PLAN:  1. Permanent atrial fibrillation [facility])  Assessment & Plan:  Based on recent Holter monitor heart rate is not optimally controlled. We'll put him on lower dose of Cartia 120 mg once a day and follow-up. Continue Xarelto. 2. Controlled type 2 diabetes mellitus with diabetic nephropathy, without long-term current use of insulin ([address]  Assessment & Plan:  Fairly well controlled with recent hemoglobin A1c reported 7.7.  3. VT (ventricular tachycardia) ([address]  Assessment & Plan:  Noted on Holter monitor with rapid ventricular rate. This could be aberrant conduction of supraventricular We'll start him on Cartia 120 to slow the heart rate down and follow-up with a repeat Holter. 4. Mild intermittent asthma with acute exacerbation  Assessment & Plan:  Shortness breath has improved since he started taking Trelegy      On this date 02/10/21 I have spent 20 minutes reviewing previous notes, test results and face to face with the patient discussing the diagnosis and importance of compliance with the treatment plan as well as documenting on the day of the visit. Desiree Frias is a 65 y.o. male being evaluated by a Virtual Visit (video visit) encounter to address concerns as mentioned above. A caregiver was present when appropriate.  Due to this being a TeleHealth encounter (During QTV-54 public health emergency), evaluation of the following organ systems was limited: Vitals/Constitutional/EENT/Resp/CV/GI//MS/Neuro/Skin/Heme-Lymph-Imm. Pursuant to the emergency declaration under the [address] [place] [address] and the [employer] and [employer] Act, this Virtual Visit was conducted with patient's (and/or legal guardian's) consent, to reduce the patient's risk of exposure to COVID-19 and provide necessary medical care. The patient (and/or legal guardian) has also been advised to contact this office for worsening conditions or problems, and seek emergency medical treatment and/or call 911 if deemed necessary. Patient identification was verified at the start of the visit: Yes      Services were provided through synchronize video discussion virtually to substitute for in-person clinic visit. Patient and provider were located at their individual homes. An electronic signature was used to authenticate this note.     --Elizabeth Horvath MD

## 2021-02-10 NOTE — PATIENT INSTRUCTIONS
Start Cartia 120 mg daily and continue other medications and follow-up in 3 months with EKG. Prescriptions are sent to [address] [place] [address] and multiple questions are answered and he verbalized understanding.

## 2021-02-10 NOTE — ASSESSMENT & PLAN NOTE
Noted on Holter monitor with rapid ventricular rate. This could be aberrant conduction of supraventricular We'll start him on Cartia 120 to slow the heart rate down and follow-up with a repeat Holter.

## 2021-02-17 LAB — DIABETIC RETINOPATHY: NEGATIVE

## 2021-02-25 ENCOUNTER — VIRTUAL VISIT (OUTPATIENT)
Dept: INTERNAL MEDICINE CLINIC | Age: 71
End: 2021-02-25
Payer: MEDICARE

## 2021-02-25 DIAGNOSIS — I48.21 PERMANENT ATRIAL FIBRILLATION (HCC): ICD-10-CM

## 2021-02-25 DIAGNOSIS — I42.0 DILATED CARDIOMYOPATHY (HCC): ICD-10-CM

## 2021-02-25 DIAGNOSIS — K21.9 GASTROESOPHAGEAL REFLUX DISEASE WITHOUT ESOPHAGITIS: ICD-10-CM

## 2021-02-25 DIAGNOSIS — E11.21 CONTROLLED TYPE 2 DIABETES MELLITUS WITH DIABETIC NEPHROPATHY, WITHOUT LONG-TERM CURRENT USE OF INSULIN (HCC): Primary | ICD-10-CM

## 2021-02-25 PROCEDURE — 4040F PNEUMOC VAC/ADMIN/RCVD: CPT | Performed by: INTERNAL MEDICINE

## 2021-02-25 PROCEDURE — G8427 DOCREV CUR MEDS BY ELIG CLIN: HCPCS | Performed by: INTERNAL MEDICINE

## 2021-02-25 PROCEDURE — 3046F HEMOGLOBIN A1C LEVEL >9.0%: CPT | Performed by: INTERNAL MEDICINE

## 2021-02-25 PROCEDURE — 3017F COLORECTAL CA SCREEN DOC REV: CPT | Performed by: INTERNAL MEDICINE

## 2021-02-25 PROCEDURE — G8484 FLU IMMUNIZE NO ADMIN: HCPCS | Performed by: INTERNAL MEDICINE

## 2021-02-25 PROCEDURE — 2022F DILAT RTA XM EVC RTNOPTHY: CPT | Performed by: INTERNAL MEDICINE

## 2021-02-25 PROCEDURE — 1123F ACP DISCUSS/DSCN MKR DOCD: CPT | Performed by: INTERNAL MEDICINE

## 2021-02-25 PROCEDURE — G8417 CALC BMI ABV UP PARAM F/U: HCPCS | Performed by: INTERNAL MEDICINE

## 2021-02-25 PROCEDURE — 99214 OFFICE O/P EST MOD 30 MIN: CPT | Performed by: INTERNAL MEDICINE

## 2021-02-25 PROCEDURE — 1036F TOBACCO NON-USER: CPT | Performed by: INTERNAL MEDICINE

## 2021-02-25 RX ORDER — FUROSEMIDE 20 MG/1
20 TABLET ORAL DAILY
Qty: 90 TABLET | Refills: 1 | Status: SHIPPED | OUTPATIENT
Start: 2021-02-25 | End: 2021-10-06

## 2021-02-25 RX ORDER — OMEPRAZOLE 20 MG/1
20 CAPSULE, DELAYED RELEASE ORAL DAILY
Qty: 90 CAPSULE | Refills: 1 | Status: SHIPPED | OUTPATIENT
Start: 2021-02-25 | End: 2021-11-09 | Stop reason: SDUPTHER

## 2021-02-25 RX ORDER — GLUCOSAMINE HCL/CHONDROITIN SU 500-400 MG
CAPSULE ORAL
Qty: 100 STRIP | Refills: 0 | Status: SHIPPED | OUTPATIENT
Start: 2021-02-25 | End: 2021-11-24

## 2021-02-25 NOTE — PROGRESS NOTES
2021    TELEHEALTH EVALUATION -- Audio/Visual (During YJMCK-04 public health emergency)    HPI:    Yamile Gross (:  1950) has requested an audio/video evaluation for the following concern(s):     DM-  Does NOT have glucometer so we'll send script. Lab Results   Component Value Date    LABA1C 7.7 2020    LABA1C 7.2 2020    LABA1C 7.2 2020     Lab Results   Component Value Date    GLUF 107 (H) 2017    LABMICR <1.20 2020    LDLCALC see below 2020    CREATININE 1.1 2021     Asthma:  Patient denies significant chest pain/tightness, dyspnea, decreased exercise tolerance, cough, or wheezing on current regimen. DRAMATICALLY IMPROVED W TRELEGY ELLIPTA NOW. PE, now on eliquis, no sx cp or sob    Hypertension: Stable. Denies CP, SOB, cough, visual changes, dizziness, palpitations or HA.      WAITING ON COVID VACCINE TILL MARCH SINCE HAD PLASMA SO TO WAIT 90D. STILL ON O2.  FOR PFTS AND 6MIN WALK TEST- DR MULLEN. bp running 130/74. atr fib, on eliquis, due for TSH, off digoxin now, is on cardizem. HR 92 this am.    GERD:  Is without sx of heartburn or dysphagia, abd pain. Review of Systems    Prior to Visit Medications    Medication Sig Taking?  Authorizing Provider   furosemide (LASIX) 20 MG tablet Take 1 tablet by mouth daily Yes Yared Cook MD   omeprazole (PRILOSEC) 20 MG delayed release capsule Take 1 capsule by mouth daily Yes Yared Cook MD   dilTIAZem (CARDIZEM CD) 120 MG extended release capsule Take 1 capsule by mouth daily Yes Zelda Peabody, MD   fluticasone-umeclidin-vilant (TRELEGY ELLIPTA) 100-62.5-25 MCG/INH AEPB Inhale 1 puff into the lungs daily Yes Alanna Ledesma, APRN - CNP   calcium carb-cholecalciferol 600-200 MG-UNIT TABS tablet Take 1 tablet by mouth daily Yes Historical Provider, MD   potassium chloride 10 MEQ/100ML Infuse 10 mEq intravenously once Yes Historical Provider, MD apixaban (ELIQUIS) 5 MG TABS tablet Take 1 tablet by mouth 2 times daily Yes Viral Morel MD   albuterol sulfate HFA (PROVENTIL HFA) 108 (90 Base) MCG/ACT inhaler Inhale 2 puffs into the lungs every 6 hours as needed for Wheezing Yes Viral Morel MD   traZODone (DESYREL) 50 MG tablet Take 2 tablets by mouth nightly Yes Viral Morel MD   metFORMIN (GLUCOPHAGE) 500 MG tablet Take 1 tablet by mouth daily (with breakfast) Yes Viral Morel MD   allopurinol (ZYLOPRIM) 100 MG tablet Take 1 tablet by mouth daily Yes Viral Morel MD   potassium chloride (KLOR-CON M) 10 MEQ extended release tablet Take 1 tablet by mouth daily Yes Viral Morel MD   montelukast (SINGULAIR) 10 MG tablet Take 1 tablet by mouth daily Yes SLAVA Moffett CNP   Spacer/Aero-Holding Neil Hard ALICE 1 Device by Does not apply route daily as needed (use with albuterol rescue inhaler) Yes SLAVA Moffett CNP   calcium-vitamin D (OSCAL-500) 500-200 MG-UNIT per tablet Take 1 tablet by mouth daily. Yes Historical Provider, MD       Social History     Tobacco Use    Smoking status: Former Smoker     Packs/day: 1.00     Years: 39.00     Pack years: 39.00     Types: Cigarettes     Start date:      Quit date: 2013     Years since quittin.1    Smokeless tobacco: Never Used   Substance Use Topics    Alcohol use: Yes     Comment: occasional wine/moderate    Drug use:  No             PHYSICAL EXAMINATION:  [ INSTRUCTIONS:  \"[x]\" Indicates a positive item  \"[]\" Indicates a negative item  -- DELETE ALL ITEMS NOT EXAMINED]  Vital Signs: (As obtained by patient/caregiver or practitioner observation)    Blood pressure-  Heart rate-    Respiratory rate-    Temperature-  Pulse oximetry-     Constitutional: [x] Appears well-developed and well-nourished [] No apparent distress      [] Abnormal-   Mental status  [] Alert and awake  [] Oriented to person/place/time []Able to follow commands Eyes:  EOM    []  Normal  [] Abnormal-  Sclera  []  Normal  [] Abnormal -         Discharge []  None visible  [] Abnormal -    HENT:   [] Normocephalic, atraumatic. [] Abnormal   [] Mouth/Throat: Mucous membranes are moist.     External Ears [] Normal  [] Abnormal-     Neck: [] No visualized mass     Pulmonary/Chest: [] Respiratory effort normal.  [] No visualized signs of difficulty breathing or respiratory distress        [] Abnormal-      Musculoskeletal:   [] Normal gait with no signs of ataxia         [] Normal range of motion of neck        [] Abnormal-       Neurological:        [] No Facial Asymmetry (Cranial nerve 7 motor function) (limited exam to video visit)          [] No gaze palsy        [] Abnormal-         Skin:        [] No significant exanthematous lesions or discoloration noted on facial skin         [] Abnormal-            Psychiatric:       [] Normal Affect [] No Hallucinations        [] Abnormal-     Other pertinent observable physical exam findings-     ASSESSMENT/PLAN:  1. Controlled type 2 diabetes mellitus with diabetic nephropathy, without long-term current use of insulin ([facility]  DM relatively well controlled and will continue current regimen. Screening reviewed. See orders. He'll start monitoring glc at home periodically too, glucometer and strips script sent  - Comprehensive Metabolic Panel; Future  - CBC Auto Differential; Future  - Lipid Panel; Future  - Hemoglobin A1C; Future  - Microalbumin / Creatinine Urine Ratio; Future  - blood glucose monitor strips; Test once a day. . Dispense sufficient amount for indicated testing frequency plus additional to accommodate PRN testing needs. Dispense: 100 strip; Refill: 0  - blood glucose monitor kit and supplies; Dispense sufficient amount for indicated testing frequency plus additional to accommodate PRN testing needs. Dispense all needed supplies. Dispense: 1 kit; Refill: 0    2.  Permanent atrial fibrillation ([facility] Pt clinically w/o evidence of cardiovascular instability, cont regimen.    - CBC Auto Differential; Future  - TSH without Reflex; Future    3. Dilated cardiomyopathy ([facility]  No current ENRIQUE, clinically stable, cont meds incl diuretic and DID NOT WAYNE LISINOPRIL PREVIOUSLY WHICH CAUSED CP  - furosemide (LASIX) 20 MG tablet; Take 1 tablet by mouth daily  Dispense: 90 tablet; Refill: 1    4. Gastroesophageal reflux disease without esophagitis  GERD controlled on meds and will refill, monitor for any recurrent or worsening sx.    - omeprazole (PRILOSEC) 20 MG delayed release capsule; Take 1 capsule by mouth daily  Dispense: 90 capsule; Refill: 1      Return in about 3 months (around 5/25/2021) for routine DM. Kali Cortes is a 65 y.o. male being evaluated by a Virtual Visit (video visit) encounter to address concerns as mentioned above. A caregiver was present when appropriate. Due to this being a TeleHealth encounter (During JVKUD-83 public health emergency), evaluation of the following organ systems was limited: Vitals/Constitutional/EENT/Resp/CV/GI//MS/Neuro/Skin/Heme-Lymph-Imm. Pursuant to the emergency declaration under the [address] [place] [address], [address] [place] [address] authority and the [employer] and [employer] Act, this Virtual Visit was conducted with patient's (and/or legal guardian's) consent, to reduce the patient's risk of exposure to COVID-19 and provide necessary medical care. The patient (and/or legal guardian) has also been advised to contact this office for worsening conditions or problems, and seek emergency medical treatment and/or call 911 if deemed necessary.      Patient identification was verified at the start of the visit: Yes    Total time spent on this encounter: Not billed by time

## 2021-03-01 ENCOUNTER — ANTI-COAG VISIT (OUTPATIENT)
Dept: INTERNAL MEDICINE CLINIC | Age: 71
End: 2021-03-01

## 2021-03-02 ENCOUNTER — HOSPITAL ENCOUNTER (OUTPATIENT)
Dept: LAB | Age: 71
Discharge: HOME | End: 2021-03-02
Payer: MEDICARE

## 2021-03-02 PROCEDURE — C9803 HOPD COVID-19 SPEC COLLECT: HCPCS

## 2021-03-02 PROCEDURE — U0002 COVID-19 LAB TEST NON-CDC: HCPCS

## 2021-03-03 LAB
SARS-COV-2: NOT DETECTED
SOURCE: NORMAL

## 2021-03-08 ENCOUNTER — HOSPITAL ENCOUNTER (OUTPATIENT)
Dept: CT IMAGING | Age: 71
Discharge: HOME | End: 2021-03-08
Payer: MEDICARE

## 2021-03-08 ENCOUNTER — HOSPITAL ENCOUNTER (OUTPATIENT)
Dept: PULMONOLOGY | Age: 71
Discharge: HOME | End: 2021-03-08
Payer: MEDICARE

## 2021-03-08 DIAGNOSIS — J96.01 ACUTE RESPIRATORY FAILURE WITH HYPOXIA (HCC): ICD-10-CM

## 2021-03-08 DIAGNOSIS — U07.1 COVID-19 VIRUS INFECTION: ICD-10-CM

## 2021-03-08 DIAGNOSIS — J44.9 CHRONIC OBSTRUCTIVE PULMONARY DISEASE, UNSPECIFIED COPD TYPE (HCC): ICD-10-CM

## 2021-03-08 LAB
DLCO %PRED: 34 %
DLCO PRED: NORMAL
DLCO/VA %PRED: NORMAL
DLCO/VA PRED: NORMAL
DLCO/VA: NORMAL
DLCO: NORMAL
EXPIRATORY TIME-POST: NORMAL
EXPIRATORY TIME: NORMAL
FEF 25-75% %CHNG: NORMAL
FEF 25-75% %PRED-POST: NORMAL
FEF 25-75% %PRED-PRE: NORMAL
FEF 25-75% PRED: NORMAL
FEF 25-75%-POST: NORMAL
FEF 25-75%-PRE: NORMAL
FEV1 %PRED-POST: 53 %
FEV1 %PRED-PRE: 53 %
FEV1 PRED: NORMAL
FEV1-POST: NORMAL
FEV1-PRE: NORMAL
FEV1/FVC %PRED-POST: NORMAL
FEV1/FVC %PRED-PRE: NORMAL
FEV1/FVC PRED: NORMAL
FEV1/FVC-POST: 54 %
FEV1/FVC-PRE: 53 %
FVC %PRED-POST: NORMAL
FVC %PRED-PRE: NORMAL
FVC PRED: NORMAL
FVC-POST: NORMAL
FVC-PRE: NORMAL
GAW %PRED: NORMAL
GAW PRED: NORMAL
GAW: NORMAL
IC %PRED: NORMAL
IC PRED: NORMAL
IC: NORMAL
MEP: NORMAL
MIP: NORMAL
MVV %PRED-PRE: NORMAL
MVV PRED: NORMAL
MVV-PRE: NORMAL
PEF %PRED-POST: NORMAL
PEF %PRED-PRE: NORMAL
PEF PRED: NORMAL
PEF%CHNG: NORMAL
PEF-POST: NORMAL
PEF-PRE: NORMAL
RAW %PRED: NORMAL
RAW PRED: NORMAL
RAW: NORMAL
RV %PRED: NORMAL
RV PRED: NORMAL
RV: NORMAL
SVC %PRED: NORMAL
SVC PRED: NORMAL
SVC: NORMAL
TLC %PRED: 93 %
TLC PRED: NORMAL
TLC: NORMAL
VA %PRED: NORMAL
VA PRED: NORMAL
VA: NORMAL
VTG %PRED: NORMAL
VTG PRED: NORMAL
VTG: NORMAL

## 2021-03-08 PROCEDURE — 71250 CT THORAX DX C-: CPT

## 2021-03-08 PROCEDURE — 94729 DIFFUSING CAPACITY: CPT

## 2021-03-08 PROCEDURE — 94726 PLETHYSMOGRAPHY LUNG VOLUMES: CPT

## 2021-03-08 PROCEDURE — 94060 EVALUATION OF WHEEZING: CPT

## 2021-03-08 ASSESSMENT — PULMONARY FUNCTION TESTS
FEV1_PERCENT_PREDICTED_PRE: 53
FEV1_PERCENT_PREDICTED_POST: 53
FEV1/FVC_POST: 54
FEV1/FVC_PRE: 53

## 2021-03-08 NOTE — PROGRESS NOTES
Pt did not take heart medication before test.  HR increased to 154 bpm after one minute.   Pt will return Thursday to take the test. He will take his heart medication before the test.

## 2021-03-08 NOTE — PROGRESS NOTES
[facility] Pulmonary Function Lab - Six Minute Walk  Test Performed on: Room Air_____ Oxygen at _____ lpm via N/C  Assist Device Used During Test:    None____ Cane____ Walker___   Shortness of Breath - Sophie's Scale  0 Nothing at all 5 Severe    0.5 Very very slight 6    1 Very slight 7 Very severe   2 Slight 8     3 Moderate 9 Very very severe   4 Somewhat severe 10 Maximal      Time SpO2 Heart Rate Respiratory Rate Modified Sophies Scale Other      Baseline                 %         PRE                1 minute                             %                       2 minutes                       %                       3 minutes                        %                          4 minutes                      %                           5 minutes                  %                           6 minutes                     %                       Recovery   x 1 Min                          %           POST             Recovery   x 2 Min                           %                          Number of Laps_____ X 170 feet _____+ ___ additional feet = Total ____feet  Stopped or paused before 6 minutes?  No____ Yes _____   Pre Blood Pressure: __ / ___    Post Blood Pressure:_  _/___  Interpretation:\

## 2021-03-11 ENCOUNTER — HOSPITAL ENCOUNTER (OUTPATIENT)
Dept: PULMONOLOGY | Age: 71
Discharge: HOME | End: 2021-03-11
Payer: MEDICARE

## 2021-03-11 PROCEDURE — 94618 PULMONARY STRESS TESTING: CPT

## 2021-03-11 NOTE — PROGRESS NOTES
[facility] Pulmonary Function Lab - Six Minute Walk  Test Performed on: Room Air_____ Oxygen at _2.5_ lpm via N/C  Assist Device Used During Test:    None_X___ Cane____ Walker___   Shortness of Breath - Sophie's Scale  0 Nothing at all 5 Severe    0.5 Very very slight 6    1 Very slight 7 Very severe   2 Slight 8     3 Moderate 9 Very very severe   4 Somewhat severe 10 Maximal      Time SpO2 Heart Rate Respiratory Rate Modified Sophies Scale Other      Baseline          97    %          97   PRE   14           2     2.5 lpm O2      1 minute                      94      %        110                            2   Home O2      24/7       2 minutes                90      %         121                 2           3 minutes                 91     %         140               3           4 minutes              92       %         149               3           5 minutes          91     %         133               3           6 minutes              91     %         146              3        Recovery   x 1 Min                   92    %         118     POST  22          2        Recovery   x 2 Min                     95   %         111                2         Number of Laps__5_ X 170 ft _850_+ 75_ additional ft = Total _925 ft  Stopped or paused before 6 minutes?  No_X__ Yes __   Pre Blood Pressure: 138 / 70_    Post Blood Pressure:_168_/_90_  Interpretation:

## 2021-04-08 DIAGNOSIS — E11.21 CONTROLLED TYPE 2 DIABETES MELLITUS WITH DIABETIC NEPHROPATHY, WITHOUT LONG-TERM CURRENT USE OF INSULIN (HCC): ICD-10-CM

## 2021-04-08 DIAGNOSIS — I48.21 PERMANENT ATRIAL FIBRILLATION (HCC): ICD-10-CM

## 2021-04-08 LAB
A/G RATIO: 2 (ref 1.1–2.2)
ALBUMIN SERPL-MCNC: 4.3 G/DL (ref 3.4–5)
ALP BLD-CCNC: 55 U/L (ref 40–129)
ALT SERPL-CCNC: 10 U/L (ref 10–40)
ANION GAP SERPL CALCULATED.3IONS-SCNC: 7 MMOL/L (ref 3–16)
AST SERPL-CCNC: 15 U/L (ref 15–37)
BASOPHILS ABSOLUTE: 0 K/UL (ref 0–0.2)
BASOPHILS RELATIVE PERCENT: 1 %
BILIRUB SERPL-MCNC: 0.6 MG/DL (ref 0–1)
BUN BLDV-MCNC: 14 MG/DL (ref 7–20)
CALCIUM SERPL-MCNC: 9.3 MG/DL (ref 8.3–10.6)
CHLORIDE BLD-SCNC: 102 MMOL/L (ref 99–110)
CHOLESTEROL, TOTAL: 119 MG/DL (ref 0–199)
CO2: 30 MMOL/L (ref 21–32)
CREAT SERPL-MCNC: 0.9 MG/DL (ref 0.8–1.3)
CREATININE URINE: 128.4 MG/DL (ref 39–259)
EOSINOPHILS ABSOLUTE: 0.2 K/UL (ref 0–0.6)
EOSINOPHILS RELATIVE PERCENT: 4.4 %
GFR AFRICAN AMERICAN: >60
GFR NON-AFRICAN AMERICAN: >60
GLOBULIN: 2.2 G/DL
GLUCOSE BLD-MCNC: 112 MG/DL (ref 70–99)
HCT VFR BLD CALC: 35.1 % (ref 40.5–52.5)
HDLC SERPL-MCNC: 33 MG/DL (ref 40–60)
HEMOGLOBIN: 11.7 G/DL (ref 13.5–17.5)
LDL CHOLESTEROL CALCULATED: 71 MG/DL
LYMPHOCYTES ABSOLUTE: 0.7 K/UL (ref 1–5.1)
LYMPHOCYTES RELATIVE PERCENT: 16.1 %
MCH RBC QN AUTO: 27.7 PG (ref 26–34)
MCHC RBC AUTO-ENTMCNC: 33.4 G/DL (ref 31–36)
MCV RBC AUTO: 83.1 FL (ref 80–100)
MICROALBUMIN UR-MCNC: <1.2 MG/DL
MICROALBUMIN/CREAT UR-RTO: NORMAL MG/G (ref 0–30)
MONOCYTES ABSOLUTE: 0.5 K/UL (ref 0–1.3)
MONOCYTES RELATIVE PERCENT: 12 %
NEUTROPHILS ABSOLUTE: 2.9 K/UL (ref 1.7–7.7)
NEUTROPHILS RELATIVE PERCENT: 66.5 %
PDW BLD-RTO: 14.3 % (ref 12.4–15.4)
PLATELET # BLD: 206 K/UL (ref 135–450)
PMV BLD AUTO: 7.7 FL (ref 5–10.5)
POTASSIUM SERPL-SCNC: 4.2 MMOL/L (ref 3.5–5.1)
RBC # BLD: 4.22 M/UL (ref 4.2–5.9)
SODIUM BLD-SCNC: 139 MMOL/L (ref 136–145)
TOTAL PROTEIN: 6.5 G/DL (ref 6.4–8.2)
TRIGL SERPL-MCNC: 73 MG/DL (ref 0–150)
TSH SERPL DL<=0.05 MIU/L-ACNC: 0.92 UIU/ML (ref 0.27–4.2)
VLDLC SERPL CALC-MCNC: 15 MG/DL
WBC # BLD: 4.4 K/UL (ref 4–11)

## 2021-04-08 PROCEDURE — 36415 COLL VENOUS BLD VENIPUNCTURE: CPT | Performed by: INTERNAL MEDICINE

## 2021-04-09 LAB
ESTIMATED AVERAGE GLUCOSE: 131.2 MG/DL
HBA1C MFR BLD: 6.2 %

## 2021-04-15 ENCOUNTER — OFFICE VISIT (OUTPATIENT)
Dept: CARDIOLOGY CLINIC | Age: 71
End: 2021-04-15
Payer: MEDICARE

## 2021-04-15 VITALS
SYSTOLIC BLOOD PRESSURE: 120 MMHG | DIASTOLIC BLOOD PRESSURE: 78 MMHG | WEIGHT: 273.8 LBS | BODY MASS INDEX: 32.33 KG/M2 | HEART RATE: 64 BPM | HEIGHT: 77 IN

## 2021-04-15 DIAGNOSIS — E11.21 CONTROLLED TYPE 2 DIABETES MELLITUS WITH DIABETIC NEPHROPATHY, WITHOUT LONG-TERM CURRENT USE OF INSULIN (HCC): ICD-10-CM

## 2021-04-15 DIAGNOSIS — R06.09 DOE (DYSPNEA ON EXERTION): ICD-10-CM

## 2021-04-15 DIAGNOSIS — I48.91 ATRIAL FIBRILLATION, UNSPECIFIED TYPE (HCC): Primary | ICD-10-CM

## 2021-04-15 PROCEDURE — G8417 CALC BMI ABV UP PARAM F/U: HCPCS | Performed by: INTERNAL MEDICINE

## 2021-04-15 PROCEDURE — 4040F PNEUMOC VAC/ADMIN/RCVD: CPT | Performed by: INTERNAL MEDICINE

## 2021-04-15 PROCEDURE — 99214 OFFICE O/P EST MOD 30 MIN: CPT | Performed by: INTERNAL MEDICINE

## 2021-04-15 PROCEDURE — 1036F TOBACCO NON-USER: CPT | Performed by: INTERNAL MEDICINE

## 2021-04-15 PROCEDURE — 1123F ACP DISCUSS/DSCN MKR DOCD: CPT | Performed by: INTERNAL MEDICINE

## 2021-04-15 PROCEDURE — G8427 DOCREV CUR MEDS BY ELIG CLIN: HCPCS | Performed by: INTERNAL MEDICINE

## 2021-04-15 PROCEDURE — 3017F COLORECTAL CA SCREEN DOC REV: CPT | Performed by: INTERNAL MEDICINE

## 2021-04-15 PROCEDURE — 93000 ELECTROCARDIOGRAM COMPLETE: CPT | Performed by: INTERNAL MEDICINE

## 2021-04-15 PROCEDURE — 2022F DILAT RTA XM EVC RTNOPTHY: CPT | Performed by: INTERNAL MEDICINE

## 2021-04-15 PROCEDURE — 3044F HG A1C LEVEL LT 7.0%: CPT | Performed by: INTERNAL MEDICINE

## 2021-04-15 RX ORDER — DILTIAZEM HYDROCHLORIDE 240 MG/1
240 CAPSULE, COATED, EXTENDED RELEASE ORAL DAILY
Qty: 90 CAPSULE | Refills: 3 | Status: SHIPPED | OUTPATIENT
Start: 2021-04-15 | End: 2021-11-24 | Stop reason: SDUPTHER

## 2021-04-15 RX ORDER — POTASSIUM CHLORIDE 750 MG/1
10 TABLET, FILM COATED, EXTENDED RELEASE ORAL DAILY
COMMUNITY
End: 2021-05-20

## 2021-04-15 RX ORDER — TIOTROPIUM BROMIDE 18 UG/1
18 CAPSULE ORAL; RESPIRATORY (INHALATION) DAILY
COMMUNITY
End: 2021-04-15

## 2021-04-15 NOTE — PROGRESS NOTES
Tracee Lindquist (:  1950) is a 65 y.o. male,     Chief Complaint   Patient presents with    Atrial Fibrillation     Patient here today for 3 month office visit. Patient is a former smoker that walks 20 minutes, 3 days weekly on the treadmill and plays golf for exercise. Patient denies chest pain, palpitations, dizziness, and edema.  Hyperlipidemia     Patient's last Lipid panel was 2021.  Shortness of Breath     Patient complains of shortness of breath on exertion, only. Patient is here for follow up for dyspnea on exertion. He has chronic atrial fibrillation and COPD. He feels his shortness of breath on activity he is more now than it was on his last visit February 10, 2021. He had Covid pneumonia in December. He does not smoke. He is more active now and start playing golf and he walks on a treadmill 20 minutes every other day. Denies any chest pain or tightness or discomfort. Denies palpitations syncope or near syncope. He is using oxygen at home. On a 4-minute walk test his heart rate went up from 60s to 140s very quickly. He has been on Cardizem  daily. Other medications are reviewed and he is compliant to his medications. Allergies   Allergen Reactions    Lisinopril      Chest tightness    Simvastatin Other (See Comments)     Muscle cramps     Prior to Admission medications    Medication Sig Start Date End Date Taking?  Authorizing Provider   potassium chloride (KLOR-CON) 10 MEQ extended release tablet Take 10 mEq by mouth daily   Yes Historical Provider, MD   dilTIAZem (CARDIZEM CD) 240 MG extended release capsule Take 1 capsule by mouth daily 4/15/21  Yes Andrade Pollack MD   furosemide (LASIX) 20 MG tablet Take 1 tablet by mouth daily 21 Yes Dilia Blankenship MD   omeprazole (PRILOSEC) 20 MG delayed release capsule Take 1 capsule by mouth daily 21  Yes Dilia Blankenship MD   fluticasone-umeclidin-vilant ([place] Just) 575-88.7-27 MCG/INH AEPB Inhale 1 puff into the lungs daily 1/19/21  Yes SLAVA Cedillo CNP   calcium carb-cholecalciferol 600-200 MG-UNIT TABS tablet Take 1 tablet by mouth daily   Yes Historical Provider, MD   apixaban (ELIQUIS) 5 MG TABS tablet Take 1 tablet by mouth 2 times daily 1/7/21  Yes Darlene Benson MD   albuterol sulfate HFA (PROVENTIL HFA) 108 (90 Base) MCG/ACT inhaler Inhale 2 puffs into the lungs every 6 hours as needed for Wheezing 12/16/20  Yes Darlene Benson MD   traZODone (DESYREL) 50 MG tablet Take 2 tablets by mouth nightly 11/25/20  Yes Darlene Benson MD   metFORMIN (GLUCOPHAGE) 500 MG tablet Take 1 tablet by mouth daily (with breakfast) 11/25/20  Yes Darlene Benson MD   allopurinol (ZYLOPRIM) 100 MG tablet Take 1 tablet by mouth daily 11/25/20  Yes Darlene Benson MD   potassium chloride (KLOR-CON M) 10 MEQ extended release tablet Take 1 tablet by mouth daily 11/25/20  Yes Darlene Benson MD   montelukast (SINGULAIR) 10 MG tablet Take 1 tablet by mouth daily 9/22/20  Yes SLAVA Cedillo CNP   blood glucose monitor strips Test once a day. . Dispense sufficient amount for indicated testing frequency plus additional to accommodate PRN testing needs. 2/25/21   Darlene Benson MD   blood glucose monitor kit and supplies Dispense sufficient amount for indicated testing frequency plus additional to accommodate PRN testing needs. Dispense all needed supplies. 2/25/21   Darlene Benson MD   Spacer/Aero-Holding Katheryn Sanders ALICE 1 Device by Does not apply route daily as needed (use with albuterol rescue inhaler) 5/28/20   SLAVA Cedillo CNP     Past Medical History:   Diagnosis Date    Aortic stenosis, mild 11/08/2017    By echo 10/2016    Asthma, intrinsic     Dr Yaakov Cushing and Emmanuelle Nicholas.     Atrial fibrillation (HCC)     Cholelithiasis     Dr Marline Mendez evaluating    Colonic polyp 05/2009    Dr Scott Richardson- also done 4/16/2012- AND 3/8/17-[facility]UBULAR [facility]  COPD (chronic obstructive pulmonary disease) (HCC)     noted on PFTs 6/2020    DDD (degenerative disc disease), lumbar     Diabetes mellitus type 2, controlled ([facility]     Dr Lanette Romberg    Diabetic neuropathy, type II diabetes mellitus ([facility] [address]     DECR MONOFIL SENSATION    Dilated cardiomyopathy ([facility] [address] 11/04/2015    GERD (gastroesophageal reflux disease)     Gout     H/O cardiovascular stress test 12/10/2015    lexiscan-normal,EF65%    H/O echocardiogram 06/13/2019    EF 55-60%, Mild AS,  Moderately dilated left and right atrium and right ventricle.  History of cardiovascular stress test 01/08/2008 1/8/08- normal exercise performance without angina, the patient was noted to have nonspecific ST and T wave changes in the inferior and lateral leads. The nuclear segment of the report will be dicated seperately.  History of complete electrocardiogram 05/16/2011    History of echocardiogram 11/14/14,11/2/11, 5/12/10, 5/20/09, 7/9/02 11/14/14- Four chamber dialtation with mild left ventricular systolic function Mild MR and TR, mild pulmonary HTN  EF 45-50%    History of total right hip arthroplasty     Dr Guzman Mt 9/2018- does get antibiotic prophylaxis w dental work- Dr Marnie Samuel Hx of Doppler echocardiogram 11/06/2018    EF55-60%,moderately to severly dilated left and right atrium and right ventricle,mild to moderate mitral and mild tricuspid regurg, mild PHTN    Hx of echocardiogram 10/26/2016    aortic sclerosis with mild stenosis,moderate left atrial dilation,mild mitral and tricusid regurg,EF55-60%    Hyperlipidemia     IFG (impaired fasting glucose)     Jan 2019- a1c 6.1 but fasting glc 131.     Knee pain, bilateral     Knee pain, left     Long-term (current) use of anticoagulants     Peptic ulcer disease     Pre-op evaluation 09/05/2018    Testosterone deficiency dx'd 9/2015    Ventricular tachycardia [facility])     mentioned by Dr Favio Sanchez 11/2019 note-who monitors him    VHD (valvular heart disease) 11/29/2018    Mild aortic stenosis by echo November 2018      Vitals:    04/15/21 0911   BP: 120/78   Site: Left Upper Arm   Position: Sitting   Cuff Size: Large Adult   Pulse: 64   Weight: 273 lb 12.8 oz (124.2 kg)   Height: 6' 5\" (1.956 m)      Body mass index is 32.47 kg/m². Wt Readings from Last 3 Encounters:   04/15/21 273 lb 12.8 oz (124.2 kg)   01/19/21 271 lb (122.9 kg)   01/15/21 271 lb (122.9 kg)     Constitutional:  Patient is moderately overweight tall pleasant man in no apparent distress. HEENT: He is wearing facemask and glasses. Cardiovascular: Auscultation: Normal S1 and S2. No significant murmurs noted. Carotids are negative for bruits. Abdominal aorta is nonpalpable. No epigastric bruit noted. Peripheral pulses: Pedal pulses are equal in both feet. Respiratory:  Respiratory effort is normal. Breath sounds are diminished but otherwise clear to auscultation. Extremities:  No edema, clubbing,  Cyanosis, petechiae. Abdomen:  No masses or tenderness. No organomegaly noted. Neurologic:  Oriented to time, place, and person and non-anxious. No focal neurological deficit noted. Psychiatric: Normal mood and effect. EKG today is consistent with the atrial fibrillation 85 bpm.    Pertinent records reviewed and discussed with patient and results are as follow:  CT chest screening done last month reported  Interval improvement in bilateral pneumonia with mild residual ground-glass and consolidative opacities in the lower lobes. Mild emphysematous changes. Mild mediastinal adenopathy, nonspecific but likely reactive. LUNG RADS: Per ACR Lung-RADS Version 1.1  Category 2, Benign appearance or behavior. Management:  Continue annual lung screening with LDCT in 12 months. Holter monitor January 2021 had reported heart rate ranging from 60 to 171 bpm in this patient with atrial fibrillation.   Average heart rate was 98 bpm.    Echocardiogram December 2020 reported  Expedited imaging was used to obtain protocol images to reduce the   sonographer's exposure during COVID-19 pandemic. Left ventricular systolic function is normal.   Ejection fraction is visually estimated at 55%. Moderate left ventricular hypertrophy. Nuclear stress test in 2018 was negative for ischemia ejection fraction was 60%. Severe bilateral atrial enlargement. Moderately dilated right ventricle. Sclerotic, mildly stenotic aortic valve (mean P mmHg). Trace aortic regurgitation. No evidence of any pericardial effusion. Lab Results   Component Value Date    WBC 4.4 2021    HGB 11.7 2021    HCT 35.1 2021     2021     Lab Results   Component Value Date    CHOL 119 2021    TRIG 73 2021    HDL 33 (L) 2021    LDLCALC 71 2021    LDLDIRECT 54 2020     Lab Results   Component Value Date    BUN 14 2021    CREATININE 0.9 2021     2021    K 4.2 2021     Lab Results   Component Value Date    INR 1.07 2020     Lab Results   Component Value Date    LABA1C 6.2 2021     ASSESSMENT/PLAN:    1. Atrial fibrillation, unspecified type [facility])  Assessment & Plan:  Rate is not optimally controlled. Increase diltiazem to 240 mg daily if tolerated and continue anticoagulation therapy with Eliquis. Orders:  -     EKG 12 lead; Standing  -     CARDIAC STRESS TEST EXERCISE ONLY; Future  2. Controlled type 2 diabetes mellitus with diabetic nephropathy, without long-term current use of insulin ([facility])  Assessment & Plan:  Recent hemoglobin A1c was 6.2 suggest diabetes is fairly under control. Follows up with PCP. 3. ENRIQUE (dyspnea on exertion)  Assessment & Plan:  Most likely related to suboptimal rate control with him being in atrial fibrillation. We will optimize rate control by increasing Cardizem to 240 mg daily and evaluate by doing a stress test on medication to assess exercise tolerance and heart rate response.   If he continues to have limited exercise tolerance we have discussed the option of AV christiano ablation and VVI pacing for better rate control. Questions are addressed he verbalized understanding. His atrial fibrillation is chronic for more than 10 years and I doubt he would respond to cardioversion and ablation. Orders:  -     CARDIAC STRESS TEST EXERCISE ONLY; Future    Increase Cardizem to 240 mg daily and follow up in 4 weeks with regular stress test to assess heart rate response to exercise and ENRIQUE. Continue other current medications. An electronic signature was used to authenticate this note.     --Sunshine Farley MD

## 2021-04-15 NOTE — PROGRESS NOTES
[id number] Score for Atrial Fibrillation Stroke Risk   Risk   Factors  Component Value   C CHF No 0   H HTN No 0   A2 Age >= 76 No,  (71 y.o.) 0   D DM Yes 1   S2 Prior Stroke/TIA No 0   V Vascular Disease No 0   A Age 74-69 Yes,  (71 y.o.) 1   Sc Sex male 0    [id number]  Score  2   Score last updated 4/15/21 5:70 AM EDT    Click here for a link to the UpToDate guideline \"Atrial Fibrillation: Anticoagulation therapy to prevent embolization    Disclaimer: Risk Score calculation is dependent on accuracy of patient problem list and past encounter diagnosis.

## 2021-04-15 NOTE — ASSESSMENT & PLAN NOTE
Most likely related to suboptimal rate control with him being in atrial fibrillation. We will optimize rate control by increasing Cardizem to 240 mg daily and evaluate by doing a stress test on medication to assess exercise tolerance and heart rate response. If he continues to have limited exercise tolerance we have discussed the option of AV christiano ablation and VVI pacing for better rate control. Questions are addressed he verbalized understanding. His atrial fibrillation is chronic for more than 10 years and I doubt he would respond to cardioversion and ablation.

## 2021-04-15 NOTE — ASSESSMENT & PLAN NOTE
Rate is not optimally controlled. Increase diltiazem to 240 mg daily if tolerated and continue anticoagulation therapy with Eliquis.

## 2021-04-15 NOTE — PATIENT INSTRUCTIONS
**It is YOUR responsibilty to bring medication bottles and/or updated medication list to [address] [place] [address]. This will allow us to better serve you and all your healthcare needs**      Please be informed that if you contact our office outside of normal business hours the physician on call cannot help with any scheduling or rescheduling issues, procedure instruction questions or any type of medication issue. We advise you for any urgent/emergency that you go to the nearest emergency room! PLEASE CALL OUR OFFICE DURING NORMAL BUSINESS HOURS    Monday - Friday   8 am to 5 pm    [place]: [phone]    [place]:  [phone]      Please hold on to these instructions the  will call you within 1-9 business days when we receive authorization from your insurance. Treadmill Stress test    WHAT TO EXPECT:     The exercise stress test is a test used to provide information about how the heart responds to exertion. It involves walking on a treadmill at increasing levels of difficulty, while electrocardiogram, heart rate, and blood pressure are monitored. ? This test will take approximately 1 hours: Please arrive at the office 5-10 min before the scheduled testing time. ? Once you are taken back to the stress lab you will be asked to read and sign a consent form before proceeding with the test. At this time feel free to ask any question that you may have as the procedure is explained to you.   ? You will be attached to the EKG monitoring equipment, your blood pressure will be taken, and you will begin walking on the treadmill. The treadmill starts off slowly and every 3 minutes the treadmill speeds up and the elevation increases. The average person usually walks for a period of 6-8min. PREPARATION FOR TEST:    ?   ? AVOID CAFFEINE 24 HOURS PRIOR TO THE TEST: Including coffee, Tea, Viri and other soft drinks even those labeled  caffeine free or decaffeinated. ?  Please wear loose comfortable clothing and comfortable walking shoes. Please wear a short sleeved shirt. Please shower or bathe and do not apply powder or lotion to the skin prior to testing, as the electrodes will adhere better giving us a clearer visual EKG recording. Increase Cardizem to 240 mg daily and follow up in 4 weeks with regular stress test to assess heart rate response to exercise and ENRIQUE. Continue other current medications.

## 2021-04-15 NOTE — LETTER
Dustin Wise  1950  [id number]    Have you had any Chest Pain that is not new? - No     Have you had any Shortness of Breath - Yes  If Yes - When on exertion    Have you had any dizziness - No     Have you had any palpitations that are not new? - No     Is the patient on any of the following medications - NONE  If Yes DO EKG - Needs done every 6 months    Do you have any edema - swelling in No    If Yes - CHECK TO SEE IF THE EDEMA IS PITTING      When did you have your last labs drawn 4/8/2021 in Epic  Where did you have them done Dr. Skylar Guaman  What doctor ordered Dr. Skylar Guaman    If we do not have these labs you are retrieve these labs for these providers!     Do you have a surgery or procedure scheduled in the near future - No  If Yes- DO EKG   Ask patient if they want to sign up for MyChart if they are not already signed up     Check to see if we have an E-MAIL on file for the patient     Check medication list thoroughly!!! AND RECONCILE OUTSIDE MEDICATIONS  If dose has changed change the entire order not just the MG  BE SURE TO ASK PATIENT IF THEY NEED MEDICATION REFILLS     At check out add to every patient's \"wrap up\" the following dot phrase AFTERHOURSEDUCATION and ensure we explain this to our patients

## 2021-04-20 ENCOUNTER — OFFICE VISIT (OUTPATIENT)
Dept: PULMONOLOGY | Age: 71
End: 2021-04-20
Payer: MEDICARE

## 2021-04-20 VITALS
HEART RATE: 89 BPM | BODY MASS INDEX: 32.23 KG/M2 | WEIGHT: 273 LBS | DIASTOLIC BLOOD PRESSURE: 70 MMHG | SYSTOLIC BLOOD PRESSURE: 132 MMHG | OXYGEN SATURATION: 97 % | HEIGHT: 77 IN

## 2021-04-20 DIAGNOSIS — J96.11 CHRONIC RESPIRATORY FAILURE WITH HYPOXIA (HCC): ICD-10-CM

## 2021-04-20 DIAGNOSIS — Z86.16 HISTORY OF 2019 NOVEL CORONAVIRUS DISEASE (COVID-19): ICD-10-CM

## 2021-04-20 DIAGNOSIS — J44.9 CHRONIC OBSTRUCTIVE PULMONARY DISEASE, UNSPECIFIED COPD TYPE (HCC): Primary | ICD-10-CM

## 2021-04-20 DIAGNOSIS — Z00.00 HEALTHCARE MAINTENANCE: ICD-10-CM

## 2021-04-20 DIAGNOSIS — Z87.891 FORMER SMOKER: ICD-10-CM

## 2021-04-20 PROCEDURE — G8926 SPIRO NO PERF OR DOC: HCPCS | Performed by: NURSE PRACTITIONER

## 2021-04-20 PROCEDURE — 1036F TOBACCO NON-USER: CPT | Performed by: NURSE PRACTITIONER

## 2021-04-20 PROCEDURE — 3017F COLORECTAL CA SCREEN DOC REV: CPT | Performed by: NURSE PRACTITIONER

## 2021-04-20 PROCEDURE — 3023F SPIROM DOC REV: CPT | Performed by: NURSE PRACTITIONER

## 2021-04-20 PROCEDURE — G8427 DOCREV CUR MEDS BY ELIG CLIN: HCPCS | Performed by: NURSE PRACTITIONER

## 2021-04-20 PROCEDURE — 1123F ACP DISCUSS/DSCN MKR DOCD: CPT | Performed by: NURSE PRACTITIONER

## 2021-04-20 PROCEDURE — 4040F PNEUMOC VAC/ADMIN/RCVD: CPT | Performed by: NURSE PRACTITIONER

## 2021-04-20 PROCEDURE — 99214 OFFICE O/P EST MOD 30 MIN: CPT | Performed by: NURSE PRACTITIONER

## 2021-04-20 PROCEDURE — G8417 CALC BMI ABV UP PARAM F/U: HCPCS | Performed by: NURSE PRACTITIONER

## 2021-04-20 NOTE — PROGRESS NOTES
Pulmonary Clinic Office Follow up     Gopal Kimbrough \"Ramy\" Lazarus Casillas is a 65 y.o. male with history of COPD, shortness of breath on exertion, asthma, A. fib, DM 2 with neuropathy, dilated cardiomyopathy, GERD, DDD, VHD, history of SVT, presents today to the pulmonary clinic for follow up. Nasir Yoon was last seen in the pulmonary clinic on 1/19/2021. He states he is doing fairly well since that time unfortunately he still has periods of fatigue and shortness of breath since he was diagnosed with Covid 12/17/2020. He was admitted twice for Covid 12/20/2020. Following the second discharge he was placed on supplemental oxygen at 2 L to be worn 24/7. Since I last saw Nasir Yoon he has repeated a 6-minute walk to see if he could be weaned from his oxygen. Unfortunately his oxygen was increased to 2.5 LPM 24/7. He did have good walking distance but did desaturate significantly. His heart rate did increase to 146 from a base of 97 during his 6-minute walk. He did state that he was moderately short of breath approximately 3 minutes into 6-minute walk. He also completed a pulmonary function test on that day. Which demonstrated moderate to severe obstructive airway disease probable restrictive disease and severe diffusion defect. His FEV1 was noted at 53% with no significant change following bronchodilator therapy. His DLCO was 34. Nasir Yoon has completed his annual CT screening, on 3/8/2021. CT demonstrated interval improvement in bilateral pneumonia with mild residual groundglass and consolidative opacities in the lower lobes along with mild emphysema changes. Mediastinal adenopathy was noted and is felt to be reactive. Nasir Yoon is not wearing his oxygen in the clinic today.   He states he has a small portable on-demand concentrator however the battery life of that is very short so he often does not wear that when he is going to be out for an extended period of time such as when he is trying to play golf. He states he continues to have some periods of shortness of breath which she will have to stop and take a few minutes to take several deep breaths. He is not using his albuterol rescue inhaler however at those times. He does have an albuterol rescue inhaler and a spacer. Vanna Ballard states they are practicing social distancing, wearing a mask when out in public, washing hands frequently or using hand . Denies any fever, chills, malaise, change in sensation of taste or smell, headache or lightheadedness. Denies any known contacts with persons with respiratory infection, positive for coronavirus or under investigation for possible coronavirus exposure. Influenza immunization: 10/20/2020  Pneumococcal immunization: PPV 23 on 12/13/2015, 9/5/2013. Prevnar 13 on 11/15/2016  COVID-19 immunization: 4/8/2021, 3/18/2021  Smoking history: Former smoker quit 1/1/2013, 39-pack-year history  PCP: Yandel Higgins nurse Andrae Cary MD    Past Medical History:  Past Medical History:   Diagnosis Date    Aortic stenosis, mild 11/08/2017    By echo 10/2016    Asthma, intrinsic     Dr Katy Bradford and Nickie Fowler.  Atrial fibrillation (HCC)     Cholelithiasis     Dr Chinedu Salazar evaluating    Colonic polyp 05/2009    Dr Nola Salter- also done 4/16/2012- AND 3/8/17-TUBULAR ADENOMA,-RECHECK 5YRS    COPD (chronic obstructive pulmonary disease) ([address]     noted on PFTs 6/2020    DDD (degenerative disc disease), lumbar     Diabetes mellitus type 2, controlled ([address]     Dr Lavinia Fleming    Diabetic neuropathy, type II diabetes mellitus ([address]     DECR MONOFIL SENSATION    Dilated cardiomyopathy ([address] 11/04/2015    GERD (gastroesophageal reflux disease)     Gout     H/O cardiovascular stress test 12/10/2015    lexiscan-normal,EF65%    H/O echocardiogram 06/13/2019    EF 55-60%, Mild AS,  Moderately dilated left and right atrium and right ventricle.     History of cardiovascular stress test 01/08/2008 1/8/08- normal exercise performance without angina, the patient was noted to have nonspecific ST and T wave changes in the inferior and lateral leads. The nuclear segment of the report will be dicated seperately.  History of complete electrocardiogram 05/16/2011    History of echocardiogram 11/14/14,11/2/11, 5/12/10, 5/20/09, 7/9/02 11/14/14- Four chamber dialtation with mild left ventricular systolic function Mild MR and TR, mild pulmonary HTN  EF 45-50%    History of total right hip arthroplasty     Dr Rafael Bland 9/2018- does get antibiotic prophylaxis w dental work- Dr Leigh Dance Hx of Doppler echocardiogram 11/06/2018    EF55-60%,moderately to severly dilated left and right atrium and right ventricle,mild to moderate mitral and mild tricuspid regurg, mild PHTN    Hx of echocardiogram 10/26/2016    aortic sclerosis with mild stenosis,moderate left atrial dilation,mild mitral and tricusid regurg,EF55-60%    Hyperlipidemia     IFG (impaired fasting glucose)     Jan 2019- a1c 6.1 but fasting glc 131.  Knee pain, bilateral     Knee pain, left     Long-term (current) use of anticoagulants     Peptic ulcer disease     Pre-op evaluation 09/05/2018    Testosterone deficiency dx'd 9/2015    Ventricular tachycardia ([address]     mentioned by Dr Dhaval Florence 11/2019 note-who monitors him    VHD (valvular heart disease) 11/29/2018    Mild aortic stenosis by echo November 2018       Current medications and allergies have been reviewed    Social and family history unchanged from initial consult      REVIEW OF SYSTEMS:    CONSTITUTIONAL:  negative for fevers, chills, diaphoresis, activity change, appetite change, night sweats and unexpected weight change.   Positive fatigue HEENT:  Negative for hearing loss, sinus pressure, nasal congestion, epistaxis, snoring  RESPIRATORY: Positive intermittent shortness of breath with exertion, negative cough, negative wheeze CARDIOVASCULAR:  Negative for chest pain, palpitations, exertional chest pressure/discomfort, edema, syncope  GASTROINTESTINAL: Negative for nausea, vomiting, diarrhea, constipation, blood in stool and abdominal pain  GENITOURINARY:  Negative for frequency, dysuria and hematuria  HEMATOLOGIC/LYMPHATIC:  Negative for easy bruising, bleeding and lymphadenopathy  ALLERGIC/IMMUNOLOGIC:  Negative for recurrent infections, angioedema, anaphylaxis and drug reaction  MUSCULOSKELETAL:  Negative for pain, joint swelling, decreased range of motion and muscle weakness  NEURO: Negative for headache, AMS, decrease sensations  SKIN: Negative for rashes or lesions      Physical Exam:  /70 (Site: Right Upper Arm, Position: Sitting, Cuff Size: Large Adult)   Pulse 89   Ht 6' 5\" (1.956 m)   Wt 273 lb (123.8 kg)   SpO2 97%   BMI 32.37 kg/m²    Body mass index is 32.37 kg/m². Constitutional:  He appears well developed and well-nourished. Neck:  Supple, no palpable lymphadenopathy, no JVD  Cardiovascular:  S1, S2 Normal, Regular rhythm, no murmurs or gallops, no pericardial  rubs. Pulmonary: Bilateral breath sounds clear and equal to auscultation, good air movement, no use of accessory muscles to support respiratory effort. No forced expiratory wheeze, no rales, no rhonchi, no cough noted during assessment. O2 sat in room air at rest is noted at 97%  Abdomen: No epigastric pain, no distention, + bowel sounds   Extremities: No edema, no calf tenderness, no clubbing of digits  Neurologic:  Awake and alert, no focal deficits  Skin: warm and dry    Radiology:   3/8/2021 low-dose screening CT of chest without contrast  Interval improvement of bilateral pneumonia with mild residual groundglass and consolidative opacities in the lower lobe. Mild emphysema changes.   Mild mediastinal adenopathy, nonspecific but likely reactive    PFT:   3/8/2021  FVC 69%/69% predicted, FEV1 53%/53% predicted, FEF 25-75% 22%/23% predicted , TLC 93% predicted, % predicted, DLCO 34% predicted  Following administration of bronchodilator there was no significant response to bronchodilator. Overall testing indicates moderate severe obstructive airway disease, probable restrictive, severe diffusion defect    6-minute walk performed at hospital:  3/11/2021  Testing performed at 2.5 L/min home oxygen, baseline O2 sat 97% with heart rate 97 respiratory rate 14, at 1 minute O2 sat 94% with heart rate 110, at 2 minutes 90% with heart rate 121, at 3 minutes O2 sat 91% heart rate 140, at 4 minutes O2 sat 92% with heart rate 149, at 5 minutes O2 sat 91% with heart rate 133, at 6 minutes O2 sat 91% with heart rate 146, at 1 minute recovery O2 sat 92% with heart rate 118 and respiratory rate 22, at 2 minutes recovery O2 sat 95% with heart rate 111. Good walking distance, desaturation was noted, requires 2.5 L/min of oxygen 24/7    ASSESSMENT/PLAN:  1. Chronic obstructive pulmonary disease, unspecified COPD type ([address]    2. Chronic respiratory failure with hypoxia (HCC)    3. History of 2019 novel coronavirus disease (COVID-19)    4. Former smoker    5. Healthcare maintenance        Franchesca Alexander is not wearing his oxygen in the office today. He states that he has problems with the battery life on his portable concentrator. He states he has concentrator at home that he uses when he is at home and at night when he sleeping than usual he will try to use his portable concentrator when he is out and about. Franchesca Alexander is playing golf and has not been wearing his oxygen when he plays golf stating he did not know how he could wear it is still swing the golf club. We have discussed several ways of which he can still be compliant with his oxygen and still enjoys playing golf. He is agreed to try these methods. He is also going to check to see if the golf carts have not AC adapter or if he will possibly need to purchase a second battery for his portable unit.     He did complete a practice, wiping items touched in public such as gas pumps, door handles, shopping carts, etc. Self monitoring for infection - fever, chills, cough, SOB. Should they develop symptoms they should call office for further instructions. Return in about 4 months (around 8/20/2021). This dictation was performed with a verbal recognition program and it was checked for errors. It is possible that there are still dictated errors within this office note. Any errors should be brought immediately to my attention for correction. All efforts were made to ensure that this office note is accurate.

## 2021-05-10 ENCOUNTER — OFFICE VISIT (OUTPATIENT)
Dept: ORTHOPEDIC SURGERY | Age: 71
End: 2021-05-10
Payer: MEDICARE

## 2021-05-10 VITALS
RESPIRATION RATE: 16 BRPM | WEIGHT: 273 LBS | HEART RATE: 94 BPM | HEIGHT: 77 IN | BODY MASS INDEX: 32.23 KG/M2 | OXYGEN SATURATION: 93 %

## 2021-05-10 DIAGNOSIS — M17.11 ARTHRITIS OF KNEE, RIGHT: Primary | ICD-10-CM

## 2021-05-10 PROCEDURE — 99213 OFFICE O/P EST LOW 20 MIN: CPT | Performed by: PHYSICIAN ASSISTANT

## 2021-05-10 PROCEDURE — 4040F PNEUMOC VAC/ADMIN/RCVD: CPT | Performed by: PHYSICIAN ASSISTANT

## 2021-05-10 PROCEDURE — 1123F ACP DISCUSS/DSCN MKR DOCD: CPT | Performed by: PHYSICIAN ASSISTANT

## 2021-05-10 PROCEDURE — 1036F TOBACCO NON-USER: CPT | Performed by: PHYSICIAN ASSISTANT

## 2021-05-10 PROCEDURE — G8427 DOCREV CUR MEDS BY ELIG CLIN: HCPCS | Performed by: PHYSICIAN ASSISTANT

## 2021-05-10 PROCEDURE — 3017F COLORECTAL CA SCREEN DOC REV: CPT | Performed by: PHYSICIAN ASSISTANT

## 2021-05-10 PROCEDURE — G8417 CALC BMI ABV UP PARAM F/U: HCPCS | Performed by: PHYSICIAN ASSISTANT

## 2021-05-10 PROCEDURE — 20610 DRAIN/INJ JOINT/BURSA W/O US: CPT | Performed by: PHYSICIAN ASSISTANT

## 2021-05-10 NOTE — PROGRESS NOTES
Review of Systems   Constitutional: Negative. HENT: Negative. Eyes: Negative. Respiratory: Negative. Cardiovascular: Negative. Gastrointestinal: Negative. Genitourinary: Negative. Musculoskeletal: Positive for arthralgias and myalgias. Skin: Negative. Negative for rash and wound. Neurological: Negative. Psychiatric/Behavioral: Negative. HPI:  Margarita Dotson is a 65 y.o. male that presents the office today complaining of intermittent right knee pain that he feels is present along the medial and posterior lateral aspect of his knee. He states that pain is worse when he goes to get up from a seated position or when he tries to go upstairs. He feels like the knee catches and is very stiff at different times. Today he rates his knee pain at a 5/10, aching in nature. Patient is fairly active and likes to play golf and states that at times while playing golf the knee really starts to hurt and the next day he usually can tell that there is something wrong with the knee. He has had a steroid injection in the knee years ago but cannot recall if it really helped or not. Past Medical History:   Diagnosis Date    Aortic stenosis, mild 11/08/2017    By echo 10/2016    Asthma, intrinsic     Dr Yvon Marley and Jody Alberts.     Atrial fibrillation (HCC)     Cholelithiasis     Dr Scar Alfaro evaluating    Colonic polyp 05/2009    Dr Ramiro Julien- also done 4/16/2012- AND 3/8/17-TUBULAR ADENOMA,-RECHECK 5YRS    COPD (chronic obstructive pulmonary disease) ([address]     noted on PFTs 6/2020    DDD (degenerative disc disease), lumbar     Diabetes mellitus type 2, controlled ([address]     Dr Chris Tee    Diabetic neuropathy, type II diabetes mellitus ([address]     DECR MONOFIL SENSATION    Dilated cardiomyopathy ([address] 11/04/2015    GERD (gastroesophageal reflux disease)     Gout     H/O cardiovascular stress test 12/10/2015    lexiscan-normal,EF65%    H/O echocardiogram 06/13/2019    EF 55-60%, Mild AS, Moderately dilated left and right atrium and right ventricle.  History of cardiovascular stress test 01/08/2008 1/8/08- normal exercise performance without angina, the patient was noted to have nonspecific ST and T wave changes in the inferior and lateral leads. The nuclear segment of the report will be dicated seperately.  History of complete electrocardiogram 05/16/2011    History of echocardiogram 11/14/14,11/2/11, 5/12/10, 5/20/09, 7/9/02 11/14/14- Four chamber dialtation with mild left ventricular systolic function Mild MR and TR, mild pulmonary HTN  EF 45-50%    History of total right hip arthroplasty     Dr Priya Rdz 9/2018- does get antibiotic prophylaxis w dental work- Dr Miguel A Perdue Hx of Doppler echocardiogram 11/06/2018    EF55-60%,moderately to severly dilated left and right atrium and right ventricle,mild to moderate mitral and mild tricuspid regurg, mild PHTN    Hx of echocardiogram 10/26/2016    aortic sclerosis with mild stenosis,moderate left atrial dilation,mild mitral and tricusid regurg,EF55-60%    Hyperlipidemia     IFG (impaired fasting glucose)     Jan 2019- a1c 6.1 but fasting glc 131.     Knee pain, bilateral     Knee pain, left     Long-term (current) use of anticoagulants     Peptic ulcer disease     Pre-op evaluation 09/05/2018    Testosterone deficiency dx'd 9/2015    Ventricular tachycardia ([address]     mentioned by Dr Froilan Chamberlain 11/2019 note-who monitors him    VHD (valvular heart disease) 11/29/2018    Mild aortic stenosis by echo November 2018       Past Surgical History:   Procedure Laterality Date    HIP ARTHROPLASTY Right 09/12/2018    Dr Shola Venegas Right 11/2016    Dr Kevin Aaron  3/08, dr Suly Faust    lap assisted colon mass resection-benign    POLYPECTOMY  2/98- jaimie    colonic polypectomy    POLYPECTOMY  9/10/02- dr Carney Fearing    colonic polypectomy    VENTRAL HERNIA REPAIR  10/08, dr Keyla Wood extended release capsule Take 1 capsule by mouth daily 90 capsule 3    furosemide (LASIX) 20 MG tablet Take 1 tablet by mouth daily 90 tablet 1    omeprazole (PRILOSEC) 20 MG delayed release capsule Take 1 capsule by mouth daily 90 capsule 1    blood glucose monitor strips Test once a day. . Dispense sufficient amount for indicated testing frequency plus additional to accommodate PRN testing needs. 100 strip 0    blood glucose monitor kit and supplies Dispense sufficient amount for indicated testing frequency plus additional to accommodate PRN testing needs. Dispense all needed supplies. 1 kit 0    fluticasone-umeclidin-vilant (TRELEGY ELLIPTA) 100-62.5-25 MCG/INH AEPB Inhale 1 puff into the lungs daily 1 each 5    calcium carb-cholecalciferol 600-200 MG-UNIT TABS tablet Take 1 tablet by mouth daily      apixaban (ELIQUIS) 5 MG TABS tablet Take 1 tablet by mouth 2 times daily 180 tablet 3    albuterol sulfate HFA (PROVENTIL HFA) 108 (90 Base) MCG/ACT inhaler Inhale 2 puffs into the lungs every 6 hours as needed for Wheezing 1 Inhaler 3    traZODone (DESYREL) 50 MG tablet Take 2 tablets by mouth nightly 180 tablet 1    metFORMIN (GLUCOPHAGE) 500 MG tablet Take 1 tablet by mouth daily (with breakfast) 90 tablet 1    allopurinol (ZYLOPRIM) 100 MG tablet Take 1 tablet by mouth daily 90 tablet 1    potassium chloride (KLOR-CON M) 10 MEQ extended release tablet Take 1 tablet by mouth daily 90 tablet 1    montelukast (SINGULAIR) 10 MG tablet Take 1 tablet by mouth daily 90 tablet 3    Spacer/Aero-Holding Chambers ALICE 1 Device by Does not apply route daily as needed (use with albuterol rescue inhaler) 1 Device 0     No current facility-administered medications for this visit.         Allergies   Allergen Reactions    Lisinopril      Chest tightness    Simvastatin Other (See Comments)     Muscle cramps       Review of Systems:  See above      Physical Exam:   Pulse 94   Resp 16   Ht 6' 5\" (1.956 m)   Wt 273 lb (123.8 kg)   SpO2 93%   BMI 32.37 kg/m²        Gait is Antalgic. The patient can bear weight on the injured extremity. Gen/Psych:Examination reveals a pleasant individual in no acute distress. The patient is oriented to time, place and person. The patient's mood and affect are appropriate. Patient appears well nourished. Body habitus is overweight     Lymph:  no lymphedema in bilateral lower extremities     Skin intact in bilateral lower extremities with no ulcerations, lesions, rash, erythema. Vascular: There are mild varicosities in bilateral lower extremities, sensation intact to light touch over bilateral lower extremities. right leg/knee exam:  Leg alignment:     Mild valgus  Quadriceps/hamstring atrophy:   no  Knee effusion:    no   Knee erythema:   no  ROM:     Full, 0-120 degrees  Varus laxity at 0 and 30 deg's: no  Valguslaxity at 0 and 30 deg's: no  Recurvatum:    no  Tenderness at:   Medial joint line, lateral joint line    Bilateral Knee strength is 5/5 flexion and extension  There is + L2-S1 motor and sensory function in bilateral lower extremities    Outside record review: office notes and x-rays     Imaging studies:  4 views of the right knee taken and reviewed in the office today show severe tricompartmental osteoarthritic changes in the lateral compartment where there is subchondral sclerosis and bone-on-bone articulation in the lateral compartment. There is mild valgus alignment. No fractures or dislocations noted. The official read and interpretation of these x-rays will be done by the the [facility]       Impression:  right knee DJD      Plan:  Natural history and expected course discussed. Questions answered.   Patient Instructions   Continue to weight bear as tolerated  Continue range of motion  Ice and elevate as needed  Tylenol or Motrin for pain  Injection given today in the office   Rest for 24-48 hours  Follow up in 6 weeks, if better call the office and cancel      Right knee injection procedure note    Pre-procedure diagnosis:  Right knee DJD    Post-procedure diagnosis:  same    Procedure: The planned procedure/risks/benefits/alternatives were discussed with the patient. Risks include, but are not limited to, increased pain, drug reaction, infection, bleeding, lack of improvement, neurovascular injury, and increased blood sugar levels. The patient understood and all of their questions were answered. The right knee was prepped with alcohol, then a 22 gauge needle was advanced into the inferior-lateral joint without difficulty. The joint was then injected with 1 ml 1% lidocaine, 1ml of Kenalog (40 mg/ml), 1ml of 0.5% bupivacaine. The injection site was cleansed with isopropyl alcohol and a band-aid was placed. Complications:  None, the patient tolerated the procedure well. Instructions: The patient was advised to rest the knee and decrease activity for the next 24 to 48 hours. May use prescription or OTC pain relievers as needed for any post-injection pain as well as ice.

## 2021-05-20 ENCOUNTER — OFFICE VISIT (OUTPATIENT)
Dept: CARDIOLOGY CLINIC | Age: 71
End: 2021-05-20
Payer: MEDICARE

## 2021-05-20 ENCOUNTER — PROCEDURE VISIT (OUTPATIENT)
Dept: CARDIOLOGY CLINIC | Age: 71
End: 2021-05-20
Payer: MEDICARE

## 2021-05-20 VITALS
SYSTOLIC BLOOD PRESSURE: 118 MMHG | HEART RATE: 68 BPM | WEIGHT: 266 LBS | BODY MASS INDEX: 31.41 KG/M2 | HEIGHT: 77 IN | DIASTOLIC BLOOD PRESSURE: 76 MMHG

## 2021-05-20 VITALS
HEART RATE: 73 BPM | HEIGHT: 77 IN | WEIGHT: 273 LBS | SYSTOLIC BLOOD PRESSURE: 112 MMHG | DIASTOLIC BLOOD PRESSURE: 64 MMHG | BODY MASS INDEX: 32.23 KG/M2

## 2021-05-20 DIAGNOSIS — R06.09 DOE (DYSPNEA ON EXERTION): ICD-10-CM

## 2021-05-20 DIAGNOSIS — I47.20 VT (VENTRICULAR TACHYCARDIA): ICD-10-CM

## 2021-05-20 DIAGNOSIS — I48.91 ATRIAL FIBRILLATION, UNSPECIFIED TYPE (HCC): ICD-10-CM

## 2021-05-20 DIAGNOSIS — I48.19 PERSISTENT ATRIAL FIBRILLATION (HCC): Primary | ICD-10-CM

## 2021-05-20 PROCEDURE — 3017F COLORECTAL CA SCREEN DOC REV: CPT | Performed by: INTERNAL MEDICINE

## 2021-05-20 PROCEDURE — G8427 DOCREV CUR MEDS BY ELIG CLIN: HCPCS | Performed by: INTERNAL MEDICINE

## 2021-05-20 PROCEDURE — 1123F ACP DISCUSS/DSCN MKR DOCD: CPT | Performed by: INTERNAL MEDICINE

## 2021-05-20 PROCEDURE — 93015 CV STRESS TEST SUPVJ I&R: CPT | Performed by: INTERNAL MEDICINE

## 2021-05-20 PROCEDURE — G8417 CALC BMI ABV UP PARAM F/U: HCPCS | Performed by: INTERNAL MEDICINE

## 2021-05-20 PROCEDURE — 99213 OFFICE O/P EST LOW 20 MIN: CPT | Performed by: INTERNAL MEDICINE

## 2021-05-20 PROCEDURE — 4040F PNEUMOC VAC/ADMIN/RCVD: CPT | Performed by: INTERNAL MEDICINE

## 2021-05-20 PROCEDURE — 1036F TOBACCO NON-USER: CPT | Performed by: INTERNAL MEDICINE

## 2021-05-20 NOTE — PROGRESS NOTES
Raymundo You (:  1950) is a 65 y.o. male,     Chief Complaint   Patient presents with    1 Month Follow-Up     Pt denies any neww Cardiac Sx, no surgeries or procedures scheduled that he is aware of. Pt states that for exercise he golfs     Atrial Fibrillation    Shortness of Breath     Patient is here for follow up for for atrial fibrillation with rapid ventricular rate response dyspnea on exertion and nonsustained ventricular tachycardia. He was started on Cardizem  daily and increased to 240 mg daily which he seems to be tolerating it well. He reports his shortness of breath and activity has improved significantly and he is happy with the quality of life he has now with his medication on board which he seems to be tolerating it well along with Eliquis 5 mg twice a day. Denies any chest pain or dizziness lightheadedness orthopnea paroxysmal nocturnal dyspnea. Trying to lose weight. He is physically very active. He has been vaccinated for COVID-19. Allergies   Allergen Reactions    Lisinopril      Chest tightness    Simvastatin Other (See Comments)     Muscle cramps     Prior to Admission medications    Medication Sig Start Date End Date Taking? Authorizing Provider   Handicap Placard MISC by Does not apply route Expires 21  Yes Romeo Ledesma, APRN - CNP   dilTIAZem (CARDIZEM CD) 240 MG extended release capsule Take 1 capsule by mouth daily 4/15/21  Yes Cathi Hurst MD   furosemide (LASIX) 20 MG tablet Take 1 tablet by mouth daily 21 Yes Erlinda Cardenas MD   omeprazole (PRILOSEC) 20 MG delayed release capsule Take 1 capsule by mouth daily 21  Yes Erlinda Cardenas MD   blood glucose monitor strips Test once a day. . Dispense sufficient amount for indicated testing frequency plus additional to accommodate PRN testing needs.  21  Yes Erlinda Cardenas MD   blood glucose monitor kit and supplies Dispense sufficient amount for indicated Diabetic neuropathy, type II diabetes mellitus ([facility]     DECR MONOFIL SENSATION    Dilated cardiomyopathy ([facility] 11/04/2015    GERD (gastroesophageal reflux disease)     Gout     H/O cardiovascular stress test 12/10/2015    lexiscan-normal,EF65%    H/O echocardiogram 06/13/2019    EF 55-60%, Mild AS,  Moderately dilated left and right atrium and right ventricle.  History of cardiovascular stress test 01/08/2008 1/8/08- normal exercise performance without angina, the patient was noted to have nonspecific ST and T wave changes in the inferior and lateral leads. The nuclear segment of the report will be dicated seperately.  History of complete electrocardiogram 05/16/2011    History of echocardiogram 11/14/14,11/2/11, 5/12/10, 5/20/09, 7/9/02 11/14/14- Four chamber dialtation with mild left ventricular systolic function Mild MR and TR, mild pulmonary HTN  EF 45-50%    History of total right hip arthroplasty     Dr Phil Weinberg 9/2018- does get antibiotic prophylaxis w dental work- Dr Romana Leys Hx of Doppler echocardiogram 11/06/2018    EF55-60%,moderately to severly dilated left and right atrium and right ventricle,mild to moderate mitral and mild tricuspid regurg, mild PHTN    Hx of echocardiogram 10/26/2016    aortic sclerosis with mild stenosis,moderate left atrial dilation,mild mitral and tricusid regurg,EF55-60%    Hyperlipidemia     IFG (impaired fasting glucose)     Jan 2019- a1c 6.1 but fasting glc 131.     Knee pain, bilateral     Knee pain, left     Long-term (current) use of anticoagulants     Peptic ulcer disease     Pre-op evaluation 09/05/2018    Testosterone deficiency dx'd 9/2015    [facility] ([facility]     mentioned by Dr Claudy Guaman 11/2019 note-who monitors him    VHD (valvular heart disease) 11/29/2018    Mild aortic stenosis by echo November 2018      Vitals:    05/20/21 1026   BP: 118/76   Pulse: 68   Weight: 266 lb (120.7 kg)   Height: 6' 5\" (1.956 m)      Body mass index is 31.54 kg/m². Wt Readings from Last 3 Encounters:   05/20/21 266 lb (120.7 kg)   05/20/21 273 lb (123.8 kg)   05/10/21 273 lb (123.8 kg)     Constitutional:  Patient is moderately overweight tall pleasant man in no apparent distress. He lost 7 pounds since last visit on April 15, 2021  HEENT: He is wearing facemask and glasses. Cardiovascular: Auscultation: Irregular S1 and S2. No significant murmurs noted. Carotids are negative for bruits. Abdominal aorta is nonpalpable. No epigastric bruit noted. Peripheral pulses: Pedal pulses are equal in both feet. Respiratory:  Respiratory effort is normal. Breath sounds are diminished but otherwise clear to auscultation. Extremities:  No edema, clubbing,  Cyanosis, petechiae. Abdomen:  No masses or tenderness. No organomegaly noted. Neurologic:  Oriented to time, place, and person and non-anxious. No focal neurological deficit noted. Psychiatric: Normal mood and effect. Pertinent records reviewed and discussed with patient and results are as follow:  Stress test done today reported atrial fibrillation with rate of 73 bpm at rest.  Normal near maximal study negative for angina or ECG evidence of ischemia. Exercise tolerance is diminished, exercised for 2:30 on standard Noah   stopped for SOB and rapid ventricular rate. Appropriate hemodynamic response to exercise is noted. Lab Results   Component Value Date    WBC 4.4 04/08/2021    HGB 11.7 04/08/2021    HCT 35.1 04/08/2021     04/08/2021     Lab Results   Component Value Date    CHOL 119 04/08/2021    TRIG 73 04/08/2021    HDL 33 (L) 04/08/2021    LDLCALC 71 04/08/2021    LDLDIRECT 54 11/17/2020     Lab Results   Component Value Date    BUN 14 04/08/2021    CREATININE 0.9 04/08/2021     04/08/2021    K 4.2 04/08/2021     Lab Results   Component Value Date    INR 1.07 12/28/2020     Lab Results   Component Value Date    LABA1C 6.2 04/08/2021     ASSESSMENT/PLAN:    1. Persistent atrial fibrillation (HCC)  Assessment & Plan:  Rate has been fairly well controlled on current dose of Cardizem  mg daily and he is tolerating it well and will continue the same. Continue Eliquis 5 mg twice a day. 2. ENRIQUE (dyspnea on exertion)  Assessment & Plan:  Is improved significantly with rate control. Continue the same. Continue to lose weight and exercise regularly. 3. VT (ventricular tachycardia) (HCC)  Assessment & Plan:  Had not had much exercise-induced ventricular ectopy on a treadmill stress test today on current medications. Since there is no angina or ischemia I would not recommend further work-up. Continue Cardizem  mg daily. Continue with Eliquis 5 mg bid. Follow-up in 6 months with EKG, sooner if needed. An electronic signature was used to authenticate this note.     --Pat Hayden MD

## 2021-05-20 NOTE — PROGRESS NOTES
[id number] Score for Atrial Fibrillation Stroke Risk   Risk   Factors  Component Value   C CHF No 0   H HTN No 0   A2 Age >= 76 No,  (71 y.o.) 0   D DM Yes 1   S2 Prior Stroke/TIA No 0   V Vascular Disease No 0   A Age 74-69 Yes,  (71 y.o.) 1   Sc Sex male 0    [id number]  Score  2   Score last updated 5/20/21 44:08 AM EDT    Click here for a link to the UpToDate guideline \"Atrial Fibrillation: Anticoagulation therapy to prevent embolization    Disclaimer: Risk Score calculation is dependent on accuracy of patient problem list and past encounter diagnosis.

## 2021-05-20 NOTE — PATIENT INSTRUCTIONS
Continue Cardizem  mg daily. Continue with Eliquis 5 mg bid. Follow-up in 6 months with EKG, sooner if needed.

## 2021-05-20 NOTE — ASSESSMENT & PLAN NOTE
Rate has been fairly well controlled on current dose of Cardizem  mg daily and he is tolerating it well and will continue the same. Continue Eliquis 5 mg twice a day.

## 2021-05-20 NOTE — ASSESSMENT & PLAN NOTE
Is improved significantly with rate control. Continue the same. Continue to lose weight and exercise regularly.

## 2021-05-26 ENCOUNTER — OFFICE VISIT (OUTPATIENT)
Dept: INTERNAL MEDICINE CLINIC | Age: 71
End: 2021-05-26
Payer: MEDICARE

## 2021-05-26 VITALS
RESPIRATION RATE: 18 BRPM | OXYGEN SATURATION: 96 % | WEIGHT: 263 LBS | DIASTOLIC BLOOD PRESSURE: 72 MMHG | SYSTOLIC BLOOD PRESSURE: 116 MMHG | HEART RATE: 99 BPM | BODY MASS INDEX: 31.19 KG/M2

## 2021-05-26 DIAGNOSIS — D68.9 COAGULOPATHY (HCC): ICD-10-CM

## 2021-05-26 DIAGNOSIS — J45.21 MILD INTERMITTENT ASTHMA WITH ACUTE EXACERBATION: ICD-10-CM

## 2021-05-26 DIAGNOSIS — I47.29 NSVT (NONSUSTAINED VENTRICULAR TACHYCARDIA): ICD-10-CM

## 2021-05-26 DIAGNOSIS — E11.21 CONTROLLED TYPE 2 DIABETES MELLITUS WITH DIABETIC NEPHROPATHY, WITHOUT LONG-TERM CURRENT USE OF INSULIN (HCC): Primary | ICD-10-CM

## 2021-05-26 DIAGNOSIS — E79.0 ELEVATED URIC ACID IN BLOOD: ICD-10-CM

## 2021-05-26 DIAGNOSIS — I42.0 DILATED CARDIOMYOPATHY (HCC): ICD-10-CM

## 2021-05-26 DIAGNOSIS — F51.01 PRIMARY INSOMNIA: ICD-10-CM

## 2021-05-26 DIAGNOSIS — I48.19 PERSISTENT ATRIAL FIBRILLATION (HCC): ICD-10-CM

## 2021-05-26 DIAGNOSIS — N40.0 BENIGN PROSTATIC HYPERPLASIA WITHOUT LOWER URINARY TRACT SYMPTOMS: ICD-10-CM

## 2021-05-26 PROCEDURE — G8427 DOCREV CUR MEDS BY ELIG CLIN: HCPCS | Performed by: INTERNAL MEDICINE

## 2021-05-26 PROCEDURE — 99214 OFFICE O/P EST MOD 30 MIN: CPT | Performed by: INTERNAL MEDICINE

## 2021-05-26 PROCEDURE — 1036F TOBACCO NON-USER: CPT | Performed by: INTERNAL MEDICINE

## 2021-05-26 PROCEDURE — G8417 CALC BMI ABV UP PARAM F/U: HCPCS | Performed by: INTERNAL MEDICINE

## 2021-05-26 PROCEDURE — 3017F COLORECTAL CA SCREEN DOC REV: CPT | Performed by: INTERNAL MEDICINE

## 2021-05-26 PROCEDURE — 4040F PNEUMOC VAC/ADMIN/RCVD: CPT | Performed by: INTERNAL MEDICINE

## 2021-05-26 PROCEDURE — 1123F ACP DISCUSS/DSCN MKR DOCD: CPT | Performed by: INTERNAL MEDICINE

## 2021-05-26 PROCEDURE — 2022F DILAT RTA XM EVC RTNOPTHY: CPT | Performed by: INTERNAL MEDICINE

## 2021-05-26 PROCEDURE — 3044F HG A1C LEVEL LT 7.0%: CPT | Performed by: INTERNAL MEDICINE

## 2021-05-26 RX ORDER — POTASSIUM CHLORIDE 750 MG/1
10 TABLET, EXTENDED RELEASE ORAL DAILY
Qty: 90 TABLET | Refills: 1 | Status: SHIPPED | OUTPATIENT
Start: 2021-05-26 | End: 2021-11-09 | Stop reason: SDUPTHER

## 2021-05-26 RX ORDER — TRAZODONE HYDROCHLORIDE 50 MG/1
100 TABLET ORAL NIGHTLY
Qty: 180 TABLET | Refills: 1 | Status: SHIPPED | OUTPATIENT
Start: 2021-05-26 | End: 2021-11-09 | Stop reason: SDUPTHER

## 2021-05-26 RX ORDER — ALLOPURINOL 100 MG/1
100 TABLET ORAL DAILY
Qty: 90 TABLET | Refills: 1 | Status: SHIPPED | OUTPATIENT
Start: 2021-05-26 | End: 2021-11-09 | Stop reason: SDUPTHER

## 2021-05-26 NOTE — PROGRESS NOTES
Linsey Lantigua  1950 05/26/21    SUBJECTIVE:  Dm- RECENT SUGARS EXCELLENT,   Lab Results   Component Value Date    LABA1C 6.2 04/08/2021    LABA1C 7.7 11/17/2020    LABA1C 7.2 08/17/2020     Lab Results   Component Value Date    GLUF 107 (H) 01/06/2017    LABMICR <1.20 04/08/2021    LDLCALC 71 04/08/2021    CREATININE 0.9 04/08/2021     STILL SOB/ENRIQUE W TREADMILL, WALKING UP TO HILL W GOLF.  HE';LL START TO MEASURE MONTHLY FOR HIS CAPACITY ON ELEVATION TO SEE IF IMPROVES W TIME. Asthma:  Patient denies significant chest pain/tightness, dyspnea, decreased exercise tolerance, cough, or wheezing on current regimen. HAS HAD COVID VACCINE  TRELEGY IS HELPING TOO W INHALER. He continues f/u also w Pulm, Dr Semaj Singer, STABLE AS ABOVE    OBJECTIVE:    /72   Pulse 99   Resp 18   Wt 263 lb (119.3 kg)   SpO2 96%   BMI 31.19 kg/m²     Physical Exam  Vitals reviewed. Constitutional:       General: He is not in acute distress. Appearance: He is well-developed. HENT:      Head: Normocephalic and atraumatic. Eyes:      General: No scleral icterus. Right eye: No discharge. Left eye: No discharge. Conjunctiva/sclera: Conjunctivae normal.      Pupils: Pupils are equal, round, and reactive to light. Neck:      Thyroid: No thyromegaly. Vascular: No JVD. Trachea: No tracheal deviation. Cardiovascular:      Rate and Rhythm: Normal rate. Rhythm irregular. Heart sounds: Normal heart sounds. No murmur heard. No friction rub. No gallop. Pulmonary:      Effort: Pulmonary effort is normal. No respiratory distress. Breath sounds: Normal breath sounds. No wheezing or rales. Abdominal:      General: Bowel sounds are normal. There is no distension. Palpations: Abdomen is soft. There is no mass. Tenderness: There is no abdominal tenderness. There is no guarding or rebound.    Musculoskeletal:      Cervical back: Normal range of motion and neck supple. Lymphadenopathy:      Cervical: No cervical adenopathy. Skin:     General: Skin is warm and dry. Findings: No rash. Neurological:      Mental Status: He is alert. Psychiatric:         Mood and Affect: Mood normal.         ASSESSMENT:    1. Controlled type 2 diabetes mellitus with diabetic nephropathy, without long-term current use of insulin ([address]    2. Dilated cardiomyopathy (HCC)    3. Elevated uric acid in blood    4. NSVT (nonsustained ventricular tachycardia) ([address]    5. Primary insomnia    6. Coagulopathy ([address]    7. Mild intermittent asthma with acute exacerbation    8. Benign prostatic hyperplasia without lower urinary tract symptoms    9. Persistent atrial fibrillation ([address]        PLAN:    Gopal Kimbrough was seen today for medication refill. Diagnoses and all orders for this visit:    Controlled type 2 diabetes mellitus with diabetic nephropathy, without long-term current use of insulin ([address] - DM relatively well controlled and will continue current regimen. Screening reviewed. See orders. -     Comprehensive Metabolic Panel; Future  -     CBC Auto Differential; Future  -     Lipid Panel; Future  -     Hemoglobin A1C; Future  -     Microalbumin / Creatinine Urine Ratio; Future  -      DIABETES FOOT EXAM    Dilated cardiomyopathy (HCC) - .stable w/o signs/sx chf, states ENERGY LEVEL HAS IMPROVED SIGNFICANTLY THE PAST 2 WEEKS  -     potassium chloride (KLOR-CON M) 10 MEQ extended release tablet; Take 1 tablet by mouth daily    Elevated uric acid in blood -  REMAINS STABLE IN REMISSION W CURRENTSUPPRESSIVE THERAPY. WILL MONITOR URIC ACID LEVELS PERIODICALLY. -     allopurinol (ZYLOPRIM) 100 MG tablet; Take 1 tablet by mouth daily  -     Uric Acid; Future    NSVT (nonsustained ventricular tachycardia) ([address]- STABLE IN REMISSION    Primary insomnia - The current medical regimen is effective;  continue present plan and medications.     -     traZODone (DESYREL) 50 MG tablet; Take 2 tablets by mouth nightly    Coagulopathy ([address]- ON ELIQUIS    Mild intermittent asthma with acute exacerbation - Asthma clinically stable w/o ongoing symptoms, will continue inhaler/medical regimen. Benign prostatic hyperplasia without lower urinary tract symptoms  -     PSA, Prostatic Specific Antigen; Future    Persistent atrial fibrillation (HCC) - Pt clinically w/o evidence of cardiovascular instability, cont regimen. CONT FU  [facility]  -     TSH without Reflex;  Future

## 2021-06-08 ASSESSMENT — ENCOUNTER SYMPTOMS
GASTROINTESTINAL NEGATIVE: 1
RESPIRATORY NEGATIVE: 1
EYES NEGATIVE: 1

## 2021-06-29 ENCOUNTER — VIRTUAL VISIT (OUTPATIENT)
Dept: INTERNAL MEDICINE CLINIC | Age: 71
End: 2021-06-29
Payer: MEDICARE

## 2021-06-29 DIAGNOSIS — Z00.00 ROUTINE GENERAL MEDICAL EXAMINATION AT A HEALTH CARE FACILITY: ICD-10-CM

## 2021-06-29 DIAGNOSIS — Z23 NEED FOR PROPHYLACTIC VACCINATION AND INOCULATION AGAINST VARICELLA: ICD-10-CM

## 2021-06-29 PROCEDURE — G0439 PPPS, SUBSEQ VISIT: HCPCS | Performed by: INTERNAL MEDICINE

## 2021-06-29 PROCEDURE — 3017F COLORECTAL CA SCREEN DOC REV: CPT | Performed by: INTERNAL MEDICINE

## 2021-06-29 PROCEDURE — 1123F ACP DISCUSS/DSCN MKR DOCD: CPT | Performed by: INTERNAL MEDICINE

## 2021-06-29 PROCEDURE — 4040F PNEUMOC VAC/ADMIN/RCVD: CPT | Performed by: INTERNAL MEDICINE

## 2021-06-29 SDOH — ECONOMIC STABILITY: FOOD INSECURITY: WITHIN THE PAST 12 MONTHS, THE FOOD YOU BOUGHT JUST DIDN'T LAST AND YOU DIDN'T HAVE MONEY TO GET MORE.: NEVER TRUE

## 2021-06-29 SDOH — ECONOMIC STABILITY: FOOD INSECURITY: WITHIN THE PAST 12 MONTHS, YOU WORRIED THAT YOUR FOOD WOULD RUN OUT BEFORE YOU GOT MONEY TO BUY MORE.: NEVER TRUE

## 2021-06-29 ASSESSMENT — LIFESTYLE VARIABLES
HOW OFTEN DURING THE LAST YEAR HAVE YOU NEEDED AN ALCOHOLIC DRINK FIRST THING IN THE MORNING TO GET YOURSELF GOING AFTER A NIGHT OF HEAVY DRINKING: 0
HOW MANY STANDARD DRINKS CONTAINING ALCOHOL DO YOU HAVE ON A TYPICAL DAY: 0
AUDIT TOTAL SCORE: 3
HOW OFTEN DO YOU HAVE A DRINK CONTAINING ALCOHOL: 3
HOW OFTEN DURING THE LAST YEAR HAVE YOU HAD A FEELING OF GUILT OR REMORSE AFTER DRINKING: 0
HAVE YOU OR SOMEONE ELSE BEEN INJURED AS A RESULT OF YOUR DRINKING: 0
HOW OFTEN DO YOU HAVE SIX OR MORE DRINKS ON ONE OCCASION: 0
HOW OFTEN DURING THE LAST YEAR HAVE YOU FAILED TO DO WHAT WAS NORMALLY EXPECTED FROM YOU BECAUSE OF DRINKING: 0
HOW OFTEN DURING THE LAST YEAR HAVE YOU FOUND THAT YOU WERE NOT ABLE TO STOP DRINKING ONCE YOU HAD STARTED: 0
HAS A RELATIVE, FRIEND, DOCTOR, OR ANOTHER HEALTH PROFESSIONAL EXPRESSED CONCERN ABOUT YOUR DRINKING OR SUGGESTED YOU CUT DOWN: 0
AUDIT-C TOTAL SCORE: 3
HOW OFTEN DURING THE LAST YEAR HAVE YOU BEEN UNABLE TO REMEMBER WHAT HAPPENED THE NIGHT BEFORE BECAUSE YOU HAD BEEN DRINKING: 0

## 2021-06-29 ASSESSMENT — PATIENT HEALTH QUESTIONNAIRE - PHQ9
SUM OF ALL RESPONSES TO PHQ QUESTIONS 1-9: 0
SUM OF ALL RESPONSES TO PHQ9 QUESTIONS 1 & 2: 0
1. LITTLE INTEREST OR PLEASURE IN DOING THINGS: 0
SUM OF ALL RESPONSES TO PHQ QUESTIONS 1-9: 0
SUM OF ALL RESPONSES TO PHQ QUESTIONS 1-9: 0
2. FEELING DOWN, DEPRESSED OR HOPELESS: 0

## 2021-06-29 ASSESSMENT — SOCIAL DETERMINANTS OF HEALTH (SDOH): HOW HARD IS IT FOR YOU TO PAY FOR THE VERY BASICS LIKE FOOD, HOUSING, MEDICAL CARE, AND HEATING?: NOT HARD AT ALL

## 2021-06-29 NOTE — PROGRESS NOTES
Medicare Annual Wellness Visit  Name: Jeremy Yonatan Date: 2021   MRN: [id number] Sex: Male   Age: 66 y.o. Ethnicity: Non-/Non    : 1950 Race: Therese Alvarez is here for Medicare AWV    Screenings for behavioral, psychosocial and functional/safety risks, and cognitive dysfunction are all negative except as indicated below. These results, as well as other patient data from the [address] form, are documented in Flowsheets linked to this Encounter. Allergies   Allergen Reactions    Lisinopril      Chest tightness    Simvastatin Other (See Comments)     Muscle cramps       Prior to Visit Medications    Medication Sig Taking? Authorizing Provider   zoster recombinant adjuvanted vaccine (SHINGRIX) 50 MCG/0.5ML SUSR injection Inject 0.5 mLs into the muscle once for 1 dose Yes Cruzito Chávez MD   potassium chloride (KLOR-CON M) 10 MEQ extended release tablet Take 1 tablet by mouth daily Yes Cruzito Chávez MD   allopurinol (ZYLOPRIM) 100 MG tablet Take 1 tablet by mouth daily Yes Cruzito Chávez MD   traZODone (DESYREL) 50 MG tablet Take 2 tablets by mouth nightly Yes Cruzito Chávez MD   Handicap Placard MISC by Does not apply route Expires 2024 Yes Ashanti Ledesma, SLAVA - LUCRETIA   dilTIAZem (CARDIZEM CD) 240 MG extended release capsule Take 1 capsule by mouth daily Yes Kelsi Werner MD   omeprazole (PRILOSEC) 20 MG delayed release capsule Take 1 capsule by mouth daily Yes Cruzito Chávez MD   blood glucose monitor strips Test once a day. . Dispense sufficient amount for indicated testing frequency plus additional to accommodate PRN testing needs. Yes Cruzito Chávez MD   blood glucose monitor kit and supplies Dispense sufficient amount for indicated testing frequency plus additional to accommodate PRN testing needs. Dispense all needed supplies.  Yes Cruzito Chávez MD   fluticasone-umeclidin-vilant (Nobertcaridad Whitten) 495-44.4-24 MCG/INH AEPB Inhale 1 puff into the lungs daily Yes SLAVA Newton CNP   calcium carb-cholecalciferol 600-200 MG-UNIT TABS tablet Take 1 tablet by mouth daily Yes Historical Provider, MD   apixaban (ELIQUIS) 5 MG TABS tablet Take 1 tablet by mouth 2 times daily Yes Tracy Scales MD   albuterol sulfate HFA (PROVENTIL HFA) 108 (90 Base) MCG/ACT inhaler Inhale 2 puffs into the lungs every 6 hours as needed for Wheezing Yes Tracy Scales MD   metFORMIN (GLUCOPHAGE) 500 MG tablet Take 1 tablet by mouth daily (with breakfast) Yes Tracy Scales MD   montelukast (SINGULAIR) 10 MG tablet Take 1 tablet by mouth daily Yes SLAVA Newton CNP   Spacer/Aero-Holding Juni Amas 1 Device by Does not apply route daily as needed (use with albuterol rescue inhaler) Yes SLAVA Newton CNP   furosemide (LASIX) 20 MG tablet Take 1 tablet by mouth daily  Tracy Scales MD       Past Medical History:   Diagnosis Date    Aortic stenosis, mild 11/08/2017    By echo 10/2016    Asthma, intrinsic     Dr Joana Chapman and Filemon Mccray.  Atrial fibrillation (HCC)     Cholelithiasis     Dr Dania Lozano evaluating    Colonic polyp 05/2009    Dr Debra Scales- also done 4/16/2012- AND 3/8/17-TUBULAR ADENOMA,-RECHECK 5YRS    COPD (chronic obstructive pulmonary disease) ([address]     noted on PFTs 6/2020    DDD (degenerative disc disease), lumbar     Diabetes mellitus type 2, controlled ([address]     Dr Fredi Pollack    Diabetic neuropathy, type II diabetes mellitus ([address]     DECR MONOFIL SENSATION    Dilated cardiomyopathy ([address] 11/04/2015    GERD (gastroesophageal reflux disease)     Gout     H/O cardiovascular stress test 12/10/2015    lexiscan-normal,EF65%    H/O echocardiogram 06/13/2019    EF 55-60%, Mild AS,  Moderately dilated left and right atrium and right ventricle.     History of cardiovascular stress test 01/08/2008 1/8/08- normal exercise performance without angina, the patient was noted to have nonspecific ST and T wave changes in the inferior and lateral leads. The nuclear segment of the report will be dicated seperately.  History of complete electrocardiogram 05/16/2011    History of echocardiogram 11/14/14,11/2/11, 5/12/10, 5/20/09, 7/9/02 11/14/14- Four chamber dialtation with mild left ventricular systolic function Mild MR and TR, mild pulmonary HTN  EF 45-50%    History of total right hip arthroplasty     Dr Glo Husain 9/2018- does get antibiotic prophylaxis w dental work- Dr Dannielle Troy Hx of Doppler echocardiogram 11/06/2018    EF55-60%,moderately to severly dilated left and right atrium and right ventricle,mild to moderate mitral and mild tricuspid regurg, mild PHTN    Hx of echocardiogram 10/26/2016    aortic sclerosis with mild stenosis,moderate left atrial dilation,mild mitral and tricusid regurg,EF55-60%    Hyperlipidemia     IFG (impaired fasting glucose)     Jan 2019- a1c 6.1 but fasting glc 131.     Knee pain, bilateral     Knee pain, left     Long-term (current) use of anticoagulants     Peptic ulcer disease     Pre-op evaluation 09/05/2018    Testosterone deficiency dx'd 9/2015    Ventricular tachycardia ([address]     mentioned by Dr Ken Turk 11/2019 note-who monitors him    VHD (valvular heart disease) 11/29/2018    Mild aortic stenosis by echo November 2018       Past Surgical History:   Procedure Laterality Date    HIP ARTHROPLASTY Right 09/12/2018    Dr Km Gruber Right 11/2016    Dr Yarely Ely  3/08, dr Clare Montenegro    [facility] assisted colon mass resection-benign    POLYPECTOMY  2/98- jaimie    colonic polypectomy    POLYPECTOMY  9/10/02- dr Thuy Greer    colonic polypectomy    VENTRAL HERNIA REPAIR  10/08, dr Korey Chino Left 2/6/14    OR[facility] Dr Clara Marino       Family History   Problem Relation Age of Onset    Hypertension Mother     High Blood Pressure Mother     Other Mother         lung problems    Other Father CHF, A. Fib    Hypotension Father     Diabetes Father     No Known Problems Sister     Atrial Fibrillation Brother     Atrial Fibrillation Brother     No Known Problems Sister     No Known Problems Sister        CareTeam (Including outside providers/suppliers regularly involved in providing care):   Patient Care Team:  Shannan Barraza MD as PCP - Sandra Beth MD as PCP - [facility] Empaneled Provider  Ghazala De Santiago MD as Consulting Physician (Cardiology)    Wt Readings from Last 3 Encounters:   05/26/21 263 lb (119.3 kg)   05/20/21 266 lb (120.7 kg)   05/20/21 273 lb (123.8 kg)     There were no vitals filed for this visit. There is no height or weight on file to calculate BMI. Based upon direct observation of the patient, evaluation of cognition reveals recent and remote memory intact. Patient's complete Health Risk Assessment and screening values have been reviewed and are found in Flowsheets. The following problems were reviewed today and where indicated follow up appointments were made and/or referrals ordered.     Positive Risk Factor Screenings with Interventions:           Health Habits/Nutrition:  Health Habits/Nutrition  Do you exercise for at least 20 minutes 2-3 times per week?: Yes  Have you lost any weight without trying in the past 3 months?: No  Do you eat only one meal per day?: No  Have you seen the dentist within the past year?: Appointment is scheduled     Health Habits/Nutrition Interventions:  · Nutritional issues:  patient is not ready to address his/her nutritional/weight issues at this time       Personalized Preventive Plan   Current Health Maintenance Status  Immunization History   Administered Date(s) Administered    Influenza 09/10/2012, 09/05/2013    Influenza, High Dose (Fluzone 65 yrs and older) 09/11/2014, 11/15/2016, 09/15/2017, 08/24/2018, 10/16/2019    Pneumococcal Conjugate 13-valent ([id number]) 11/15/2016    Pneumococcal Polysaccharide ([id number]) 09/05/2013, 12/03/2015    Tdap (Boostrix, Adacel) 09/11/2014    Zoster Live (Zostavax) 08/17/2016        Health Maintenance   Topic Date Due    Shingles Vaccine (2 of 3) 10/12/2016    Annual Wellness Visit (AWV)  Never done    Low dose CT lung screening  03/08/2022    A1C test (Diabetic or Prediabetic)  04/08/2022    Diabetic microalbuminuria test  04/08/2022    Lipid screen  04/08/2022    Diabetic foot exam  05/26/2022    Diabetic retinal exam  02/17/2023    DTaP/Tdap/Td vaccine (2 - Td or Tdap) 09/11/2024    Colon cancer screen colonoscopy  03/08/2027    Flu vaccine  Completed    Pneumococcal 65+ years Vaccine  Completed    COVID-19 Vaccine  Completed    AAA screen  Completed    Hepatitis C screen  Completed    Hepatitis A vaccine  Aged Out    Hib vaccine  Aged Out    Meningococcal (ACWY) vaccine  Aged Out     Recommendations for [employer] Due: see orders and patient instructions/AVS. LPN mailed script for shingles vaccinations to patient. Unable to obtain 3 vital signs due to patient not having equipment to take blood pressure/temperature. Recommended screening schedule for the next 5-10 years is provided to the patient in written form: see Patient Instructions/AVS.    Mick CARMONA LPN, 5/92/9891, performed the documented evaluation under the direct supervision of the attending physician. Akanksha Thacker, was evaluated through a synchronous (real-time) audio-video encounter. The patient (or guardian if applicable) is aware that this is a billable service. Verbal consent to proceed has been obtained within the past 12 months. The visit was conducted pursuant to the emergency declaration under the [facility], [address] [place] [address] authority and the [employer] and [employer] Act. Patient identification was verified, and a caregiver was present when appropriate.  The patient was located in the

## 2021-06-29 NOTE — PATIENT INSTRUCTIONS
Personalized Preventive Plan for Tracee Lindquist - 6/29/2021  Medicare offers a range of preventive health benefits. Some of the tests and screenings are paid in full while other may be subject to a deductible, co-insurance, and/or copay. Some of these benefits include a comprehensive review of your medical history including lifestyle, illnesses that may run in your family, and various assessments and screenings as appropriate. After reviewing your medical record and screening and assessments performed today your provider may have ordered immunizations, labs, imaging, and/or referrals for you. A list of these orders (if applicable) as well as your Preventive Care list are included within your After Visit Summary for your review. Other Preventive Recommendations:    · A preventive eye exam performed by an eye specialist is recommended every 1-2 years to screen for glaucoma; cataracts, macular degeneration, and other eye disorders. · A preventive dental visit is recommended every 6 months. · Try to get at least 150 minutes of exercise per week or 10,000 steps per day on a pedometer . · Order or download the FREE \"Exercise & Physical Activity: Your Everyday Guide\" from [employer]. Call 3-251.363.9064 or search [employer] online. · You need 6407-0361 mg of calcium and 7586-2176 IU of vitamin D per day. It is possible to meet your calcium requirement with diet alone, but a vitamin D supplement is usually necessary to meet this goal.  · When exposed to the sun, use a sunscreen that protects against both UVA and UVB radiation with an SPF of 30 or greater. Reapply every 2 to 3 hours or after sweating, drying off with a towel, or swimming. · Always wear a seat belt when traveling in a car. Always wear a helmet when riding a bicycle or motorcycle. Personalized Preventive Plan for Tracee Lindquist - 6/29/2021  Medicare offers a range of preventive health benefits.  Some of

## 2021-07-15 DIAGNOSIS — J44.9 CHRONIC OBSTRUCTIVE PULMONARY DISEASE, UNSPECIFIED COPD TYPE (HCC): ICD-10-CM

## 2021-08-19 ENCOUNTER — OFFICE VISIT (OUTPATIENT)
Dept: PULMONOLOGY | Age: 71
End: 2021-08-19
Payer: MEDICARE

## 2021-08-19 VITALS
BODY MASS INDEX: 31.35 KG/M2 | SYSTOLIC BLOOD PRESSURE: 125 MMHG | DIASTOLIC BLOOD PRESSURE: 68 MMHG | HEART RATE: 81 BPM | WEIGHT: 265.5 LBS | OXYGEN SATURATION: 95 % | HEIGHT: 77 IN

## 2021-08-19 DIAGNOSIS — J96.11 CHRONIC RESPIRATORY FAILURE WITH HYPOXIA (HCC): ICD-10-CM

## 2021-08-19 DIAGNOSIS — Z86.16 HISTORY OF 2019 NOVEL CORONAVIRUS DISEASE (COVID-19): ICD-10-CM

## 2021-08-19 DIAGNOSIS — J44.9 CHRONIC OBSTRUCTIVE PULMONARY DISEASE, UNSPECIFIED COPD TYPE (HCC): Primary | ICD-10-CM

## 2021-08-19 DIAGNOSIS — Z00.00 HEALTHCARE MAINTENANCE: ICD-10-CM

## 2021-08-19 DIAGNOSIS — Z01.818 PREOP TESTING: ICD-10-CM

## 2021-08-19 DIAGNOSIS — Z87.891 FORMER SMOKER: ICD-10-CM

## 2021-08-19 PROCEDURE — 3023F SPIROM DOC REV: CPT | Performed by: NURSE PRACTITIONER

## 2021-08-19 PROCEDURE — 99214 OFFICE O/P EST MOD 30 MIN: CPT | Performed by: NURSE PRACTITIONER

## 2021-08-19 PROCEDURE — 4040F PNEUMOC VAC/ADMIN/RCVD: CPT | Performed by: NURSE PRACTITIONER

## 2021-08-19 PROCEDURE — 1036F TOBACCO NON-USER: CPT | Performed by: NURSE PRACTITIONER

## 2021-08-19 PROCEDURE — G8427 DOCREV CUR MEDS BY ELIG CLIN: HCPCS | Performed by: NURSE PRACTITIONER

## 2021-08-19 PROCEDURE — 3017F COLORECTAL CA SCREEN DOC REV: CPT | Performed by: NURSE PRACTITIONER

## 2021-08-19 PROCEDURE — G8926 SPIRO NO PERF OR DOC: HCPCS | Performed by: NURSE PRACTITIONER

## 2021-08-19 PROCEDURE — G8417 CALC BMI ABV UP PARAM F/U: HCPCS | Performed by: NURSE PRACTITIONER

## 2021-08-19 PROCEDURE — 1123F ACP DISCUSS/DSCN MKR DOCD: CPT | Performed by: NURSE PRACTITIONER

## 2021-08-19 NOTE — PROGRESS NOTES
Pulmonary Clinic Office Follow up     Adelia Hawthorne is a 66 y.o. male with history of COPD, shortness of breath on exertion, asthma, A. fib, DM 2 with neuropathy, dilated cardiomyopathy, GERD, DDD, VHD, history of SVT, presents today to the pulmonary clinic for 4 month follow up. An Amaya states he has not required hospitalization, visits to the emergency room, walk-in clinic, urgent care for respiratory related illness since last visit 4/20/2021. He states he is staying active playing golf this summer. He has noticed that he occasionally will have some shortness of breath while he is playing golf uses his albuterol rescue inhaler for good relief. He is currently on Trelegy once a day, he states he is rinsing his mouth well after use. He states he feels Trelegy has helped him more with his shortness of breath. His last pulmonary function test was 3/8/2021 which demonstrated FEV1 of 53% / 53% following bronchodilator administration. Overall testing demonstrated moderate severe obstructive disease, probable restrictive disease, and severe diffusion defect. He states he has rare use of his albuterol rescue inhaler occasionally using while playing golf if needed in the heat and humidity. We will repeat his pulmonary function test in March 2022 he is aware that he may need a Covid prescreening test prior to his pulmonary function test.    He did complete a 6-minute walk test at the hospital on 3/11/2021 which demonstrated the need for supplemental oxygen. He is currently on 2.5 L/min supplemental oxygen 24 however he is not wearing his oxygen in the office today. He does have a small portable concentrator that he uses when he is playing golf. I have instructed him in the importance of wearing the oxygen 24/7 given the severity of his lung disease and diffusion defect (DLCO 34% predicted). He states he normally is wearing his oxygen when he is at home and he does have his portable unit when he is playing golf. He states he will take his oxygen off when he goes to \"hit the ball\" but then will place it back on when he gets in the golf cart. He denies any shortness of breath when he is wearing his oxygen. He denies any shortness of breath today with rest.    [place] Salgado did have COVID-19 and was hospitalized in over [place] holiday December 2020. He did have some prolonged fatigue but he states that has since resolved. He states he feels he is back to his normal activity with playing golf. Rebecca Cheema states they are practicing social distancing, wearing a mask when out in public, washing hands frequently or using hand . Denies any fever, chills, malaise, change in sensation of taste or smell, headache or lightheadedness. Denies any known contacts with persons with respiratory infection, positive for coronavirus or under investigation for possible coronavirus exposure. Influenza immunization: 10/20/2020  Pneumococcal immunization: PPV 23 on 12/13/2015, 9/5/2013. Prevnar 13 on 11/15/2016  COVID-19 immunization: 4/8/2021, 3/18/2021Pfizer   Smoking history: Former smoker quit 1/1/2013, 39-pack-year history  PCP: Margarita Lockhart MD    Past Medical History:  Past Medical History:   Diagnosis Date    Aortic stenosis, mild 11/08/2017    By echo 10/2016    Asthma, intrinsic     Dr Chano Barrios and Muna Smith.     Atrial fibrillation (HCC)     Cholelithiasis     Dr Nelia Jurado evaluating    Colonic polyp 05/2009    Dr Jacqueline Ren- also done 4/16/2012- AND 3/8/17-TUBULAR ADENOMA,-RECHECK 5YRS    COPD (chronic obstructive pulmonary disease) ([address]     noted on PFTs 6/2020    DDD (degenerative disc disease), lumbar     Diabetes mellitus type 2, controlled ([address]     Dr Corwin Morales    Diabetic neuropathy, type II diabetes mellitus ([address]     DECR MONOFIL SENSATION    Dilated cardiomyopathy ([address] 11/04/2015    GERD (gastroesophageal reflux disease)     Gout     H/O cardiovascular stress test 12/10/2015    lexiscan-normal,EF65%    H/O echocardiogram 06/13/2019    EF 55-60%, Mild AS,  Moderately dilated left and right atrium and right ventricle.  History of cardiovascular stress test 01/08/2008 1/8/08- normal exercise performance without angina, the patient was noted to have nonspecific ST and T wave changes in the inferior and lateral leads. The nuclear segment of the report will be dicated seperately.  History of complete electrocardiogram 05/16/2011    History of echocardiogram 11/14/14,11/2/11, 5/12/10, 5/20/09, 7/9/02 11/14/14- Four chamber dialtation with mild left ventricular systolic function Mild MR and TR, mild pulmonary HTN  EF 45-50%    History of total right hip arthroplasty     Dr Dimple Nam 9/2018- does get antibiotic prophylaxis w dental work- Dr Maame Calderon Hx of Doppler echocardiogram 11/06/2018    EF55-60%,moderately to severly dilated left and right atrium and right ventricle,mild to moderate mitral and mild tricuspid regurg, mild PHTN    Hx of echocardiogram 10/26/2016    aortic sclerosis with mild stenosis,moderate left atrial dilation,mild mitral and tricusid regurg,EF55-60%    Hyperlipidemia     IFG (impaired fasting glucose)     Jan 2019- a1c 6.1 but fasting glc 131.     Knee pain, bilateral     Knee pain, left     Long-term (current) use of anticoagulants     Peptic ulcer disease     Pre-op evaluation 09/05/2018    Testosterone deficiency dx'd 9/2015    Ventricular tachycardia ([address]     mentioned by Dr Cabrera Fletcher 11/2019 note-who monitors him    VHD (valvular heart disease) 11/29/2018    Mild aortic stenosis by echo November 2018       Current medications and allergies have been reviewed    Social and family history unchanged from initial consult      REVIEW OF SYSTEMS:    CONSTITUTIONAL:  negative for fevers, chills, diaphoresis, activity change, appetite change, night sweats and unexpected weight change. HEENT:  Negative for hearing loss, sinus pressure, nasal congestion, epistaxis, snoring  RESPIRATORY: Negative shortness of breath, negative wheeze, negative cough  CARDIOVASCULAR:  Negative for chest pain, palpitations, exertional chest pressure/discomfort, edema, syncope  GASTROINTESTINAL: Negative for nausea, vomiting, diarrhea, constipation, blood in stool and abdominal pain  GENITOURINARY:  Negative for frequency, dysuria and hematuria  HEMATOLOGIC/LYMPHATIC:  Negative for easy bruising, bleeding and lymphadenopathy  ALLERGIC/IMMUNOLOGIC:  Negative for recurrent infections, angioedema, anaphylaxis and drug reaction  MUSCULOSKELETAL:  Negative for pain, joint swelling, decreased range of motion and muscle weakness  NEURO: Negative for headache, AMS, decrease sensations  SKIN: Negative for rashes or lesions      Physical Exam:  /68   Pulse 81   Ht 6' 5\" (1.956 m)   Wt 265 lb 8 oz (120.4 kg)   SpO2 95%   BMI 31.48 kg/m²    Body mass index is 31.48 kg/m². Constitutional:  He appears well developed and well-nourished. Neck:  Supple, no palpable lymphadenopathy, no JVD  Cardiovascular:  S1, S2 Normal, Regular rhythm, no murmurs or gallops, no pericardial  rubs. Pulmonary:  Bilateral breath sounds clear and equal to auscultation, good air movement, no use of accessory muscles to support respiratory effort. No forced expiratory wheeze, no rales, no rhonchi, no cough noted during assessment. O2 sat at rest on room air is 95%  Abdomen: No epigastric pain, no distention, + bowel sounds   Extremities: No edema, no calf tenderness, no clubbing of digits  Neurologic:  Awake and alert, no focal deficits  Skin: warm and dry    Radiology:   3/8/2021 low-dose screening CT of chest without contrast  Interval improvement of bilateral pneumonia with mild residual groundglass and consolidative opacities in the lower lobe. Mild emphysema changes.   Mild mediastinal adenopathy, nonspecific but likely reactive     PFT:   3/8/2021  FVC 69%/69% predicted, FEV1 53%/53% predicted, FEF 25-75% 22%/23% predicted , TLC 93% predicted, % predicted, DLCO 34% predicted  Following administration of bronchodilator there was no significant response to bronchodilator. Overall testing indicates moderate severe obstructive airway disease, probable restrictive, severe diffusion defect    6-minute walk performed at hospital:  3/11/2021  Testing performed at 2.5 L/min home oxygen, baseline O2 sat 97% with heart rate 97 respiratory rate 14, at 1 minute O2 sat 94% with heart rate 110, at 2 minutes 90% with heart rate 121, at 3 minutes O2 sat 91% heart rate 140, at 4 minutes O2 sat 92% with heart rate 149, at 5 minutes O2 sat 91% with heart rate 133, at 6 minutes O2 sat 91% with heart rate 146, at 1 minute recovery O2 sat 92% with heart rate 118 and respiratory rate 22, at 2 minutes recovery O2 sat 95% with heart rate 111. Good walking distance, desaturation was noted, requires 2.5 L/min of oxygen 24/7     ASSESSMENT/PLAN:  1. Chronic obstructive pulmonary disease, unspecified COPD type ([facility]    2. Chronic respiratory failure with hypoxia (HCC)    3. History of 2019 novel coronavirus disease (COVID-19)    4. Former smoker    5. Preop testing    6. Healthcare maintenance      Continue use of Trelegy daily, rinsing mouth gargling well after use  Continue use of albuterol rescue inhaler as needed  Pulmonary function test 3/2022  Continue supplemental oxygen 2.5 L/min via nasal cannula, minded of the importance to wear 24/7 as he is not wearing in the office today. Continue physical activity and strength building  Repeat CT lung screening 3/2022  Yearly flu immunization, pneumococcal vaccination as needed.    Coronavirus booster when eligible    While he has received 2 doses of the COVID-19 immunization, with the increase in Covid in our local area among nonimmunized population I have recommended that  he continue to take coronavirus precautions including: social distancing when needing to be in public, handwashing practice, wiping items touched in public such as gas pumps, door handles, shopping carts, etc. Self monitoring for infection - fever, chills, cough, SOB. Should they develop symptoms they should call office for further instructions. Return in about 6 months (around 2/19/2022) for 6 month follow up. This dictation was performed with a verbal recognition program and it was checked for errors. It is possible that there are still dictated errors within this office note. Any errors should be brought immediately to my attention for correction. All efforts were made to ensure that this office note is accurate.

## 2021-08-23 ENCOUNTER — OFFICE VISIT (OUTPATIENT)
Dept: INTERNAL MEDICINE CLINIC | Age: 71
End: 2021-08-23
Payer: MEDICARE

## 2021-08-23 VITALS
OXYGEN SATURATION: 96 % | RESPIRATION RATE: 15 BRPM | BODY MASS INDEX: 31.78 KG/M2 | DIASTOLIC BLOOD PRESSURE: 68 MMHG | HEART RATE: 92 BPM | SYSTOLIC BLOOD PRESSURE: 112 MMHG | WEIGHT: 268 LBS

## 2021-08-23 DIAGNOSIS — E11.21 CONTROLLED TYPE 2 DIABETES MELLITUS WITH DIABETIC NEPHROPATHY, WITHOUT LONG-TERM CURRENT USE OF INSULIN (HCC): ICD-10-CM

## 2021-08-23 DIAGNOSIS — J45.21 MILD INTERMITTENT ASTHMA WITH ACUTE EXACERBATION: Primary | ICD-10-CM

## 2021-08-23 PROCEDURE — 4040F PNEUMOC VAC/ADMIN/RCVD: CPT | Performed by: INTERNAL MEDICINE

## 2021-08-23 PROCEDURE — 3044F HG A1C LEVEL LT 7.0%: CPT | Performed by: INTERNAL MEDICINE

## 2021-08-23 PROCEDURE — G8427 DOCREV CUR MEDS BY ELIG CLIN: HCPCS | Performed by: INTERNAL MEDICINE

## 2021-08-23 PROCEDURE — 1123F ACP DISCUSS/DSCN MKR DOCD: CPT | Performed by: INTERNAL MEDICINE

## 2021-08-23 PROCEDURE — G8417 CALC BMI ABV UP PARAM F/U: HCPCS | Performed by: INTERNAL MEDICINE

## 2021-08-23 PROCEDURE — 3017F COLORECTAL CA SCREEN DOC REV: CPT | Performed by: INTERNAL MEDICINE

## 2021-08-23 PROCEDURE — 1036F TOBACCO NON-USER: CPT | Performed by: INTERNAL MEDICINE

## 2021-08-23 PROCEDURE — 2022F DILAT RTA XM EVC RTNOPTHY: CPT | Performed by: INTERNAL MEDICINE

## 2021-08-23 PROCEDURE — 99213 OFFICE O/P EST LOW 20 MIN: CPT | Performed by: INTERNAL MEDICINE

## 2021-08-23 RX ORDER — PREDNISONE 1 MG/1
TABLET ORAL
Qty: 36 TABLET | Refills: 0 | Status: SHIPPED | OUTPATIENT
Start: 2021-08-23 | End: 2021-10-06 | Stop reason: ALTCHOICE

## 2021-08-23 RX ORDER — METHYLPREDNISOLONE ACETATE 80 MG/ML
80 INJECTION, SUSPENSION INTRA-ARTICULAR; INTRALESIONAL; INTRAMUSCULAR; SOFT TISSUE ONCE
Status: COMPLETED | OUTPATIENT
Start: 2021-08-23 | End: 2021-08-23

## 2021-08-23 RX ADMIN — METHYLPREDNISOLONE ACETATE 80 MG: 80 INJECTION, SUSPENSION INTRA-ARTICULAR; INTRALESIONAL; INTRAMUSCULAR; SOFT TISSUE at 09:38

## 2021-08-23 NOTE — PROGRESS NOTES
Mindy Waite  1950 08/23/21    SUBJECTIVE:  4d hx of worsening chest tightness, some SOB and wheezing ,cough. Took pred 3 tabs 2 and 1d ago  No fever, chills. Cough     Dm- HAS BEEN STABLE,   Lab Results   Component Value Date    LABA1C 6.2 04/08/2021    LABA1C 7.7 11/17/2020    LABA1C 7.2 08/17/2020     Lab Results   Component Value Date    GLUF 107 (H) 01/06/2017    LABMICR <1.20 04/08/2021    LDLCALC 71 04/08/2021    CREATININE 0.9 04/08/2021       OBJECTIVE:    /68   Pulse 92   Resp 15   Wt 268 lb (121.6 kg)   SpO2 96%   BMI 31.78 kg/m²     Physical Exam  Vitals reviewed. Constitutional:       Appearance: He is well-developed. Cardiovascular:      Rate and Rhythm: Normal rate and regular rhythm. Heart sounds: Normal heart sounds. No murmur heard. No friction rub. No gallop. Pulmonary:      Effort: Pulmonary effort is normal. No respiratory distress. Breath sounds: Wheezing (TR BILAT) present. No rales. Abdominal:      General: Bowel sounds are normal. There is no distension. Palpations: Abdomen is soft. Tenderness: There is no abdominal tenderness. Musculoskeletal:      Cervical back: Neck supple. Neurological:      Mental Status: He is alert. ASSESSMENT:    1. Mild intermittent asthma with acute exacerbation    2. Controlled type 2 diabetes mellitus with diabetic nephropathy, without long-term current use of insulin ([address]        PLAN:    Balaji Gerber was seen today for chest pain and shortness of breath. Diagnoses and all orders for this visit:    Mild intermittent asthma with acute exacerbation - For ongoing exacerbation will treat with course of steroid  INJ THEN taper and as otherwise noted in medlist RESTARTING PRN NEBS, IF NO BETTER END OF THE WEEK WE'LL ALSO CONSIDER EMPIRIC ABX. -     methylPREDNISolone acetate (DEPO-MEDROL) injection 80 mg  -     albuterol (PROVENTIL) (5 MG/ML) 0.5% nebulizer solution;  Take 1 mL by nebulization 4 times daily as needed for Wheezing  -     predniSONE (DELTASONE) 5 MG tablet; Take 8 pills, then 7,6,5,4,3,2,1    Controlled type 2 diabetes mellitus with diabetic nephropathy, without long-term current use of insulin ([facility]- continue present management. Will monitor.    -     metFORMIN (GLUCOPHAGE) 500 MG tablet;  Take 1 tablet by mouth daily (with breakfast)

## 2021-09-22 ENCOUNTER — TELEPHONE (OUTPATIENT)
Dept: PULMONOLOGY | Age: 71
End: 2021-09-22

## 2021-09-22 DIAGNOSIS — J45.909 MILD ASTHMA WITHOUT COMPLICATION, UNSPECIFIED WHETHER PERSISTENT: ICD-10-CM

## 2021-09-22 RX ORDER — MONTELUKAST SODIUM 10 MG/1
10 TABLET ORAL DAILY
Qty: 90 TABLET | Refills: 3 | Status: SHIPPED | OUTPATIENT
Start: 2021-09-22 | End: 2022-07-05 | Stop reason: SDUPTHER

## 2021-09-22 NOTE — TELEPHONE ENCOUNTER
Spoke with Duran Barbosa from [facility] due to patient stating \"his oxygen concentrator was out of oxygen\".  She says she will have someone from the office contact him for clarification

## 2021-10-06 DIAGNOSIS — I42.0 DILATED CARDIOMYOPATHY (HCC): ICD-10-CM

## 2021-10-06 RX ORDER — FUROSEMIDE 20 MG/1
TABLET ORAL
Qty: 90 TABLET | Refills: 1 | Status: SHIPPED | OUTPATIENT
Start: 2021-10-06 | End: 2022-02-09 | Stop reason: SDUPTHER

## 2021-10-14 NOTE — ASSESSMENT & PLAN NOTE
Based on recent Holter monitor heart rate is not optimally controlled. We'll put him on lower dose of Cartia 120 mg once a day and follow-up. Continue Xarelto. Patient here for prolia injection. She tolerated injection well and was discharged home in stable condition. She is due to return 4/14/22 for next injection.

## 2021-11-01 DIAGNOSIS — N40.0 BENIGN PROSTATIC HYPERPLASIA WITHOUT LOWER URINARY TRACT SYMPTOMS: ICD-10-CM

## 2021-11-01 DIAGNOSIS — I48.19 PERSISTENT ATRIAL FIBRILLATION (HCC): ICD-10-CM

## 2021-11-01 DIAGNOSIS — E11.21 CONTROLLED TYPE 2 DIABETES MELLITUS WITH DIABETIC NEPHROPATHY, WITHOUT LONG-TERM CURRENT USE OF INSULIN (HCC): ICD-10-CM

## 2021-11-01 DIAGNOSIS — E79.0 ELEVATED URIC ACID IN BLOOD: ICD-10-CM

## 2021-11-01 LAB
A/G RATIO: 1.9 (ref 1.1–2.2)
ALBUMIN SERPL-MCNC: 4.4 G/DL (ref 3.4–5)
ALP BLD-CCNC: 66 U/L (ref 40–129)
ALT SERPL-CCNC: 10 U/L (ref 10–40)
ANION GAP SERPL CALCULATED.3IONS-SCNC: 14 MMOL/L (ref 3–16)
AST SERPL-CCNC: 16 U/L (ref 15–37)
BASOPHILS ABSOLUTE: 0.1 K/UL (ref 0–0.2)
BASOPHILS RELATIVE PERCENT: 1.2 %
BILIRUB SERPL-MCNC: 0.5 MG/DL (ref 0–1)
BUN BLDV-MCNC: 13 MG/DL (ref 7–20)
CALCIUM SERPL-MCNC: 9.8 MG/DL (ref 8.3–10.6)
CHLORIDE BLD-SCNC: 101 MMOL/L (ref 99–110)
CHOLESTEROL, TOTAL: 127 MG/DL (ref 0–199)
CO2: 26 MMOL/L (ref 21–32)
CREAT SERPL-MCNC: 1 MG/DL (ref 0.8–1.3)
CREATININE URINE: 103.8 MG/DL (ref 39–259)
EOSINOPHILS ABSOLUTE: 0.2 K/UL (ref 0–0.6)
EOSINOPHILS RELATIVE PERCENT: 3.2 %
GFR AFRICAN AMERICAN: >60
GFR NON-AFRICAN AMERICAN: >60
GLUCOSE BLD-MCNC: 112 MG/DL (ref 70–99)
HCT VFR BLD CALC: 37.3 % (ref 40.5–52.5)
HDLC SERPL-MCNC: 50 MG/DL (ref 40–60)
HEMOGLOBIN: 11.8 G/DL (ref 13.5–17.5)
LDL CHOLESTEROL CALCULATED: 65 MG/DL
LYMPHOCYTES ABSOLUTE: 0.7 K/UL (ref 1–5.1)
LYMPHOCYTES RELATIVE PERCENT: 15.3 %
MCH RBC QN AUTO: 25.9 PG (ref 26–34)
MCHC RBC AUTO-ENTMCNC: 31.6 G/DL (ref 31–36)
MCV RBC AUTO: 81.9 FL (ref 80–100)
MICROALBUMIN UR-MCNC: <1.2 MG/DL
MICROALBUMIN/CREAT UR-RTO: NORMAL MG/G (ref 0–30)
MONOCYTES ABSOLUTE: 0.5 K/UL (ref 0–1.3)
MONOCYTES RELATIVE PERCENT: 9.8 %
NEUTROPHILS ABSOLUTE: 3.4 K/UL (ref 1.7–7.7)
NEUTROPHILS RELATIVE PERCENT: 70.5 %
PDW BLD-RTO: 15.7 % (ref 12.4–15.4)
PLATELET # BLD: 218 K/UL (ref 135–450)
PMV BLD AUTO: 8 FL (ref 5–10.5)
POTASSIUM SERPL-SCNC: 4.2 MMOL/L (ref 3.5–5.1)
PROSTATE SPECIFIC ANTIGEN: 4.15 NG/ML (ref 0–4)
RBC # BLD: 4.55 M/UL (ref 4.2–5.9)
SODIUM BLD-SCNC: 141 MMOL/L (ref 136–145)
TOTAL PROTEIN: 6.7 G/DL (ref 6.4–8.2)
TRIGL SERPL-MCNC: 61 MG/DL (ref 0–150)
TSH SERPL DL<=0.05 MIU/L-ACNC: 1.01 UIU/ML (ref 0.27–4.2)
URIC ACID, SERUM: 6.2 MG/DL (ref 3.5–7.2)
VLDLC SERPL CALC-MCNC: 12 MG/DL
WBC # BLD: 4.9 K/UL (ref 4–11)

## 2021-11-01 PROCEDURE — 36415 COLL VENOUS BLD VENIPUNCTURE: CPT | Performed by: INTERNAL MEDICINE

## 2021-11-02 PROBLEM — R97.20 ELEVATED PSA: Status: ACTIVE | Noted: 2021-11-02

## 2021-11-02 LAB
ESTIMATED AVERAGE GLUCOSE: 125.5 MG/DL
HBA1C MFR BLD: 6 %

## 2021-11-02 NOTE — RESULT ENCOUNTER NOTE
Chol, sugars, labs appear in stable range, DM is controlled, uric acid level for gout is nl range and thyroid fxn nl. HOWEVER, DOES HAVE SL HIGH PSA--- THIS COULD JUST BE FROM NORMAL AGING BUT I DO REC WE REFER TO UROLOGY ALSO FOR EVAL TO BE SURE OF NO SIGNIFICANT PROSTATE ENLARGEMENT OR NODULES.  - IF OK W BRITTNEE LET'S REFER TO UROLOGY.  notify pt please.

## 2021-11-09 ENCOUNTER — OFFICE VISIT (OUTPATIENT)
Dept: INTERNAL MEDICINE CLINIC | Age: 71
End: 2021-11-09
Payer: MEDICARE

## 2021-11-09 VITALS
DIASTOLIC BLOOD PRESSURE: 64 MMHG | SYSTOLIC BLOOD PRESSURE: 118 MMHG | BODY MASS INDEX: 32.14 KG/M2 | HEART RATE: 83 BPM | WEIGHT: 271 LBS | RESPIRATION RATE: 15 BRPM | OXYGEN SATURATION: 95 %

## 2021-11-09 DIAGNOSIS — I42.0 DILATED CARDIOMYOPATHY (HCC): ICD-10-CM

## 2021-11-09 DIAGNOSIS — E79.0 ELEVATED URIC ACID IN BLOOD: ICD-10-CM

## 2021-11-09 DIAGNOSIS — E11.40 TYPE 2 DIABETES MELLITUS WITH DIABETIC NEUROPATHY, WITHOUT LONG-TERM CURRENT USE OF INSULIN (HCC): Primary | ICD-10-CM

## 2021-11-09 DIAGNOSIS — F51.01 PRIMARY INSOMNIA: ICD-10-CM

## 2021-11-09 DIAGNOSIS — K21.9 GASTROESOPHAGEAL REFLUX DISEASE WITHOUT ESOPHAGITIS: ICD-10-CM

## 2021-11-09 DIAGNOSIS — I48.11 LONGSTANDING PERSISTENT ATRIAL FIBRILLATION (HCC): ICD-10-CM

## 2021-11-09 DIAGNOSIS — Z20.822 EXPOSURE TO COVID-19 VIRUS: ICD-10-CM

## 2021-11-09 DIAGNOSIS — M20.5X2 MALLET TOE OF LEFT FOOT: ICD-10-CM

## 2021-11-09 DIAGNOSIS — I51.3 ATRIAL THROMBOSIS: ICD-10-CM

## 2021-11-09 PROCEDURE — 1036F TOBACCO NON-USER: CPT | Performed by: INTERNAL MEDICINE

## 2021-11-09 PROCEDURE — 93000 ELECTROCARDIOGRAM COMPLETE: CPT | Performed by: INTERNAL MEDICINE

## 2021-11-09 PROCEDURE — 1123F ACP DISCUSS/DSCN MKR DOCD: CPT | Performed by: INTERNAL MEDICINE

## 2021-11-09 PROCEDURE — 99214 OFFICE O/P EST MOD 30 MIN: CPT | Performed by: INTERNAL MEDICINE

## 2021-11-09 PROCEDURE — 3044F HG A1C LEVEL LT 7.0%: CPT | Performed by: INTERNAL MEDICINE

## 2021-11-09 PROCEDURE — 4040F PNEUMOC VAC/ADMIN/RCVD: CPT | Performed by: INTERNAL MEDICINE

## 2021-11-09 PROCEDURE — 2022F DILAT RTA XM EVC RTNOPTHY: CPT | Performed by: INTERNAL MEDICINE

## 2021-11-09 PROCEDURE — G8427 DOCREV CUR MEDS BY ELIG CLIN: HCPCS | Performed by: INTERNAL MEDICINE

## 2021-11-09 PROCEDURE — G8484 FLU IMMUNIZE NO ADMIN: HCPCS | Performed by: INTERNAL MEDICINE

## 2021-11-09 PROCEDURE — 3017F COLORECTAL CA SCREEN DOC REV: CPT | Performed by: INTERNAL MEDICINE

## 2021-11-09 PROCEDURE — G8417 CALC BMI ABV UP PARAM F/U: HCPCS | Performed by: INTERNAL MEDICINE

## 2021-11-09 RX ORDER — ALLOPURINOL 100 MG/1
100 TABLET ORAL DAILY
Qty: 90 TABLET | Refills: 1 | Status: SHIPPED | OUTPATIENT
Start: 2021-11-09 | End: 2022-04-12 | Stop reason: SDUPTHER

## 2021-11-09 RX ORDER — OMEPRAZOLE 20 MG/1
20 CAPSULE, DELAYED RELEASE ORAL DAILY
Qty: 90 CAPSULE | Refills: 1 | Status: SHIPPED | OUTPATIENT
Start: 2021-11-09 | End: 2022-04-12 | Stop reason: SDUPTHER

## 2021-11-09 RX ORDER — DULAGLUTIDE 0.75 MG/.5ML
0.75 INJECTION, SOLUTION SUBCUTANEOUS WEEKLY
Qty: 4 PEN | Refills: 5 | Status: SHIPPED | OUTPATIENT
Start: 2021-11-09 | End: 2021-11-24

## 2021-11-09 RX ORDER — POTASSIUM CHLORIDE 750 MG/1
10 TABLET, EXTENDED RELEASE ORAL DAILY
Qty: 90 TABLET | Refills: 1 | Status: SHIPPED | OUTPATIENT
Start: 2021-11-09 | End: 2022-04-12 | Stop reason: SDUPTHER

## 2021-11-09 RX ORDER — TRAZODONE HYDROCHLORIDE 50 MG/1
100 TABLET ORAL NIGHTLY
Qty: 180 TABLET | Refills: 1 | Status: SHIPPED | OUTPATIENT
Start: 2021-11-09 | End: 2022-04-12 | Stop reason: SDUPTHER

## 2021-11-09 NOTE — PROGRESS NOTES
Ernestine Fragoso  1950 11/09/21    SUBJECTIVE:  Recurring problems w a L 2nd mallet toe and distal ulceration, infection. Dr Roya Romero is planning for a partial tip amputation Dec 3.    atr fib chr and on eliquis, he';ll check w Dr Rosalva Moffett on 15th when     DM-  Diet and meds have been consistent, last a1c was in good range at 6.0. Lab Results   Component Value Date    LABA1C 6.0 11/01/2021    LABA1C 6.2 04/08/2021    LABA1C 7.7 11/17/2020     Lab Results   Component Value Date    GLUF 107 (H) 01/06/2017    LABMICR <1.20 11/01/2021    LDLCALC 65 11/01/2021    CREATININE 1.0 11/01/2021     Has had booster Covid 11/3. OBESITY, WT IS UP 20# THE PAST YR.  ASKED IF ANY AIDES TO LOSE WT. Would be a candidate for trulicity if ins will cover. OBJECTIVE:    /64   Pulse 83   Resp 15   Wt 271 lb (122.9 kg)   SpO2 95%   BMI 32.14 kg/m²     Physical Exam  Vitals reviewed. Constitutional:       General: He is not in acute distress. Appearance: He is well-developed. HENT:      Head: Normocephalic and atraumatic. Eyes:      General: No scleral icterus. Right eye: No discharge. Left eye: No discharge. Conjunctiva/sclera: Conjunctivae normal.      Pupils: Pupils are equal, round, and reactive to light. Neck:      Thyroid: No thyromegaly. Vascular: No JVD. Trachea: No tracheal deviation. Cardiovascular:      Rate and Rhythm: Normal rate. Rhythm irregular. Heart sounds: Normal heart sounds. No murmur heard. No friction rub. No gallop. Pulmonary:      Effort: Pulmonary effort is normal. No respiratory distress. Breath sounds: Normal breath sounds. No wheezing or rales. Abdominal:      General: Bowel sounds are normal. There is no distension. Palpations: Abdomen is soft. There is no mass. Tenderness: There is no abdominal tenderness. There is no guarding or rebound. Musculoskeletal:         General: Deformity present. No tenderness.  Swelling: L 2nd mallet toe. Cervical back: Normal range of motion and neck supple. Lymphadenopathy:      Cervical: No cervical adenopathy. Skin:     General: Skin is warm and dry. Neurological:      Mental Status: He is alert. Psychiatric:         Mood and Affect: Mood normal.         ASSESSMENT:    1. Type 2 diabetes mellitus with diabetic neuropathy, without long-term current use of insulin ([address]    2. Mallet toe of left foot    3. Longstanding persistent atrial fibrillation ([address]    4. Gastroesophageal reflux disease without esophagitis    5. Primary insomnia    6. Atrial thrombosis    7. Elevated uric acid in blood    8. Dilated cardiomyopathy ([address]    9. Exposure to COVID-19 virus        PLAN:    Cordell Blandon was seen today for pre-op exam, cough and other. Diagnoses and all orders for this visit:    Type 2 diabetes mellitus with diabetic neuropathy, without long-term current use of insulin ([facility] - [employer] SCRIPT IF INS WILL COVER, ALSO CHECK PREOP LAB. IF INS COVERS WE'LL THEN STOP METFORMIN. GLP SHOULD LEAD TO SIGNIFICANT WT LOSS  -     Dulaglutide (TRULICITY) 4.62 ER/7.3WB SOPN; Inject 0.75 mg into the skin once a week  -     Urinalysis with Microscopic; Future    Mallet toe of left foot- OTHERWISE TABLE TO PROCEED W SURGERY, HE'LL CHECK W DR Seema Rajan WHEN TO HOLD ANTICOAGULATION    Longstanding persistent atrial fibrillation (HCC) - Pt clinically w/o evidence of cardiovascular instability, cont regimen. -     apixaban (ELIQUIS) 5 MG TABS tablet; Take 1 tablet by mouth 2 times daily  -     CBC Auto Differential; Future  -     Basic Metabolic Panel; Future  -     EKG 12 Lead    Gastroesophageal reflux disease without esophagitis  - GERD controlled on meds and will refill, monitor for any recurrent or worsening sx.    -     omeprazole (PRILOSEC) 20 MG delayed release capsule;  Take 1 capsule by mouth daily    Primary insomnia - The current medical regimen is effective;  continue present

## 2021-11-15 ENCOUNTER — HOSPITAL ENCOUNTER (OUTPATIENT)
Age: 71
Discharge: HOME | End: 2021-11-15
Payer: MEDICARE

## 2021-11-15 ENCOUNTER — HOSPITAL ENCOUNTER (OUTPATIENT)
Dept: GENERAL RADIOLOGY | Age: 71
Discharge: HOME | End: 2021-11-15
Payer: MEDICARE

## 2021-11-15 DIAGNOSIS — I42.0 DILATED CARDIOMYOPATHY (HCC): ICD-10-CM

## 2021-11-15 DIAGNOSIS — I48.11 LONGSTANDING PERSISTENT ATRIAL FIBRILLATION (HCC): ICD-10-CM

## 2021-11-15 DIAGNOSIS — E11.40 TYPE 2 DIABETES MELLITUS WITH DIABETIC NEUROPATHY, WITHOUT LONG-TERM CURRENT USE OF INSULIN (HCC): ICD-10-CM

## 2021-11-15 LAB
ANION GAP SERPL CALCULATED.3IONS-SCNC: 12 MMOL/L (ref 3–16)
BASOPHILS ABSOLUTE: 0.1 K/UL (ref 0–0.2)
BASOPHILS RELATIVE PERCENT: 1.2 %
BILIRUBIN URINE: NEGATIVE
BLOOD, URINE: NEGATIVE
BUN BLDV-MCNC: 13 MG/DL (ref 7–20)
CALCIUM SERPL-MCNC: 9.6 MG/DL (ref 8.3–10.6)
CHLORIDE BLD-SCNC: 101 MMOL/L (ref 99–110)
CLARITY: CLEAR
CO2: 27 MMOL/L (ref 21–32)
COLOR: YELLOW
CREAT SERPL-MCNC: 0.9 MG/DL (ref 0.8–1.3)
EOSINOPHILS ABSOLUTE: 0.1 K/UL (ref 0–0.6)
EOSINOPHILS RELATIVE PERCENT: 2.7 %
EPITHELIAL CELLS, UA: 0 /HPF (ref 0–5)
GFR AFRICAN AMERICAN: >60
GFR NON-AFRICAN AMERICAN: >60
GLUCOSE BLD-MCNC: 103 MG/DL (ref 70–99)
GLUCOSE URINE: NEGATIVE MG/DL
HCT VFR BLD CALC: 36.3 % (ref 40.5–52.5)
HEMOGLOBIN: 11.7 G/DL (ref 13.5–17.5)
HYALINE CASTS: 0 /LPF (ref 0–8)
KETONES, URINE: NEGATIVE MG/DL
LEUKOCYTE ESTERASE, URINE: NEGATIVE
LYMPHOCYTES ABSOLUTE: 1 K/UL (ref 1–5.1)
LYMPHOCYTES RELATIVE PERCENT: 17.9 %
MCH RBC QN AUTO: 25.7 PG (ref 26–34)
MCHC RBC AUTO-ENTMCNC: 32.3 G/DL (ref 31–36)
MCV RBC AUTO: 79.6 FL (ref 80–100)
MICROSCOPIC EXAMINATION: NORMAL
MONOCYTES ABSOLUTE: 0.5 K/UL (ref 0–1.3)
MONOCYTES RELATIVE PERCENT: 10.2 %
NEUTROPHILS ABSOLUTE: 3.6 K/UL (ref 1.7–7.7)
NEUTROPHILS RELATIVE PERCENT: 68 %
NITRITE, URINE: NEGATIVE
PDW BLD-RTO: 15.6 % (ref 12.4–15.4)
PH UA: 7.5 (ref 5–8)
PLATELET # BLD: 249 K/UL (ref 135–450)
PMV BLD AUTO: 8 FL (ref 5–10.5)
POTASSIUM SERPL-SCNC: 4.5 MMOL/L (ref 3.5–5.1)
PROTEIN UA: NEGATIVE MG/DL
RBC # BLD: 4.56 M/UL (ref 4.2–5.9)
RBC UA: 2 /HPF (ref 0–4)
SODIUM BLD-SCNC: 140 MMOL/L (ref 136–145)
SPECIFIC GRAVITY UA: 1.02 (ref 1–1.03)
URINE TYPE: NORMAL
UROBILINOGEN, URINE: 0.2 E.U./DL
WBC # BLD: 5.4 K/UL (ref 4–11)
WBC UA: 1 /HPF (ref 0–5)

## 2021-11-15 PROCEDURE — 36415 COLL VENOUS BLD VENIPUNCTURE: CPT | Performed by: INTERNAL MEDICINE

## 2021-11-15 PROCEDURE — 71046 X-RAY EXAM CHEST 2 VIEWS: CPT

## 2021-11-24 ENCOUNTER — OFFICE VISIT (OUTPATIENT)
Dept: CARDIOLOGY CLINIC | Age: 71
End: 2021-11-24
Payer: MEDICARE

## 2021-11-24 VITALS
SYSTOLIC BLOOD PRESSURE: 116 MMHG | BODY MASS INDEX: 32.4 KG/M2 | OXYGEN SATURATION: 98 % | DIASTOLIC BLOOD PRESSURE: 76 MMHG | HEIGHT: 77 IN | HEART RATE: 90 BPM | WEIGHT: 274.4 LBS

## 2021-11-24 DIAGNOSIS — I48.20 CHRONIC ATRIAL FIBRILLATION (HCC): ICD-10-CM

## 2021-11-24 DIAGNOSIS — E11.21 CONTROLLED TYPE 2 DIABETES MELLITUS WITH DIABETIC NEPHROPATHY, WITHOUT LONG-TERM CURRENT USE OF INSULIN (HCC): ICD-10-CM

## 2021-11-24 DIAGNOSIS — Z01.818 PRE-OP EVALUATION: Primary | ICD-10-CM

## 2021-11-24 PROCEDURE — 3044F HG A1C LEVEL LT 7.0%: CPT | Performed by: INTERNAL MEDICINE

## 2021-11-24 PROCEDURE — 1123F ACP DISCUSS/DSCN MKR DOCD: CPT | Performed by: INTERNAL MEDICINE

## 2021-11-24 PROCEDURE — G8484 FLU IMMUNIZE NO ADMIN: HCPCS | Performed by: INTERNAL MEDICINE

## 2021-11-24 PROCEDURE — G8417 CALC BMI ABV UP PARAM F/U: HCPCS | Performed by: INTERNAL MEDICINE

## 2021-11-24 PROCEDURE — 4040F PNEUMOC VAC/ADMIN/RCVD: CPT | Performed by: INTERNAL MEDICINE

## 2021-11-24 PROCEDURE — 99214 OFFICE O/P EST MOD 30 MIN: CPT | Performed by: INTERNAL MEDICINE

## 2021-11-24 PROCEDURE — 3017F COLORECTAL CA SCREEN DOC REV: CPT | Performed by: INTERNAL MEDICINE

## 2021-11-24 PROCEDURE — 2022F DILAT RTA XM EVC RTNOPTHY: CPT | Performed by: INTERNAL MEDICINE

## 2021-11-24 PROCEDURE — G8427 DOCREV CUR MEDS BY ELIG CLIN: HCPCS | Performed by: INTERNAL MEDICINE

## 2021-11-24 PROCEDURE — 1036F TOBACCO NON-USER: CPT | Performed by: INTERNAL MEDICINE

## 2021-11-24 RX ORDER — DILTIAZEM HYDROCHLORIDE 240 MG/1
240 CAPSULE, COATED, EXTENDED RELEASE ORAL DAILY
Qty: 90 CAPSULE | Refills: 3 | Status: ON HOLD
Start: 2021-11-24 | End: 2022-04-07 | Stop reason: HOSPADM

## 2021-11-24 NOTE — ASSESSMENT & PLAN NOTE
Rate is well controlled on Cardizem and he is anticoagulated with Eliquis. Chads vascular score is 2. Continue both.

## 2021-11-24 NOTE — PATIENT INSTRUCTIONS
Ok to hold Eliquis for 48 hour the procedure and resume it ASAP post op after the toe surgery. Counseled for calorie counting, reduction in serving size and exercise and lifestyle modification for weight loss. Follow-up in 12 months with EKG, sooner if needed.

## 2021-11-24 NOTE — PROGRESS NOTES
Slick Bolanos (:  1950) is a 66 y.o. male,     Chief Complaint   Patient presents with    6 Month Follow-Up     having amputation of end of great toe on12/3  wants to know when to stop and start eliquis. needs refill on diltalizam, has not excerized since having foot issues, denies any CP, SOB palpitations. Patient is here for follow up for chronic atrial fibrillation and has type 2 diabetes. He is anticipating left second toe surgery and wants to know when he can come off of Eliquis. It is scheduled to be done in [place] by Dr. Carmen Arias. Denies any cardiac symptoms today. He golfs when the weather is permitting denies any shortness of breath or palpitations or dizziness. He denies any chest pains. He has been compliant to his medications and follows up with PCP regularly. He is not able to exercise or walk much lately and has gained weight. He does not smoke. He has been fully vaccinated against COVID-19. Allergies   Allergen Reactions    Lisinopril      Chest tightness    Simvastatin Other (See Comments)     Muscle cramps     Prior to Admission medications    Medication Sig Start Date End Date Taking?  Authorizing Provider   dilTIAZem (CARDIZEM CD) 240 MG extended release capsule Take 1 capsule by mouth daily 21  Yes Momo Cerrato MD   apixaban (ELIQUIS) 5 MG TABS tablet Take 5 mg by mouth 2 times daily   Yes Historical Provider, MD   traZODone (DESYREL) 50 MG tablet Take 2 tablets by mouth nightly 21  Yes Luca Carranza MD   allopurinol (ZYLOPRIM) 100 MG tablet Take 1 tablet by mouth daily 21  Yes Luca Carranza MD   omeprazole (PRILOSEC) 20 MG delayed release capsule Take 1 capsule by mouth daily 21  Yes Luca Carranza MD   potassium chloride (KLOR-CON M) 10 MEQ extended release tablet Take 1 tablet by mouth daily 21  Yes Luca Carranza MD   furosemide (LASIX) 20 MG tablet TAKE ONE TABLET BY MOUTH DAILY 10/6/21  Yes Luca Carranza MD montelukast (SINGULAIR) 10 MG tablet Take 1 tablet by mouth daily 9/22/21  Yes SLAVA Chase CNP   metFORMIN (GLUCOPHAGE) 500 MG tablet Take 1 tablet by mouth daily (with breakfast) 8/23/21  Yes Chandler Franks MD   albuterol (PROVENTIL) (5 MG/ML) 0.5% nebulizer solution Take 1 mL by nebulization 4 times daily as needed for Wheezing 8/23/21  Yes Chandler Franks MD   fluticasone-umeclidin-vilant (TRELEGY ELLIPTA) 343-25.5-60 MCG/INH AEPB Inhale 1 puff into the lungs daily 7/15/21  Yes SLAVA Chase CNP   calcium carb-cholecalciferol 600-200 MG-UNIT TABS tablet Take 1 tablet by mouth daily   Yes Historical Provider, MD     Past Medical History:   Diagnosis Date    Aortic stenosis, mild 11/08/2017    By echo 10/2016    Asthma, intrinsic     Dr Ashley Riddle and Mel Philippe.  Atrial fibrillation ([address]     Colonic polyp 05/2009    Dr Douglas Swain- also done 4/16/2012- AND 3/8/17-TUBULAR ADENOMA,-RECHECK 5YRS    COPD (chronic obstructive pulmonary disease) ([address]     noted on PFTs 6/2020    DDD (degenerative disc disease), lumbar     Diabetes mellitus type 2, controlled ([address]     Dr Alissa Guajardo- optho, Dr Gerardo Doshi- podiatry    Diabetic neuropathy, type II diabetes mellitus ([address]     DECR MONOFIL SENSATION    Dilated cardiomyopathy ([address] 11/04/2015    Elevated PSA 11/02/2021    if ok w Bharti De Santiago we'll refer to urology    GERD (gastroesophageal reflux disease)     Gout     H/O cardiovascular stress test 12/10/2015    lexiscan-normal,EF65%    History of total right hip arthroplasty     Dr Sophia Thomas 9/2018- does get antibiotic prophylaxis w dental work- Dr Adamaris Lombardi     IFG (impaired fasting glucose)     Jan 2019- a1c 6.1 but fasting glc 131.     Knee pain, bilateral     Long-term (current) use of anticoagulants     Peptic ulcer disease     Testosterone deficiency dx'd 9/2015    Ventricular tachycardia [facility])     mentioned by Dr Mara Matthew 11/2019 note-who monitors him    VHD (valvular heart disease) 11/29/2018    Mild aortic stenosis by echo November 2018      Vitals:    11/24/21 0959   BP: 116/76   Pulse: 90   SpO2: 98%   Weight: 274 lb 6.4 oz (124.5 kg)   Height: 6' 5\" (1.956 m)      Body mass index is 32.54 kg/m². Wt Readings from Last 3 Encounters:   11/24/21 274 lb 6.4 oz (124.5 kg)   11/09/21 271 lb (122.9 kg)   08/23/21 268 lb (121.6 kg)     Constitutional:  Patient is moderately overweight tall pleasant male in no apparent distress. He gained 8 pounds since last visit. HEENT: He is wearing glasses and facemask. Cardiovascular: Auscultation: Irregularly irregular first and second heart sound without any significant cardiac murmurs or gallops. .  Pedal pulses are 1+. .   Respiratory:  Respiratory effort is normal. Breath sounds are clear to auscultation. Extremities:  No edema, clubbing,  Cyanosis, petechiae. Neurologic:  Oriented to time, place, and person and non-anxious. No focal neurological deficit noted. Psychiatric: Normal mood and effect. EKG from November 9, 2021 is reviewed is consistent with atrial fibrillation rate is 76 bpm    [id number] Score for Atrial Fibrillation Stroke Risk is 2    Pertinent records reviewed and discussed with patient and results are as follow:    Lab Results   Component Value Date    WBC 5.4 11/15/2021    HGB 11.7 11/15/2021    HCT 36.3 11/15/2021     11/15/2021     Lab Results   Component Value Date    CHOL 127 11/01/2021    TRIG 61 11/01/2021    HDL 50 11/01/2021    LDLCALC 65 11/01/2021    LDLDIRECT 54 11/17/2020     Lab Results   Component Value Date    BUN 13 11/15/2021    CREATININE 0.9 11/15/2021     11/15/2021    K 4.5 11/15/2021     Lab Results   Component Value Date    INR 1.07 12/28/2020     Lab Results   Component Value Date    LABA1C 6.0 11/01/2021     ASSESSMENT/PLAN:    1. Pre-op evaluation  Assessment & Plan:  Considered low cardiac risk from toe surgery.   He can hold Eliquis 48 hours before the procedure and resume it as soon as possible postoperatively as per instructions from the surgeon. Chads vascular score is 2 which makes him intermediate risk of having a stroke off anticoagulation and the risk goes up longer we hold Eliquis. 2. Chronic atrial fibrillation ([address]  Assessment & Plan:  Rate is well controlled on Cardizem and he is anticoagulated with Eliquis. Chads vascular score is 2. Continue both. 3. Controlled type 2 diabetes mellitus with diabetic nephropathy, without long-term current use of insulin ([address]  Assessment & Plan:  Well-controlled with recent hemoglobin A1c reported 6.0. Continue follow-up with PCP. Ok to hold Eliquis for 48 hour the procedure and resume it ASAP post op after the toe surgery. Counseled for calorie counting, reduction in serving size and exercise and lifestyle modification for weight loss. Follow-up in 12 months with EKG, sooner if needed. An electronic signature was used to authenticate this note.     --Zahra Hutton MD

## 2021-11-24 NOTE — ASSESSMENT & PLAN NOTE
Considered low cardiac risk from toe surgery. He can hold Eliquis 48 hours before the procedure and resume it as soon as possible postoperatively as per instructions from the surgeon. Chads vascular score is 2 which makes him intermediate risk of having a stroke off anticoagulation and the risk goes up longer we hold Eliquis.

## 2021-11-29 ENCOUNTER — ANESTHESIA EVENT (OUTPATIENT)
Dept: OPERATING ROOM | Age: 71
End: 2021-11-29
Payer: MEDICARE

## 2021-11-29 ASSESSMENT — ENCOUNTER SYMPTOMS: SHORTNESS OF BREATH: 1

## 2021-11-29 NOTE — ANESTHESIA PRE PROCEDURE
Department of Anesthesiology  Preprocedure Note       Name:  Nakita Gordillo   Age:  66 y.o.  :  1950                                          MRN:  [id number]         Date:  2021      Surgeon: Seun Haas):  Ana Ramirez DPM    Procedure: Procedure(s):  LEFT SECOND TOE PARTIAL AMPUTATION    Medications prior to admission:   Prior to Admission medications    Medication Sig Start Date End Date Taking? Authorizing Provider   dilTIAZem (CARDIZEM CD) 240 MG extended release capsule Take 1 capsule by mouth daily 21   Rekha Gilbert MD   apixaban (ELIQUIS) 5 MG TABS tablet Take 5 mg by mouth 2 times daily    Historical Provider, MD   traZODone (DESYREL) 50 MG tablet Take 2 tablets by mouth nightly 21   Jose Garcia MD   allopurinol (ZYLOPRIM) 100 MG tablet Take 1 tablet by mouth daily 21   Jose Garcia MD   omeprazole (PRILOSEC) 20 MG delayed release capsule Take 1 capsule by mouth daily 21   Jose Garcia MD   potassium chloride (KLOR-CON M) 10 MEQ extended release tablet Take 1 tablet by mouth daily 21   Jose Garcia MD   furosemide (LASIX) 20 MG tablet TAKE ONE TABLET BY MOUTH DAILY 10/6/21   Jose Garcia MD   montelukast (SINGULAIR) 10 MG tablet Take 1 tablet by mouth daily 21   SLAVA Steven - CNP   metFORMIN (GLUCOPHAGE) 500 MG tablet Take 1 tablet by mouth daily (with breakfast) 21   Jose Garcia MD   albuterol (PROVENTIL) (5 MG/ML) 0.5% nebulizer solution Take 1 mL by nebulization 4 times daily as needed for Wheezing 21   Jose Garcia MD   fluticasone-umeclidin-vilant (TRELEGY ELLIPTA) 573-08.0-12 MCG/INH AEPB Inhale 1 puff into the lungs daily 7/15/21   SLAVA Steven - CNP   calcium carb-cholecalciferol 600-200 MG-UNIT TABS tablet Take 1 tablet by mouth daily    Historical Provider, MD       Current medications:    No current facility-administered medications for this encounter. Current Outpatient Medications   Medication Sig Dispense Refill    dilTIAZem (CARDIZEM CD) 240 MG extended release capsule Take 1 capsule by mouth daily 90 capsule 3    apixaban (ELIQUIS) 5 MG TABS tablet Take 5 mg by mouth 2 times daily      traZODone (DESYREL) 50 MG tablet Take 2 tablets by mouth nightly 180 tablet 1    allopurinol (ZYLOPRIM) 100 MG tablet Take 1 tablet by mouth daily 90 tablet 1    omeprazole (PRILOSEC) 20 MG delayed release capsule Take 1 capsule by mouth daily 90 capsule 1    potassium chloride (KLOR-CON M) 10 MEQ extended release tablet Take 1 tablet by mouth daily 90 tablet 1    furosemide (LASIX) 20 MG tablet TAKE ONE TABLET BY MOUTH DAILY 90 tablet 1    montelukast (SINGULAIR) 10 MG tablet Take 1 tablet by mouth daily 90 tablet 3    metFORMIN (GLUCOPHAGE) 500 MG tablet Take 1 tablet by mouth daily (with breakfast) 90 tablet 1    albuterol (PROVENTIL) (5 MG/ML) 0.5% nebulizer solution Take 1 mL by nebulization 4 times daily as needed for Wheezing 120 each 3    fluticasone-umeclidin-vilant (TRELEGY ELLIPTA) 100-62.5-25 MCG/INH AEPB Inhale 1 puff into the lungs daily 1 each 5    calcium carb-cholecalciferol 600-200 MG-UNIT TABS tablet Take 1 tablet by mouth daily         Allergies:     Allergies   Allergen Reactions    Lisinopril      Chest tightness    Simvastatin Other (See Comments)     Muscle cramps       Problem List:    Patient Active Problem List   Diagnosis Code    Atrial fibrillation (HCC) I48.91    GERD (gastroesophageal reflux disease) K21.9    Asthma J45.909    Knee pain, bilateral M25.561, M25.562    DDD (degenerative disc disease), lumbar M51.36    Testosterone deficiency E34.9    Dilated cardiomyopathy ([facility] I42.0    Gout M10.9    Hyperbilirubinemia E80.6    Mild intermittent asthma without complication A86.35    Cholelithiasis K80.20    Colonic polyp K63.5    Closed fracture of anterior lip of right acetabulum without additional fracture ([address] S32.411A    Closed anterior dislocation of left shoulder S43.015A    VT (ventricular tachycardia) ([facility] I47.2    SVT (supraventricular tachycardia) ([facility]) I47.1    NSVT (nonsustained ventricular tachycardia) ([facility]) I47.2    Gait disturbance R26.9    Leg weakness, bilateral R29.898    Uncontrolled pain R52    Pre-op evaluation Z01.818    VHD (valvular heart disease) I38    IFG (impaired fasting glucose) R73.01    Diabetes mellitus type 2, controlled ([facility]) E11.9    Diabetic neuropathy, type II diabetes mellitus ([facility]) E11.40    COPD (chronic obstructive pulmonary disease) ([facility]) J44.9    Nonrheumatic aortic valve stenosis I35.0    COVID-19 virus infection U07.1    Acute respiratory failure ([facility]) J96.00    Atrial thrombosis I51.3    ENRIQUE (dyspnea on exertion) R06.00    Arthritis of left acromioclavicular joint M19.012    Coagulopathy ([facility]) D68.9    Elevated PSA R97.20       Past Medical History:        Diagnosis Date    Aortic stenosis, mild 11/08/2017    By echo 10/2016    Asthma, intrinsic     Dr Luis Pickett and Idalmis Arora.     Atrial fibrillation ([address]     Colonic polyp 05/2009    Dr Priscilla Massey- also done 4/16/2012- AND 3/8/17-TUBULAR ADENOMA,-RECHECK 5YRS    COPD (chronic obstructive pulmonary disease) ([address]     noted on PFTs 6/2020    DDD (degenerative disc disease), lumbar     Diabetes mellitus type 2, controlled ([address]     Dr Oley Fleischer- optho, Dr Jim Anne- podiatry    Diabetic neuropathy, type II diabetes mellitus ([address]     DECR MONOFIL SENSATION    Dilated cardiomyopathy ([address] 11/04/2015    Elevated PSA 11/02/2021    if ok w En Welsh we'll refer to urology    GERD (gastroesophageal reflux disease)     Gout     H/O cardiovascular stress test 12/10/2015    lexiscan-normal,EF65%    History of total right hip arthroplasty     Dr Petra Ricci 9/2018- does get antibiotic prophylaxis w dental work- Dr Ponce Corporal    Hyperlipidemia     IFG (impaired fasting glucose)     Jan 2019- a1c 6.1 but fasting glc 131.    Knee pain, bilateral     Long-term (current) use of anticoagulants     Peptic ulcer disease     Testosterone deficiency dx'd 2015    Ventricular tachycardia ([address]     mentioned by Dr Aung Acuna 2019 note-who monitors him    VHD (valvular heart disease) 2018    Mild aortic stenosis by echo 2018       Past Surgical History:        Procedure Laterality Date    HIP ARTHROPLASTY Right 2018    Dr Chip Blank Right 2016    Dr Vesna Lawson  3/08, dr Emil Goodman    lap assisted colon mass resection-benign    POLYPECTOMY  - jaimie    colonic polypectomy    POLYPECTOMY  9/10/02- dr Jenna Gray    colonic polypectomy    VENTRAL HERNIA REPAIR  10/08, dr Joana Putnam Left 14    ORIF Dr Rocio Bains       Social History:    Social History     Tobacco Use    Smoking status: Former Smoker     Packs/day: 1.00     Years: 39.00     Pack years: 39.00     Types: Cigarettes     Start date:      Quit date: 2013     Years since quittin.9    Smokeless tobacco: Never Used   Substance Use Topics    Alcohol use: Yes     Comment: occasional wine/moderate/ caffeine 2 cups of coffee                                Counseling given: Not Answered      Vital Signs (Current): There were no vitals filed for this visit.                                            BP Readings from Last 3 Encounters:   21 116/76   21 118/64   21 112/68       NPO Status:                                                                                 BMI:   Wt Readings from Last 3 Encounters:   21 274 lb 6.4 oz (124.5 kg)   21 271 lb (122.9 kg)   21 268 lb (121.6 kg)     There is no height or weight on file to calculate BMI.    CBC:   Lab Results   Component Value Date    WBC 5.4 11/15/2021    RBC 4.56 11/15/2021    HGB 11.7 11/15/2021    HCT 36.3 11/15/2021    MCV 79.6 11/15/2021    RDW 15.6 11/15/2021     11/15/2021       CMP:   Lab Results   Component Value Date     11/15/2021    K 4.5 11/15/2021     11/15/2021    CO2 27 11/15/2021    BUN 13 11/15/2021    CREATININE 0.9 11/15/2021    GFRAA >60 11/15/2021    GFRAA >60 05/22/2013    AGRATIO 1.9 11/01/2021    LABGLOM >60 11/15/2021    LABGLOM 76 02/08/2018    GLUCOSE 103 11/15/2021    PROT 6.7 11/01/2021    PROT 6.6 12/21/2012    CALCIUM 9.6 11/15/2021    BILITOT 0.5 11/01/2021    ALKPHOS 66 11/01/2021    AST 16 11/01/2021    ALT 10 11/01/2021       POC Tests: No results for input(s): POCGLU, POCNA, POCK, POCCL, POCBUN, POCHEMO, POCHCT in the last 72 hours. Coags:   Lab Results   Component Value Date    PROTIME 13.0 12/28/2020    PROTIME 27.7 11/25/2020    INR 1.07 12/28/2020    APTT 30.7 12/28/2020       HCG (If Applicable): No results found for: PREGTESTUR, PREGSERUM, HCG, HCGQUANT     ABGs: No results found for: PHART, PO2ART, WBM1XDV, GYS5LOK, BEART, P6QNKTAM     Type & Screen (If Applicable):  No results found for: LABABO, LABRH    Drug/Infectious Status (If Applicable):  No results found for: HIV, HEPCAB    COVID-19 Screening (If Applicable):   Lab Results   Component Value Date    COVID19 NOT DETECTED 03/02/2021    COVID19 DETECTED 12/16/2020           Anesthesia Evaluation  Patient summary reviewed  Airway:         Dental:          Pulmonary:   (+) COPD:  shortness of breath:  asthma:                           ROS comment: former smoker    Cardiovascular:    (+) valvular problems/murmurs: AS, dysrhythmias: atrial fibrillation, SVT and ventricular tachycardia, ENRIQUE:,       ECG reviewed      Echocardiogram reviewed  Stress test reviewed  Cleared by cardiology           ROS comment:  Summary   Expedited imaging was used to obtain protocol images to reduce the   sonographer's exposure during COVID-19 pandemic. Left ventricular systolic function is normal.   Ejection fraction is visually estimated at 55%. Moderate left ventricular hypertrophy.    Severe bilateral atrial Alireza Tai, APRN - CRNA   11/29/2021

## 2021-11-30 LAB
COMMENT: NORMAL
SARS-COV-2 RNA, RT PCR: NOT DETECTED
SOURCE: NORMAL

## 2021-12-02 NOTE — PROGRESS NOTES
Surgery  12/3/21    Arrival time 0600  Surgery time 0730               1. Do not eat or drink anything after midnight  unless instructed by your doctor prior to surgery. This includes                   no water, chewing gum or mints. 2. Follow your directions as prescribed by the doctor for your procedure and medications. 3. Check with your Doctor regarding stopping vitamins, supplements, blood thinners (Plavix, Coumadin, Lovenox, Effient, Pradaxa, Xarelto, Fragmin or                   other blood thinners) and follow their instructions. Pt. To take cardizem and omeprazole with a tiny sip of water the morning of surgery. Pt. Also may take inhalers if needed   4. Do not smoke, and do not drink any alcoholic beverages 24 hours prior to surgery. This includes NA Beer. 5. You may brush your teeth and gargle the morning of surgery. DO NOT SWALLOW WATER   6. You MUST make arrangements for a responsible adult to take you home after your surgery and be able to check on you every couple                   hours for the day. You will not be allowed to leave alone or drive yourself home. It is strongly suggested someone stay with you the first 24                   hrs. Your surgery will be cancelled if you do not have a ride home. 7. Please wear simple, loose fitting clothing to the hospital.  Erika Skeans not bring valuables (money, credit cards, checkbooks, etc.) Do not wear any                   makeup (including no eye makeup) or nail polish on your fingers or toes. 8. DO NOT wear any jewelry or piercings on day of surgery. All body piercing jewelry must be removed. 9. If you have dentures, they will be removed before going to the OR; we will provide you a container. If you wear contact lenses or glasses,                  they will be removed; please bring a case for them. 10. If you  have a Living Will and Durable Power of  for Healthcare, please bring in a copy.            11. Please bring picture ID,  insurance card, paperwork from the doctors office    (H & P, Consent, & card for implantable devices). 12. Take a shower the night before or morning of your procedure, do not apply any lotion, oil or powder. 13. Wear a mask covering your nose & mouth when entering the hospital. Have your covid-19 test performed within 2-7 days of your                  surgery. Quarantine yourself after the test until after your surgery. Pt. Had a covid test on 11/29/21 and results are negative.

## 2021-12-03 ENCOUNTER — HOSPITAL ENCOUNTER (OUTPATIENT)
Age: 71
Setting detail: OUTPATIENT SURGERY
Discharge: HOME | End: 2021-12-03
Attending: PODIATRIST | Admitting: PODIATRIST
Payer: MEDICARE

## 2021-12-03 ENCOUNTER — ANESTHESIA (OUTPATIENT)
Dept: OPERATING ROOM | Age: 71
End: 2021-12-03
Payer: MEDICARE

## 2021-12-03 VITALS
DIASTOLIC BLOOD PRESSURE: 80 MMHG | WEIGHT: 265 LBS | TEMPERATURE: 97.4 F | RESPIRATION RATE: 16 BRPM | OXYGEN SATURATION: 93 % | SYSTOLIC BLOOD PRESSURE: 118 MMHG | BODY MASS INDEX: 31.29 KG/M2 | HEIGHT: 77 IN | HEART RATE: 74 BPM

## 2021-12-03 VITALS
DIASTOLIC BLOOD PRESSURE: 65 MMHG | OXYGEN SATURATION: 97 % | SYSTOLIC BLOOD PRESSURE: 114 MMHG | RESPIRATION RATE: 8 BRPM | TEMPERATURE: 98.6 F

## 2021-12-03 DIAGNOSIS — M20.42 HAMMER TOE OF LEFT FOOT: Primary | ICD-10-CM

## 2021-12-03 LAB
GLUCOSE BLD-MCNC: 114 MG/DL (ref 70–99)
GLUCOSE BLD-MCNC: 134 MG/DL (ref 70–99)

## 2021-12-03 PROCEDURE — 2580000003 HC RX 258: Performed by: PODIATRIST

## 2021-12-03 PROCEDURE — 3600000012 HC SURGERY LEVEL 2 ADDTL 15MIN: Performed by: PODIATRIST

## 2021-12-03 PROCEDURE — 2709999900 HC NON-CHARGEABLE SUPPLY: Performed by: PODIATRIST

## 2021-12-03 PROCEDURE — 7100000000 HC PACU RECOVERY - FIRST 15 MIN: Performed by: PODIATRIST

## 2021-12-03 PROCEDURE — 3600000002 HC SURGERY LEVEL 2 BASE: Performed by: PODIATRIST

## 2021-12-03 PROCEDURE — 7100000010 HC PHASE II RECOVERY - FIRST 15 MIN: Performed by: PODIATRIST

## 2021-12-03 PROCEDURE — 7100000011 HC PHASE II RECOVERY - ADDTL 15 MIN: Performed by: PODIATRIST

## 2021-12-03 PROCEDURE — 6360000002 HC RX W HCPCS: Performed by: NURSE ANESTHETIST, CERTIFIED REGISTERED

## 2021-12-03 PROCEDURE — 2500000003 HC RX 250 WO HCPCS: Performed by: PODIATRIST

## 2021-12-03 PROCEDURE — 3700000000 HC ANESTHESIA ATTENDED CARE: Performed by: PODIATRIST

## 2021-12-03 PROCEDURE — 6360000002 HC RX W HCPCS: Performed by: PODIATRIST

## 2021-12-03 PROCEDURE — 7100000001 HC PACU RECOVERY - ADDTL 15 MIN: Performed by: PODIATRIST

## 2021-12-03 PROCEDURE — 82962 GLUCOSE BLOOD TEST: CPT

## 2021-12-03 PROCEDURE — 2500000003 HC RX 250 WO HCPCS: Performed by: NURSE ANESTHETIST, CERTIFIED REGISTERED

## 2021-12-03 PROCEDURE — 3700000001 HC ADD 15 MINUTES (ANESTHESIA): Performed by: PODIATRIST

## 2021-12-03 RX ORDER — LIDOCAINE HYDROCHLORIDE 20 MG/ML
INJECTION, SOLUTION EPIDURAL; INFILTRATION; INTRACAUDAL; PERINEURAL PRN
Status: DISCONTINUED | OUTPATIENT
Start: 2021-12-03 | End: 2021-12-03 | Stop reason: SDUPTHER

## 2021-12-03 RX ORDER — PROPOFOL 10 MG/ML
INJECTION, EMULSION INTRAVENOUS PRN
Status: DISCONTINUED | OUTPATIENT
Start: 2021-12-03 | End: 2021-12-03 | Stop reason: SDUPTHER

## 2021-12-03 RX ORDER — DEXAMETHASONE SODIUM PHOSPHATE 4 MG/ML
INJECTION, SOLUTION INTRA-ARTICULAR; INTRALESIONAL; INTRAMUSCULAR; INTRAVENOUS; SOFT TISSUE PRN
Status: DISCONTINUED | OUTPATIENT
Start: 2021-12-03 | End: 2021-12-03 | Stop reason: SDUPTHER

## 2021-12-03 RX ORDER — CEFAZOLIN SODIUM 2 G/100ML
2000 INJECTION, SOLUTION INTRAVENOUS ONCE
Status: COMPLETED | OUTPATIENT
Start: 2021-12-03 | End: 2021-12-03

## 2021-12-03 RX ORDER — ONDANSETRON 2 MG/ML
INJECTION INTRAMUSCULAR; INTRAVENOUS PRN
Status: DISCONTINUED | OUTPATIENT
Start: 2021-12-03 | End: 2021-12-03 | Stop reason: SDUPTHER

## 2021-12-03 RX ORDER — FENTANYL CITRATE 50 UG/ML
INJECTION, SOLUTION INTRAMUSCULAR; INTRAVENOUS PRN
Status: DISCONTINUED | OUTPATIENT
Start: 2021-12-03 | End: 2021-12-03 | Stop reason: SDUPTHER

## 2021-12-03 RX ORDER — SODIUM CHLORIDE 0.9 % (FLUSH) 0.9 %
5-40 SYRINGE (ML) INJECTION PRN
Status: DISCONTINUED | OUTPATIENT
Start: 2021-12-03 | End: 2021-12-03 | Stop reason: HOSPADM

## 2021-12-03 RX ORDER — BUPIVACAINE HYDROCHLORIDE 2.5 MG/ML
INJECTION, SOLUTION EPIDURAL; INFILTRATION; INTRACAUDAL
Status: COMPLETED | OUTPATIENT
Start: 2021-12-03 | End: 2021-12-03

## 2021-12-03 RX ORDER — SODIUM CHLORIDE 9 MG/ML
INJECTION, SOLUTION INTRAVENOUS CONTINUOUS
Status: DISCONTINUED | OUTPATIENT
Start: 2021-12-03 | End: 2021-12-03 | Stop reason: HOSPADM

## 2021-12-03 RX ADMIN — LIDOCAINE HYDROCHLORIDE 100 MG: 20 INJECTION, SOLUTION EPIDURAL; INFILTRATION; INTRACAUDAL; PERINEURAL at 07:41

## 2021-12-03 RX ADMIN — PROPOFOL 120 MG: 10 INJECTION, EMULSION INTRAVENOUS at 07:41

## 2021-12-03 RX ADMIN — FENTANYL CITRATE 50 MCG: 50 INJECTION, SOLUTION INTRAMUSCULAR; INTRAVENOUS at 07:47

## 2021-12-03 RX ADMIN — CEFAZOLIN SODIUM 2000 MG: 2 INJECTION, SOLUTION INTRAVENOUS at 07:47

## 2021-12-03 RX ADMIN — ONDANSETRON HYDROCHLORIDE 4 MG: 4 INJECTION, SOLUTION INTRAMUSCULAR; INTRAVENOUS at 07:59

## 2021-12-03 RX ADMIN — SODIUM CHLORIDE: 9 INJECTION, SOLUTION INTRAVENOUS at 06:53

## 2021-12-03 RX ADMIN — FENTANYL CITRATE 50 MCG: 50 INJECTION, SOLUTION INTRAMUSCULAR; INTRAVENOUS at 07:41

## 2021-12-03 RX ADMIN — Medication 3 ML: at 06:54

## 2021-12-03 RX ADMIN — DEXAMETHASONE SODIUM PHOSPHATE 8 MG: 4 INJECTION, SOLUTION INTRAMUSCULAR; INTRAVENOUS at 07:50

## 2021-12-03 ASSESSMENT — PAIN SCALES - GENERAL
PAINLEVEL_OUTOF10: 0
PAINLEVEL_OUTOF10: 0

## 2021-12-03 ASSESSMENT — PULMONARY FUNCTION TESTS
PIF_VALUE: 11
PIF_VALUE: 0
PIF_VALUE: 13
PIF_VALUE: 15
PIF_VALUE: 2
PIF_VALUE: 0
PIF_VALUE: 1
PIF_VALUE: -13
PIF_VALUE: 4
PIF_VALUE: -13
PIF_VALUE: 12
PIF_VALUE: 1
PIF_VALUE: 1
PIF_VALUE: 15
PIF_VALUE: 0
PIF_VALUE: 11
PIF_VALUE: 13
PIF_VALUE: 46
PIF_VALUE: 10
PIF_VALUE: 16
PIF_VALUE: 10
PIF_VALUE: 16
PIF_VALUE: 3
PIF_VALUE: 12
PIF_VALUE: 9
PIF_VALUE: 2
PIF_VALUE: 11
PIF_VALUE: 11
PIF_VALUE: 13
PIF_VALUE: 11
PIF_VALUE: 14
PIF_VALUE: 15
PIF_VALUE: 11
PIF_VALUE: 3
PIF_VALUE: 16
PIF_VALUE: 11
PIF_VALUE: 11
PIF_VALUE: 1
PIF_VALUE: 11
PIF_VALUE: 14
PIF_VALUE: -13
PIF_VALUE: 15
PIF_VALUE: 16
PIF_VALUE: 16
PIF_VALUE: 10
PIF_VALUE: 16
PIF_VALUE: 17
PIF_VALUE: 13
PIF_VALUE: 13
PIF_VALUE: 10
PIF_VALUE: 16
PIF_VALUE: -13
PIF_VALUE: 16
PIF_VALUE: -12

## 2021-12-03 ASSESSMENT — ENCOUNTER SYMPTOMS: SHORTNESS OF BREATH: 1

## 2021-12-03 ASSESSMENT — PAIN - FUNCTIONAL ASSESSMENT: PAIN_FUNCTIONAL_ASSESSMENT: 0-10

## 2021-12-03 NOTE — ANESTHESIA PRE PROCEDURE
Department of Anesthesiology  Preprocedure Note       Name:  Keaton Rosa   Age:  66 y.o.  :  1950                                          MRN:  [id number]         Date:  12/3/2021      Surgeon: Scott Ang):  Oneida Mcarthur DPM    Procedure: Procedure(s):  LEFT SECOND TOE PARTIAL AMPUTATION    Medications prior to admission:   Prior to Admission medications    Medication Sig Start Date End Date Taking?  Authorizing Provider   dilTIAZem (CARDIZEM CD) 240 MG extended release capsule Take 1 capsule by mouth daily 21  Yes Nolvia Arrieta MD   apixaban (ELIQUIS) 5 MG TABS tablet Take 5 mg by mouth 2 times daily   Yes Historical Provider, MD   traZODone (DESYREL) 50 MG tablet Take 2 tablets by mouth nightly 21  Yes Edna Sánchez MD   allopurinol (ZYLOPRIM) 100 MG tablet Take 1 tablet by mouth daily 21  Yes Edna Sánchez MD   omeprazole (PRILOSEC) 20 MG delayed release capsule Take 1 capsule by mouth daily 21  Yes Edna Sánchez MD   potassium chloride (KLOR-CON M) 10 MEQ extended release tablet Take 1 tablet by mouth daily 21  Yes Edna Sánchez MD   furosemide (LASIX) 20 MG tablet TAKE ONE TABLET BY MOUTH DAILY 10/6/21  Yes Edna Sánchez MD   montelukast (SINGULAIR) 10 MG tablet Take 1 tablet by mouth daily 21  Yes SLAVA Lopez - CNP   metFORMIN (GLUCOPHAGE) 500 MG tablet Take 1 tablet by mouth daily (with breakfast) 21  Yes Edna Sánchez MD   albuterol (PROVENTIL) (5 MG/ML) 0.5% nebulizer solution Take 1 mL by nebulization 4 times daily as needed for Wheezing 21  Yes Edna Sánchez MD   fluticasone-umeclidin-vilant (TRELEGY ELLIPTA) 100-62.5-25 MCG/INH AEPB Inhale 1 puff into the lungs daily 7/15/21  Yes SLAVA Lopez - CNP   calcium carb-cholecalciferol 600-200 MG-UNIT TABS tablet Take 1 tablet by mouth daily   Yes Historical Provider, MD       Current medications:    Current Facility-Administered Medications   Medication Dose Route Frequency Provider Last Rate Last Admin    sodium chloride flush 0.9 % injection 5-40 mL  5-40 mL IntraVENous PRN Joanne Reasons, DPM   3 mL at 12/03/21 0654    0.9 % sodium chloride infusion   IntraVENous Continuous Joanne Reasons,  mL/hr at 12/03/21 0653 New Bag at 12/03/21 0653    ceFAZolin (ANCEF) 2000 mg in dextrose 4 % 100 mL IVPB (premix)  2,000 mg IntraVENous Once Joanne Reasons, DPM           Allergies:     Allergies   Allergen Reactions    Lisinopril      Chest tightness    Simvastatin Other (See Comments)     Muscle cramps       Problem List:    Patient Active Problem List   Diagnosis Code    Atrial fibrillation ([facility]) I48.91    GERD (gastroesophageal reflux disease) K21.9    Asthma J45.909    Knee pain, bilateral M25.561, M25.562    DDD (degenerative disc disease), lumbar M51.36    Testosterone deficiency E34.9    Dilated cardiomyopathy ([facility] [address] I42.0    Gout M10.9    Hyperbilirubinemia E80.6    Mild intermittent asthma without complication H35.16    Cholelithiasis K80.20    Colonic polyp K63.5    Closed fracture of anterior lip of right acetabulum without additional fracture ([facility]) S32.411A    Closed anterior dislocation of left shoulder S43.015A    VT (ventricular tachycardia) ([facility]) I47.2    SVT (supraventricular tachycardia) ([facility]) I47.1    NSVT (nonsustained ventricular tachycardia) ([facility]) I47.2    Gait disturbance R26.9    Leg weakness, bilateral R29.898    Uncontrolled pain R52    Pre-op evaluation Z01.818    VHD (valvular heart disease) I38    IFG (impaired fasting glucose) R73.01    Diabetes mellitus type 2, controlled ([facility]) E11.9    Diabetic neuropathy, type II diabetes mellitus ([facility]) E11.40    COPD (chronic obstructive pulmonary disease) ([facility]) J44.9    Nonrheumatic aortic valve stenosis I35.0    COVID-19 virus infection U07.1    Acute respiratory failure ([facility]) J96.00    Atrial thrombosis I51.3    ENRIQUE (dyspnea on [place]       Social History:    Social History     Tobacco Use    Smoking status: Former Smoker     Packs/day: 1.00     Years: 39.00     Pack years: 39.00     Types: Cigarettes     Start date:      Quit date: 2013     Years since quittin.9    Smokeless tobacco: Never Used   Substance Use Topics    Alcohol use: Yes     Comment: occasional wine/moderate/ caffeine 2 cups of coffee                                Counseling given: Not Answered      Vital Signs (Current):   Vitals:    21 1523 21 0619   BP:  135/72   Pulse:  77   Resp:  16   Temp:  36.6 °C (97.8 °F)   TempSrc:  Temporal   SpO2:  93%   Weight: 265 lb (120.2 kg)    Height: 6' 5\" (1.956 m)                                               BP Readings from Last 3 Encounters:   21 135/72   21 116/76   21 118/64       NPO Status: Time of last liquid consumption:                         Time of last solid consumption:                         Date of last liquid consumption: 21                        Date of last solid food consumption: 21    BMI:   Wt Readings from Last 3 Encounters:   21 265 lb (120.2 kg)   21 274 lb 6.4 oz (124.5 kg)   21 271 lb (122.9 kg)     Body mass index is 31.42 kg/m².     CBC:   Lab Results   Component Value Date    WBC 5.4 11/15/2021    RBC 4.56 11/15/2021    HGB 11.7 11/15/2021    HCT 36.3 11/15/2021    MCV 79.6 11/15/2021    RDW 15.6 11/15/2021     11/15/2021       CMP:   Lab Results   Component Value Date     11/15/2021    K 4.5 11/15/2021     11/15/2021    CO2 27 11/15/2021    BUN 13 11/15/2021    CREATININE 0.9 11/15/2021    GFRAA >60 11/15/2021    GFRAA >60 2013    AGRATIO 1.9 2021    LABGLOM >60 11/15/2021    LABGLOM 76 2018    GLUCOSE 103 11/15/2021    PROT 6.7 2021    PROT 6.6 2012    CALCIUM 9.6 11/15/2021    BILITOT 0.5 2021    ALKPHOS 66 2021    AST 16 2021    ALT 10 2021 near maximal study negative for angina or ECG evidence of ischemia. Exercise tolerance is diminished, exercised for 2:30 on standard Noah   stopped for SOB and rapid ventricular rate. Appropriate hemodynamic response to exercise is noted. Signature      ------------------------------------------------------------------   Electronically signed by Meg Sanchez MD   (Interpreting physician) on 05/20/2021 at 09:59 AM   ------------------------------------------------------------------    Atrial flutter-fibrillation   -RSR(V1) -nondiagnostic. ABNORMAL RHYTHM  21  Considered low cardiac risk from toe surgery. He can hold Eliquis 48 hours before the procedure and resume it as soon as possible postoperatively as per instructions from the surgeon. Chads vascular score is 2 which makes him intermediate risk of having a stroke off anticoagulation and the risk goes up longer we hold Eliquis. Neuro/Psych:                ROS comment: etoh GI/Hepatic/Renal:   (+) GERD: well controlled, PUD, liver disease:,           Endo/Other:    (+) Diabetes, blood dyscrasia: anticoagulation therapy, arthritis:., .          Pt had no PAT visit       Abdominal:   (+) obese,           Vascular:   + DVT, . Other Findings:             Anesthesia Plan      general     ASA 4     (Chart review only 11/29/21  Needs covid testing )  Induction: intravenous. MIPS: Postoperative opioids intended and Prophylactic antiemetics administered. Anesthetic plan and risks discussed with patient. Pre Anesthesia Evaluation complete. Anesthesia plan, risks, benefits, alternatives, and personnel discussed with patient and/or legal guardian. Patient and/or legal guardian verbalized an understanding and agreed to proceed. Anesthesia plan discussed with care team members and agreed upon.   SLAVA Jones - DONATO  12/3/2021

## 2021-12-03 NOTE — PROGRESS NOTES
Pre-procedure completed. The patient and his wife denies any questions at this time. Call light within reach.   Siderails up x2

## 2021-12-03 NOTE — ANESTHESIA POSTPROCEDURE EVALUATION
Department of Anesthesiology  Postprocedure Note    Patient: Tran Lantigua  MRN: [id number]  YOB: 1950  Date of evaluation: 12/3/2021  Time:  11:48 AM     Procedure Summary     Date: 12/03/21 Room / Location: [address] [place] [address] 01 / [facility]    Anesthesia Start: 5093 Anesthesia Stop: 3488    Procedure: LEFT SECOND TOE PARTIAL AMPUTATION (Left Second Toe) Diagnosis:       Ulcer of left foot, with fat layer exposed ([address]      Type 2 diabetes mellitus with diabetic neuropathy, unspecified whether long term insulin use (HCC)      Other hammer toe(s) (acquired), left foot      (Ulcer of left foot, with fat layer exposed ([address] [L97.522] Type 2 diabetes mellitus with diabetic neuropathy, unspecified whether long term insulin use ([address] [E11.40] Other hammer toe(s) (acquired), left foot [M20.42])    Surgeons: Salo Newell DPM Responsible Provider: SLAVA Harris CRNA    Anesthesia Type: general ASA Status: 4          Anesthesia Type: general    Aarti Phase I: Aarti Score: 10    Aarti Phase II: Aarti Score: 9    Last vitals: Reviewed and per EMR flowsheets.        Anesthesia Post Evaluation    Patient location during evaluation: bedside  Patient participation: complete - patient participated  Level of consciousness: awake and alert  Pain score: 2  Airway patency: patent  Nausea & Vomiting: no nausea and no vomiting  Complications: no  Cardiovascular status: hemodynamically stable  Respiratory status: room air and spontaneous ventilation  Hydration status: stable

## 2021-12-03 NOTE — PROGRESS NOTES
Pt. Awake, alert, and oriented. Vs stable. Denies pain or nausea. Dressing to left foot is dry/intact. No drainage noted. IV infusing. SCD to RLE. Pt. Ready for d/c from PACU.

## 2021-12-03 NOTE — BRIEF OP NOTE
Brief Postoperative Note      Patient: Alanna West  YOB: 1950  MRN: [id number]    Date of Procedure: 12/3/2021    Pre-Op Diagnosis: Ulcer of left foot, with fat layer exposed ([facility] [address] [L97.522] Type 2 diabetes mellitus with diabetic neuropathy, unspecified whether long term insulin use ([facility] [address] [E11.40] Other hammer toe(s) (acquired), left foot [M20.42]    Post-Op Diagnosis: Same       Procedure(s):  LEFT SECOND TOE PARTIAL AMPUTATION    Surgeon(s):  Ishmael Canas DPM    Assistant:  * No surgical staff found *    Anesthesia: General    Estimated Blood Loss (mL): 1 ml    Complications: None    Specimens:   * No specimens in log *    Implants:  * No implants in log *      Drains: * No LDAs found *    Findings: as expected    Electronically signed by Ishmael Canas DPM on 12/3/2021 at 8:24 AM

## 2021-12-03 NOTE — PROGRESS NOTES
Pt. Is awake, alert, and oriented x 4. On room air. Vs stable. Dressing to left foot is dry/intact. No c/o pain or nausea. [facility]  Wife at bedside. Call light in reach. Pt. Ready to be d/c home.

## 2021-12-03 NOTE — ANESTHESIA PRE PROCEDURE
Department of Anesthesiology  Preprocedure Note       Name:  Nilda Mooney   Age:  66 y.o.  :  1950                                          MRN:  [id number]         Date:  12/3/2021      Surgeon: Kayleen Mederos):  Manav Chamberlain DPM    Procedure: Procedure(s):  LEFT SECOND TOE PARTIAL AMPUTATION    Medications prior to admission:   Prior to Admission medications    Medication Sig Start Date End Date Taking?  Authorizing Provider   dilTIAZem (CARDIZEM CD) 240 MG extended release capsule Take 1 capsule by mouth daily 21  Yes Carmelina Robertson MD   apixaban (ELIQUIS) 5 MG TABS tablet Take 5 mg by mouth 2 times daily   Yes Historical Provider, MD   traZODone (DESYREL) 50 MG tablet Take 2 tablets by mouth nightly 21  Yes Mallorie Bender MD   allopurinol (ZYLOPRIM) 100 MG tablet Take 1 tablet by mouth daily 21  Yes Mallorie Bender MD   omeprazole (PRILOSEC) 20 MG delayed release capsule Take 1 capsule by mouth daily 21  Yes Mallorie Bender MD   potassium chloride (KLOR-CON M) 10 MEQ extended release tablet Take 1 tablet by mouth daily 21  Yes Mallorie Bender MD   furosemide (LASIX) 20 MG tablet TAKE ONE TABLET BY MOUTH DAILY 10/6/21  Yes Mallorie Bender MD   montelukast (SINGULAIR) 10 MG tablet Take 1 tablet by mouth daily 21  Yes SLAVA Darnell - CNP   metFORMIN (GLUCOPHAGE) 500 MG tablet Take 1 tablet by mouth daily (with breakfast) 21  Yes Mallorie Bender MD   albuterol (PROVENTIL) (5 MG/ML) 0.5% nebulizer solution Take 1 mL by nebulization 4 times daily as needed for Wheezing 21  Yes Mallorie Bender MD   fluticasone-umeclidin-vilant (TRELEGY ELLIPTA) 100-62.5-25 MCG/INH AEPB Inhale 1 puff into the lungs daily 7/15/21  Yes SLAVA Darnell - CNP   calcium carb-cholecalciferol 600-200 MG-UNIT TABS tablet Take 1 tablet by mouth daily   Yes Historical Provider, MD       Current medications:    Current Facility-Administered Medications   Medication Dose Route Frequency Provider Last Rate Last Admin    sodium chloride flush 0.9 % injection 5-40 mL  5-40 mL IntraVENous PRN Aida Davis, DPM   3 mL at 12/03/21 0654    0.9 % sodium chloride infusion   IntraVENous Continuous Aidacris Davis  mL/hr at 12/03/21 0653 New Bag at 12/03/21 0653    ceFAZolin (ANCEF) 2000 mg in dextrose 4 % 100 mL IVPB (premix)  2,000 mg IntraVENous Once Linton Susan, DPM           Allergies:     Allergies   Allergen Reactions    Lisinopril      Chest tightness    Simvastatin Other (See Comments)     Muscle cramps       Problem List:    Patient Active Problem List   Diagnosis Code    Atrial fibrillation ([facility]) I48.91    GERD (gastroesophageal reflux disease) K21.9    Asthma J45.909    Knee pain, bilateral M25.561, M25.562    DDD (degenerative disc disease), lumbar M51.36    Testosterone deficiency E34.9    Dilated cardiomyopathy ([facility] [address] I42.0    Gout M10.9    Hyperbilirubinemia E80.6    Mild intermittent asthma without complication S74.04    Cholelithiasis K80.20    Colonic polyp K63.5    Closed fracture of anterior lip of right acetabulum without additional fracture ([facility]) S32.411A    Closed anterior dislocation of left shoulder S43.015A    VT (ventricular tachycardia) ([facility]) I47.2    SVT (supraventricular tachycardia) ([facility]) I47.1    NSVT (nonsustained ventricular tachycardia) ([facility]) I47.2    Gait disturbance R26.9    Leg weakness, bilateral R29.898    Uncontrolled pain R52    Pre-op evaluation Z01.818    VHD (valvular heart disease) I38    IFG (impaired fasting glucose) R73.01    Diabetes mellitus type 2, controlled ([facility]) E11.9    Diabetic neuropathy, type II diabetes mellitus ([facility]) E11.40    COPD (chronic obstructive pulmonary disease) ([facility]) J44.9    Nonrheumatic aortic valve stenosis I35.0    COVID-19 virus infection U07.1    Acute respiratory failure ([facility]) J96.00    Atrial thrombosis I51.3    ENRIQUE (dyspnea on exertion) R06.00    Arthritis of left acromioclavicular joint M19.012    Coagulopathy (HCC) D68.9    Elevated PSA R97.20       Past Medical History:        Diagnosis Date    Aortic stenosis, mild 11/08/2017    By echo 10/2016    Asthma, intrinsic     Dr Casimiro Frankel and Slick Talavera.  Atrial fibrillation ([address]     Colonic polyp 05/2009    Dr Valdez Check- also done 4/16/2012- AND 3/8/17-TUBULAR ADENOMA,-RECHECK 5YRS    COPD (chronic obstructive pulmonary disease) ([address]     noted on PFTs 6/2020    DDD (degenerative disc disease), lumbar     Diabetes mellitus type 2, controlled ([address]     Dr Keyon Onofre- optho, Dr Kye Rosenberg- podiatry    Diabetic neuropathy, type II diabetes mellitus ([address]     DECR MONOFIL SENSATION    Dilated cardiomyopathy ([address] 11/04/2015    Elevated PSA 11/02/2021    if ok w Eligio Wu we'll refer to urology    GERD (gastroesophageal reflux disease)     Gout     H/O cardiovascular stress test 12/10/2015    lexiscan-normal,EF65%    History of total right hip arthroplasty     Dr Leah Buckner 9/2018- does get antibiotic prophylaxis w dental work- Dr Marrie Cranker    Hyperlipidemia     IFG (impaired fasting glucose)     Jan 2019- a1c 6.1 but fasting glc 131.     Knee pain, bilateral     Long-term (current) use of anticoagulants     Peptic ulcer disease     Testosterone deficiency dx'd 9/2015    Ventricular tachycardia [facility])     mentioned by Dr Yue Stevenson 11/2019 note-who monitors him    VHD (valvular heart disease) 11/29/2018    Mild aortic stenosis by echo November 2018       Past Surgical History:        Procedure Laterality Date    HIP ARTHROPLASTY Right 09/12/2018    Dr Adrian Santos Right 11/2016    Dr Navya Moncada  3/08, dr Guilherme Mendez    lap assisted colon mass resection-benign    POLYPECTOMY  2/98- jaimie    colonic polypectomy    POLYPECTOMY  9/10/02- dr Neha Marroquin    colonic polypectomy    VENTRAL HERNIA REPAIR  10/08, dr Posadas Level Left 2/6/14    ORIF  Deboo       Social History:    Social History     Tobacco Use    Smoking status: Former Smoker     Packs/day: 1.00     Years: 39.00     Pack years: 39.00     Types: Cigarettes     Start date:      Quit date: 2013     Years since quittin.9    Smokeless tobacco: Never Used   Substance Use Topics    Alcohol use: Yes     Comment: occasional wine/moderate/ caffeine 2 cups of coffee                                Counseling given: Not Answered      Vital Signs (Current):   Vitals:    21 1523 21 0619   BP:  135/72   Pulse:  77   Resp:  16   Temp:  36.6 °C (97.8 °F)   TempSrc:  Temporal   SpO2:  93%   Weight: 265 lb (120.2 kg)    Height: 6' 5\" (1.956 m)                                               BP Readings from Last 3 Encounters:   21 135/72   21 116/76   21 118/64       NPO Status: Time of last liquid consumption:                         Time of last solid consumption:                         Date of last liquid consumption: 21                        Date of last solid food consumption: 21    BMI:   Wt Readings from Last 3 Encounters:   21 265 lb (120.2 kg)   21 274 lb 6.4 oz (124.5 kg)   21 271 lb (122.9 kg)     Body mass index is 31.42 kg/m².     CBC:   Lab Results   Component Value Date    WBC 5.4 11/15/2021    RBC 4.56 11/15/2021    HGB 11.7 11/15/2021    HCT 36.3 11/15/2021    MCV 79.6 11/15/2021    RDW 15.6 11/15/2021     11/15/2021       CMP:   Lab Results   Component Value Date     11/15/2021    K 4.5 11/15/2021     11/15/2021    CO2 27 11/15/2021    BUN 13 11/15/2021    CREATININE 0.9 11/15/2021    GFRAA >60 11/15/2021    GFRAA >60 2013    AGRATIO 1.9 2021    LABGLOM >60 11/15/2021    LABGLOM 76 2018    GLUCOSE 103 11/15/2021    PROT 6.7 2021    PROT 6.6 2012    CALCIUM 9.6 11/15/2021    BILITOT 0.5 2021    ALKPHOS 66 2021    AST 16 2021    ALT 10 2021 POC Tests:   Recent Labs     21  0642   POCGLU 114*       Coags:   Lab Results   Component Value Date    PROTIME 13.0 2020    PROTIME 27.7 2020    INR 1.07 2020    APTT 30.7 2020       HCG (If Applicable): No results found for: PREGTESTUR, PREGSERUM, HCG, HCGQUANT     ABGs: No results found for: PHART, PO2ART, ZAK7IAA, ZYO7ONU, BEART, Y3EWEWTF     Type & Screen (If Applicable):  No results found for: LABABO, LABRH    Drug/Infectious Status (If Applicable):  No results found for: HIV, HEPCAB    COVID-19 Screening (If Applicable):   Lab Results   Component Value Date    COVID19 NOT DETECTED 2021           Anesthesia Evaluation  Patient summary reviewed no history of anesthetic complications:   Airway:         Dental:          Pulmonary:   (+) COPD:  shortness of breath:  asthma:                           ROS comment: former smoker    Cardiovascular:  Exercise tolerance: poor (<4 METS),   (+) valvular problems/murmurs: AS, dysrhythmias: atrial fibrillation, SVT and ventricular tachycardia, ENRIQUE:,       ECG reviewed      Echocardiogram reviewed  Stress test reviewed  Cleared by cardiology           ROS comment:  Summary   Expedited imaging was used to obtain protocol images to reduce the   sonographer's exposure during COVID- pandemic. Left ventricular systolic function is normal.   Ejection fraction is visually estimated at 55%. Moderate left ventricular hypertrophy. Severe bilateral atrial enlargement. Moderately dilated right ventricle. Sclerotic, mildly stenotic aortic valve (mean P mmHg). Trace aortic regurgitation. No evidence of any pericardial effusion.       Signature      ------------------------------------------------------------------   Electronically signed by Alberto Venegas MD (Interpreting   physician) on 2020 at 03:29 PM   ------------------------------------------------------------------     Summary   Normal near maximal study negative for angina or ECG evidence of ischemia. Exercise tolerance is diminished, exercised for 2:30 on standard Noah   stopped for SOB and rapid ventricular rate. Appropriate hemodynamic response to exercise is noted. Signature      ------------------------------------------------------------------   Electronically signed by Lashay Currie MD   (Interpreting physician) on 05/20/2021 at 09:59 AM   ------------------------------------------------------------------    Atrial flutter-fibrillation   -RSR(V1) -nondiagnostic. ABNORMAL RHYTHM  21  Considered low cardiac risk from toe surgery. He can hold Eliquis 48 hours before the procedure and resume it as soon as possible postoperatively as per instructions from the surgeon. Chads vascular score is 2 which makes him intermediate risk of having a stroke off anticoagulation and the risk goes up longer we hold Eliquis. Neuro/Psych:                ROS comment: etoh GI/Hepatic/Renal:   (+) GERD: well controlled, PUD, liver disease:,           Endo/Other:    (+) Diabetes, blood dyscrasia: anticoagulation therapy, arthritis:., .          Pt had no PAT visit       Abdominal:             Vascular:   + DVT, . Other Findings:             Anesthesia Plan      MAC and general     ASA 4     (Chart review only 11/29/21  Needs covid testing )  Induction: intravenous. MIPS: Postoperative opioids intended and Prophylactic antiemetics administered.                       Nury Better, APRN - CRNA   12/3/2021

## 2021-12-03 NOTE — H&P
..The patient was counseled at length about the risks of shruti Covid-19 during their perioperative period and any recovery window from their procedure. The patient was made aware that shruti Covid-19  may worsen their prognosis for recovering from their procedure  and lend to a higher morbidity and/or mortality risk. All material risks, benefits, and reasonable alternatives including postponing the procedure were discussed. The patient does wish to proceed with the procedure at this time.

## 2021-12-03 NOTE — H&P
..Update History & Physical    The patient's History and Physical of November 9, 2021 was reviewed with the patient and I examined the patient. There was no change. The surgical site was confirmed by the patient and me. Plan: The risks, benefits, expected outcome, and alternative to the recommended procedure have been discussed with the patient. Patient understands and wants to proceed with the procedure.      Electronically signed by Cecilia Jaramillo DPM on 12/3/2021 at 7:21 AM

## 2021-12-04 NOTE — OP NOTE
Attention was then  directed to the left second toe where utilizing a skin marker to outline  the dissection, we performed this. We then elevated the tourniquet and  then, we disarticulated the region of the DIPJ. Next, I went ahead and  took the head of the middle phalanx to get a better closure. We  irrigated with copious amounts of normal sterile saline. We then closed  the region with 4-0 Vicryl and 4-0 nylon. The patient then had the  above-listed injectable. We then dressed it with Betadine-soaked  Adaptic, 4 x 4's, Kerlix, and an Ace. The patient tolerated the  procedure and anesthesia well and left the OR with vital signs stable  and vascular status intact to the left foot and second toe. The patient is to be discharged home. He is to rest and elevate. He  has Percocet for pain. He is to keep the dressing dry, clean, and  intact. He is to do strict nonweightbearing. He will have a surgical  shoe for protection, but I want him to use the crutches. I will give  him a courtesy call on Sunday to check on him and see how he is doing  and we shall go from there.         Willy Jackson DPM    D: 12/03/2021 8:44:57       T: 12/03/2021 10:02:45     [email or web address]  Job#: [id number]     Doc#: [id number]    CC:

## 2021-12-24 PROBLEM — Z01.818 PRE-OP EVALUATION: Status: RESOLVED | Noted: 2018-09-05 | Resolved: 2021-12-24

## 2022-01-10 DIAGNOSIS — J44.9 CHRONIC OBSTRUCTIVE PULMONARY DISEASE, UNSPECIFIED COPD TYPE (HCC): ICD-10-CM

## 2022-02-09 ENCOUNTER — OFFICE VISIT (OUTPATIENT)
Dept: INTERNAL MEDICINE CLINIC | Age: 72
End: 2022-02-09
Payer: MEDICARE

## 2022-02-09 VITALS
SYSTOLIC BLOOD PRESSURE: 110 MMHG | HEIGHT: 77 IN | DIASTOLIC BLOOD PRESSURE: 64 MMHG | WEIGHT: 267.13 LBS | OXYGEN SATURATION: 93 % | HEART RATE: 59 BPM | BODY MASS INDEX: 31.54 KG/M2

## 2022-02-09 DIAGNOSIS — R97.20 ELEVATED PSA: ICD-10-CM

## 2022-02-09 DIAGNOSIS — D68.9 COAGULOPATHY (HCC): ICD-10-CM

## 2022-02-09 DIAGNOSIS — E11.21 CONTROLLED TYPE 2 DIABETES MELLITUS WITH DIABETIC NEPHROPATHY, WITHOUT LONG-TERM CURRENT USE OF INSULIN (HCC): Primary | ICD-10-CM

## 2022-02-09 DIAGNOSIS — I42.0 DILATED CARDIOMYOPATHY (HCC): ICD-10-CM

## 2022-02-09 DIAGNOSIS — I48.20 CHRONIC ATRIAL FIBRILLATION (HCC): ICD-10-CM

## 2022-02-09 DIAGNOSIS — J44.9 CHRONIC OBSTRUCTIVE PULMONARY DISEASE, UNSPECIFIED COPD TYPE (HCC): ICD-10-CM

## 2022-02-09 PROCEDURE — 3046F HEMOGLOBIN A1C LEVEL >9.0%: CPT | Performed by: INTERNAL MEDICINE

## 2022-02-09 PROCEDURE — 4040F PNEUMOC VAC/ADMIN/RCVD: CPT | Performed by: INTERNAL MEDICINE

## 2022-02-09 PROCEDURE — G8484 FLU IMMUNIZE NO ADMIN: HCPCS | Performed by: INTERNAL MEDICINE

## 2022-02-09 PROCEDURE — 3023F SPIROM DOC REV: CPT | Performed by: INTERNAL MEDICINE

## 2022-02-09 PROCEDURE — 99214 OFFICE O/P EST MOD 30 MIN: CPT | Performed by: INTERNAL MEDICINE

## 2022-02-09 PROCEDURE — 1036F TOBACCO NON-USER: CPT | Performed by: INTERNAL MEDICINE

## 2022-02-09 PROCEDURE — 3017F COLORECTAL CA SCREEN DOC REV: CPT | Performed by: INTERNAL MEDICINE

## 2022-02-09 PROCEDURE — G8427 DOCREV CUR MEDS BY ELIG CLIN: HCPCS | Performed by: INTERNAL MEDICINE

## 2022-02-09 PROCEDURE — 2022F DILAT RTA XM EVC RTNOPTHY: CPT | Performed by: INTERNAL MEDICINE

## 2022-02-09 PROCEDURE — 1123F ACP DISCUSS/DSCN MKR DOCD: CPT | Performed by: INTERNAL MEDICINE

## 2022-02-09 PROCEDURE — G8417 CALC BMI ABV UP PARAM F/U: HCPCS | Performed by: INTERNAL MEDICINE

## 2022-02-09 RX ORDER — FUROSEMIDE 20 MG/1
TABLET ORAL
Qty: 90 TABLET | Refills: 1 | Status: SHIPPED | OUTPATIENT
Start: 2022-02-09 | End: 2022-04-19 | Stop reason: SDUPTHER

## 2022-02-09 NOTE — PROGRESS NOTES
Musculoskeletal:      Cervical back: Normal range of motion and neck supple. Lymphadenopathy:      Cervical: No cervical adenopathy. Skin:     General: Skin is warm and dry. Findings: No rash. Neurological:      Mental Status: He is alert. Psychiatric:         Mood and Affect: Mood normal.         ASSESSMENT:    1. Controlled type 2 diabetes mellitus with diabetic nephropathy, without long-term current use of insulin ([address]    2. Dilated cardiomyopathy (HCC)    3. Elevated PSA    4. Chronic atrial fibrillation (HCC)    5. Chronic obstructive pulmonary disease, unspecified COPD type ([address]    6. Coagulopathy ([address]        PLAN:    Royce Ely was seen today for 3 month follow-up and diabetes. Diagnoses and all orders for this visit:    Controlled type 2 diabetes mellitus with diabetic nephropathy, without long-term current use of insulin ([address] - DM relatively well controlled and will continue current regimen. Screening reviewed. See orders. -     metFORMIN (GLUCOPHAGE) 500 MG tablet; Take 1 tablet by mouth daily (with breakfast)  -     Comprehensive Metabolic Panel; Future  -     CBC Auto Differential; Future  -     Lipid Panel; Future  -     TSH without Reflex; Future    Dilated cardiomyopathy (HCC) - Pt clinically w/o evidence of cardiovascular instability, cont regimen.    -     furosemide (LASIX) 20 MG tablet; TAKE ONE TABLET BY MOUTH DAILY  -     Comprehensive Metabolic Panel; Future  -     Lipid Panel; Future    Elevated PSA- RECENT PSA ELEVATED, REFER FOR UROLOGY EVAL  -     AFL - Terry Glover MD, Urology, [place]    Chronic atrial fibrillation ([address] - Pt clinically w/o evidence of cardiovascular instability, cont regimen. -     Comprehensive Metabolic Panel; Future  -     CBC Auto Differential; Future  -     Lipid Panel; Future  -     TSH without Reflex;  Future    Chronic obstructive pulmonary disease, unspecified COPD type ([address] -COPD stable on current regiment of inhalers/meds. Coagulopathy ([facility]- ON ELIQUIS    Other orders  -     zoster recombinant adjuvanted vaccine [facility]) 50 MCG/0.5ML SUSR injection;  Inject 0.5 mLs into the muscle once for 1 dose

## 2022-02-18 LAB — DIABETIC RETINOPATHY: NEGATIVE

## 2022-04-04 DIAGNOSIS — J45.21 MILD INTERMITTENT ASTHMA WITH ACUTE EXACERBATION: ICD-10-CM

## 2022-04-06 ENCOUNTER — HOSPITAL ENCOUNTER (INPATIENT)
Age: 72
LOS: 1 days | Discharge: HOME | DRG: 871 | End: 2022-04-07
Attending: FAMILY MEDICINE | Admitting: INTERNAL MEDICINE
Payer: MEDICARE

## 2022-04-06 ENCOUNTER — APPOINTMENT (OUTPATIENT)
Dept: GENERAL RADIOLOGY | Age: 72
DRG: 871 | End: 2022-04-06
Payer: MEDICARE

## 2022-04-06 DIAGNOSIS — R05.9 COUGH: ICD-10-CM

## 2022-04-06 DIAGNOSIS — E83.42 HYPOMAGNESEMIA: ICD-10-CM

## 2022-04-06 DIAGNOSIS — I48.20 CHRONIC ATRIAL FIBRILLATION (HCC): ICD-10-CM

## 2022-04-06 DIAGNOSIS — R07.89 CHEST PRESSURE: ICD-10-CM

## 2022-04-06 DIAGNOSIS — D72.819 LEUKOPENIA, UNSPECIFIED TYPE: ICD-10-CM

## 2022-04-06 DIAGNOSIS — I50.9 CONGESTIVE HEART FAILURE, UNSPECIFIED HF CHRONICITY, UNSPECIFIED HEART FAILURE TYPE (HCC): ICD-10-CM

## 2022-04-06 DIAGNOSIS — R06.00 DYSPNEA, UNSPECIFIED TYPE: Primary | ICD-10-CM

## 2022-04-06 PROBLEM — R07.9 CHEST PAIN: Status: ACTIVE | Noted: 2022-04-06

## 2022-04-06 LAB
ADENOVIRUS DETECTION BY PCR: NOT DETECTED
ALBUMIN SERPL-MCNC: 4 GM/DL (ref 3.4–5)
ALP BLD-CCNC: 68 IU/L (ref 40–129)
ALT SERPL-CCNC: 16 U/L (ref 10–40)
ANION GAP SERPL CALCULATED.3IONS-SCNC: 11 MMOL/L (ref 4–16)
AST SERPL-CCNC: 24 IU/L (ref 15–37)
BACTERIA: NEGATIVE /HPF
BASE EXCESS MIXED: 4.5 (ref 0–1.2)
BASOPHILS ABSOLUTE: 0 K/CU MM
BASOPHILS RELATIVE PERCENT: 0.8 % (ref 0–1)
BILIRUB SERPL-MCNC: 0.6 MG/DL (ref 0–1)
BILIRUBIN URINE: NEGATIVE MG/DL
BLOOD, URINE: NEGATIVE
BORDETELLA PARAPERTUSSIS BY PCR: NOT DETECTED
BORDETELLA PERTUSSIS PCR: NOT DETECTED
BUN BLDV-MCNC: 13 MG/DL (ref 6–23)
CALCIUM SERPL-MCNC: 8.9 MG/DL (ref 8.3–10.6)
CHLAMYDOPHILA PNEUMONIA PCR: NOT DETECTED
CHLORIDE BLD-SCNC: 100 MMOL/L (ref 99–110)
CLARITY: CLEAR
CO2: 25 MMOL/L (ref 21–32)
COLOR: YELLOW
COMMENT: ABNORMAL
CORONAVIRUS 229E PCR: NOT DETECTED
CORONAVIRUS HKU1 PCR: NOT DETECTED
CORONAVIRUS NL63 PCR: NOT DETECTED
CORONAVIRUS OC43 PCR: NOT DETECTED
CREAT SERPL-MCNC: 1.1 MG/DL (ref 0.9–1.3)
DIFFERENTIAL TYPE: ABNORMAL
EOSINOPHILS ABSOLUTE: 0 K/CU MM
EOSINOPHILS RELATIVE PERCENT: 0 % (ref 0–3)
GFR AFRICAN AMERICAN: >60 ML/MIN/1.73M2
GFR NON-AFRICAN AMERICAN: >60 ML/MIN/1.73M2
GLUCOSE BLD-MCNC: 104 MG/DL (ref 70–99)
GLUCOSE BLD-MCNC: 125 MG/DL (ref 70–99)
GLUCOSE BLD-MCNC: 127 MG/DL (ref 70–99)
GLUCOSE BLD-MCNC: 137 MG/DL (ref 70–99)
GLUCOSE, URINE: NEGATIVE MG/DL
HCO3 VENOUS: 28.4 MMOL/L (ref 19–25)
HCT VFR BLD CALC: 36.3 % (ref 42–52)
HEMOGLOBIN: 11.2 GM/DL (ref 13.5–18)
HUMAN METAPNEUMOVIRUS PCR: NOT DETECTED
IMMATURE NEUTROPHIL %: 0.3 % (ref 0–0.43)
INFLUENZA A BY PCR: NOT DETECTED
INFLUENZA A H1 (2009) PCR: NOT DETECTED
INFLUENZA A H1 PANDEMIC PCR: NOT DETECTED
INFLUENZA A H3 PCR: NOT DETECTED
INFLUENZA B BY PCR: NOT DETECTED
KETONES, URINE: NEGATIVE MG/DL
LACTATE: 1.3 MMOL/L (ref 0.4–2)
LEUKOCYTE ESTERASE, URINE: NEGATIVE
LV EF: 50 %
LVEF MODALITY: NORMAL
LYMPHOCYTES ABSOLUTE: 0.3 K/CU MM
LYMPHOCYTES RELATIVE PERCENT: 6.4 % (ref 24–44)
MAGNESIUM: 1.6 MG/DL (ref 1.8–2.4)
MCH RBC QN AUTO: 25 PG (ref 27–31)
MCHC RBC AUTO-ENTMCNC: 30.9 % (ref 32–36)
MCV RBC AUTO: 81 FL (ref 78–100)
MONOCYTES ABSOLUTE: 0.5 K/CU MM
MONOCYTES RELATIVE PERCENT: 13.4 % (ref 0–4)
MYCOPLASMA PNEUMONIAE PCR: NOT DETECTED
NITRITE URINE, QUANTITATIVE: NEGATIVE
NUCLEATED RBC %: 0 %
O2 SAT, VEN: 94.8 % (ref 50–70)
PARAINFLUENZA 1 PCR: NOT DETECTED
PARAINFLUENZA 2 PCR: NOT DETECTED
PARAINFLUENZA 3 PCR: NOT DETECTED
PARAINFLUENZA 4 PCR: NOT DETECTED
PCO2, VEN: 39 MMHG (ref 38–52)
PDW BLD-RTO: 16.9 % (ref 11.7–14.9)
PH VENOUS: 7.47 (ref 7.32–7.42)
PH, URINE: 6 (ref 5–8)
PLATELET # BLD: 162 K/CU MM (ref 140–440)
PMV BLD AUTO: 8.6 FL (ref 7.5–11.1)
PO2, VEN: 93 MMHG (ref 28–48)
POTASSIUM SERPL-SCNC: 3.9 MMOL/L (ref 3.5–5.1)
PRO-BNP: 1327 PG/ML
PROCALCITONIN: 0.23
PROTEIN UA: NEGATIVE MG/DL
RAPID INFLUENZA  B AGN: NEGATIVE
RAPID INFLUENZA A AGN: NEGATIVE
RBC # BLD: 4.48 M/CU MM (ref 4.6–6.2)
RBC URINE: NORMAL /HPF (ref 0–3)
RHINOVIRUS ENTEROVIRUS PCR: NOT DETECTED
RSV PCR: NOT DETECTED
SARS-COV-2, NAAT: NOT DETECTED
SARS-COV-2: NOT DETECTED
SEGMENTED NEUTROPHILS ABSOLUTE COUNT: 3.1 K/CU MM
SEGMENTED NEUTROPHILS RELATIVE PERCENT: 79.1 % (ref 36–66)
SODIUM BLD-SCNC: 136 MMOL/L (ref 135–145)
SOURCE: NORMAL
SPECIFIC GRAVITY UA: 1.01 (ref 1–1.03)
SQUAMOUS EPITHELIAL: <1 /HPF
TOTAL IMMATURE NEUTOROPHIL: 0.01 K/CU MM
TOTAL NUCLEATED RBC: 0 K/CU MM
TOTAL PROTEIN: 6.5 GM/DL (ref 6.4–8.2)
TRICHOMONAS: NORMAL /HPF
TROPONIN T: <0.01 NG/ML
TROPONIN T: <0.01 NG/ML
TSH HIGH SENSITIVITY: 0.88 UIU/ML (ref 0.27–4.2)
UROBILINOGEN, URINE: 0.2 MG/DL (ref 0.2–1)
WBC # BLD: 3.9 K/CU MM (ref 4–10.5)
WBC UA: NORMAL /HPF (ref 0–2)

## 2022-04-06 PROCEDURE — 2580000003 HC RX 258: Performed by: NURSE PRACTITIONER

## 2022-04-06 PROCEDURE — 2500000003 HC RX 250 WO HCPCS: Performed by: INTERNAL MEDICINE

## 2022-04-06 PROCEDURE — 6360000002 HC RX W HCPCS: Performed by: PHYSICIAN ASSISTANT

## 2022-04-06 PROCEDURE — 99223 1ST HOSP IP/OBS HIGH 75: CPT | Performed by: INTERNAL MEDICINE

## 2022-04-06 PROCEDURE — 83605 ASSAY OF LACTIC ACID: CPT

## 2022-04-06 PROCEDURE — 2580000003 HC RX 258: Performed by: PHYSICIAN ASSISTANT

## 2022-04-06 PROCEDURE — 6370000000 HC RX 637 (ALT 250 FOR IP): Performed by: NURSE PRACTITIONER

## 2022-04-06 PROCEDURE — 84484 ASSAY OF TROPONIN QUANT: CPT

## 2022-04-06 PROCEDURE — 6370000000 HC RX 637 (ALT 250 FOR IP): Performed by: PHYSICIAN ASSISTANT

## 2022-04-06 PROCEDURE — 81001 URINALYSIS AUTO W/SCOPE: CPT

## 2022-04-06 PROCEDURE — 71045 X-RAY EXAM CHEST 1 VIEW: CPT

## 2022-04-06 PROCEDURE — 1200000000 HC SEMI PRIVATE

## 2022-04-06 PROCEDURE — 87804 INFLUENZA ASSAY W/OPTIC: CPT

## 2022-04-06 PROCEDURE — 87077 CULTURE AEROBIC IDENTIFY: CPT

## 2022-04-06 PROCEDURE — 83735 ASSAY OF MAGNESIUM: CPT

## 2022-04-06 PROCEDURE — 84145 PROCALCITONIN (PCT): CPT

## 2022-04-06 PROCEDURE — 94761 N-INVAS EAR/PLS OXIMETRY MLT: CPT

## 2022-04-06 PROCEDURE — 83880 ASSAY OF NATRIURETIC PEPTIDE: CPT

## 2022-04-06 PROCEDURE — 87150 DNA/RNA AMPLIFIED PROBE: CPT

## 2022-04-06 PROCEDURE — 2500000003 HC RX 250 WO HCPCS: Performed by: NURSE PRACTITIONER

## 2022-04-06 PROCEDURE — 0202U NFCT DS 22 TRGT SARS-COV-2: CPT

## 2022-04-06 PROCEDURE — 99285 EMERGENCY DEPT VISIT HI MDM: CPT

## 2022-04-06 PROCEDURE — 2700000000 HC OXYGEN THERAPY PER DAY

## 2022-04-06 PROCEDURE — 93306 TTE W/DOPPLER COMPLETE: CPT

## 2022-04-06 PROCEDURE — 87186 SC STD MICRODIL/AGAR DIL: CPT

## 2022-04-06 PROCEDURE — 6370000000 HC RX 637 (ALT 250 FOR IP): Performed by: INTERNAL MEDICINE

## 2022-04-06 PROCEDURE — 94640 AIRWAY INHALATION TREATMENT: CPT

## 2022-04-06 PROCEDURE — 93005 ELECTROCARDIOGRAM TRACING: CPT | Performed by: PHYSICIAN ASSISTANT

## 2022-04-06 PROCEDURE — 87449 NOS EACH ORGANISM AG IA: CPT

## 2022-04-06 PROCEDURE — 87040 BLOOD CULTURE FOR BACTERIA: CPT

## 2022-04-06 PROCEDURE — 87635 SARS-COV-2 COVID-19 AMP PRB: CPT

## 2022-04-06 PROCEDURE — 82805 BLOOD GASES W/O2 SATURATION: CPT

## 2022-04-06 PROCEDURE — 85025 COMPLETE CBC W/AUTO DIFF WBC: CPT

## 2022-04-06 PROCEDURE — 84443 ASSAY THYROID STIM HORMONE: CPT

## 2022-04-06 PROCEDURE — 82962 GLUCOSE BLOOD TEST: CPT

## 2022-04-06 PROCEDURE — 87899 AGENT NOS ASSAY W/OPTIC: CPT

## 2022-04-06 PROCEDURE — 80053 COMPREHEN METABOLIC PANEL: CPT

## 2022-04-06 RX ORDER — ASPIRIN 81 MG/1
81 TABLET, CHEWABLE ORAL DAILY
Status: DISCONTINUED | OUTPATIENT
Start: 2022-04-07 | End: 2022-04-07 | Stop reason: HOSPADM

## 2022-04-06 RX ORDER — ACETAMINOPHEN 325 MG/1
650 TABLET ORAL EVERY 6 HOURS PRN
Status: DISCONTINUED | OUTPATIENT
Start: 2022-04-06 | End: 2022-04-07 | Stop reason: HOSPADM

## 2022-04-06 RX ORDER — LORAZEPAM 0.5 MG/1
0.5 TABLET ORAL ONCE
Status: COMPLETED | OUTPATIENT
Start: 2022-04-06 | End: 2022-04-06

## 2022-04-06 RX ORDER — ACETAMINOPHEN 500 MG
1000 TABLET ORAL ONCE
Status: COMPLETED | OUTPATIENT
Start: 2022-04-06 | End: 2022-04-06

## 2022-04-06 RX ORDER — ALBUTEROL SULFATE 90 UG/1
2 AEROSOL, METERED RESPIRATORY (INHALATION) ONCE
Status: COMPLETED | OUTPATIENT
Start: 2022-04-06 | End: 2022-04-06

## 2022-04-06 RX ORDER — SODIUM CHLORIDE 9 MG/ML
INJECTION, SOLUTION INTRAVENOUS CONTINUOUS
Status: DISCONTINUED | OUTPATIENT
Start: 2022-04-06 | End: 2022-04-06

## 2022-04-06 RX ORDER — MONTELUKAST SODIUM 10 MG/1
10 TABLET ORAL DAILY
Status: DISCONTINUED | OUTPATIENT
Start: 2022-04-06 | End: 2022-04-07 | Stop reason: HOSPADM

## 2022-04-06 RX ORDER — DILTIAZEM HYDROCHLORIDE 240 MG/1
240 CAPSULE, COATED, EXTENDED RELEASE ORAL DAILY
Status: DISCONTINUED | OUTPATIENT
Start: 2022-04-06 | End: 2022-04-07

## 2022-04-06 RX ORDER — FUROSEMIDE 20 MG/1
20 TABLET ORAL DAILY
Status: DISCONTINUED | OUTPATIENT
Start: 2022-04-06 | End: 2022-04-06

## 2022-04-06 RX ORDER — SODIUM CHLORIDE 9 MG/ML
INJECTION, SOLUTION INTRAVENOUS PRN
Status: DISCONTINUED | OUTPATIENT
Start: 2022-04-06 | End: 2022-04-07 | Stop reason: HOSPADM

## 2022-04-06 RX ORDER — MAGNESIUM SULFATE IN WATER 40 MG/ML
2000 INJECTION, SOLUTION INTRAVENOUS ONCE
Status: COMPLETED | OUTPATIENT
Start: 2022-04-06 | End: 2022-04-06

## 2022-04-06 RX ORDER — MAGNESIUM SULFATE IN WATER 40 MG/ML
2000 INJECTION, SOLUTION INTRAVENOUS PRN
Status: DISCONTINUED | OUTPATIENT
Start: 2022-04-06 | End: 2022-04-07 | Stop reason: HOSPADM

## 2022-04-06 RX ORDER — POTASSIUM CHLORIDE 7.45 MG/ML
10 INJECTION INTRAVENOUS PRN
Status: DISCONTINUED | OUTPATIENT
Start: 2022-04-06 | End: 2022-04-07 | Stop reason: HOSPADM

## 2022-04-06 RX ORDER — PANTOPRAZOLE SODIUM 40 MG/1
40 TABLET, DELAYED RELEASE ORAL
Status: DISCONTINUED | OUTPATIENT
Start: 2022-04-07 | End: 2022-04-07 | Stop reason: HOSPADM

## 2022-04-06 RX ORDER — POLYETHYLENE GLYCOL 3350 17 G/17G
17 POWDER, FOR SOLUTION ORAL DAILY PRN
Status: DISCONTINUED | OUTPATIENT
Start: 2022-04-06 | End: 2022-04-07 | Stop reason: HOSPADM

## 2022-04-06 RX ORDER — 0.9 % SODIUM CHLORIDE 0.9 %
500 INTRAVENOUS SOLUTION INTRAVENOUS ONCE
Status: COMPLETED | OUTPATIENT
Start: 2022-04-06 | End: 2022-04-06

## 2022-04-06 RX ORDER — NITROGLYCERIN 0.4 MG/1
0.4 TABLET SUBLINGUAL EVERY 5 MIN PRN
Status: DISCONTINUED | OUTPATIENT
Start: 2022-04-06 | End: 2022-04-07 | Stop reason: HOSPADM

## 2022-04-06 RX ORDER — ALLOPURINOL 100 MG/1
100 TABLET ORAL DAILY
Status: DISCONTINUED | OUTPATIENT
Start: 2022-04-06 | End: 2022-04-07 | Stop reason: HOSPADM

## 2022-04-06 RX ORDER — IPRATROPIUM BROMIDE AND ALBUTEROL SULFATE 2.5; .5 MG/3ML; MG/3ML
1 SOLUTION RESPIRATORY (INHALATION)
Status: DISCONTINUED | OUTPATIENT
Start: 2022-04-06 | End: 2022-04-07 | Stop reason: HOSPADM

## 2022-04-06 RX ORDER — SODIUM CHLORIDE 9 MG/ML
INJECTION, SOLUTION INTRAVENOUS CONTINUOUS
Status: DISPENSED | OUTPATIENT
Start: 2022-04-06 | End: 2022-04-06

## 2022-04-06 RX ORDER — DILTIAZEM HYDROCHLORIDE 240 MG/1
240 CAPSULE, COATED, EXTENDED RELEASE ORAL DAILY
Status: DISCONTINUED | OUTPATIENT
Start: 2022-04-06 | End: 2022-04-06

## 2022-04-06 RX ORDER — ACETAMINOPHEN 650 MG/1
650 SUPPOSITORY RECTAL EVERY 6 HOURS PRN
Status: DISCONTINUED | OUTPATIENT
Start: 2022-04-06 | End: 2022-04-07 | Stop reason: HOSPADM

## 2022-04-06 RX ORDER — SODIUM CHLORIDE 0.9 % (FLUSH) 0.9 %
5-40 SYRINGE (ML) INJECTION PRN
Status: DISCONTINUED | OUTPATIENT
Start: 2022-04-06 | End: 2022-04-07 | Stop reason: HOSPADM

## 2022-04-06 RX ORDER — SODIUM CHLORIDE 0.9 % (FLUSH) 0.9 %
5-40 SYRINGE (ML) INJECTION EVERY 12 HOURS SCHEDULED
Status: DISCONTINUED | OUTPATIENT
Start: 2022-04-06 | End: 2022-04-07 | Stop reason: HOSPADM

## 2022-04-06 RX ADMIN — DOXYCYCLINE 100 MG: 100 INJECTION, POWDER, LYOPHILIZED, FOR SOLUTION INTRAVENOUS at 16:45

## 2022-04-06 RX ADMIN — AZITHROMYCIN MONOHYDRATE 500 MG: 500 INJECTION, POWDER, LYOPHILIZED, FOR SOLUTION INTRAVENOUS at 16:56

## 2022-04-06 RX ADMIN — IPRATROPIUM BROMIDE AND ALBUTEROL SULFATE 1 AMPULE: .5; 3 SOLUTION RESPIRATORY (INHALATION) at 19:25

## 2022-04-06 RX ADMIN — SODIUM CHLORIDE: 9 INJECTION, SOLUTION INTRAVENOUS at 11:31

## 2022-04-06 RX ADMIN — DEXTROSE MONOHYDRATE 5 MG/HR: 50 INJECTION, SOLUTION INTRAVENOUS at 18:09

## 2022-04-06 RX ADMIN — ALBUTEROL SULFATE 2 PUFF: 90 AEROSOL, METERED RESPIRATORY (INHALATION) at 08:15

## 2022-04-06 RX ADMIN — LORAZEPAM 0.5 MG: 0.5 TABLET ORAL at 20:22

## 2022-04-06 RX ADMIN — APIXABAN 5 MG: 5 TABLET, FILM COATED ORAL at 20:22

## 2022-04-06 RX ADMIN — ACETAMINOPHEN 1000 MG: 500 TABLET ORAL at 09:00

## 2022-04-06 RX ADMIN — IPRATROPIUM BROMIDE AND ALBUTEROL SULFATE 1 AMPULE: .5; 3 SOLUTION RESPIRATORY (INHALATION) at 16:07

## 2022-04-06 RX ADMIN — SODIUM CHLORIDE 500 ML: 9 INJECTION, SOLUTION INTRAVENOUS at 09:01

## 2022-04-06 RX ADMIN — CEFTRIAXONE 1000 MG: 1 INJECTION, POWDER, FOR SOLUTION INTRAMUSCULAR; INTRAVENOUS at 11:36

## 2022-04-06 RX ADMIN — ACETAMINOPHEN 650 MG: 325 TABLET ORAL at 18:06

## 2022-04-06 RX ADMIN — MONTELUKAST 10 MG: 10 TABLET, FILM COATED ORAL at 18:06

## 2022-04-06 RX ADMIN — MAGNESIUM SULFATE HEPTAHYDRATE 2000 MG: 2 INJECTION, SOLUTION INTRAVENOUS at 11:08

## 2022-04-06 RX ADMIN — SODIUM CHLORIDE, PRESERVATIVE FREE 10 ML: 5 INJECTION INTRAVENOUS at 13:45

## 2022-04-06 RX ADMIN — DOXYCYCLINE 100 MG: 100 INJECTION, POWDER, LYOPHILIZED, FOR SOLUTION INTRAVENOUS at 23:20

## 2022-04-06 RX ADMIN — Medication 2 PUFF: at 08:15

## 2022-04-06 ASSESSMENT — PAIN SCALES - GENERAL
PAINLEVEL_OUTOF10: 0
PAINLEVEL_OUTOF10: 0
PAINLEVEL_OUTOF10: 5
PAINLEVEL_OUTOF10: 0

## 2022-04-06 ASSESSMENT — ENCOUNTER SYMPTOMS
COUGH: 1
VOMITING: 0
DIARRHEA: 0
CHEST TIGHTNESS: 0
ABDOMINAL PAIN: 0
SHORTNESS OF BREATH: 1

## 2022-04-06 NOTE — ED PROVIDER NOTES
[place]      PCP: Liudmila Barone MD    CHIEF COMPLAINT    Chief Complaint   Patient presents with    Shortness of Breath     on 2+ O2         Of note, this patient was not evaluated by attending physician, attending physician was available for consultation. HPI    Cecilia Wan is a 66 y.o. male who presents to the emergency department today with shortness of breath and chest pressure. He states developed over the last 48 hours worsening last night and this morning. Patient states that he does wear oxygen at night, he has history of COPD, atrial fibrillation, states that his shortness of breath intensified. States he has had some fevers and chills, generalized malaise. He states he had a mild cough. No significant wheezing. Has had his flu vaccination, has had COVID vaccines, does not state that he is been around anyone ill. REVIEW OF SYSTEMS    Constitutional: See HPI  HENT:  Denies sore throat or ear pain   Cardiovascular: Admits chest pressure, no palpitations, no obvious syncope  Respiratory:  See HPI. GI:  Denies abdominal pain, nausea, vomiting, or diarrhea  :  Denies any urinary symptoms. Musculoskeletal:  Denies back pain,   Skin:  Denies rash  Neurologic:  Denies headache, focal weakness or sensory changes   Endocrine:  Denies polyuria or polydypsia   Lymphatic:  Denies swollen glands     All other review of systems are negative  See HPI and nursing notes for additional information       PAST MEDICAL & SURGICAL HISTORY    Past Medical History:   Diagnosis Date    Aortic stenosis, mild 11/08/2017    By echo 10/2016    Asthma, intrinsic     Dr Buster Martinez and Jayesh Keating.     Atrial fibrillation ([address]     Colonic polyp 05/2009    Dr Bebe Garibay- also done 4/16/2012- AND 3/8/17-TUBULAR ADENOMA,-RECHECK 5YRS    COPD (chronic obstructive pulmonary disease) ([address]     noted on PFTs 6/2020    DDD (degenerative disc disease), lumbar     Diabetes mellitus type 2, controlled [facility])     Dr Jose Wheatley- optho, Dr Sanam Fleming- podiatry    Diabetic neuropathy, type II diabetes mellitus ([facility]     DECR MONOFIL SENSATION    Dilated cardiomyopathy ([facility] 11/04/2015    Elevated PSA 11/02/2021    Dr Deidre Lopez    GERD (gastroesophageal reflux disease)     Gout     H/O cardiovascular stress test 12/10/2015    lexiscan-normal,EF65%    History of total right hip arthroplasty     Dr Jessica Sanchez 9/2018- does get antibiotic prophylaxis w dental work- Dr Sukhi Velasquez    Hyperlipidemia     IFG (impaired fasting glucose)     Jan 2019- a1c 6.1 but fasting glc 131.  Knee pain, bilateral     Long-term (current) use of anticoagulants     Peptic ulcer disease     Testosterone deficiency dx'd 9/2015    Ventricular tachycardia ([facility]     mentioned by Dr Disha Fernandes 11/2019 note-who monitors him    VHD (valvular heart disease) 11/29/2018    Mild aortic stenosis by echo November 2018     Past Surgical History:   Procedure Laterality Date    HIP ARTHROPLASTY Right 09/12/2018    Dr Jyoti Thompson Right 11/2016    Dr Vanessa Okeefe  3/08, dr Pedro Shankar    lap assisted colon mass resection-benign    POLYPECTOMY  2/98- jaimie    colonic polypectomy    POLYPECTOMY  9/10/02- dr Yared Nicholas    colonic polypectomy    TOE AMPUTATION Left 12/03/2021    LEFT SEC[facility] by Dr. Adithya Vargas Left 12/03/2021    partial to L second, Dr Sanam Fleming.     TOE AMPUTATION Left 12/3/2021    LEFT SECOND TOE PARTIAL AMPUTATION performed by Marlene Frederick DPM at [place] [address]  10/08, dr Bean Davis Left 02/06/2014    [facility] Dr Shanna Green    Current Outpatient Rx   Medication Sig Dispense Refill    albuterol (PROVENTIL) (5 MG/ML) 0.5% nebulizer solution Take 1 mL by nebulization 4 times daily as needed for Wheezing 120 each 3    metFORMIN (GLUCOPHAGE) 500 MG tablet Take 1 tablet by mouth daily (with breakfast) 90 tablet 1    furosemide (LASIX) 20 MG tablet TAKE ONE TABLET BY MOUTH DAILY 90 tablet 1    Dulaglutide (TRULICITY SC) Inject into the skin      fluticasone-umeclidin-vilant (TRELEGY ELLIPTA) 100-62.5-25 MCG/INH AEPB Inhale 1 puff into the lungs daily 1 each 5    dilTIAZem (CARDIZEM CD) 240 MG extended release capsule Take 1 capsule by mouth daily 90 capsule 3    apixaban (ELIQUIS) 5 MG TABS tablet Take 5 mg by mouth 2 times daily      traZODone (DESYREL) 50 MG tablet Take 2 tablets by mouth nightly 180 tablet 1    allopurinol (ZYLOPRIM) 100 MG tablet Take 1 tablet by mouth daily 90 tablet 1    omeprazole (PRILOSEC) 20 MG delayed release capsule Take 1 capsule by mouth daily 90 capsule 1    potassium chloride (KLOR-CON M) 10 MEQ extended release tablet Take 1 tablet by mouth daily 90 tablet 1    montelukast (SINGULAIR) 10 MG tablet Take 1 tablet by mouth daily 90 tablet 3    calcium carb-cholecalciferol 600-200 MG-UNIT TABS tablet Take 1 tablet by mouth daily         ALLERGIES    Allergies   Allergen Reactions    Lisinopril      Chest tightness    Simvastatin Other (See Comments)     Muscle cramps       SOCIAL & FAMILY HISTORY    Social History     Socioeconomic History    Marital status:      Spouse name: Not on file    Number of children: Not on file    Years of education: Not on file    Highest education level: Not on file   Occupational History    Occupation: [employer]   Tobacco Use    Smoking status: Former Smoker     Packs/day: 1.00     Years: 39.00     Pack years: 39.00     Types: Cigarettes     Start date:      Quit date: 2013     Years since quittin.2    Smokeless tobacco: Never Used   Vaping Use    Vaping Use: Never used   Substance and Sexual Activity    Alcohol use: Yes     Comment: occasional wine/moderate/ caffeine 2 cups of coffee    Drug use: No    Sexual activity: Yes     Partners: Female   Other Topics Concern    Not on file   Social History Narrative Exercise:    Seat Belt :     Social Determinants of Health     Financial Resource Strain: Low Risk     Difficulty of Paying Living Expenses: Not hard at all   Food Insecurity: No Food Insecurity    Worried About Running Out of Food in the Last Year: Never true    Miquel of Food in the Last Year: Never true   Transportation Needs:     Lack of Transportation (Medical): Not on file    Lack of Transportation (Non-Medical):  Not on file   Physical Activity:     Days of Exercise per Week: Not on file    Minutes of Exercise per Session: Not on file   Stress:     Feeling of Stress : Not on file   Social Connections:     Frequency of Communication with Friends and Family: Not on file    Frequency of Social Gatherings with Friends and Family: Not on file    Attends Restoration Services: Not on file    Active Member of [address] [place] [address] Cultural[employer] or Organizations: Not on file    Attends Club or Organization Meetings: Not on file    Marital Status: Not on file   Intimate Partner Violence:     Fear of Current or Ex-Partner: Not on file    Emotionally Abused: Not on file    Physically Abused: Not on file    Sexually Abused: Not on file   Housing Stability:     Unable to Pay for Housing in the Last Year: Not on file    Number of [place] in the Last Year: Not on file    Unstable Housing in the Last Year: Not on file     Family History   Problem Relation Age of Onset    Hypertension Mother     High Blood Pressure Mother     Other Mother         lung problems    Other Father         CHF, A. Fib    Hypotension Father     Diabetes Father     No Known Problems Sister     Atrial Fibrillation Brother     Atrial Fibrillation Brother     No Known Problems Sister     No Known Problems Sister      PHYSICAL EXAM    VITAL SIGNS: /68   Pulse 110   Temp 101.5 °F (38.6 °C) (Oral)   Resp 20   Ht 6' 5\" (1.956 m)   Wt 265 lb (120.2 kg)   SpO2 95%   BMI 31.42 kg/m²    Constitutional:  Well developed, well nourished, no acute distress   HENT:  Atraumatic, moist mucus membranes  Neck/Lymphatics: supple, no JVD, no swollen nodes  Respiratory: On oxygen per nasal cannula, nonlabored breathing. Rate 20. Lungs lung sounds diminished, no significant rhonchi rales, wheezing. No adventitious sounds, no retractions   Cardiovascular:  Rate tachycardic, regularly irregular Rhythm,  no murmurs/rubs/gallops. No carotid bruits or murmurs heard in carotids. No JVD  GI:  Soft, nontender, normal bowel sounds  Musculoskeletal:    There is no edema, asymmetry, or calf / thigh tenderness bilaterally. No cyanosis. No cool or pale-appearing limb. Distal cap refill and pulses intact bilateral upper and lower extremities  Bilateral upper and lower extremity ROM intact without pain or obvious deficit  Integument:  Skin is warm and dry, no petechiae   Neurologic:  Alert & oriented, no slurred speech  Psych: Pleasant affect, no hallucinations      EKG    See supervising physician note for EKG interpretation.     LABS:  Results for orders placed or performed during the hospital encounter of 04/06/22   COVID-19, Rapid    Specimen: Nasopharyngeal   Result Value Ref Range    Source THROAT     SARS-CoV-2, NAAT NOT DETECTED NOT DETECTED   Rapid Flu Swab    Specimen: Nasopharyngeal   Result Value Ref Range    Rapid Influenza A Ag NEGATIVE NEGATIVE    Rapid Influenza B Ag NEGATIVE NEGATIVE   CBC with Auto Differential   Result Value Ref Range    WBC 3.9 (L) 4.0 - 10.5 K/CU MM    RBC 4.48 (L) 4.6 - 6.2 M/CU MM    Hemoglobin 11.2 (L) 13.5 - 18.0 GM/DL    Hematocrit 36.3 (L) 42 - 52 %    MCV 81.0 78 - 100 FL    MCH 25.0 (L) 27 - 31 PG    MCHC 30.9 (L) 32.0 - 36.0 %    RDW 16.9 (H) 11.7 - 14.9 %    Platelets 892 994 - 626 K/CU MM    MPV 8.6 7.5 - 11.1 FL    Differential Type AUTOMATED DIFFERENTIAL     Segs Relative 79.1 (H) 36 - 66 %    Lymphocytes % 6.4 (L) 24 - 44 %    Monocytes % 13.4 (H) 0 - 4 %    Eosinophils % 0.0 0 - 3 %    Basophils % 0.8 0 - 1 % Segs Absolute 3.1 K/CU MM    Lymphocytes Absolute 0.3 K/CU MM    Monocytes Absolute 0.5 K/CU MM    Eosinophils Absolute 0.0 K/CU MM    Basophils Absolute 0.0 K/CU MM    Nucleated RBC % 0.0 %    Total Nucleated RBC 0.0 K/CU MM    Total Immature Neutrophil 0.01 K/CU MM    Immature Neutrophil % 0.3 0 - 0.43 %   Comprehensive Metabolic Panel   Result Value Ref Range    Sodium 136 135 - 145 MMOL/L    Potassium 3.9 3.5 - 5.1 MMOL/L    Chloride 100 99 - 110 mMol/L    CO2 25 21 - 32 MMOL/L    BUN 13 6 - 23 MG/DL    CREATININE 1.1 0.9 - 1.3 MG/DL    Glucose 125 (H) 70 - 99 MG/DL    Calcium 8.9 8.3 - 10.6 MG/DL    Albumin 4.0 3.4 - 5.0 GM/DL    Total Protein 6.5 6.4 - 8.2 GM/DL    Total Bilirubin 0.6 0.0 - 1.0 MG/DL    ALT 16 10 - 40 U/L    AST 24 15 - 37 IU/L    Alkaline Phosphatase 68 40 - 129 IU/L    GFR Non-African American >60 >60 mL/min/1.73m2    GFR African American >60 >60 mL/min/1.73m2    Anion Gap 11 4 - 16   Troponin   Result Value Ref Range    Troponin T <0.010 <0.01 NG/ML   Brain Natriuretic Peptide   Result Value Ref Range    Pro-BNP 1,327 (H) <300 PG/ML   Lactic Acid   Result Value Ref Range    Lactate 1.3 0.4 - 2.0 mMOL/L   Blood Gas, Venous   Result Value Ref Range    pH, Joseph 7.47 (H) 7.32 - 7.42    pCO2, Joseph 39 38 - 52 mmHG    pO2, Joseph 93 (H) 28 - 48 mmHG    Base Exc, Mixed 4.5 (H) 0 - 1.2    HCO3, Venous 28.4 (H) 19 - 25 MMOL/L    O2 Sat, Joseph 94.8 (H) 50 - 70 %    Comment BG    Urinalysis   Result Value Ref Range    Color, UA YELLOW YELLOW    Clarity, UA CLEAR CLEAR    Glucose, Urine NEGATIVE NEGATIVE MG/DL    Bilirubin Urine NEGATIVE NEGATIVE MG/DL    Ketones, Urine NEGATIVE NEGATIVE MG/DL    Specific Gravity, UA 1.015 1.001 - 1.035    Blood, Urine NEGATIVE NEGATIVE    pH, Urine 6.0 5.0 - 8.0    Protein, UA NEGATIVE NEGATIVE MG/DL    Urobilinogen, Urine 0.2 0.2 - 1.0 MG/DL    Nitrite Urine, Quantitative NEGATIVE NEGATIVE    Leukocyte Esterase, Urine NEGATIVE NEGATIVE    RBC, UA NONE SEEN 0 - 3 /HPF    WBC, UA NONE SEEN 0 - 2 /HPF    Bacteria, UA NEGATIVE NEGATIVE /HPF    Squam Epithel, UA <1 /HPF    Trichomonas, UA NONE SEEN NONE SEEN /HPF   Procalcitonin   Result Value Ref Range    Procalcitonin 0.232    Magnesium   Result Value Ref Range    Magnesium 1.6 (L) 1.8 - 2.4 mg/dl   EKG 12 Lead   Result Value Ref Range    Ventricular Rate 129 BPM    Atrial Rate 147 BPM    QRS Duration 92 ms    Q-T Interval 290 ms    QTc Calculation (Bazett) 424 ms    R Axis 80 degrees    T Axis 36 degrees    Diagnosis       Atrial fibrillation with rapid ventricular response with premature ventricular or aberrantly conducted complexes  Abnormal ECG  When compared with ECG of 27-DEC-2020 04:00,  Vent. rate has increased BY  76 BPM         RADIOLOGY/PROCEDURES    XR CHEST PORTABLE    Result Date: 4/6/2022  EXAMINATION: ONE XRAY VIEW OF THE CHEST 4/6/2022 8:21 am COMPARISON: 11/15/2021 HISTORY: ORDERING SYSTEM PROVIDED HISTORY: shortness of breath TECHNOLOGIST PROVIDED HISTORY: Reason for exam:->shortness of breath Reason for Exam: shortness of breath FINDINGS: The cardiac silhouette is enlarged. Mediastinal contours are stable. Pulmonary vascular congestion is unchanged. No focal lung infiltrate. No pleural effusion or pneumothorax. The visualized osseous structures are unremarkable. 1. Stable pulmonary vascular congestion. ED COURSE & MEDICAL DECISION MAKING      Patient presents as above. Emergent etiologies considered. Patient seen and examined. Patient presenting with worsening shortness of breath, fever, generalized malaise, cough. States it intensified earlier this morning prompting him department valuation. Overall he is in no obvious distress. He is on oxygen per nasal cannula. No significant labored breathing. Patient does have a fever of 101.5 °F, he has tachycardic rate and appears to be in A. fib with RVR, most likely secondary to his fever and his illness.   Will give him Tylenol, initiate fluid given inhaler. Broad work-up initiated. Patient was found to be in A. fib with RVR, this could be secondary to his fever but he was given Tylenol and fluids and his heart rate did improve. He was not given any antiarrhythmic drugs at this point. He did have elevated BNP and signs of pulmonary vascular congestion on his x-ray, he does have a history of dilated cardiomyopathy which I do feel CHF is likely causing some of his shortness of breath. No elevation of his troponin or any significant ischemic changes found. No signs of underlying pneumonia but with his leukopenia of his cough his fever will be really treated with broad-spectrum antibiotics. Patient also had low magnesium, this was supplemented. At this point secondary the patient's oxygen requirement, fever, heart failure picture as well as his ongoing chest pressure we will look to admit for further evaluation and treatment. Was admitted to the medicine team in stable condition. Vital signs and nursing notes reviewed during ED course. All pertinent Lab data and radiographic results reviewed with patient at bedside. The patient and/or the family were informed of the results of any tests/labs/imaging, the treatment plan, and time was allotted to answer questions. Clinical  IMPRESSION    1. Dyspnea, unspecified type    2. Leukopenia, unspecified type    3. Congestive heart failure, unspecified HF chronicity, unspecified heart failure type ([address]    4. Chronic atrial fibrillation (HCC)    5. Cough    6. Chest pressure    7. Hypomagnesemia       Comment: Please note this report has been produced using speech recognition software and may contain errors related to that system including errors in grammar, punctuation, and spelling, as well as words and phrases that may be inappropriate. If there are any questions or concerns please feel free to contact the dictating provider for clarification.                         CATRACHO Dupree  04/06/22 [address]

## 2022-04-06 NOTE — CONSULTS
[facility]  Nirali Harris  Phone: [phone]    Fax [phone]                  Elvin Worley MD, Rebecca Mello MD, Dominic Molina MD, MD Yolie John MD Susanne Plume, MD Dominga Renshaw, MD Delmon Mylar, APRN      Zayda Lopez, APRN  Vandana Roy, APRN    Angelica Puentes, APRN  Masood Harvey PA-C    CARDIOLOGY CONSULT NOTE     Reason for consultation: Chest pain/stress test?    Primary care physician: Kymberly Isbell MD      Thank you for the consult. Chief Complaints :  Chief Complaint   Patient presents with    Shortness of Breath     on 2+ O2        History of present illness:Og is a 66 y. o.year old  male with a past medical history of chronic atrial fibrillation, type 2 diabetes mellitus, dilated cardiomyopathy, mild aortic stenosis, COPD. [facility] Patient presents with increasing dyspnea on exertion, fever, and cough. Patient stated that he began to experience symptoms starting yesterday afternoon. He does have baseline dyspnea on exertion and requires 2.5 L of oxygen at home, however it is usually only when he walks up a flight of stairs. When he first arrived in the emergency department he is also expressing some chest tightness in the upper portion of his chest.  The pain did not radiate anywhere and was alleviated with inhalers. Patient follows with Dr. Florina Turk [facility]. Had a treadmill stress test in May 2021 which revealed decreased exercise capacity and elevated heart rate but did not show any signs of ischemia. No need for further ischemic work-up at that time. Last echo in December 2020 revealed ejection fraction of 50% with moderate LVH and severe bilateral atrial enlargement, without any signs of significant valvular heart disease.     Patient was noted to have a magnesium of 1.6 received IV mag sulfate, potassium of 3.9, troponin negative x2, BNP of 1327, TSH within flush 0.9 % injection 5-40 mL, 2 times per day  sodium chloride flush 0.9 % injection 5-40 mL, PRN  0.9 % sodium chloride infusion, PRN  acetaminophen (TYLENOL) tablet 650 mg, Q6H PRN   Or  acetaminophen (TYLENOL) suppository 650 mg, Q6H PRN  polyethylene glycol (GLYCOLAX) packet 17 g, Daily PRN  [START ON 4/7/2022] aspirin chewable tablet 81 mg, Daily  potassium chloride 10 mEq/100 mL IVPB (Peripheral Line), PRN  magnesium sulfate 2000 mg in 50 mL IVPB premix, PRN  nitroGLYCERIN (NITROSTAT) SL tablet 0.4 mg, Q5 Min PRN  allopurinol (ZYLOPRIM) tablet 100 mg, Daily  apixaban (ELIQUIS) tablet 5 mg, BID  calcium-cholecalciferol 500-200 MG-UNIT per tablet 1 tablet, Daily  montelukast (SINGULAIR) tablet 10 mg, Daily  [START ON 4/7/2022] pantoprazole (PROTONIX) tablet 40 mg, QAM AC  doxycycline (VIBRAMYCIN) 100 mg in dextrose 5 % 100 mL IVPB, Q12H  insulin lispro (HUMALOG) injection vial 0-6 Units, TID WC  insulin lispro (HUMALOG) injection vial 0-3 Units, Nightly  0.9 % sodium chloride infusion, Continuous  ipratropium-albuterol (DUONEB) nebulizer solution 1 ampule, Q4H WA  dilTIAZem (CARDIZEM CD) extended release capsule 240 mg, Daily      Current Facility-Administered Medications   Medication Dose Route Frequency Provider Last Rate Last Admin    magnesium sulfate 2000 mg in 50 mL IVPB premix  2,000 mg IntraVENous Once CATRACHO Al 25 mL/hr at 04/06/22 1108 2,000 mg at 04/06/22 1108    azithromycin (ZITHROMAX) 500 mg in dextrose 5 % 250 mL IVPB  500 mg IntraVENous Once CATRACHO Flores        sodium chloride flush 0.9 % injection 5-40 mL  5-40 mL IntraVENous 2 times per day SLAVA Silverio CNP        sodium chloride flush 0.9 % injection 5-40 mL  5-40 mL IntraVENous PRN SLAVA Silverio CNP        0.9 % sodium chloride infusion   IntraVENous PRN SLAVA Silverio CNP        acetaminophen (TYLENOL) tablet 650 mg  650 mg Oral Q6H PRN SLAVA Silverio CNP        Or  albuterol (PROVENTIL) (5 MG/ML) 0.5% nebulizer solution Take 1 mL by nebulization 4 times daily as needed for Wheezing 120 each 3    metFORMIN (GLUCOPHAGE) 500 MG tablet Take 1 tablet by mouth daily (with breakfast) 90 tablet 1    furosemide (LASIX) 20 MG tablet TAKE ONE TABLET BY MOUTH DAILY 90 tablet 1    Dulaglutide (TRULICITY SC) Inject into the skin      fluticasone-umeclidin-vilant (TRELEGY ELLIPTA) 100-62.5-25 MCG/INH AEPB Inhale 1 puff into the lungs daily 1 each 5    dilTIAZem (CARDIZEM CD) 240 MG extended release capsule Take 1 capsule by mouth daily 90 capsule 3    apixaban (ELIQUIS) 5 MG TABS tablet Take 5 mg by mouth 2 times daily      traZODone (DESYREL) 50 MG tablet Take 2 tablets by mouth nightly 180 tablet 1    allopurinol (ZYLOPRIM) 100 MG tablet Take 1 tablet by mouth daily 90 tablet 1    omeprazole (PRILOSEC) 20 MG delayed release capsule Take 1 capsule by mouth daily 90 capsule 1    potassium chloride (KLOR-CON M) 10 MEQ extended release tablet Take 1 tablet by mouth daily 90 tablet 1    montelukast (SINGULAIR) 10 MG tablet Take 1 tablet by mouth daily 90 tablet 3    calcium carb-cholecalciferol 600-200 MG-UNIT TABS tablet Take 1 tablet by mouth daily         Review of Systems   Constitutional: Positive for diaphoresis, fatigue and fever. Negative for unexpected weight change. HENT: Negative. Eyes: Negative for visual disturbance. Respiratory: Positive for cough and shortness of breath ( On exertion, worse than baseline). Negative for chest tightness. Cardiovascular: Negative for chest pain ( Was having chest pain, relieved with inhalers), palpitations and leg swelling. Gastrointestinal: Negative for abdominal pain, diarrhea and vomiting. Endocrine: Negative for cold intolerance and heat intolerance. Musculoskeletal: Negative. Skin: Negative for pallor and rash. Neurological: Negative for dizziness, syncope, light-headedness and headaches.    Hematological: Does not bruise/bleed easily. Psychiatric/Behavioral: Negative for dysphoric mood. The patient is not nervous/anxious. Physical Examination:    Vitals:    04/06/22 1002 04/06/22 1032 04/06/22 1051 04/06/22 1132   BP: (!) 112/53 (!) 99/59  105/63   Pulse: 94 85 77 78   Resp: 16 16 16 14   Temp:       TempSrc:       SpO2: 97% 96% 95% 96%   Weight:       Height:           Physical Exam  Constitutional:       General: He is not in acute distress. Appearance: He is not diaphoretic. HENT:      Head: Normocephalic and atraumatic. Right Ear: External ear normal.      Left Ear: External ear normal.      Nose: Nose normal.      Mouth/Throat:      Mouth: Mucous membranes are moist.   Eyes:      Extraocular Movements: Extraocular movements intact. Comments: Pupils equal and round   Neck:      Vascular: No carotid bruit. Cardiovascular:      Rate and Rhythm: Normal rate. Rhythm irregularly irregular. Pulses: Normal pulses. Heart sounds: S1 normal and S2 normal. Murmur heard. Systolic murmur is present with a grade of 2/6. No friction rub. No gallop. Pulmonary:      Effort: Pulmonary effort is normal.      Comments: On 3 L of oxygen, baseline of 2.5. Decreased breath sounds  Abdominal:      Palpations: Abdomen is soft. Tenderness: There is no abdominal tenderness. Musculoskeletal:      Right lower leg: No edema. Left lower leg: No edema. Skin:     General: Skin is warm and dry. Capillary Refill: Capillary refill takes less than 2 seconds. Findings: No rash. Comments: Skin turgor brisk   Neurological:      Mental Status: He is alert and oriented to person, place, and time. Mental status is at baseline. Psychiatric:         Behavior: Behavior is cooperative. Thought Content:  Thought content normal.         Judgment: Judgment normal.          Medical decision making and Data review:    Lab Review   Recent Labs     04/06/22  0811   WBC 3.9*   HGB 11.2*   HCT 36.3*         Recent Labs     22  0811      K 3.9      CO2 25   BUN 13   CREATININE 1.1     Recent Labs     22  0811   AST 24   ALT 16   BILITOT 0.6   ALKPHOS 68     Recent Labs     22  0811 22  1127   TROPONINT <0.010 <0.010       Recent Labs     22  0811   PROBNP 1,327*     Lab Results   Component Value Date    INR 1.07 2020    PROTIME 13.0 2020       EKG: Reviewed by me  Atrial fibrillation with RVR, no signs of acute ischemic changes. Similar to previous EKG except with rapid heart rate    ECHO: 2020   Expedited imaging was used to obtain protocol images to reduce the   sonographer's exposure during COVID-19 pandemic. Left ventricular systolic function is normal.   Ejection fraction is visually estimated at 55%. Moderate left ventricular hypertrophy. Severe bilateral atrial enlargement. Moderately dilated right ventricle. Sclerotic, mildly stenotic aortic valve (mean P mmHg). Trace aortic regurgitation. No evidence of any pericardial effusion. Chest Xray: Reviewed by me  Stable pulmonary vascular congestion and cardiomegaly. No acute cardiopulmonary process      All labs, medications and tests reviewed by myself including data  from outside source , patient and available family . Continue all other medications of all above medical condition listed as is. Assessment and plan: 66 y. o.year old with   1. Atypical chest pain  -Chest pain pleuritic in nature, no longer experiencing, relieved with inhalers. Ischemic work-up to this point negative. No need for stress test at this time. Will check echo  2. Upper respiratory infection  -Per primary care  3. Chronic atrial fibrillation  -[id number] of 2. Currently rate controlled. Continue Cardizem 240 mg daily and Eliquis 5 mg twice daily. Goal Mg 2.0-2.2 (received IV Mg Sulfate) and potassium of 4.0-4.5.  4. Dilated cardiomyopathy  -will check echo  5.  Valvular heart disease  -Mild systolic murmur noted on physical exam.  6. Type 2 diabetes mellitus  -Well-controlled last A1c of 6.0. Per primary care      Thank you  much for consult and giving us the opportunity in contributing in the care of this patient. Please feel free to call me for any questions.        Rafa Bansal PA-C, 4/6/2022 12:38 PM

## 2022-04-06 NOTE — ED PROVIDER NOTES
12 lead EKG per my interpretation:  Atrial Fibrillation with RVR at 129 with PVCs  Axis is   Normal  QTc is  within an acceptable range  There is no specific T wave changes appreciated. There is no specific ST wave changes appreciated. No STEMI    Prior EKG to compare with was available and atrial fibrillation was seen on prior from 11/9/2021.     MD Jacqueline Valdovinos MD  04/06/22 [id number]

## 2022-04-06 NOTE — ED TRIAGE NOTES
Pt presents to ED from home for SOB and weakness upon waking this morning, pt also c/o tightness in the chest. Pt arrived on his home O2 of 3L NC

## 2022-04-06 NOTE — ED NOTES
[id number] paged hospitalist     Ginette Marlow  04/06/22 [place] with [facility] returned call      Ginette Marlow  04/06/22 [id number]

## 2022-04-06 NOTE — ED NOTES
1212 perfect serve message sent to Dr Jose Colorado on in pt consult from hospitalist     Steven Leung  04/06/22 1213  1212 Dr Jose Colorado acknowledged perfect serve message.  Added to treatment team.      Steven Leung  04/06/22 1220

## 2022-04-06 NOTE — CONSULTS
CARDIOLOGY CONSULT NOTE   Reason for consultation:  Mahin Acuna    Referring physician:  Ruddy Pabon MD     Primary care physician: Felizardo Boas, MD      Dear  Ruddy Pabon MD   Thanks for the consult. History of present illness:Og is a 66 y. o.year old who  presents with for shortness of breath which is moderate, for many years, intermittent, self limiting,+ associated with cough or fever, gets worse with activity and better with rest, patient has fever 101 and has been coughing cardiology consult is called for shortness of breath patient denies chest pain patient history of A. fib and takes Eliquis and Cardizem    Chief Complaint   Patient presents with    Shortness of Breath     on 2+ O2     Blood pressure, cholesterol, blood glucose and weight are well controlled. Past medical history:    has a past medical history of Aortic stenosis, mild, Asthma, intrinsic, Atrial fibrillation ([address] Colonic polyp, COPD (chronic obstructive pulmonary disease) ([address] DDD (degenerative disc disease), lumbar, Diabetes mellitus type 2, controlled ([address] Diabetic neuropathy, type II diabetes mellitus ([address] Dilated cardiomyopathy ([address] Elevated PSA, GERD (gastroesophageal reflux disease), Gout, H/O cardiovascular stress test, History of total right hip arthroplasty, Hyperlipidemia, IFG (impaired fasting glucose), Knee pain, bilateral, Long-term (current) use of anticoagulants, Peptic ulcer disease, Testosterone deficiency, Ventricular tachycardia ([address] and VHD (valvular heart disease). Past surgical history:   has a past surgical history that includes polypectomy (2/98- jaimie); polypectomy (9/10/02- dr Jeremiah Anton); laparoscopy (3/08, dr Clotilde Hampton); ventral hernia repair (10/08, dr Clotilde Hampton); Wrist fracture surgery (Left, 02/06/2014); Inguinal hernia repair (Right, 11/2016); Hip Arthroplasty (Right, 09/12/2018); Toe amputation (Left, 12/03/2021);  Toe amputation (Left, 12/03/2021); and Toe amputation (Left, 12/3/2021). Social History:   reports that he quit smoking about 9 years ago. His smoking use included cigarettes. He started smoking about 58 years ago. He has a 39.00 pack-year smoking history. He has never used smokeless tobacco. He reports current alcohol use. He reports that he does not use drugs.   Family history:   no family history of CAD, STROKE of DM    Allergies   Allergen Reactions    Lisinopril      Chest tightness    Simvastatin Other (See Comments)     Muscle cramps       azithromycin (ZITHROMAX) 500 mg in dextrose 5 % 250 mL IVPB, Once  sodium chloride flush 0.9 % injection 5-40 mL, 2 times per day  sodium chloride flush 0.9 % injection 5-40 mL, PRN  0.9 % sodium chloride infusion, PRN  acetaminophen (TYLENOL) tablet 650 mg, Q6H PRN   Or  acetaminophen (TYLENOL) suppository 650 mg, Q6H PRN  polyethylene glycol (GLYCOLAX) packet 17 g, Daily PRN  [START ON 4/7/2022] aspirin chewable tablet 81 mg, Daily  potassium chloride 10 mEq/100 mL IVPB (Peripheral Line), PRN  magnesium sulfate 2000 mg in 50 mL IVPB premix, PRN  nitroGLYCERIN (NITROSTAT) SL tablet 0.4 mg, Q5 Min PRN  allopurinol (ZYLOPRIM) tablet 100 mg, Daily  apixaban (ELIQUIS) tablet 5 mg, BID  calcium-cholecalciferol 500-200 MG-UNIT per tablet 1 tablet, Daily  montelukast (SINGULAIR) tablet 10 mg, Daily  [START ON 4/7/2022] pantoprazole (PROTONIX) tablet 40 mg, QAM AC  doxycycline (VIBRAMYCIN) 100 mg in dextrose 5 % 100 mL IVPB, Q12H  insulin lispro (HUMALOG) injection vial 0-6 Units, TID WC  insulin lispro (HUMALOG) injection vial 0-3 Units, Nightly  0.9 % sodium chloride infusion, Continuous  ipratropium-albuterol (DUONEB) nebulizer solution 1 ampule, Q4H WA  dilTIAZem (CARDIZEM CD) extended release capsule 240 mg, Daily      Current Facility-Administered Medications   Medication Dose Route Frequency Provider Last Rate Last Admin    azithromycin (ZITHROMAX) 500 mg in dextrose 5 % 250 mL IVPB  500 mg IntraVENous Once CATRACHO Krishnamurthy  sodium chloride flush 0.9 % injection 5-40 mL  5-40 mL IntraVENous 2 times per day Zuridine Carthage, APRN - CNP        sodium chloride flush 0.9 % injection 5-40 mL  5-40 mL IntraVENous PRN Zuridine [place], APRN - CNP        0.9 % sodium chloride infusion   IntraVENous PRN Zuridine Nat, SLAVA - CNP        acetaminophen (TYLENOL) tablet 650 mg  650 mg Oral Q6H PRN Zuridine CarthageSLAVA hogue - CNP        Or    acetaminophen (TYLENOL) suppository 650 mg  650 mg Rectal Q6H PRN Carlos Nat, APRN - [place]        polyethylene glycol (GLYCOLAX) packet 17 g  17 g Oral Daily PRN SLAVA Rosenthal - CNP        [START ON 4/7/2022] aspirin chewable tablet 81 mg  81 mg Oral Daily Carlos Johns, SLAVA - CNP        potassium chloride 10 mEq/100 mL IVPB (Peripheral Line)  10 mEq IntraVENous PRN SLAVA Rosenthal - CNP        magnesium sulfate 2000 mg in 50 mL IVPB premix  2,000 mg IntraVENous PRN Carlos Johns APRN - CNP        nitroGLYCERIN (NITROSTAT) SL tablet 0.4 mg  0.4 mg SubLINGual Q5 Min PRN Carlos Nat, APRN - [place]        allopurinol (ZYLOPRIM) tablet 100 mg  100 mg Oral Daily Carlos NatSLAVA junior - CNP        apixaban (ELIQUIS) tablet 5 mg  5 mg Oral BID Carlos NatSLAVA hogue - CNP        calcium-cholecalciferol 500-200 MG-UNIT per tablet 1 tablet  1 tablet Oral Daily SLAVA Rosenthal - CNP        montelukast (SINGULAIR) tablet 10 mg  10 mg Oral Daily Carlos [place], APRN - CNP        [START ON 4/7/2022] pantoprazole (PROTONIX) tablet 40 mg  40 mg Oral QAM AC SLAVA Mcintosh CNP        doxycycline (VIBRAMYCIN) 100 mg in dextrose 5 % 100 mL IVPB  100 mg IntraVENous Q12H SLAVA Mcintosh CNP        insulin lispro (HUMALOG) injection vial 0-6 Units  0-6 Units SubCUTAneous TID WC SLAVA Mcintosh CNP        insulin lispro (HUMALOG) injection vial 0-3 Units  0-3 Units SubCUTAneous Nightly Lilia Cooney APRN - CNP        0.9 % sodium chloride infusion   IntraVENous Continuous SLAVA Jules CNP 50 mL/hr at 04/06/22 1131 New Bag at 04/06/22 1131    ipratropium-albuterol (DUONEB) nebulizer solution 1 ampule  1 ampule Inhalation Q4H WA SLAVA Mcintosh CNP        dilTIAZem (CARDIZEM CD) extended release capsule 240 mg  240 mg Oral Daily SLAVA Mcintosh CNP         Review of Systems:   · Constitutional: No Fever or Weight Loss   · Eyes: No Decreased Vision  · ENT: No Headaches, Hearing Loss or Vertigo  · Cardiovascular: No chest pain, dyspnea on exertion, palpitations or loss of consciousness  · Respiratory: +cough or wheezing    · Gastrointestinal: No abdominal pain, appetite loss, blood in stools, constipation, diarrhea or heartburn  · Genitourinary: No dysuria, trouble voiding, or hematuria  · Musculoskeletal:  No gait disturbance, weakness or joint complaints  · Integumentary: No rash or pruritis  · Neurological: No TIA or stroke symptoms  · Psychiatric: No anxiety or depression  · Endocrine: No malaise, fatigue or temperature intolerance  · Hematologic/Lymphatic: No bleeding problems, blood clots or swollen lymph nodes  · Allergic/Immunologic: No nasal congestion or hives  All systems negative except as marked. ·   ·      Physical Examination:    Vitals:    04/06/22 1244   BP: 101/71   Pulse: 79   Resp: 16   Temp: 101   SpO2: 95%      Wt Readings from Last 3 Encounters:   04/06/22 265 lb (120.2 kg)   02/09/22 267 lb 2 oz (121.2 kg)   12/02/21 265 lb (120.2 kg)     Body mass index is 31.42 kg/m². General Appearance:  No distress, conversant    Constitutional:  Well developed, Well nourished, No acute distress, Non-toxic appearance. HENT:  Normocephalic, Atraumatic, Bilateral external ears normal, Oropharynx moist, No oral exudates, Nose normal. Neck- Normal range of motion, No tenderness, Supple, No stridor,no apical-carotid delay, no carotid bruit  Eyes:  PERRL, EOMI, Conjunctiva normal, No discharge.    Respiratory: Normal breath sounds, No respiratory distress, No wheezing, No chest tenderness. ,no use of accessory muscles, diaphragm movement is normal  Cardiovascular: (PMI) apex non displaced,no lifts no thrills, no s3,no s4, Normal heart rate, Normal rhythm, No murmurs, No rubs, No gallops. Carotid arteries pulse and amplitude are normal no bruit, no abdominal bruit noted ( normal abdominal aorta ausculation), femoral arteries pulse and amplitude are normal no bruit, pedal pulses are normal  GI:  Bowel sounds normal, Soft, No tenderness, No masses, No pulsatile masses, no hepatosplenomegally, no bruits  : External genitalia appear normal, No masses or lesions. No discharge. No CVA tenderness. Musculoskeletal:  Intact distal pulses, No edema, No tenderness, No cyanosis, No clubbing. Good range of motion in all major joints. No tenderness to palpation or major deformities noted. Back- No tenderness. Integument:  Warm, Dry, No erythema, No rash. Skin: no rash, no ulcers  Lymphatic:  No lymphadenopathy noted. Neurologic:  Alert & oriented x 3, Normal motor function, Normal sensory function, No focal deficits noted. Psychiatric:  Affect normal, Judgment normal, Mood normal.   Lab Review   Recent Labs     04/06/22  0811   WBC 3.9*   HGB 11.2*   HCT 36.3*         Recent Labs     04/06/22  0811      K 3.9      CO2 25   BUN 13   CREATININE 1.1     Recent Labs     04/06/22  0811   AST 24   ALT 16   BILITOT 0.6   ALKPHOS 68     No results for input(s): TROPONINI in the last 72 hours. No results found for: BNP  Lab Results   Component Value Date    INR 1.07 12/28/2020    PROTIME 13.0 12/28/2020         EKG:afib    Chest Xray:1. Stable pulmonary vascular congestion. ECHO: Normal EF in 2020  Labs, echo, meds reviewed  Assessment: 66 y. o.year old with PMH of  has a past medical history of Aortic stenosis, mild, Asthma, intrinsic, Atrial fibrillation ([address] Colonic polyp, COPD (chronic obstructive pulmonary disease) ([facility] [address] DDD (degenerative disc disease), lumbar, Diabetes mellitus type 2, controlled ([address] Diabetic neuropathy, type II diabetes mellitus ([facility] [address] Dilated cardiomyopathy ([facility] [address] [facility] Elevated PSA, GERD (gastroesophageal reflux disease), Gout, H/O cardiovascular stress test, History of total right hip arthroplasty, Hyperlipidemia, IFG (impaired fasting glucose), Knee pain, bilateral, Long-term (current) use of anticoagulants, Peptic ulcer disease, Testosterone deficiency, Ventricular tachycardia ([address] and VHD (valvular heart disease). Recommendations:    1. Tautness of breath: This is from most likely upper respiratory tract infection patient is 3 101 has been coughing also chest x-ray reviewed shows some congestion agree with an admitting patient to the hospital and getting IV antibiotic treatment will recommend to be cautious with IV fluids as he does have some chest congestion also  2. A. fib rate is controlled now continue Cardizem continue Eliquis  All labs, medications and tests reviewed, continue all other medications of all above medical condition listed as is.          Dwight Davis MD, 4/6/2022 1:41 PM

## 2022-04-06 NOTE — H&P
History and Physical      Name:  Amarilys Lopez /Age/Sex: 1950  (66 y.o. male)   MRN & CSN:  [id number] & [id number] Admission Date/Time: 2022  7:50 AM   Location:  [facility] PCP: Varsha Barajas MD       Hospital Day: 1           Assessment and Plan:   # URI -chest x-ray without focal infiltrate. Rapid Covid and influenza A/B negative. Pro-Howie 0.2. Patient with positive fever, mild cough. Will check urine antigens and respiratory panel, nebs, supplemental O2 as needed  # Acute on chronic respiratory failure with hypoxia in setting of URI - Pt with increase in O2 requirement. Only uses O2 2.5l per nc nightly, now requiring 24hrs, currently on 3l per nc, POC as noted, monitor vbg's, cont pulse ox  # Chest pain -? pleuritic. EKG with nonspecific ST changes, troponin negative x1. Pt currently chest pain free. Place on tele. Continue to trend trops, TTE, c/s Cardiology for eval. Resume patient's aspirin, has allergy to statin, nitrates prn. Lipid panel in am, check tsh. # Sepsis in setting of upper respiratory infection-criteria met with leukopenia and fever. Chest x-ray without focal infiltrate. Lactic acid 1.3, pro-Howie 0.23. Blood cultures x2 obtained, initiated on IV ceftriaxone\Zithromax. Received 500ml NS in ED. Currently not requiring vasopressor support. Will continue empiric IV abx, Continue 1l NS, eval further need for IV hydration, trend lactics, mrsa,sputum cx if able monitor closely  # Hypomagnesemia -replacement ordered  # Chronic atrial fibrillation on AC -heart rate currently 80s to 90s. Resume Diltiazem and Eliquis, monitor on telemetry  # History AS -monitored by Cardiology, follow-up TTE    Other chronic medical conditions-resume home medications unless contraindicated  # History of dilated cardiomyopathy  # COPD -no bronchospasms noted on exam, resume Singulair  # Diabetes mellitus type 2 -Hold metformin and trulicity.  Manage on sliding scale insulin, monitor Accu-Cheks AC at bedtime, ADA diet  # Chronic anemia -hemoglobin stable, monitor  # Gout -resume allopurinol  # GERD - resume ppi    Present on Admission:   Chest pain             Diet ADULT DIET; Regular; 4 carb choices (60 gm/meal); Low Fat/Low Chol/High Fiber/2 gm Na; No Caffeine   DVT Prophylaxis [] Lovenox, []  Heparin, [] SCDs, [] Ambulation  [x] Long term AC   GI Prophylaxis [x] PPI,  [] H2 Blocker,  [] Carafate,  [] Diet/Tube Feeds   Code Status Full code   Disposition Admit to med surg. Patient plans to return home upon discharge         Chief Complaint: Shortness of Breath (on 2+ O2)      History obtained from patient and EHR  History of Present Illness:   Porter Ponce is a 66 y.o. male  with history of atrial fibrillation on Eliquis, dilated cardiomyopathy with normal EF, aortic stenosis, COPD, home O2 at 2.5 L nightly, essential hypertension, diabetes mellitus, gout, GERD who presents with shortness of breath and chest pressure. The patient reports worsening shortness of breath since yesterday. Shortness of breath is worse with minimal activity. He reports increasing his O2 requirement. Normally wears O2 nightly however the last 24 to 48 hours he has been wearing up to 3 L during the day. He reports mild cough. He states his shortness of breath was at its worse this morning with associated non radiating bilateral chest wall pressure. He denies associated nausea vomiting. He had sweating with fevers. Reports chronic lower extremity swelling; denies any worsening. Reports fever with temp max 102.5F. He denies recent sick contacts. He denies any other acute issues. He was evaluated in the emergency department. Temp 101.5F, pulse 78, respirations 18, blood pressure 145/125 with O2 sat 94% on 3L per nasal cannula. Chest x-ray performed showed vascular congestion no focal infiltrate. EKG showed atrial fibrillation with nonspecific ST changes. Troponin negative x1, BNP 1327.   Rapid Covid and influenza AB were negative. VBG showed PH 7.47, PO2 93, PCO2 39, O2 sat 94.8% on O2. WBC 3.9, hemoglobin 11.2, hematocrit 36.3, platelets 110. Chemistry panel unremarkable with exception of glucose of 125. Lactic acid 1.3. Blood cultures were ordered the patient was dosed with IV ceftriaxone\Zithromax. Patient has been admitted for further management. Ten point ROS: reviewed and are negative, unless as noted in above HPI. Objective:   No intake or output data in the 24 hours ending 04/06/22 1022     Vitals:   Vitals:    04/06/22 0751 04/06/22 0752 04/06/22 0757 04/06/22 0800   BP: 132/68   119/75   Pulse:  124 110 104   Resp:  25 20 22   Temp:       TempSrc:       SpO2: 90% 93% 95% 95%   Weight:       Height:           Physical Exam: 04/06/22     GEN -Awake appearing male, in NAD. Appears given age. EYES -anicteric, conjunctiva pink. HENT -Head is normocephalic, atraumatic. MM are moist. No evidence of thrush. NECK -Supple, no apparent thyromegaly or masses. RESP -Decreased bs, resp unlabored. Symmetric chest movement   C/V -S1/S2 auscultated. Irregular, + systolic murmur present No JVD. Cap refill <3 sec. BLE edema present. GI -Abdomen is soft non distended, non tender to palpation. + BS. No masses or guarding.  -No CVA/ flank tenderness. LYMPH- No petechiae or ecchymoses. MS -No gross joint deformities. SKIN -Normal coloration, warm, dry. NEURO-Cranial nerves appear grossly intact, normal speech, no lateralizing weakness. PSYC-Awake, alert, oriented x 4. Appropriate affect. Past Medical History:      Past Medical History:   Diagnosis Date    Aortic stenosis, mild 11/08/2017    By echo 10/2016    Asthma, intrinsic     Dr Claribel Davila and Antolin Awad.     Atrial fibrillation [facility] [facility]     Colonic polyp 05/2009    Dr Gatito Aguilar- also done 4/16/2012- AND 3/8/17-TUBULAR ADENOMA,-RECHECK 5YRS    COPD (chronic obstructive pulmonary disease) ([address]     noted on PFTs 6/2020    DDD (degenerative disc disease), lumbar     Diabetes mellitus type 2, controlled ([facility] [address]     Dr Mana Green- optho, Dr Marvin Robles- podiatry    Diabetic neuropathy, type II diabetes mellitus ([facility] [address]     DECR MONOFIL SENSATION    Dilated cardiomyopathy ([facility] [address] 11/04/2015    Elevated PSA 11/02/2021    Dr Jayce Farley    GERD (gastroesophageal reflux disease)     Gout     H/O cardiovascular stress test 12/10/2015    lexiscan-normal,EF65%    History of total right hip arthroplasty     Dr Pritesh Varghese 9/2018- does get antibiotic prophylaxis w dental work- Dr Katerina Salcedo    Hyperlipidemia     IFG (impaired fasting glucose)     Jan 2019- a1c 6.1 but fasting glc 131.  Knee pain, bilateral     Long-term (current) use of anticoagulants     Peptic ulcer disease     Testosterone deficiency dx'd 9/2015    Ventricular tachycardia [facility])     mentioned by Dr Bernhard Oppenheim 11/2019 note-who monitors him    VHD (valvular heart disease) 11/29/2018    Mild aortic stenosis by echo November 2018         Past Surgical  History:    has a past surgical history that includes polypectomy (2/98- jaimie); polypectomy (9/10/02- dr Cookie Villalobos); laparoscopy (3/08, dr Cookie Monahan); ventral hernia repair (10/08, dr Cookie Monahan); Wrist fracture surgery (Left, 02/06/2014); Inguinal hernia repair (Right, 11/2016); Hip Arthroplasty (Right, 09/12/2018); Toe amputation (Left, 12/03/2021); Toe amputation (Left, 12/03/2021); and Toe amputation (Left, 12/3/2021). Family  History:   family history includes Atrial Fibrillation in his brother and brother; Diabetes in his father; High Blood Pressure in his mother; Hypertension in his mother; Hypotension in his father; No Known Problems in his sister, sister, and sister; Other in his father and mother.         Social History:     Social History     Socioeconomic History    Marital status:      Spouse name: Not on file    Number of children: Not on file    Years of education: Not on file    Highest education level: Not on file   Occupational History    Occupation: [employer]   Tobacco Use    Smoking status: Former Smoker     Packs/day: 1.00     Years: 39.00     Pack years: 39.00     Types: Cigarettes     Start date:      Quit date: 2013     Years since quittin.2    Smokeless tobacco: Never Used   Vaping Use    Vaping Use: Never used   Substance and Sexual Activity    Alcohol use: Yes     Comment: occasional wine/moderate/ caffeine 2 cups of coffee    Drug use: No    Sexual activity: Yes     Partners: Female   Other Topics Concern    Not on file   Social History Narrative    Exercise:    Seat Belt :     Social Determinants of Health     Financial Resource Strain: Low Risk     Difficulty of Paying Living Expenses: Not hard at all   Food Insecurity: No Food Insecurity    Worried About Running Out of Food in the Last Year: Never true    Miquel of Food in the Last Year: Never true   Transportation Needs:     Lack of Transportation (Medical): Not on file    Lack of Transportation (Non-Medical): Not on file   Physical Activity:     Days of Exercise per Week: Not on file    Minutes of Exercise per Session: Not on file   Stress:     Feeling of Stress : Not on file   Social Connections:     Frequency of Communication with Friends and Family: Not on file    Frequency of Social Gatherings with Friends and Family: Not on file    Attends Baptism Services: Not on file    Active Member of [address] [place] [address] or Organizations: Not on file    Attends Club or Organization Meetings: Not on file    Marital Status: Not on file   Intimate Partner Violence:     Fear of Current or Ex-Partner: Not on file    Emotionally Abused: Not on file    Physically Abused: Not on file    Sexually Abused: Not on file   Housing Stability:     Unable to Pay for Housing in the Last Year: Not on file    Number of [place] in the Last Year: Not on file    Unstable Housing in the Last Year: Not on file      reports that he quit smoking about 9 years ago.  His smoking use included cigarettes. He started smoking about 58 years ago. He has a 39.00 pack-year smoking history. He has never used smokeless tobacco.   reports current alcohol use. reports no history of drug use. Allergies: Allergies   Allergen Reactions    Lisinopril      Chest tightness    Simvastatin Other (See Comments)     Muscle cramps       Home Medications:     Prior to Admission medications    Medication Sig Start Date End Date Taking?  Authorizing Provider   albuterol (PROVENTIL) (5 MG/ML) 0.5% nebulizer solution Take 1 mL by nebulization 4 times daily as needed for Wheezing 4/4/22   Deepa Estevez MD   metFORMIN (GLUCOPHAGE) 500 MG tablet Take 1 tablet by mouth daily (with breakfast) 2/9/22   Deepa Estevez MD   furosemide (LASIX) 20 MG tablet TAKE ONE TABLET BY MOUTH DAILY 2/9/22   Deepa Estevez MD   Dulaglutide (TRULICITY SC) Inject into the skin    Historical Provider, MD   fluticasone-umeclidin-vilant (TRELEGY ELLIPTA) 100-62.5-25 MCG/INH AEPB Inhale 1 puff into the lungs daily 1/10/22   Jeffrey Carrasco MD   dilTIAZem (CARDIZEM CD) 240 MG extended release capsule Take 1 capsule by mouth daily 11/24/21   Maria Del Carmen Holland MD   apixaban (ELIQUIS) 5 MG TABS tablet Take 5 mg by mouth 2 times daily    Historical Provider, MD   traZODone (DESYREL) 50 MG tablet Take 2 tablets by mouth nightly 11/9/21   Deepa Estevez MD   allopurinol (ZYLOPRIM) 100 MG tablet Take 1 tablet by mouth daily 11/9/21   Deepa Estevez MD   omeprazole (PRILOSEC) 20 MG delayed release capsule Take 1 capsule by mouth daily 11/9/21   Deepa Estevez MD   potassium chloride (KLOR-CON M) 10 MEQ extended release tablet Take 1 tablet by mouth daily 11/9/21   Deepa Estevez MD   montelukast (SINGULAIR) 10 MG tablet Take 1 tablet by mouth daily 9/22/21   Rey Ledesma, APRN - CNP   calcium carb-cholecalciferol 600-200 MG-UNIT TABS tablet Take 1 tablet by mouth daily    Historical Provider, MD         Medications:   Medications:    magnesium sulfate  2,000 mg IntraVENous Once    azithromycin  500 mg IntraVENous Once    cefTRIAXone (ROCEPHIN) IV  1,000 mg IntraVENous Once    sodium chloride flush  5-40 mL IntraVENous 2 times per day    [START ON 4/7/2022] aspirin  81 mg Oral Daily    allopurinol  100 mg Oral Daily    apixaban  5 mg Oral BID    calcium carb-cholecalciferol  1 tablet Oral Daily    dilTIAZem  240 mg Oral Daily    furosemide  1 tablet Oral Daily    montelukast  10 mg Oral Daily    [START ON 4/7/2022] pantoprazole  40 mg Oral QAM AC    doxycycline (VIBRAMYCIN) IV  100 mg IntraVENous Q12H    insulin lispro  0-6 Units SubCUTAneous TID WC    insulin lispro  0-3 Units SubCUTAneous Nightly      Infusions:    sodium chloride       PRN Meds: sodium chloride flush, 5-40 mL, PRN  sodium chloride, , PRN  acetaminophen, 650 mg, Q6H PRN   Or  acetaminophen, 650 mg, Q6H PRN  polyethylene glycol, 17 g, Daily PRN  potassium chloride, 10 mEq, PRN  magnesium sulfate, 2,000 mg, PRN  nitroGLYCERIN, 0.4 mg, Q5 Min PRN        Data:     Laboratory this visit:  Reviewed  Recent Labs     04/06/22  0811   WBC 3.9*   HGB 11.2*   HCT 36.3*         Recent Labs     04/06/22  0811      K 3.9      CO2 25   BUN 13   CREATININE 1.1     Recent Labs     04/06/22  0811   AST 24   ALT 16   BILITOT 0.6   ALKPHOS 68     No results for input(s): INR in the last 72 hours. Radiology this visit:  Reviewed. XR CHEST PORTABLE    Result Date: 4/6/2022  EXAMINATION: ONE XRAY VIEW OF THE CHEST 4/6/2022 8:21 am COMPARISON: 11/15/2021 HISTORY: ORDERING SYSTEM PROVIDED HISTORY: shortness of breath TECHNOLOGIST PROVIDED HISTORY: Reason for exam:->shortness of breath Reason for Exam: shortness of breath FINDINGS: The cardiac silhouette is enlarged. Mediastinal contours are stable. Pulmonary vascular congestion is unchanged. No focal lung infiltrate. No pleural effusion or pneumothorax.  The visualized osseous structures are unremarkable. 1. Stable pulmonary vascular congestion.            EKG this visit:  personally reviewed       Electronically signed by SLAVA Rosenthal CNP on 4/6/2022 at 10:22 AM

## 2022-04-07 VITALS
RESPIRATION RATE: 18 BRPM | SYSTOLIC BLOOD PRESSURE: 153 MMHG | HEART RATE: 113 BPM | TEMPERATURE: 99 F | BODY MASS INDEX: 31.99 KG/M2 | DIASTOLIC BLOOD PRESSURE: 77 MMHG | OXYGEN SATURATION: 98 % | WEIGHT: 270.95 LBS | HEIGHT: 77 IN

## 2022-04-07 LAB
ALBUMIN SERPL-MCNC: 4 GM/DL (ref 3.4–5)
ALP BLD-CCNC: 69 IU/L (ref 40–128)
ALT SERPL-CCNC: 17 U/L (ref 10–40)
ANION GAP SERPL CALCULATED.3IONS-SCNC: 14 MMOL/L (ref 4–16)
AST SERPL-CCNC: 26 IU/L (ref 15–37)
BILIRUB SERPL-MCNC: 0.7 MG/DL (ref 0–1)
BUN BLDV-MCNC: 13 MG/DL (ref 6–23)
CALCIUM SERPL-MCNC: 8.8 MG/DL (ref 8.3–10.6)
CHLORIDE BLD-SCNC: 101 MMOL/L (ref 99–110)
CHOLESTEROL: 86 MG/DL
CO2: 23 MMOL/L (ref 21–32)
CREAT SERPL-MCNC: 1 MG/DL (ref 0.9–1.3)
EKG ATRIAL RATE: 147 BPM
EKG DIAGNOSIS: NORMAL
EKG Q-T INTERVAL: 290 MS
EKG QRS DURATION: 92 MS
EKG QTC CALCULATION (BAZETT): 424 MS
EKG R AXIS: 80 DEGREES
EKG T AXIS: 36 DEGREES
EKG VENTRICULAR RATE: 129 BPM
ESTIMATED AVERAGE GLUCOSE: 123 MG/DL
GFR AFRICAN AMERICAN: >60 ML/MIN/1.73M2
GFR NON-AFRICAN AMERICAN: >60 ML/MIN/1.73M2
GLUCOSE BLD-MCNC: 111 MG/DL (ref 70–99)
GLUCOSE BLD-MCNC: 114 MG/DL (ref 70–99)
GLUCOSE BLD-MCNC: 119 MG/DL (ref 70–99)
HBA1C MFR BLD: 5.9 % (ref 4.2–6.3)
HCT VFR BLD CALC: 37.9 % (ref 42–52)
HDLC SERPL-MCNC: 36 MG/DL
HEMOGLOBIN: 11.1 GM/DL (ref 13.5–18)
LDL CHOLESTEROL CALCULATED: 38 MG/DL
LEGIONELLA URINARY AG: NEGATIVE
MCH RBC QN AUTO: 24.1 PG (ref 27–31)
MCHC RBC AUTO-ENTMCNC: 29.3 % (ref 32–36)
MCV RBC AUTO: 82.2 FL (ref 78–100)
PDW BLD-RTO: 17 % (ref 11.7–14.9)
PLATELET # BLD: 159 K/CU MM (ref 140–440)
PMV BLD AUTO: 9.2 FL (ref 7.5–11.1)
POTASSIUM SERPL-SCNC: 4.1 MMOL/L (ref 3.5–5.1)
RBC # BLD: 4.61 M/CU MM (ref 4.6–6.2)
SODIUM BLD-SCNC: 138 MMOL/L (ref 135–145)
STREP PNEUMONIAE ANTIGEN: NORMAL
TOTAL PROTEIN: 5.8 GM/DL (ref 6.4–8.2)
TRIGL SERPL-MCNC: 62 MG/DL
WBC # BLD: 3.3 K/CU MM (ref 4–10.5)

## 2022-04-07 PROCEDURE — 6370000000 HC RX 637 (ALT 250 FOR IP): Performed by: NURSE PRACTITIONER

## 2022-04-07 PROCEDURE — 85027 COMPLETE CBC AUTOMATED: CPT

## 2022-04-07 PROCEDURE — 6370000000 HC RX 637 (ALT 250 FOR IP): Performed by: INTERNAL MEDICINE

## 2022-04-07 PROCEDURE — 94640 AIRWAY INHALATION TREATMENT: CPT

## 2022-04-07 PROCEDURE — 82805 BLOOD GASES W/O2 SATURATION: CPT

## 2022-04-07 PROCEDURE — 2500000003 HC RX 250 WO HCPCS: Performed by: NURSE PRACTITIONER

## 2022-04-07 PROCEDURE — 2580000003 HC RX 258: Performed by: NURSE PRACTITIONER

## 2022-04-07 PROCEDURE — 94761 N-INVAS EAR/PLS OXIMETRY MLT: CPT

## 2022-04-07 PROCEDURE — 83036 HEMOGLOBIN GLYCOSYLATED A1C: CPT

## 2022-04-07 PROCEDURE — 80061 LIPID PANEL: CPT

## 2022-04-07 PROCEDURE — 82962 GLUCOSE BLOOD TEST: CPT

## 2022-04-07 PROCEDURE — 99233 SBSQ HOSP IP/OBS HIGH 50: CPT | Performed by: INTERNAL MEDICINE

## 2022-04-07 PROCEDURE — 80053 COMPREHEN METABOLIC PANEL: CPT

## 2022-04-07 PROCEDURE — 2700000000 HC OXYGEN THERAPY PER DAY

## 2022-04-07 PROCEDURE — 36415 COLL VENOUS BLD VENIPUNCTURE: CPT

## 2022-04-07 PROCEDURE — 93010 ELECTROCARDIOGRAM REPORT: CPT | Performed by: INTERNAL MEDICINE

## 2022-04-07 RX ORDER — DILTIAZEM HYDROCHLORIDE 360 MG/1
360 CAPSULE, EXTENDED RELEASE ORAL DAILY
Qty: 30 CAPSULE | Refills: 3 | Status: SHIPPED | OUTPATIENT
Start: 2022-04-08 | End: 2022-06-14 | Stop reason: ALTCHOICE

## 2022-04-07 RX ORDER — DILTIAZEM HYDROCHLORIDE 180 MG/1
360 CAPSULE, COATED, EXTENDED RELEASE ORAL DAILY
Status: DISCONTINUED | OUTPATIENT
Start: 2022-04-07 | End: 2022-04-07 | Stop reason: HOSPADM

## 2022-04-07 RX ADMIN — APIXABAN 5 MG: 5 TABLET, FILM COATED ORAL at 08:44

## 2022-04-07 RX ADMIN — IPRATROPIUM BROMIDE AND ALBUTEROL SULFATE 1 AMPULE: .5; 3 SOLUTION RESPIRATORY (INHALATION) at 08:05

## 2022-04-07 RX ADMIN — ACETAMINOPHEN 650 MG: 325 TABLET ORAL at 02:17

## 2022-04-07 RX ADMIN — IPRATROPIUM BROMIDE AND ALBUTEROL SULFATE 1 AMPULE: .5; 3 SOLUTION RESPIRATORY (INHALATION) at 15:39

## 2022-04-07 RX ADMIN — Medication 1 TABLET: at 08:44

## 2022-04-07 RX ADMIN — SODIUM CHLORIDE, PRESERVATIVE FREE 10 ML: 5 INJECTION INTRAVENOUS at 08:44

## 2022-04-07 RX ADMIN — ALLOPURINOL 100 MG: 100 TABLET ORAL at 08:44

## 2022-04-07 RX ADMIN — DILTIAZEM HYDROCHLORIDE 360 MG: 180 CAPSULE, COATED, EXTENDED RELEASE ORAL at 09:15

## 2022-04-07 RX ADMIN — IPRATROPIUM BROMIDE AND ALBUTEROL SULFATE 1 AMPULE: .5; 3 SOLUTION RESPIRATORY (INHALATION) at 11:44

## 2022-04-07 RX ADMIN — PANTOPRAZOLE SODIUM 40 MG: 40 TABLET, DELAYED RELEASE ORAL at 06:41

## 2022-04-07 RX ADMIN — ASPIRIN 81 MG 81 MG: 81 TABLET ORAL at 08:44

## 2022-04-07 RX ADMIN — DOXYCYCLINE 100 MG: 100 INJECTION, POWDER, LYOPHILIZED, FOR SOLUTION INTRAVENOUS at 11:44

## 2022-04-07 ASSESSMENT — PAIN SCALES - GENERAL
PAINLEVEL_OUTOF10: 0
PAINLEVEL_OUTOF10: 0
PAINLEVEL_OUTOF10: 6
PAINLEVEL_OUTOF10: 0

## 2022-04-07 NOTE — PROGRESS NOTES
Outpatient Pharmacy Progress Note for Meds-to-Beds    Total number of Prescriptions Filled: 1  The following medications were dispensed to the patient during the discharge process:  Diltiazem  mg    Additional Documentation:  Patient picked-up the medication(s) in the OP Pharmacy      Thank you for letting us serve your patients.   [address] [place]    [address], [address] [facility]    Phone: [phone]    Fax: [phone]

## 2022-04-07 NOTE — PROGRESS NOTES
Today's plan: Increase Cardizem to 360 mg daily DC IV Cardizem drip continue Eliquis and continue treatment of his fever and bronchitis  Echo reveals moderate to severe aortic stenosis valve area 1.1 mGy across the aortic valve is 40 m mercury is has a is a new finding since his last echo which shows mild aortic stenosis in 2020 and will follow with Dr. Ramon Abreu as an outpatient for further work-up of aortic stenosis    Admit Date:  4/6/2022    Subjective: Cough however feels less feverish and chills      Chief complaints on admission  Chief Complaint   Patient presents with    Shortness of Breath     on 2+ O2         History of present illness:Og is a 66 y. o.year old who  presents with had concerns including Shortness of Breath (on 2+ O2). Past medical history:    has a past medical history of Aortic stenosis, mild, Asthma, intrinsic, Atrial fibrillation ([address] Colonic polyp, COPD (chronic obstructive pulmonary disease) ([address] DDD (degenerative disc disease), lumbar, Diabetes mellitus type 2, controlled ([address] Diabetic neuropathy, type II diabetes mellitus ([address] Dilated cardiomyopathy ([address] Elevated PSA, GERD (gastroesophageal reflux disease), Gout, H/O cardiovascular stress test, History of total right hip arthroplasty, Hyperlipidemia, IFG (impaired fasting glucose), Knee pain, bilateral, Long-term (current) use of anticoagulants, Peptic ulcer disease, Testosterone deficiency, Ventricular tachycardia ([address] and VHD (valvular heart disease). Past surgical history:   has a past surgical history that includes polypectomy (2/98- jaimie); polypectomy (9/10/02- dr Sb Wright); laparoscopy (3/08, dr Joana Edgar); ventral hernia repair (10/08, dr Joana Edgar); Wrist fracture surgery (Left, 02/06/2014); Inguinal hernia repair (Right, 11/2016); Hip Arthroplasty (Right, 09/12/2018); Toe amputation (Left, 12/03/2021); Toe amputation (Left, 12/03/2021); and Toe amputation (Left, 12/3/2021).   Social History:   reports that he quit smoking about 9 years ago. His smoking use included cigarettes. He started smoking about 58 years ago. He has a 39.00 pack-year smoking history. He has never used smokeless tobacco. He reports current alcohol use. He reports that he does not use drugs. Family history:  family history includes Atrial Fibrillation in his brother and brother; Diabetes in his father; High Blood Pressure in his mother; Hypertension in his mother; Hypotension in his father; No Known Problems in his sister, sister, and sister; Other in his father and mother. Allergies   Allergen Reactions    Lisinopril      Chest tightness    Simvastatin Other (See Comments)     Muscle cramps         Objective:   /64   Pulse 101   Temp 98.5 °F (36.9 °C) (Oral)   Resp 22   Ht 6' 5\" (1.956 m)   Wt 270 lb 15.1 oz (122.9 kg)   SpO2 92%   BMI 32.13 kg/m²       Intake/Output Summary (Last 24 hours) at 4/7/2022 1137  Last data filed at 4/7/2022 0218  Gross per 24 hour   Intake --   Output 425 ml   Net -425 ml           Physical Exam:  Constitutional:  Well developed, Well nourished, No acute distress, Non-toxic appearance. HENT:  Normocephalic, Atraumatic, Bilateral external ears normal, Oropharynx moist, No oral exudates, Nose normal. Neck- Normal range of motion, No tenderness, Supple, No stridor. Eyes:  PERRL, EOMI, Conjunctiva normal, No discharge. Respiratory:  Normal breath sounds, No respiratory distress, No wheezing, No chest tenderness. ,no use of accessory muscles, diaphragm movement is normal  Cardiovascular: (PMI) apex non displaced,no lifts no thrills, no s3,no s4, Normal heart rate, Normal rhythm, No murmurs, No rubs, No gallops. Carotid arteries pulse and amplitude are normal no bruit, no abdominal bruit noted ( normal abdominal aorta ausculation), femoral arteries pulse and amplitude are normal no bruit, pedal pulses are normal  GI:  Bowel sounds normal, Soft, No tenderness, No masses, No pulsatile masses.    : External genitalia appear normal, No masses or lesions. No discharge. No CVA tenderness. Musculoskeletal:  Intact distal pulses, No edema, No tenderness, No cyanosis, No clubbing. Good range of motion in all major joints. No tenderness to palpation or major deformities noted. Back- No tenderness. Integument:  Warm, Dry, No erythema, No rash. Lymphatic:  No lymphadenopathy noted. Neurologic:  Alert & oriented x 3, Normal motor function, Normal sensory function, No focal deficits noted. Psychiatric:  Affect normal, Judgment normal, Mood normal.     Medications:    dilTIAZem  360 mg Oral Daily    sodium chloride flush  5-40 mL IntraVENous 2 times per day    aspirin  81 mg Oral Daily    allopurinol  100 mg Oral Daily    apixaban  5 mg Oral BID    calcium-cholecalciferol  1 tablet Oral Daily    montelukast  10 mg Oral Daily    pantoprazole  40 mg Oral QAM AC    doxycycline (VIBRAMYCIN) IV  100 mg IntraVENous Q12H    insulin lispro  0-6 Units SubCUTAneous TID WC    insulin lispro  0-3 Units SubCUTAneous Nightly    ipratropium-albuterol  1 ampule Inhalation Q4H WA      sodium chloride      dilTIAZem (CARDIZEM) 100 mg in dextrose 5% 100 mL ([facility]-[place]) Stopped (04/07/22 0915)     sodium chloride flush, sodium chloride, acetaminophen **OR** acetaminophen, polyethylene glycol, potassium chloride, magnesium sulfate, nitroGLYCERIN    Lab Data:  CBC:   Recent Labs     04/06/22  0811 04/07/22 0229   WBC 3.9* 3.3*   HGB 11.2* 11.1*   HCT 36.3* 37.9*   MCV 81.0 82.2    159     BMP:   Recent Labs     04/06/22  0811 04/07/22 0229    138   K 3.9 4.1    101   CO2 25 23   BUN 13 13   CREATININE 1.1 1.0     LIVER PROFILE:   Recent Labs     04/06/22  0811 04/07/22 0229   AST 24 26   ALT 16 17   BILITOT 0.6 0.7   ALKPHOS 68 69     PT/INR: No results for input(s): PROTIME, INR in the last 72 hours. APTT: No results for input(s): APTT in the last 72 hours.   BNP:  No results for input(s): BNP in the last 72 hours. TROPONIN: @TROPONINI:3@      Assessment:  66 y. o.year old who is admitted for          Plan:  1. Tautness of breath: This is from most likely upper respiratory tract infection patient is 3 101 has been coughing also chest x-ray reviewed shows some congestion agree with an admitting patient to the hospital and getting IV antibiotic treatment will recommend to be cautious with IV fluids as he does have some chest congestion also  2. A. fib rate is controlled now continue Cardizem continue Eliquis  3. Moderate to severe use gnosis as mentioned above patient will need outpatient left and right heart cath and TIM for management of her his aortic stenosis  diet  All labs, medications and tests reviewed, continue all other medications of all above medical condition listed as is.       Paul Sahu MD, MD 4/7/2022 11:37 AM

## 2022-04-07 NOTE — PROGRESS NOTES
Message sent to Marcelina Castillo: Pt was titrated down on his cardizem drip early in shift to 2.5gtt, pt c/o of being very cold, visibly shivering, but warm to touch, denies chest pain, c/o headache, vitals T 98.9, , b/p 129/72m 85 MAP, 23 R, 87 O2, had to increase oxygen to 5L for pt to recover, increased cardizem drip to 5, Pt sustainting elevated HR.

## 2022-04-07 NOTE — DISCHARGE SUMMARY
Discharge Summary    Name:  Imani Montana /Age/Sex: 1950  (66 y.o. male)   MRN & CSN:  [id number] & [id number] Admission Date/Time: 2022  7:50 AM   Attending:  No att. providers found Discharging Physician: Elyssa Bradford DO     Hospital Course:   Imani Montana is a 66 y.o.  male  who presents with Chest pain  Patient presented with above complaint. He was admitted and evaluated. He was noted to be in Afib with Rapid ventricular response and he was promptly started on IV Cardizem. Cardiology was consulted. Since the rate was controlled, the drip was titrated and discontinued and he was placed on oral cardizem. His dose was increased to 360 from 240. He did well and felt better. He asked to be discharged since he is doing a lot better. The patient expressed appropriate understanding of and agreement with the discharge recommendations, medications, and plan.      Consults this admission:  IP CONSULT TO HOSPITALIST  IP CONSULT TO HOSPITALIST  IP CONSULT TO CARDIOLOGY    Discharge Instruction:   Follow up appointments:   Primary care physician:  within 2 weeks    Diet:  cardiac diet   Activity: activity as tolerated  Disposition: Discharged to:   [x]Home, [][facility], []SNF, []Acute Rehab, []Hospice   Condition on discharge: Stable    Discharge Medications:        Medication List      CHANGE how you take these medications    dilTIAZem 360 MG extended release capsule  Commonly known as: CARDIZEM CD  Take 1 capsule by mouth daily  Start taking on: 2022  What changed:   · medication strength  · how much to take        CONTINUE taking these medications    albuterol (5 MG/ML) 0.5% nebulizer solution  Commonly known as: PROVENTIL  Take 1 mL by nebulization 4 times daily as needed for Wheezing     allopurinol 100 MG tablet  Commonly known as: Zyloprim  Take 1 tablet by mouth daily     calcium carb-cholecalciferol 600-200 MG-UNIT Tabs tablet     Eliquis 5 MG Tabs tablet  Generic drug: apixaban fluticasone-umeclidin-vilant 100-62.5-25 MCG/INH Aepb  Commonly known as: TRELEGY ELLIPTA  Inhale 1 puff into the lungs daily     furosemide 20 MG tablet  Commonly known as: LASIX  TAKE ONE TABLET BY MOUTH DAILY     metFORMIN 500 MG tablet  Commonly known as: GLUCOPHAGE  Take 1 tablet by mouth daily (with breakfast)     montelukast 10 MG tablet  Commonly known as: SINGULAIR  Take 1 tablet by mouth daily     omeprazole 20 MG delayed release capsule  Commonly known as: PRILOSEC  Take 1 capsule by mouth daily     potassium chloride 10 MEQ extended release tablet  Commonly known as: KLOR-CON M  Take 1 tablet by mouth daily     traZODone 50 MG tablet  Commonly known as: DESYREL  Take 2 tablets by mouth nightly     [facility]           Where to Get Your Medications      These medications were sent to [address] [place] [address] 83, [address]    Phone: [phone]   · dilTIAZem 360 MG extended release capsule         Objective Findings at Discharge:   BP (!) 153/77   Pulse 113   Temp 99 °F (37.2 °C) (Oral)   Resp 18   Ht 6' 5\" (1.956 m)   Wt 270 lb 15.1 oz (122.9 kg)   SpO2 98%   BMI 32.13 kg/m²            PHYSICAL EXAM   GEN Awake male, sitting upright in bed in no apparent distress. Appears given age. EYES Pupils are equally round. No scleral erythema, discharge, or conjunctivitis. HENT Mucous membranes are moist. Oral pharynx without exudates, no evidence of thrush. NECK Supple, no apparent thyromegaly or masses. RESP Clear to auscultation, no wheezes, rales or rhonchi. Symmetric chest movement while on room air. CARDIO/VASC S1/S2 auscultated. Regular rate without appreciable murmurs, rubs, or gallops. No JVD or carotid bruits. Peripheral pulses equal bilaterally and palpable. No peripheral edema. GI Abdomen is soft without significant tenderness, masses, or guarding.  Bowel sounds are normoactive. Rectal exam deferred.  No costovertebral angle tenderness. Normal appearing external genitalia. Garcia catheter is not present. HEME/LYMPH No palpable cervical lymphadenopathy and no hepatosplenomegaly. No petechiae or ecchymoses. MSK No gross joint deformities. SKIN Normal coloration, warm, dry. NEURO Cranial nerves appear grossly intact, normal speech, no lateralizing weakness. PSYCH Awake, alert, oriented x 4. Affect appropriate.     BMP/CBC  Recent Labs     04/06/22  0811 04/07/22  0229    138   K 3.9 4.1    101   CO2 25 23   BUN 13 13   CREATININE 1.1 1.0   WBC 3.9* 3.3*   HCT 36.3* 37.9*    159       IMAGING:      Discharge Time of 35 minutes    Electronically signed by Regino Huizar DO on 4/7/2022 at 6:17 PM

## 2022-04-08 ENCOUNTER — CARE COORDINATION (OUTPATIENT)
Dept: CASE MANAGEMENT | Age: 72
End: 2022-04-08

## 2022-04-08 ENCOUNTER — TELEPHONE (OUTPATIENT)
Dept: INTERNAL MEDICINE CLINIC | Age: 72
End: 2022-04-08

## 2022-04-08 DIAGNOSIS — R07.89 OTHER CHEST PAIN: Primary | ICD-10-CM

## 2022-04-08 PROCEDURE — 1111F DSCHRG MED/CURRENT MED MERGE: CPT | Performed by: INTERNAL MEDICINE

## 2022-04-08 RX ORDER — ALBUTEROL SULFATE 2.5 MG/3ML
2.5 SOLUTION RESPIRATORY (INHALATION) EVERY 6 HOURS PRN
Qty: 120 EACH | Refills: 3 | Status: SHIPPED | OUTPATIENT
Start: 2022-04-08

## 2022-04-08 NOTE — CARE COORDINATION
Mirela [facility] Transitions Initial Follow Up Call    Call within 2 business days of discharge: Yes    Patient: Sneha Leavitt Patient : 1950   MRN: [id number]  Reason for Admission: SOB, Chest pain/pressure  Discharge Date: 22 RARS: Readmission Risk Score: 13 ( )      Last Discharge [facility]       Complaint Diagnosis Description Type Department Provider    22 Shortness of Breath Dyspnea, unspecified type . .. ED to Hosp-Admission (Discharged) (ADMITTED) [facility] [address] Inocencio Jacinto, DO; Yanique Mallory A. .. Spoke with: Fernando Herndon, patient    1st attempt to contact patient for initial Care Transition follow up. Unable to reach patient. Call picked up and immediately disconnected. CTN called back. Call was picked up again but no response when CTN said hello. CTN attempted to contact patient's spouse (EC and on HIPAA). Unable to reach her. Left message with contact information and request for call back. Received incoming call from patient. Mr. Cata Duque states that he is doing fine. He denies having any c/o chest pain/discomfort, pressure, tightness. He has not noticed any palpitations, elevated or rapid HR. He continues to become short of breath but states not as severe. He is on 2.5 L of continuous oxygen. Denies distress. He is not overexerting himself or doing any strenuous activities. Denies having any c/o lightheadedness/dizziness, swelling. He is wearing compression stockings daily. He is eating and drinking fluids w/o issues. States he has his appointments scheduled with PCP on 22 and cardiology on 22. Reviewed medication list.  He is aware Diltiazem changed to 360 mg daily. He is aware of when to contact his providers with any new or worsening symptoms. No questions or needs at this time. Transitions of Care Initial Call    Was this an external facility discharge?  No Discharge Facility: [facility]     Challenges to be reviewed by the provider Additional needs identified to be addressed with provider: No  none             Method of communication with provider : none    Was this a readmission? No  Patient stated reason for admission: SOB, Chest pressure  Patients top risk factors for readmission: medical condition-Acute Respiratory Failure, Asthma, AFIB, COPD, DM, NSVT, Valvular heart disease    Care Transition Nurse (CTN) contacted the patient by telephone to perform post hospital discharge assessment. Verified name and  with patient as identifiers. Provided introduction to self, and explanation of the CTN role. CTN reviewed discharge instructions, medical action plan and red flags with patient who verbalized understanding. Patient given an opportunity to ask questions and does not have any further questions or concerns at this time. Were discharge instructions available to patient? Yes. Reviewed appropriate site of care based on symptoms and resources available to patient including: PCP and specialist. The patient agrees to contact the PCP office for questions related to their healthcare. Medication reconciliation was performed with patient, who verbalizes understanding of administration of home medications. Reviewed and educated patient on any new and changed medications related to discharge diagnosis. Was patient discharged with a pulse oximeter? No Discussed and confirmed pulse oximeter discharge instructions and when to notify provider or seek emergency care. CTN provided contact information. Plan for follow-up call in 5-7 days based on severity of symptoms and risk factors.   Plan for next call: symptom management-any new or worsening symptoms    Care Transitions 24 Hour Call    Do you have any ongoing symptoms?: Yes  Patient-reported symptoms: Shortness of Breath (Comment: SOB but not as severe)  Do you have a copy of your discharge instructions?: Yes  Do you have all of your prescriptions and are they filled?: Yes  Have you been contacted by a [address]?: No  Have you scheduled your follow up appointment?: Yes (Comment: PCP on 4/12/22 and Cardiology on 4/13/22)  Were you discharged with any Home Care or [address]: No  Post Acute Services: [facility] Jamil Deng (Comment: [facility] [phone])  Do you feel like you have everything you need to keep you well at home?: Yes  Care Transitions Interventions   Home Care Waiver: Declined        DME Assistance: Declined          Follow Up  Future Appointments   Date Time Provider Kaylee Dean   4/12/2022  1:45 PM MD Medhat Levine [place] [facility]   4/13/2022 12:00 PM Tamiko Barragan MD [facility]   5/10/2022 11:15 AM MD Medhat Levine [facility]   7/12/2022 10:30 AM [facility]EDULE[facility] [address] [facility] [address] [place] [facility]   11/10/2022 10:40 AM Tamiko Barragan MD [facility]       Meryl Melendrez, ANNE

## 2022-04-08 NOTE — TELEPHONE ENCOUNTER
Mirela 45 Transitions Initial Follow Up Call    Outreach made within 2 business days of discharge:     Patient: Sneha Leavitt Patient : 1950   MRN: [id number]  Reason for Admission: There are no discharge diagnoses documented for the most recent discharge. Discharge Date: 22       Spoke with: Patient    Discharge department/facility: [facility]    TCM Interactive Patient Contact:  Was patient able to fill all prescriptions: yes  Was patient instructed to bring all medications to the follow-up visit: yes  Is patient taking all medications as directed in the discharge summary?  yes  Does patient understand their discharge instructions: yes  Does patient have questions or concerns that need addressed prior to 7-14 day follow up office visit: no    Scheduled appointment with PCP within 7-14 days    Follow Up  Future Appointments   Date Time Provider Kaylee Dean   2022  1:45 PM Sandi Rivera MD [address] [place] [facility]   2022 12:00 PM Jamey Blancas MD [facility]   5/10/2022 11:15 AM MD Medhat Lau [facility]   2022 10:30 AM [facility]EDULE, [address] [place] [facility] [address] [place] [facility]   11/10/2022 10:40 AM Jamey Blancas MD [facility] [place]

## 2022-04-10 LAB
CULTURE: ABNORMAL
Lab: ABNORMAL
Lab: ABNORMAL
SPECIMEN: ABNORMAL
SPECIMEN: ABNORMAL

## 2022-04-12 ENCOUNTER — OFFICE VISIT (OUTPATIENT)
Dept: INTERNAL MEDICINE CLINIC | Age: 72
End: 2022-04-12
Payer: MEDICARE

## 2022-04-12 VITALS
WEIGHT: 283 LBS | RESPIRATION RATE: 16 BRPM | BODY MASS INDEX: 33.42 KG/M2 | SYSTOLIC BLOOD PRESSURE: 120 MMHG | OXYGEN SATURATION: 96 % | HEIGHT: 77 IN | HEART RATE: 67 BPM | DIASTOLIC BLOOD PRESSURE: 60 MMHG

## 2022-04-12 DIAGNOSIS — F51.01 PRIMARY INSOMNIA: ICD-10-CM

## 2022-04-12 DIAGNOSIS — K21.9 GASTROESOPHAGEAL REFLUX DISEASE WITHOUT ESOPHAGITIS: ICD-10-CM

## 2022-04-12 DIAGNOSIS — E79.0 ELEVATED URIC ACID IN BLOOD: ICD-10-CM

## 2022-04-12 DIAGNOSIS — I42.0 DILATED CARDIOMYOPATHY (HCC): ICD-10-CM

## 2022-04-12 DIAGNOSIS — I48.20 CHRONIC ATRIAL FIBRILLATION (HCC): ICD-10-CM

## 2022-04-12 DIAGNOSIS — J45.21 MILD INTERMITTENT ASTHMA WITH ACUTE EXACERBATION: ICD-10-CM

## 2022-04-12 DIAGNOSIS — I50.21 ACUTE SYSTOLIC CHF (CONGESTIVE HEART FAILURE) (HCC): Primary | ICD-10-CM

## 2022-04-12 DIAGNOSIS — J45.20 MILD INTERMITTENT ASTHMA WITHOUT COMPLICATION: ICD-10-CM

## 2022-04-12 PROCEDURE — 99496 TRANSJ CARE MGMT HIGH F2F 7D: CPT | Performed by: INTERNAL MEDICINE

## 2022-04-12 PROCEDURE — 1111F DSCHRG MED/CURRENT MED MERGE: CPT | Performed by: INTERNAL MEDICINE

## 2022-04-12 RX ORDER — TRAZODONE HYDROCHLORIDE 50 MG/1
100 TABLET ORAL NIGHTLY
Qty: 180 TABLET | Refills: 1 | Status: SHIPPED | OUTPATIENT
Start: 2022-04-12 | End: 2022-05-10 | Stop reason: SDUPTHER

## 2022-04-12 RX ORDER — PREDNISONE 1 MG/1
TABLET ORAL
Qty: 36 TABLET | Refills: 0 | Status: SHIPPED | OUTPATIENT
Start: 2022-04-12 | End: 2022-05-10 | Stop reason: ALTCHOICE

## 2022-04-12 RX ORDER — ALLOPURINOL 100 MG/1
100 TABLET ORAL DAILY
Qty: 90 TABLET | Refills: 1 | Status: SHIPPED | OUTPATIENT
Start: 2022-04-12 | End: 2022-05-10 | Stop reason: SDUPTHER

## 2022-04-12 RX ORDER — OMEPRAZOLE 20 MG/1
20 CAPSULE, DELAYED RELEASE ORAL DAILY
Qty: 90 CAPSULE | Refills: 1 | Status: SHIPPED | OUTPATIENT
Start: 2022-04-12 | End: 2022-05-10 | Stop reason: SDUPTHER

## 2022-04-12 RX ORDER — POTASSIUM CHLORIDE 750 MG/1
10 TABLET, EXTENDED RELEASE ORAL DAILY
Qty: 90 TABLET | Refills: 1 | Status: SHIPPED | OUTPATIENT
Start: 2022-04-12 | End: 2022-04-18 | Stop reason: SDUPTHER

## 2022-04-12 NOTE — PATIENT INSTRUCTIONS
For three days take 60mg of lasix/FUROSEMIDE and 20meq of potassium chloride,   Then for 6d take 40mg of lasix and 20meq of potassium,   Then back to 20mg lasix and 10meq of potassium  Recheck lab in two weeks.   Check weight daily and also call us back w your amount of weight drop in two weeks

## 2022-04-12 NOTE — PROGRESS NOTES
Post-Discharge Transitional Care Follow Up      Cecilia Wan   YOB: 1950    Date of Office Visit:  4/12/2022  Date of Hospital Admission: 4/6/22  Date of Hospital Discharge: 4/7/22  Readmission Risk Score (high >=14%. Medium >=10%):Readmission Risk Score: 13 ( )      Care management risk score Rising risk (score 2-5) and Complex Care (Scores >=6): 11     Non face to face  following discharge, date last encounter closed (first attempt may have been earlier): 4/8/2022 11:37 AM     Call initiated 2 business days of discharge: Yes     Acute systolic CHF (congestive heart failure) ([facility] [place] [address]- THOUGH HR IS MUCH IMPROVED, CLINICALLY STILL W SOME FLUID CONGESTION, W SIGNIFICANT WT GAIN NOTED. WE'LL TRY SHORT COURSE OF INCR LASIX AND KCL DOSING FOR NEXT COUPLE OF WEEKS THEN F/U LAB INCL BNP AND BMP    Patient Instructions   For three days take 60mg of lasix/FUROSEMIDE and 20meq of potassium chloride,   Then for 6d take 40mg of lasix and 20meq of potassium,   Then back to 20mg lasix and 10meq of potassium  Recheck lab in two weeks. Check weight daily and also call us back w your amount of weight drop in two weeks    FOR CLOSE FU ALSO DR Richard Pompa TOMORROW  -     Brain Natriuretic Peptide; Future  -     Basic Metabolic Panel; Future  Chronic atrial fibrillation ([facility]- HR STEADY AND CONT MED REGIMEN  -     GA DISCHARGE MEDS RECONCILED W/ CURRENT OUTPATIENT MED LIST  [facility]- W MILDLY DECR EF NOTED ON ECHO. OTHERWISE TREATMENT AS ABOVE  -     potassium chloride (KLOR-CON M) 10 MEQ extended release tablet; Take 1 tablet by mouth daily, Disp-90 tablet, R-1Normal  -     Brain Natriuretic Peptide; Future  -     Basic Metabolic Panel; Future  -     GA DISCHARGE MEDS RECONCILED W/ CURRENT OUTPATIENT MED LIST  Mild intermittent asthma without complication- BETTER AFTER BRIEF PRED TAPER, HE'LL FINISH THE TAPER AND SUSPECT THIS WAS TRIGGERED BY HIS AFIB/RVR AND ALSO THE CHF.   Gastroesophageal reflux disease without esophagitis - GERD controlled on meds and will refill, monitor for any recurrent or worsening sx.    -     omeprazole (PRILOSEC) 20 MG delayed release capsule; Take 1 capsule by mouth daily, Disp-90 capsule, R-1Normal  -     SD DISCHARGE MEDS RECONCILED W/ CURRENT OUTPATIENT MED LIST  Primary insomnia- The current medical regimen is effective;  continue present plan and medications. -     traZODone (DESYREL) 50 MG tablet; Take 2 tablets by mouth nightly, Disp-180 tablet, R-1Normal  -     SD DISCHARGE MEDS RECONCILED W/ CURRENT OUTPATIENT MED LIST  Elevated uric acid in blood- CONT SUPPRESSIVE THERAPY  -     allopurinol (ZYLOPRIM) 100 MG tablet; Take 1 tablet by mouth daily, Disp-90 tablet, R-1Normal  -     SD DISCHARGE MEDS RECONCILED W/ CURRENT OUTPATIENT MED LIST  Mild intermittent asthma with acute exacerbation  -     predniSONE (DELTASONE) 5 MG tablet; Take 8 pills, then 7,6,5,4,3,2,1, Disp-36 tablet, R-0Normal      Medical Decision Making: high complexity  Return in about 4 weeks (around 5/10/2022) for routine. On this date 2022 I have spent 25 minutes reviewing previous notes, test results and face to face with the patient discussing the diagnosis and importance of compliance with the treatment plan as well as documenting on the day of the visit. Subjective:   HPI    Inpatient course: Discharge summary reviewed- see chart. HAD RECENT FLARE OF AFIB/RVR THEN LEADING TO CHF. BNP ELEVATED, EF ON ECHO WAS 50% AND W MOD/SEVERE AS. Wt is still up, still w ENRIQUE but no sx cp or palpitations. dc summary:  Summary   This patient is in atrial fibrillation. Left ventricular systolic function is normal.   Ejection fraction is visually estimated at 50%. Mild left ventricular hypertrophy. Mildly dilated left ventricle. Severely dilated atrium bilaterally. Trace aortic regurgitation. Moderate to severe aortic stenosis (DVI 0.2, Mean P mmHg, TOMMY 1.15   cmsq).    Mild tricuspid regurgitation is present. RVSP is 44 mmHg. No evidence of any pericardial effusion. Signature      ------------------------------------------------------------------   Electronically signed by Petra Lara MD   (Interpreting physician) on 04/07/2022 at 11:37 AM          Interval history/Current status:   Hospital Course:   Michelle Goyal is a 66 y.o.  male  who presents with Chest pain  Patient presented with above complaint. He was admitted and evaluated. He was noted to be in Afib with Rapid ventricular response and he was promptly started on IV Cardizem. Cardiology was consulted. Since the rate was controlled, the drip was titrated and discontinued and he was placed on oral cardizem. His dose was increased to 360 from 240. He did well and felt better. He asked to be discharged since he is doing a lot better.        The patient expressed appropriate understanding of and agreement with the discharge recommendations, medications, and plan.      Consults this admission:  IP CONSULT TO HOSPITALIST  IP CONSULT TO HOSPITALIST  IP CONSULT TO CARDIOLOGY     Discharge Instruction:   Follow up appointments:   Primary care physician:  within 2 weeks    - [address] WHEEZING IS BETTER, NO SX CP  DOES HAVE FU W  [facility] CARDIOLOGY TOMORROW. WT IS UP SIGNIFICANTLY FROM 270 ON 4/7 UP  CURRENTLY.       Patient Active Problem List   Diagnosis    Atrial fibrillation (HCC)    GERD (gastroesophageal reflux disease)    Asthma    Knee pain, bilateral    DDD (degenerative disc disease), lumbar    Testosterone deficiency    Dilated cardiomyopathy ([address]    Gout    Hyperbilirubinemia    Mild intermittent asthma without complication    Cholelithiasis    Colonic polyp    Closed fracture of anterior lip of right acetabulum without additional fracture ([address]    Closed anterior dislocation of left shoulder    [facility]    SVT (supraventricular tachycardia) (HCC)    NSVT (nonsustained ventricular tachycardia) ([facility])    Gait disturbance    Leg weakness, bilateral    Uncontrolled pain    VHD (valvular heart disease)    IFG (impaired fasting glucose)    Diabetes mellitus type 2, controlled (HCC)    Diabetic neuropathy, type II diabetes mellitus ([facility]    COPD (chronic obstructive pulmonary disease) (HCC)    Nonrheumatic aortic valve stenosis    COVID-19 virus infection    Acute respiratory failure ([facility])    Atrial thrombosis    ENRIQUE (dyspnea on exertion)    Arthritis of left acromioclavicular joint    Coagulopathy ([facility])    Elevated PSA    Chest pain       Medications listed as ordered at the time of discharge from hospital     Medication List          Accurate as of April 12, 2022  1:51 PM. If you have any questions, ask your nurse or doctor.             CONTINUE taking these medications    albuterol (2.5 MG/3ML) 0.083% nebulizer solution  Commonly known as: PROVENTIL  Take 3 mLs by nebulization every 6 hours as needed for Wheezing     allopurinol 100 MG tablet  Commonly known as: Zyloprim  Take 1 tablet by mouth daily     calcium carb-cholecalciferol 600-200 MG-UNIT Tabs tablet     dilTIAZem 360 MG extended release capsule  Commonly known as: CARDIZEM CD  Take 1 capsule by mouth daily     Eliquis 5 MG Tabs tablet  Generic drug: apixaban     fluticasone-umeclidin-vilant 100-62.5-25 MCG/INH Aepb  Commonly known as: TRELEGY ELLIPTA  Inhale 1 puff into the lungs daily     furosemide 20 MG tablet  Commonly known as: LASIX  TAKE ONE TABLET BY MOUTH DAILY     metFORMIN 500 MG tablet  Commonly known as: GLUCOPHAGE  Take 1 tablet by mouth daily (with breakfast)     montelukast 10 MG tablet  Commonly known as: SINGULAIR  Take 1 tablet by mouth daily     omeprazole 20 MG delayed release capsule  Commonly known as: PRILOSEC  Take 1 capsule by mouth daily     potassium chloride 10 MEQ extended release tablet  Commonly known as: KLOR-CON M  Take 1 tablet by mouth daily     traZODone 50 MG tablet  Commonly known as: DESYREL  Take 2 tablets by mouth nightly     TRULICITY SC             Medications marked \"taking\" at this time  Outpatient Medications Marked as Taking for the 4/12/22 encounter (Office Visit) with Fawn Marte MD   Medication Sig Dispense Refill    albuterol (PROVENTIL) (2.5 MG/3ML) 0.083% nebulizer solution Take 3 mLs by nebulization every 6 hours as needed for Wheezing 120 each 3    dilTIAZem (CARDIZEM CD) 360 MG extended release capsule Take 1 capsule by mouth daily 30 capsule 3    metFORMIN (GLUCOPHAGE) 500 MG tablet Take 1 tablet by mouth daily (with breakfast) 90 tablet 1    furosemide (LASIX) 20 MG tablet TAKE ONE TABLET BY MOUTH DAILY 90 tablet 1    Dulaglutide (TRULICITY SC) Inject into the skin       fluticasone-umeclidin-vilant (TRELEGY ELLIPTA) 100-62.5-25 MCG/INH AEPB Inhale 1 puff into the lungs daily 1 each 5    apixaban (ELIQUIS) 5 MG TABS tablet Take 5 mg by mouth 2 times daily      traZODone (DESYREL) 50 MG tablet Take 2 tablets by mouth nightly 180 tablet 1    allopurinol (ZYLOPRIM) 100 MG tablet Take 1 tablet by mouth daily 90 tablet 1    omeprazole (PRILOSEC) 20 MG delayed release capsule Take 1 capsule by mouth daily 90 capsule 1    potassium chloride (KLOR-CON M) 10 MEQ extended release tablet Take 1 tablet by mouth daily 90 tablet 1    montelukast (SINGULAIR) 10 MG tablet Take 1 tablet by mouth daily 90 tablet 3    calcium carb-cholecalciferol 600-200 MG-UNIT TABS tablet Take 1 tablet by mouth daily          Medications patient taking as of now reconciled against medications ordered at time of hospital discharge: Yes    Review of Systems    Objective:    /60   Pulse 67   Resp 16   Ht 6' 5\" (1.956 m)   Wt 283 lb (128.4 kg)   SpO2 96%   BMI 33.56 kg/m²   Physical Exam  Vitals reviewed. Constitutional:       Appearance: He is well-developed. Cardiovascular:      Rate and Rhythm: Normal rate. Rhythm irregular. Heart sounds: Normal heart sounds. No murmur heard. No friction rub. No gallop. Pulmonary:      Effort: Pulmonary effort is normal. No respiratory distress. Breath sounds: Normal breath sounds. No wheezing or rales. Abdominal:      General: Bowel sounds are normal. There is no distension. Palpations: Abdomen is soft. Tenderness: There is no abdominal tenderness. Musculoskeletal:      Cervical back: Neck supple. Right lower leg: Edema present. Left lower leg: Edema present. Neurological:      General: No focal deficit present. Mental Status: He is alert. An electronic signature was used to authenticate this note.   --Selena Hdz MD

## 2022-04-13 ENCOUNTER — OFFICE VISIT (OUTPATIENT)
Dept: CARDIOLOGY CLINIC | Age: 72
End: 2022-04-13
Payer: MEDICARE

## 2022-04-13 VITALS
HEIGHT: 77 IN | HEART RATE: 78 BPM | SYSTOLIC BLOOD PRESSURE: 118 MMHG | DIASTOLIC BLOOD PRESSURE: 74 MMHG | WEIGHT: 279 LBS | BODY MASS INDEX: 32.94 KG/M2

## 2022-04-13 DIAGNOSIS — I35.0 NONRHEUMATIC AORTIC VALVE STENOSIS: ICD-10-CM

## 2022-04-13 DIAGNOSIS — E11.21 CONTROLLED TYPE 2 DIABETES MELLITUS WITH DIABETIC NEPHROPATHY, WITHOUT LONG-TERM CURRENT USE OF INSULIN (HCC): ICD-10-CM

## 2022-04-13 DIAGNOSIS — I48.20 CHRONIC ATRIAL FIBRILLATION (HCC): Primary | ICD-10-CM

## 2022-04-13 PROBLEM — I38 VHD (VALVULAR HEART DISEASE): Status: RESOLVED | Noted: 2018-11-29 | Resolved: 2022-04-13

## 2022-04-13 PROCEDURE — 99214 OFFICE O/P EST MOD 30 MIN: CPT | Performed by: INTERNAL MEDICINE

## 2022-04-13 PROCEDURE — 3044F HG A1C LEVEL LT 7.0%: CPT | Performed by: INTERNAL MEDICINE

## 2022-04-13 PROCEDURE — 93000 ELECTROCARDIOGRAM COMPLETE: CPT | Performed by: INTERNAL MEDICINE

## 2022-04-13 PROCEDURE — G8417 CALC BMI ABV UP PARAM F/U: HCPCS | Performed by: INTERNAL MEDICINE

## 2022-04-13 PROCEDURE — 1111F DSCHRG MED/CURRENT MED MERGE: CPT | Performed by: INTERNAL MEDICINE

## 2022-04-13 PROCEDURE — 3017F COLORECTAL CA SCREEN DOC REV: CPT | Performed by: INTERNAL MEDICINE

## 2022-04-13 PROCEDURE — G8427 DOCREV CUR MEDS BY ELIG CLIN: HCPCS | Performed by: INTERNAL MEDICINE

## 2022-04-13 PROCEDURE — 1123F ACP DISCUSS/DSCN MKR DOCD: CPT | Performed by: INTERNAL MEDICINE

## 2022-04-13 PROCEDURE — 4040F PNEUMOC VAC/ADMIN/RCVD: CPT | Performed by: INTERNAL MEDICINE

## 2022-04-13 PROCEDURE — 1036F TOBACCO NON-USER: CPT | Performed by: INTERNAL MEDICINE

## 2022-04-13 PROCEDURE — 2022F DILAT RTA XM EVC RTNOPTHY: CPT | Performed by: INTERNAL MEDICINE

## 2022-04-13 NOTE — ASSESSMENT & PLAN NOTE
Moderately severe aortic stenosis with gradient of 44 mmHg on recent echo as noted. Recent admission with some evidence of CHF is noted. Recommend further evaluation by left and right heart catheterization and TIM to see if he has any coronary artery disease and if valve stenosis is significant to intervene. Patient verbalized understanding. I have discussed the case with Dr. Naty Burris.

## 2022-04-13 NOTE — ASSESSMENT & PLAN NOTE
Rate is well controlled on diltiazem  mg daily and he is anticoagulated with Eliquis. Continue the same.

## 2022-04-13 NOTE — PATIENT INSTRUCTIONS
Continue current cardiovascular medications which have been reviewed and discussed individually with you. Will have Dr Liborio Bullard evaluate for TAVR and left and right heart cath. Follow-up in 3 months, sooner if needed.

## 2022-04-13 NOTE — PROGRESS NOTES
Stuart Pñea (:  1950) is a 66 y.o. male,     Chief Complaint   Patient presents with    Follow-Up from Hospital     pt was in hospital for A-Fib RVR. pt has SOB and tightness in his lungs. pt lamin any CP. pt has restarted exercising. pt uses O2 at home. pt does not smoke and drinks a glass of wine in the evening. pt has no future procedures. Patient is here for follow up for atrial fibrillation and valvular heart disease. He was in the hospital on  with febrile illness and atrial fibrillation with rapid rate and mild congestive heart failure responded to antibiotics and rate controlling measures and he is feeling back to normal now. Echocardiogram suggested moderately severe aortic stenosis. Blood cultures were positive however by reviewing the records and talk with the patient we do not know the source. Echocardiogram did not show any vegetations. Allergies   Allergen Reactions    Lisinopril      Chest tightness    Simvastatin Other (See Comments)     Muscle cramps     Prior to Admission medications    Medication Sig Start Date End Date Taking?  Authorizing Provider   allopurinol (ZYLOPRIM) 100 MG tablet Take 1 tablet by mouth daily 22  Yes Mainor Lara MD   potassium chloride (KLOR-CON M) 10 MEQ extended release tablet Take 1 tablet by mouth daily 22  Yes Mainor Lara MD   omeprazole (PRILOSEC) 20 MG delayed release capsule Take 1 capsule by mouth daily 22  Yes Mainor Lara MD   traZODone (DESYREL) 50 MG tablet Take 2 tablets by mouth nightly 22  Yes Mainor Lara MD   predniSONE (DELTASONE) 5 MG tablet Take 8 pills, then 7,6,5,4,3,2,1 22  Yes Mainor Lara MD   albuterol (PROVENTIL) (2.5 MG/3ML) 0.083% nebulizer solution Take 3 mLs by nebulization every 6 hours as needed for Wheezing 22  Yes Mainor Lara MD   dilTIAZem (CARDIZEM CD) 360 MG extended release capsule Take 1 capsule by mouth daily 22  Yes Иван Avery SAMANTHA Sprague,    metFORMIN (GLUCOPHAGE) 500 MG tablet Take 1 tablet by mouth daily (with breakfast) 2/9/22  Yes Josue Thorpe MD   furosemide (LASIX) 20 MG tablet TAKE ONE TABLET BY MOUTH DAILY  Patient taking differently: TAKE ONE TABLET BY MOUTH DAILY  60mg till 4/15  40mg from 4/15-4/21  Then start 20 mg 2/9/22  Yes Josue Thorpe MD   Dulaglutide (TRULICITY SC) Inject into the skin    Yes Historical Provider, MD   fluticasone-umeclidin-vilant (TRELEGY ELLIPTA) 100-62.5-25 MCG/INH AEPB Inhale 1 puff into the lungs daily 1/10/22  Yes Harman Hwang MD   apixaban (ELIQUIS) 5 MG TABS tablet Take 5 mg by mouth 2 times daily   Yes Historical Provider, MD   montelukast (SINGULAIR) 10 MG tablet Take 1 tablet by mouth daily 9/22/21  Yes Mary Beth Ledesma, APRN - CNP   calcium carb-cholecalciferol 600-200 MG-UNIT TABS tablet Take 1 tablet by mouth daily   Yes Historical Provider, MD     Past Medical History:   Diagnosis Date    Aortic stenosis, mild 11/08/2017    By echo 10/2016    Asthma, intrinsic     Dr Lizz Mccoy and Arlyn Guerrero.  Atrial fibrillation [address] [address]     Colonic polyp 05/2009    Dr Priya Azevedo- also done 4/16/2012- AND 3/8/17-TUBULAR ADENOMA,-RECHECK 5YRS    COPD (chronic obstructive pulmonary disease) ([facility] [address]     noted on PFTs 6/2020    DDD (degenerative disc disease), lumbar     Diabetes mellitus type 2, controlled ([address]     Dr Peter Garcia- optho, Dr Mercy Leigh- podiatry    Diabetic neuropathy, type II diabetes mellitus ([address]     DECR MONOFIL SENSATION    Dilated cardiomyopathy ([facility] [address] 11/04/2015    Elevated PSA 11/02/2021    Dr Katerina Reardon    GERD (gastroesophageal reflux disease)     Gout     H/O cardiovascular stress test 12/10/2015    lexiscan-normal,EF65%    History of total right hip arthroplasty     Dr Dimple Nam 9/2018- does get antibiotic prophylaxis w dental work- Dr Yolanda Quezada    Hyperlipidemia     IFG (impaired fasting glucose)     Jan 2019- a1c 6.1 but fasting glc 131.     Knee pain, bilateral     Long-term (current) use of anticoagulants     Peptic ulcer disease     Testosterone deficiency dx'd 2015    Ventricular tachycardia [facility])     mentioned by Dr Linda Lam 2019 note-who monitors him    VHD (valvular heart disease) 2018    Mild aortic stenosis by echo 2018      Vitals:    22 1213   BP: 118/74   Pulse: 78   Weight: 279 lb (126.6 kg)   Height: 6' 5\" (1.956 m)      Body mass index is 33.08 kg/m². Wt Readings from Last 3 Encounters:   22 279 lb (126.6 kg)   22 283 lb (128.4 kg)   22 270 lb 15.1 oz (122.9 kg)     Constitutional:  Patient is well-built moderately overweight male in no apparent distress. HEENT: Wearing glasses and facemask. Cardiovascular: Auscultation: Normal S1 and S2.  2/6 to 3/6 systolic murmur noted in aortic area left sternal border. Carotids are negative for bruits. Respiratory:  Respiratory effort is normal. Breath sounds are clear to auscultation. Extremities: Patient is wearing bilateral compression stockings  Abdomen:  No masses or tenderness. No organomegaly noted. Neurologic:  Oriented to time, place, and person and non-anxious. No focal neurological deficit noted. Psychiatric: Normal mood and effect. EKG today is consistent with atrial fibrillation rate of 78 bpm.    Echocardiogram on 2022 reported   Left ventricular systolic function is normal.   Ejection fraction is visually estimated at 50%. Mild left ventricular hypertrophy. Mildly dilated left ventricle. Severely dilated atrium bilaterally. Trace aortic regurgitation. Moderate to severe aortic stenosis (DVI 0.2, Mean P mmHg, TOMMY 1.15  cmsq). Mild tricuspid regurgitation is present. RVSP is 44 mmHg. No evidence of any pericardial effusion. Last Chest Xray 22 reported  1. Stable pulmonary vascular congestion.      Pertinent records reviewed and discussed with patient and results are as follow:    Lab Results   Component Value Date    WBC 3.3 04/07/2022    HGB 11.1 04/07/2022    HCT 37.9 04/07/2022     04/07/2022     Lab Results   Component Value Date    CHOL 86 04/07/2022    TRIG 62 04/07/2022    HDL 36 (L) 04/07/2022    LDLCALC 38 04/07/2022    LDLDIRECT 54 11/17/2020     Lab Results   Component Value Date    BUN 13 04/07/2022    CREATININE 1.0 04/07/2022     04/07/2022    K 4.1 04/07/2022     Pro-BNP <300 PG/ML 1,327 High on 4/6/2022     Lab Results   Component Value Date    INR 1.07 12/28/2020     Lab Results   Component Value Date    LABA1C 5.9 04/07/2022     ASSESSMENT/PLAN:    1. Chronic atrial fibrillation (HCC)  Assessment & Plan:  Rate is well controlled on diltiazem  mg daily and he is anticoagulated with Eliquis. Continue the same. Orders:  -     EKG 12 lead  2. Nonrheumatic aortic valve stenosis  Assessment & Plan: Moderately severe aortic stenosis with gradient of 44 mmHg on recent echo as noted. Recent admission with some evidence of CHF is noted. Recommend further evaluation by left and right heart catheterization and TIM to see if he has any coronary artery disease and if valve stenosis is significant to intervene. Patient verbalized understanding. I have discussed the case with Dr. Raquel Rosado. 3. Controlled type 2 diabetes mellitus with diabetic nephropathy, without long-term current use of insulin ([address]  Assessment & Plan:  Fairly well-controlled with recent hemoglobin A1c was 5.9. Follows up with PCP. Continue current cardiovascular medications which have been reviewed and discussed individually with you. Will have Dr Raquel Rosado evaluate for TAVR and left and right heart cath. Follow-up in 3 months, sooner if needed. An electronic signature was used to authenticate this note.     --Floridalma Barrios MD

## 2022-04-14 ENCOUNTER — CARE COORDINATION (OUTPATIENT)
Dept: CASE MANAGEMENT | Age: 72
End: 2022-04-14

## 2022-04-14 NOTE — CARE COORDINATION
[facility] Transitions Follow Up Call    2022    Patient: Alvarez Mrac  Patient : 1950   MRN: [id number]  Reason for Admission: SOB, Chest pain/pressure  Discharge Date: 22 RARS: Readmission Risk Score: 13 ( )         Spoke with: Adán Shelby, patient    Contacted patient for care transition follow up. Spoke very briefly with patient. Mr. Kailey Hutchins states he is still under his doctor's care. Denies having any c/o chest pain and states he never really had chest pain. Continues to have a rapid HR. Had follow up with cardiology yesterday and has another appointment with cardiology on Monday, 22. Reports he is short of breath at rest and with exertion. He continues to use his oxygen. Denies distress. Denies having any dizziness/lightheadedness. He is aware of when to contact provider with any new or worsening symptoms and when to call 911 and/or report to the ER. No questions or needs at this time. Care Transitions Follow Up Call    Needs to be reviewed by the provider   Additional needs identified to be addressed with provider: No  none             Method of communication with provider : none      Care Transition Nurse (CTN) contacted the patient by telephone to follow up after admission on 22-22. Verified name and  with patient as identifiers. Addressed changes since last contact: continues to have elevated HR and SOB  Discussed follow-up appointments. If no appointment was previously scheduled, appointment scheduling offered: Yes. Is follow up appointment scheduled within 7 days of discharge? Yes    Patients top risk factors for readmission: medical condition-Acute Respiratory Failure, Asthma, AFIB, COPD, DM, NSVT, Valvular Heart Disease  Interventions to address risk factors: Scheduled appointment with Specialist-Has another apointment scheduled with cardiology on 22    CTN provided contact information for future needs.  Plan for follow-up call in 7-10 days based on severity of symptoms and risk factors. Plan for next call: symptom management-any new or worsening symptoms      Care Transitions Subsequent and Final Call    Subsequent and Final Calls  Do you have any ongoing symptoms?: Yes  Patient-reported symptoms: Shortness of Breath, Other  Do you currently have any active services?: No  Are you currently active with any services?: Home Health  Do you have any needs or concerns that I can assist you with?: No  Care Transitions Interventions   Home Care Waiver: Declined        DME Assistance: Declined   Other Interventions:            Follow Up  Future Appointments   Date Time Provider Kaylee Dean   4/18/2022 12:10 PM Lewis Meyer MD [facility]   5/10/2022 11:15 AM Monse Cadet MD [address] [facility]   7/12/2022 10:30 AM Margaret Anderson [address] [facility] [address] [facility]   7/13/2022 11:00 AM Romy Orozco MD [facility]   11/10/2022 10:40 AM Romy Orozco MD [facility]       Governor Yeimy RN

## 2022-04-18 ENCOUNTER — INITIAL CONSULT (OUTPATIENT)
Dept: CARDIOLOGY CLINIC | Age: 72
End: 2022-04-18
Payer: MEDICARE

## 2022-04-18 VITALS
WEIGHT: 279 LBS | DIASTOLIC BLOOD PRESSURE: 72 MMHG | BODY MASS INDEX: 32.94 KG/M2 | HEIGHT: 77 IN | SYSTOLIC BLOOD PRESSURE: 124 MMHG | HEART RATE: 91 BPM

## 2022-04-18 DIAGNOSIS — I50.33 ACUTE ON CHRONIC DIASTOLIC CONGESTIVE HEART FAILURE (HCC): ICD-10-CM

## 2022-04-18 DIAGNOSIS — J43.1 PANLOBULAR EMPHYSEMA (HCC): ICD-10-CM

## 2022-04-18 DIAGNOSIS — I47.20 VT (VENTRICULAR TACHYCARDIA): ICD-10-CM

## 2022-04-18 DIAGNOSIS — R07.89 OTHER CHEST PAIN: ICD-10-CM

## 2022-04-18 DIAGNOSIS — I42.0 DILATED CARDIOMYOPATHY (HCC): ICD-10-CM

## 2022-04-18 DIAGNOSIS — E80.6 HYPERBILIRUBINEMIA: ICD-10-CM

## 2022-04-18 DIAGNOSIS — I47.29 NSVT (NONSUSTAINED VENTRICULAR TACHYCARDIA): ICD-10-CM

## 2022-04-18 DIAGNOSIS — I47.1 SVT (SUPRAVENTRICULAR TACHYCARDIA) (HCC): ICD-10-CM

## 2022-04-18 DIAGNOSIS — I48.20 CHRONIC ATRIAL FIBRILLATION (HCC): Primary | ICD-10-CM

## 2022-04-18 DIAGNOSIS — I35.0 NONRHEUMATIC AORTIC VALVE STENOSIS: ICD-10-CM

## 2022-04-18 PROCEDURE — 4040F PNEUMOC VAC/ADMIN/RCVD: CPT | Performed by: INTERNAL MEDICINE

## 2022-04-18 PROCEDURE — 99214 OFFICE O/P EST MOD 30 MIN: CPT | Performed by: INTERNAL MEDICINE

## 2022-04-18 PROCEDURE — 1036F TOBACCO NON-USER: CPT | Performed by: INTERNAL MEDICINE

## 2022-04-18 PROCEDURE — 3017F COLORECTAL CA SCREEN DOC REV: CPT | Performed by: INTERNAL MEDICINE

## 2022-04-18 PROCEDURE — 3023F SPIROM DOC REV: CPT | Performed by: INTERNAL MEDICINE

## 2022-04-18 PROCEDURE — G8427 DOCREV CUR MEDS BY ELIG CLIN: HCPCS | Performed by: INTERNAL MEDICINE

## 2022-04-18 PROCEDURE — 1123F ACP DISCUSS/DSCN MKR DOCD: CPT | Performed by: INTERNAL MEDICINE

## 2022-04-18 PROCEDURE — G8417 CALC BMI ABV UP PARAM F/U: HCPCS | Performed by: INTERNAL MEDICINE

## 2022-04-18 PROCEDURE — 1111F DSCHRG MED/CURRENT MED MERGE: CPT | Performed by: INTERNAL MEDICINE

## 2022-04-18 RX ORDER — POTASSIUM CHLORIDE 20 MEQ/1
40 TABLET, EXTENDED RELEASE ORAL DAILY
Qty: 90 TABLET | Refills: 3 | Status: SHIPPED | OUTPATIENT
Start: 2022-04-18

## 2022-04-18 NOTE — PROGRESS NOTES
CARDIOLOGY  NOTE    Chief Complaint: Aortic stenosis    HPI:   Liza Araya is a 66y.o. year old who has Past medical history as noted below. Comes in for evaluation due to abnormal echo showing possible moderate to severe aortic stenosis with a mean gradient of 44 mmHg and an aortic valve area was 1.1 cm² square which was done during his hospitalization in April 2022 when he was admitted with decompensated heart failure in the setting of A. fib with rapid heart rate and pneumonia associated with fevers rigors and chills. He is feeling much better now after increasing Lasix to 40 twice daily. He says shortness of breath is better but he still getting winded and short of breath on exertion when climbing stairs he denies any dizzy spells or passing out episodes.   Throughout the conversation his sonRyan on Phone and his wife is assisting with some of the information  Liam Ramirez normally sees Dr. Dozier Person he has longstanding history of persistent atrial fibrillation but his son believes it is been gradually declining and more short of breath with reduced exercise capacity ever since he had Covid a year ago  He has no known coronary artery disease last stress test in 2018 showed no ischemia  During his hospitalization his chest x-ray was concerning for pulmonary edema versus pneumonia BNP was slightly elevated    Current Outpatient Medications   Medication Sig Dispense Refill    potassium chloride (KLOR-CON M) 20 MEQ extended release tablet Take 2 tablets by mouth daily 90 tablet 3    allopurinol (ZYLOPRIM) 100 MG tablet Take 1 tablet by mouth daily 90 tablet 1    omeprazole (PRILOSEC) 20 MG delayed release capsule Take 1 capsule by mouth daily 90 capsule 1    traZODone (DESYREL) 50 MG tablet Take 2 tablets by mouth nightly 180 tablet 1    predniSONE (DELTASONE) 5 MG tablet Take 8 pills, then 7,6,5,4,3,2,1 36 tablet 0    albuterol (PROVENTIL) (2.5 MG/3ML) 0.083% nebulizer solution Take 3 mLs by nebulization every 6 hours as needed for Wheezing 120 each 3    dilTIAZem (CARDIZEM CD) 360 MG extended release capsule Take 1 capsule by mouth daily 30 capsule 3    metFORMIN (GLUCOPHAGE) 500 MG tablet Take 1 tablet by mouth daily (with breakfast) 90 tablet 1    furosemide (LASIX) 20 MG tablet TAKE ONE TABLET BY MOUTH DAILY (Patient taking differently: TAKE ONE TABLET BY MOUTH DAILY  60mg till 4/15  40mg from 4/15-4/21  Then start 20 mg) 90 tablet 1    Dulaglutide (TRULICITY SC) Inject into the skin       fluticasone-umeclidin-vilant (TRELEGY ELLIPTA) 100-62.5-25 MCG/INH AEPB Inhale 1 puff into the lungs daily 1 each 5    apixaban (ELIQUIS) 5 MG TABS tablet Take 5 mg by mouth 2 times daily      montelukast (SINGULAIR) 10 MG tablet Take 1 tablet by mouth daily 90 tablet 3    calcium carb-cholecalciferol 600-200 MG-UNIT TABS tablet Take 1 tablet by mouth daily       No current facility-administered medications for this visit. Allergies:   Lisinopril and Simvastatin    Patient History:  Past Medical History:   Diagnosis Date    Aortic stenosis, mild 11/08/2017    By echo 10/2016    Asthma, intrinsic     Dr Pamella Hernández and Travis Rodriguez.     Atrial fibrillation ([address]     Colonic polyp 05/2009    Dr Manisha Anthony- also done 4/16/2012- AND 3/8/17-TUBULAR ADENOMA,-RECHECK 5YRS    COPD (chronic obstructive pulmonary disease) ([address]     noted on PFTs 6/2020    DDD (degenerative disc disease), lumbar     Diabetes mellitus type 2, controlled ([address]     Dr Cullen Coelho- optho, Dr Sofía Mendieta- podiatry    Diabetic neuropathy, type II diabetes mellitus ([address]     DECR MONOFIL SENSATION    Dilated cardiomyopathy ([address] 11/04/2015    Elevated PSA 11/02/2021    Dr Flory Cortez    GERD (gastroesophageal reflux disease)     Gout     H/O cardiovascular stress test 12/10/2015    lexiscan-normal,EF65%    History of total right hip arthroplasty     Dr Darius Lopez 9/2018- does get antibiotic prophylaxis w dental work- Dr Diane Fothergill Hyperlipidemia     IFG (impaired fasting glucose)     2019- a1c 6.1 but fasting glc 131.  Knee pain, bilateral     Long-term (current) use of anticoagulants     Peptic ulcer disease     Testosterone deficiency dx'd 2015    Ventricular tachycardia ([address]     mentioned by Dr Tasia Alex 2019 note-who monitors him    VHD (valvular heart disease) 2018    Mild aortic stenosis by echo 2018     Past Surgical History:   Procedure Laterality Date    HIP ARTHROPLASTY Right 2018    Dr Karlee Guajardo Right 2016    Dr Priti Abernathy  3/08, dr Kathy Nguyen    lap assisted colon mass resection-benign    POLYPECTOMY  - jaimie    colonic polypectomy    POLYPECTOMY  9/10/02- dr Lia Alva    colonic polypectomy    TOE AMPUTATION Left 2021    LEFT SECOND TOE PARTIAL AMPUTATION in [place] by Dr. Jan Barrios Left 2021    partial to L second, Dr Salome Moody.     TOE AMPUTATION Left 12/3/2021    LEFT SECOND TOE PARTIAL AMPUTATION performed by Beata Barroso DPM at [facility]  10/08, dr Warden Tam Left 2014    [facility] Dr Danisha Webb     Family History   Problem Relation Age of Onset    Hypertension Mother     High Blood Pressure Mother     Other Mother         lung problems    Other Father         CHF, A. Fib    Hypotension Father     Diabetes Father     No Known Problems Sister     Atrial Fibrillation Brother     Atrial Fibrillation Brother     No Known Problems Sister     No Known Problems Sister      Social History     Tobacco Use    Smoking status: Former Smoker     Packs/day: 1.00     Years: 39.00     Pack years: 39.00     Types: Cigarettes     Start date:      Quit date: 2013     Years since quittin.2    Smokeless tobacco: Never Used   Substance Use Topics    Alcohol use: Yes     Comment: occasional wine/moderate/ caffeine 2 cups of coffee        Review of Systems: · Constitutional: No Fever or Weight Loss   · Eyes: No Decreased Vision  · ENT: No Headaches, Hearing Loss or Vertigo  · Cardiovascular: as per note above   · Respiratory: No cough or wheezing and as per note above. · Gastrointestinal: No abdominal pain, appetite loss, blood in stools, constipation, diarrhea or heartburn  · Genitourinary: No dysuria, trouble voiding, or hematuria  · Musculoskeletal:  denies any new  joint aches , swelling  or pain   · Integumentary: No rash or pruritis  · Neurological: No TIA or stroke symptoms  · Psychiatric: No anxiety or depression  · Endocrine: No malaise, fatigue or temperature intolerance  · Hematologic/Lymphatic: No bleeding problems, blood clots or swollen lymph nodes  · Allergic/Immunologic: No nasal congestion or hives    Objective:      Physical Exam:  /72   Pulse 91   Ht 6' 5\" (1.956 m)   Wt 279 lb (126.6 kg)   BMI 33.08 kg/m²   Wt Readings from Last 3 Encounters:   04/18/22 279 lb (126.6 kg)   04/13/22 279 lb (126.6 kg)   04/12/22 283 lb (128.4 kg)     Body mass index is 33.08 kg/m². Vitals:    04/18/22 1228   BP: 124/72   Pulse: 91        General Appearance:  No distress, conversant  Constitutional:  Well developed, Well nourished, No acute distress, Non-toxic appearance. HENT:  Normocephalic, Atraumatic, Bilateral external ears normal, Oropharynx moist, No oral exudates, Nose normal. Neck- Normal range of motion, No tenderness, Supple, No stridor,no apical-carotid delay  Eyes:  PERRL, EOMI, Conjunctiva normal, No discharge. Respiratory:  Normal breath sounds, No respiratory distress, No wheezing, No chest tenderness. ,no use of accessory muscles, NO crackles  Cardiovascular: (PMI) apex non displaced,no lifts no thrills,S1 and S2 audible, No added heart sounds, No signs of ankle edema, or volume overload, No evidence of JVD, No crackles  GI:  Bowel sounds normal, Soft, No tenderness, No masses, No gross visceromegaly   :  No costovertebral angle tenderness   Musculoskeletal:  No edema, no tenderness, no deformities. Back- no tenderness  Integument:  Well hydrated, no rash   Lymphatic:  No lymphadenopathy noted   Neurologic:  Alert & oriented x 3, CN 2-12 normal, normal motor function, normal sensory function, no focal deficits noted   Psychiatric:  Speech and behavior appropriate       Medical decision making and Data review:  DATA:  Lab Results   Component Value Date    TROPONINT <0.010 04/06/2022     BNP:    Lab Results   Component Value Date    PROBNP 1,327 (H) 04/06/2022     PT/INR:  No results found for: PTINR  Lab Results   Component Value Date    LABA1C 5.9 04/07/2022    LABA1C 6.0 11/01/2021     Lab Results   Component Value Date    CHOL 86 04/07/2022    TRIG 62 04/07/2022    HDL 36 (L) 04/07/2022    LDLCALC 38 04/07/2022    LDLDIRECT 54 11/17/2020     Lab Results   Component Value Date    ALT 17 04/07/2022    AST 26 04/07/2022     No results for input(s): WBC, HGB, HCT, MCV, PLT in the last 72 hours. TSH:   Lab Results   Component Value Date    TSH 1.01 11/01/2021     Lab Results   Component Value Date    AST 26 04/07/2022    ALT 17 04/07/2022    BILIDIR 0.5 (H) 01/06/2017    BILITOT 0.7 04/07/2022    ALKPHOS 69 04/07/2022     Lab Results   Component Value Date    PROBNP 1,327 (H) 04/06/2022    PROBNP 530.6 (H) 12/25/2020    PROBNP 552.9 (H) 12/23/2020     Lab Results   Component Value Date    LABA1C 5.9 04/07/2022    LABA1C 6.0 11/01/2021     Lab Results   Component Value Date    WBC 3.3 (L) 04/07/2022    HGB 11.1 (L) 04/07/2022    HCT 37.9 (L) 04/07/2022     04/07/2022     Echo 6/2019     Summary   LV function and size are normal, Ejection Fraction 55-60 %. Mild concentric left ventricular hypertrophy. Diastolic dysfunction could not be evaluated due to arrhythmia. Moderately dilated left and right atrium and right ventricle. Aortic sclerosis with mild stenosis.  The aortic valve area is 1.59 cm2 with   a mean gradient of 16 mmHg.   RVSP= 32 mmHg. IVC appears dilated, but maintains respiratory changes. No evidence of pericardial effusion. All labs, medications and tests reviewed by myself including data and history from outside source , patient and available family . Assessment & Plan:      1. Chronic atrial fibrillation (HCC)    2. Other chest pain    3. Dilated cardiomyopathy ([address]    4. NSVT (nonsustained ventricular tachycardia) ([address]    5. Nonrheumatic aortic valve stenosis    6. SVT (supraventricular tachycardia) ([address]    7. VT (ventricular tachycardia) ([address]    8. Hyperbilirubinemia    9. Panlobular emphysema ([address]    10. Acute on chronic diastolic congestive heart failure (HCC)         Nonrheumatic aortic valve stenosis  We had extensive discussion including pathophysiology of aortic stenosis and treatment options. I think his symptoms are currently multifactorial in the setting of mild COPD decline due to deconditioning and CHF due to aortic stenosis. His aortic valve area is calculated 1.15 cm but gradient is 44 mmHg. Advised him to continue taking Lasix 40 mg daily. Repeat echo in 6 weeks after diuresis we had extensive discussion explained that he will need a left and right heart cath and possible TIM for further evaluation and eventually he will need aortic valve replacement within the next few months to a year advised to be cautious and avoid strenuous activity and call us if he starts getting dizzy lightheaded or has more shortness of breath for now continue Lasix 40 mg daily and potassium    Atrial fibrillation ([address]   He has permanent atrial fibrillation continue rate control strategy currently on Cardizem he occasionally notices a heart rate in the 50s but he is not symptomatic.   Continue anticoagulation    Acute on chronic diastolic congestive heart failure (HCC)  Due to combination of diastolic dysfunction, aortic stenosis advised to watch salt check weight every day for now continue 40 mg Lasix and potassium every day         Dyslipidemia :  All available lab work was reviewed. Patient was advised to repeat lab work before next visit. Necessary orders were placed , instructions given by myself       Counseled extensively and medication compliance urged. We discussed that for the  prevention of ASCVD our  goal is aggressive risk modification. Patient is encouraged to exercise if they can , educated about  brisk walk for 30 minutes  at least 3 to 4 times a week if there are no physical limitations  Various goals were discussed and questions answered. Continue current medications. Appropriate prescriptions are addressed and refills ordered. Questions answered and patient verbalizes understanding. Call for any problems, questions, or concerns. Greater than 60 % of time spent counseling besides reviewing data and images     Continue all other medications of all above medical condition listed as is. Return in about 2 months (around 6/18/2022). Please note this report has been partially produced using speech recognition software and may contain errors related to that system including errors in grammar, punctuation, and spelling, as well as words and phrases that may be inappropriate. If there are any questions or concerns please feel free to contact the dictating provider for clarification.

## 2022-04-18 NOTE — ASSESSMENT & PLAN NOTE
We had extensive discussion including pathophysiology of aortic stenosis and treatment options. I think his symptoms are currently multifactorial in the setting of mild COPD decline due to deconditioning and CHF due to aortic stenosis. His aortic valve area is calculated 1.15 cm but gradient is 44 mmHg. Advised him to continue taking Lasix 40 mg daily.   Repeat echo in 6 weeks after diuresis we had extensive discussion explained that he will need a left and right heart cath and possible TIM for further evaluation and eventually he will need aortic valve replacement within the next few months to a year advised to be cautious and avoid strenuous activity and call us if he starts getting dizzy lightheaded or has more shortness of breath for now continue Lasix 40 mg daily and potassium

## 2022-04-18 NOTE — ASSESSMENT & PLAN NOTE
Due to combination of diastolic dysfunction, aortic stenosis advised to watch salt check weight every day for now continue 40 mg Lasix and potassium every day

## 2022-04-18 NOTE — ASSESSMENT & PLAN NOTE
He has permanent atrial fibrillation continue rate control strategy currently on Cardizem he occasionally notices a heart rate in the 50s but he is not symptomatic.   Continue anticoagulation

## 2022-04-19 DIAGNOSIS — I42.0 DILATED CARDIOMYOPATHY (HCC): ICD-10-CM

## 2022-04-19 RX ORDER — FUROSEMIDE 40 MG/1
40 TABLET ORAL DAILY
Qty: 90 TABLET | Refills: 1 | Status: ON HOLD | OUTPATIENT
Start: 2022-04-19 | End: 2022-06-21 | Stop reason: SDUPTHER

## 2022-04-21 ENCOUNTER — CARE COORDINATION (OUTPATIENT)
Dept: CASE MANAGEMENT | Age: 72
End: 2022-04-21

## 2022-04-21 NOTE — CARE COORDINATION
[facility] Transitions Follow Up Call    2022    Patient: Luiz Mera  Patient : 1950   MRN: [id number]  Reason for Admission: Chest pain  Discharge Date: 22 RARS: Readmission Risk Score: 13 ( )         Spoke with: Aramis Carter, patient    Contacted patient for care transition follow up. Mr. Estephania Gonzalez states that he is doing fairly good. Continues to become short of breath with exertion but states breathing has improved. Denies distress. Denies having any c/o chest pain/discomfort, dizziness/lightheadedness. He states that he does not feel the palpitations and unaware of when HR is elevated. He is monitoring his weight. States he had a weight gain of 13 lbs but has lost 10 lbs since. He is taking Lasix and Potassium as prescribed. Discussed when to contact provider with a weight gain of 2-3 lbs within 24 hours or 5 lbs within a week. He verbalized understanding. He is eating and drinking fluids w/o issues. He is monitoring his salt intake and not adding any additional salt. He is aware of when to contact his providers with any new or worsening symptoms. No questions or needs at this time. Care Transitions Follow Up Call    Needs to be reviewed by the provider   Additional needs identified to be addressed with provider: No  none             Method of communication with provider : none      Care Transition Nurse (CTN) contacted the patient by telephone to follow up after admission on 22-22. Verified name and  with patient as identifiers. Addressed changes since last contact: Continues to monitor his weight. Weight has decreased. Discussed follow-up appointments. If no appointment was previously scheduled, appointment scheduling offered: Yes. Is follow up appointment scheduled within 7 days of discharge? Yes.     CTN reviewed discharge instructions, medical action plan and red flags with patient and discussed any barriers to care and/or understanding of plan of

## 2022-04-27 ENCOUNTER — TELEPHONE (OUTPATIENT)
Dept: CARDIOLOGY CLINIC | Age: 72
End: 2022-04-27

## 2022-04-27 NOTE — TELEPHONE ENCOUNTER
Pharmacy called in reference to the RX for Potassium chloride quantity ,dose and directions need clarified.  Please review

## 2022-04-28 ENCOUNTER — CARE COORDINATION (OUTPATIENT)
Dept: CASE MANAGEMENT | Age: 72
End: 2022-04-28

## 2022-04-28 NOTE — CARE COORDINATION
[facility] Transitions Follow Up Call    2022    Patient: Amarilys Lopez  Patient : 1950   MRN: [id number]  Reason for Admission: Chest pain  Discharge Date: 22 RARS: Readmission Risk Score: 13 ( )    Attempted to contact patient for care transition follow up. Unable to reach patient. Left message with contact information and request for call back.         Follow Up  Future Appointments   Date Time Provider Kaylee Dean   5/10/2022 11:15 AM Varsha Barajas MD [address] [place] E S IM [facility]   2022  9:45 AM SCHEDULE, [facility] ECHO [facility]   2022 12:10 PM Sandra Sawant MD [facility] Heart [facility]   2022 10:30 AM [facility]EDULE[facility] [address] [place] [facility] [address] [place] E S IM [facility]   2022 11:00 AM Luis F Jenkins MD [facility] Heart [facility]   11/10/2022 10:40 AM Luis F Jenkins MD [facility] Heart [facility]       Jennifer Ramirez RN

## 2022-05-02 DIAGNOSIS — I50.21 ACUTE SYSTOLIC CHF (CONGESTIVE HEART FAILURE) (HCC): ICD-10-CM

## 2022-05-02 DIAGNOSIS — I42.0 DILATED CARDIOMYOPATHY (HCC): ICD-10-CM

## 2022-05-02 LAB
ANION GAP SERPL CALCULATED.3IONS-SCNC: 13 MMOL/L (ref 3–16)
BUN BLDV-MCNC: 14 MG/DL (ref 7–20)
CALCIUM SERPL-MCNC: 9.8 MG/DL (ref 8.3–10.6)
CHLORIDE BLD-SCNC: 103 MMOL/L (ref 99–110)
CO2: 25 MMOL/L (ref 21–32)
CREAT SERPL-MCNC: 1 MG/DL (ref 0.8–1.3)
GFR AFRICAN AMERICAN: >60
GFR NON-AFRICAN AMERICAN: >60
GLUCOSE BLD-MCNC: 125 MG/DL (ref 70–99)
POTASSIUM SERPL-SCNC: 4.5 MMOL/L (ref 3.5–5.1)
PRO-BNP: 650 PG/ML (ref 0–124)
SODIUM BLD-SCNC: 141 MMOL/L (ref 136–145)

## 2022-05-02 PROCEDURE — 36415 COLL VENOUS BLD VENIPUNCTURE: CPT | Performed by: INTERNAL MEDICINE

## 2022-05-03 ENCOUNTER — CARE COORDINATION (OUTPATIENT)
Dept: CASE MANAGEMENT | Age: 72
End: 2022-05-03

## 2022-05-03 NOTE — CARE COORDINATION
[facility] Transitions Follow Up Call    5/3/2022    Patient: Amarilys Lopez  Patient : 1950   MRN: [id number]  Reason for Admission: chest pain  Discharge Date: 22 RARS: Readmission Risk Score: 13 ( )    2nd attempt to contact patient for care transition follow up. Unable to reach patient. Left message with contact information and request for call back. No further outreach. Episode to be resolved and CTN to sign off if return call is not received from the patient.      Follow Up  Future Appointments   Date Time Provider Kaylee Dean   5/10/2022 11:15 AM Varsha Barajas MD [address] [place] E S IM [facility]   2022  9:45 AM SCHEDULE, [address] ECHO [facility]   2022 12:10 PM Sandra Sawant  [address] Heart [facility]   2022 10:30 AM SCHEDULE, [address] [place] AW LPN [address] [place] E S [facility]   2022 11:00 AM Luis F Jenkins  [address] Heart [facility]   11/10/2022 10:40 AM Luis F Jenkins  [address] Heart [facility]       Jennifer Ramirez RN

## 2022-05-10 ENCOUNTER — OFFICE VISIT (OUTPATIENT)
Dept: INTERNAL MEDICINE CLINIC | Age: 72
End: 2022-05-10
Payer: MEDICARE

## 2022-05-10 VITALS
RESPIRATION RATE: 16 BRPM | SYSTOLIC BLOOD PRESSURE: 122 MMHG | HEIGHT: 77 IN | DIASTOLIC BLOOD PRESSURE: 68 MMHG | BODY MASS INDEX: 31.79 KG/M2 | HEART RATE: 58 BPM | OXYGEN SATURATION: 94 % | WEIGHT: 269.25 LBS

## 2022-05-10 DIAGNOSIS — K63.5 HYPERPLASTIC POLYP OF SIGMOID COLON: ICD-10-CM

## 2022-05-10 DIAGNOSIS — R97.20 ELEVATED PSA: ICD-10-CM

## 2022-05-10 DIAGNOSIS — F51.01 PRIMARY INSOMNIA: ICD-10-CM

## 2022-05-10 DIAGNOSIS — K21.9 GASTROESOPHAGEAL REFLUX DISEASE WITHOUT ESOPHAGITIS: ICD-10-CM

## 2022-05-10 DIAGNOSIS — I48.19 PERSISTENT ATRIAL FIBRILLATION (HCC): ICD-10-CM

## 2022-05-10 DIAGNOSIS — J45.20 MILD INTERMITTENT ASTHMA WITHOUT COMPLICATION: ICD-10-CM

## 2022-05-10 DIAGNOSIS — E11.21 CONTROLLED TYPE 2 DIABETES MELLITUS WITH DIABETIC NEPHROPATHY, WITHOUT LONG-TERM CURRENT USE OF INSULIN (HCC): Primary | ICD-10-CM

## 2022-05-10 DIAGNOSIS — E79.0 ELEVATED URIC ACID IN BLOOD: ICD-10-CM

## 2022-05-10 PROCEDURE — 99214 OFFICE O/P EST MOD 30 MIN: CPT | Performed by: INTERNAL MEDICINE

## 2022-05-10 PROCEDURE — 3044F HG A1C LEVEL LT 7.0%: CPT | Performed by: INTERNAL MEDICINE

## 2022-05-10 PROCEDURE — G8427 DOCREV CUR MEDS BY ELIG CLIN: HCPCS | Performed by: INTERNAL MEDICINE

## 2022-05-10 PROCEDURE — 3017F COLORECTAL CA SCREEN DOC REV: CPT | Performed by: INTERNAL MEDICINE

## 2022-05-10 PROCEDURE — 4040F PNEUMOC VAC/ADMIN/RCVD: CPT | Performed by: INTERNAL MEDICINE

## 2022-05-10 PROCEDURE — 2022F DILAT RTA XM EVC RTNOPTHY: CPT | Performed by: INTERNAL MEDICINE

## 2022-05-10 PROCEDURE — G8417 CALC BMI ABV UP PARAM F/U: HCPCS | Performed by: INTERNAL MEDICINE

## 2022-05-10 PROCEDURE — 1036F TOBACCO NON-USER: CPT | Performed by: INTERNAL MEDICINE

## 2022-05-10 PROCEDURE — 1123F ACP DISCUSS/DSCN MKR DOCD: CPT | Performed by: INTERNAL MEDICINE

## 2022-05-10 RX ORDER — TRAZODONE HYDROCHLORIDE 50 MG/1
100 TABLET ORAL NIGHTLY
Qty: 180 TABLET | Refills: 1 | Status: SHIPPED | OUTPATIENT
Start: 2022-05-10 | End: 2022-10-27 | Stop reason: SDUPTHER

## 2022-05-10 RX ORDER — OMEPRAZOLE 20 MG/1
20 CAPSULE, DELAYED RELEASE ORAL DAILY
Qty: 90 CAPSULE | Refills: 1 | Status: SHIPPED | OUTPATIENT
Start: 2022-05-10 | End: 2022-10-27 | Stop reason: SDUPTHER

## 2022-05-10 RX ORDER — ALLOPURINOL 100 MG/1
100 TABLET ORAL DAILY
Qty: 90 TABLET | Refills: 1 | Status: SHIPPED | OUTPATIENT
Start: 2022-05-10 | End: 2022-10-27 | Stop reason: SDUPTHER

## 2022-05-10 NOTE — PROGRESS NOTES
Richi Larson  1950  05/10/22    SUBJECTIVE:  Elevated PSA and BPH, sched for bx per Dr Darshana Grayson for May 31, end of the month. He'd need to hold his eliquis for 3d prior    DM-  Wt is stable and cont regular meds and diet. Lab Results   Component Value Date    LABA1C 5.9 04/07/2022    LABA1C 6.0 11/01/2021    LABA1C 6.2 04/08/2021     Lab Results   Component Value Date    GLUF 107 (H) 01/06/2017    LABMICR Not Indicated 11/15/2021    LDLCALC 38 04/07/2022    CREATININE 1.0 05/02/2022     GERD:  Is without sx of heartburn or dysphagia, abd pain. Gout no recent flares. Asthma:  Patient denies significant chest pain/tightness, dyspnea, decreased exercise tolerance, cough, or wheezing on current regimen. HX OF COLON POLYP, DUE FOR F'/U  OBJECTIVE:    /68   Pulse 58   Resp 16   Ht 6' 5\" (1.956 m)   Wt 269 lb 4 oz (122.1 kg)   SpO2 94%   BMI 31.93 kg/m²     Physical Exam  Vitals reviewed. Constitutional:       General: He is not in acute distress. Appearance: He is well-developed. HENT:      Head: Normocephalic and atraumatic. Eyes:      General: No scleral icterus. Right eye: No discharge. Left eye: No discharge. Conjunctiva/sclera: Conjunctivae normal.      Pupils: Pupils are equal, round, and reactive to light. Neck:      Thyroid: No thyromegaly. Vascular: No JVD. Trachea: No tracheal deviation. Cardiovascular:      Rate and Rhythm: Normal rate. Rhythm irregular. Heart sounds: Normal heart sounds. No murmur heard. No friction rub. No gallop. Pulmonary:      Effort: Pulmonary effort is normal. No respiratory distress. Breath sounds: Normal breath sounds. No wheezing or rales. Abdominal:      General: Bowel sounds are normal. There is no distension. Palpations: Abdomen is soft. There is no mass. Tenderness: There is no abdominal tenderness. There is no guarding or rebound.    Musculoskeletal:         General: No tenderness. Cervical back: Normal range of motion and neck supple. Lymphadenopathy:      Cervical: No cervical adenopathy. Skin:     General: Skin is warm and dry. Neurological:      Mental Status: He is alert. Psychiatric:         Mood and Affect: Mood normal.         ASSESSMENT:    1. Controlled type 2 diabetes mellitus with diabetic nephropathy, without long-term current use of insulin ([address]    2. Mild intermittent asthma without complication    3. Persistent atrial fibrillation ([address]    4. Gastroesophageal reflux disease without esophagitis    5. Elevated uric acid in blood    6. Primary insomnia    7. Elevated PSA    8. Hyperplastic polyp of sigmoid colon        PLAN:    Nadir Bueno was seen today for 3 month follow-up and procedure. Diagnoses and all orders for this visit:    Controlled type 2 diabetes mellitus with diabetic nephropathy, without long-term current use of insulin ([address]  -     Comprehensive Metabolic Panel; Future  -     CBC with Auto Differential; Future  -     Lipid Panel; Future  -     Hemoglobin A1C; Future    Mild intermittent asthma without complication - Asthma clinically stable w/o ongoing symptoms, will continue inhaler/medical regimen. Persistent atrial fibrillation (HCC) - Pt clinically w/o evidence of cardiovascular instability, cont regimen. -     apixaban (ELIQUIS) 5 MG TABS tablet; Take 1 tablet by mouth 2 times daily  -     Comprehensive Metabolic Panel; Future  -     CBC with Auto Differential; Future  -     TSH; Future    Gastroesophageal reflux disease without esophagitis - GERD controlled on meds and will refill, monitor for any recurrent or worsening sx.    -     omeprazole (PRILOSEC) 20 MG delayed release capsule; Take 1 capsule by mouth daily    Elevated uric acid in blood - NO GOUT SX, CONT MED AND MONITORING  -     allopurinol (ZYLOPRIM) 100 MG tablet; Take 1 tablet by mouth daily  -     Uric Acid; Future    Primary insomnia- continue present management. Will monitor.    -     traZODone (DESYREL) 50 MG tablet;  Take 2 tablets by mouth nightly    Elevated PSA- FOR BX DR [place] PENDING, [place]    Hyperplastic polyp of sigmoid colon- FOR F/U GI  -     Amb External Referral To Gastroenterology

## 2022-06-03 PROBLEM — C61 ADENOCARCINOMA OF PROSTATE (HCC): Status: ACTIVE | Noted: 2022-06-03

## 2022-06-06 ENCOUNTER — PROCEDURE VISIT (OUTPATIENT)
Dept: CARDIOLOGY CLINIC | Age: 72
End: 2022-06-06
Payer: MEDICARE

## 2022-06-06 DIAGNOSIS — I47.29 NSVT (NONSUSTAINED VENTRICULAR TACHYCARDIA): ICD-10-CM

## 2022-06-06 DIAGNOSIS — R07.89 OTHER CHEST PAIN: ICD-10-CM

## 2022-06-06 DIAGNOSIS — I35.0 NONRHEUMATIC AORTIC VALVE STENOSIS: ICD-10-CM

## 2022-06-06 DIAGNOSIS — I42.0 DILATED CARDIOMYOPATHY (HCC): ICD-10-CM

## 2022-06-06 DIAGNOSIS — I47.20 VT (VENTRICULAR TACHYCARDIA): ICD-10-CM

## 2022-06-06 LAB
LV EF: 38 %
LVEF MODALITY: NORMAL

## 2022-06-06 PROCEDURE — 93306 TTE W/DOPPLER COMPLETE: CPT | Performed by: INTERNAL MEDICINE

## 2022-06-14 ENCOUNTER — OFFICE VISIT (OUTPATIENT)
Dept: CARDIOLOGY CLINIC | Age: 72
End: 2022-06-14
Payer: MEDICARE

## 2022-06-14 VITALS
WEIGHT: 269.6 LBS | BODY MASS INDEX: 31.83 KG/M2 | DIASTOLIC BLOOD PRESSURE: 82 MMHG | HEIGHT: 77 IN | SYSTOLIC BLOOD PRESSURE: 130 MMHG | HEART RATE: 82 BPM

## 2022-06-14 DIAGNOSIS — I47.1 SVT (SUPRAVENTRICULAR TACHYCARDIA) (HCC): ICD-10-CM

## 2022-06-14 DIAGNOSIS — I50.33 ACUTE ON CHRONIC DIASTOLIC CONGESTIVE HEART FAILURE (HCC): Primary | ICD-10-CM

## 2022-06-14 DIAGNOSIS — R52 UNCONTROLLED PAIN: ICD-10-CM

## 2022-06-14 DIAGNOSIS — I47.20 VT (VENTRICULAR TACHYCARDIA): ICD-10-CM

## 2022-06-14 DIAGNOSIS — J43.1 PANLOBULAR EMPHYSEMA (HCC): ICD-10-CM

## 2022-06-14 DIAGNOSIS — Z01.810 PRE-OPERATIVE CARDIOVASCULAR EXAMINATION: Primary | ICD-10-CM

## 2022-06-14 DIAGNOSIS — K21.9 GASTROESOPHAGEAL REFLUX DISEASE WITHOUT ESOPHAGITIS: ICD-10-CM

## 2022-06-14 DIAGNOSIS — I42.0 DILATED CARDIOMYOPATHY (HCC): ICD-10-CM

## 2022-06-14 DIAGNOSIS — I47.29 NSVT (NONSUSTAINED VENTRICULAR TACHYCARDIA): ICD-10-CM

## 2022-06-14 DIAGNOSIS — I48.20 CHRONIC ATRIAL FIBRILLATION (HCC): ICD-10-CM

## 2022-06-14 PROCEDURE — 1123F ACP DISCUSS/DSCN MKR DOCD: CPT | Performed by: INTERNAL MEDICINE

## 2022-06-14 PROCEDURE — G8417 CALC BMI ABV UP PARAM F/U: HCPCS | Performed by: INTERNAL MEDICINE

## 2022-06-14 PROCEDURE — 99214 OFFICE O/P EST MOD 30 MIN: CPT | Performed by: INTERNAL MEDICINE

## 2022-06-14 PROCEDURE — 3023F SPIROM DOC REV: CPT | Performed by: INTERNAL MEDICINE

## 2022-06-14 PROCEDURE — 1036F TOBACCO NON-USER: CPT | Performed by: INTERNAL MEDICINE

## 2022-06-14 PROCEDURE — G8427 DOCREV CUR MEDS BY ELIG CLIN: HCPCS | Performed by: INTERNAL MEDICINE

## 2022-06-14 PROCEDURE — 3017F COLORECTAL CA SCREEN DOC REV: CPT | Performed by: INTERNAL MEDICINE

## 2022-06-14 RX ORDER — METOPROLOL SUCCINATE 100 MG/1
100 TABLET, EXTENDED RELEASE ORAL DAILY
Qty: 30 TABLET | Refills: 3 | Status: SHIPPED | OUTPATIENT
Start: 2022-06-14 | End: 2022-08-22

## 2022-06-14 NOTE — PATIENT INSTRUCTIONS
Stop cardiozem cd 360 mg   Start toprol xl 100 mg daily starting tomorrow  Take cardizem 30 mg every 6 hrs for first 2 days and check hr at home starting tomorrow   Call me if hr is less than 60 or more than 120   Stop cardizem after 2 days

## 2022-06-14 NOTE — PROGRESS NOTES
CARDIOLOGY  NOTE    Chief Complaint: Aortic stenosis    HPI:   Ramo Lantigua is a 66y.o. year old who has Past medical history as noted below. Comes in for evaluation due to abnormal echo showing possible moderate to severe aortic stenosis with a mean gradient of 44 mmHg and an aortic valve area was 1.1 cm² square which was done during his hospitalization in April 2022 when he was admitted with decompensated heart failure in the setting of A. fib with rapid heart rate and pneumonia associated with fevers rigors and chills. His repeat echo  Now in June   Unfortunately showed a decline in EF to 35-40 %   He is feeling sob with reduced exercise capacity on  Lasix to 40 mg  daily. He says shortness of breath is better but he still getting winded and short of breath on exertion when climbing stairs he denies any dizzy spells or passing out episodes.   His  son Dr Madelyn Almanza ( pediatrician)  Is involved in his care and will be updated by haley   He was just diagnosed of prostate cancer and is undergoing PET scan and radiation Ect for treatment plan   Basil Cain normally sees Dr. Ulrich Money he has longstanding history of persistent atrial fibrillation but his son believes it is been gradually declining and more short of breath with reduced exercise capacity ever since he had Covid a year ago  He has no known coronary artery disease last stress test in 2018 showed no ischemia  During his hospitalization his chest x-ray was concerning for pulmonary edema versus pneumonia BNP was slightly elevated    Current Outpatient Medications   Medication Sig Dispense Refill    metoprolol succinate (TOPROL XL) 100 MG extended release tablet Take 1 tablet by mouth daily 30 tablet 3    dilTIAZem (CARDIZEM) 30 MG tablet Take 1 tablet by mouth 4 times daily 90 tablet 3    apixaban (ELIQUIS) 5 MG TABS tablet Take 1 tablet by mouth 2 times daily 180 tablet 1    omeprazole (PRILOSEC) 20 MG delayed release capsule Take 1 capsule by mouth daily 90 capsule 1    allopurinol (ZYLOPRIM) 100 MG tablet Take 1 tablet by mouth daily 90 tablet 1    traZODone (DESYREL) 50 MG tablet Take 2 tablets by mouth nightly 180 tablet 1    furosemide (LASIX) 40 MG tablet Take 1 tablet by mouth daily 90 tablet 1    potassium chloride (KLOR-CON M) 20 MEQ extended release tablet Take 2 tablets by mouth daily 90 tablet 3    albuterol (PROVENTIL) (2.5 MG/3ML) 0.083% nebulizer solution Take 3 mLs by nebulization every 6 hours as needed for Wheezing 120 each 3    metFORMIN (GLUCOPHAGE) 500 MG tablet Take 1 tablet by mouth daily (with breakfast) 90 tablet 1    Dulaglutide (TRULICITY SC) Inject into the skin       fluticasone-umeclidin-vilant (TRELEGY ELLIPTA) 100-62.5-25 MCG/INH AEPB Inhale 1 puff into the lungs daily 1 each 5    montelukast (SINGULAIR) 10 MG tablet Take 1 tablet by mouth daily 90 tablet 3    calcium carb-cholecalciferol 600-200 MG-UNIT TABS tablet Take 1 tablet by mouth daily       No current facility-administered medications for this visit. Allergies:   Lisinopril and Simvastatin    Patient History:  Past Medical History:   Diagnosis Date    Adenocarcinoma of prostate ([facility] 05/31/2022    positive bx Dr Khari Calderón urology    Aortic stenosis, mild 11/08/2017    By echo 10/2016    Asthma, intrinsic     Dr Tyson Olmstead and Percy Bales.     Atrial fibrillation ([facility]     Colonic polyp 05/2009    Dr Lyons [facility]- also done 4/16/2012- AND 3/8/17-TUBULAR ADENOMA,-RECHECK 5YRS    COPD (chronic obstructive pulmonary disease) ([facility]     noted on PFTs 6/2020    DDD (degenerative disc disease), lumbar     Diabetes mellitus type 2, controlled [facility])     Dr Ezekiel Norwood- optho, Dr Salome Moody- podiatry    Diabetic neuropathy, type II diabetes mellitus ([facility]     DECR MONOFIL SENSATION    Dilated cardiomyopathy ([facility] 11/04/2015    Elevated PSA 11/02/2021    Dr Khari Calderón w [facility]    GERD (gastroesophageal reflux disease)     Gout     H/O cardiovascular stress test 12/10/2015    lexiscan-normal,EF65%    History of total right hip arthroplasty     Dr Irvin Chung 2018- does get antibiotic prophylaxis w dental work- Dr Ponce Felt    Hyperlipidemia     IFG (impaired fasting glucose)     2019- a1c 6.1 but fasting glc 131.  Knee pain, bilateral     Long-term (current) use of anticoagulants     Peptic ulcer disease     Testosterone deficiency dx'd 2015    Ventricular tachycardia ([address]     mentioned by Dr Carlos Torres 2019 note-who monitors him    VHD (valvular heart disease) 2018    Mild aortic stenosis by echo 2018     Past Surgical History:   Procedure Laterality Date    HIP ARTHROPLASTY Right 2018    Dr Presley Sharma Right 2016    Dr Amparo Avila  3/08, dr Sulma sams assisted colon mass resection-benign    POLYPECTOMY  - jaimie    colonic polypectomy    POLYPECTOMY  9/10/02- dr Hardy Blue    colonic polypectomy    TOE AMPUTATION Left 2021    LEFT SECOND TOE PARTIAL AMPUTATION in [place] by Dr. Basil Wynne Left 2021    partial to L second, Dr Dannie Lee.     TOE AMPUTATION Left 12/3/2021    LEFT SECOND TOE PARTIAL AMPUTATION performed by Yves Cooper DPM at [place] [id number]  10/08, dr Adela Lopez Left 2014    [facility] Dr Lupillo Ramirez     Family History   Problem Relation Age of Onset    Hypertension Mother     High Blood Pressure Mother     Other Mother         lung problems    Other Father         CHF, A. Fib    Hypotension Father     Diabetes Father     No Known Problems Sister     Atrial Fibrillation Brother     Atrial Fibrillation Brother     No Known Problems Sister     No Known Problems Sister      Social History     Tobacco Use    Smoking status: Former Smoker     Packs/day: 1.00     Years: 39.00     Pack years: 39.00     Types: Cigarettes     Start date:      Quit date: 2013     Years since quittin.4    Smokeless tobacco: Never Used   Substance Use Topics    Alcohol use: Yes     Comment: occasional wine/moderate/ caffeine 2 cups of coffee        Review of Systems:   · Constitutional: No Fever or Weight Loss   · Eyes: No Decreased Vision  · ENT: No Headaches, Hearing Loss or Vertigo  · Cardiovascular: as per note above   · Respiratory: No cough or wheezing and as per note above. · Gastrointestinal: No abdominal pain, appetite loss, blood in stools, constipation, diarrhea or heartburn  · Genitourinary: No dysuria, trouble voiding, or hematuria  · Musculoskeletal:  denies any new  joint aches , swelling  or pain   · Integumentary: No rash or pruritis  · Neurological: No TIA or stroke symptoms  · Psychiatric: No anxiety or depression  · Endocrine: No malaise, fatigue or temperature intolerance  · Hematologic/Lymphatic: No bleeding problems, blood clots or swollen lymph nodes  · Allergic/Immunologic: No nasal congestion or hives    Objective:      Physical Exam:  /82   Pulse 82   Ht 6' 5\" (1.956 m)   Wt 269 lb 9.6 oz (122.3 kg)   BMI 31.97 kg/m²   Wt Readings from Last 3 Encounters:   06/14/22 269 lb 9.6 oz (122.3 kg)   05/10/22 269 lb 4 oz (122.1 kg)   04/18/22 279 lb (126.6 kg)     Body mass index is 31.97 kg/m². Vitals:    06/14/22 1216   BP: 130/82   Pulse: 82        General Appearance:  No distress, conversant  Constitutional:  Well developed, Well nourished, No acute distress, Non-toxic appearance. HENT:  Normocephalic, Atraumatic, Bilateral external ears normal, Oropharynx moist, No oral exudates, Nose normal. Neck- Normal range of motion, No tenderness, Supple, No stridor,no apical-carotid delay  Eyes:  PERRL, EOMI, Conjunctiva normal, No discharge. Respiratory:  Normal breath sounds, No respiratory distress, No wheezing, No chest tenderness. ,no use of accessory muscles, NO crackles  Cardiovascular: (PMI) apex non displaced,no lifts no thrills,S1 and S2 audible, No added heart sounds, No signs of ankle edema, or volume overload, No evidence of JVD, No crackles  GI:  Bowel sounds normal, Soft, No tenderness, No masses, No gross visceromegaly   :  No costovertebral angle tenderness   Musculoskeletal:  No edema, no tenderness, no deformities. Back- no tenderness  Integument:  Well hydrated, no rash   Lymphatic:  No lymphadenopathy noted   Neurologic:  Alert & oriented x 3, CN 2-12 normal, normal motor function, normal sensory function, no focal deficits noted   Psychiatric:  Speech and behavior appropriate       Medical decision making and Data review:  DATA:  Lab Results   Component Value Date    TROPONINT <0.010 04/06/2022     BNP:    Lab Results   Component Value Date    PROBNP 650 (H) 05/02/2022     PT/INR:  No results found for: PTINR  Lab Results   Component Value Date    LABA1C 5.9 04/07/2022    LABA1C 6.0 11/01/2021     Lab Results   Component Value Date    CHOL 86 04/07/2022    TRIG 62 04/07/2022    HDL 36 (L) 04/07/2022    LDLCALC 38 04/07/2022    LDLDIRECT 54 11/17/2020     Lab Results   Component Value Date    ALT 17 04/07/2022    AST 26 04/07/2022     No results for input(s): WBC, HGB, HCT, MCV, PLT in the last 72 hours. TSH:   Lab Results   Component Value Date    TSH 1.01 11/01/2021     Lab Results   Component Value Date    AST 26 04/07/2022    ALT 17 04/07/2022    BILIDIR 0.5 (H) 01/06/2017    BILITOT 0.7 04/07/2022    ALKPHOS 69 04/07/2022     Lab Results   Component Value Date    PROBNP 650 (H) 05/02/2022    PROBNP 1,327 (H) 04/06/2022    PROBNP 530.6 (H) 12/25/2020     Lab Results   Component Value Date    LABA1C 5.9 04/07/2022    LABA1C 6.0 11/01/2021     Lab Results   Component Value Date    WBC 3.3 (L) 04/07/2022    HGB 11.1 (L) 04/07/2022    HCT 37.9 (L) 04/07/2022     04/07/2022     Echo 6/2019     Summary   LV function and size are normal, Ejection Fraction 55-60 %. Mild concentric left ventricular hypertrophy.    Diastolic dysfunction could not be evaluated due to arrhythmia. Moderately dilated left and right atrium and right ventricle. Aortic sclerosis with mild stenosis. The aortic valve area is 1.59 cm2 with   a mean gradient of 16 mmHg. RVSP= 32 mmHg. IVC appears dilated, but maintains respiratory changes. No evidence of pericardial effusion. All labs, medications and tests reviewed by myself including data and history from outside source , patient and available family . Assessment & Plan:      1. Acute on chronic diastolic congestive heart failure ([address]    2. Chronic atrial fibrillation (HCC)    3. Panlobular emphysema ([address]    4. Dilated cardiomyopathy ([address]    5. NSVT (nonsustained ventricular tachycardia) (HCC)    6. SVT (supraventricular tachycardia) ([address]    7. Uncontrolled pain    8. VT (ventricular tachycardia) ([address]    9. Gastroesophageal reflux disease without esophagitis         Nonrheumatic aortic valve stenosis  We had extensive discussion including pathophysiology of aortic stenosis and treatment options. I think his symptoms are currently multifactorial in the setting of mild COPD decline due to deconditioning and CHF due to aortic stenosis. His aortic valve area is calculated 1.15 cm but gradient is 44 mmHg. Advised him to continue taking Lasix 40 mg daily.   Repeat echo inspite of  after diuresis shows further decline in EF    we had extensive discussion explained that he will need a left and right heart cath and possible TIM for further evaluation and eventually he will need aortic valve replacement within the next few months to a year advised to be cautious and avoid strenuous activity and call us if he starts getting dizzy lightheaded or has more shortness of breath for now continue Lasix 40 mg daily and potassium  Also stop cardizem and wean off it , change to topril XL due to lower EF  Alternates and risk of the procedure were dicussed in detail  Patient is in agreement to proceed  Mallampati is 2  ASA is 3      Atrial fibrillation [facility])   He has permanent atrial fibrillation continue rate control strategy currently on Cardizem he occasionally notices a heart rate in the 50s but he is not symptomatic. Continue anticoagulation  Stop Cardizem due to fall in EF and wean off switch to toprol XL   Written instructions given and I ave him short acting cardizem to make up for high hr if needed      Acute on chronic diastolic congestive heart failure (HCC)  Due to combination of diastolic dysfunction, aortic stenosis advised to watch salt check weight every day for now continue 40 mg Lasix and potassium every day         Dyslipidemia :  All available lab work was reviewed. Patient was advised to repeat lab work before next visit. Necessary orders were placed , instructions given by myself       Counseled extensively and medication compliance urged. We discussed that for the  prevention of ASCVD our  goal is aggressive risk modification. Patient is encouraged to exercise if they can , educated about  brisk walk for 30 minutes  at least 3 to 4 times a week if there are no physical limitations  Various goals were discussed and questions answered. Continue current medications. Appropriate prescriptions are addressed and refills ordered. Questions answered and patient verbalizes understanding. Call for any problems, questions, or concerns. Greater than 60 % of time spent counseling besides reviewing data and images     Continue all other medications of all above medical condition listed as is. Return in about 6 months (around 12/14/2022). Please note this report has been partially produced using speech recognition software and may contain errors related to that system including errors in grammar, punctuation, and spelling, as well as words and phrases that may be inappropriate. If there are any questions or concerns please feel free to contact the dictating provider for clarification.

## 2022-06-17 ENCOUNTER — HOSPITAL ENCOUNTER (OUTPATIENT)
Age: 72
Setting detail: SPECIMEN
Discharge: HOME | End: 2022-06-17
Payer: MEDICARE

## 2022-06-17 ENCOUNTER — NURSE ONLY (OUTPATIENT)
Dept: CARDIOLOGY CLINIC | Age: 72
End: 2022-06-17
Payer: MEDICARE

## 2022-06-17 ENCOUNTER — HOSPITAL ENCOUNTER (OUTPATIENT)
Age: 72
Discharge: HOME | End: 2022-06-17
Payer: MEDICARE

## 2022-06-17 VITALS — DIASTOLIC BLOOD PRESSURE: 82 MMHG | TEMPERATURE: 97.4 F | SYSTOLIC BLOOD PRESSURE: 130 MMHG

## 2022-06-17 DIAGNOSIS — Z01.810 PRE-OPERATIVE CARDIOVASCULAR EXAMINATION: ICD-10-CM

## 2022-06-17 LAB
ABO/RH: NORMAL
ANION GAP SERPL CALCULATED.3IONS-SCNC: 9 MMOL/L (ref 4–16)
ANTIBODY SCREEN: NEGATIVE
BASOPHILS ABSOLUTE: 0.1 K/CU MM
BASOPHILS RELATIVE PERCENT: 0.8 % (ref 0–1)
BUN BLDV-MCNC: 16 MG/DL (ref 6–23)
CALCIUM SERPL-MCNC: 9.2 MG/DL (ref 8.3–10.6)
CHLORIDE BLD-SCNC: 103 MMOL/L (ref 99–110)
CO2: 31 MMOL/L (ref 21–32)
COMMENT: NORMAL
CREAT SERPL-MCNC: 1.1 MG/DL (ref 0.9–1.3)
DIFFERENTIAL TYPE: ABNORMAL
EOSINOPHILS ABSOLUTE: 0.2 K/CU MM
EOSINOPHILS RELATIVE PERCENT: 3 % (ref 0–3)
GFR AFRICAN AMERICAN: >60 ML/MIN/1.73M2
GFR NON-AFRICAN AMERICAN: >60 ML/MIN/1.73M2
GLUCOSE BLD-MCNC: 103 MG/DL (ref 70–99)
HCT VFR BLD CALC: 35.6 % (ref 42–52)
HEMOGLOBIN: 10.6 GM/DL (ref 13.5–18)
IMMATURE NEUTROPHIL %: 0.3 % (ref 0–0.43)
LYMPHOCYTES ABSOLUTE: 0.8 K/CU MM
LYMPHOCYTES RELATIVE PERCENT: 12.1 % (ref 24–44)
MCH RBC QN AUTO: 24.8 PG (ref 27–31)
MCHC RBC AUTO-ENTMCNC: 29.8 % (ref 32–36)
MCV RBC AUTO: 83.4 FL (ref 78–100)
MONOCYTES ABSOLUTE: 0.6 K/CU MM
MONOCYTES RELATIVE PERCENT: 9.8 % (ref 0–4)
NUCLEATED RBC %: 0 %
PDW BLD-RTO: 16.1 % (ref 11.7–14.9)
PLATELET # BLD: 225 K/CU MM (ref 140–440)
PMV BLD AUTO: 9.9 FL (ref 7.5–11.1)
POTASSIUM SERPL-SCNC: 4.4 MMOL/L (ref 3.5–5.1)
RBC # BLD: 4.27 M/CU MM (ref 4.6–6.2)
SEGMENTED NEUTROPHILS ABSOLUTE COUNT: 4.8 K/CU MM
SEGMENTED NEUTROPHILS RELATIVE PERCENT: 74 % (ref 36–66)
SODIUM BLD-SCNC: 143 MMOL/L (ref 135–145)
TOTAL IMMATURE NEUTOROPHIL: 0.02 K/CU MM
TOTAL NUCLEATED RBC: 0 K/CU MM
WBC # BLD: 6.4 K/CU MM (ref 4–10.5)

## 2022-06-17 PROCEDURE — U0005 INFEC AGEN DETEC AMPLI PROBE: HCPCS

## 2022-06-17 PROCEDURE — 86901 BLOOD TYPING SEROLOGIC RH(D): CPT

## 2022-06-17 PROCEDURE — 85025 COMPLETE CBC W/AUTO DIFF WBC: CPT

## 2022-06-17 PROCEDURE — 99211 OFF/OP EST MAY X REQ PHY/QHP: CPT | Performed by: INTERNAL MEDICINE

## 2022-06-17 PROCEDURE — 36415 COLL VENOUS BLD VENIPUNCTURE: CPT

## 2022-06-17 PROCEDURE — 86850 RBC ANTIBODY SCREEN: CPT

## 2022-06-17 PROCEDURE — U0003 INFECTIOUS AGENT DETECTION BY NUCLEIC ACID (DNA OR RNA); SEVERE ACUTE RESPIRATORY SYNDROME CORONAVIRUS 2 (SARS-COV-2) (CORONAVIRUS DISEASE [COVID-19]), AMPLIFIED PROBE TECHNIQUE, MAKING USE OF HIGH THROUGHPUT TECHNOLOGIES AS DESCRIBED BY CMS-2020-01-R: HCPCS

## 2022-06-17 PROCEDURE — 80048 BASIC METABOLIC PNL TOTAL CA: CPT

## 2022-06-17 PROCEDURE — 86900 BLOOD TYPING SEROLOGIC ABO: CPT

## 2022-06-17 NOTE — PROGRESS NOTES
Performed COVID testing with oral swab for 3 seconds to throat. Per This RN. Dropped swab in labeled collection tube along with  lab order and facesheet with copy of insurance card placed in collection bag. Bag placed in biohazard bag and placed in fridge.  called for . Patient was here in office & educated on TIM / [facility] for Dx: Aortic stenosis. Procedure is scheduled for 6/21/22 @ 9:00, w/arrival @ 7:00, @ [facility]. Pre-admission orders were given to patient for labs, which are due 6/17/22 @ [address] [facility]. Procedure and risks were explained to patient. Consent forms were signed. Instructions were given to patient to remain NPO after midnight the night before procedure. Patient may take morning meds the morning of procedure with small amount of water. Patient is asked to call hospital @ [phone], 1 to 2 days before procedure to pre-register. Patient was notified that procedure could be delayed due to an emergency. Patient voiced understanding.  Copies of consent, pre-testing orders, & instructions scanned into media

## 2022-06-18 LAB
SARS-COV-2: NOT DETECTED
SOURCE: NORMAL

## 2022-06-21 ENCOUNTER — HOSPITAL ENCOUNTER (OUTPATIENT)
Dept: CARDIAC CATH/INVASIVE PROCEDURES | Age: 72
Discharge: HOME | End: 2022-06-21
Attending: INTERNAL MEDICINE | Admitting: INTERNAL MEDICINE
Payer: MEDICARE

## 2022-06-21 VITALS
HEIGHT: 77 IN | SYSTOLIC BLOOD PRESSURE: 144 MMHG | WEIGHT: 269 LBS | RESPIRATION RATE: 15 BRPM | OXYGEN SATURATION: 94 % | TEMPERATURE: 97 F | DIASTOLIC BLOOD PRESSURE: 100 MMHG | HEART RATE: 74 BPM | BODY MASS INDEX: 31.76 KG/M2

## 2022-06-21 DIAGNOSIS — I42.0 DILATED CARDIOMYOPATHY (HCC): ICD-10-CM

## 2022-06-21 LAB
GLUCOSE BLD-MCNC: 112 MG/DL (ref 70–99)
LV EF: 38 %
LVEF MODALITY: NORMAL

## 2022-06-21 PROCEDURE — 6360000002 HC RX W HCPCS

## 2022-06-21 PROCEDURE — 2500000003 HC RX 250 WO HCPCS

## 2022-06-21 PROCEDURE — 6360000004 HC RX CONTRAST MEDICATION

## 2022-06-21 PROCEDURE — 2580000003 HC RX 258: Performed by: INTERNAL MEDICINE

## 2022-06-21 PROCEDURE — C1769 GUIDE WIRE: HCPCS

## 2022-06-21 PROCEDURE — C1894 INTRO/SHEATH, NON-LASER: HCPCS

## 2022-06-21 PROCEDURE — 93325 DOPPLER ECHO COLOR FLOW MAPG: CPT | Performed by: INTERNAL MEDICINE

## 2022-06-21 PROCEDURE — 82962 GLUCOSE BLOOD TEST: CPT

## 2022-06-21 PROCEDURE — C1751 CATH, INF, PER/CENT/MIDLINE: HCPCS

## 2022-06-21 PROCEDURE — 93312 ECHO TRANSESOPHAGEAL: CPT

## 2022-06-21 PROCEDURE — 2709999900 HC NON-CHARGEABLE SUPPLY

## 2022-06-21 PROCEDURE — 93460 R&L HRT ART/VENTRICLE ANGIO: CPT

## 2022-06-21 PROCEDURE — 93460 R&L HRT ART/VENTRICLE ANGIO: CPT | Performed by: INTERNAL MEDICINE

## 2022-06-21 PROCEDURE — C1887 CATHETER, GUIDING: HCPCS

## 2022-06-21 PROCEDURE — 93320 DOPPLER ECHO COMPLETE: CPT | Performed by: INTERNAL MEDICINE

## 2022-06-21 PROCEDURE — 6370000000 HC RX 637 (ALT 250 FOR IP): Performed by: INTERNAL MEDICINE

## 2022-06-21 RX ORDER — DIPHENHYDRAMINE HCL 25 MG
25 TABLET ORAL ONCE
Status: COMPLETED | OUTPATIENT
Start: 2022-06-21 | End: 2022-06-21

## 2022-06-21 RX ORDER — SODIUM CHLORIDE 9 MG/ML
INJECTION, SOLUTION INTRAVENOUS CONTINUOUS
Status: DISCONTINUED | OUTPATIENT
Start: 2022-06-21 | End: 2022-06-21 | Stop reason: HOSPADM

## 2022-06-21 RX ORDER — SODIUM CHLORIDE 0.9 % (FLUSH) 0.9 %
5-40 SYRINGE (ML) INJECTION PRN
Status: DISCONTINUED | OUTPATIENT
Start: 2022-06-21 | End: 2022-06-21 | Stop reason: HOSPADM

## 2022-06-21 RX ORDER — FUROSEMIDE 40 MG/1
40 TABLET ORAL DAILY
Qty: 90 TABLET | Refills: 1 | Status: SHIPPED | OUTPATIENT
Start: 2022-06-21

## 2022-06-21 RX ORDER — DIAZEPAM 5 MG/1
5 TABLET ORAL ONCE
Status: COMPLETED | OUTPATIENT
Start: 2022-06-21 | End: 2022-06-21

## 2022-06-21 RX ORDER — ACETAMINOPHEN 325 MG/1
650 TABLET ORAL EVERY 4 HOURS PRN
Status: DISCONTINUED | OUTPATIENT
Start: 2022-06-21 | End: 2022-06-21 | Stop reason: HOSPADM

## 2022-06-21 RX ORDER — HYDRALAZINE HYDROCHLORIDE 20 MG/ML
10 INJECTION INTRAMUSCULAR; INTRAVENOUS EVERY 10 MIN PRN
Status: DISCONTINUED | OUTPATIENT
Start: 2022-06-21 | End: 2022-06-21 | Stop reason: HOSPADM

## 2022-06-21 RX ORDER — SODIUM CHLORIDE 9 MG/ML
INJECTION, SOLUTION INTRAVENOUS PRN
Status: DISCONTINUED | OUTPATIENT
Start: 2022-06-21 | End: 2022-06-21 | Stop reason: HOSPADM

## 2022-06-21 RX ORDER — 0.9 % SODIUM CHLORIDE 0.9 %
500 INTRAVENOUS SOLUTION INTRAVENOUS ONCE
Status: DISCONTINUED | OUTPATIENT
Start: 2022-06-21 | End: 2022-06-21 | Stop reason: HOSPADM

## 2022-06-21 RX ORDER — SODIUM CHLORIDE 0.9 % (FLUSH) 0.9 %
5-40 SYRINGE (ML) INJECTION EVERY 12 HOURS SCHEDULED
Status: DISCONTINUED | OUTPATIENT
Start: 2022-06-21 | End: 2022-06-21 | Stop reason: HOSPADM

## 2022-06-21 RX ADMIN — SODIUM CHLORIDE: 9 INJECTION, SOLUTION INTRAVENOUS at 07:38

## 2022-06-21 RX ADMIN — DIPHENHYDRAMINE HCL 25 MG: 25 TABLET ORAL at 07:39

## 2022-06-21 RX ADMIN — DIAZEPAM 5 MG: 5 TABLET ORAL at 07:39

## 2022-06-21 NOTE — PROGRESS NOTES
To holding area. Assessment completed and questions answered. Call light in reach. Iv inserted. Blood sugar 112.

## 2022-06-21 NOTE — PROGRESS NOTES
Patient returned from Cath Lab. No bleeding nor hematoma noted to right groin s/p removal of venous sheath. Tr band to right wrist with 10ml air; no bleeding nor hematoma noted. Vitals stable. Call light within reach. Site check from ANNE Hilario to ANNE Guevara.

## 2022-06-21 NOTE — H&P
Raul Lees, 74 y.o., male    Primary care physician:  Josue Thorpe MD     Chief Complaint: Aortic stenosis     History of Present Illness:  Petra Mcclendon is a 66y.o. year old who has Past medical history as noted below. Comes in for evaluation due to abnormal echo showing possible moderate to severe aortic stenosis with a mean gradient of 44 mmHg and an aortic valve area was 1.1 cm² square which was done during his hospitalization in April 2022 when he was admitted with decompensated heart failure in the setting of A. fib with rapid heart rate and pneumonia associated with fevers rigors and chills. His repeat echo  Now in June   Unfortunately showed a decline in EF to 35-40 %   He is feeling sob with reduced exercise capacity on  Lasix to 40 mg  daily. He says shortness of breath is better but he still getting winded and short of breath on exertion when climbing stairs he denies any dizzy spells or passing out episodes.   His  son Dr Shellie Tapia ( pediatrician)  Is involved in his care and will be updated by haley   He was just diagnosed of prostate cancer and is undergoing PET scan and radiation Ect for treatment plan   Vaughn Renae normally sees Dr. Cabrera Fletcher he has longstanding history of persistent atrial fibrillation but his son believes it is been gradually declining and more short of breath with reduced exercise capacity ever since he had Covid a year ago  He has no known coronary artery disease last stress test in 2018 showed no ischemia  During his hospitalization his chest x-ray was concerning for pulmonary edema versus pneumonia BNP was slightly elevated  Past medical history:    has a past medical history of Adenocarcinoma of prostate ([address] Aortic stenosis, mild, Asthma, intrinsic, Atrial fibrillation ([address] Colonic polyp, COPD (chronic obstructive pulmonary disease) ([address] DDD (degenerative disc disease), lumbar, Diabetes mellitus type 2, controlled ([address] Diabetic neuropathy, type II diabetes mellitus ([address] Dilated cardiomyopathy ([facility] Elevated PSA, GERD (gastroesophageal reflux disease), Gout, H/O cardiovascular stress test, History of total right hip arthroplasty, Hyperlipidemia, IFG (impaired fasting glucose), Knee pain, bilateral, Long-term (current) use of anticoagulants, Peptic ulcer disease, Testosterone deficiency, Ventricular tachycardia ([facility] and VHD (valvular heart disease). Past surgical history:   has a past surgical history that includes polypectomy (2/98- jaimie); polypectomy (9/10/02- dr Kathy Lamas); laparoscopy (3/08, dr Christy Kearns); ventral hernia repair (10/08, dr Christy Kearns); Wrist fracture surgery (Left, 02/06/2014); Inguinal hernia repair (Right, 11/2016); Hip Arthroplasty (Right, 09/12/2018); Toe amputation (Left, 12/03/2021); Toe amputation (Left, 12/03/2021); and Toe amputation (Left, 12/3/2021). Social History:   reports that he quit smoking about 9 years ago. His smoking use included cigarettes. He started smoking about 58 years ago. He has a 39.00 pack-year smoking history. He has never used smokeless tobacco. He reports current alcohol use. He reports that he does not use drugs. Family history:  family history includes Atrial Fibrillation in his brother and brother; Diabetes in his father; High Blood Pressure in his mother; Hypertension in his mother; Hypotension in his father; No Known Problems in his sister, sister, and sister; Other in his father and mother. Allergies: Allergies   Allergen Reactions    Lisinopril      Chest tightness    Simvastatin Other (See Comments)     Muscle cramps       Home Medications:  Prior to Admission medications    Medication Sig Start Date End Date Taking?  Authorizing Provider   metoprolol succinate (TOPROL XL) 100 MG extended release tablet Take 1 tablet by mouth daily 6/14/22   Isaias Taylor MD   dilTIAZem (CARDIZEM) 30 MG tablet Take 1 tablet by mouth 4 times daily  Patient not taking: Reported on 6/21/2022 6/14/22   Isaias Taylor MD   apixaban (ELIQUIS) 5 MG TABS tablet Take 1 tablet by mouth 2 times daily 5/10/22 8/8/22  Martir Sidhu MD   omeprazole (PRILOSEC) 20 MG delayed release capsule Take 1 capsule by mouth daily 5/10/22   Martir Sidhu MD   allopurinol (ZYLOPRIM) 100 MG tablet Take 1 tablet by mouth daily 5/10/22   Martir Sidhu MD   traZODone (DESYREL) 50 MG tablet Take 2 tablets by mouth nightly 5/10/22   Martir Sidhu MD   furosemide (LASIX) 40 MG tablet Take 1 tablet by mouth daily 4/19/22   Martir Sidhu MD   potassium chloride (KLOR-CON M) 20 MEQ extended release tablet Take 2 tablets by mouth daily 4/18/22   Brady Kirkpatrick MD   albuterol (PROVENTIL) (2.5 MG/3ML) 0.083% nebulizer solution Take 3 mLs by nebulization every 6 hours as needed for Wheezing 4/8/22   Martir Sidhu MD   metFORMIN (GLUCOPHAGE) 500 MG tablet Take 1 tablet by mouth daily (with breakfast) 2/9/22   Martir Sidhu MD   Dulaglutide (TRULICITY SC) Inject into the skin   Patient not taking: Reported on 6/21/2022    Historical Provider, MD   fluticasone-umeclidin-vilant (Doni Cane) 769-18.0-22 MCG/INH AEPB Inhale 1 puff into the lungs daily 1/10/22   Umberto Mcgovern MD   montelukast (SINGULAIR) 10 MG tablet Take 1 tablet by mouth daily 9/22/21   SLAVA Aguilar - CNP   calcium carb-cholecalciferol 600-200 MG-UNIT TABS tablet Take 1 tablet by mouth daily    Historical Provider, MD       Review of systems: Review of Systems:   · Constitutional: No Fever or Weight Loss   · Eyes: No Decreased Vision  · ENT: No Headaches, Hearing Loss or Vertigo  · Cardiovascular: No chest pain, dyspnea on exertion, palpitations or loss of consciousness  · Respiratory: No cough or wheezing    · Gastrointestinal: No abdominal pain, appetite loss, blood in stools, constipation, diarrhea or heartburn  · Genitourinary: No dysuria, trouble voiding, or hematuria  · Musculoskeletal:  No gait disturbance, weakness or joint complaints  · Integumentary: No rash or pruritis  · Neurological: No TIA or stroke symptoms  · Psychiatric: No anxiety or depression  · Endocrine: No malaise, fatigue or temperature intolerance  · Hematologic/Lymphatic: No bleeding problems, blood clots or swollen lymph nodes  Allergic/Immunologic: No nasal congestion or hives    Physical Examination:    /70   Pulse 81   Temp 97 °F (36.1 °C) (Temporal)   Resp 20   Ht 6' 5\" (1.956 m)   Wt 269 lb (122 kg)   SpO2 98%   BMI 31.90 kg/m²      General Appearance:  No distress, conversant  Constitutional:  Well developed, Well nourished, No acute distress, Non-toxic appearance. HENT:  Normocephalic, Atraumatic, Bilateral external ears normal, Oropharynx moist, No oral exudates, Nose normal. Neck- Normal range of motion, No tenderness, Supple, No stridor,no apical-carotid delay  Eyes:  PERRL, EOMI, Conjunctiva normal, No discharge. Lymphatics: no palpable lymph nodes  Respiratory:  Normal breath sounds, No respiratory distress, No wheezing, No chest tenderness. ,no uise of accessory muscles, JVP not elevated  Cardiovascular: (PMI) apex non displaced,no lifts no thrills, no s3,no s4, Normal heart rate, Normal rhythm, No murmurs, No rubs, No gallops. GI:  Bowel sounds normal, Soft, No tenderness, No masses, No pulsatile masses, no hepatosplenomegally, no bruits  Musculoskeletal:  Intact distal pulses, No edema, No tenderness, No cyanosis, No clubbing. Good range of motion in all major joints. No tenderness to palpation or major deformities noted. Back- No tenderness. Integument:  Warm, Dry, No erythema, No rash. Skin: no rash, no ulcers  Lymphatic:  No lymphadenopathy noted. Neurologic:  Alert & oriented x 3, Normal motor function, Normal sensory function, No focal deficits noted. Lab Review   No results for input(s): WBC, HGB, HCT, PLT in the last 72 hours.    No results for input(s): NA, K, CL, CO2, PHOS, BUN, CREATININE, CA in the last 72 hours. No results for input(s): AST, ALT, ALB, BILIDIR, BILITOT, ALKPHOS in the last 72 hours. No results for input(s): TROPONINI in the last 72 hours. Lab Results   Component Value Date    INR 1.07 12/28/2020    PROTIME 13.0 12/28/2020     No results found for: BNP      Assessment:    Active Problems:    * No active hospital problems. *     ASA : 2 , Mallampati class II  Plan:   1. aORTIC STENOSIS [address] : Alternate and risks versus benefits were discussed in detail. We will plan cardiac catheterization. We  discussed the risk of but not limited to  potential kidney failure, emergent surgery,blood transfusion  transfusion, infection, potential even death.   Patient is in agreement and wants to proceed

## 2022-06-22 DIAGNOSIS — J45.909 ASTHMA, INTRINSIC: ICD-10-CM

## 2022-06-22 RX ORDER — ALBUTEROL SULFATE 90 UG/1
2 AEROSOL, METERED RESPIRATORY (INHALATION) EVERY 6 HOURS PRN
Qty: 3 EACH | Refills: 1 | Status: SHIPPED | OUTPATIENT
Start: 2022-06-22

## 2022-06-29 ENCOUNTER — OFFICE VISIT (OUTPATIENT)
Dept: CARDIOLOGY CLINIC | Age: 72
End: 2022-06-29
Payer: MEDICARE

## 2022-06-29 VITALS
BODY MASS INDEX: 31.97 KG/M2 | SYSTOLIC BLOOD PRESSURE: 142 MMHG | OXYGEN SATURATION: 97 % | DIASTOLIC BLOOD PRESSURE: 82 MMHG | HEIGHT: 77 IN | WEIGHT: 270.8 LBS | HEART RATE: 98 BPM | RESPIRATION RATE: 18 BRPM

## 2022-06-29 DIAGNOSIS — I50.22 CHRONIC SYSTOLIC (CONGESTIVE) HEART FAILURE (HCC): Primary | ICD-10-CM

## 2022-06-29 DIAGNOSIS — I42.0 DILATED CARDIOMYOPATHY (HCC): ICD-10-CM

## 2022-06-29 DIAGNOSIS — I35.0 NONRHEUMATIC AORTIC VALVE STENOSIS: ICD-10-CM

## 2022-06-29 PROCEDURE — 3017F COLORECTAL CA SCREEN DOC REV: CPT | Performed by: NURSE PRACTITIONER

## 2022-06-29 PROCEDURE — 99214 OFFICE O/P EST MOD 30 MIN: CPT | Performed by: NURSE PRACTITIONER

## 2022-06-29 PROCEDURE — 1036F TOBACCO NON-USER: CPT | Performed by: NURSE PRACTITIONER

## 2022-06-29 PROCEDURE — G8427 DOCREV CUR MEDS BY ELIG CLIN: HCPCS | Performed by: NURSE PRACTITIONER

## 2022-06-29 PROCEDURE — G8417 CALC BMI ABV UP PARAM F/U: HCPCS | Performed by: NURSE PRACTITIONER

## 2022-06-29 PROCEDURE — 1123F ACP DISCUSS/DSCN MKR DOCD: CPT | Performed by: NURSE PRACTITIONER

## 2022-06-29 RX ORDER — BICALUTAMIDE 50 MG/1
50 TABLET, FILM COATED ORAL DAILY
COMMUNITY
Start: 2022-06-27 | End: 2022-07-27

## 2022-06-29 NOTE — PROGRESS NOTES
[facility] [address]  Phone: [phone]    Fax [phone]    Irving Molina MD, Nadia Moreno MD, [address] MD, Sim Russel, MD Floyce Sandifer, MD Elner Genera, MD Chrissy Bach, SLAVA Layne, SLAVA Graham, SLAVA Cannon, APRN  Og Gillis, [address] [facility]     CARDIOLOGY  NOTE    2022    Stuart Peña (:  1950) is a 74 y.o. [facility] established patient with Dr. Dusty Charles, here for evaluation of the following chief complaint(s):  Follow-up (CHF/AFIB/NSVT)        SUBJECTIVE/OBJECTIVE:    TONI Downing is a 74 y. o. who has Past medical history as noted below. Comes in to discuss [facility] and TIM findings . LHC and TIM were done for evaluation due to abnormal echo showing possible moderate to severe aortic stenosis with a mean gradient of 44 mmHg and an aortic valve area was 1.1 cm² square, which was done during his hospitalization in 2022. A that time he was admitted with decompensated heart failure in the setting of A. fib with rapid heart rate and pneumonia associated with fevers rigors and chills. His repeat echo, 2022, Unfortunately showed a decline in EF to 35-40 %   He is feeling sob with reduced exercise capacity on  Lasix to 40 mg BID. He continues to report shortness of breath, he states it is better but he still getting winded and short of breath on exertion when climbing stairs. he denies any dizzy spells or passing out episodes. His son Dr Patrice Cunningham ( pediatrician)  Is involved in his care and will be updated by Gurpreet Rojas.   He was just diagnosed of prostate cancer and is undergoing PET scan and radiation Ect for treatment plan   Gurpreet Rojas normally sees Dr. Bernhard Oppenheim he has longstanding history of persistent atrial fibrillation but his son believes it is been gradually declining and more short of breath with reduced exercise capacity ever since he had Covid a year ago  He has no known coronary artery disease last stress test in 2018 showed no ischemia  During his hospitalization his chest x-ray was concerning for pulmonary edema versus pneumonia BNP was slightly elevated      Review of Systems   Constitutional: Negative for fatigue and fever. Respiratory: Positive for shortness of breath (improved). Negative for cough. Cardiovascular: Positive for leg swelling (improved). Negative for chest pain and palpitations. Musculoskeletal: Negative for arthralgias and gait problem. Neurological: Negative for dizziness, syncope, weakness, light-headedness and headaches. Vitals:    06/29/22 1308   BP: (!) 142/82   Site: Right Upper Arm   Position: Sitting   Cuff Size: Medium Adult   Pulse: 98   Resp: 18   SpO2: 97%   Weight: 270 lb 12.8 oz (122.8 kg)   Height: 6' 5\" (1.956 m)       Wt Readings from Last 3 Encounters:   06/29/22 270 lb 12.8 oz (122.8 kg)   06/21/22 269 lb (122 kg)   06/14/22 269 lb 9.6 oz (122.3 kg)       BP Readings from Last 3 Encounters:   06/29/22 (!) 142/82   06/21/22 (!) 144/100   06/17/22 130/82       Prior to Admission medications    Medication Sig Start Date End Date Taking?  Authorizing Provider   bicalutamide (CASODEX) 50 MG chemo tablet Take 50 mg by mouth daily 6/27/22 7/27/22 Yes Historical Provider, MD   albuterol sulfate HFA (PROVENTIL;VENTOLIN;PROAIR) 108 (90 Base) MCG/ACT inhaler Inhale 2 puffs into the lungs every 6 hours as needed for Shortness of Breath 6/22/22  Yes Fawn Marte MD   furosemide (LASIX) 40 MG tablet Take 1 tablet by mouth daily Alternate between 40 once a day and 40 twice a day on alternate days 6/21/22  Yes Marycruz Braxton MD   metoprolol succinate (TOPROL XL) 100 MG extended release tablet Take 1 tablet by mouth daily 6/14/22  Yes Marycruz Braxton MD   apixaban (ELIQUIS) 5 MG TABS tablet Take 1 tablet by mouth 2 times daily 5/10/22 8/8/22 Yes Fawn Marte MD   omeprazole (PRILOSEC) 20 MG delayed release capsule Take 1 capsule by mouth daily 5/10/22  Yes Bard Makenzie MD   allopurinol (ZYLOPRIM) 100 MG tablet Take 1 tablet by mouth daily 5/10/22  Yes Bard Makenzie MD   traZODone (DESYREL) 50 MG tablet Take 2 tablets by mouth nightly 5/10/22  Yes Bard Makenzie MD   potassium chloride (KLOR-CON M) 20 MEQ extended release tablet Take 2 tablets by mouth daily 4/18/22  Yes Khanh Davis MD   albuterol (PROVENTIL) (2.5 MG/3ML) 0.083% nebulizer solution Take 3 mLs by nebulization every 6 hours as needed for Wheezing 4/8/22  Yes Bard Makenzie MD   metFORMIN (GLUCOPHAGE) 500 MG tablet Take 1 tablet by mouth daily (with breakfast) 2/9/22  Yes Bard Makenzie MD   Dulaglutide (TRULICITY SC) Inject into the skin    Yes Historical Provider, MD   fluticasone-umeclidin-vilant (TRELEGY ELLIPTA) 100-62.5-25 MCG/INH AEPB Inhale 1 puff into the lungs daily 1/10/22  Yes Edis Granados MD   montelukast (SINGULAIR) 10 MG tablet Take 1 tablet by mouth daily 9/22/21  Yes Hansa Ledesma, SLAVA - CNP   calcium carb-cholecalciferol 600-200 MG-UNIT TABS tablet Take 1 tablet by mouth daily   Yes Historical Provider, MD       Physical Exam  Vitals reviewed. Constitutional:       General: He is not in acute distress. Appearance: Normal appearance. He is obese. He is not ill-appearing. HENT:      Head: Atraumatic. Neck:      Vascular: No carotid bruit or JVD. Cardiovascular:      Rate and Rhythm: Normal rate and regular rhythm. Pulses: Normal pulses. Heart sounds: Murmur heard. Pulmonary:      Effort: Pulmonary effort is normal. No respiratory distress. Breath sounds: Normal breath sounds. Musculoskeletal:         General: No deformity. Cervical back: Neck supple. No muscular tenderness. Right lower leg: Edema present. Left lower leg: Edema present. Neurological:      Mental Status: He is alert.          Health Maintenance   Topic Date Due    Low dose CT lung screening  Never done    Shingles vaccine (2 of 3) 10/12/2016    Depression Screen  2022    Annual Wellness Visit (AWV)  2022    Diabetic foot exam  2022    Diabetic microalbuminuria test  2022    A1C test (Diabetic or Prediabetic)  2023    Lipids  2023    Prostate Specific Antigen (PSA) Screening or Monitoring  06/10/2023    Diabetic retinal exam  2024    DTaP/Tdap/Td vaccine (2 - Td or Tdap) 2024    Colorectal Cancer Screen  2027    Flu vaccine  Completed    Pneumococcal 65+ years Vaccine  Completed    COVID-19 Vaccine  Completed    AAA screen  Completed    Hepatitis C screen  Completed    Hepatitis A vaccine  Aged Out    Hib vaccine  Aged Out    Meningococcal (ACWY) vaccine  Aged Out       Lab Review   Lab Results   Component Value Date    CHOL 86 2022    CHOL 127 2021    TRIG 62 2022    HDL 36 2022    HDL 41 2012    LDLDIRECT 54 2020        Echo 2022   Summary   LV function and size are normal, Ejection Fraction 55-60 %.   Mild concentric left ventricular hypertrophy.   Diastolic dysfunction could not be evaluated due to arrhythmia.   Moderately dilated left and right atrium and right ventricle.   Aortic sclerosis with mild stenosis. The aortic valve area is 1.59 cm2 with   a mean gradient of 16 mmHg.   RVSP= 32 mmHg. IVC appears dilated, but maintains respiratory changes.   No evidence of pericardial effusion.     Echo 2022   Summary   Left ventricular function is low normal, EF is estimated at 35-40%. Mild left ventricular hypertrophy. Diastolic dysfunction could not be evaluated due to arrhythmia. Severely dilated left atrium. Moderately dilated right atrium and mildly enlarged right ventricle. Heavily calcified aortic valve with moderate aortic stenosis; mean PmmHG. TOMMY: 1.36cm2. DVI: 0.3. Gradient may be underestimated due to   cardiomyopathy .    Moderate aortic regurgitation; PHT: 370msec. Mitral annular calcification is present. Moderate mitral , and tricuspid regurgitation is present. Moderate Pulmonary hypertension noted with RVSP of 55mmHg. Inferior vena cava is dilated, measuring at 3.0 cm, and does not collapse   with respiration. Dilation of the aortic root (3.6cm)and the ascending aorta(4.3cm). No evidence of pericardial effusion. C: 6/21/2022  Procedure Summary   Access : Radial and Femoral vein access for RHC   1. RCA has mild to moderate 20-30 % stenosis, LAD and Circ are   patent   2. LVEDP was 22 mmHG, PcWp is 28 mmHG   RVSP was 54/9 , PA is 38 mmHG   Mean gradient on pull back was 17 mmHG   TOMMY is 1.2 Cm ( underestimate due to low cardiac output)   CO is 4.25 L/MIN  Angiographic Findings      Diagnostic Findings      Cardiac Arteries and Lesion Findings     LMCA: Normal (no stenosis %) and No Angiographicalyl Significant  Disease. Widely patent     LAD: Normal (no stenosis %) and No Angiographicalyl Significant Disease. LAD is  widely patent , Diagonal bifurcates and is widely patent     LCx: Normal (no stenosis %) and No Angiographicalyl Significant Disease. Circ  is patent , Om is patent     RCA: Abnormal and Focal stenosis. Mid RCA is 20-30 % stenosis , PDA and PLV are  widely patent       TIM: 6/21/2022  Summary   Left ventricular systolic function is abnormal.   Ejection fraction is visually estimated at 35-40%. Aortic valve appears tricuspid. Leaflets are heavily calcified   TOMMY by planimetry was 1.0 cm2   Moderate mitral regurgitation is present. Mild prolapse of posterior leaflet      ASSESSMENT/PLAN:    Nonrheumatic aortic valve stenosis  Dr. Liborio Bullard patient and wife have had extensive discussion about aortic valve at last office visit with potential treatment and pathophysiology. Reviewed TIM and left heart cath results with patient. Discussed valvular replacement and follow-up with TAVR clinic.   Patient wife's questions answered. Patient would like to just get it over with. Patient states that he wants to get back to being active again. Reinforced to be cautious and avoid strenuous activity and call us if he starts getting dizzy lightheaded or has more shortness of breath, for now continue Lasix 40 mg BID and potassium. Patient information sent to Ramone Ferrari at [facility] clinic.     Atrial fibrillation [facility])   He has permanent atrial fibrillation continue rate control strategy currently on Cardizem he occasionally notices a heart rate in the 50s but he is not symptomatic. Continue anticoagulation. Patient has not noticed any significant changes in heart rate since changing from Cardizem to metoprolol. He has not needed to use any additional Cardizem. Encourage patient to notify office if he notices any sustained heart rates over 120.        Acute on chronic diastolic/systolic congestive heart failure (HCC)  Due to combination of diastolic dysfunction, aortic stenosis advised to watch salt. Encouraged to weigh daily. Reduced ejection fraction on last echocardiogram showing an ejection fraction of 35 to 40%. Continue Toprol 100 mg daily  Start losartan 25 mg daily, Aldactone 25 mg daily, farxiga 10 mg daily.     Dyslipidemia  All available lab work was reviewed. Patient was advised to repeat lab work before next visit. Necessary orders were placed , instructions given by myself   Counseled extensively and medication compliance urged. We discussed that for the  prevention of ASCVD our  goal is aggressive risk modification. Patient is encouraged to exercise if they can , educated about  brisk walk for 30 minutes  at least 3 to 4 times a week if there are no physical limitations        Various goals were discussed and questions answered. Continue current medications. Appropriate prescriptions are addressed and refills ordered. Questions answered and patient verbalizes understanding. Call for any problems, questions, or concerns. Greater than 60 % of time spent counseling besides reviewing data and images     No follow-ups on file. An electronic signature was used to authenticate this note.     Electronically signed by SLAVA Hernandez CNP on 6/29/2022 at 1:14 PM

## 2022-06-30 RX ORDER — LOSARTAN POTASSIUM 25 MG/1
25 TABLET ORAL DAILY
Qty: 90 TABLET | Refills: 1 | Status: SHIPPED | OUTPATIENT
Start: 2022-06-30

## 2022-06-30 RX ORDER — SPIRONOLACTONE 25 MG/1
25 TABLET ORAL DAILY
Qty: 90 TABLET | Refills: 1 | Status: SHIPPED | OUTPATIENT
Start: 2022-06-30

## 2022-06-30 ASSESSMENT — ENCOUNTER SYMPTOMS
SHORTNESS OF BREATH: 1
COUGH: 0

## 2022-07-02 ENCOUNTER — HOSPITAL ENCOUNTER (EMERGENCY)
Age: 72
Discharge: HOME | End: 2022-07-02
Attending: STUDENT IN AN ORGANIZED HEALTH CARE EDUCATION/TRAINING PROGRAM
Payer: MEDICARE

## 2022-07-02 ENCOUNTER — APPOINTMENT (OUTPATIENT)
Dept: GENERAL RADIOLOGY | Age: 72
End: 2022-07-02
Payer: MEDICARE

## 2022-07-02 VITALS
DIASTOLIC BLOOD PRESSURE: 82 MMHG | TEMPERATURE: 98 F | OXYGEN SATURATION: 93 % | SYSTOLIC BLOOD PRESSURE: 113 MMHG | RESPIRATION RATE: 18 BRPM | HEART RATE: 80 BPM

## 2022-07-02 DIAGNOSIS — M25.552 ACUTE PAIN OF LEFT HIP: ICD-10-CM

## 2022-07-02 DIAGNOSIS — M25.552 LEFT HIP PAIN: ICD-10-CM

## 2022-07-02 DIAGNOSIS — M25.559 HIP PAIN: Primary | ICD-10-CM

## 2022-07-02 PROCEDURE — 6370000000 HC RX 637 (ALT 250 FOR IP): Performed by: STUDENT IN AN ORGANIZED HEALTH CARE EDUCATION/TRAINING PROGRAM

## 2022-07-02 PROCEDURE — 73502 X-RAY EXAM HIP UNI 2-3 VIEWS: CPT

## 2022-07-02 PROCEDURE — 73130 X-RAY EXAM OF HAND: CPT

## 2022-07-02 PROCEDURE — 99283 EMERGENCY DEPT VISIT LOW MDM: CPT

## 2022-07-02 RX ORDER — OXYCODONE HYDROCHLORIDE AND ACETAMINOPHEN 5; 325 MG/1; MG/1
2 TABLET ORAL ONCE
Status: COMPLETED | OUTPATIENT
Start: 2022-07-02 | End: 2022-07-02

## 2022-07-02 RX ORDER — OXYCODONE HYDROCHLORIDE AND ACETAMINOPHEN 5; 325 MG/1; MG/1
2 TABLET ORAL EVERY 8 HOURS PRN
Status: DISCONTINUED | OUTPATIENT
Start: 2022-07-02 | End: 2022-07-02

## 2022-07-02 RX ORDER — OXYCODONE HYDROCHLORIDE AND ACETAMINOPHEN 5; 325 MG/1; MG/1
1-2 TABLET ORAL EVERY 8 HOURS PRN
Qty: 15 TABLET | Refills: 0 | Status: SHIPPED | OUTPATIENT
Start: 2022-07-02 | End: 2022-07-05 | Stop reason: SDUPTHER

## 2022-07-02 RX ADMIN — OXYCODONE HYDROCHLORIDE AND ACETAMINOPHEN 2 TABLET: 5; 325 TABLET ORAL at 11:39

## 2022-07-02 ASSESSMENT — PAIN SCALES - GENERAL
PAINLEVEL_OUTOF10: 2
PAINLEVEL_OUTOF10: 10
PAINLEVEL_OUTOF10: 10

## 2022-07-02 ASSESSMENT — PAIN DESCRIPTION - LOCATION: LOCATION: HIP

## 2022-07-02 ASSESSMENT — PAIN DESCRIPTION - PAIN TYPE: TYPE: ACUTE PAIN

## 2022-07-02 ASSESSMENT — PAIN DESCRIPTION - ORIENTATION: ORIENTATION: LEFT

## 2022-07-02 ASSESSMENT — PAIN DESCRIPTION - DESCRIPTORS: DESCRIPTORS: SHOOTING;SHARP

## 2022-07-02 ASSESSMENT — PAIN DESCRIPTION - FREQUENCY: FREQUENCY: CONTINUOUS

## 2022-07-02 NOTE — ED PROVIDER NOTES
5YRS    COPD (chronic obstructive pulmonary disease) (HCC)     noted on PFTs 6/2020    DDD (degenerative disc disease), lumbar     Diabetes mellitus type 2, controlled ([facility]     Dr Darcie Kumar- optho, Dr Nhung Hanks- podiatry    Diabetic neuropathy, type II diabetes mellitus ([facility]     DECR MONOFIL SENSATION    Dilated cardiomyopathy ([facility] 11/04/2015    Elevated PSA 11/02/2021    Dr Sharen Dancer w [facility]    GERD (gastroesophageal reflux disease)     Gout     H/O cardiovascular stress test 12/10/2015    lexiscan-normal,EF65%    History of total right hip arthroplasty     Dr Sanjuanita Yeager 9/2018- does get antibiotic prophylaxis w dental work- Dr Kiara Broussard    Hyperlipidemia     Hypertension     IFG (impaired fasting glucose)     Jan 2019- a1c 6.1 but fasting glc 131.  Knee pain, bilateral     Long-term (current) use of anticoagulants     Peptic ulcer disease     Testosterone deficiency dx'd 9/2015    Ventricular tachycardia ([facility]     mentioned by Dr Benny Gurrola 11/2019 note-who monitors him    VHD (valvular heart disease) 11/29/2018    Mild aortic stenosis by echo November 2018     Past Surgical History:   Procedure Laterality Date    HIP ARTHROPLASTY Right 09/12/2018    Dr Jessica Cho Right 11/2016    Dr Sebastian Fernandez  3/08, dr Javi Napier    lap assisted colon mass resection-benign    POLYPECTOMY  2/98- jaimie    colonic polypectomy    POLYPECTOMY  9/10/02- dr Titus Oh    colonic polypectomy    TOE AMPUTATION Left 12/03/2021    LEFT SECOND TOE PARTIAL AMPUTATION in [place] by Dr. Deirdre Pedroza Left 12/03/2021    partial to L second, Dr Nhung Hanks.     TOE AMPUTATION Left 12/3/2021    LEFT SECOND TOE PARTIAL AMPUTATION performed by Shraddha Cummings DPM at [facility] [place]  10/08, dr Yuriy Vidal Left 02/06/2014    [facility] Dr Josse Munguia     Family History   Problem Relation Age of Onset    Hypertension Mother     High Blood Pressure Mother    Tanisha Coombs Other Mother         lung problems    Other Father         CHF, A. Fib    Hypotension Father     Diabetes Father     No Known Problems Sister     Atrial Fibrillation Brother     Atrial Fibrillation Brother     No Known Problems Sister     No Known Problems Sister      Social History     Socioeconomic History    Marital status:      Spouse name: Not on file    Number of children: Not on file    Years of education: Not on file    Highest education level: Not on file   Occupational History    Occupation: [employer]   Tobacco Use    Smoking status: Former Smoker     Packs/day: 1.00     Years: 39.00     Pack years: 39.00     Types: Cigarettes     Start date:      Quit date: 2013     Years since quittin.5    Smokeless tobacco: Never Used   Vaping Use    Vaping Use: Never used   Substance and Sexual Activity    Alcohol use: Yes     Alcohol/week: 7.0 standard drinks     Types: 7 Glasses of wine per week     Comment: occasional wine/moderate/ caffeine 2 cups of coffee    Drug use: No    Sexual activity: Yes     Partners: Female   Other Topics Concern    Not on file   Social History Narrative    Exercise:    Seat Belt :     Social Determinants of Health     Financial Resource Strain:     Difficulty of Paying Living Expenses: Not on file   Food Insecurity:     Worried About Running Out of Food in the Last Year: Not on file    Miquel of Food in the Last Year: Not on file   Transportation Needs:     Lack of Transportation (Medical): Not on file    Lack of Transportation (Non-Medical):  Not on file   Physical Activity:     Days of Exercise per Week: Not on file    Minutes of Exercise per Session: Not on file   Stress:     Feeling of Stress : Not on file   Social Connections:     Frequency of Communication with Friends and Family: Not on file    Frequency of Social Gatherings with Friends and Family: Not on file    Attends Restorationism Services: Not on file   [employer] of Take 2 tablets by mouth daily 90 tablet 3    albuterol (PROVENTIL) (2.5 MG/3ML) 0.083% nebulizer solution Take 3 mLs by nebulization every 6 hours as needed for Wheezing 120 each 3    metFORMIN (GLUCOPHAGE) 500 MG tablet Take 1 tablet by mouth daily (with breakfast) 90 tablet 1    Dulaglutide (TRULICITY SC) Inject into the skin       fluticasone-umeclidin-vilant (TRELEGY ELLIPTA) 100-62.5-25 MCG/INH AEPB Inhale 1 puff into the lungs daily 1 each 5    montelukast (SINGULAIR) 10 MG tablet Take 1 tablet by mouth daily 90 tablet 3    calcium carb-cholecalciferol 600-200 MG-UNIT TABS tablet Take 1 tablet by mouth daily       Allergies   Allergen Reactions    Lisinopril      Chest tightness    Simvastatin Other (See Comments)     Muscle cramps       Nursing Notes Reviewed    Physical Exam:  Triage VS:    ED Triage Vitals [07/02/22 1119]   Enc Vitals Group      /85      Heart Rate 74      Resp 18      Temp 98 °F (36.7 °C)      Temp src       SpO2 93 %      Weight       Height       Head Circumference       Peak Flow       Pain Score       Pain Loc       Pain Edu? Excl. in [address]? My pulse ox interpretation is - normal      General appearance:  No acute distress. Skin:  Warm. Dry. Eye:  Extraocular movements intact. Ears, nose, mouth and throat:  Oral mucosa moist   Neck:  Trachea midline. Extremity:  No swelling. Normal ROM, left palm and dorsum of the hand ecchymosis. Full strength and range of motion bilateral upper extremities. Left greater trochanter is tender to palpation. No pain with internal or external rotation of the L hip. Radial and dorsalis pedis pulses intact bilaterally. Sensation intact in all upper extremity and lower extremity dermatomes. No wrist ttp. Heart:  Regular rate and rhythm, normal S1 & S2, no extra heart sounds. Perfusion:  intact  Respiratory:  Lungs clear to auscultation bilaterally. Respirations nonlabored. Abdominal:  Normal bowel sounds. Soft.  Nontender. Non distended. Back:  No CVA tenderness to palpation     Neurological:  Alert and oriented times 3. No focal neuro deficits. Psychiatric:  Appropriate    I have reviewed and interpreted all of the currently available lab results from this visit (if applicable):  No results found for this visit on 07/02/22. Radiographs (if obtained):  Radiologist's Report Reviewed:  No results found. EKG (if obtained): (All EKG's are interpreted by myself in the absence of a cardiologist)      MDM:  73 yo M pmh prostate CA presents 4 days after a fall onto his L hip with L hip pain. Patient had a PET scan yesterday and results are unknown. He has follow-up with oncology this week. Low suspicion for fractures however will get x-rays of left hip and left wrist.  Will provide analgesia and if under control will discharge. Patient and wife are in agreement to this plan. XR showed small ulnar impaction syndrome -she states this is likely from an old injury and he has no pain over the wrist -only on the distal fourth and fifth metacarpals and base of phalanges. Hip XR unremarkable. Pain under control. Patient and wife at bedside agreeable to going home and following up with oncologist.  They have no questions, comments or concerns at this time and feels safe and stable to go home. Clinical Impression:  1. Hip pain    2. Acute pain of left hip    3. Left hip pain      Disposition referral (if applicable): Oncologist  Follow up with your oncologist on 7/5        Disposition medications (if applicable):  New Prescriptions    OXYCODONE-ACETAMINOPHEN (PERCOCET) 5-325 MG PER TABLET    Take 1-2 tablets by mouth every 8 hours as needed for Pain for up to 3 days.      ED Provider Disposition Time  DISPOSITION Decision To Discharge 07/02/2022 01:06:08 PM      Comment: Please note this report has been produced using speech recognition software and may contain errors related to that system including errors in grammar, punctuation, and spelling, as well as words and phrases that may be inappropriate. Efforts were made to edit the dictations.        Tosin Lizama DO  07/02/22 3953

## 2022-07-05 ENCOUNTER — OFFICE VISIT (OUTPATIENT)
Dept: INTERNAL MEDICINE CLINIC | Age: 72
End: 2022-07-05
Payer: MEDICARE

## 2022-07-05 VITALS
BODY MASS INDEX: 32.85 KG/M2 | WEIGHT: 277 LBS | HEART RATE: 73 BPM | DIASTOLIC BLOOD PRESSURE: 68 MMHG | RESPIRATION RATE: 17 BRPM | SYSTOLIC BLOOD PRESSURE: 106 MMHG | OXYGEN SATURATION: 94 %

## 2022-07-05 DIAGNOSIS — J45.909 MILD ASTHMA WITHOUT COMPLICATION, UNSPECIFIED WHETHER PERSISTENT: ICD-10-CM

## 2022-07-05 DIAGNOSIS — C61 ADENOCARCINOMA OF PROSTATE (HCC): ICD-10-CM

## 2022-07-05 DIAGNOSIS — E11.21 CONTROLLED TYPE 2 DIABETES MELLITUS WITH DIABETIC NEPHROPATHY, WITHOUT LONG-TERM CURRENT USE OF INSULIN (HCC): ICD-10-CM

## 2022-07-05 DIAGNOSIS — I35.0 NONRHEUMATIC AORTIC VALVE STENOSIS: Primary | ICD-10-CM

## 2022-07-05 DIAGNOSIS — M25.559 HIP PAIN: ICD-10-CM

## 2022-07-05 PROCEDURE — 1036F TOBACCO NON-USER: CPT | Performed by: INTERNAL MEDICINE

## 2022-07-05 PROCEDURE — 99214 OFFICE O/P EST MOD 30 MIN: CPT | Performed by: INTERNAL MEDICINE

## 2022-07-05 PROCEDURE — 2022F DILAT RTA XM EVC RTNOPTHY: CPT | Performed by: INTERNAL MEDICINE

## 2022-07-05 PROCEDURE — 1123F ACP DISCUSS/DSCN MKR DOCD: CPT | Performed by: INTERNAL MEDICINE

## 2022-07-05 PROCEDURE — 3044F HG A1C LEVEL LT 7.0%: CPT | Performed by: INTERNAL MEDICINE

## 2022-07-05 PROCEDURE — G8417 CALC BMI ABV UP PARAM F/U: HCPCS | Performed by: INTERNAL MEDICINE

## 2022-07-05 PROCEDURE — 3017F COLORECTAL CA SCREEN DOC REV: CPT | Performed by: INTERNAL MEDICINE

## 2022-07-05 PROCEDURE — G8427 DOCREV CUR MEDS BY ELIG CLIN: HCPCS | Performed by: INTERNAL MEDICINE

## 2022-07-05 RX ORDER — MONTELUKAST SODIUM 10 MG/1
10 TABLET ORAL DAILY
Qty: 90 TABLET | Refills: 3 | Status: SHIPPED | OUTPATIENT
Start: 2022-07-05

## 2022-07-05 RX ORDER — OXYCODONE HYDROCHLORIDE AND ACETAMINOPHEN 5; 325 MG/1; MG/1
1-2 TABLET ORAL EVERY 8 HOURS PRN
Qty: 15 TABLET | Refills: 0 | Status: SHIPPED | OUTPATIENT
Start: 2022-07-05 | End: 2022-07-08

## 2022-07-05 ASSESSMENT — PATIENT HEALTH QUESTIONNAIRE - PHQ9
SUM OF ALL RESPONSES TO PHQ9 QUESTIONS 1 & 2: 0
DEPRESSION UNABLE TO ASSESS: FUNCTIONAL CAPACITY MOTIVATION LIMITS ACCURACY
1. LITTLE INTEREST OR PLEASURE IN DOING THINGS: 0
SUM OF ALL RESPONSES TO PHQ QUESTIONS 1-9: 0
2. FEELING DOWN, DEPRESSED OR HOPELESS: 0

## 2022-07-05 NOTE — PROGRESS NOTES
Vamsi [place]  1950 07/05/22    SUBJECTIVE:  Seen for ED after a fa;; 6d ago landing on stairs. Did hit head and also L hip. Prostate CA, is to start hormonal therapy and also RT, no surgery. Asthma:  Patient denies significant chest pain/tightness, dyspnea, decreased exercise tolerance, cough, or wheezing on current regimen. atr fib, last seen by Dr Wyatt Medina and some med adjustments noted. Feels has had some improvement in his ENRIQUE. DM- cardi just started farxiga and so we can stop metformin  Lab Results   Component Value Date    LABA1C 5.9 04/07/2022    LABA1C 6.0 11/01/2021    LABA1C 6.2 04/08/2021     Lab Results   Component Value Date    GLUF 107 (H) 01/06/2017    LABMICR Not Indicated 11/15/2021    LDLCALC 38 04/07/2022    CREATININE 1.1 06/17/2022     Severe AS and had recent recheck echo w TOMMY ~1.0, also LHC noted. Pending for cardiac clearance then if to proceed w TAVR. L hip pain is resolving but did ask for rf percocet. Controlled substances monitoring: possible medication side effects, risk of tolerance and/or dependence, and alternative treatments discussed, no signs of potential drug abuse or diversion identified and OARRS report reviewed today- activity consistent with treatment plan. OBJECTIVE:    /68   Pulse 73   Resp 17   Wt 277 lb (125.6 kg)   SpO2 94%   BMI 32.85 kg/m²     Physical Exam  Vitals reviewed. Constitutional:       General: He is not in acute distress. Appearance: He is well-developed. HENT:      Head: Normocephalic and atraumatic. Eyes:      General: No scleral icterus. Right eye: No discharge. Left eye: No discharge. Conjunctiva/sclera: Conjunctivae normal.      Pupils: Pupils are equal, round, and reactive to light. Neck:      Thyroid: No thyromegaly. Vascular: No JVD. Trachea: No tracheal deviation. Cardiovascular:      Rate and Rhythm: Normal rate. Rhythm irregular.       Heart sounds: Murmur f/u w rad onc, urology

## 2022-07-09 DIAGNOSIS — J44.9 CHRONIC OBSTRUCTIVE PULMONARY DISEASE, UNSPECIFIED COPD TYPE (HCC): ICD-10-CM

## 2022-07-11 RX ORDER — FLUTICASONE FUROATE, UMECLIDINIUM BROMIDE AND VILANTEROL TRIFENATATE 100; 62.5; 25 UG/1; UG/1; UG/1
POWDER RESPIRATORY (INHALATION)
Qty: 1 EACH | Refills: 5 | Status: SHIPPED | OUTPATIENT
Start: 2022-07-11

## 2022-07-12 ENCOUNTER — TELEMEDICINE (OUTPATIENT)
Dept: INTERNAL MEDICINE CLINIC | Age: 72
End: 2022-07-12
Payer: MEDICARE

## 2022-07-12 DIAGNOSIS — Z00.00 MEDICARE ANNUAL WELLNESS VISIT, SUBSEQUENT: Primary | ICD-10-CM

## 2022-07-12 PROCEDURE — G0439 PPPS, SUBSEQ VISIT: HCPCS | Performed by: INTERNAL MEDICINE

## 2022-07-12 PROCEDURE — 3017F COLORECTAL CA SCREEN DOC REV: CPT | Performed by: INTERNAL MEDICINE

## 2022-07-12 PROCEDURE — 1123F ACP DISCUSS/DSCN MKR DOCD: CPT | Performed by: INTERNAL MEDICINE

## 2022-07-12 SDOH — ECONOMIC STABILITY: FOOD INSECURITY: WITHIN THE PAST 12 MONTHS, THE FOOD YOU BOUGHT JUST DIDN'T LAST AND YOU DIDN'T HAVE MONEY TO GET MORE.: NEVER TRUE

## 2022-07-12 SDOH — ECONOMIC STABILITY: FOOD INSECURITY: WITHIN THE PAST 12 MONTHS, YOU WORRIED THAT YOUR FOOD WOULD RUN OUT BEFORE YOU GOT MONEY TO BUY MORE.: NEVER TRUE

## 2022-07-12 ASSESSMENT — LIFESTYLE VARIABLES
HOW OFTEN DO YOU HAVE A DRINK CONTAINING ALCOHOL: 4 OR MORE TIMES A WEEK
HOW OFTEN DURING THE LAST YEAR HAVE YOU FOUND THAT YOU WERE NOT ABLE TO STOP DRINKING ONCE YOU HAD STARTED: 0
HAVE YOU OR SOMEONE ELSE BEEN INJURED AS A RESULT OF YOUR DRINKING: 0
HOW OFTEN DURING THE LAST YEAR HAVE YOU NEEDED AN ALCOHOLIC DRINK FIRST THING IN THE MORNING TO GET YOURSELF GOING AFTER A NIGHT OF HEAVY DRINKING: 0
HOW MANY STANDARD DRINKS CONTAINING ALCOHOL DO YOU HAVE ON A TYPICAL DAY: 1 OR 2
HOW OFTEN DURING THE LAST YEAR HAVE YOU HAD A FEELING OF GUILT OR REMORSE AFTER DRINKING: 0
HAS A RELATIVE, FRIEND, DOCTOR, OR ANOTHER HEALTH PROFESSIONAL EXPRESSED CONCERN ABOUT YOUR DRINKING OR SUGGESTED YOU CUT DOWN: 0
HOW OFTEN DURING THE LAST YEAR HAVE YOU FAILED TO DO WHAT WAS NORMALLY EXPECTED FROM YOU BECAUSE OF DRINKING: 0
HOW OFTEN DURING THE LAST YEAR HAVE YOU BEEN UNABLE TO REMEMBER WHAT HAPPENED THE NIGHT BEFORE BECAUSE YOU HAD BEEN DRINKING: 0

## 2022-07-12 ASSESSMENT — PATIENT HEALTH QUESTIONNAIRE - PHQ9
SUM OF ALL RESPONSES TO PHQ QUESTIONS 1-9: 0
SUM OF ALL RESPONSES TO PHQ9 QUESTIONS 1 & 2: 0
1. LITTLE INTEREST OR PLEASURE IN DOING THINGS: 0
2. FEELING DOWN, DEPRESSED OR HOPELESS: 0

## 2022-07-12 ASSESSMENT — SOCIAL DETERMINANTS OF HEALTH (SDOH): HOW HARD IS IT FOR YOU TO PAY FOR THE VERY BASICS LIKE FOOD, HOUSING, MEDICAL CARE, AND HEATING?: NOT HARD AT ALL

## 2022-07-12 NOTE — PROGRESS NOTES
Medicare Annual Wellness Visit    Wilber Ocampo is here for Medicare AWV    Assessment & Plan   Medicare annual wellness visit, subsequent      Recommendations for Preventive Services Due: see orders and patient instructions/AVS.  Recommended screening schedule for the next 5-10 years is provided to the patient in written form: see Patient Instructions/AVS.     Return for Medicare Annual Wellness Visit in 1 year. Subjective      Patient's complete Health Risk Assessment and screening values have been reviewed and are found in Flowsheets. The following problems were reviewed today and where indicated follow up appointments were made and/or referrals ordered. Positive Risk Factor Screenings with Interventions:             Health Habits/Nutrition:     Physical Activity: Sufficiently Active    Days of Exercise per Week: 7 days    Minutes of Exercise per Session: 30 min     Have you lost any weight without trying in the past 3 months?: No     Have you seen the dentist within the past year?: Yes    Health Habits/Nutrition Interventions:  · Nutritional issues:  patient is not ready to address his/her nutritional/weight issues at this time             Objective      Patient-Reported Vitals  No data recorded     Unable to obtain 3 vital signs due to patient not having equipment to take blood pressure/temperature. Allergies   Allergen Reactions    Lisinopril      Chest tightness    Simvastatin Other (See Comments)     Muscle cramps     Prior to Visit Medications    Medication Sig Taking?  Authorizing Provider   Joshua Breach 100-62.5-25 MCG/INH AEPB INHALE ONE PUFF BY MOUTH DAILY Yes Rosendo Barnard MD   montelukast (SINGULAIR) 10 MG tablet Take 1 tablet by mouth daily Yes Marianela Thomas MD   losartan (COZAAR) 25 MG tablet Take 1 tablet by mouth daily Yes Little Lochearn, APRN - CNP   spironolactone (ALDACTONE) 25 MG tablet Take 1 tablet by mouth daily Yes Little Lochearn, APRN - CNP dapagliflozin (FARXIGA) 10 MG tablet Take 1 tablet by mouth every morning Yes Gerard Rowley APRN - CNP   bicalutamide (CASODEX) 50 MG chemo tablet Take 50 mg by mouth daily Yes Historical Provider, MD   albuterol sulfate HFA (PROVENTIL;VENTOLIN;PROAIR) 108 (90 Base) MCG/ACT inhaler Inhale 2 puffs into the lungs every 6 hours as needed for Shortness of Breath Yes Osmar Gillette MD   furosemide (LASIX) 40 MG tablet Take 1 tablet by mouth daily Alternate between 40 once a day and 40 twice a day on alternate days Yes Mallory Hogue MD   metoprolol succinate (TOPROL XL) 100 MG extended release tablet Take 1 tablet by mouth daily Yes Mallory Hogue MD   apixaban (ELIQUIS) 5 MG TABS tablet Take 1 tablet by mouth 2 times daily Yes Osmar Gillette MD   omeprazole (PRILOSEC) 20 MG delayed release capsule Take 1 capsule by mouth daily Yes Osmar Gillette MD   allopurinol (ZYLOPRIM) 100 MG tablet Take 1 tablet by mouth daily Yes Osmar Gillette MD   traZODone (DESYREL) 50 MG tablet Take 2 tablets by mouth nightly Yes Osmar Gillette MD   potassium chloride (KLOR-CON M) 20 MEQ extended release tablet Take 2 tablets by mouth daily Yes Mallory Hogue MD   albuterol (PROVENTIL) (2.5 MG/3ML) 0.083% nebulizer solution Take 3 mLs by nebulization every 6 hours as needed for Wheezing Yes Osmar Gillette MD   calcium carb-cholecalciferol 600-200 MG-UNIT TABS tablet Take 1 tablet by mouth daily Yes Historical Provider, MD Sneed (Including outside providers/suppliers regularly involved in providing care):   Patient Care Team:  Osmar Gillette MD as PCP - General  Osmar Gillette MD as PCP - [facility]  Sandra Muro MD as Consulting Physician (Cardiology)     Reviewed and updated this visit:  Tobacco  Allergies  Meds  Med Hx  Surg Hx  Soc Hx  Fam Hx             I, Jovan Feldman LPN, [birth year], performed the documented evaluation under the direct supervision of the attending physician. Alec Escobar, was evaluated through a synchronous (real-time) audio encounter. The patient (or guardian if applicable) is aware that this is a billable service, which includes applicable co-pays. This Virtual Visit was conducted with patient's (and/or legal guardian's) consent. The visit was conducted pursuant to the emergency declaration under the [facility] [address] and the [employer] and [employer] Medical General Act. Patient identification was verified, and a caregiver was present when appropriate. The patient was located at Home: [address] [place] [address] [address]  [address] [facility] [id number]. Provider was located at [facility] (Appt Dept): [address]. Total time spent for this encounter: Not billed by time    --Leah Metz LPN on 8/11/1150 at 25:98 AM    An electronic signature was used to authenticate this note. This encounter was performed under Bubba juárez MDs, direct supervision, 7/12/2022.

## 2022-07-12 NOTE — PATIENT INSTRUCTIONS
Personalized Preventive Plan for [place] - 7/12/2022  Medicare offers a range of preventive health benefits. Some of the tests and screenings are paid in full while other may be subject to a deductible, co-insurance, and/or copay. Some of these benefits include a comprehensive review of your medical history including lifestyle, illnesses that may run in your family, and various assessments and screenings as appropriate. After reviewing your medical record and screening and assessments performed today your provider may have ordered immunizations, labs, imaging, and/or referrals for you. A list of these orders (if applicable) as well as your Preventive Care list are included within your After Visit Summary for your review. Other Preventive Recommendations:    · A preventive eye exam performed by an eye specialist is recommended every 1-2 years to screen for glaucoma; cataracts, macular degeneration, and other eye disorders. · A preventive dental visit is recommended every 6 months. · Try to get at least 150 minutes of exercise per week or 10,000 steps per day on a pedometer . · Order or download the FREE \"Exercise & Physical Activity: Your Everyday Guide\" from [employer]. Call 1-991.331.2085 or search [employer] online. · You need 0142-2251 mg of calcium and 3340-5448 IU of vitamin D per day. It is possible to meet your calcium requirement with diet alone, but a vitamin D supplement is usually necessary to meet this goal.  · When exposed to the sun, use a sunscreen that protects against both UVA and UVB radiation with an SPF of 30 or greater. Reapply every 2 to 3 hours or after sweating, drying off with a towel, or swimming. · Always wear a seat belt when traveling in a car. Always wear a helmet when riding a bicycle or motorcycle. Personalized Preventive Plan for [place] [facility] 7/12/2022  Medicare offers a range of preventive health benefits.  Some of the tests and screenings are paid in full while other may be subject to a deductible, co-insurance, and/or copay. Some of these benefits include a comprehensive review of your medical history including lifestyle, illnesses that may run in your family, and various assessments and screenings as appropriate. After reviewing your medical record and screening and assessments performed today your provider may have ordered immunizations, labs, imaging, and/or referrals for you. A list of these orders (if applicable) as well as your Preventive Care list are included within your After Visit Summary for your review. Other Preventive Recommendations:    A preventive eye exam performed by an eye specialist is recommended every 1-2 years to screen for glaucoma; cataracts, macular degeneration, and other eye disorders. A preventive dental visit is recommended every 6 months. Try to get at least 150 minutes of exercise per week or 10,000 steps per day on a pedometer . Order or download the FREE \"Exercise & Physical Activity: Your Everyday Guide\" from [employer]. Call 7-590.394.3065 or search [employer] online. You need 5152-1467 mg of calcium and 5671-2830 IU of vitamin D per day. It is possible to meet your calcium requirement with diet alone, but a vitamin D supplement is usually necessary to meet this goal.  When exposed to the sun, use a sunscreen that protects against both UVA and UVB radiation with an SPF of 30 or greater. Reapply every 2 to 3 hours or after sweating, drying off with a towel, or swimming. Always wear a seat belt when traveling in a car. Always wear a helmet when riding a bicycle or motorcycle.

## 2022-07-21 ENCOUNTER — HOSPITAL ENCOUNTER (OUTPATIENT)
Dept: CT IMAGING | Age: 72
Discharge: HOME | End: 2022-07-21
Payer: MEDICARE

## 2022-07-21 ENCOUNTER — HOSPITAL ENCOUNTER (OUTPATIENT)
Dept: CARDIAC CATH/INVASIVE PROCEDURES | Age: 72
Discharge: HOME | End: 2022-07-21
Attending: INTERNAL MEDICINE | Admitting: INTERNAL MEDICINE
Payer: MEDICARE

## 2022-07-21 ENCOUNTER — HOSPITAL ENCOUNTER (OUTPATIENT)
Dept: PULMONOLOGY | Age: 72
Discharge: HOME | End: 2022-07-21
Payer: MEDICARE

## 2022-07-21 ENCOUNTER — HOSPITAL ENCOUNTER (OUTPATIENT)
Dept: ULTRASOUND IMAGING | Age: 72
Discharge: HOME | End: 2022-07-21
Payer: MEDICARE

## 2022-07-21 VITALS
RESPIRATION RATE: 14 BRPM | HEIGHT: 77 IN | BODY MASS INDEX: 32.71 KG/M2 | SYSTOLIC BLOOD PRESSURE: 132 MMHG | DIASTOLIC BLOOD PRESSURE: 91 MMHG | HEART RATE: 94 BPM | WEIGHT: 277 LBS

## 2022-07-21 LAB
ALBUMIN SERPL-MCNC: 4.8 GM/DL (ref 3.4–5)
ALP BLD-CCNC: 73 IU/L (ref 40–128)
ALT SERPL-CCNC: 14 U/L (ref 10–40)
ANION GAP SERPL CALCULATED.3IONS-SCNC: 9 MMOL/L (ref 4–16)
APTT: 36.6 SECONDS (ref 25.1–37.1)
AST SERPL-CCNC: 20 IU/L (ref 15–37)
BILIRUB SERPL-MCNC: 0.7 MG/DL (ref 0–1)
BUN BLDV-MCNC: 17 MG/DL (ref 6–23)
CALCIUM SERPL-MCNC: 9.7 MG/DL (ref 8.3–10.6)
CHLORIDE BLD-SCNC: 103 MMOL/L (ref 99–110)
CO2: 29 MMOL/L (ref 21–32)
CREAT SERPL-MCNC: 1 MG/DL (ref 0.9–1.3)
GFR AFRICAN AMERICAN: >60 ML/MIN/1.73M2
GFR AFRICAN AMERICAN: >60 ML/MIN/1.73M2
GFR NON-AFRICAN AMERICAN: >60 ML/MIN/1.73M2
GFR NON-AFRICAN AMERICAN: >60 ML/MIN/1.73M2
GLUCOSE BLD-MCNC: 114 MG/DL (ref 70–99)
HCT VFR BLD CALC: 36.5 % (ref 42–52)
HEMOGLOBIN: 11.1 GM/DL (ref 13.5–18)
INR BLD: 1.28 INDEX
MCH RBC QN AUTO: 25.2 PG (ref 27–31)
MCHC RBC AUTO-ENTMCNC: 30.4 % (ref 32–36)
MCV RBC AUTO: 82.8 FL (ref 78–100)
PDW BLD-RTO: 18.1 % (ref 11.7–14.9)
PLATELET # BLD: 204 K/CU MM (ref 140–440)
PMV BLD AUTO: 8.7 FL (ref 7.5–11.1)
POC CREATININE: 1.1 MG/DL (ref 0.9–1.3)
POTASSIUM SERPL-SCNC: 4.3 MMOL/L (ref 3.5–5.1)
PRO-BNP: 717.5 PG/ML
PROTHROMBIN TIME: 16.5 SECONDS (ref 11.7–14.5)
RBC # BLD: 4.41 M/CU MM (ref 4.6–6.2)
SARS-COV-2, NAAT: NOT DETECTED
SODIUM BLD-SCNC: 141 MMOL/L (ref 135–145)
SOURCE: NORMAL
TOTAL PROTEIN: 6.6 GM/DL (ref 6.4–8.2)
WBC # BLD: 5.4 K/CU MM (ref 4–10.5)

## 2022-07-21 PROCEDURE — 85730 THROMBOPLASTIN TIME PARTIAL: CPT

## 2022-07-21 PROCEDURE — 94010 BREATHING CAPACITY TEST: CPT

## 2022-07-21 PROCEDURE — 93880 EXTRACRANIAL BILAT STUDY: CPT

## 2022-07-21 PROCEDURE — 36415 COLL VENOUS BLD VENIPUNCTURE: CPT

## 2022-07-21 PROCEDURE — 6360000004 HC RX CONTRAST MEDICATION: Performed by: INTERNAL MEDICINE

## 2022-07-21 PROCEDURE — 85027 COMPLETE CBC AUTOMATED: CPT

## 2022-07-21 PROCEDURE — 87635 SARS-COV-2 COVID-19 AMP PRB: CPT

## 2022-07-21 PROCEDURE — 75574 CT ANGIO HRT W/3D IMAGE: CPT

## 2022-07-21 PROCEDURE — 74174 CTA ABD&PLVS W/CONTRAST: CPT

## 2022-07-21 PROCEDURE — 80053 COMPREHEN METABOLIC PANEL: CPT

## 2022-07-21 PROCEDURE — 85610 PROTHROMBIN TIME: CPT

## 2022-07-21 PROCEDURE — 2580000003 HC RX 258: Performed by: INTERNAL MEDICINE

## 2022-07-21 PROCEDURE — 83880 ASSAY OF NATRIURETIC PEPTIDE: CPT

## 2022-07-21 RX ORDER — 0.9 % SODIUM CHLORIDE 0.9 %
10 VIAL (ML) INJECTION
Status: COMPLETED | OUTPATIENT
Start: 2022-07-21 | End: 2022-07-21

## 2022-07-21 RX ADMIN — Medication 10 ML: at 08:37

## 2022-07-21 RX ADMIN — IOPAMIDOL 150 ML: 755 INJECTION, SOLUTION INTRAVENOUS at 08:38

## 2022-07-25 ENCOUNTER — TELEPHONE (OUTPATIENT)
Dept: CARDIOLOGY | Age: 72
End: 2022-07-25

## 2022-07-25 NOTE — TELEPHONE ENCOUNTER
TAVR Ct showed perimeter of 104 ( he chambers snot qualify for TAVR ) be better to have SAVR  I have updated Pt and his son Dr Martin Burton [phone]  Ct surgery to see pt soon

## 2022-07-26 ENCOUNTER — INITIAL CONSULT (OUTPATIENT)
Dept: CARDIOTHORACIC SURGERY | Age: 72
End: 2022-07-26
Payer: MEDICARE

## 2022-07-26 VITALS
SYSTOLIC BLOOD PRESSURE: 120 MMHG | WEIGHT: 263 LBS | HEIGHT: 77 IN | HEART RATE: 60 BPM | BODY MASS INDEX: 31.05 KG/M2 | DIASTOLIC BLOOD PRESSURE: 70 MMHG

## 2022-07-26 DIAGNOSIS — J43.1 PANLOBULAR EMPHYSEMA (HCC): ICD-10-CM

## 2022-07-26 DIAGNOSIS — E11.21 CONTROLLED TYPE 2 DIABETES MELLITUS WITH DIABETIC NEPHROPATHY, WITHOUT LONG-TERM CURRENT USE OF INSULIN (HCC): ICD-10-CM

## 2022-07-26 DIAGNOSIS — I50.22 CHRONIC SYSTOLIC (CONGESTIVE) HEART FAILURE (HCC): ICD-10-CM

## 2022-07-26 DIAGNOSIS — I35.0 NONRHEUMATIC AORTIC VALVE STENOSIS: Primary | ICD-10-CM

## 2022-07-26 DIAGNOSIS — I42.0 DILATED CARDIOMYOPATHY (HCC): ICD-10-CM

## 2022-07-26 DIAGNOSIS — R06.09 DOE (DYSPNEA ON EXERTION): ICD-10-CM

## 2022-07-26 PROCEDURE — G8417 CALC BMI ABV UP PARAM F/U: HCPCS | Performed by: THORACIC SURGERY (CARDIOTHORACIC VASCULAR SURGERY)

## 2022-07-26 PROCEDURE — G8427 DOCREV CUR MEDS BY ELIG CLIN: HCPCS | Performed by: THORACIC SURGERY (CARDIOTHORACIC VASCULAR SURGERY)

## 2022-07-26 PROCEDURE — 99205 OFFICE O/P NEW HI 60 MIN: CPT | Performed by: THORACIC SURGERY (CARDIOTHORACIC VASCULAR SURGERY)

## 2022-07-26 PROCEDURE — 2022F DILAT RTA XM EVC RTNOPTHY: CPT | Performed by: THORACIC SURGERY (CARDIOTHORACIC VASCULAR SURGERY)

## 2022-07-26 PROCEDURE — 3023F SPIROM DOC REV: CPT | Performed by: THORACIC SURGERY (CARDIOTHORACIC VASCULAR SURGERY)

## 2022-07-26 NOTE — PROGRESS NOTES
Consultation/History & Physical    Date of Consultation:  7/26/2022      Surgeon:  Ramandeep Phan MD      Referring Provider: Dr. Wyatt Medina PCP:  Rey Franco MD     Chief Complaint: Dyspnea on exertion 2 to 3 years  Chief Complaint   Patient presents with    Consultation     Delfina Kayser patient here to discuss possible TAVR. He has had Carotid, CTA, Echo and Heart cath all completed with the last month. He states Dr. Wyatt Medina called states his valve is to big to do TAVR. Needs to know what the other options will be. He denies any stents or chest pain. He does have dentures and no bleeding issues. Currently on eliquis for blood thinner. History of Present Illness:  Vamsi Nunez is a 74 y.o. male who presents with dyspnea on exertion that has been present for several years mostly in the recent past.  Patient's ejection fraction has decreased over time to 35% and there is severe aortic stenosis though gradient and area are borderline. Patient also developed atrial fibrillation with rapid rate at some point in the recent past in April requiring hospitalization. Patient was evaluated for TAVR by Dr. Wyatt Medina and was not found to be a suitable candidate for [employer] valve.              PMH:   has a past medical history of Adenocarcinoma of prostate ([address] Aortic stenosis, mild, Asthma, intrinsic, Atrial fibrillation ([address] Colonic polyp, COPD (chronic obstructive pulmonary disease) ([address] DDD (degenerative disc disease), lumbar, Diabetes mellitus type 2, controlled ([address] Diabetic neuropathy, type II diabetes mellitus ([address] Dilated cardiomyopathy ([address] Elevated PSA, GERD (gastroesophageal reflux disease), Gout, H/O cardiovascular stress test, History of total right hip arthroplasty, Hyperlipidemia, Hypertension, IFG (impaired fasting glucose), Knee pain, bilateral, Long-term (current) use of anticoagulants, Peptic ulcer disease, Testosterone deficiency, Ventricular tachycardia ([address] and VHD (valvular heart albuterol sulfate HFA (PROVENTIL;VENTOLIN;PROAIR) 108 (90 Base) MCG/ACT inhaler Inhale 2 puffs into the lungs every 6 hours as needed for Shortness of Breath 6/22/22  Yes Osmar Gillette MD   furosemide (LASIX) 40 MG tablet Take 1 tablet by mouth daily Alternate between 40 once a day and 40 twice a day on alternate days 6/21/22  Yes Mallory Hogue MD   metoprolol succinate (TOPROL XL) 100 MG extended release tablet Take 1 tablet by mouth daily 6/14/22  Yes Mallory Hogue MD   apixaban (ELIQUIS) 5 MG TABS tablet Take 1 tablet by mouth 2 times daily 5/10/22 8/8/22 Yes Osmar Gillette MD   omeprazole (PRILOSEC) 20 MG delayed release capsule Take 1 capsule by mouth daily 5/10/22  Yes Osmar Gillette MD   allopurinol (ZYLOPRIM) 100 MG tablet Take 1 tablet by mouth daily 5/10/22  Yes Osmar Gillette MD   traZODone (DESYREL) 50 MG tablet Take 2 tablets by mouth nightly 5/10/22  Yes Osmar Gillette MD   potassium chloride (KLOR-CON M) 20 MEQ extended release tablet Take 2 tablets by mouth daily 4/18/22  Yes Mallory Hogue MD   albuterol (PROVENTIL) (2.5 MG/3ML) 0.083% nebulizer solution Take 3 mLs by nebulization every 6 hours as needed for Wheezing 4/8/22  Yes Omsar Gillette MD   calcium carb-cholecalciferol 600-200 MG-UNIT TABS tablet Take 1 tablet by mouth daily   Yes Historical Provider, MD        REVIEW OF SYSTEMS:   Constitutional: No Fever, chills change in appetite,  or drastic weight loss/gain   Eyes: No changes in vision  ENT: No Headaches, Hearing Loss or Vertigo  Cardiovascular: No chest pain, chest tightness, palpitations, lower extremity edema, or dyspnea on exertion  Respiratory: No shortness of breath or cough  Gastrointestinal: No abdominal pain, appetite loss, blood in stools, constipation, diarrhea or heartburn  Genitourinary: No dysuria, trouble voiding, or hematuria  Musculoskeletal:  No gait disturbance, weakness or joint complaints  Integumentary: No rash, NPO with PEG since CVA in 5/2017 pallor, or skin lesions  Neurological: No lightheadedness, dizziness, seizure, or loss of consciousness  Psychiatric: No anxiety or depression  Endocrine: No malaise, fatigue or temperature intolerance  Hematologic/Lymphatic: No bleeding problems, blood clots or swollen lymph nodes       Vital Signs:   /70   Pulse 60   Ht 6' 5\" (1.956 m)   Wt 263 lb (119.3 kg)   BMI 31.19 kg/m²      Physical Exam:    Constitutional: Average built and  nourished, No acute distress, Non-toxic appearance. HENT:  Normocephalic, Atraumatic, Bilateral external ears normal, Oropharynx moist, No oral exudates, Neck- Normal range of motion, No tenderness, Supple, No stridor  Eyes:  Pupils equal and round, extraocular muscles intact. Conjunctiva normal, No discharge. Respiratory:  Clear to auscultation,No wheezing or crackles, no signs of respiratory distress   Cardiovascular: Regular Rhythm and rate,S1 and S2 audible, No murmur, rub or gallop, No evidence of JVD. Pulses 2+, no carotid bruit  GI:  Soft, No tenderness, No masses, No gross visceromegaly   :  No costovertebral angle tenderness   Musculoskeletal: Mild edema present, no tenderness, no deformities. Integument:  Well hydrated, no rash   Lymphatic:  No lymphadenopathy noted   Neurologic:  Alert & oriented x 3. Can move all four appendages separately.  Follows commands  Psychiatric:  Speech and behavior appropriate      MEDICAL DECISION MAKING/TESTING    Cardiac Cath: Findings noted    ECHO/TIM: Findings noted           Labs:    BMP:   Lab Results   Component Value Date/Time     07/21/2022 07:21 AM    K 4.3 07/21/2022 07:21 AM     07/21/2022 07:21 AM    CO2 29 07/21/2022 07:21 AM    PHOS 3.2 12/25/2020 05:00 AM    BUN 17 07/21/2022 07:21 AM    CREATININE 1.1 07/21/2022 07:45 AM    CREATININE 1.0 07/21/2022 07:21 AM      No results found for: GLU  Lab Results   Component Value Date/Time    MG 1.6 04/06/2022 08:11 AM      CBC:   Lab Results   Component Value Date/Time    WBC 5.4 07/21/2022 07:21 AM    HGB 11.1 07/21/2022 07:21 AM    HCT 36.5 07/21/2022 07:21 AM    MCV 82.8 07/21/2022 07:21 AM     07/21/2022 07:21 AM      Coagulation:   Lab Results   Component Value Date/Time    INR 1.28 07/21/2022 07:21 AM    APTT 36.6 07/21/2022 07:21 AM     Cardiac markers:   Lab Results   Component Value Date/Time    CKTOTAL 54 01/06/2017 04:30 AM     Lab Results   Component Value Date/Time    LABA1C 5.9 04/07/2022 02:29 AM    No results found for: ALB    Lab Results   Component Value Date/Time    BILITOT 0.7 07/21/2022 07:21 AM    BILIDIR 0.5 01/06/2017 04:30 AM    AST 20 07/21/2022 07:21 AM    ALT 14 07/21/2022 07:21 AM    ALKPHOS 73 07/21/2022 07:21 AM      Blood Gas:  Lab Results   Component Value Date/Time    BECMIX 4.5 04/06/2022 08:15 AM    COMMENT  06/17/2022 02:23 PM     HAS NOT BEEN PREGNANT OR TRANSFUSED IN LAST 3 MONTHS. LIVER PROFILE:   No results for input(s): AST, ALT, LIPASE, BILIDIR, BILITOT, ALKPHOS in the last 72 hours. Invalid input(s): AMYLASE,  ALB  PT/INR:   No results for input(s): PROTIME, INR in the last 72 hours. APTT:   No results for input(s): APTT in the last 72 hours. BNP:    No results for input(s): BNP in the last 72 hours. Records reviewed:  Current med chart          Diagnostic tests ordered:    No orders of the defined types were placed in this encounter. Diagnosis:   Diagnosis Orders   1. Nonrheumatic aortic valve stenosis        2. Chronic systolic (congestive) heart failure        3. Dilated cardiomyopathy ([address]        4. ENRIQUE (dyspnea on exertion)        5. Controlled type 2 diabetes mellitus with diabetic nephropathy, without long-term current use of insulin (HCC)        6. Panlobular emphysema (HCC)            Assessment:  74 y.o. male patient with severe aortic stenosis: Cardiomyopathy: History of atrial fibrillation and class II-3 symptomatic events for evaluation for TAVR. Versus opensurgery.   plan:  I have discussed the risk benefits of open aortic valve replacement surgery with the patient. Low LV function carries high risk of mortality. Patient wants to discuss this with his son who is a physician who apparently has already contacted a [facility] for a second opinion regarding TAVR  Will follow up.   Case was discussed with Dr. Sandra Bowser and is willing to send patient to [facility] for second opinion       Signed:  Belen Johnson MD 7/26/2022 12:52 PM

## 2022-07-27 ENCOUNTER — TELEPHONE (OUTPATIENT)
Dept: CARDIOLOGY CLINIC | Age: 72
End: 2022-07-27

## 2022-07-27 NOTE — TELEPHONE ENCOUNTER
Faxed medical records to [facility]- [phone].

## 2022-08-05 DIAGNOSIS — E11.21 CONTROLLED TYPE 2 DIABETES MELLITUS WITH DIABETIC NEPHROPATHY, WITHOUT LONG-TERM CURRENT USE OF INSULIN (HCC): ICD-10-CM

## 2022-08-05 DIAGNOSIS — I48.19 PERSISTENT ATRIAL FIBRILLATION (HCC): ICD-10-CM

## 2022-08-05 DIAGNOSIS — E79.0 ELEVATED URIC ACID IN BLOOD: ICD-10-CM

## 2022-08-05 LAB
A/G RATIO: 2.1 (ref 1.1–2.2)
ALBUMIN SERPL-MCNC: 4.5 G/DL (ref 3.4–5)
ALP BLD-CCNC: 69 U/L (ref 40–129)
ALT SERPL-CCNC: 17 U/L (ref 10–40)
ANION GAP SERPL CALCULATED.3IONS-SCNC: 17 MMOL/L (ref 3–16)
AST SERPL-CCNC: 21 U/L (ref 15–37)
BASOPHILS ABSOLUTE: 0.1 K/UL (ref 0–0.2)
BASOPHILS RELATIVE PERCENT: 1.1 %
BILIRUB SERPL-MCNC: 0.6 MG/DL (ref 0–1)
BUN BLDV-MCNC: 16 MG/DL (ref 7–20)
CALCIUM SERPL-MCNC: 9.9 MG/DL (ref 8.3–10.6)
CHLORIDE BLD-SCNC: 103 MMOL/L (ref 99–110)
CHOLESTEROL, TOTAL: 142 MG/DL (ref 0–199)
CO2: 23 MMOL/L (ref 21–32)
CREAT SERPL-MCNC: 1 MG/DL (ref 0.8–1.3)
EOSINOPHILS ABSOLUTE: 0.3 K/UL (ref 0–0.6)
EOSINOPHILS RELATIVE PERCENT: 7.3 %
GFR AFRICAN AMERICAN: >60
GFR NON-AFRICAN AMERICAN: >60
GLUCOSE BLD-MCNC: 113 MG/DL (ref 70–99)
HCT VFR BLD CALC: 38.5 % (ref 40.5–52.5)
HDLC SERPL-MCNC: 44 MG/DL (ref 40–60)
HEMOGLOBIN: 12.7 G/DL (ref 13.5–17.5)
LDL CHOLESTEROL CALCULATED: 75 MG/DL
LYMPHOCYTES ABSOLUTE: 0.6 K/UL (ref 1–5.1)
LYMPHOCYTES RELATIVE PERCENT: 13.4 %
MCH RBC QN AUTO: 26.7 PG (ref 26–34)
MCHC RBC AUTO-ENTMCNC: 33 G/DL (ref 31–36)
MCV RBC AUTO: 80.8 FL (ref 80–100)
MONOCYTES ABSOLUTE: 0.4 K/UL (ref 0–1.3)
MONOCYTES RELATIVE PERCENT: 8.9 %
NEUTROPHILS ABSOLUTE: 3.2 K/UL (ref 1.7–7.7)
NEUTROPHILS RELATIVE PERCENT: 69.3 %
PDW BLD-RTO: 23.1 % (ref 12.4–15.4)
PLATELET # BLD: 179 K/UL (ref 135–450)
PMV BLD AUTO: 7.8 FL (ref 5–10.5)
POTASSIUM SERPL-SCNC: 4 MMOL/L (ref 3.5–5.1)
RBC # BLD: 4.76 M/UL (ref 4.2–5.9)
SODIUM BLD-SCNC: 143 MMOL/L (ref 136–145)
TOTAL PROTEIN: 6.6 G/DL (ref 6.4–8.2)
TRIGL SERPL-MCNC: 116 MG/DL (ref 0–150)
TSH SERPL DL<=0.05 MIU/L-ACNC: 0.73 UIU/ML (ref 0.27–4.2)
URIC ACID, SERUM: 5.3 MG/DL (ref 3.5–7.2)
VLDLC SERPL CALC-MCNC: 23 MG/DL
WBC # BLD: 4.6 K/UL (ref 4–11)

## 2022-08-05 PROCEDURE — 36415 COLL VENOUS BLD VENIPUNCTURE: CPT | Performed by: INTERNAL MEDICINE

## 2022-08-06 LAB
ESTIMATED AVERAGE GLUCOSE: 111.2 MG/DL
HBA1C MFR BLD: 5.5 %

## 2022-08-10 ENCOUNTER — OFFICE VISIT (OUTPATIENT)
Dept: INTERNAL MEDICINE CLINIC | Age: 72
End: 2022-08-10
Payer: MEDICARE

## 2022-08-10 VITALS
HEART RATE: 80 BPM | WEIGHT: 261 LBS | RESPIRATION RATE: 14 BRPM | OXYGEN SATURATION: 96 % | DIASTOLIC BLOOD PRESSURE: 72 MMHG | SYSTOLIC BLOOD PRESSURE: 110 MMHG | BODY MASS INDEX: 30.95 KG/M2

## 2022-08-10 DIAGNOSIS — C61 ADENOCARCINOMA OF PROSTATE (HCC): Primary | ICD-10-CM

## 2022-08-10 DIAGNOSIS — E11.21 CONTROLLED TYPE 2 DIABETES MELLITUS WITH DIABETIC NEPHROPATHY, WITHOUT LONG-TERM CURRENT USE OF INSULIN (HCC): ICD-10-CM

## 2022-08-10 DIAGNOSIS — I48.19 PERSISTENT ATRIAL FIBRILLATION (HCC): ICD-10-CM

## 2022-08-10 DIAGNOSIS — I35.0 NONRHEUMATIC AORTIC VALVE STENOSIS: ICD-10-CM

## 2022-08-10 PROCEDURE — 1036F TOBACCO NON-USER: CPT | Performed by: INTERNAL MEDICINE

## 2022-08-10 PROCEDURE — 2022F DILAT RTA XM EVC RTNOPTHY: CPT | Performed by: INTERNAL MEDICINE

## 2022-08-10 PROCEDURE — 3044F HG A1C LEVEL LT 7.0%: CPT | Performed by: INTERNAL MEDICINE

## 2022-08-10 PROCEDURE — 99213 OFFICE O/P EST LOW 20 MIN: CPT | Performed by: INTERNAL MEDICINE

## 2022-08-10 PROCEDURE — G8417 CALC BMI ABV UP PARAM F/U: HCPCS | Performed by: INTERNAL MEDICINE

## 2022-08-10 PROCEDURE — 3017F COLORECTAL CA SCREEN DOC REV: CPT | Performed by: INTERNAL MEDICINE

## 2022-08-10 PROCEDURE — 1123F ACP DISCUSS/DSCN MKR DOCD: CPT | Performed by: INTERNAL MEDICINE

## 2022-08-10 PROCEDURE — G8427 DOCREV CUR MEDS BY ELIG CLIN: HCPCS | Performed by: INTERNAL MEDICINE

## 2022-08-10 NOTE — PROGRESS NOTES
Nakita Gordillo  1950  08/10/22    SUBJECTIVE:  prostate CA and for RT DR Hallie Lovelace today at [facility]. Also for his aortic stenosis planning later after RT for TAVR w Dr Kalyan Velazquez. Her note was reviewed fr last week:  Assessment & Plan     Aortic valve stenosis, severe  Patient presents for TAVR evaluation. Images reviewed and consistent with severe low flow, low gradient with a reduced EF. He was previously worked up for TAVR in [facility] with CV surgeon/TAVR CT/LHC. Patient states that procedure was not able to be performed as that facility did not have the correct valve needed given large perimeter (per son's report 103). -Discussed risk vs benefits of procedure and patient would like to proceed   -Will request TAVR CT images and LHC images to review to determine sizing and appproach  -Patient will need dental clearance prior to procedure  -Will proceed with scheduling TAVR and pre op eval as soon as possible as patient due to start daily radiation for prostate cancer soon     Heart failure with reduced ejection fraction ([address]  Newly reduced EF on echo June 2022.   -Currently being managed by cardiologist in [facility] referral to heart failure cardiologist for future management  -Continue current medications of losartan, aldactone, farxiga, metoprolol     Permanent atrial fibrillation ([address]  Continue home metoprolol and eliquis       Follow-up: No follow-ups on file. OBJECTIVE:    /72   Pulse 80   Resp 14   Wt 261 lb (118.4 kg)   SpO2 96%   BMI 30.95 kg/m²     Physical Exam  Vitals reviewed. Constitutional:       General: He is not in acute distress. Appearance: He is well-developed. HENT:      Head: Normocephalic and atraumatic. Eyes:      General: No scleral icterus. Right eye: No discharge. Left eye: No discharge. Conjunctiva/sclera: Conjunctivae normal.      Pupils: Pupils are equal, round, and reactive to light.    Neck:      Thyroid: No thyromegaly. Vascular: No JVD. Trachea: No tracheal deviation. Cardiovascular:      Rate and Rhythm: Normal rate. Rhythm irregular. Heart sounds: Murmur (BRANT RUSB) heard. No friction rub. No gallop. Pulmonary:      Effort: Pulmonary effort is normal. No respiratory distress. Breath sounds: Normal breath sounds. No wheezing or rales. Abdominal:      General: Bowel sounds are normal. There is no distension. Palpations: Abdomen is soft. There is no mass. Tenderness: There is no abdominal tenderness. There is no guarding or rebound. Musculoskeletal:         General: No tenderness. Cervical back: Normal range of motion and neck supple. Lymphadenopathy:      Cervical: No cervical adenopathy. Skin:     General: Skin is warm and dry. Neurological:      Mental Status: He is alert. Psychiatric:         Mood and Affect: Mood normal.       ASSESSMENT:    1. Adenocarcinoma of prostate ([address]    2. Persistent atrial fibrillation ([address]    3. Nonrheumatic aortic valve stenosis    4. Controlled type 2 diabetes mellitus with diabetic nephropathy, without long-term current use of insulin ([address]        PLAN:    Sandrita Millan was seen today for diabetes. Diagnoses and all orders for this visit:    Adenocarcinoma of prostate ([address]- FOR RADIATION THERAPY TODAY AS NOTED    Persistent atrial fibrillation ([address]- Pt clinically w/o evidence of cardiovascular instability, cont regimen. Nonrheumatic aortic valve stenosis- FOR ELECTIVE TAVR LATER     Controlled type 2 diabetes mellitus with diabetic nephropathy, without long-term current use of insulin ([address]- DM relatively well controlled and will continue current regimen. Screening reviewed. See orders.

## 2022-08-11 LAB — PROSTATE SPECIFIC ANTIGEN: 0.29 NG/ML (ref 0.01–4)

## 2022-08-15 LAB
BILIRUBIN, URINE: NEGATIVE MG/DL
CLARITY: CLEAR
COLOR: COLORLESS
ERYTHROCYTES URINE: NEGATIVE MG/DL
GLUCOSE URINE: 500 MG/DL
KETONES, URINE: NEGATIVE MG/DL
LEUKOCYTES, UA: NEGATIVE LEU/UL
NITRITE, URINE: NEGATIVE
PH, URINE: 6 PH (ref 5–8)
PROTEIN, URINE: NEGATIVE MG/DL
SPECIFIC GRAVITY, URINE: 1 (ref 1–1.03)
UROBILINOGEN, URINE: NORMAL MG/DL

## 2022-08-22 RX ORDER — METOPROLOL SUCCINATE 100 MG/1
100 TABLET, EXTENDED RELEASE ORAL DAILY
Qty: 30 TABLET | Refills: 5 | Status: SHIPPED | OUTPATIENT
Start: 2022-08-22

## 2022-10-04 LAB — DIABETIC RETINOPATHY: NORMAL

## 2022-10-19 ENCOUNTER — TELEPHONE (OUTPATIENT)
Dept: INTERNAL MEDICINE CLINIC | Age: 72
End: 2022-10-19

## 2022-10-19 NOTE — TELEPHONE ENCOUNTER
[facility] Trans[facility] Initial Follow Up Call    Outreach made within 2 business days of discharge: Yes    Patient: Kim Lyons Patient : 1950   MRN: [id number]  Reason for Admission: There are no discharge diagnoses documented for the most recent discharge. Discharge Date: 10/18/22       Spoke with: LM with patient    Discharge department/facility: [facility]    TCM Interactive Patient Contact:  Was patient able to fill all prescriptions: LM with patient  Was patient instructed to bring all medications to the follow-up visit: LM with patient  Is patient taking all medications as directed in the discharge summary?  LM with patient  Does patient understand their discharge instructions: LM with patient  Does patient have questions or concerns that need addressed prior to 7-14 day follow up office visit: LM with patient    Scheduled appointment with PCP within 7-14 days    Follow Up  Future Appointments   Date Time Provider Kaylee Dean   11/10/2022 10:40 AM Mehul Bains MD [facility]   2022 10:15 AM Leena Gasca MD [address] [facility]   2023 10:00 AM [facility] [address] [facility]       Cheri Maurer MA

## 2022-10-24 ENCOUNTER — TELEPHONE (OUTPATIENT)
Dept: CARDIOLOGY CLINIC | Age: 72
End: 2022-10-24

## 2022-10-24 NOTE — TELEPHONE ENCOUNTER
Pt called to cx all appt, transferring to Dr Aquilino Zavala in [place], due to them doing a Tavr.  Appt cx

## 2022-10-27 ENCOUNTER — OFFICE VISIT (OUTPATIENT)
Dept: INTERNAL MEDICINE CLINIC | Age: 72
End: 2022-10-27
Payer: MEDICARE

## 2022-10-27 VITALS
SYSTOLIC BLOOD PRESSURE: 112 MMHG | OXYGEN SATURATION: 98 % | RESPIRATION RATE: 16 BRPM | DIASTOLIC BLOOD PRESSURE: 64 MMHG | HEART RATE: 70 BPM

## 2022-10-27 DIAGNOSIS — K21.9 GASTROESOPHAGEAL REFLUX DISEASE WITHOUT ESOPHAGITIS: ICD-10-CM

## 2022-10-27 DIAGNOSIS — E11.21 CONTROLLED TYPE 2 DIABETES MELLITUS WITH DIABETIC NEPHROPATHY, WITHOUT LONG-TERM CURRENT USE OF INSULIN (HCC): ICD-10-CM

## 2022-10-27 DIAGNOSIS — E79.0 ELEVATED URIC ACID IN BLOOD: ICD-10-CM

## 2022-10-27 DIAGNOSIS — J45.20 MILD INTERMITTENT ASTHMA WITHOUT COMPLICATION: ICD-10-CM

## 2022-10-27 DIAGNOSIS — R59.0 INGUINAL LYMPHADENOPATHY: ICD-10-CM

## 2022-10-27 DIAGNOSIS — I35.0 NONRHEUMATIC AORTIC VALVE STENOSIS: Primary | ICD-10-CM

## 2022-10-27 DIAGNOSIS — K52.9 CHRONIC DIARRHEA: ICD-10-CM

## 2022-10-27 DIAGNOSIS — Z09 HOSPITAL DISCHARGE FOLLOW-UP: ICD-10-CM

## 2022-10-27 DIAGNOSIS — I48.19 PERSISTENT ATRIAL FIBRILLATION (HCC): ICD-10-CM

## 2022-10-27 DIAGNOSIS — F51.01 PRIMARY INSOMNIA: ICD-10-CM

## 2022-10-27 DIAGNOSIS — C61 ADENOCARCINOMA OF PROSTATE (HCC): ICD-10-CM

## 2022-10-27 PROCEDURE — 1111F DSCHRG MED/CURRENT MED MERGE: CPT | Performed by: INTERNAL MEDICINE

## 2022-10-27 PROCEDURE — 99496 TRANSJ CARE MGMT HIGH F2F 7D: CPT | Performed by: INTERNAL MEDICINE

## 2022-10-27 RX ORDER — DIPHENOXYLATE HYDROCHLORIDE AND ATROPINE SULFATE 2.5; .025 MG/1; MG/1
1 TABLET ORAL 2 TIMES DAILY PRN
Qty: 60 TABLET | Refills: 2 | Status: SHIPPED | OUTPATIENT
Start: 2022-10-27 | End: 2022-11-26

## 2022-10-27 RX ORDER — TRAZODONE HYDROCHLORIDE 50 MG/1
100 TABLET ORAL NIGHTLY
Qty: 180 TABLET | Refills: 1 | Status: SHIPPED | OUTPATIENT
Start: 2022-10-27

## 2022-10-27 RX ORDER — ALLOPURINOL 100 MG/1
100 TABLET ORAL DAILY
Qty: 90 TABLET | Refills: 1 | Status: SHIPPED | OUTPATIENT
Start: 2022-10-27

## 2022-10-27 RX ORDER — OMEPRAZOLE 20 MG/1
20 CAPSULE, DELAYED RELEASE ORAL DAILY
Qty: 90 CAPSULE | Refills: 1 | Status: SHIPPED | OUTPATIENT
Start: 2022-10-27

## 2022-10-27 NOTE — PROGRESS NOTES
Post-Discharge Transitional Care  Follow Up      Socrates Breen   YOB: 1950    Date of Office Visit:  10/27/2022  Date of Hospital Admission: 7/21/22  Date of Hospital Discharge: 7/21/22  Risk of hospital readmission (high >=14%. Medium >=10%) :Readmission Risk Score: 13 ( )      Care management risk score Rising risk (score 2-5) and Complex Care (Scores >=6): No Risk Score On File     Non face to face  following discharge, date last encounter closed (first attempt may have been earlier): 10/19/2022    Call initiated 2 business days of discharge: Yes    ASSESSMENT/PLAN:   Nonrheumatic aortic valve stenosis - STABLE AND DOING WELL S/P TAVR, IS TO CONT CLOSE F/U DR Cornelius  AT [place]  Chronic diarrhea- INDUCED BY RT, WE'LL ADD TRIAL PRN LOMOTIL AND HOPEFULL W RECOVERY HIS SX WILL SLOWLY GALEN  -     diphenoxylate-atropine (DIPHENATOL) 2.5-0.025 MG per tablet; Take 1 tablet by mouth 2 times daily as needed for Diarrhea for up to 30 days. , Disp-60 tablet, R-2Normal  Adenocarcinoma of prostate ([facility] [address]- I'LL ALSO MONITOR PSA AND HE'LL CONT F/U W UROLOGY, RAD ONCOLOGY  -     PSA, Prostatic Specific Antigen; Future  Controlled type 2 diabetes mellitus with diabetic nephropathy, without long-term current use of insulin ([address] - DM relatively well controlled and will continue current regimen. Screening reviewed. See orders. -     Comprehensive Metabolic Panel; Future  -     CBC with Auto Differential; Future  -     Lipid Panel; Future  -     Microalbumin / Creatinine Urine Ratio; Future  -     Hemoglobin A1C; Future  Persistent atrial fibrillation (HCC)  - Pt clinically w/o evidence of cardiovascular instability, cont regimen. CONT ANTICOAGULATION  -     TSH; Future  -     Magnesium; Future  Mild intermittent asthma without complication- ON HOME O2 BUT SATS OK, MAY BE ABLE TO WEAN OFF. RECHECK HOME O2 STUDY IF STILL NECESSARY  -     Home O2 eval (desaturation screen);  Future  Gastroesophageal reflux disease without esophagitis - GERD controlled on meds and will refill, monitor for any recurrent or worsening sx.    -     omeprazole (PRILOSEC) 20 MG delayed release capsule; Take 1 capsule by mouth daily, Disp-90 capsule, R-1Normal  Elevated uric acid in blood- CONT MED AND MONITOR  -     allopurinol (ZYLOPRIM) 100 MG tablet; Take 1 tablet by mouth daily, Disp-90 tablet, R-1Normal  -     Uric Acid; Future  Primary insomnia- continue present management. Will monitor.    -     traZODone (DESYREL) 50 MG tablet; Take 2 tablets by mouth nightly, Disp-180 tablet, R-1 Normal  R  **R INGUINAL LAD- HE'LL ALSO D/W UROLOGY. CT ABD/PELV W/O SIGNIFICANT PATHOLOGY EARLIER THIS YR. I'LL REASSESS ONE MONTH AND IF PERSISTENT THENCONSIDER RECHECK CT AND/OR ALSO POSSIBLE SURGICAL ASSESSMENT, EXCISIONAL BX? Hospital discharge follow-up  -     DE DISCHARGE MEDS RECONCILED W/ CURRENT OUTPATIENT MED LIST      Medical Decision Making: high complexity  Return in about 4 weeks (around 11/24/2022) for routine. On this date 10/27/2022 I have spent 25 minutes reviewing previous notes, test results and face to face with the patient discussing the diagnosis and importance of compliance with the treatment plan as well as documenting on the day of the visit. Subjective:   HPI:  Follow up of Hospital problems/diagnosis(es): see below    Inpatient course: Discharge summary reviewed- see chart. Echo:Summary     1. Moderate left ventricular concentric hypertrophy. 2. Left ventricular systolic function is low normal with an ejection   fraction by Biplane Method of Discs of 51 %. 3. Status post TAVR with a 29 mm Denzel 3. No stenosis or regurgitation mean   gradient 12 mmHg. Peak velocity 2.2 m/s. Dimensionless index 0.6. Author Vivi 4. Left atrial chamber is severely enlarged with a left atrial volume index   of 124 ml/m2 by BP MOD. 5. Right atrial chamber dimension is severely enlarged.      6. There is moderate pulmonary hypertension, estimated right ventricle   systolic pressure is 53 mmHg. 7. There is mild mitral valve regurgitation. 8. The TAVR is new as of yesterday 10/17/2022. Dc summary:    Interval history/CurrentKimberly Karla Gonzalez, LUCRETIA - 10/18/2022 11:06 AM EDT  Formatting of this note is different from the original.  Images from the original note were not included. STRUCTURAL HEART DISCHARGE SUMMARY    Nilesh Castanon  MRN: [id number]   Account: [de-identified]  Admitted: 10/17/2022  Discharge Date/Time: 10/18/22 / 11:09 AM  ______________________________________________________________________    Clinical Summary    Perpetual Assessment: Timmy Hamilton is a 74 y.o. male with a past medical history significant for HFrEF, Permanent Afib (Eliquis), NIDDM2, COPD (former smoker), prostate cancer (completed radiation therapy), HTN, GERD, gout, and COVID-19 infection (2021), who presented to [facility] 10/17/22 from home for planned TAVR. During pre-op evaluation he described symptoms of ENRIQUE, LE swelling, and fatigue. The patient underwent successful TAVR 10/17/22 with 29mm Barba Denzel valve via the right femoral approach. Post procedure ECG showed A-fib with new LBBB QRS 134ms. Discharged with 30 day event monitor. Echocardiogram 10/18/22 showed EF 51% MG 12mmHg, no PVL, mild MR. The patient had an uncomplicated hospital course and was discharged home on 10/18/2022 with plans to follow-up in the structural heart clinic in 30 days. Assessment and Plan: Moderate Low Flow Low Gradient Aortic Stenosis with Moderate Aortic Insufficiency s/p TAVR  S/p successful TAVR 10/17/22 with 29mm Barba Denzel valve via the right femoral approach for symptomatic aortic stenosis and insuffiencey.  - S/p right femoral access, site without signs of hematoma/bleeding.  - Post procedure ECG showed A-fib with new LBBB QRS 134ms. Post procedure telemetry showed A-fib with LBBB QRS 140s, pre-op ECG showed A-fib with PVCs and IRBBB QRS 94ms.    - Echocardiogram 10/18/22 showed EF 51% MG 12mmHg, no PVL, mild MR (preop TTE showed EF 35-40%, mean gradient 26 mmHg, TOMMY 1.36 cm2 with moderate AI)  - Continue with aggressive pulmonary toilet including early ambulation, PRN med nebs, IS use encouraged. - Continue medical therapy with Lasix, Toprol-XL, Spironolactone, Losartan. Continue Eliquis (in the setting of A-fib)       Heart Failure with Reduced Ejection Fraction, Chronic  Echocardiogram as discussed above   10/6/22  LVEDP 8 pre-implant   -Monitor daily weights,resume home medications     Permanent Atrial Fibrillation  Per history, on Eliquis, resumed 10/18/22  -Home medications resumed    New LBBB   S/p TAVR  Pre-op ECG showed A-fib with PVCs and IRBBB QRS 94ms  Post procedure ECG showed A-fib with new LBBB QRS 134ms. -Discharged with 30 day event monitor     HTN  Per history  -Continued home medications as able. NIDDM2  Per history.  -Home medications continued     Chronic Respiratory Failure   No signs of acute exacerbation.   -Home medications continued.      Discharge Medications    Medication List       CONTINUE taking these medications     * albuterol 2.5 mg /3 mL (0.083 %) nebulizer solution  Commonly known as: PROVENTIL    * albuterol 90 mcg/actuation inhaler    allopurinoL 100 MG tablet  Commonly known as: ZYLOPRIM    apixaban 5 mg Tab  Commonly known as: ELIQUIS    calcium-vitamin D 500 mg-5 mcg (200 unit) per tablet  Commonly known as: OS-MARCUS +D    Farxiga 10 mg tablet  Generic drug: dapagliflozin    * furosemide 40 MG tablet  Commonly known as: LASIX    * furosemide 40 MG tablet  Commonly known as: LASIX    losartan 25 MG tablet  Commonly known as: COZAAR    metoprolol succinate 100 MG 24 hr tablet  Commonly known as: TOPROL-XL    montelukast 10 mg tablet  Commonly known as: SINGULAIR    omeprazole 20 MG capsule  Commonly known as: PRILOSEC    potassium chloride SA 20 MEQ tablet  Commonly known as: K-DUR,KLOR-CON    PROBIOTIC ORAL    spironolactone 25 MG tablet  Commonly known as: ALDACTONE    traZODone 50 MG tablet  Commonly known as: SHARON Felix Ellipta 100-62.5-25 mcg Dsdv  Generic drug: fluticasone-umeclidin-vilanter    vitamin E 100 unit Cap capsule        * This list has 4 medication(s) that are the same as other medications prescribed for you. Read the directions carefully, and ask your doctor or other care provider to review them with you. Physician(s)  Family: Shona Rodriguez MD, Phone: [phone], Address: [address] [place] [address] / [address] [place] [address] Dr    Follow Up:   [facility] [address]  [phone]  Follow up  Follow up in the structural heart clinic in 30 days status:       [address] 10/18 AND NO PALPITATIONS, NO MED CHANGES. BP STABLE AND NO SX CP OR SOB. F/U IS NOW W DR Trent Valencia WHO WILL BE HIS PRIMARY CARDIOLOGIST, APPT 11/9. HE HAD PROSTATE CA RADIATION END OF SEPT BUT NOW W RECURRING ISSUES OF BOWEL INCONTINENCE. BMS ~3X/DAY AND UNABLE TO CONTROL EASILY  DOES HAVE APPT W UROLOGY TOMORROW, NOT TRIED IMODIUM    Hypertension: Stable. Denies CP, SOB, cough, visual changes, dizziness, palpitations or HA. DM- SUGARS STABLE AS BELOW  Lab Results   Component Value Date    LABA1C 5.5 08/05/2022    LABA1C 5.9 04/07/2022    LABA1C 6.0 11/01/2021     Lab Results   Component Value Date    GLUF 107 (H) 01/06/2017    LABMICR Not Indicated 11/15/2021    LDLCALC 75 08/05/2022    CREATININE 1.0 08/05/2022       Asthma:  Patient denies significant chest pain/tightness, dyspnea, decreased exercise tolerance, cough, or wheezing on current regimen.     Yesterday noticed a swollen LN R inguinal region    Patient Active Problem List   Diagnosis    Atrial fibrillation (HCC)    GERD (gastroesophageal reflux disease)    Asthma    Knee pain, bilateral    DDD (degenerative disc disease), lumbar Testosterone deficiency    Dilated cardiomyopathy (HCC)    Gout    Hyperbilirubinemia    Mild intermittent asthma without complication    Cholelithiasis    Colonic polyp    Closed fracture of anterior lip of right acetabulum without additional fracture (HCC)    Closed anterior dislocation of left shoulder    VT (ventricular tachycardia)    SVT (supraventricular tachycardia) (HCC)    NSVT (nonsustained ventricular tachycardia)    Gait disturbance    Leg weakness, bilateral    Uncontrolled pain    IFG (impaired fasting glucose)    Diabetes mellitus type 2, controlled ([facility] [address]    Diabetic neuropathy, type II diabetes mellitus (HCC)    COPD (chronic obstructive pulmonary disease) (HCC)    Nonrheumatic aortic valve stenosis    COVID-19 virus infection    Acute respiratory failure (HCC)    Atrial thrombosis    ENRIQUE (dyspnea on exertion)    Arthritis of left acromioclavicular joint    Coagulopathy (HCC)    Elevated PSA    Chest pain    Acute on chronic diastolic congestive heart failure (HCC)    Adenocarcinoma of prostate ([facility] [address]    Chronic systolic (congestive) heart failure       Medications listed as ordered at the time of discharge from hospital     Medication List            Accurate as of October 27, 2022 10:16 AM. If you have any questions, ask your nurse or doctor.                 CONTINUE taking these medications      * albuterol (2.5 MG/3ML) 0.083% nebulizer solution  Commonly known as: PROVENTIL  Take 3 mLs by nebulization every 6 hours as needed for Wheezing     * albuterol sulfate  (90 Base) MCG/ACT inhaler  Commonly known as: PROVENTIL;VENTOLIN;PROAIR  Inhale 2 puffs into the lungs every 6 hours as needed for Shortness of Breath     allopurinol 100 MG tablet  Commonly known as: Zyloprim  Take 1 tablet by mouth daily     apixaban 5 MG Tabs tablet  Commonly known as: Eliquis  Take 1 tablet by mouth 2 times daily     bicalutamide 50 MG chemo tablet  Commonly known as: CASODEX     calcium carb-cholecalciferol 600-200 MG-UNIT Tabs tablet     dapagliflozin 10 MG tablet  Commonly known as: Farxiga  Take 1 tablet by mouth every morning     furosemide 40 MG tablet  Commonly known as: LASIX  Take 1 tablet by mouth daily Alternate between 40 once a day and 40 twice a day on alternate days     losartan 25 MG tablet  Commonly known as: COZAAR  Take 1 tablet by mouth daily     metoprolol succinate 100 MG extended release tablet  Commonly known as: TOPROL XL  Take 1 tablet by mouth daily     montelukast 10 MG tablet  Commonly known as: SINGULAIR  Take 1 tablet by mouth daily     omeprazole 20 MG delayed release capsule  Commonly known as: PRILOSEC  Take 1 capsule by mouth daily     potassium chloride 20 MEQ extended release tablet  Commonly known as: KLOR-CON M  Take 2 tablets by mouth daily     spironolactone 25 MG tablet  Commonly known as: ALDACTONE  Take 1 tablet by mouth daily     traZODone 50 MG tablet  Commonly known as: DESYREL  Take 2 tablets by mouth nightly     Trelegy Ellipta 100-62.5-25 MCG/INH Aepb  Generic drug: fluticasone-umeclidin-vilant  INHALE ONE PUFF BY MOUTH DAILY           * This list has 2 medication(s) that are the same as other medications prescribed for you. Read the directions carefully, and ask your doctor or other care provider to review them with you. Medications marked \"taking\" at this time  No outpatient medications have been marked as taking for the 10/27/22 encounter (Office Visit) with Agapito Salazar MD.        Medications patient taking as of now reconciled against medications ordered at time of hospital discharge: Yes    A comprehensive review of systems was negative except for what was noted in the HPI.     Objective:    /64   Pulse 70   Resp 16   SpO2 98%   General Appearance: alert and oriented to person, place and time, well developed and well- nourished, in no acute distress  Skin: warm and dry, no rash or erythema  Head: normocephalic and atraumatic  Eyes: pupils equal, round, and reactive to light, extraocular eye movements intact, conjunctivae normal  Neck: supple and non-tender without mass, no thyromegaly or thyroid nodules, no cervical lymphadenopathy  Pulmonary/Chest: clear to auscultation bilaterally- no wheezes, rales or rhonchi, normal air movement, no respiratory distress  Cardiovascular: normal rate, IRregular rhythm, normal S1 and S2, no murmurs, rubs, clicks, or gallops, distal pulses intact, no carotid bruits  Abdomen: soft, non-tender, non-distended, normal bowel sounds, no masses or organomegaly- R MID INGUINAL REGION W PALPABLE/ENLARGED NONTENDER LN APPROX 1CM DIAMETER. Extremities: no cyanosis, clubbing or edema  Musculoskeletal: normal range of motion, no joint swelling, deformity or tenderness  Neurologic:  no cranial nerve deficit, gait, coordination and speech normal      An electronic signature was used to authenticate this note.   --Hany Mills MD

## 2022-11-05 DIAGNOSIS — I42.0 DILATED CARDIOMYOPATHY (HCC): ICD-10-CM

## 2022-11-07 RX ORDER — FUROSEMIDE 40 MG/1
TABLET ORAL
Qty: 90 TABLET | Refills: 1 | OUTPATIENT
Start: 2022-11-07

## 2022-11-16 ENCOUNTER — HOSPITAL ENCOUNTER (OUTPATIENT)
Dept: CARDIAC REHAB | Age: 72
Setting detail: THERAPIES SERIES
Discharge: HOME | End: 2022-11-16
Payer: MEDICARE

## 2022-11-16 PROCEDURE — G0423 INTENS CARDIAC REHAB NO EXER: HCPCS

## 2022-11-16 PROCEDURE — G0422 INTENS CARDIAC REHAB W/EXERC: HCPCS

## 2022-11-21 ENCOUNTER — HOSPITAL ENCOUNTER (OUTPATIENT)
Dept: CARDIAC REHAB | Age: 72
Setting detail: THERAPIES SERIES
Discharge: HOME | End: 2022-11-21
Payer: MEDICARE

## 2022-11-21 DIAGNOSIS — E79.0 ELEVATED URIC ACID IN BLOOD: ICD-10-CM

## 2022-11-21 DIAGNOSIS — C61 ADENOCARCINOMA OF PROSTATE (HCC): ICD-10-CM

## 2022-11-21 DIAGNOSIS — E11.21 CONTROLLED TYPE 2 DIABETES MELLITUS WITH DIABETIC NEPHROPATHY, WITHOUT LONG-TERM CURRENT USE OF INSULIN (HCC): ICD-10-CM

## 2022-11-21 DIAGNOSIS — E11.21 CONTROLLED TYPE 2 DIABETES MELLITUS WITH DIABETIC NEPHROPATHY, WITHOUT LONG-TERM CURRENT USE OF INSULIN (HCC): Primary | ICD-10-CM

## 2022-11-21 DIAGNOSIS — I48.19 PERSISTENT ATRIAL FIBRILLATION (HCC): ICD-10-CM

## 2022-11-21 LAB
A/G RATIO: 2 (ref 1.1–2.2)
ALBUMIN SERPL-MCNC: 4 G/DL (ref 3.4–5)
ALP BLD-CCNC: 61 U/L (ref 40–129)
ALT SERPL-CCNC: 9 U/L (ref 10–40)
ANION GAP SERPL CALCULATED.3IONS-SCNC: 11 MMOL/L (ref 3–16)
AST SERPL-CCNC: 17 U/L (ref 15–37)
BASOPHILS ABSOLUTE: 0 K/UL (ref 0–0.2)
BASOPHILS RELATIVE PERCENT: 0.8 %
BILIRUB SERPL-MCNC: 0.5 MG/DL (ref 0–1)
BUN BLDV-MCNC: 17 MG/DL (ref 7–20)
CALCIUM SERPL-MCNC: 9.4 MG/DL (ref 8.3–10.6)
CHLORIDE BLD-SCNC: 105 MMOL/L (ref 99–110)
CHOLESTEROL, TOTAL: 118 MG/DL (ref 0–199)
CO2: 26 MMOL/L (ref 21–32)
CREAT SERPL-MCNC: 1 MG/DL (ref 0.8–1.3)
EOSINOPHILS ABSOLUTE: 0.4 K/UL (ref 0–0.6)
EOSINOPHILS RELATIVE PERCENT: 9.1 %
GFR SERPL CREATININE-BSD FRML MDRD: >60 ML/MIN/{1.73_M2}
GLUCOSE BLD-MCNC: 113 MG/DL (ref 70–99)
GLUCOSE BLD-MCNC: 117 MG/DL (ref 70–99)
GLUCOSE BLD-MCNC: 98 MG/DL (ref 70–99)
HCT VFR BLD CALC: 31.3 % (ref 40.5–52.5)
HDLC SERPL-MCNC: 33 MG/DL (ref 40–60)
HEMOGLOBIN: 10.5 G/DL (ref 13.5–17.5)
LDL CHOLESTEROL CALCULATED: 61 MG/DL
LYMPHOCYTES ABSOLUTE: 0.3 K/UL (ref 1–5.1)
LYMPHOCYTES RELATIVE PERCENT: 6.1 %
MAGNESIUM: 2.1 MG/DL (ref 1.8–2.4)
MCH RBC QN AUTO: 31.7 PG (ref 26–34)
MCHC RBC AUTO-ENTMCNC: 33.6 G/DL (ref 31–36)
MCV RBC AUTO: 94.6 FL (ref 80–100)
MONOCYTES ABSOLUTE: 0.6 K/UL (ref 0–1.3)
MONOCYTES RELATIVE PERCENT: 12.2 %
NEUTROPHILS ABSOLUTE: 3.4 K/UL (ref 1.7–7.7)
NEUTROPHILS RELATIVE PERCENT: 71.8 %
PDW BLD-RTO: 14.3 % (ref 12.4–15.4)
PLATELET # BLD: 170 K/UL (ref 135–450)
PMV BLD AUTO: 7.1 FL (ref 5–10.5)
POTASSIUM SERPL-SCNC: 4.2 MMOL/L (ref 3.5–5.1)
PROSTATE SPECIFIC ANTIGEN: 0.03 NG/ML (ref 0–4)
RBC # BLD: 3.31 M/UL (ref 4.2–5.9)
SODIUM BLD-SCNC: 142 MMOL/L (ref 136–145)
TOTAL PROTEIN: 6 G/DL (ref 6.4–8.2)
TRIGL SERPL-MCNC: 119 MG/DL (ref 0–150)
TSH SERPL DL<=0.05 MIU/L-ACNC: 0.67 UIU/ML (ref 0.27–4.2)
URIC ACID, SERUM: 5 MG/DL (ref 3.5–7.2)
VLDLC SERPL CALC-MCNC: 24 MG/DL
WBC # BLD: 4.7 K/UL (ref 4–11)

## 2022-11-21 PROCEDURE — 36415 COLL VENOUS BLD VENIPUNCTURE: CPT | Performed by: INTERNAL MEDICINE

## 2022-11-21 PROCEDURE — G0422 INTENS CARDIAC REHAB W/EXERC: HCPCS

## 2022-11-21 PROCEDURE — G0423 INTENS CARDIAC REHAB NO EXER: HCPCS

## 2022-11-21 PROCEDURE — 82962 GLUCOSE BLOOD TEST: CPT

## 2022-11-22 ENCOUNTER — HOSPITAL ENCOUNTER (OUTPATIENT)
Dept: CARDIAC REHAB | Age: 72
Setting detail: THERAPIES SERIES
Discharge: HOME | End: 2022-11-22
Payer: MEDICARE

## 2022-11-22 DIAGNOSIS — D64.9 ANEMIA, UNSPECIFIED TYPE: ICD-10-CM

## 2022-11-22 DIAGNOSIS — D64.9 ANEMIA, UNSPECIFIED TYPE: Primary | ICD-10-CM

## 2022-11-22 LAB
ESTIMATED AVERAGE GLUCOSE: 96.8 MG/DL
FERRITIN: 21 NG/ML (ref 30–400)
GLUCOSE BLD-MCNC: 106 MG/DL (ref 70–99)
GLUCOSE BLD-MCNC: 108 MG/DL (ref 70–99)
HBA1C MFR BLD: 5 %
IRON SATURATION: 9 % (ref 20–50)
IRON: 31 UG/DL (ref 59–158)
TOTAL IRON BINDING CAPACITY: 345 UG/DL (ref 260–445)

## 2022-11-22 PROCEDURE — G0423 INTENS CARDIAC REHAB NO EXER: HCPCS

## 2022-11-22 PROCEDURE — 82962 GLUCOSE BLOOD TEST: CPT

## 2022-11-22 PROCEDURE — G0422 INTENS CARDIAC REHAB W/EXERC: HCPCS

## 2022-11-23 DIAGNOSIS — D64.9 ANEMIA, UNSPECIFIED TYPE: Primary | ICD-10-CM

## 2022-11-23 RX ORDER — FERROUS SULFATE 325(65) MG
325 TABLET ORAL 2 TIMES DAILY
Qty: 60 TABLET | Refills: 5 | COMMUNITY
Start: 2022-11-23

## 2022-11-23 NOTE — RESULT ENCOUNTER NOTE
Please call pt, Toy Fleischer has low iron so c/w iron deficiency anemia. Unsure if this may be related to his recent valve surgery, he has f/u w us next week but rec to also start otc ferrous sulfate 325mg twice/day till his appt.

## 2022-11-28 ENCOUNTER — HOSPITAL ENCOUNTER (OUTPATIENT)
Dept: CARDIAC REHAB | Age: 72
Setting detail: THERAPIES SERIES
Discharge: HOME | End: 2022-11-28
Payer: MEDICARE

## 2022-11-28 ENCOUNTER — OFFICE VISIT (OUTPATIENT)
Dept: INTERNAL MEDICINE CLINIC | Age: 72
End: 2022-11-28
Payer: MEDICARE

## 2022-11-28 VITALS
DIASTOLIC BLOOD PRESSURE: 56 MMHG | RESPIRATION RATE: 17 BRPM | WEIGHT: 264 LBS | OXYGEN SATURATION: 95 % | HEART RATE: 83 BPM | BODY MASS INDEX: 31.31 KG/M2 | SYSTOLIC BLOOD PRESSURE: 104 MMHG

## 2022-11-28 DIAGNOSIS — E11.21 CONTROLLED TYPE 2 DIABETES MELLITUS WITH DIABETIC NEPHROPATHY, WITHOUT LONG-TERM CURRENT USE OF INSULIN (HCC): ICD-10-CM

## 2022-11-28 DIAGNOSIS — D50.9 IRON DEFICIENCY ANEMIA, UNSPECIFIED IRON DEFICIENCY ANEMIA TYPE: Primary | ICD-10-CM

## 2022-11-28 DIAGNOSIS — I48.19 PERSISTENT ATRIAL FIBRILLATION (HCC): ICD-10-CM

## 2022-11-28 LAB
GLUCOSE BLD-MCNC: 102 MG/DL (ref 70–99)
GLUCOSE BLD-MCNC: 103 MG/DL (ref 70–99)

## 2022-11-28 PROCEDURE — G8417 CALC BMI ABV UP PARAM F/U: HCPCS | Performed by: INTERNAL MEDICINE

## 2022-11-28 PROCEDURE — 82962 GLUCOSE BLOOD TEST: CPT

## 2022-11-28 PROCEDURE — 2022F DILAT RTA XM EVC RTNOPTHY: CPT | Performed by: INTERNAL MEDICINE

## 2022-11-28 PROCEDURE — G0423 INTENS CARDIAC REHAB NO EXER: HCPCS

## 2022-11-28 PROCEDURE — 3017F COLORECTAL CA SCREEN DOC REV: CPT | Performed by: INTERNAL MEDICINE

## 2022-11-28 PROCEDURE — G8484 FLU IMMUNIZE NO ADMIN: HCPCS | Performed by: INTERNAL MEDICINE

## 2022-11-28 PROCEDURE — G0422 INTENS CARDIAC REHAB W/EXERC: HCPCS

## 2022-11-28 PROCEDURE — G8427 DOCREV CUR MEDS BY ELIG CLIN: HCPCS | Performed by: INTERNAL MEDICINE

## 2022-11-28 PROCEDURE — 1036F TOBACCO NON-USER: CPT | Performed by: INTERNAL MEDICINE

## 2022-11-28 PROCEDURE — 1123F ACP DISCUSS/DSCN MKR DOCD: CPT | Performed by: INTERNAL MEDICINE

## 2022-11-28 PROCEDURE — 99214 OFFICE O/P EST MOD 30 MIN: CPT | Performed by: INTERNAL MEDICINE

## 2022-11-28 PROCEDURE — 3044F HG A1C LEVEL LT 7.0%: CPT | Performed by: INTERNAL MEDICINE

## 2022-11-28 NOTE — PATIENT INSTRUCTIONS
PLEASE CHECK W CARDIOLOGY IF YOU NEED TO GO ON A LOVENOX BRIDGE FOR YOUR VALVE IF COLONOSCOPY OR OTHER ENDOSCOPY IS NEEDED.

## 2022-11-28 NOTE — PROGRESS NOTES
Lymphadenopathy:      Cervical: No cervical adenopathy. Skin:     General: Skin is warm and dry. Neurological:      Mental Status: He is alert. Psychiatric:         Mood and Affect: Mood normal.       ASSESSMENT:    1. Iron deficiency anemia, unspecified iron deficiency anemia type    2. Persistent atrial fibrillation ([facility] [address]    3. Controlled type 2 diabetes mellitus with diabetic nephropathy, without long-term current use of insulin ([facility] [address]        PLAN:    Mario Fournier was seen today for follow-up and discuss medications. Diagnoses and all orders for this visit:    Iron deficiency anemia, unspecified iron deficiency anemia type- restarted iron supplement, we'll f/u lab again in a couple of weeks but also refer for eval Dr Ankur James  -     CBC with Auto Differential; Future  -     Ferritin; Future  -     Iron and TIBC;  Future  REFERRING TO DR JOHNSON    Persistent atrial fibrillation ([facility] [address]- pt to check w cardiology if needs lovenox bridge prior to endoscopy or if can hold eliquis briefly    Controlled type 2 diabetes mellitus with diabetic nephropathy, without long-term current use of insulin ([facility] [address]- glc improved also w farxiga

## 2022-11-29 ENCOUNTER — HOSPITAL ENCOUNTER (OUTPATIENT)
Dept: CARDIAC REHAB | Age: 72
Setting detail: THERAPIES SERIES
Discharge: HOME | End: 2022-11-29
Payer: MEDICARE

## 2022-11-29 PROCEDURE — G0422 INTENS CARDIAC REHAB W/EXERC: HCPCS

## 2022-11-29 PROCEDURE — G0423 INTENS CARDIAC REHAB NO EXER: HCPCS

## 2022-12-01 ENCOUNTER — HOSPITAL ENCOUNTER (OUTPATIENT)
Dept: CARDIAC REHAB | Age: 72
Setting detail: THERAPIES SERIES
Discharge: HOME | End: 2022-12-01
Payer: MEDICARE

## 2022-12-01 LAB
GLUCOSE BLD-MCNC: 116 MG/DL (ref 70–99)
GLUCOSE BLD-MCNC: 96 MG/DL (ref 70–99)

## 2022-12-01 PROCEDURE — 82962 GLUCOSE BLOOD TEST: CPT

## 2022-12-01 PROCEDURE — G0423 INTENS CARDIAC REHAB NO EXER: HCPCS

## 2022-12-01 PROCEDURE — G0422 INTENS CARDIAC REHAB W/EXERC: HCPCS

## 2022-12-05 ENCOUNTER — HOSPITAL ENCOUNTER (OUTPATIENT)
Dept: CARDIAC REHAB | Age: 72
Setting detail: THERAPIES SERIES
Discharge: HOME | End: 2022-12-05
Payer: MEDICARE

## 2022-12-05 LAB
GLUCOSE BLD-MCNC: 111 MG/DL (ref 70–99)
GLUCOSE BLD-MCNC: 99 MG/DL (ref 70–99)

## 2022-12-05 PROCEDURE — 82962 GLUCOSE BLOOD TEST: CPT

## 2022-12-05 PROCEDURE — G0423 INTENS CARDIAC REHAB NO EXER: HCPCS

## 2022-12-05 PROCEDURE — G0422 INTENS CARDIAC REHAB W/EXERC: HCPCS

## 2022-12-06 ENCOUNTER — HOSPITAL ENCOUNTER (OUTPATIENT)
Dept: CARDIAC REHAB | Age: 72
Setting detail: THERAPIES SERIES
Discharge: HOME | End: 2022-12-06
Payer: MEDICARE

## 2022-12-06 LAB
GLUCOSE BLD-MCNC: 104 MG/DL (ref 70–99)
GLUCOSE BLD-MCNC: 90 MG/DL (ref 70–99)

## 2022-12-06 PROCEDURE — G0423 INTENS CARDIAC REHAB NO EXER: HCPCS

## 2022-12-06 PROCEDURE — G0422 INTENS CARDIAC REHAB W/EXERC: HCPCS

## 2022-12-06 PROCEDURE — 82962 GLUCOSE BLOOD TEST: CPT

## 2022-12-08 ENCOUNTER — HOSPITAL ENCOUNTER (OUTPATIENT)
Dept: CARDIAC REHAB | Age: 72
Setting detail: THERAPIES SERIES
Discharge: HOME | End: 2022-12-08
Payer: MEDICARE

## 2022-12-08 PROCEDURE — G0423 INTENS CARDIAC REHAB NO EXER: HCPCS

## 2022-12-08 PROCEDURE — G0422 INTENS CARDIAC REHAB W/EXERC: HCPCS

## 2022-12-12 ENCOUNTER — HOSPITAL ENCOUNTER (OUTPATIENT)
Dept: CARDIAC REHAB | Age: 72
Setting detail: THERAPIES SERIES
Discharge: HOME | End: 2022-12-12
Payer: MEDICARE

## 2022-12-12 DIAGNOSIS — D50.9 IRON DEFICIENCY ANEMIA, UNSPECIFIED IRON DEFICIENCY ANEMIA TYPE: ICD-10-CM

## 2022-12-12 LAB
BASOPHILS ABSOLUTE: 0 K/UL (ref 0–0.2)
BASOPHILS RELATIVE PERCENT: 1 %
EOSINOPHILS ABSOLUTE: 0.3 K/UL (ref 0–0.6)
EOSINOPHILS RELATIVE PERCENT: 6.9 %
FERRITIN: 62.2 NG/ML (ref 30–400)
HCT VFR BLD CALC: 37.6 % (ref 40.5–52.5)
HEMOGLOBIN: 12.4 G/DL (ref 13.5–17.5)
IRON SATURATION: 20 % (ref 20–50)
IRON: 66 UG/DL (ref 59–158)
LYMPHOCYTES ABSOLUTE: 0.4 K/UL (ref 1–5.1)
LYMPHOCYTES RELATIVE PERCENT: 7.7 %
MCH RBC QN AUTO: 30.8 PG (ref 26–34)
MCHC RBC AUTO-ENTMCNC: 33 G/DL (ref 31–36)
MCV RBC AUTO: 93.5 FL (ref 80–100)
MONOCYTES ABSOLUTE: 0.5 K/UL (ref 0–1.3)
MONOCYTES RELATIVE PERCENT: 11.8 %
NEUTROPHILS ABSOLUTE: 3.3 K/UL (ref 1.7–7.7)
NEUTROPHILS RELATIVE PERCENT: 72.6 %
PDW BLD-RTO: 16.5 % (ref 12.4–15.4)
PLATELET # BLD: 166 K/UL (ref 135–450)
PMV BLD AUTO: 7.6 FL (ref 5–10.5)
RBC # BLD: 4.03 M/UL (ref 4.2–5.9)
TOTAL IRON BINDING CAPACITY: 322 UG/DL (ref 260–445)
WBC # BLD: 4.6 K/UL (ref 4–11)

## 2022-12-12 PROCEDURE — G0423 INTENS CARDIAC REHAB NO EXER: HCPCS

## 2022-12-12 PROCEDURE — 36415 COLL VENOUS BLD VENIPUNCTURE: CPT | Performed by: INTERNAL MEDICINE

## 2022-12-12 PROCEDURE — G0422 INTENS CARDIAC REHAB W/EXERC: HCPCS

## 2022-12-13 ENCOUNTER — HOSPITAL ENCOUNTER (OUTPATIENT)
Dept: CARDIAC REHAB | Age: 72
Setting detail: THERAPIES SERIES
Discharge: HOME | End: 2022-12-13
Payer: MEDICARE

## 2022-12-13 DIAGNOSIS — D64.9 ANEMIA, UNSPECIFIED TYPE: ICD-10-CM

## 2022-12-13 PROCEDURE — G0423 INTENS CARDIAC REHAB NO EXER: HCPCS

## 2022-12-13 PROCEDURE — G0422 INTENS CARDIAC REHAB W/EXERC: HCPCS

## 2022-12-13 RX ORDER — FERROUS SULFATE 325(65) MG
325 TABLET ORAL
Qty: 30 TABLET | Refills: 2
Start: 2022-12-13

## 2022-12-13 NOTE — RESULT ENCOUNTER NOTE
Please call pt, his blood counts are much better and iron stores have normalized. The anemia may have been related to his valve surgery or also pending assessment from GI Dr Mylene Pepe to r/o a GI source. Rec to decr iron supplement to once/day for now.

## 2022-12-15 ENCOUNTER — HOSPITAL ENCOUNTER (OUTPATIENT)
Dept: CARDIAC REHAB | Age: 72
Setting detail: THERAPIES SERIES
Discharge: HOME | End: 2022-12-15
Payer: MEDICARE

## 2022-12-15 PROCEDURE — G0423 INTENS CARDIAC REHAB NO EXER: HCPCS

## 2022-12-15 PROCEDURE — G0422 INTENS CARDIAC REHAB W/EXERC: HCPCS

## 2022-12-19 ENCOUNTER — HOSPITAL ENCOUNTER (OUTPATIENT)
Dept: CARDIAC REHAB | Age: 72
Setting detail: THERAPIES SERIES
Discharge: HOME | End: 2022-12-19
Payer: MEDICARE

## 2022-12-19 PROCEDURE — G0423 INTENS CARDIAC REHAB NO EXER: HCPCS

## 2022-12-19 PROCEDURE — G0422 INTENS CARDIAC REHAB W/EXERC: HCPCS

## 2022-12-20 ENCOUNTER — HOSPITAL ENCOUNTER (OUTPATIENT)
Dept: CARDIAC REHAB | Age: 72
Setting detail: THERAPIES SERIES
Discharge: HOME | End: 2022-12-20
Payer: MEDICARE

## 2022-12-20 PROCEDURE — G0422 INTENS CARDIAC REHAB W/EXERC: HCPCS

## 2022-12-20 PROCEDURE — G0423 INTENS CARDIAC REHAB NO EXER: HCPCS

## 2022-12-22 ENCOUNTER — HOSPITAL ENCOUNTER (OUTPATIENT)
Dept: CARDIAC REHAB | Age: 72
Setting detail: THERAPIES SERIES
Discharge: HOME | End: 2022-12-22
Payer: MEDICARE

## 2022-12-22 PROCEDURE — G0423 INTENS CARDIAC REHAB NO EXER: HCPCS

## 2022-12-22 PROCEDURE — G0422 INTENS CARDIAC REHAB W/EXERC: HCPCS

## 2022-12-24 DIAGNOSIS — I50.22 CHRONIC SYSTOLIC (CONGESTIVE) HEART FAILURE (HCC): ICD-10-CM

## 2022-12-24 DIAGNOSIS — I42.0 DILATED CARDIOMYOPATHY (HCC): ICD-10-CM

## 2022-12-27 ENCOUNTER — HOSPITAL ENCOUNTER (OUTPATIENT)
Dept: CARDIAC REHAB | Age: 72
Setting detail: THERAPIES SERIES
Discharge: HOME | End: 2022-12-27
Payer: MEDICARE

## 2022-12-27 DIAGNOSIS — I48.19 PERSISTENT ATRIAL FIBRILLATION (HCC): ICD-10-CM

## 2022-12-27 DIAGNOSIS — I48.19 PERSISTENT ATRIAL FIBRILLATION (HCC): Primary | ICD-10-CM

## 2022-12-27 LAB
ANION GAP SERPL CALCULATED.3IONS-SCNC: 15 MMOL/L (ref 3–16)
BUN BLDV-MCNC: 14 MG/DL (ref 7–20)
CALCIUM SERPL-MCNC: 9.7 MG/DL (ref 8.3–10.6)
CHLORIDE BLD-SCNC: 100 MMOL/L (ref 99–110)
CO2: 25 MMOL/L (ref 21–32)
CREAT SERPL-MCNC: 1 MG/DL (ref 0.8–1.3)
GFR SERPL CREATININE-BSD FRML MDRD: >60 ML/MIN/{1.73_M2}
GLUCOSE BLD-MCNC: 88 MG/DL (ref 70–99)
MAGNESIUM: 1.9 MG/DL (ref 1.8–2.4)
POTASSIUM SERPL-SCNC: 4.5 MMOL/L (ref 3.5–5.1)
SODIUM BLD-SCNC: 140 MMOL/L (ref 136–145)

## 2022-12-27 PROCEDURE — G0423 INTENS CARDIAC REHAB NO EXER: HCPCS

## 2022-12-27 PROCEDURE — G0422 INTENS CARDIAC REHAB W/EXERC: HCPCS

## 2022-12-27 RX ORDER — SPIRONOLACTONE 25 MG/1
25 TABLET ORAL DAILY
Qty: 90 TABLET | Refills: 3 | Status: SHIPPED | OUTPATIENT
Start: 2022-12-27

## 2022-12-29 ENCOUNTER — HOSPITAL ENCOUNTER (OUTPATIENT)
Dept: CARDIAC REHAB | Age: 72
Setting detail: THERAPIES SERIES
Discharge: HOME | End: 2022-12-29
Payer: MEDICARE

## 2022-12-29 PROCEDURE — G0422 INTENS CARDIAC REHAB W/EXERC: HCPCS

## 2022-12-29 PROCEDURE — G0423 INTENS CARDIAC REHAB NO EXER: HCPCS

## 2022-12-30 LAB
A/G RATIO: 1.6 (ref 1–2)
ALBUMIN SERPL-MCNC: 4.1 G/DL (ref 3.5–5.7)
ALBUMIN/PROTEIN TOTAL, SER/PL: 6.6 G/DL (ref 6–8.3)
ALP BLD-CCNC: 57 U/L (ref 34–104)
ALT SERPL-CCNC: 10 U/L (ref 7–52)
ANION GAP 4: 8 MMOL/L (ref 7–16)
AST W/O P-5'-P: 15 U/L (ref 13–39)
BASOPHILS # BLD: 0.9 %
BASOPHILS ABSOLUTE: 0 K/UL (ref 0–0.1)
BILIRUB SERPL-MCNC: 0.7 MG/DL (ref 0.3–1)
BUN BLDV-MCNC: 21 MG/DL (ref 7–25)
CALCIUM SERPL-MCNC: 9.8 MG/DL (ref 8.6–10.2)
CHLORIDE BLD-SCNC: 101 MMOL/L (ref 98–107)
CO2: 29 MMOL/L (ref 21–31)
CREATININE + EGFR PANEL: 1.07 MG/DL (ref 0.7–1.3)
EGFR MALE: 73 ML/MIN/1.73M2
EOSINOPHILS ABSOLUTE: 0.2 K/UL (ref 0–0.4)
EOSINOPHILS RELATIVE PERCENT: 4.1 %
GLOBULIN: 2.5 G/DL (ref 2.6–4.2)
GLUCOSE: 104 MG/DL (ref 74–109)
HEMOGLOBIN URINE, QUAL: 13.4 G/DL (ref 13.1–17.6)
LYMPHOCYTES # BLD: 6.6 %
LYMPHOCYTES ABSOLUTE: 0.3 K/UL (ref 0.8–3.6)
MCH RBC QN AUTO: 30.5 PG (ref 28.4–33.4)
MCHC RBC AUTO-ENTMCNC: 33.6 G/DL (ref 31.1–37)
MCV RBC AUTO: 90.6 FL (ref 85–99)
MONOCYTES # BLD: 10.9 %
MONOCYTES ABSOLUTE: 0.5 K/UL (ref 0.3–0.9)
NEUTROPHILS ABSOLUTE: 3.6 K/UL (ref 2–7.3)
NEUTROPHILS/100 LEUKOCYTES: 77.5 %
PDW BLD-RTO: 16.9 % (ref 11.7–15.2)
PLATELET COUNT MANUAL: 151 K/UL (ref 154–393)
POTASSIUM SERPL-SCNC: 4.1 MMOL/L (ref 3.5–5.1)
PROSTATE SPECIFIC ANTIGEN: 0.01 NG/ML (ref 0.01–4)
RBC # BLD: 4.38 M/UL (ref 4.3–5.86)
SODIUM BLD-SCNC: 138 MMOL/L (ref 136–145)
SR HEMATOCRIT: 39.7 % (ref 39–51.5)
WBC BLOOD, MANUAL: 4.6 K/UL (ref 4–10.5)

## 2023-01-03 ENCOUNTER — HOSPITAL ENCOUNTER (OUTPATIENT)
Dept: CARDIAC REHAB | Age: 73
Setting detail: THERAPIES SERIES
Discharge: HOME | End: 2023-01-03
Payer: MEDICARE

## 2023-01-03 PROCEDURE — G0422 INTENS CARDIAC REHAB W/EXERC: HCPCS

## 2023-01-03 PROCEDURE — G0423 INTENS CARDIAC REHAB NO EXER: HCPCS

## 2023-01-04 DIAGNOSIS — J44.9 CHRONIC OBSTRUCTIVE PULMONARY DISEASE, UNSPECIFIED COPD TYPE (HCC): ICD-10-CM

## 2023-01-04 RX ORDER — FLUTICASONE FUROATE, UMECLIDINIUM BROMIDE AND VILANTEROL TRIFENATATE 100; 62.5; 25 UG/1; UG/1; UG/1
POWDER RESPIRATORY (INHALATION)
OUTPATIENT
Start: 2023-01-04

## 2023-01-05 ENCOUNTER — HOSPITAL ENCOUNTER (OUTPATIENT)
Dept: CARDIAC REHAB | Age: 73
Setting detail: THERAPIES SERIES
Discharge: HOME | End: 2023-01-05
Payer: MEDICARE

## 2023-01-05 PROCEDURE — G0422 INTENS CARDIAC REHAB W/EXERC: HCPCS

## 2023-01-05 PROCEDURE — G0423 INTENS CARDIAC REHAB NO EXER: HCPCS

## 2023-01-05 NOTE — PROGRESS NOTES
Cardiac Rehab Outpatient Medical Nutrition Therapy    01/05/2023  9:58-10:33     Client History:    Pertinent medications:   Current Outpatient Medications   Medication Instructions    albuterol (PROVENTIL) 2.5 mg, Nebulization, EVERY 6 HOURS PRN    albuterol sulfate HFA (PROVENTIL;VENTOLIN;PROAIR) 108 (90 Base) MCG/ACT inhaler 2 puffs, Inhalation, EVERY 6 HOURS PRN    allopurinol (ZYLOPRIM) 100 mg, Oral, DAILY    apixaban (ELIQUIS) 5 mg, Oral, 2 TIMES DAILY    bicalutamide (CASODEX) 50 mg, Oral, DAILY    calcium carb-cholecalciferol 600-200 MG-UNIT TABS tablet 1 tablet, Oral, DAILY    dapagliflozin (FARXIGA) 10 mg, Oral, EVERY MORNING    ferrous sulfate (IRON 325) 325 mg, Oral, DAILY WITH BREAKFAST    furosemide (LASIX) 40 mg, Oral, DAILY, Alternate between 40 once a day and 40 twice a day on alternate days    losartan (COZAAR) 25 mg, Oral, DAILY    metoprolol succinate (TOPROL XL) 100 mg, Oral, DAILY    montelukast (SINGULAIR) 10 mg, Oral, DAILY    omeprazole (PRILOSEC) 20 mg, Oral, DAILY    potassium chloride (KLOR-CON M) 20 MEQ extended release tablet 40 mEq, Oral, DAILY    spironolactone (ALDACTONE) 25 mg, Oral, DAILY    traZODone (DESYREL) 100 mg, Oral, NIGHTLY    TRELEGY ELLIPTA 100-62.5-25 MCG/INH AEPB INHALE ONE PUFF BY MOUTH DAILY       Social hx: Retired for past 5 years. Lives with spouse. Spouse does grocery shopping and cooking. Does not use tobacco. May drink one glass of wine 4-5 days/week. Sleep habits improving since valve surgery. Family hx: father and 2 brothers have Afib     Pertinent Medical hx: aortic valve replacement, Afib, colon polyps, prostate cancer-finished with radiation but still takes hormone shots every 3 months, T2DM    Activity habits: Started to do chair exercises before cardiac rehab program. Currently attending exercise sessions 3 days/week. Has treadmill and stationary bike at home. Started doing balance exercises to improve balance.      Food/nutrition habits: Eats 3 meals each day. Breakfast usually toast, yogurt, fruit. Lunch is lighter meal. Once a week has egg with low sodium jacob. Drinks coffee with sugar sub. Supper meal, meat with sides. Usually does not eat casseroles/mixed foods. Eats fish weekly. Eats a few vegetables-green beans, salad, raw broccoli, corn, beans. Likes more fruit than vegetables. Enjoys salty snacks but has changed to lower sodium versions and controlled portions. Eating less desserts, spouse not baking. Eats gum drops most days. Drinks some water and diet caffeine free soda. Biochemical data:11/21/22  HDL 33, LDL 61, Tg 119, HbA1c 5%    Anthropometrics:    Ht:6'5\"  Wt: 259#   IBW:  208-228#           % of IBW:113          BMI: 30.7, obese    Weight hx: Highest known weight 272#. Wants to lose weight to 240#. Diet hx: Recently changing foods during cardiac rehab program.     Estimated needs: ~2400 calories/day (based on current weight with [employer] [employer])     Nutrition dx: nutrition related knowledge deficit related to limited exposure to cardiac meal plan as evidenced by recent changes since starting program    Nutrition Prescription:  lower sodium meal plan ~1500 mg/day, consistent meals 3 per day, at least 5 servings fruit/vegetables each day     Nutrition Interventions: Discussion regarding food habits and recent changes to foods. Spouse very helpful, has been preparing food differently-less salt and controlled portions. Eats fruit every day and some vegetables but less variety in vegetables. Encouraged to pay attention to amount of gum drops eaten, will monitor serving. Has been able to maintain appetite during cancer treatments but does have some GI upset/bowel changes at times.      Nutrition related goals: eat at least 5 servings fruit/vegetables each day, monitor servings sizes of snacks     Adherence/barriers to goals: no barriers at this time     Primary learner: attended alone  Education materials provided: Pritikin shopping list Method of education:   Explanation       Handouts   Teach back     Response to education:    Verbalized understanding           Rec/plan: Patient to continue in cardiac rehab program

## 2023-01-09 ENCOUNTER — HOSPITAL ENCOUNTER (OUTPATIENT)
Dept: CARDIAC REHAB | Age: 73
Setting detail: THERAPIES SERIES
Discharge: HOME | End: 2023-01-09
Payer: MEDICARE

## 2023-01-09 PROCEDURE — G0422 INTENS CARDIAC REHAB W/EXERC: HCPCS

## 2023-01-09 PROCEDURE — G0423 INTENS CARDIAC REHAB NO EXER: HCPCS

## 2023-01-09 RX ORDER — FLUTICASONE FUROATE, UMECLIDINIUM BROMIDE AND VILANTEROL TRIFENATATE 100; 62.5; 25 UG/1; UG/1; UG/1
1 POWDER RESPIRATORY (INHALATION) DAILY
Qty: 1 EACH | Refills: 5 | Status: SHIPPED | OUTPATIENT
Start: 2023-01-09

## 2023-01-10 ENCOUNTER — HOSPITAL ENCOUNTER (OUTPATIENT)
Dept: CARDIAC REHAB | Age: 73
Setting detail: THERAPIES SERIES
Discharge: HOME | End: 2023-01-10
Payer: MEDICARE

## 2023-01-10 PROCEDURE — G0422 INTENS CARDIAC REHAB W/EXERC: HCPCS

## 2023-01-10 PROCEDURE — G0423 INTENS CARDIAC REHAB NO EXER: HCPCS

## 2023-01-12 ENCOUNTER — HOSPITAL ENCOUNTER (OUTPATIENT)
Dept: CARDIAC REHAB | Age: 73
Setting detail: THERAPIES SERIES
Discharge: HOME | End: 2023-01-12
Payer: MEDICARE

## 2023-01-12 PROCEDURE — G0422 INTENS CARDIAC REHAB W/EXERC: HCPCS

## 2023-01-12 PROCEDURE — G0423 INTENS CARDIAC REHAB NO EXER: HCPCS

## 2023-01-16 ENCOUNTER — HOSPITAL ENCOUNTER (OUTPATIENT)
Dept: CARDIAC REHAB | Age: 73
Setting detail: THERAPIES SERIES
Discharge: HOME | End: 2023-01-16
Payer: MEDICARE

## 2023-01-16 PROCEDURE — G0423 INTENS CARDIAC REHAB NO EXER: HCPCS

## 2023-01-16 PROCEDURE — G0422 INTENS CARDIAC REHAB W/EXERC: HCPCS

## 2023-01-17 ENCOUNTER — HOSPITAL ENCOUNTER (OUTPATIENT)
Dept: CARDIAC REHAB | Age: 73
Setting detail: THERAPIES SERIES
Discharge: HOME | End: 2023-01-17
Payer: MEDICARE

## 2023-01-17 PROCEDURE — G0423 INTENS CARDIAC REHAB NO EXER: HCPCS

## 2023-01-17 PROCEDURE — G0422 INTENS CARDIAC REHAB W/EXERC: HCPCS

## 2023-01-19 ENCOUNTER — HOSPITAL ENCOUNTER (OUTPATIENT)
Dept: CARDIAC REHAB | Age: 73
Setting detail: THERAPIES SERIES
Discharge: HOME | End: 2023-01-19
Payer: MEDICARE

## 2023-01-19 PROCEDURE — G0422 INTENS CARDIAC REHAB W/EXERC: HCPCS

## 2023-01-19 PROCEDURE — G0423 INTENS CARDIAC REHAB NO EXER: HCPCS

## 2023-01-23 ENCOUNTER — HOSPITAL ENCOUNTER (OUTPATIENT)
Dept: CARDIAC REHAB | Age: 73
Setting detail: THERAPIES SERIES
Discharge: HOME | End: 2023-01-23
Payer: MEDICARE

## 2023-01-23 PROCEDURE — G0423 INTENS CARDIAC REHAB NO EXER: HCPCS

## 2023-01-23 PROCEDURE — G0422 INTENS CARDIAC REHAB W/EXERC: HCPCS

## 2023-01-24 ENCOUNTER — HOSPITAL ENCOUNTER (OUTPATIENT)
Dept: CARDIAC REHAB | Age: 73
Setting detail: THERAPIES SERIES
Discharge: HOME | End: 2023-01-24
Payer: MEDICARE

## 2023-01-24 PROCEDURE — G0423 INTENS CARDIAC REHAB NO EXER: HCPCS

## 2023-01-24 PROCEDURE — G0422 INTENS CARDIAC REHAB W/EXERC: HCPCS

## 2023-01-25 ENCOUNTER — OFFICE VISIT (OUTPATIENT)
Dept: INTERNAL MEDICINE CLINIC | Age: 73
End: 2023-01-25
Payer: MEDICARE

## 2023-01-25 VITALS
HEART RATE: 84 BPM | RESPIRATION RATE: 16 BRPM | WEIGHT: 266 LBS | OXYGEN SATURATION: 94 % | DIASTOLIC BLOOD PRESSURE: 62 MMHG | BODY MASS INDEX: 31.54 KG/M2 | SYSTOLIC BLOOD PRESSURE: 122 MMHG

## 2023-01-25 DIAGNOSIS — I35.0 NONRHEUMATIC AORTIC VALVE STENOSIS: ICD-10-CM

## 2023-01-25 DIAGNOSIS — I42.0 DILATED CARDIOMYOPATHY (HCC): ICD-10-CM

## 2023-01-25 DIAGNOSIS — I48.19 PERSISTENT ATRIAL FIBRILLATION (HCC): ICD-10-CM

## 2023-01-25 DIAGNOSIS — C61 ADENOCARCINOMA OF PROSTATE (HCC): ICD-10-CM

## 2023-01-25 DIAGNOSIS — D50.9 IRON DEFICIENCY ANEMIA, UNSPECIFIED IRON DEFICIENCY ANEMIA TYPE: ICD-10-CM

## 2023-01-25 DIAGNOSIS — E11.41 DIABETIC MONONEUROPATHY ASSOCIATED WITH TYPE 2 DIABETES MELLITUS (HCC): ICD-10-CM

## 2023-01-25 DIAGNOSIS — K52.9 CHRONIC DIARRHEA: ICD-10-CM

## 2023-01-25 DIAGNOSIS — E79.0 ELEVATED URIC ACID IN BLOOD: ICD-10-CM

## 2023-01-25 DIAGNOSIS — I48.19 PERSISTENT ATRIAL FIBRILLATION (HCC): Primary | ICD-10-CM

## 2023-01-25 DIAGNOSIS — F51.01 PRIMARY INSOMNIA: ICD-10-CM

## 2023-01-25 DIAGNOSIS — D68.9 COAGULOPATHY (HCC): ICD-10-CM

## 2023-01-25 DIAGNOSIS — K21.9 GASTROESOPHAGEAL REFLUX DISEASE WITHOUT ESOPHAGITIS: ICD-10-CM

## 2023-01-25 DIAGNOSIS — Z23 NEED FOR PROPHYLACTIC VACCINATION AGAINST STREPTOCOCCUS PNEUMONIAE (PNEUMOCOCCUS): ICD-10-CM

## 2023-01-25 DIAGNOSIS — E11.21 CONTROLLED TYPE 2 DIABETES MELLITUS WITH DIABETIC NEPHROPATHY, WITHOUT LONG-TERM CURRENT USE OF INSULIN (HCC): ICD-10-CM

## 2023-01-25 DIAGNOSIS — J43.1 PANLOBULAR EMPHYSEMA (HCC): ICD-10-CM

## 2023-01-25 LAB
FERRITIN: 47.5 NG/ML (ref 30–400)
IRON SATURATION: 25 % (ref 20–50)
IRON: 79 UG/DL (ref 59–158)
TOTAL IRON BINDING CAPACITY: 322 UG/DL (ref 260–445)

## 2023-01-25 PROCEDURE — 3046F HEMOGLOBIN A1C LEVEL >9.0%: CPT | Performed by: INTERNAL MEDICINE

## 2023-01-25 PROCEDURE — G0009 ADMIN PNEUMOCOCCAL VACCINE: HCPCS | Performed by: INTERNAL MEDICINE

## 2023-01-25 PROCEDURE — 2022F DILAT RTA XM EVC RTNOPTHY: CPT | Performed by: INTERNAL MEDICINE

## 2023-01-25 PROCEDURE — 1036F TOBACCO NON-USER: CPT | Performed by: INTERNAL MEDICINE

## 2023-01-25 PROCEDURE — 36415 COLL VENOUS BLD VENIPUNCTURE: CPT | Performed by: INTERNAL MEDICINE

## 2023-01-25 PROCEDURE — G8484 FLU IMMUNIZE NO ADMIN: HCPCS | Performed by: INTERNAL MEDICINE

## 2023-01-25 PROCEDURE — 3017F COLORECTAL CA SCREEN DOC REV: CPT | Performed by: INTERNAL MEDICINE

## 2023-01-25 PROCEDURE — 3023F SPIROM DOC REV: CPT | Performed by: INTERNAL MEDICINE

## 2023-01-25 PROCEDURE — 99214 OFFICE O/P EST MOD 30 MIN: CPT | Performed by: INTERNAL MEDICINE

## 2023-01-25 PROCEDURE — G8417 CALC BMI ABV UP PARAM F/U: HCPCS | Performed by: INTERNAL MEDICINE

## 2023-01-25 PROCEDURE — G8427 DOCREV CUR MEDS BY ELIG CLIN: HCPCS | Performed by: INTERNAL MEDICINE

## 2023-01-25 PROCEDURE — 1123F ACP DISCUSS/DSCN MKR DOCD: CPT | Performed by: INTERNAL MEDICINE

## 2023-01-25 PROCEDURE — 90677 PCV20 VACCINE IM: CPT | Performed by: INTERNAL MEDICINE

## 2023-01-25 RX ORDER — APIXABAN 5 MG/1
TABLET, FILM COATED ORAL
Qty: 180 TABLET | Refills: 1 | Status: SHIPPED | OUTPATIENT
Start: 2023-01-25 | End: 2023-01-25 | Stop reason: SDUPTHER

## 2023-01-25 RX ORDER — DIPHENOXYLATE HYDROCHLORIDE AND ATROPINE SULFATE 2.5; .025 MG/1; MG/1
1 TABLET ORAL 2 TIMES DAILY PRN
Qty: 60 TABLET | Refills: 2 | Status: CANCELLED | OUTPATIENT
Start: 2023-01-25 | End: 2023-02-24

## 2023-01-25 RX ORDER — ALLOPURINOL 100 MG/1
100 TABLET ORAL DAILY
Qty: 90 TABLET | Refills: 1 | Status: SHIPPED | OUTPATIENT
Start: 2023-01-25

## 2023-01-25 RX ORDER — TRAZODONE HYDROCHLORIDE 50 MG/1
100 TABLET ORAL NIGHTLY
Qty: 180 TABLET | Refills: 1 | Status: SHIPPED | OUTPATIENT
Start: 2023-01-25

## 2023-01-25 RX ORDER — METOPROLOL SUCCINATE 100 MG/1
100 TABLET, EXTENDED RELEASE ORAL DAILY
Qty: 30 TABLET | Refills: 5 | Status: SHIPPED | OUTPATIENT
Start: 2023-01-25

## 2023-01-25 RX ORDER — OMEPRAZOLE 20 MG/1
20 CAPSULE, DELAYED RELEASE ORAL DAILY
Qty: 90 CAPSULE | Refills: 1 | Status: SHIPPED | OUTPATIENT
Start: 2023-01-25

## 2023-01-25 ASSESSMENT — PATIENT HEALTH QUESTIONNAIRE - PHQ9
SUM OF ALL RESPONSES TO PHQ QUESTIONS 1-9: 0
2. FEELING DOWN, DEPRESSED OR HOPELESS: 0
SUM OF ALL RESPONSES TO PHQ QUESTIONS 1-9: 0
1. LITTLE INTEREST OR PLEASURE IN DOING THINGS: 0
DEPRESSION UNABLE TO ASSESS: FUNCTIONAL CAPACITY MOTIVATION LIMITS ACCURACY
SUM OF ALL RESPONSES TO PHQ9 QUESTIONS 1 & 2: 0

## 2023-01-25 NOTE — PROGRESS NOTES
Lexi Zambrano  1950 01/25/23    SUBJECTIVE:    PROSTATE CA, has finished RT last Sept but still some irreg BMs and small Bms. Also on hormonal shots. DM- more strict w diet and also on farxiga, no hypoglycemic spells. Lab Results   Component Value Date    LABA1C 5.0 11/21/2022    LABA1C 5.5 08/05/2022    LABA1C 5.9 04/07/2022     Lab Results   Component Value Date    GLUF 107 (H) 01/06/2017    LABMICR Not Indicated 11/15/2021    LDLCALC 61 11/21/2022    CREATININE 1.0 12/27/2022     No gout flares    Aortic stenosis, s/p TAVR, also afib, on eliquis. Cont f/u w [facility] cardiology. Cardiomyopathy, EF in June 2022 was 35-40%, but now s/p TAVR in Nov was up to 49%. Asthma:  Patient denies significant chest pain/tightness, dyspnea, decreased exercise tolerance, cough, or wheezing on current regimen. Pri9or iron defic anemia after his surgery, is on iron and if levels normal, since no anemia last mo, we'll then stop iron supplement too. OBJECTIVE:    /62   Pulse 84   Resp 16   Wt 266 lb (120.7 kg)   SpO2 94%   BMI 31.54 kg/m²     Physical Exam  Constitutional:       Appearance: Normal appearance. Cardiovascular:      Rate and Rhythm: Normal rate. Rhythm irregular. Heart sounds: No murmur heard. No gallop. Pulmonary:      Effort: No respiratory distress. Breath sounds: No wheezing. Abdominal:      General: Abdomen is flat. Bowel sounds are normal. There is no distension. Palpations: Abdomen is soft. There is no mass. Tenderness: There is no abdominal tenderness. Hernia: No hernia is present. Musculoskeletal:      Right lower leg: No edema. Left lower leg: No edema.    Skin:     Comments: FOOT EXAM  Visual inspection:  Deformity/amputation: absent  Skin lesions/pre-ulcerative calluses: absent  Edema: right- negative, left- negative    Sensory exam:  Monofilament sensation: DIMINISHED  (minimum of 5 random plantar locations tested, avoiding callused areas - > 1 area with absence of sensation is + for neuropathy)      Pulses: normal         Neurological:      Mental Status: He is alert. Psychiatric:         Mood and Affect: Mood normal.       ASSESSMENT:    1. Persistent atrial fibrillation ([address]    2. Dilated cardiomyopathy ([address]    3. Controlled type 2 diabetes mellitus with diabetic nephropathy, without long-term current use of insulin ([facility])    4. Elevated uric acid in blood    5. Primary insomnia    6. Gastroesophageal reflux disease without esophagitis    7. Chronic diarrhea    8. Panlobular emphysema ([address]    9. Coagulopathy ([address]    10. Adenocarcinoma of prostate ([address]    11. Nonrheumatic aortic valve stenosis    12. Need for prophylactic vaccination against Streptococcus pneumoniae (pneumococcus)    13. Iron deficiency anemia, unspecified iron deficiency anemia type    14. Diabetic mononeuropathy associated with type 2 diabetes mellitus ([address]        PLAN:    Cordell Blandon was seen today for atrial fibrillation, hypertension and diabetes. Diagnoses and all orders for this visit:    Persistent atrial fibrillation ([address]- Pt clinically w/o evidence of cardiovascular instability, cont regimen. -     metoprolol succinate (TOPROL XL) 100 MG extended release tablet; Take 1 tablet by mouth daily  -     apixaban (ELIQUIS) 5 MG TABS tablet; TAKE ONE TABLET BY MOUTH TWICE A DAY  -     Comprehensive Metabolic Panel; Future  -     CBC with Auto Differential; Future  -     Lipid Panel; Future  -     TSH; Future  -     Magnesium; Future    Dilated cardiomyopathy ([address]- MOST RECENT ECHO W EF IMPROVED, CONT MEDS AND FU W DR Steven Sampsonast [facility]  -     metoprolol succinate (TOPROL XL) 100 MG extended release tablet; Take 1 tablet by mouth daily  -     Comprehensive Metabolic Panel; Future  -     CBC with Auto Differential; Future  -     Lipid Panel;  Future    Controlled type 2 diabetes mellitus with diabetic nephropathy, without long-term current use of insulin ([address] - DM relatively well controlled and will continue current regimen. Screening reviewed. See orders. HE'LL CHECK FEET REGULARLY W HIS DIMINISHED SENSATION NOTED IN FEET, COULD NOT FEEL MONOFILAMENT  -     Comprehensive Metabolic Panel; Future  -     CBC with Auto Differential; Future  -     Lipid Panel; Future  -     Microalbumin / Creatinine Urine Ratio; Future  -     Hemoglobin A1C; Future  -      DIABETES FOOT EXAM    Elevated uric acid in blood- continue present management. Will monitor.    -     allopurinol (ZYLOPRIM) 100 MG tablet; Take 1 tablet by mouth daily  -     Uric Acid; Future    Primary insomnia- The current medical regimen is effective;  continue present plan and medications. -     traZODone (DESYREL) 50 MG tablet; Take 2 tablets by mouth nightly    Gastroesophageal reflux disease without esophagitis- GERD controlled on meds and will refill, monitor for any recurrent or worsening sx.    -     omeprazole (PRILOSEC) 20 MG delayed release capsule; Take 1 capsule by mouth daily    Chronic diarrhea- SX IMPROVED AND HE'LL STOP LOMOTIL AS BMS HAVE BEEN CONSTIPATED ,HAD PRIOR DIARRHEA AFTER RADIATION THERAPY  ALSO IF IRON LEVELS OK WE'LL STOP IRON    Panlobular emphysema ([address] - COPD stable on current regiment of inhalers/meds. Coagulopathy ([address]- STABLE ON ANTICOAGULATION FOR AFIB    Adenocarcinoma of prostate ([address]- CONT F/U UROLOGY    Nonrheumatic aortic valve stenosis- STABLE AND IMPROVED S/P TAVR    Need for prophylactic vaccination against Streptococcus pneumoniae (pneumococcus)  -     Pneumococcal, PCV20, PREVNAR 20, (age 25 yrs+), IM, PF    Iron deficiency anemia, unspecified iron deficiency anemia type- CHECK IRON AND IF NL THE STOP IRON, PRIOR ANEMIA HAS RESOLVED  -     Ferritin; Future  -     Iron and TIBC; Future  -     Ferritin; Future  -     Iron and TIBC;  Future    Diabetic mononeuropathy associated with type 2 diabetes mellitus ([address]- DM relatively well controlled and will continue current regimen. Screening reviewed. See orders.

## 2023-01-26 ENCOUNTER — HOSPITAL ENCOUNTER (OUTPATIENT)
Dept: CARDIAC REHAB | Age: 73
Setting detail: THERAPIES SERIES
Discharge: HOME | End: 2023-01-26
Payer: MEDICARE

## 2023-01-26 PROCEDURE — G0422 INTENS CARDIAC REHAB W/EXERC: HCPCS

## 2023-01-26 PROCEDURE — G0423 INTENS CARDIAC REHAB NO EXER: HCPCS

## 2023-01-30 ENCOUNTER — HOSPITAL ENCOUNTER (OUTPATIENT)
Dept: CARDIAC REHAB | Age: 73
Setting detail: THERAPIES SERIES
Discharge: HOME | End: 2023-01-30
Payer: MEDICARE

## 2023-01-30 PROCEDURE — G0422 INTENS CARDIAC REHAB W/EXERC: HCPCS

## 2023-01-30 PROCEDURE — G0423 INTENS CARDIAC REHAB NO EXER: HCPCS

## 2023-01-31 ENCOUNTER — HOSPITAL ENCOUNTER (OUTPATIENT)
Dept: CARDIAC REHAB | Age: 73
Setting detail: THERAPIES SERIES
Discharge: HOME | End: 2023-01-31
Payer: MEDICARE

## 2023-01-31 PROCEDURE — G0422 INTENS CARDIAC REHAB W/EXERC: HCPCS

## 2023-01-31 PROCEDURE — G0423 INTENS CARDIAC REHAB NO EXER: HCPCS

## 2023-02-02 ENCOUNTER — HOSPITAL ENCOUNTER (OUTPATIENT)
Dept: CARDIAC REHAB | Age: 73
Setting detail: THERAPIES SERIES
Discharge: HOME | End: 2023-02-02
Payer: MEDICARE

## 2023-02-02 PROCEDURE — G0422 INTENS CARDIAC REHAB W/EXERC: HCPCS

## 2023-02-02 PROCEDURE — G0423 INTENS CARDIAC REHAB NO EXER: HCPCS

## 2023-02-06 ENCOUNTER — HOSPITAL ENCOUNTER (OUTPATIENT)
Dept: CARDIAC REHAB | Age: 73
Setting detail: THERAPIES SERIES
Discharge: HOME | End: 2023-02-06
Payer: MEDICARE

## 2023-02-06 PROCEDURE — G0423 INTENS CARDIAC REHAB NO EXER: HCPCS

## 2023-02-06 PROCEDURE — G0422 INTENS CARDIAC REHAB W/EXERC: HCPCS

## 2023-02-07 ENCOUNTER — HOSPITAL ENCOUNTER (OUTPATIENT)
Dept: CARDIAC REHAB | Age: 73
Setting detail: THERAPIES SERIES
Discharge: HOME | End: 2023-02-07
Payer: MEDICARE

## 2023-02-07 PROCEDURE — G0423 INTENS CARDIAC REHAB NO EXER: HCPCS

## 2023-02-07 PROCEDURE — G0422 INTENS CARDIAC REHAB W/EXERC: HCPCS

## 2023-02-09 ENCOUNTER — HOSPITAL ENCOUNTER (OUTPATIENT)
Dept: CARDIAC REHAB | Age: 73
Setting detail: THERAPIES SERIES
Discharge: HOME | End: 2023-02-09
Payer: MEDICARE

## 2023-02-09 PROCEDURE — G0423 INTENS CARDIAC REHAB NO EXER: HCPCS

## 2023-02-09 PROCEDURE — G0422 INTENS CARDIAC REHAB W/EXERC: HCPCS

## 2023-02-13 ENCOUNTER — HOSPITAL ENCOUNTER (OUTPATIENT)
Dept: CARDIAC REHAB | Age: 73
Setting detail: THERAPIES SERIES
Discharge: HOME | End: 2023-02-13
Payer: MEDICARE

## 2023-02-13 PROCEDURE — G0422 INTENS CARDIAC REHAB W/EXERC: HCPCS

## 2023-02-13 PROCEDURE — G0423 INTENS CARDIAC REHAB NO EXER: HCPCS

## 2023-02-14 ENCOUNTER — HOSPITAL ENCOUNTER (OUTPATIENT)
Dept: CARDIAC REHAB | Age: 73
Setting detail: THERAPIES SERIES
Discharge: HOME | End: 2023-02-14
Payer: MEDICARE

## 2023-02-14 PROCEDURE — G0423 INTENS CARDIAC REHAB NO EXER: HCPCS

## 2023-02-14 PROCEDURE — G0422 INTENS CARDIAC REHAB W/EXERC: HCPCS

## 2023-03-24 LAB
A/G RATIO: 1.3 (ref 1–2)
ALBUMIN SERPL-MCNC: 3.8 G/DL (ref 3.5–5.7)
ALBUMIN/PROTEIN TOTAL, SER/PL: 6.7 G/DL (ref 6–8.3)
ALP BLD-CCNC: 66 U/L (ref 34–104)
ALT SERPL-CCNC: 11 U/L (ref 7–52)
ANION GAP 4: 9 MMOL/L (ref 7–16)
AST W/O P-5'-P: 16 U/L (ref 13–39)
BASOPHILS # BLD: 0.8 %
BASOPHILS ABSOLUTE: 0 K/UL (ref 0–0.1)
BILIRUB SERPL-MCNC: 0.9 MG/DL (ref 0.3–1)
BUN BLDV-MCNC: 12 MG/DL (ref 7–25)
CALCIUM SERPL-MCNC: 9.6 MG/DL (ref 8.6–10.2)
CHLORIDE BLD-SCNC: 99 MMOL/L (ref 98–107)
CO2: 29 MMOL/L (ref 21–31)
CREAT SERPL-MCNC: 1.11 MG/DL (ref 0.7–1.3)
EGFR MALE: 70 ML/MIN/1.73M2
EOSINOPHILS ABSOLUTE: 0.2 K/UL (ref 0–0.4)
EOSINOPHILS RELATIVE PERCENT: 4.6 %
GLOBULIN: 2.9 G/DL (ref 2.6–4.2)
GLUCOSE: 82 MG/DL (ref 74–109)
HEMOGLOBIN URINE, QUAL: 12.8 G/DL (ref 13.1–17.6)
LYMPHOCYTES # BLD: 6.7 %
LYMPHOCYTES ABSOLUTE: 0.4 K/UL (ref 0.8–3.6)
MCH RBC QN AUTO: 30.9 PG (ref 28.4–33.4)
MCHC RBC AUTO-ENTMCNC: 34.2 G/DL (ref 31.1–37)
MCV RBC AUTO: 90.2 FL (ref 85–99)
MONOCYTES # BLD: 13.1 %
MONOCYTES ABSOLUTE: 0.7 K/UL (ref 0.3–0.9)
NEUTROPHILS ABSOLUTE: 4 K/UL (ref 2–7.3)
NEUTROPHILS/100 LEUKOCYTES: 74.8 %
PDW BLD-RTO: 14.6 % (ref 11.7–15.2)
PLATELET COUNT MANUAL: 177 K/UL (ref 154–393)
POTASSIUM SERPL-SCNC: 3.9 MMOL/L (ref 3.5–5.1)
PROSTATE SPECIFIC ANTIGEN: <0.008 NG/ML (ref 0.01–4)
RBC # BLD: 4.14 M/UL (ref 4.3–5.86)
SODIUM BLD-SCNC: 137 MMOL/L (ref 136–145)
SR HEMATOCRIT: 37.4 % (ref 39–51.5)
WBC BLOOD, MANUAL: 5.4 K/UL (ref 4–10.5)

## 2023-04-18 DIAGNOSIS — D64.9 ANEMIA, UNSPECIFIED TYPE: ICD-10-CM

## 2023-04-18 RX ORDER — FERROUS SULFATE 325(65) MG
325 TABLET ORAL
Qty: 90 TABLET | Refills: 1 | Status: SHIPPED | OUTPATIENT
Start: 2023-04-18

## 2023-04-20 ENCOUNTER — HOSPITAL ENCOUNTER (OUTPATIENT)
Age: 73
Discharge: HOME | End: 2023-04-20
Payer: MEDICARE

## 2023-04-20 ENCOUNTER — HOSPITAL ENCOUNTER (OUTPATIENT)
Dept: GENERAL RADIOLOGY | Age: 73
Discharge: HOME | End: 2023-04-20
Payer: MEDICARE

## 2023-04-20 ENCOUNTER — OFFICE VISIT (OUTPATIENT)
Dept: INTERNAL MEDICINE CLINIC | Age: 73
End: 2023-04-20

## 2023-04-20 VITALS
WEIGHT: 278 LBS | HEART RATE: 80 BPM | BODY MASS INDEX: 32.97 KG/M2 | OXYGEN SATURATION: 95 % | RESPIRATION RATE: 16 BRPM | SYSTOLIC BLOOD PRESSURE: 122 MMHG | DIASTOLIC BLOOD PRESSURE: 84 MMHG

## 2023-04-20 DIAGNOSIS — E11.21 CONTROLLED TYPE 2 DIABETES MELLITUS WITH DIABETIC NEPHROPATHY, WITHOUT LONG-TERM CURRENT USE OF INSULIN (HCC): ICD-10-CM

## 2023-04-20 DIAGNOSIS — F51.01 PRIMARY INSOMNIA: ICD-10-CM

## 2023-04-20 DIAGNOSIS — I50.21 ACUTE SYSTOLIC CHF (CONGESTIVE HEART FAILURE) (HCC): ICD-10-CM

## 2023-04-20 DIAGNOSIS — I42.0 DILATED CARDIOMYOPATHY (HCC): ICD-10-CM

## 2023-04-20 DIAGNOSIS — J45.21 MILD INTERMITTENT ASTHMA WITH ACUTE EXACERBATION: ICD-10-CM

## 2023-04-20 DIAGNOSIS — E79.0 ELEVATED URIC ACID IN BLOOD: ICD-10-CM

## 2023-04-20 DIAGNOSIS — I50.21 ACUTE SYSTOLIC CHF (CONGESTIVE HEART FAILURE) (HCC): Primary | ICD-10-CM

## 2023-04-20 PROCEDURE — 36415 COLL VENOUS BLD VENIPUNCTURE: CPT | Performed by: INTERNAL MEDICINE

## 2023-04-20 PROCEDURE — 71046 X-RAY EXAM CHEST 2 VIEWS: CPT

## 2023-04-20 RX ORDER — POTASSIUM CHLORIDE 20 MEQ/1
40 TABLET, EXTENDED RELEASE ORAL DAILY
Qty: 90 TABLET | Refills: 1 | Status: SHIPPED | OUTPATIENT
Start: 2023-04-20

## 2023-04-20 RX ORDER — ALLOPURINOL 100 MG/1
100 TABLET ORAL DAILY
Qty: 90 TABLET | Refills: 1 | Status: SHIPPED | OUTPATIENT
Start: 2023-04-20

## 2023-04-20 RX ORDER — TRAZODONE HYDROCHLORIDE 50 MG/1
100 TABLET ORAL NIGHTLY
Qty: 180 TABLET | Refills: 1 | Status: SHIPPED | OUTPATIENT
Start: 2023-04-20

## 2023-04-20 RX ORDER — TRIAMCINOLONE ACETONIDE 40 MG/ML
40 INJECTION, SUSPENSION INTRA-ARTICULAR; INTRAMUSCULAR ONCE
Status: COMPLETED | OUTPATIENT
Start: 2023-04-20 | End: 2023-04-20

## 2023-04-20 RX ORDER — PREDNISONE 1 MG/1
TABLET ORAL
Qty: 36 TABLET | Refills: 0 | Status: SHIPPED | OUTPATIENT
Start: 2023-04-20

## 2023-04-20 RX ORDER — FUROSEMIDE 40 MG/1
60 TABLET ORAL DAILY
Qty: 135 TABLET | Refills: 1 | Status: SHIPPED | OUTPATIENT
Start: 2023-04-20

## 2023-04-20 RX ADMIN — TRIAMCINOLONE ACETONIDE 40 MG: 40 INJECTION, SUSPENSION INTRA-ARTICULAR; INTRAMUSCULAR at 10:06

## 2023-04-20 SDOH — ECONOMIC STABILITY: FOOD INSECURITY: WITHIN THE PAST 12 MONTHS, THE FOOD YOU BOUGHT JUST DIDN'T LAST AND YOU DIDN'T HAVE MONEY TO GET MORE.: NEVER TRUE

## 2023-04-20 SDOH — ECONOMIC STABILITY: INCOME INSECURITY: HOW HARD IS IT FOR YOU TO PAY FOR THE VERY BASICS LIKE FOOD, HOUSING, MEDICAL CARE, AND HEATING?: NOT HARD AT ALL

## 2023-04-20 SDOH — ECONOMIC STABILITY: FOOD INSECURITY: WITHIN THE PAST 12 MONTHS, YOU WORRIED THAT YOUR FOOD WOULD RUN OUT BEFORE YOU GOT MONEY TO BUY MORE.: NEVER TRUE

## 2023-04-20 SDOH — ECONOMIC STABILITY: HOUSING INSECURITY
IN THE LAST 12 MONTHS, WAS THERE A TIME WHEN YOU DID NOT HAVE A STEADY PLACE TO SLEEP OR SLEPT IN A SHELTER (INCLUDING NOW)?: NO

## 2023-04-20 NOTE — RESULT ENCOUNTER NOTE
Please call pt, cxr w/o any pneumonia but some mild fluid congestion is noted so hopefully will have improvement in his SOB after treatment incl extra lasix for a week

## 2023-04-20 NOTE — PROGRESS NOTES
use of insulin ([facility]        PLAN:    Brynda Kayser was seen today for diabetes and shortness of breath. Diagnoses and all orders for this visit:    Acute systolic CHF (congestive heart failure) ([facility]- clinically chf suspected. We'll check cxr and scr bnp, also rec incr lasix from 1.5 tabs/day to 2 tabs/day for one week then back to 1.5 tabs/day  -     Brain Natriuretic Peptide; Future  -     XR CHEST STANDARD (2 VW); Future    Mild intermittent asthma with acute exacerbation - For ongoing exacerbation will treat with course of atbs, steroid taper and as otherwise noted in medlist.    -     predniSONE (DELTASONE) 5 MG tablet; Take 8 pills, then 7,6,5,4,3,2,1  -     triamcinolone acetonide (KENALOG-40) injection 40 mg    Elevated uric acid in blood- The current medical regimen is effective;  continue present plan and medications. -     allopurinol (ZYLOPRIM) 100 MG tablet; Take 1 tablet by mouth daily    Primary insomnia - continue present management. Will monitor.    -     traZODone (DESYREL) 50 MG tablet; Take 2 tablets by mouth nightly    Dilated cardiomyopathy ([facility]- last echos reviewed, scr lab, rf meds and cont f/u cardiology DR Ines Alvarez  -     potassium chloride (KLOR-CON M) 20 MEQ extended release tablet; Take 2 tablets by mouth daily  -     Brain Natriuretic Peptide; Future  -     furosemide (LASIX) 40 MG tablet; Take 1.5 tablets by mouth daily    Controlled type 2 diabetes mellitus with diabetic nephropathy, without long-term current use of insulin ([facility] - DM relatively well controlled and will continue current regimen. Screening reviewed. See orders.

## 2023-04-20 NOTE — PATIENT INSTRUCTIONS
Increase lasix to 2 tabs/day for one week, then back down to 1.5 tabs/day. Weigh yourself daily and record, to bring to Dr Michael Matthew.

## 2023-04-21 LAB — NT-PROBNP SERPL-MCNC: 1716 PG/ML (ref 0–449)

## 2023-04-27 ENCOUNTER — TELEPHONE (OUTPATIENT)
Dept: INTERNAL MEDICINE CLINIC | Age: 73
End: 2023-04-27

## 2023-04-27 ENCOUNTER — HOSPITAL ENCOUNTER (OUTPATIENT)
Age: 73
Discharge: HOME | End: 2023-04-27
Payer: MEDICARE

## 2023-04-27 ENCOUNTER — HOSPITAL ENCOUNTER (OUTPATIENT)
Dept: GENERAL RADIOLOGY | Age: 73
Discharge: HOME | End: 2023-04-27
Payer: MEDICARE

## 2023-04-27 DIAGNOSIS — R07.81 RIB PAIN ON RIGHT SIDE: Primary | ICD-10-CM

## 2023-04-27 DIAGNOSIS — R07.81 RIB PAIN ON RIGHT SIDE: ICD-10-CM

## 2023-04-27 DIAGNOSIS — J45.21 MILD INTERMITTENT ASTHMA WITH ACUTE EXACERBATION: ICD-10-CM

## 2023-04-27 PROCEDURE — 71100 X-RAY EXAM RIBS UNI 2 VIEWS: CPT

## 2023-04-27 RX ORDER — PREDNISONE 1 MG/1
TABLET ORAL
Qty: 36 TABLET | Refills: 0 | Status: SHIPPED | OUTPATIENT
Start: 2023-04-27

## 2023-04-27 NOTE — TELEPHONE ENCOUNTER
PT CALLED TO REPORT HE HAS COMPLETED THE PREDNISONE. HE HAS STARTED BACK EXERCISING AND HAS NO SOB. HE DOES STILL HAVE A SLIGHT COUGH AND COMPLAINS OF A LOT OF R SIDE RIB PAIN. HE WANTS TO KNOW WHAT ELSE TO DO. PLEASE ADVISE.

## 2023-05-05 ENCOUNTER — TELEMEDICINE (OUTPATIENT)
Dept: INTERNAL MEDICINE CLINIC | Age: 73
End: 2023-05-05
Payer: MEDICARE

## 2023-05-05 DIAGNOSIS — I50.21 ACUTE SYSTOLIC CHF (CONGESTIVE HEART FAILURE) (HCC): ICD-10-CM

## 2023-05-05 DIAGNOSIS — J45.21 MILD INTERMITTENT ASTHMA WITH ACUTE EXACERBATION: Primary | ICD-10-CM

## 2023-05-05 DIAGNOSIS — I42.0 DILATED CARDIOMYOPATHY (HCC): ICD-10-CM

## 2023-05-05 PROCEDURE — 3017F COLORECTAL CA SCREEN DOC REV: CPT | Performed by: INTERNAL MEDICINE

## 2023-05-05 PROCEDURE — G8417 CALC BMI ABV UP PARAM F/U: HCPCS | Performed by: INTERNAL MEDICINE

## 2023-05-05 PROCEDURE — 1123F ACP DISCUSS/DSCN MKR DOCD: CPT | Performed by: INTERNAL MEDICINE

## 2023-05-05 PROCEDURE — G8427 DOCREV CUR MEDS BY ELIG CLIN: HCPCS | Performed by: INTERNAL MEDICINE

## 2023-05-05 PROCEDURE — 1036F TOBACCO NON-USER: CPT | Performed by: INTERNAL MEDICINE

## 2023-05-05 PROCEDURE — 99213 OFFICE O/P EST LOW 20 MIN: CPT | Performed by: INTERNAL MEDICINE

## 2023-05-05 RX ORDER — FUROSEMIDE 40 MG/1
40 TABLET ORAL 2 TIMES DAILY
Qty: 180 TABLET | Refills: 1
Start: 2023-05-05

## 2023-05-05 RX ORDER — GUAIFENESIN AND CODEINE PHOSPHATE 100; 10 MG/5ML; MG/5ML
5 SOLUTION ORAL 4 TIMES DAILY PRN
Qty: 120 ML | Refills: 0 | Status: SHIPPED | OUTPATIENT
Start: 2023-05-05 | End: 2023-05-12

## 2023-05-05 RX ORDER — DOXYCYCLINE HYCLATE 100 MG
100 TABLET ORAL 2 TIMES DAILY
Qty: 20 TABLET | Refills: 0 | Status: SHIPPED | OUTPATIENT
Start: 2023-05-05 | End: 2023-05-15

## 2023-05-05 RX ORDER — PREDNISONE 1 MG/1
TABLET ORAL
Qty: 36 TABLET | Refills: 0 | Status: SHIPPED | OUTPATIENT
Start: 2023-05-05

## 2023-05-05 NOTE — PROGRESS NOTES
2023    TELEHEALTH EVALUATION -- Audio/Visual (During IONGU-29 public health emergency)    HPI:    Nancy Coleman (:  1950) has requested an audio/video evaluation for the following concern(s):    Chf IS BETTER AFTER DIURESIS AND CARDIOLOGY HAS INCR FURTHER TO 40MG BID. FOR F/U LAB IN [place] ON MON. ASTHMA WORSE LATELY W GRANDSON ILL W SINUS SX.  HE HAS HAD COUGH AND INC CONGESTION, WHEEZING W/O FEVER OR CHILLS THE PAST 4D. Review of Systems    Prior to Visit Medications    Medication Sig Taking?  Authorizing Provider   predniSONE (DELTASONE) 5 MG tablet Take 8 pills, then 7,6,5,4,3,2,1 Yes Deidra Redding MD   allopurinol (ZYLOPRIM) 100 MG tablet Take 1 tablet by mouth daily Yes Deidra Redding MD   traZODone (DESYREL) 50 MG tablet Take 2 tablets by mouth nightly Yes Deidra Redding MD   potassium chloride (KLOR-CON M) 20 MEQ extended release tablet Take 2 tablets by mouth daily Yes Deidra Redding MD   furosemide (LASIX) 40 MG tablet Take 1.5 tablets by mouth daily Yes Deidra Redding MD   ferrous sulfate (IRON 325) 325 (65 Fe) MG tablet Take 1 tablet by mouth daily (with breakfast) Yes Deidra Redding MD   omeprazole (PRILOSEC) 20 MG delayed release capsule Take 1 capsule by mouth daily Yes Deidra Redding MD   metoprolol succinate (TOPROL XL) 100 MG extended release tablet Take 1 tablet by mouth daily Yes Deidra Redding MD   apixaban (ELIQUIS) 5 MG TABS tablet TAKE ONE TABLET BY MOUTH TWICE A DAY Yes Deidra Redding MD   fluticasone-umeclidin-vilant (TRELEGY ELLIPTA) 848-28.8-18 MCG/ACT AEPB inhaler Inhale 1 puff into the lungs daily Yes Deidra Redding MD   spironolactone (ALDACTONE) 25 MG tablet Take 1 tablet by mouth daily Yes Patti Pillai, APRN - CNP   TRELEGY ELLIPTA 100-62.5-25 MCG/INH AEPB INHALE ONE PUFF BY MOUTH DAILY Yes Preet Gallo MD   montelukast (SINGULAIR) 10 MG tablet Take 1 tablet by mouth daily Yes Deidra Redding MD   losartan

## 2023-05-15 ENCOUNTER — TELEPHONE (OUTPATIENT)
Dept: INTERNAL MEDICINE CLINIC | Age: 73
End: 2023-05-15

## 2023-05-15 DIAGNOSIS — J18.9 PNEUMONIA OF LEFT LOWER LOBE DUE TO INFECTIOUS ORGANISM: Primary | ICD-10-CM

## 2023-05-15 DIAGNOSIS — R09.1 PLEURISY: ICD-10-CM

## 2023-05-15 DIAGNOSIS — J45.21 MILD INTERMITTENT ASTHMA WITH ACUTE EXACERBATION: Primary | ICD-10-CM

## 2023-05-15 RX ORDER — LEVOFLOXACIN 500 MG/1
500 TABLET, FILM COATED ORAL DAILY
Qty: 10 TABLET | Refills: 0 | Status: SHIPPED | OUTPATIENT
Start: 2023-05-15 | End: 2023-05-25

## 2023-05-15 RX ORDER — PREDNISONE 1 MG/1
TABLET ORAL
Qty: 36 TABLET | Refills: 0 | Status: SHIPPED | OUTPATIENT
Start: 2023-05-15

## 2023-05-15 NOTE — TELEPHONE ENCOUNTER
PT STATES HE HAS COMPLETED THE ABX AND THE PREDNISONE FOR THE R SIDE RIB PAIN. HE STATES THE PAIN HAS NOW MOVED TO THE LOWER LEFT RIB AREA. HE HAS A SLIGHT COUGH, BUT IS BETTER. NOT PRODUCTIVE. PLEASE ADVISE.

## 2023-05-18 ENCOUNTER — HOSPITAL ENCOUNTER (OUTPATIENT)
Dept: CT IMAGING | Age: 73
Discharge: HOME | End: 2023-05-18
Payer: MEDICARE

## 2023-05-18 DIAGNOSIS — J18.9 PNEUMONIA OF LEFT LOWER LOBE DUE TO INFECTIOUS ORGANISM: ICD-10-CM

## 2023-05-18 PROCEDURE — 71250 CT THORAX DX C-: CPT

## 2023-06-22 DIAGNOSIS — R09.1 PLEURISY: ICD-10-CM

## 2023-06-22 DIAGNOSIS — I42.0 DILATED CARDIOMYOPATHY (HCC): ICD-10-CM

## 2023-06-22 DIAGNOSIS — J45.21 MILD INTERMITTENT ASTHMA WITH ACUTE EXACERBATION: ICD-10-CM

## 2023-06-23 RX ORDER — LEVOFLOXACIN 500 MG/1
TABLET, FILM COATED ORAL
Qty: 10 TABLET | Refills: 0 | OUTPATIENT
Start: 2023-06-23

## 2023-06-23 RX ORDER — DOXYCYCLINE HYCLATE 100 MG
TABLET ORAL
Qty: 20 TABLET | Refills: 0 | OUTPATIENT
Start: 2023-06-23

## 2023-06-23 RX ORDER — POTASSIUM CHLORIDE 1500 MG/1
TABLET, EXTENDED RELEASE ORAL
Qty: 90 TABLET | Refills: 1 | OUTPATIENT
Start: 2023-06-23

## 2023-06-23 RX ORDER — PREDNISONE 5 MG/1
TABLET ORAL
Qty: 36 TABLET | Refills: 0 | OUTPATIENT
Start: 2023-06-23

## 2023-07-10 RX ORDER — FLUTICASONE FUROATE, UMECLIDINIUM BROMIDE AND VILANTEROL TRIFENATATE 100; 62.5; 25 UG/1; UG/1; UG/1
POWDER RESPIRATORY (INHALATION)
Qty: 1 EACH | Refills: 5 | Status: SHIPPED | OUTPATIENT
Start: 2023-07-10

## 2023-07-25 ENCOUNTER — TELEMEDICINE (OUTPATIENT)
Dept: INTERNAL MEDICINE CLINIC | Age: 73
End: 2023-07-25
Payer: MEDICARE

## 2023-07-25 DIAGNOSIS — Z00.00 MEDICARE ANNUAL WELLNESS VISIT, SUBSEQUENT: Primary | ICD-10-CM

## 2023-07-25 PROCEDURE — G0439 PPPS, SUBSEQ VISIT: HCPCS | Performed by: INTERNAL MEDICINE

## 2023-07-25 PROCEDURE — 3017F COLORECTAL CA SCREEN DOC REV: CPT | Performed by: INTERNAL MEDICINE

## 2023-07-25 PROCEDURE — 1123F ACP DISCUSS/DSCN MKR DOCD: CPT | Performed by: INTERNAL MEDICINE

## 2023-07-25 ASSESSMENT — LIFESTYLE VARIABLES
HOW OFTEN DURING THE LAST YEAR HAVE YOU BEEN UNABLE TO REMEMBER WHAT HAPPENED THE NIGHT BEFORE BECAUSE YOU HAD BEEN DRINKING: 0
HAS A RELATIVE, FRIEND, DOCTOR, OR ANOTHER HEALTH PROFESSIONAL EXPRESSED CONCERN ABOUT YOUR DRINKING OR SUGGESTED YOU CUT DOWN: 0
HAVE YOU OR SOMEONE ELSE BEEN INJURED AS A RESULT OF YOUR DRINKING: 0
HOW MANY STANDARD DRINKS CONTAINING ALCOHOL DO YOU HAVE ON A TYPICAL DAY: 1 OR 2
HOW OFTEN DURING THE LAST YEAR HAVE YOU HAD A FEELING OF GUILT OR REMORSE AFTER DRINKING: 0
HOW OFTEN DO YOU HAVE A DRINK CONTAINING ALCOHOL: 4 OR MORE TIMES A WEEK
HOW OFTEN DURING THE LAST YEAR HAVE YOU FAILED TO DO WHAT WAS NORMALLY EXPECTED FROM YOU BECAUSE OF DRINKING: 0
HOW OFTEN DURING THE LAST YEAR HAVE YOU FOUND THAT YOU WERE NOT ABLE TO STOP DRINKING ONCE YOU HAD STARTED: 0
HOW OFTEN DURING THE LAST YEAR HAVE YOU NEEDED AN ALCOHOLIC DRINK FIRST THING IN THE MORNING TO GET YOURSELF GOING AFTER A NIGHT OF HEAVY DRINKING: 0

## 2023-07-25 ASSESSMENT — PATIENT HEALTH QUESTIONNAIRE - PHQ9
2. FEELING DOWN, DEPRESSED OR HOPELESS: 0
SUM OF ALL RESPONSES TO PHQ QUESTIONS 1-9: 0
SUM OF ALL RESPONSES TO PHQ9 QUESTIONS 1 & 2: 0
SUM OF ALL RESPONSES TO PHQ QUESTIONS 1-9: 0
1. LITTLE INTEREST OR PLEASURE IN DOING THINGS: 0

## 2023-07-25 NOTE — PATIENT INSTRUCTIONS
Personalized Preventive Plan for Miesha Aldana - 7/25/2023  Medicare offers a range of preventive health benefits. Some of the tests and screenings are paid in full while other may be subject to a deductible, co-insurance, and/or copay. Some of these benefits include a comprehensive review of your medical history including lifestyle, illnesses that may run in your family, and various assessments and screenings as appropriate. After reviewing your medical record and screening and assessments performed today your provider may have ordered immunizations, labs, imaging, and/or referrals for you. A list of these orders (if applicable) as well as your Preventive Care list are included within your After Visit Summary for your review. Other Preventive Recommendations:    A preventive eye exam performed by an eye specialist is recommended every 1-2 years to screen for glaucoma; cataracts, macular degeneration, and other eye disorders. A preventive dental visit is recommended every 6 months. Try to get at least 150 minutes of exercise per week or 10,000 steps per day on a pedometer . Order or download the FREE \"Exercise & Physical Activity: Your Everyday Guide\" from [employer]. Call 1-526.767.8250 or search [employer] online. You need 5396-7423 mg of calcium and 9272-1856 IU of vitamin D per day. It is possible to meet your calcium requirement with diet alone, but a vitamin D supplement is usually necessary to meet this goal.  When exposed to the sun, use a sunscreen that protects against both UVA and UVB radiation with an SPF of 30 or greater. Reapply every 2 to 3 hours or after sweating, drying off with a towel, or swimming. Always wear a seat belt when traveling in a car. Always wear a helmet when riding a bicycle or motorcycle.        Starting a Weight Loss Plan: Care Instructions  Overview     If you're thinking about losing weight, it can be hard to know where to

## 2023-07-25 NOTE — PROGRESS NOTES
Medicare Annual Wellness Visit    Lisbet Costa is here for Medicare AWV    Assessment & Plan   Medicare annual wellness visit, subsequent  Recommendations for Preventive Services Due: see orders and patient instructions/AVS.  Recommended screening schedule for the next 5-10 years is provided to the patient in written form: see Patient Instructions/AVS.     Return for medicare physical- AT [address], keep next as scheduled. Subjective       Patient's complete Health Risk Assessment and screening values have been reviewed and are found in Flowsheets. The following problems were reviewed today and where indicated follow up appointments were made and/or referrals ordered. Positive Risk Factor Screenings with Interventions:                 Weight and Activity:  Physical Activity: Sufficiently Active    Days of Exercise per Week: 5 days    Minutes of Exercise per Session: 40 min     On average, how many days per week do you engage in moderate to strenuous exercise (like a brisk walk)?: 5 days  Have you lost any weight without trying in the past 3 months?: No  There is no height or weight on file to calculate BMI. (!) Abnormal  Obesity Interventions:  Patient declines any further evaluation or treatment                            Objective      Patient-Reported Vitals  No data recorded     Unable to obtain 3 vital signs due to patient not having equipment to take blood pressure/temperature. Allergies   Allergen Reactions    Lisinopril      Chest tightness    Simvastatin Other (See Comments)     Muscle cramps     Prior to Visit Medications    Medication Sig Taking?  Authorizing Provider   Kyle Craig 100-62.5-25 MCG/ACT AEPB inhaler INHALE ONE PUFF BY MOUTH DAILY Yes Tanvir Quiñonez MD   furosemide (LASIX) 40 MG tablet Take 1 tablet by mouth 2 times daily  Patient taking differently: Take 1 tablet by mouth 3 times daily Yes Tanvir Quiñonez MD   allopurinol (ZYLOPRIM) 100 MG

## 2023-08-03 DIAGNOSIS — I48.19 PERSISTENT ATRIAL FIBRILLATION (HCC): ICD-10-CM

## 2023-08-04 ENCOUNTER — TELEPHONE (OUTPATIENT)
Dept: INTERNAL MEDICINE CLINIC | Age: 73
End: 2023-08-04

## 2023-08-04 NOTE — TELEPHONE ENCOUNTER
PT IS HAVING AN EXTRACTION DONE AND IS ON ELIQUIS. [facility] WANTS TO KNOW HOW MANY DAY PRIOR TO EXTRACTION IS HE TO DC  [place]. PLEASE ADVISE.

## 2023-08-11 ENCOUNTER — TELEMEDICINE (OUTPATIENT)
Dept: INTERNAL MEDICINE CLINIC | Age: 73
End: 2023-08-11
Payer: MEDICARE

## 2023-08-11 DIAGNOSIS — U07.1 COVID-19 VIRUS INFECTION: Primary | ICD-10-CM

## 2023-08-11 PROCEDURE — G8417 CALC BMI ABV UP PARAM F/U: HCPCS | Performed by: INTERNAL MEDICINE

## 2023-08-11 PROCEDURE — 3017F COLORECTAL CA SCREEN DOC REV: CPT | Performed by: INTERNAL MEDICINE

## 2023-08-11 PROCEDURE — 1036F TOBACCO NON-USER: CPT | Performed by: INTERNAL MEDICINE

## 2023-08-11 PROCEDURE — 99213 OFFICE O/P EST LOW 20 MIN: CPT | Performed by: INTERNAL MEDICINE

## 2023-08-11 PROCEDURE — 1123F ACP DISCUSS/DSCN MKR DOCD: CPT | Performed by: INTERNAL MEDICINE

## 2023-08-11 PROCEDURE — G8427 DOCREV CUR MEDS BY ELIG CLIN: HCPCS | Performed by: INTERNAL MEDICINE

## 2023-08-11 RX ORDER — PREDNISONE 5 MG/1
TABLET ORAL
Qty: 36 TABLET | Refills: 0 | Status: SHIPPED | OUTPATIENT
Start: 2023-08-11

## 2023-08-11 NOTE — PROGRESS NOTES
2023    TELEHEALTH EVALUATION -- Audio/Visual    HPI:    Lala Mccoy (:  1950) has requested an audio/video evaluation for the following concern(s):    Tested pos for COVID today, last pm w onset of SOB,     Review of Systems    Prior to Visit Medications    Medication Sig Taking?  Authorizing Provider   apixaban (ELIQUIS) 5 MG TABS tablet TAKE ONE TABLET BY MOUTH TWICE A DAY Yes Shira Nguyen MD   Evia Durie 100-62.5-25 MCG/ACT AEPB inhaler INHALE ONE PUFF BY MOUTH DAILY Yes Shira Nguyen MD   furosemide (LASIX) 40 MG tablet Take 1 tablet by mouth 2 times daily  Patient taking differently: Take 1 tablet by mouth 3 times daily Yes Shira Nguyen MD   allopurinol (ZYLOPRIM) 100 MG tablet Take 1 tablet by mouth daily Yes Shira Nguyen MD   traZODone (DESYREL) 50 MG tablet Take 2 tablets by mouth nightly Yes Shira Nguyen MD   potassium chloride (KLOR-CON M) 20 MEQ extended release tablet Take 2 tablets by mouth daily Yes Shira Nguyen MD   ferrous sulfate (IRON 325) 325 (65 Fe) MG tablet Take 1 tablet by mouth daily (with breakfast) Yes Shira Nguyen MD   omeprazole (PRILOSEC) 20 MG delayed release capsule Take 1 capsule by mouth daily Yes Shira Nguyen MD   metoprolol succinate (TOPROL XL) 100 MG extended release tablet Take 1 tablet by mouth daily Yes Shira Nguyen MD   spironolactone (ALDACTONE) 25 MG tablet Take 1 tablet by mouth daily Yes SLAVA Barry CNP   TRELEGY ELLIPTA 100-62.5-25 MCG/INH AEPB INHALE ONE PUFF BY MOUTH DAILY Yes Marsha Russo MD   montelukast (SINGULAIR) 10 MG tablet Take 1 tablet by mouth daily Yes Shira Nguyen MD   losartan (COZAAR) 25 MG tablet Take 1 tablet by mouth daily Yes SLAVA Reyes CNP   dapagliflozin (FARXIGA) 10 MG tablet Take 1 tablet by mouth every morning Yes SLAVA Reyes CNP   albuterol sulfate HFA (PROVENTIL;VENTOLIN;PROAIR) 108 (90 Base) MCG/ACT inhaler Inhale 2

## 2023-08-24 ENCOUNTER — TELEPHONE (OUTPATIENT)
Dept: INTERNAL MEDICINE CLINIC | Age: 73
End: 2023-08-24

## 2023-08-24 DIAGNOSIS — U07.1 COVID-19 VIRUS INFECTION: Primary | ICD-10-CM

## 2023-08-24 RX ORDER — GUAIFENESIN AND CODEINE PHOSPHATE 100; 10 MG/5ML; MG/5ML
5 SOLUTION ORAL 4 TIMES DAILY PRN
Qty: 120 ML | Refills: 0 | Status: SHIPPED | OUTPATIENT
Start: 2023-08-24 | End: 2023-08-31

## 2023-08-24 NOTE — TELEPHONE ENCOUNTER
PT CALLED TO STATE HE IS FEELING BETTER POST COVID. HOWEVER, HIS COUGH IS KEEPING HIM UP AT NIGHT. HE HAS TRIED MULTIPLE OTC MEDICATIONS WO SUCCESS. HE IS REQUESTING SOMETHING FOR HIS COUGH AT NIGHT. PLEASE ADVISE.

## 2023-09-13 ENCOUNTER — OFFICE VISIT (OUTPATIENT)
Dept: INTERNAL MEDICINE CLINIC | Age: 73
End: 2023-09-13
Payer: MEDICARE

## 2023-09-13 VITALS
SYSTOLIC BLOOD PRESSURE: 124 MMHG | DIASTOLIC BLOOD PRESSURE: 78 MMHG | OXYGEN SATURATION: 93 % | HEART RATE: 86 BPM | WEIGHT: 265 LBS | BODY MASS INDEX: 31.29 KG/M2 | HEIGHT: 77 IN

## 2023-09-13 DIAGNOSIS — E11.21 CONTROLLED TYPE 2 DIABETES MELLITUS WITH DIABETIC NEPHROPATHY, WITHOUT LONG-TERM CURRENT USE OF INSULIN (HCC): ICD-10-CM

## 2023-09-13 DIAGNOSIS — L03.012 CELLULITIS OF LEFT INDEX FINGER: Primary | ICD-10-CM

## 2023-09-13 DIAGNOSIS — J45.909 MILD ASTHMA WITHOUT COMPLICATION, UNSPECIFIED WHETHER PERSISTENT: ICD-10-CM

## 2023-09-13 DIAGNOSIS — C61 ADENOCARCINOMA OF PROSTATE (HCC): ICD-10-CM

## 2023-09-13 PROCEDURE — G8417 CALC BMI ABV UP PARAM F/U: HCPCS | Performed by: INTERNAL MEDICINE

## 2023-09-13 PROCEDURE — 99213 OFFICE O/P EST LOW 20 MIN: CPT | Performed by: INTERNAL MEDICINE

## 2023-09-13 PROCEDURE — G8427 DOCREV CUR MEDS BY ELIG CLIN: HCPCS | Performed by: INTERNAL MEDICINE

## 2023-09-13 PROCEDURE — 1123F ACP DISCUSS/DSCN MKR DOCD: CPT | Performed by: INTERNAL MEDICINE

## 2023-09-13 PROCEDURE — 1036F TOBACCO NON-USER: CPT | Performed by: INTERNAL MEDICINE

## 2023-09-13 PROCEDURE — 2022F DILAT RTA XM EVC RTNOPTHY: CPT | Performed by: INTERNAL MEDICINE

## 2023-09-13 PROCEDURE — 3044F HG A1C LEVEL LT 7.0%: CPT | Performed by: INTERNAL MEDICINE

## 2023-09-13 PROCEDURE — 3017F COLORECTAL CA SCREEN DOC REV: CPT | Performed by: INTERNAL MEDICINE

## 2023-09-13 RX ORDER — PREDNISONE 5 MG/1
TABLET ORAL
Qty: 21 TABLET | Refills: 0 | Status: SHIPPED | OUTPATIENT
Start: 2023-09-13

## 2023-09-13 RX ORDER — SEMAGLUTIDE 0.68 MG/ML
0.5 INJECTION, SOLUTION SUBCUTANEOUS WEEKLY
Qty: 3 ML | Refills: 3 | Status: SHIPPED | OUTPATIENT
Start: 2023-09-13

## 2023-09-13 RX ORDER — SULFAMETHOXAZOLE AND TRIMETHOPRIM 800; 160 MG/1; MG/1
1 TABLET ORAL 2 TIMES DAILY
Qty: 20 TABLET | Refills: 0 | Status: SHIPPED | OUTPATIENT
Start: 2023-09-13 | End: 2023-09-23

## 2023-09-13 RX ORDER — MONTELUKAST SODIUM 10 MG/1
10 TABLET ORAL DAILY
Qty: 90 TABLET | Refills: 3 | Status: SHIPPED | OUTPATIENT
Start: 2023-09-13

## 2023-09-13 NOTE — PATIENT INSTRUCTIONS
IF insurance covers the ozempic, then start at 0.25mg weekly for two weeks, then incr to 0.5mg weekly.     Check also w your pharmacist if there are no significant interactions w your meds

## 2023-09-25 ENCOUNTER — TELEPHONE (OUTPATIENT)
Dept: INTERNAL MEDICINE CLINIC | Age: 73
End: 2023-09-25

## 2023-09-25 DIAGNOSIS — L03.012 CELLULITIS OF LEFT INDEX FINGER: Primary | ICD-10-CM

## 2023-09-25 RX ORDER — DOXYCYCLINE HYCLATE 100 MG
100 TABLET ORAL 2 TIMES DAILY
Qty: 20 TABLET | Refills: 0 | Status: SHIPPED | OUTPATIENT
Start: 2023-09-25 | End: 2023-10-05

## 2023-09-25 NOTE — TELEPHONE ENCOUNTER
Patient calls regarding the cellulitis of his finger. He has completed Prednisone and Bactrim with no improvement. Please advise.

## 2023-09-26 DIAGNOSIS — L03.012 CELLULITIS OF LEFT INDEX FINGER: Primary | ICD-10-CM

## 2023-10-20 ENCOUNTER — OFFICE VISIT (OUTPATIENT)
Dept: INTERNAL MEDICINE CLINIC | Age: 73
End: 2023-10-20
Payer: MEDICARE

## 2023-10-20 VITALS
RESPIRATION RATE: 16 BRPM | BODY MASS INDEX: 30.27 KG/M2 | WEIGHT: 256.4 LBS | DIASTOLIC BLOOD PRESSURE: 80 MMHG | HEART RATE: 86 BPM | SYSTOLIC BLOOD PRESSURE: 124 MMHG | OXYGEN SATURATION: 96 % | HEIGHT: 77 IN

## 2023-10-20 DIAGNOSIS — E11.21 CONTROLLED TYPE 2 DIABETES MELLITUS WITH DIABETIC NEPHROPATHY, WITHOUT LONG-TERM CURRENT USE OF INSULIN (HCC): Primary | ICD-10-CM

## 2023-10-20 DIAGNOSIS — I48.19 PERSISTENT ATRIAL FIBRILLATION (HCC): ICD-10-CM

## 2023-10-20 DIAGNOSIS — E79.0 ELEVATED URIC ACID IN BLOOD: ICD-10-CM

## 2023-10-20 DIAGNOSIS — K21.9 GASTROESOPHAGEAL REFLUX DISEASE WITHOUT ESOPHAGITIS: ICD-10-CM

## 2023-10-20 DIAGNOSIS — Z23 NEED FOR INFLUENZA VACCINATION: ICD-10-CM

## 2023-10-20 DIAGNOSIS — Z23 NEED FOR SHINGLES VACCINE: ICD-10-CM

## 2023-10-20 DIAGNOSIS — J45.909 MILD ASTHMA WITHOUT COMPLICATION, UNSPECIFIED WHETHER PERSISTENT: ICD-10-CM

## 2023-10-20 DIAGNOSIS — I35.0 NONRHEUMATIC AORTIC VALVE STENOSIS: ICD-10-CM

## 2023-10-20 PROCEDURE — G0008 ADMIN INFLUENZA VIRUS VAC: HCPCS | Performed by: INTERNAL MEDICINE

## 2023-10-20 PROCEDURE — 99214 OFFICE O/P EST MOD 30 MIN: CPT | Performed by: INTERNAL MEDICINE

## 2023-10-20 PROCEDURE — G8417 CALC BMI ABV UP PARAM F/U: HCPCS | Performed by: INTERNAL MEDICINE

## 2023-10-20 PROCEDURE — 1036F TOBACCO NON-USER: CPT | Performed by: INTERNAL MEDICINE

## 2023-10-20 PROCEDURE — 1123F ACP DISCUSS/DSCN MKR DOCD: CPT | Performed by: INTERNAL MEDICINE

## 2023-10-20 PROCEDURE — 2022F DILAT RTA XM EVC RTNOPTHY: CPT | Performed by: INTERNAL MEDICINE

## 2023-10-20 PROCEDURE — 3044F HG A1C LEVEL LT 7.0%: CPT | Performed by: INTERNAL MEDICINE

## 2023-10-20 PROCEDURE — G8427 DOCREV CUR MEDS BY ELIG CLIN: HCPCS | Performed by: INTERNAL MEDICINE

## 2023-10-20 PROCEDURE — 3017F COLORECTAL CA SCREEN DOC REV: CPT | Performed by: INTERNAL MEDICINE

## 2023-10-20 PROCEDURE — 90694 VACC AIIV4 NO PRSRV 0.5ML IM: CPT | Performed by: INTERNAL MEDICINE

## 2023-10-20 PROCEDURE — G8484 FLU IMMUNIZE NO ADMIN: HCPCS | Performed by: INTERNAL MEDICINE

## 2023-10-20 RX ORDER — EVENING PRIMROSE OIL 500 MG
100 CAPSULE ORAL DAILY
COMMUNITY

## 2023-10-20 RX ORDER — FLUTICASONE FUROATE, UMECLIDINIUM BROMIDE AND VILANTEROL TRIFENATATE 100; 62.5; 25 UG/1; UG/1; UG/1
POWDER RESPIRATORY (INHALATION)
Qty: 1 EACH | Refills: 5 | Status: SHIPPED | OUTPATIENT
Start: 2023-10-20

## 2023-10-20 RX ORDER — OMEPRAZOLE 20 MG/1
20 CAPSULE, DELAYED RELEASE ORAL DAILY
Qty: 90 CAPSULE | Refills: 1 | Status: SHIPPED | OUTPATIENT
Start: 2023-10-20

## 2023-10-20 RX ORDER — ALLOPURINOL 100 MG/1
100 TABLET ORAL DAILY
Qty: 90 TABLET | Refills: 1 | Status: SHIPPED | OUTPATIENT
Start: 2023-10-20

## 2023-10-20 NOTE — PATIENT INSTRUCTIONS
Rec next week for COVID shot, then 1-2 weeks later for RSV, then lastly before end of the year start shingles vaccine, you'll need shingles vaccine x2.

## 2023-10-20 NOTE — PROGRESS NOTES
([address])    6. Need for shingles vaccine        PLAN:    Cheyanne Leal was seen today for 3 month follow-up, flu vaccine and immunizations. Diagnoses and all orders for this visit:    Controlled type 2 diabetes mellitus with diabetic nephropathy, without long-term current use of insulin ([address]) - DM relatively well controlled and will continue current regimen. Screening reviewed. See orders. -     Comprehensive Metabolic Panel; Future  -     Lipid Panel; Future  -     Hemoglobin A1C; Future    Nonrheumatic aortic valve stenosis- STABLE CLINICALLY AND FOR CONT EVAL DR Zoraida Dueñas    Mild asthma without complication, unspecified whether persistent - Asthma clinically stable w/o ongoing symptoms, will continue inhaler/medical regimen. Persistent atrial fibrillation (HCC) - Pt clinically w/o evidence of cardiovascular instability, cont regimen.    -     TSH; Future  -     Magnesium; Future  -     CBC; Future    Need for shingles vaccine  -     zoster recombinant adjuvanted vaccine [facility]) 50 MCG/0.5ML SUSR injection;  Inject 0.5 mLs into the muscle once for 1 dose    Need for influenza vaccination  -     Influenza, FLUAD, (age 72 y+), IM, Preservative Free, 0.5 mL

## 2023-10-23 DIAGNOSIS — K64.9 HEMORRHOIDS, UNSPECIFIED HEMORRHOID TYPE: Primary | ICD-10-CM

## 2023-10-26 ENCOUNTER — OFFICE VISIT (OUTPATIENT)
Dept: SURGERY | Age: 73
End: 2023-10-26
Payer: MEDICARE

## 2023-10-26 VITALS
WEIGHT: 255.8 LBS | BODY MASS INDEX: 30.2 KG/M2 | DIASTOLIC BLOOD PRESSURE: 60 MMHG | SYSTOLIC BLOOD PRESSURE: 108 MMHG | HEIGHT: 77 IN

## 2023-10-26 DIAGNOSIS — K62.89 PERIANAL MASS: Primary | ICD-10-CM

## 2023-10-26 PROCEDURE — 1123F ACP DISCUSS/DSCN MKR DOCD: CPT | Performed by: SURGERY

## 2023-10-26 PROCEDURE — G8417 CALC BMI ABV UP PARAM F/U: HCPCS | Performed by: SURGERY

## 2023-10-26 PROCEDURE — 3017F COLORECTAL CA SCREEN DOC REV: CPT | Performed by: SURGERY

## 2023-10-26 PROCEDURE — 1036F TOBACCO NON-USER: CPT | Performed by: SURGERY

## 2023-10-26 PROCEDURE — G8484 FLU IMMUNIZE NO ADMIN: HCPCS | Performed by: SURGERY

## 2023-10-26 PROCEDURE — 99204 OFFICE O/P NEW MOD 45 MIN: CPT | Performed by: SURGERY

## 2023-10-26 PROCEDURE — G8427 DOCREV CUR MEDS BY ELIG CLIN: HCPCS | Performed by: SURGERY

## 2023-10-26 ASSESSMENT — ENCOUNTER SYMPTOMS
EYE ITCHING: 0
COLOR CHANGE: 0
ANAL BLEEDING: 0
STRIDOR: 0
CONSTIPATION: 0
SORE THROAT: 0
EYE REDNESS: 0
APNEA: 0
PHOTOPHOBIA: 0
RECTAL PAIN: 1
BACK PAIN: 0
CHOKING: 0

## 2023-10-26 NOTE — PROGRESS NOTES
education level: Not on file   Occupational History    Occupation: [employer]   Tobacco Use    Smoking status: Former     Packs/day: 1.00     Years: 39.00     Additional pack years: 0.00     Total pack years: 39.00     Types: Cigarettes     Start date: 1/1/1964     Quit date: 1/1/2013     Years since quitting: 10.8    Smokeless tobacco: Never   Vaping Use    Vaping Use: Never used   Substance and Sexual Activity    Alcohol use: Yes     Alcohol/week: 7.0 standard drinks of alcohol     Types: 7 Glasses of wine per week     Comment: occasional wine/moderate/ caffeine 2 cups of coffee    Drug use: No    Sexual activity: Yes     Partners: Female   Other Topics Concern    Not on file   Social History Narrative    Exercise:    Seat Belt :     Social Determinants of Health     Financial Resource Strain: Low Risk  (4/20/2023)    Overall Financial Resource Strain (CARDIA)     Difficulty of Paying Living Expenses: Not hard at all   Food Insecurity: No Food Insecurity (4/20/2023)    Hunger Vital Sign     Worried About Running Out of Food in the Last Year: Never true     [address] in the Last Year: Never true   Transportation Needs: Unknown (4/20/2023)    PRAPARE - Transportation     Lack of Transportation (Medical): Not on file     Lack of Transportation (Non-Medical):  No   Physical Activity: Sufficiently Active (7/25/2023)    Exercise Vital Sign     Days of Exercise per Week: 5 days     Minutes of Exercise per Session: 40 min   Stress: Not on file   Social Connections: Not on file   Intimate Partner Violence: Not on file   Housing Stability: Unknown (4/20/2023)    Housing Stability Vital Sign     Unable to Pay for Housing in the Last Year: Not on file     Number of Places Lived in the Last Year: Not on file     Unstable Housing in the Last Year: No       Current Outpatient Medications   Medication Sig Dispense Refill    vitamin E 45 MG (100 UNIT) capsule Take 1 capsule by mouth daily      allopurinol (ZYLOPRIM) 100

## 2023-10-27 ENCOUNTER — TELEPHONE (OUTPATIENT)
Dept: SURGERY | Age: 73
End: 2023-10-27

## 2023-10-27 NOTE — TELEPHONE ENCOUNTER
SPOKE TO  [address] [place] [address] (EUA PERIANAL LESION REMOVAL) SCHEDULED @ [facility].  NOTIFIED OF DATES, TIMES AND LOCATION    PHONE ASSESSMENT   SURGERY - 11/01/23 @ 1145 AM  P/O - 11/13/23 @ 900 AM    NPO AFTER MIDNIGHT    HOLD BLOOD THINNERS - HOLD ELIQUIS 2 DAYS PRIOR

## 2023-10-29 DIAGNOSIS — K21.9 GASTROESOPHAGEAL REFLUX DISEASE WITHOUT ESOPHAGITIS: ICD-10-CM

## 2023-10-30 RX ORDER — OMEPRAZOLE 20 MG/1
20 CAPSULE, DELAYED RELEASE ORAL DAILY
Qty: 90 CAPSULE | Refills: 1 | Status: SHIPPED | OUTPATIENT
Start: 2023-10-30

## 2023-10-31 ENCOUNTER — ANESTHESIA EVENT (OUTPATIENT)
Dept: OPERATING ROOM | Age: 73
End: 2023-10-31
Payer: MEDICARE

## 2023-10-31 NOTE — PROGRESS NOTES
Patient notified of surgery time at [facility] 11/01/23 1200 arrival 1000, NPO instructions and DOS meds reviewed

## 2023-10-31 NOTE — ANESTHESIA PRE PROCEDURE
Department of Anesthesiology  Preprocedure Note       Name:  Darling Howe   Age:  74 y.o.  :  1950                                          MRN:  [id number]         Date:  10/31/2023      Surgeon: Charles Navarro): Tara Mckeon MD    Procedure: Procedure(s):  EUA WITH PERIANAL LESION REMOVAL    Medications prior to admission:   Prior to Admission medications    Medication Sig Start Date End Date Taking?  Authorizing Provider   omeprazole (PRILOSEC) 20 MG delayed release capsule TAKE ONE CAPSULE BY MOUTH DAILY 10/30/23   Laila Elder MD   vitamin E 45 MG (100 UNIT) capsule Take 1 capsule by mouth daily    Provider, MD Simeon   allopurinol (ZYLOPRIM) 100 MG tablet Take 1 tablet by mouth daily 10/20/23   Juan Laguna MD   fluticasone-umeclidin-vilant (TRELEGY ELLIPTA) 942-47.1-97 MCG/ACT AEPB inhaler INHALE ONE PUFF BY MOUTH DAILY 10/20/23   Amira ELLIS MD   montelukast (SINGULAIR) 10 MG tablet TAKE ONE TABLET BY MOUTH DAILY  Patient not taking: Reported on 10/20/2023 9/13/23   Juan Laguna MD   predniSONE (DELTASONE) 5 MG tablet Take 6 tablets by mouth on day 1, 5 on day 2, 4 on day 3, 3 on day 4, 2 on day 5, 1 on day 6. 23   Juan Laguna MD   Semaglutide,0.25 or 0.5MG/DOS, (OZEMPIC, 0.25 OR 0.5 MG/DOSE,) 2 MG/3ML SOPN Inject 0.5 mg into the skin once a week  Patient not taking: Reported on 10/30/2023 9/13/23   Juan Laguna MD   predniSONE (DELTASONE) 5 MG tablet Take 8 pills, then 7,6,5,4,3,2,1  Patient not taking: Reported on 10/30/2023 8/11/23   Juan Laguna MD   apixaban (ELIQUIS) 5 MG TABS tablet TAKE ONE TABLET BY MOUTH TWICE A DAY 8/3/23   Juan Laguna MD   furosemide (LASIX) 40 MG tablet Take 1 tablet by mouth 2 times daily  Patient taking differently: Take 1 tablet by mouth 3 times daily 23   Juan Laguna MD   traZODone (DESYREL) 50 MG tablet Take 2 tablets by mouth nightly 23   Juan Laguna MD   potassium

## 2023-11-01 ENCOUNTER — ANESTHESIA (OUTPATIENT)
Dept: OPERATING ROOM | Age: 73
End: 2023-11-01
Payer: MEDICARE

## 2023-11-01 ENCOUNTER — HOSPITAL ENCOUNTER (OUTPATIENT)
Age: 73
Setting detail: OUTPATIENT SURGERY
Discharge: HOME | End: 2023-11-01
Attending: SURGERY | Admitting: SURGERY
Payer: MEDICARE

## 2023-11-01 VITALS
HEART RATE: 75 BPM | BODY MASS INDEX: 29.52 KG/M2 | TEMPERATURE: 97.5 F | OXYGEN SATURATION: 95 % | RESPIRATION RATE: 16 BRPM | HEIGHT: 77 IN | SYSTOLIC BLOOD PRESSURE: 132 MMHG | WEIGHT: 250 LBS | DIASTOLIC BLOOD PRESSURE: 74 MMHG

## 2023-11-01 DIAGNOSIS — K62.89 PERIANAL MASS: ICD-10-CM

## 2023-11-01 DIAGNOSIS — I50.21 ACUTE SYSTOLIC CHF (CONGESTIVE HEART FAILURE) (HCC): ICD-10-CM

## 2023-11-01 DIAGNOSIS — I42.0 DILATED CARDIOMYOPATHY (HCC): ICD-10-CM

## 2023-11-01 LAB — GLUCOSE BLD-MCNC: 91 MG/DL (ref 70–99)

## 2023-11-01 PROCEDURE — 82962 GLUCOSE BLOOD TEST: CPT

## 2023-11-01 PROCEDURE — 7100000010 HC PHASE II RECOVERY - FIRST 15 MIN: Performed by: SURGERY

## 2023-11-01 PROCEDURE — 88305 TISSUE EXAM BY PATHOLOGIST: CPT | Performed by: PATHOLOGY

## 2023-11-01 PROCEDURE — 3600000012 HC SURGERY LEVEL 2 ADDTL 15MIN: Performed by: SURGERY

## 2023-11-01 PROCEDURE — 2709999900 HC NON-CHARGEABLE SUPPLY: Performed by: SURGERY

## 2023-11-01 PROCEDURE — 7100000011 HC PHASE II RECOVERY - ADDTL 15 MIN: Performed by: SURGERY

## 2023-11-01 PROCEDURE — 6360000002 HC RX W HCPCS: Performed by: SURGERY

## 2023-11-01 PROCEDURE — 7100000001 HC PACU RECOVERY - ADDTL 15 MIN: Performed by: SURGERY

## 2023-11-01 PROCEDURE — 3700000001 HC ADD 15 MINUTES (ANESTHESIA): Performed by: SURGERY

## 2023-11-01 PROCEDURE — 7100000000 HC PACU RECOVERY - FIRST 15 MIN: Performed by: SURGERY

## 2023-11-01 PROCEDURE — 3700000000 HC ANESTHESIA ATTENDED CARE: Performed by: SURGERY

## 2023-11-01 PROCEDURE — 6360000002 HC RX W HCPCS: Performed by: NURSE ANESTHETIST, CERTIFIED REGISTERED

## 2023-11-01 PROCEDURE — 3600000002 HC SURGERY LEVEL 2 BASE: Performed by: SURGERY

## 2023-11-01 PROCEDURE — 2580000003 HC RX 258: Performed by: ANESTHESIOLOGY

## 2023-11-01 PROCEDURE — 88304 TISSUE EXAM BY PATHOLOGIST: CPT | Performed by: PATHOLOGY

## 2023-11-01 RX ORDER — LIDOCAINE HYDROCHLORIDE 20 MG/ML
INJECTION, SOLUTION INTRAVENOUS PRN
Status: DISCONTINUED | OUTPATIENT
Start: 2023-11-01 | End: 2023-11-01 | Stop reason: SDUPTHER

## 2023-11-01 RX ORDER — SODIUM CHLORIDE 0.9 % (FLUSH) 0.9 %
5-40 SYRINGE (ML) INJECTION PRN
Status: DISCONTINUED | OUTPATIENT
Start: 2023-11-01 | End: 2023-11-01 | Stop reason: HOSPADM

## 2023-11-01 RX ORDER — SODIUM CHLORIDE, SODIUM LACTATE, POTASSIUM CHLORIDE, CALCIUM CHLORIDE 600; 310; 30; 20 MG/100ML; MG/100ML; MG/100ML; MG/100ML
INJECTION, SOLUTION INTRAVENOUS CONTINUOUS
Status: DISCONTINUED | OUTPATIENT
Start: 2023-11-01 | End: 2023-11-01 | Stop reason: HOSPADM

## 2023-11-01 RX ORDER — DIPHENHYDRAMINE HYDROCHLORIDE 50 MG/ML
12.5 INJECTION INTRAMUSCULAR; INTRAVENOUS
Status: DISCONTINUED | OUTPATIENT
Start: 2023-11-01 | End: 2023-11-01 | Stop reason: HOSPADM

## 2023-11-01 RX ORDER — LABETALOL HYDROCHLORIDE 5 MG/ML
10 INJECTION, SOLUTION INTRAVENOUS
Status: DISCONTINUED | OUTPATIENT
Start: 2023-11-01 | End: 2023-11-01 | Stop reason: HOSPADM

## 2023-11-01 RX ORDER — CEFAZOLIN SODIUM 1 G/3ML
INJECTION, POWDER, FOR SOLUTION INTRAMUSCULAR; INTRAVENOUS PRN
Status: DISCONTINUED | OUTPATIENT
Start: 2023-11-01 | End: 2023-11-01 | Stop reason: SDUPTHER

## 2023-11-01 RX ORDER — HYDRALAZINE HYDROCHLORIDE 20 MG/ML
10 INJECTION INTRAMUSCULAR; INTRAVENOUS
Status: DISCONTINUED | OUTPATIENT
Start: 2023-11-01 | End: 2023-11-01 | Stop reason: HOSPADM

## 2023-11-01 RX ORDER — SODIUM CHLORIDE 0.9 % (FLUSH) 0.9 %
5-40 SYRINGE (ML) INJECTION EVERY 12 HOURS SCHEDULED
Status: DISCONTINUED | OUTPATIENT
Start: 2023-11-01 | End: 2023-11-01 | Stop reason: HOSPADM

## 2023-11-01 RX ORDER — HYDROCODONE BITARTRATE AND ACETAMINOPHEN 5; 325 MG/1; MG/1
1 TABLET ORAL EVERY 6 HOURS PRN
Qty: 12 TABLET | Refills: 0 | Status: SHIPPED | OUTPATIENT
Start: 2023-11-01 | End: 2023-11-04

## 2023-11-01 RX ORDER — BUPIVACAINE HYDROCHLORIDE 5 MG/ML
INJECTION, SOLUTION EPIDURAL; INTRACAUDAL
Status: COMPLETED | OUTPATIENT
Start: 2023-11-01 | End: 2023-11-01

## 2023-11-01 RX ORDER — PROPOFOL 10 MG/ML
INJECTION, EMULSION INTRAVENOUS PRN
Status: DISCONTINUED | OUTPATIENT
Start: 2023-11-01 | End: 2023-11-01 | Stop reason: SDUPTHER

## 2023-11-01 RX ORDER — FENTANYL CITRATE 50 UG/ML
25 INJECTION, SOLUTION INTRAMUSCULAR; INTRAVENOUS EVERY 5 MIN PRN
Status: DISCONTINUED | OUTPATIENT
Start: 2023-11-01 | End: 2023-11-01 | Stop reason: HOSPADM

## 2023-11-01 RX ORDER — ONDANSETRON 2 MG/ML
INJECTION INTRAMUSCULAR; INTRAVENOUS PRN
Status: DISCONTINUED | OUTPATIENT
Start: 2023-11-01 | End: 2023-11-01 | Stop reason: SDUPTHER

## 2023-11-01 RX ORDER — DROPERIDOL 2.5 MG/ML
0.62 INJECTION, SOLUTION INTRAMUSCULAR; INTRAVENOUS EVERY 10 MIN PRN
Status: DISCONTINUED | OUTPATIENT
Start: 2023-11-01 | End: 2023-11-01 | Stop reason: HOSPADM

## 2023-11-01 RX ORDER — ONDANSETRON 2 MG/ML
4 INJECTION INTRAMUSCULAR; INTRAVENOUS
Status: DISCONTINUED | OUTPATIENT
Start: 2023-11-01 | End: 2023-11-01 | Stop reason: HOSPADM

## 2023-11-01 RX ORDER — OXYCODONE HYDROCHLORIDE 5 MG/1
5 TABLET ORAL
Status: DISCONTINUED | OUTPATIENT
Start: 2023-11-01 | End: 2023-11-01 | Stop reason: HOSPADM

## 2023-11-01 RX ADMIN — PROPOFOL 50 MG: 10 INJECTION, EMULSION INTRAVENOUS at 09:23

## 2023-11-01 RX ADMIN — ONDANSETRON 4 MG: 2 INJECTION INTRAMUSCULAR; INTRAVENOUS at 09:21

## 2023-11-01 RX ADMIN — PROPOFOL 50 MG: 10 INJECTION, EMULSION INTRAVENOUS at 09:22

## 2023-11-01 RX ADMIN — CEFAZOLIN 2 G: 1 INJECTION, POWDER, FOR SOLUTION INTRAMUSCULAR; INTRAVENOUS; PARENTERAL at 09:44

## 2023-11-01 RX ADMIN — SODIUM CHLORIDE, POTASSIUM CHLORIDE, SODIUM LACTATE AND CALCIUM CHLORIDE: 600; 310; 30; 20 INJECTION, SOLUTION INTRAVENOUS at 08:26

## 2023-11-01 RX ADMIN — LIDOCAINE HYDROCHLORIDE 100 MG: 20 INJECTION, SOLUTION INTRAVENOUS at 09:21

## 2023-11-01 ASSESSMENT — PAIN - FUNCTIONAL ASSESSMENT: PAIN_FUNCTIONAL_ASSESSMENT: 0-10

## 2023-11-01 ASSESSMENT — PAIN SCALES - GENERAL
PAINLEVEL_OUTOF10: 0
PAINLEVEL_OUTOF10: 0

## 2023-11-01 NOTE — DISCHARGE INSTRUCTIONS
Patient Discharge Instructions  Dr. Mayela Ortega  [address] [place] [address] Drive  [phone]    Discharge Date:  11/1/2023    Discharged To: Home      RESUME ACTIVITY:      BATHING:   OK to shower but no bath tub or submerging incision under water    Wound:    Keep wound dry and clean. May shower as instructed above. Dressing:    Change outer dressing daily after shower or if soiled. DRIVING:   3-5 days. No driving until off narcotic pain medications and     walking comfortably    RETURN TO WORK: when cleared after your follow up office visit    WALKING:    As tolerated     STAIRS:    As tolerated    LIFTING:   Less than 10 pounds for one week    DIET:    Katya Hood diet on day of surgery then regular diet    SPECIAL INSTRUCTIONS:     Call the office at [phone]  if you have a fever greater than or equal to 101 oF or if your incision becomes red, tender, or has drainage of pus. If follow up appointment was not given to you, call the Surgical Clinic at [phone] for follow up appointment with Dr. Balwinder Briceño in:  1-2 weeks. Patient Discharge Instructions  Dr. Mayela Ortega  [address] [place] [address], [address] [place] [address] Drive  [phone]    Discharge Date:  11/1/2023    Discharged To: Home      RESUME ACTIVITY:      BATHING:   OK to shower but no bath tub or submerging incision under water    Wound:    Keep wound dry and clean. May shower as instructed above. Dressing:    Change outer dressing daily after shower or if soiled. DRIVING:   3-5 days.  No driving until off narcotic pain medications and     walking comfortably    RETURN TO WORK: when cleared after your follow up office visit    WALKING:    As tolerated     STAIRS:    As tolerated    LIFTING:   Less than 10 pounds for one week    DIET:    Katya Hood diet on day of surgery then regular diet    SPECIAL INSTRUCTIONS:     Call the office at [phone]  if you have a fever greater than or equal to 101 oF or if your incision becomes red,

## 2023-11-01 NOTE — ANESTHESIA POSTPROCEDURE EVALUATION
Department of Anesthesiology  Postprocedure Note    Patient: Violet Lyons  MRN: [id number]  YOB: 1950  Date of evaluation: 11/1/2023      Procedure Summary     Date: 11/01/23 Room / Location: [address] [place] [address]    Anesthesia Start: 8267 Anesthesia Stop: 1000    Procedure: EUA WITH PERIANAL LESION REMOVAL Diagnosis:       Perianal mass      (Perianal mass [K62.89])    Surgeons: Chyna Rodriguez MD Responsible Provider: Susan Molina MD    Anesthesia Type: MAC ASA Status: 3          Anesthesia Type: No value filed.     Aarti Phase I:      Aarti Phase II:        Anesthesia Post Evaluation    Patient location during evaluation: PACU  Patient participation: complete - patient participated  Level of consciousness: awake  Pain score: 0  Airway patency: patent  Nausea & Vomiting: no vomiting and no nausea  Complications: no  Cardiovascular status: blood pressure returned to baseline and hemodynamically stable  Respiratory status: acceptable, spontaneous ventilation and nasal cannula  Hydration status: euvolemic  Pain management: adequate

## 2023-11-01 NOTE — H&P
Chief Complaint   Patient presents with    New Patient       Hemorrhoids          SUBJECTIVE:  HPI: Patient presents for  perianal mass . Onset of symptoms was several months ago. Patient does report perianal mass. Patient describes symptoms as gradually worsening. Patient denies fever/chills. Patient denies drainage from the rectal area. Treatment to date has been. Patient denies a personalhistory of colon cancer. Patient denies family hx of colorectal CA  Patient denies a personal history of IBD. I have reviewedthe patient's(pertinent information to this visit) medical history, family history(scanned in  the Media tab under \"patient questioner\"), social history and review of systems with the patient today in the office. Past Surgical History         Past Surgical History:   Procedure Laterality Date    AORTIC VALVULOPLASTY   10/17/2022     TAVR-Dr Chintan Wesley    HIP ARTHROPLASTY Right 09/12/2018     Dr Arminda Junior Right 11/2016     Dr Demi Cook   3/08, dr Gloria Valencia     lap assisted colon mass resection-benign    POLYPECTOMY   2/98- jaimie     colonic polypectomy    POLYPECTOMY   9/10/02- dr Chastity Storey     colonic polypectomy    TOE AMPUTATION Left 12/03/2021     LEFT SECOND TOE PARTIAL AMPUTATION in [facility] by Dr. Colt Garcia Left 12/03/2021     partial to L second, Dr Lucien Sanchez. TOE AMPUTATION Left 12/03/2021     LEFT SECOND TOE PARTIAL AMPUTATION performed by Stef Swartz DPM at [facility]   10/08, dr Dominic Kaur Left 02/06/2014     ORIF Dr Afshan Bains         Past Medical History        Past Medical History:   Diagnosis Date    Adenocarcinoma of prostate ([address]) 05/31/2022     had radiation therapy then ON LUPRON--positive bx Dr Nicole Stewart urology/RAD ONCOLOGY DR SPENCER/Dr Farmer oncology    Aortic stenosis, mild 11/08/2017     By echo 10/2016    Asthma, intrinsic       Dr Ramandeep Reyes and Nickie Maciel.     Atrial HENT:      Head: Normocephalic. Eyes:      Pupils: Pupils are equal, round, and reactive to light. Cardiovascular:      Rate and Rhythm: Normal rate. Pulmonary:      Effort: Pulmonary effort is normal.   Abdominal:      General: There is no distension. Palpations: Abdomen is soft. There is no mass. Tenderness: There is no abdominal tenderness. There is no guarding or rebound. Genitourinary:       Musculoskeletal:         General: Normal range of motion. Cervical back: Normal range of motion and neck supple. Skin:     General: Skin is warm. Neurological:      Mental Status: He is alert and oriented to person, place, and time. ASSESSMENT:  1. Perianal mass             PLAN:  Treatment:  will proceed with perianal tissue removal under MAC. Last c-scope was 2017. Suzan Hwang Patient counseled on risks, benefits, and alternatives of treatment plan at length today. Patient states an understanding and willingness to proceed with plan.       Sindy Cisse MD

## 2023-11-01 NOTE — FLOWSHEET NOTE
Dr Angle Stewart notified no instructions or script, stated he educated spouse and will call in script

## 2023-11-01 NOTE — PROGRESS NOTES
Spoke with patient, patient agreeable to come in early. Stated he was going to take a shower and head in. Sherice Talley from [facility] notified.

## 2023-11-01 NOTE — PROGRESS NOTES
Pt returned to room from   pacu  [facility] call light placed within reach, side rails up x's 2  1125 assisted pt up in room to get dressed  1135 Discharge instructions given to pt and wife, no script dr Desiree Solano notified , informed pt Dr Desiree Solano was notified and script will be called in to [facility], voiced understanding  [phone] Pt escorted to main entrance via wheelchair for discharge.

## 2023-11-02 ENCOUNTER — TELEPHONE (OUTPATIENT)
Dept: SURGERY | Age: 73
End: 2023-11-02

## 2023-11-07 NOTE — OP NOTE
Violet Lyons  1950 11/7/23        Pre-operative Diagnosis:  Perianal mas    Post-operative Diagnosis:  Same    Procedure:   1. Exam under anesthesia and excision of 1 cm and 3 cm perianal skin lesion      2. Two layer closure 3 cm     Surgeon: Chyna Rodriguez MD    Anesthesia: GETA/local    ASA Class: 3    Findings: same. Non malignant appearing     Estimated Blood Loss:  Minimal                  Specimens: as above             Complications:  None; patient tolerated the procedure well. Disposition: PACU - hemodynamically stable. Condition: stable      The patient was seen again in the Holding Room. The risks, benefits, complications, treatment options, and expected outcomes were discussed with the patient. There was concurrence with the proposed plan, and informed consent was obtained. The site of surgery was properly noted/marked. The patient was taken to the Operating Room, identified as Violet Lyons, and the procedure verified. A Time Out was held and the above information confirmed. The patient was brought to the operating room and positioned in a lithotomy. After induction of anesthesia, the perineum was prepped and draped in standard sterile fashion. The anus and rectum were examined and there was no lesion in the rectal mucosa. There was a 3 cm benign appearing skin lesion distal to the dentate line. 1% lidocaine was infiltrated and an elliptical incision was made to remove the skin lesion with surrounding normal tissue. There was another lesion on the right gluteal area also removed in similar fashion. The specimen measured 3 cm and 1 cm leaving 3 cm wound defect. Hemostasis was acquired using Bovie cautery. The wound irrigated and closed in two layers using 3-0 Vicryl for the deep dermal tissue and then interuppted 4-0 chromic gut for the skin.       Chyna Rodriguez MD

## 2023-11-08 ENCOUNTER — OFFICE VISIT (OUTPATIENT)
Dept: INFECTIOUS DISEASES | Age: 73
End: 2023-11-08
Payer: MEDICARE

## 2023-11-08 VITALS
RESPIRATION RATE: 18 BRPM | WEIGHT: 254.8 LBS | DIASTOLIC BLOOD PRESSURE: 72 MMHG | BODY MASS INDEX: 30.21 KG/M2 | SYSTOLIC BLOOD PRESSURE: 120 MMHG | HEART RATE: 67 BPM

## 2023-11-08 DIAGNOSIS — L30.9 DERMATITIS: ICD-10-CM

## 2023-11-08 PROCEDURE — G8484 FLU IMMUNIZE NO ADMIN: HCPCS | Performed by: INTERNAL MEDICINE

## 2023-11-08 PROCEDURE — G8427 DOCREV CUR MEDS BY ELIG CLIN: HCPCS | Performed by: INTERNAL MEDICINE

## 2023-11-08 PROCEDURE — G8417 CALC BMI ABV UP PARAM F/U: HCPCS | Performed by: INTERNAL MEDICINE

## 2023-11-08 PROCEDURE — 99204 OFFICE O/P NEW MOD 45 MIN: CPT | Performed by: INTERNAL MEDICINE

## 2023-11-08 PROCEDURE — 1036F TOBACCO NON-USER: CPT | Performed by: INTERNAL MEDICINE

## 2023-11-08 PROCEDURE — 1123F ACP DISCUSS/DSCN MKR DOCD: CPT | Performed by: INTERNAL MEDICINE

## 2023-11-08 PROCEDURE — 3017F COLORECTAL CA SCREEN DOC REV: CPT | Performed by: INTERNAL MEDICINE

## 2023-11-08 NOTE — PROGRESS NOTES
education level: Not on file   Occupational History    Occupation: [employer]   Tobacco Use    Smoking status: Former     Packs/day: 1.00     Years: 39.00     Additional pack years: 0.00     Total pack years: 39.00     Types: Cigarettes     Start date: 1/1/1964     Quit date: 1/1/2013     Years since quitting: 10.8    Smokeless tobacco: Never   Vaping Use    Vaping Use: Never used   Substance and Sexual Activity    Alcohol use: Yes     Alcohol/week: 7.0 standard drinks of alcohol     Types: 7 Glasses of wine per week     Comment: occasional wine/moderate/ caffeine 2 cups of coffee    Drug use: No    Sexual activity: Yes     Partners: Female   Other Topics Concern    Not on file   Social History Narrative    Exercise:    Seat Belt :     Social Determinants of Health     Financial Resource Strain: Low Risk  (4/20/2023)    Overall Financial Resource Strain (CARDIA)     Difficulty of Paying Living Expenses: Not hard at all   Food Insecurity: No Food Insecurity (4/20/2023)    Hunger Vital Sign     Worried About Running Out of Food in the Last Year: Never true     [address] in the Last Year: Never true   Transportation Needs: Unknown (4/20/2023)    PRAPARE - Transportation     Lack of Transportation (Medical): Not on file     Lack of Transportation (Non-Medical):  No   Physical Activity: Sufficiently Active (7/25/2023)    Exercise Vital Sign     Days of Exercise per Week: 5 days     Minutes of Exercise per Session: 40 min   Stress: Not on file   Social Connections: Not on file   Intimate Partner Violence: Not on file   Housing Stability: Unknown (4/20/2023)    Housing Stability Vital Sign     Unable to Pay for Housing in the Last Year: Not on file     Number of Places Lived in the Last Year: Not on file     Unstable Housing in the Last Year: No       Family History   Problem Relation Age of Onset    Hypertension Mother     High Blood Pressure Mother     Other Mother         lung problems    Other Father

## 2023-11-08 NOTE — ASSESSMENT & PLAN NOTE
Dryness of both hands, fingers, but maximal on the dorsum of the left index finger and the interdigital web.  Will refer to dermatology

## 2023-11-13 ENCOUNTER — OFFICE VISIT (OUTPATIENT)
Dept: SURGERY | Age: 73
End: 2023-11-13

## 2023-11-13 VITALS
DIASTOLIC BLOOD PRESSURE: 68 MMHG | SYSTOLIC BLOOD PRESSURE: 112 MMHG | BODY MASS INDEX: 30.33 KG/M2 | WEIGHT: 256.9 LBS | OXYGEN SATURATION: 93 % | HEIGHT: 77 IN | HEART RATE: 62 BPM

## 2023-11-13 DIAGNOSIS — K62.89 PERIANAL MASS: Primary | ICD-10-CM

## 2023-11-13 PROCEDURE — 99024 POSTOP FOLLOW-UP VISIT: CPT | Performed by: SURGERY

## 2023-11-13 NOTE — PROGRESS NOTES
Chief Complaint   Patient presents with    Post-Op Check     1st P/O EUA with Hemorrhoidectomy @ [facility] 11/1/23         SUBJECTIVE:  Patient here for post op visit. Pain is minimal.  Wounds: minbruising and no discharge. Past Surgical History:   Procedure Laterality Date    AORTIC VALVULOPLASTY  10/17/2022    TAVR-Dr [address] ARTHROPLASTY Right 09/12/2018    Dr Ashish Blackwell Right 11/2016    Dr Tosin Gorman  3/08, dr Patrica Pinto    lap assisted colon mass resection-benign    POLYPECTOMY  2/98- jaimie    colonic polypectomy    POLYPECTOMY  9/10/02- dr Elliott Hoffman    colonic polypectomy    RECTAL SURGERY N/A 11/1/2023    EUA WITH PERIANAL LESION REMOVAL, HEMORRHOIDECTOMY performed by Terrance Torres MD at [facility] [address] [facility] Left 12/03/2021    LEFT SECOND TOE PARTIAL AMPUTATION in [facility] by Dr. Shukri Sullivan Left 12/03/2021    partial to L second, Dr Anton Dakins. TOE AMPUTATION Left 12/03/2021    LEFT SECOND TOE PARTIAL AMPUTATION performed by Marisa Graham DPM at [address] [place] [address]  10/08, dr Jasmin Ann Left 02/06/2014    ORIF Dr Kameron Peguero     Past Medical History:   Diagnosis Date    Adenocarcinoma of prostate ([address]) 05/31/2022    had radiation therapy then ON LUPRON--positive bx Dr Leo Black urology/RAD ONCOLOGY DR SPENCER/Dr Farmer oncology    Aortic stenosis, mild 11/08/2017    By echo 10/2016    Asthma, intrinsic     Dr Raleigh Halsted and Arpita Peguero.     Atrial fibrillation [facility])     Colonic polyp 05/2009    Dr Elliott Hoffman- also done 4/16/2012- AND 3/8/17-TUBULAR ADENOMA,-RECHECK 5YRS    COPD (chronic obstructive pulmonary disease) ([address])     noted on PFTs 6/2020    DDD (degenerative disc disease), lumbar     Diabetes mellitus type 2, controlled ([address])     Dr Kumar Luke- optho, Dr Anton Dakins- podiatry    Diabetic neuropathy, type II diabetes mellitus ([address])     DECR MONOFIL SENSATION    Dilated cardiomyopathy ([address]) 11/04/2015    GERD

## 2023-11-29 DIAGNOSIS — I48.19 PERSISTENT ATRIAL FIBRILLATION (HCC): ICD-10-CM

## 2023-11-29 DIAGNOSIS — F51.01 PRIMARY INSOMNIA: ICD-10-CM

## 2023-11-29 DIAGNOSIS — I42.0 DILATED CARDIOMYOPATHY (HCC): ICD-10-CM

## 2023-11-29 RX ORDER — TRAZODONE HYDROCHLORIDE 50 MG/1
100 TABLET ORAL NIGHTLY
Qty: 180 TABLET | Refills: 1 | Status: SHIPPED | OUTPATIENT
Start: 2023-11-29

## 2023-11-29 RX ORDER — POTASSIUM CHLORIDE 20 MEQ/1
40 TABLET, EXTENDED RELEASE ORAL DAILY
Qty: 90 TABLET | Refills: 1 | Status: SHIPPED | OUTPATIENT
Start: 2023-11-29

## 2024-01-15 ENCOUNTER — HOSPITAL ENCOUNTER (OUTPATIENT)
Age: 74
Discharge: HOME | End: 2024-01-15
Payer: MEDICARE

## 2024-01-15 DIAGNOSIS — I48.19 PERSISTENT ATRIAL FIBRILLATION (HCC): ICD-10-CM

## 2024-01-15 DIAGNOSIS — E11.21 CONTROLLED TYPE 2 DIABETES MELLITUS WITH DIABETIC NEPHROPATHY, WITHOUT LONG-TERM CURRENT USE OF INSULIN (HCC): ICD-10-CM

## 2024-01-15 LAB
ALBUMIN SERPL-MCNC: 4.3 GM/DL (ref 3.4–5)
ALP BLD-CCNC: 73 IU/L (ref 40–128)
ALT SERPL-CCNC: 11 U/L (ref 10–40)
ANION GAP SERPL CALCULATED.3IONS-SCNC: 9 MMOL/L (ref 7–16)
AST SERPL-CCNC: 16 IU/L (ref 15–37)
BILIRUB SERPL-MCNC: 0.6 MG/DL (ref 0–1)
BUN SERPL-MCNC: 19 MG/DL (ref 6–23)
CALCIUM SERPL-MCNC: 9.3 MG/DL (ref 8.3–10.6)
CHLORIDE BLD-SCNC: 103 MMOL/L (ref 99–110)
CHOLEST SERPL-MCNC: 118 MG/DL
CO2: 28 MMOL/L (ref 21–32)
CREAT SERPL-MCNC: 1.1 MG/DL (ref 0.9–1.3)
ESTIMATED AVERAGE GLUCOSE: 108 MG/DL
GFR SERPL CREATININE-BSD FRML MDRD: >60 ML/MIN/1.73M2
GLUCOSE SERPL-MCNC: 114 MG/DL (ref 70–99)
HBA1C MFR BLD: 5.4 % (ref 4.2–6.3)
HCT VFR BLD CALC: 36.5 % (ref 42–52)
HDLC SERPL-MCNC: 42 MG/DL
HEMOGLOBIN: 11.3 GM/DL (ref 13.5–18)
LDLC SERPL CALC-MCNC: 58 MG/DL
MAGNESIUM: 2 MG/DL (ref 1.8–2.4)
MCH RBC QN AUTO: 27.3 PG (ref 27–31)
MCHC RBC AUTO-ENTMCNC: 31 % (ref 32–36)
MCV RBC AUTO: 88.2 FL (ref 78–100)
PDW BLD-RTO: 14.3 % (ref 11.7–14.9)
PLATELET # BLD: 176 K/CU MM (ref 140–440)
PMV BLD AUTO: 9.2 FL (ref 7.5–11.1)
POTASSIUM SERPL-SCNC: 3.9 MMOL/L (ref 3.5–5.1)
RBC # BLD: 4.14 M/CU MM (ref 4.6–6.2)
SODIUM BLD-SCNC: 140 MMOL/L (ref 135–145)
TOTAL PROTEIN: 6.2 GM/DL (ref 6.4–8.2)
TRIGL SERPL-MCNC: 91 MG/DL
TSH SERPL DL<=0.005 MIU/L-ACNC: 1 UIU/ML (ref 0.27–4.2)
WBC # BLD: 4.5 K/CU MM (ref 4–10.5)

## 2024-01-15 PROCEDURE — 83735 ASSAY OF MAGNESIUM: CPT

## 2024-01-15 PROCEDURE — 80061 LIPID PANEL: CPT

## 2024-01-15 PROCEDURE — 84443 ASSAY THYROID STIM HORMONE: CPT

## 2024-01-15 PROCEDURE — 80053 COMPREHEN METABOLIC PANEL: CPT

## 2024-01-15 PROCEDURE — 83036 HEMOGLOBIN GLYCOSYLATED A1C: CPT

## 2024-01-15 PROCEDURE — 36415 COLL VENOUS BLD VENIPUNCTURE: CPT

## 2024-01-15 PROCEDURE — 85027 COMPLETE CBC AUTOMATED: CPT

## 2024-01-16 NOTE — RESULT ENCOUNTER NOTE
Chol, sugars, labs appear in stable range, DM is controlled, THYROID FXN ALSO IS NL RANGE.   notify pt please.

## 2024-01-24 ENCOUNTER — OFFICE VISIT (OUTPATIENT)
Dept: INTERNAL MEDICINE CLINIC | Age: 74
End: 2024-01-24
Payer: MEDICARE

## 2024-01-24 VITALS
BODY MASS INDEX: 30.82 KG/M2 | SYSTOLIC BLOOD PRESSURE: 116 MMHG | DIASTOLIC BLOOD PRESSURE: 70 MMHG | WEIGHT: 261 LBS | OXYGEN SATURATION: 94 % | RESPIRATION RATE: 16 BRPM | HEIGHT: 77 IN | HEART RATE: 75 BPM

## 2024-01-24 DIAGNOSIS — E11.21 CONTROLLED TYPE 2 DIABETES MELLITUS WITH DIABETIC NEPHROPATHY, WITHOUT LONG-TERM CURRENT USE OF INSULIN (HCC): ICD-10-CM

## 2024-01-24 DIAGNOSIS — R06.00 DYSPNEA, UNSPECIFIED TYPE: Primary | ICD-10-CM

## 2024-01-24 DIAGNOSIS — M10.079 IDIOPATHIC GOUT OF FOOT, UNSPECIFIED CHRONICITY, UNSPECIFIED LATERALITY: ICD-10-CM

## 2024-01-24 DIAGNOSIS — J43.1 PANLOBULAR EMPHYSEMA (HCC): ICD-10-CM

## 2024-01-24 DIAGNOSIS — C61 ADENOCARCINOMA OF PROSTATE (HCC): ICD-10-CM

## 2024-01-24 DIAGNOSIS — I42.0 DILATED CARDIOMYOPATHY (HCC): ICD-10-CM

## 2024-01-24 DIAGNOSIS — I48.19 PERSISTENT ATRIAL FIBRILLATION (HCC): ICD-10-CM

## 2024-01-24 DIAGNOSIS — J45.20 MILD INTERMITTENT ASTHMA WITHOUT COMPLICATION: ICD-10-CM

## 2024-01-24 PROBLEM — I50.33 ACUTE ON CHRONIC DIASTOLIC CONGESTIVE HEART FAILURE (HCC): Status: RESOLVED | Noted: 2022-04-18 | Resolved: 2024-01-24

## 2024-01-24 PROBLEM — D68.9 COAGULOPATHY (HCC): Status: RESOLVED | Noted: 2021-05-26 | Resolved: 2024-01-24

## 2024-01-24 PROCEDURE — 99214 OFFICE O/P EST MOD 30 MIN: CPT | Performed by: INTERNAL MEDICINE

## 2024-01-24 PROCEDURE — 2022F DILAT RTA XM EVC RTNOPTHY: CPT | Performed by: INTERNAL MEDICINE

## 2024-01-24 PROCEDURE — G8427 DOCREV CUR MEDS BY ELIG CLIN: HCPCS | Performed by: INTERNAL MEDICINE

## 2024-01-24 PROCEDURE — 3044F HG A1C LEVEL LT 7.0%: CPT | Performed by: INTERNAL MEDICINE

## 2024-01-24 PROCEDURE — 3017F COLORECTAL CA SCREEN DOC REV: CPT | Performed by: INTERNAL MEDICINE

## 2024-01-24 PROCEDURE — 1036F TOBACCO NON-USER: CPT | Performed by: INTERNAL MEDICINE

## 2024-01-24 PROCEDURE — G8484 FLU IMMUNIZE NO ADMIN: HCPCS | Performed by: INTERNAL MEDICINE

## 2024-01-24 PROCEDURE — 1123F ACP DISCUSS/DSCN MKR DOCD: CPT | Performed by: INTERNAL MEDICINE

## 2024-01-24 PROCEDURE — G8417 CALC BMI ABV UP PARAM F/U: HCPCS | Performed by: INTERNAL MEDICINE

## 2024-01-24 PROCEDURE — 3023F SPIROM DOC REV: CPT | Performed by: INTERNAL MEDICINE

## 2024-01-24 RX ORDER — POTASSIUM CHLORIDE 20 MEQ/1
40 TABLET, EXTENDED RELEASE ORAL DAILY
Qty: 90 TABLET | Refills: 1 | Status: SHIPPED | OUTPATIENT
Start: 2024-01-24

## 2024-01-25 ENCOUNTER — HOSPITAL ENCOUNTER (OUTPATIENT)
Age: 74
Discharge: HOME | End: 2024-01-25
Payer: MEDICARE

## 2024-01-25 ENCOUNTER — HOSPITAL ENCOUNTER (OUTPATIENT)
Dept: GENERAL RADIOLOGY | Age: 74
Discharge: HOME | End: 2024-01-25
Payer: MEDICARE

## 2024-01-25 DIAGNOSIS — J45.20 MILD INTERMITTENT ASTHMA WITHOUT COMPLICATION: ICD-10-CM

## 2024-01-25 DIAGNOSIS — R06.00 DYSPNEA, UNSPECIFIED TYPE: ICD-10-CM

## 2024-01-25 PROCEDURE — 71046 X-RAY EXAM CHEST 2 VIEWS: CPT

## 2024-02-16 ENCOUNTER — TELEPHONE (OUTPATIENT)
Dept: INTERNAL MEDICINE CLINIC | Age: 74
End: 2024-02-16

## 2024-02-20 LAB — DIABETIC RETINOPATHY: NEGATIVE

## 2024-03-04 ENCOUNTER — HOSPITAL ENCOUNTER (OUTPATIENT)
Dept: GENERAL RADIOLOGY | Age: 74
Discharge: HOME | End: 2024-03-04
Payer: MEDICARE

## 2024-03-04 ENCOUNTER — OFFICE VISIT (OUTPATIENT)
Dept: INTERNAL MEDICINE CLINIC | Age: 74
End: 2024-03-04
Payer: MEDICARE

## 2024-03-04 ENCOUNTER — HOSPITAL ENCOUNTER (OUTPATIENT)
Age: 74
Discharge: HOME | End: 2024-03-04
Payer: MEDICARE

## 2024-03-04 VITALS
OXYGEN SATURATION: 96 % | HEART RATE: 78 BPM | BODY MASS INDEX: 30.73 KG/M2 | WEIGHT: 260.25 LBS | DIASTOLIC BLOOD PRESSURE: 74 MMHG | HEIGHT: 77 IN | SYSTOLIC BLOOD PRESSURE: 132 MMHG | RESPIRATION RATE: 16 BRPM

## 2024-03-04 DIAGNOSIS — M17.11 PRIMARY OSTEOARTHRITIS OF RIGHT KNEE: ICD-10-CM

## 2024-03-04 DIAGNOSIS — M17.11 PRIMARY OSTEOARTHRITIS OF RIGHT KNEE: Primary | ICD-10-CM

## 2024-03-04 DIAGNOSIS — E11.21 CONTROLLED TYPE 2 DIABETES MELLITUS WITH DIABETIC NEPHROPATHY, WITHOUT LONG-TERM CURRENT USE OF INSULIN (HCC): ICD-10-CM

## 2024-03-04 DIAGNOSIS — M25.561 ACUTE PAIN OF RIGHT KNEE: ICD-10-CM

## 2024-03-04 PROBLEM — J96.00 ACUTE RESPIRATORY FAILURE (HCC): Status: RESOLVED | Noted: 2020-12-22 | Resolved: 2024-03-04

## 2024-03-04 PROBLEM — I50.22 CHRONIC SYSTOLIC (CONGESTIVE) HEART FAILURE (HCC): Status: RESOLVED | Noted: 2022-06-29 | Resolved: 2024-03-04

## 2024-03-04 PROCEDURE — G8427 DOCREV CUR MEDS BY ELIG CLIN: HCPCS | Performed by: INTERNAL MEDICINE

## 2024-03-04 PROCEDURE — G8484 FLU IMMUNIZE NO ADMIN: HCPCS | Performed by: INTERNAL MEDICINE

## 2024-03-04 PROCEDURE — G8417 CALC BMI ABV UP PARAM F/U: HCPCS | Performed by: INTERNAL MEDICINE

## 2024-03-04 PROCEDURE — 1123F ACP DISCUSS/DSCN MKR DOCD: CPT | Performed by: INTERNAL MEDICINE

## 2024-03-04 PROCEDURE — 3044F HG A1C LEVEL LT 7.0%: CPT | Performed by: INTERNAL MEDICINE

## 2024-03-04 PROCEDURE — 1036F TOBACCO NON-USER: CPT | Performed by: INTERNAL MEDICINE

## 2024-03-04 PROCEDURE — 99213 OFFICE O/P EST LOW 20 MIN: CPT | Performed by: INTERNAL MEDICINE

## 2024-03-04 PROCEDURE — 3017F COLORECTAL CA SCREEN DOC REV: CPT | Performed by: INTERNAL MEDICINE

## 2024-03-04 PROCEDURE — 73564 X-RAY EXAM KNEE 4 OR MORE: CPT

## 2024-03-04 PROCEDURE — 2022F DILAT RTA XM EVC RTNOPTHY: CPT | Performed by: INTERNAL MEDICINE

## 2024-03-04 RX ORDER — PREDNISONE 5 MG/1
TABLET ORAL
Qty: 21 TABLET | Refills: 0 | Status: SHIPPED | OUTPATIENT
Start: 2024-03-04

## 2024-03-04 ASSESSMENT — PATIENT HEALTH QUESTIONNAIRE - PHQ9
SUM OF ALL RESPONSES TO PHQ QUESTIONS 1-9: 0
SUM OF ALL RESPONSES TO PHQ9 QUESTIONS 1 & 2: 0
1. LITTLE INTEREST OR PLEASURE IN DOING THINGS: 0
SUM OF ALL RESPONSES TO PHQ QUESTIONS 1-9: 0
SUM OF ALL RESPONSES TO PHQ9 QUESTIONS 1 & 2: 0
2. FEELING DOWN, DEPRESSED OR HOPELESS: NOT AT ALL
1. LITTLE INTEREST OR PLEASURE IN DOING THINGS: NOT AT ALL
2. FEELING DOWN, DEPRESSED OR HOPELESS: 0

## 2024-03-04 NOTE — PROGRESS NOTES
Og Adair  1950 03/04/24    SUBJECTIVE:    Fell forward at home into a doorway and hit knee now w some incr pain and swelling.  Tylenol not helping much.  Has known diffuse mild/mod arthritis fr prior xray 2021.     DM-   Lab Results   Component Value Date    LABA1C 5.4 01/15/2024    LABA1C 5.5 04/17/2023    LABA1C 5.0 11/21/2022     Lab Results   Component Value Date    GLUF 107 (H) 01/06/2017    MALBCR see below 04/17/2023    LDLCALC 58 01/15/2024    CREATININE 1.1 01/15/2024       OBJECTIVE:    /74   Pulse 78   Resp 16   Ht 1.956 m (6' 5\")   Wt 118 kg (260 lb 4 oz)   SpO2 96%   BMI 30.86 kg/m²     Physical Exam  Constitutional:       Appearance: Normal appearance.   Musculoskeletal:         General: Swelling (mild R knee, has no laxity ant drawer, varus/valgus stress.) and tenderness (sl tender inferior aspect anterior knee, ROM intact but w significant crepitus) present.   Neurological:      Mental Status: He is alert.         ASSESSMENT:    1. Primary osteoarthritis of right knee    2. Acute pain of right knee    3. Controlled type 2 diabetes mellitus with diabetic nephropathy, without long-term current use of insulin ([facility])        PLAN:    Og was seen today for fall, knee injury, knee pain and joint swelling.    Diagnoses and all orders for this visit:    Primary osteoarthritis of right knee- w trauma, check xray r/o fx.  If progressive DUD then refer ortho.   Trial pred taper  -     XR KNEE RIGHT (1-2 VIEWS); Future  -     predniSONE (DELTASONE) 5 MG tablet; Take 6 tablets by mouth on day 1, 5 on day 2, 4 on day 3, 3 on day 4, 2 on day 5, 1 on day 6.    Acute pain of right knee- Plan as noted    -     XR KNEE RIGHT (1-2 VIEWS); Future  -     predniSONE (DELTASONE) 5 MG tablet; Take 6 tablets by mouth on day 1, 5 on day 2, 4 on day 3, 3 on day 4, 2 on day 5, 1 on day 6.    Controlled type 2 diabetes mellitus with diabetic nephropathy, without long-term current use of insulin ([facility])- last

## 2024-03-06 DIAGNOSIS — M25.561 ACUTE PAIN OF RIGHT KNEE: Primary | ICD-10-CM

## 2024-03-06 NOTE — RESULT ENCOUNTER NOTE
Please call pt, he has moderately severe R knee arthritis and also a joint effusion.  Do rec we proceed w orthopedic consultation.  Check if has preference, if not then [facility] is ok

## 2024-03-18 ENCOUNTER — OFFICE VISIT (OUTPATIENT)
Dept: ORTHOPEDIC SURGERY | Age: 74
End: 2024-03-18
Payer: MEDICARE

## 2024-03-18 VITALS
BODY MASS INDEX: 30.7 KG/M2 | HEART RATE: 44 BPM | HEIGHT: 77 IN | OXYGEN SATURATION: 90 % | RESPIRATION RATE: 14 BRPM | WEIGHT: 260 LBS

## 2024-03-18 DIAGNOSIS — M17.11 ARTHRITIS OF RIGHT KNEE: Primary | ICD-10-CM

## 2024-03-18 PROCEDURE — 1123F ACP DISCUSS/DSCN MKR DOCD: CPT | Performed by: PHYSICIAN ASSISTANT

## 2024-03-18 PROCEDURE — G8417 CALC BMI ABV UP PARAM F/U: HCPCS | Performed by: PHYSICIAN ASSISTANT

## 2024-03-18 PROCEDURE — 99213 OFFICE O/P EST LOW 20 MIN: CPT | Performed by: PHYSICIAN ASSISTANT

## 2024-03-18 PROCEDURE — 3017F COLORECTAL CA SCREEN DOC REV: CPT | Performed by: PHYSICIAN ASSISTANT

## 2024-03-18 PROCEDURE — 20610 DRAIN/INJ JOINT/BURSA W/O US: CPT | Performed by: PHYSICIAN ASSISTANT

## 2024-03-18 PROCEDURE — 1036F TOBACCO NON-USER: CPT | Performed by: PHYSICIAN ASSISTANT

## 2024-03-18 PROCEDURE — G8427 DOCREV CUR MEDS BY ELIG CLIN: HCPCS | Performed by: PHYSICIAN ASSISTANT

## 2024-03-18 PROCEDURE — G8484 FLU IMMUNIZE NO ADMIN: HCPCS | Performed by: PHYSICIAN ASSISTANT

## 2024-03-18 RX ORDER — TRIAMCINOLONE ACETONIDE 40 MG/ML
40 INJECTION, SUSPENSION INTRA-ARTICULAR; INTRAMUSCULAR ONCE
Status: COMPLETED | OUTPATIENT
Start: 2024-03-18 | End: 2024-03-18

## 2024-03-18 RX ADMIN — TRIAMCINOLONE ACETONIDE 40 MG: 40 INJECTION, SUSPENSION INTRA-ARTICULAR; INTRAMUSCULAR at 10:55

## 2024-03-18 ASSESSMENT — ENCOUNTER SYMPTOMS
GASTROINTESTINAL NEGATIVE: 1
EYES NEGATIVE: 1
RESPIRATORY NEGATIVE: 1

## 2024-03-18 NOTE — PATIENT INSTRUCTIONS
Continue weight-bearing as tolerated.  Continue range of motion exercises as instructed.  Ice and elevate as needed.  Tylenol or Motrin for pain.  Injection given into the right knee.  Follow up as needed    We are committed to providing you the best care possible.  If you receive a survey after visiting one of our offices, please take time to share your experience concerning your physician office visit.  These surveys are confidential and no health information about you is shared.  We are eager to improve for you and we are counting on your feedback to help make that happen.

## 2024-03-18 NOTE — PROGRESS NOTES
Prednisone given on 03/04/2024 by Dr. Elder. Patient states that he is doing much better from taking the steorid. Patient is still having slight pain on the medial side of the knee.

## 2024-03-18 NOTE — PROGRESS NOTES
Review of Systems   Constitutional: Negative.    HENT: Negative.     Eyes: Negative.    Respiratory: Negative.     Cardiovascular: Negative.    Gastrointestinal: Negative.    Genitourinary: Negative.    Musculoskeletal:  Positive for arthralgias and myalgias.   Skin: Negative.  Negative for rash and wound.   Neurological: Negative.    Psychiatric/Behavioral: Negative.           HPI:  Og Adair is a 73 y.o. male that presents back to the office today with recurrent right knee pain.  He has been seen in this office before for right knee issues and has been doing pretty well up until recently.  He did get a steroid Dosepak which has helped his knee pain significantly but would like to discuss steroid injection in the right knee.  He states that the knee started hurting because he fell on it about a month ago.    Past Medical History:   Diagnosis Date    Adenocarcinoma of prostate ([facility]) 05/31/2022    had radiation therapy then ON LUPRON--positive bx Dr Rodriguez urology/RAD ONCOLOGY DR SPENCER/Dr Farmer oncology    Aortic stenosis, mild 11/08/2017    By echo 10/2016    Asthma, intrinsic     Dr Dunbar and Kaur Miller.    Atrial fibrillation ([facility])     Dr Kaur Haji Tropic    Colonic polyp 05/2009    Dr Garcia- also done 4/16/2012- AND 3/8/17-TUBULAR ADENOMA,-RECHECK 5YRS    COPD (chronic obstructive pulmonary disease) ([facility])     noted on PFTs 6/2020    DDD (degenerative disc disease), lumbar     Diabetes mellitus type 2, controlled ([facility])     Dr Hannah- optho, Dr Mata- podiatry    Diabetic neuropathy, type II diabetes mellitus ([facility])     DECR MONOFIL SENSATION    Dilated cardiomyopathy ([facility]) 11/04/2015    GERD (gastroesophageal reflux disease)     Gout     H/O cardiovascular stress test 12/10/2015    lexiscan-normal,EF65%    H/O right and left heart catheterization 06/21/2022    Recommend TAVR evaluation    H/O transesophageal echocardiography (TIM) for monitoring 06/21/1966    LVSF abnormal.  EF 35-40%. Aortic

## 2024-04-03 DIAGNOSIS — J45.909 ASTHMA, INTRINSIC: ICD-10-CM

## 2024-04-03 RX ORDER — ALBUTEROL SULFATE 90 UG/1
2 AEROSOL, METERED RESPIRATORY (INHALATION) EVERY 6 HOURS PRN
Qty: 3 EACH | Refills: 1 | Status: SHIPPED | OUTPATIENT
Start: 2024-04-03

## 2024-04-04 DIAGNOSIS — F51.01 PRIMARY INSOMNIA: ICD-10-CM

## 2024-04-04 DIAGNOSIS — E79.0 ELEVATED URIC ACID IN BLOOD: ICD-10-CM

## 2024-04-04 DIAGNOSIS — I42.0 DILATED CARDIOMYOPATHY (HCC): ICD-10-CM

## 2024-04-04 RX ORDER — TRAZODONE HYDROCHLORIDE 50 MG/1
TABLET ORAL
Qty: 180 TABLET | Refills: 1 | Status: SHIPPED | OUTPATIENT
Start: 2024-04-04

## 2024-04-04 RX ORDER — ALLOPURINOL 100 MG/1
100 TABLET ORAL DAILY
Qty: 90 TABLET | Refills: 1 | Status: SHIPPED | OUTPATIENT
Start: 2024-04-04

## 2024-04-04 RX ORDER — POTASSIUM CHLORIDE 1500 MG/1
40 TABLET, EXTENDED RELEASE ORAL DAILY
Qty: 180 TABLET | Refills: 1 | Status: SHIPPED | OUTPATIENT
Start: 2024-04-04

## 2024-04-09 ENCOUNTER — TELEMEDICINE (OUTPATIENT)
Dept: INTERNAL MEDICINE CLINIC | Age: 74
End: 2024-04-09
Payer: MEDICARE

## 2024-04-09 DIAGNOSIS — J01.00 ACUTE NON-RECURRENT MAXILLARY SINUSITIS: Primary | ICD-10-CM

## 2024-04-09 DIAGNOSIS — J45.20 MILD INTERMITTENT ASTHMA WITHOUT COMPLICATION: ICD-10-CM

## 2024-04-09 PROCEDURE — 1036F TOBACCO NON-USER: CPT | Performed by: INTERNAL MEDICINE

## 2024-04-09 PROCEDURE — G8427 DOCREV CUR MEDS BY ELIG CLIN: HCPCS | Performed by: INTERNAL MEDICINE

## 2024-04-09 PROCEDURE — 99213 OFFICE O/P EST LOW 20 MIN: CPT | Performed by: INTERNAL MEDICINE

## 2024-04-09 PROCEDURE — 1123F ACP DISCUSS/DSCN MKR DOCD: CPT | Performed by: INTERNAL MEDICINE

## 2024-04-09 PROCEDURE — 3017F COLORECTAL CA SCREEN DOC REV: CPT | Performed by: INTERNAL MEDICINE

## 2024-04-09 PROCEDURE — G8417 CALC BMI ABV UP PARAM F/U: HCPCS | Performed by: INTERNAL MEDICINE

## 2024-04-09 RX ORDER — DOXYCYCLINE HYCLATE 100 MG
100 TABLET ORAL 2 TIMES DAILY
Qty: 20 TABLET | Refills: 0 | Status: SHIPPED | OUTPATIENT
Start: 2024-04-09 | End: 2024-04-19

## 2024-04-09 RX ORDER — PREDNISONE 5 MG/1
TABLET ORAL
Qty: 21 TABLET | Refills: 0 | Status: SHIPPED | OUTPATIENT
Start: 2024-04-09

## 2024-04-09 RX ORDER — BUMETANIDE 2 MG/1
TABLET ORAL
COMMUNITY
Start: 2024-02-13

## 2024-04-09 NOTE — PROGRESS NOTES
2024    TELEHEALTH EVALUATION -- Audio/Visual    HPI:    Og Adair (:  1950) has requested an audio/video evaluation for the following concern(s):    2d hx of worsening dry cough and chest congestion, sinus congestion.  Started w ST then spreading to chest.  L ribs aching.  Denies fever or chills  sl SOB.  Seeing Pul, [facility] also [facility].    Review of Systems    Prior to Visit Medications    Medication Sig Taking? Authorizing Provider   bumetanide (BUMEX) 2 MG tablet Take by mouth Yes Provider, MD Simeon   allopurinol (ZYLOPRIM) 100 MG tablet TAKE 1 TABLET BY MOUTH DAILY  Gerardo Elder MD   traZODone (DESYREL) 50 MG tablet TAKE TWO TABLETS BY MOUTH ONCE NIGHTLY  Gerardo Elder MD   potassium chloride (KLOR-CON M20) 20 MEQ extended release tablet TAKE 2 TABLETS BY MOUTH DAILY  Gerardo Elder MD   albuterol sulfate HFA (PROVENTIL;VENTOLIN;PROAIR) 108 (90 Base) MCG/ACT inhaler Inhale 2 puffs into the lungs every 6 hours as needed for Shortness of Breath  Gerardo Elder MD   apixaban (ELIQUIS) 5 MG TABS tablet TAKE ONE TABLET BY MOUTH TWICE A DAY  Gerardo Elder MD   omeprazole (PRILOSEC) 20 MG delayed release capsule TAKE ONE CAPSULE BY MOUTH DAILY  Gerardo Elder MD   fluticasone-umeclidin-vilant (TRELEGY ELLIPTA) 100-62.5-25 MCG/ACT AEPB inhaler INHALE ONE PUFF BY MOUTH DAILY  Gerardo Elder MD   metoprolol succinate (TOPROL XL) 100 MG extended release tablet Take 1 tablet by mouth daily  Gerardo Elder MD   losartan (COZAAR) 25 MG tablet Take 1 tablet by mouth daily  Sadie Atkinson APRN - CNP   dapagliflozin (FARXIGA) 10 MG tablet Take 1 tablet by mouth every morning  Sadie Atkinson APRN - CNP   albuterol (PROVENTIL) (2.5 MG/3ML) 0.083% nebulizer solution Take 3 mLs by nebulization every 6 hours as needed for Wheezing  Gerardo Elder MD   calcium carb-cholecalciferol 600-200 MG-UNIT TABS tablet

## 2024-04-12 ENCOUNTER — TELEPHONE (OUTPATIENT)
Dept: INTERNAL MEDICINE CLINIC | Age: 74
End: 2024-04-12

## 2024-04-12 DIAGNOSIS — J45.20 MILD INTERMITTENT ASTHMA WITHOUT COMPLICATION: ICD-10-CM

## 2024-04-12 DIAGNOSIS — J01.00 ACUTE NON-RECURRENT MAXILLARY SINUSITIS: Primary | ICD-10-CM

## 2024-04-12 RX ORDER — PREDNISONE 5 MG/1
TABLET ORAL
Qty: 36 TABLET | Refills: 0 | Status: SHIPPED | OUTPATIENT
Start: 2024-04-12

## 2024-04-12 NOTE — TELEPHONE ENCOUNTER
Patient called in stating he still does not feel well. Some chest tightness. Requesting a round of steroids. Please advise.

## 2024-04-15 DIAGNOSIS — M10.079 IDIOPATHIC GOUT OF FOOT, UNSPECIFIED CHRONICITY, UNSPECIFIED LATERALITY: ICD-10-CM

## 2024-04-15 DIAGNOSIS — E11.21 CONTROLLED TYPE 2 DIABETES MELLITUS WITH DIABETIC NEPHROPATHY, WITHOUT LONG-TERM CURRENT USE OF INSULIN (HCC): ICD-10-CM

## 2024-04-15 DIAGNOSIS — I42.0 DILATED CARDIOMYOPATHY (HCC): ICD-10-CM

## 2024-04-15 DIAGNOSIS — J45.20 MILD INTERMITTENT ASTHMA WITHOUT COMPLICATION: ICD-10-CM

## 2024-04-15 DIAGNOSIS — C61 ADENOCARCINOMA OF PROSTATE (HCC): ICD-10-CM

## 2024-04-15 DIAGNOSIS — R06.00 DYSPNEA, UNSPECIFIED TYPE: ICD-10-CM

## 2024-04-15 DIAGNOSIS — I48.19 PERSISTENT ATRIAL FIBRILLATION (HCC): ICD-10-CM

## 2024-04-15 LAB
CHOLEST SERPL-MCNC: 122 MG/DL (ref 0–199)
DEPRECATED RDW RBC AUTO: 17.7 % (ref 12.4–15.4)
HCT VFR BLD AUTO: 31.8 % (ref 40.5–52.5)
HDLC SERPL-MCNC: 46 MG/DL (ref 40–60)
HGB BLD-MCNC: 10.6 G/DL (ref 13.5–17.5)
LDLC SERPL CALC-MCNC: 53 MG/DL
MAGNESIUM SERPL-MCNC: 1.6 MG/DL (ref 1.8–2.4)
MCH RBC QN AUTO: 26.3 PG (ref 26–34)
MCHC RBC AUTO-ENTMCNC: 33.5 G/DL (ref 31–36)
MCV RBC AUTO: 78.6 FL (ref 80–100)
NT-PROBNP SERPL-MCNC: 1014 PG/ML (ref 0–449)
PLATELET # BLD AUTO: 205 K/UL (ref 135–450)
PMV BLD AUTO: 7.4 FL (ref 5–10.5)
RBC # BLD AUTO: 4.04 M/UL (ref 4.2–5.9)
TRIGL SERPL-MCNC: 115 MG/DL (ref 0–150)
TSH SERPL DL<=0.005 MIU/L-ACNC: 0.87 UIU/ML (ref 0.27–4.2)
URATE SERPL-MCNC: 5.9 MG/DL (ref 3.5–7.2)
VLDLC SERPL CALC-MCNC: 23 MG/DL
WBC # BLD AUTO: 4.2 K/UL (ref 4–11)

## 2024-04-15 PROCEDURE — 36415 COLL VENOUS BLD VENIPUNCTURE: CPT | Performed by: INTERNAL MEDICINE

## 2024-04-16 ENCOUNTER — TELEPHONE (OUTPATIENT)
Dept: INTERNAL MEDICINE CLINIC | Age: 74
End: 2024-04-16

## 2024-04-16 DIAGNOSIS — D64.9 ANEMIA, UNSPECIFIED TYPE: Primary | ICD-10-CM

## 2024-04-16 DIAGNOSIS — D64.9 ANEMIA, UNSPECIFIED TYPE: ICD-10-CM

## 2024-04-16 DIAGNOSIS — I42.0 DILATED CARDIOMYOPATHY (HCC): Primary | ICD-10-CM

## 2024-04-16 LAB
EST. AVERAGE GLUCOSE BLD GHB EST-MCNC: 128.4 MG/DL
FERRITIN SERPL IA-MCNC: 19.9 NG/ML (ref 30–400)
HBA1C MFR BLD: 6.1 %
IRON SATN MFR SERPL: 11 % (ref 20–50)
IRON SERPL-MCNC: 41 UG/DL (ref 59–158)
TIBC SERPL-MCNC: 365 UG/DL (ref 260–445)

## 2024-04-16 RX ORDER — MAGNESIUM OXIDE 400 MG/1
400 TABLET ORAL DAILY
Qty: 90 TABLET | Refills: 1 | Status: SHIPPED | OUTPATIENT
Start: 2024-04-16

## 2024-04-16 NOTE — TELEPHONE ENCOUNTER
----- Message from Gerardo Elder MD sent at 4/16/2024  9:40 AM EDT -----  Please call pt, lab ok incl chol level, thyroid fxn, kidney fxn.  Only two issues are 1, is sl anemic--- pls add iron, ferritin and tibc, dx anemia.  Ramy should also do stool cards, hemoccult x3- dx anemia, and if any iron deficiency or evidence of blood in stools will likely need GI consultation.  2, has sl low magnesium so we need to start supplement, 400mg daily which I'll send in.

## 2024-04-16 NOTE — RESULT ENCOUNTER NOTE
Please call pt, lab ok incl chol level, thyroid fxn, kidney fxn.  Only two issues are 1, is sl anemic--- pls add iron, ferritin and tibc, dx anemia.  Ramy should also do stool cards, hemoccult x3- dx anemia, and if any iron deficiency or evidence of blood in stools will likely need GI consultation.  2, has sl low magnesium so we need to start supplement, 400mg daily which I'll send in.

## 2024-04-17 ENCOUNTER — TELEPHONE (OUTPATIENT)
Dept: INTERNAL MEDICINE CLINIC | Age: 74
End: 2024-04-17

## 2024-04-17 ENCOUNTER — HOSPITAL ENCOUNTER (OUTPATIENT)
Dept: CARDIAC REHAB | Age: 74
Setting detail: THERAPIES SERIES
Discharge: HOME | End: 2024-04-17

## 2024-04-17 DIAGNOSIS — D50.9 IRON DEFICIENCY ANEMIA, UNSPECIFIED IRON DEFICIENCY ANEMIA TYPE: Primary | ICD-10-CM

## 2024-04-17 NOTE — RESULT ENCOUNTER NOTE
Please call pt, Ramy does have iron defic anemia.  If ok w him let's refer to GI, Dr Garcia.  I saw on his chart looks like he's due for a f/u w him for a prior polyp too, noted from last colonoscopy.

## 2024-04-17 NOTE — TELEPHONE ENCOUNTER
----- Message from Gerardo Elder MD sent at 4/17/2024  7:29 AM EDT -----  Please call pt, Ramy does have iron defic anemia.  If ok w him let's refer to GI, Dr Garcia.  I saw on his chart looks like he's due for a f/u w him for a prior polyp too, noted from last colonoscopy.

## 2024-04-22 ENCOUNTER — HOSPITAL ENCOUNTER (OUTPATIENT)
Dept: CARDIAC REHAB | Age: 74
Setting detail: THERAPIES SERIES
Discharge: HOME | End: 2024-04-22
Payer: MEDICARE

## 2024-04-22 PROCEDURE — 94625 PHY/QHP OP PULM RHB W/O MNTR: CPT

## 2024-04-24 SDOH — ECONOMIC STABILITY: FOOD INSECURITY: WITHIN THE PAST 12 MONTHS, THE FOOD YOU BOUGHT JUST DIDN'T LAST AND YOU DIDN'T HAVE MONEY TO GET MORE.: NEVER TRUE

## 2024-04-24 SDOH — ECONOMIC STABILITY: TRANSPORTATION INSECURITY
IN THE PAST 12 MONTHS, HAS LACK OF TRANSPORTATION KEPT YOU FROM MEETINGS, WORK, OR FROM GETTING THINGS NEEDED FOR DAILY LIVING?: NO

## 2024-04-24 SDOH — ECONOMIC STABILITY: FOOD INSECURITY: WITHIN THE PAST 12 MONTHS, YOU WORRIED THAT YOUR FOOD WOULD RUN OUT BEFORE YOU GOT MONEY TO BUY MORE.: NEVER TRUE

## 2024-04-24 SDOH — ECONOMIC STABILITY: INCOME INSECURITY: HOW HARD IS IT FOR YOU TO PAY FOR THE VERY BASICS LIKE FOOD, HOUSING, MEDICAL CARE, AND HEATING?: NOT HARD AT ALL

## 2024-04-25 ENCOUNTER — OFFICE VISIT (OUTPATIENT)
Dept: INTERNAL MEDICINE CLINIC | Age: 74
End: 2024-04-25

## 2024-04-25 VITALS
HEIGHT: 77 IN | DIASTOLIC BLOOD PRESSURE: 68 MMHG | WEIGHT: 264 LBS | SYSTOLIC BLOOD PRESSURE: 120 MMHG | HEART RATE: 77 BPM | BODY MASS INDEX: 31.17 KG/M2 | OXYGEN SATURATION: 97 %

## 2024-04-25 DIAGNOSIS — I27.20 PULMONARY HYPERTENSION (HCC): Primary | ICD-10-CM

## 2024-04-25 DIAGNOSIS — E79.0 ELEVATED URIC ACID IN BLOOD: ICD-10-CM

## 2024-04-25 DIAGNOSIS — J45.21 MILD INTERMITTENT ASTHMA WITH ACUTE EXACERBATION: ICD-10-CM

## 2024-04-25 DIAGNOSIS — I42.0 DILATED CARDIOMYOPATHY (HCC): ICD-10-CM

## 2024-04-25 DIAGNOSIS — J45.20 MILD INTERMITTENT ASTHMA WITHOUT COMPLICATION: ICD-10-CM

## 2024-04-25 DIAGNOSIS — I50.21 ACUTE SYSTOLIC CONGESTIVE HEART FAILURE (HCC): ICD-10-CM

## 2024-04-25 DIAGNOSIS — F51.01 PRIMARY INSOMNIA: ICD-10-CM

## 2024-04-25 RX ORDER — FLUTICASONE FUROATE, UMECLIDINIUM BROMIDE AND VILANTEROL TRIFENATATE 200; 62.5; 25 UG/1; UG/1; UG/1
1 POWDER RESPIRATORY (INHALATION) DAILY
Qty: 1 EACH | Refills: 5 | Status: SHIPPED | OUTPATIENT
Start: 2024-04-25

## 2024-04-25 RX ORDER — TRAZODONE HYDROCHLORIDE 50 MG/1
TABLET ORAL
Qty: 180 TABLET | Refills: 1 | Status: SHIPPED | OUTPATIENT
Start: 2024-04-25

## 2024-04-25 RX ORDER — POTASSIUM CHLORIDE 20 MEQ/1
40 TABLET, EXTENDED RELEASE ORAL DAILY
Qty: 180 TABLET | Refills: 1 | Status: SHIPPED | OUTPATIENT
Start: 2024-04-25

## 2024-04-25 RX ORDER — PREDNISONE 5 MG/1
TABLET ORAL
Qty: 72 TABLET | Refills: 0 | Status: SHIPPED | OUTPATIENT
Start: 2024-04-25

## 2024-04-25 RX ORDER — ALLOPURINOL 100 MG/1
100 TABLET ORAL DAILY
Qty: 90 TABLET | Refills: 1 | Status: SHIPPED | OUTPATIENT
Start: 2024-04-25

## 2024-04-25 RX ORDER — HYDROCHLOROTHIAZIDE 12.5 MG/1
12.5 CAPSULE, GELATIN COATED ORAL EVERY MORNING
Qty: 90 CAPSULE | Refills: 1 | Status: SHIPPED | OUTPATIENT
Start: 2024-04-25

## 2024-04-25 SDOH — ECONOMIC STABILITY: FOOD INSECURITY: WITHIN THE PAST 12 MONTHS, YOU WORRIED THAT YOUR FOOD WOULD RUN OUT BEFORE YOU GOT MONEY TO BUY MORE.: NEVER TRUE

## 2024-04-25 SDOH — ECONOMIC STABILITY: INCOME INSECURITY: HOW HARD IS IT FOR YOU TO PAY FOR THE VERY BASICS LIKE FOOD, HOUSING, MEDICAL CARE, AND HEATING?: NOT HARD AT ALL

## 2024-04-25 SDOH — ECONOMIC STABILITY: FOOD INSECURITY: WITHIN THE PAST 12 MONTHS, THE FOOD YOU BOUGHT JUST DIDN'T LAST AND YOU DIDN'T HAVE MONEY TO GET MORE.: NEVER TRUE

## 2024-04-25 NOTE — PROGRESS NOTES
[place]  1950 04/25/24    SUBJECTIVE:    Asthma still recurring chest tightness, SOB and feels like has a pulled chest muscle.  Sx worse w exertion walking.  Denies wheezing or coughing.  Has not had improvement w abx and also steroid x2.    Of note, he has also had RHC w elevated PA pressures, pul HTN.  PA was 45.    CT chest was c/w COPD changes, no pneumonia.  OBJECTIVE:    /68 (Site: Left Upper Arm, Position: Sitting, Cuff Size: Medium Adult)   Pulse 77   Ht 1.956 m (6' 5.01\")   Wt 119.7 kg (264 lb)   SpO2 97%   BMI 31.30 kg/m²     Physical Exam  Constitutional:       Appearance: Normal appearance.   Cardiovascular:      Rate and Rhythm: Normal rate. Rhythm irregular.      Heart sounds: No murmur heard.     No gallop.   Pulmonary:      Effort: No respiratory distress.      Breath sounds: No wheezing.   Abdominal:      General: Abdomen is flat. Bowel sounds are normal. There is no distension.      Palpations: Abdomen is soft. There is no mass.      Tenderness: There is no abdominal tenderness.      Hernia: No hernia is present.   Musculoskeletal:      Right lower leg: Edema (TR) present.      Left lower leg: Edema (TR) present.   Neurological:      Mental Status: He is alert.   Psychiatric:         Mood and Affect: Mood normal.         ASSESSMENT:    1. Pulmonary hypertension (HCC)    2. Mild intermittent asthma with acute exacerbation    3. Dilated cardiomyopathy (HCC)    4. Acute systolic congestive heart failure (HCC)    5. Primary insomnia    6. Elevated uric acid in blood    7. Mild intermittent asthma without complication        PLAN:    Og was seen today for 3 month follow-up.    Diagnoses and all orders for this visit:    Pulmonary hypertension (HCC)- I SUSPECT HIS PERSISTENT SOB/ENRIQUE IS MULTIFACTORIAL FIRST AND PRIMARILY NOW FR UNDERLYING PUL HTN BUT ALSO W ONGOING ASTHMA EXACERBATION AS WELL AS POSSIBLE MILD CHF.  WE'LL RECHECK BNP, TRY GENTLE ADDITIONAL DIURESIS W ADDED LOW

## 2024-04-29 ENCOUNTER — HOSPITAL ENCOUNTER (OUTPATIENT)
Dept: CARDIAC REHAB | Age: 74
Setting detail: THERAPIES SERIES
Discharge: HOME | End: 2024-04-29
Payer: MEDICARE

## 2024-04-29 PROCEDURE — 94625 PHY/QHP OP PULM RHB W/O MNTR: CPT

## 2024-05-01 DIAGNOSIS — I27.20 PULMONARY HYPERTENSION (HCC): ICD-10-CM

## 2024-05-01 DIAGNOSIS — I50.21 ACUTE SYSTOLIC CONGESTIVE HEART FAILURE (HCC): ICD-10-CM

## 2024-05-01 DIAGNOSIS — I42.0 DILATED CARDIOMYOPATHY (HCC): ICD-10-CM

## 2024-05-01 LAB
ANION GAP SERPL CALCULATED.3IONS-SCNC: 14 MMOL/L (ref 3–16)
BUN SERPL-MCNC: 33 MG/DL (ref 7–20)
CALCIUM SERPL-MCNC: 9.4 MG/DL (ref 8.3–10.6)
CHLORIDE SERPL-SCNC: 95 MMOL/L (ref 99–110)
CO2 SERPL-SCNC: 31 MMOL/L (ref 21–32)
CREAT SERPL-MCNC: 1.1 MG/DL (ref 0.8–1.3)
GFR SERPLBLD CREATININE-BSD FMLA CKD-EPI: 70 ML/MIN/{1.73_M2}
GLUCOSE SERPL-MCNC: 97 MG/DL (ref 70–99)
NT-PROBNP SERPL-MCNC: 765 PG/ML (ref 0–449)
POTASSIUM SERPL-SCNC: 3.2 MMOL/L (ref 3.5–5.1)
SODIUM SERPL-SCNC: 140 MMOL/L (ref 136–145)

## 2024-05-01 PROCEDURE — 36415 COLL VENOUS BLD VENIPUNCTURE: CPT | Performed by: INTERNAL MEDICINE

## 2024-05-02 ENCOUNTER — HOSPITAL ENCOUNTER (OUTPATIENT)
Dept: CARDIAC REHAB | Age: 74
Setting detail: THERAPIES SERIES
Discharge: HOME | End: 2024-05-02
Payer: MEDICARE

## 2024-05-02 DIAGNOSIS — I42.0 DILATED CARDIOMYOPATHY (HCC): ICD-10-CM

## 2024-05-02 LAB
GLUCOSE BLD-MCNC: 144 MG/DL (ref 70–99)
GLUCOSE BLD-MCNC: 146 MG/DL (ref 70–99)

## 2024-05-02 PROCEDURE — 94625 PHY/QHP OP PULM RHB W/O MNTR: CPT

## 2024-05-02 PROCEDURE — 82962 GLUCOSE BLOOD TEST: CPT

## 2024-05-02 NOTE — RESULT ENCOUNTER NOTE
Please call pt, lab indicates stable kidney fxn and the heart failure tracking # BNP is improved from 100-756.  Only issue is sl low potassium so suggest for 2 days boost potassium from 40 rebeca equivalents (takes 2 of the 20s twice/day) to 4 of the tabs twice/day, then back to 2 tabs twice/day.

## 2024-05-08 ENCOUNTER — APPOINTMENT (OUTPATIENT)
Dept: GENERAL RADIOLOGY | Age: 74
End: 2024-05-08
Payer: MEDICARE

## 2024-05-08 ENCOUNTER — HOSPITAL ENCOUNTER (EMERGENCY)
Age: 74
Discharge: LEFT BEFORE DISCHARGE | End: 2024-05-08
Payer: MEDICARE

## 2024-05-08 VITALS
BODY MASS INDEX: 30.11 KG/M2 | WEIGHT: 255 LBS | SYSTOLIC BLOOD PRESSURE: 116 MMHG | RESPIRATION RATE: 16 BRPM | DIASTOLIC BLOOD PRESSURE: 66 MMHG | TEMPERATURE: 97.8 F | OXYGEN SATURATION: 93 % | HEIGHT: 77 IN | HEART RATE: 78 BPM

## 2024-05-08 DIAGNOSIS — R07.89 LEFT-SIDED CHEST WALL PAIN: Primary | ICD-10-CM

## 2024-05-08 DIAGNOSIS — S50.02XA CONTUSION OF LEFT ELBOW, INITIAL ENCOUNTER: ICD-10-CM

## 2024-05-08 DIAGNOSIS — M25.562 ACUTE PAIN OF LEFT KNEE: ICD-10-CM

## 2024-05-08 DIAGNOSIS — M25.522 LEFT ELBOW PAIN: ICD-10-CM

## 2024-05-08 PROCEDURE — 6370000000 HC RX 637 (ALT 250 FOR IP): Performed by: PHYSICIAN ASSISTANT

## 2024-05-08 PROCEDURE — 99283 EMERGENCY DEPT VISIT LOW MDM: CPT

## 2024-05-08 PROCEDURE — 71101 X-RAY EXAM UNILAT RIBS/CHEST: CPT

## 2024-05-08 PROCEDURE — 73564 X-RAY EXAM KNEE 4 OR MORE: CPT

## 2024-05-08 PROCEDURE — 73090 X-RAY EXAM OF FOREARM: CPT

## 2024-05-08 PROCEDURE — 73080 X-RAY EXAM OF ELBOW: CPT

## 2024-05-08 RX ORDER — ACETAMINOPHEN 500 MG
1000 TABLET ORAL ONCE
Status: COMPLETED | OUTPATIENT
Start: 2024-05-08 | End: 2024-05-08

## 2024-05-08 RX ADMIN — ACETAMINOPHEN 1000 MG: 500 TABLET ORAL at 19:22

## 2024-05-08 ASSESSMENT — PAIN DESCRIPTION - LOCATION: LOCATION: RIB CAGE

## 2024-05-08 ASSESSMENT — PAIN SCALES - GENERAL: PAINLEVEL_OUTOF10: 10

## 2024-05-08 NOTE — ED PROVIDER NOTES
again need for return precautions and follow-up.  She verbalized understanding and agreement.      Critical Care:      Discussion with Other Profesionals : None    Disposition Considerations (tests considered but not done, Shared Decision Making, Pt Expectation of Test or Tx.): CT head imaging considered.   I had a shared Medical Decision Making Discussion with patient CT head offered however they declined.  Additionally also offered CT chest however low suspicion for any rib fractures that would      Escalation of care, including admission/OBS considered : Appropriate for outpatient management.     Records Reviewed : None    Social Determinants of Health : None     Chronic conditions affecting care:    has a past medical history of Adenocarcinoma of prostate ([facility]) (05/31/2022), Aortic stenosis, mild (11/08/2017), Asthma, intrinsic, Atrial fibrillation ([facility]), Colonic polyp (05/2009), COPD (chronic obstructive pulmonary disease) ([facility]), DDD (degenerative disc disease), lumbar, Diabetes mellitus type 2, controlled ([facility]), Diabetic neuropathy, type II diabetes mellitus ([facility]), Dilated cardiomyopathy ([facility]) (11/04/2015), GERD (gastroesophageal reflux disease), Gout, H/O cardiovascular stress test (12/10/2015), H/O right and left heart catheterization (06/21/2022), H/O transesophageal echocardiography (TIM) for monitoring (06/21/1966), History of total right hip arthroplasty, Hyperlipidemia, Hypertension, IFG (impaired fasting glucose), Knee pain, bilateral, Long-term (current) use of anticoagulants, Peptic ulcer disease, S/P TAVR (transcatheter aortic valve replacement) (10/17/2022), Testosterone deficiency (dx'd 9/2015), Ventricular tachycardia ([facility]), and VHD (valvular heart disease) (11/29/2018).    I am the Primary Clinician of Record.    Patient was given the following medications:  Medications   acetaminophen (TYLENOL) tablet 1,000 mg (1,000 mg Oral Given 5/8/24 1922)        Is this patient to be

## 2024-05-13 ENCOUNTER — HOSPITAL ENCOUNTER (OUTPATIENT)
Dept: CARDIAC REHAB | Age: 74
Setting detail: THERAPIES SERIES
Discharge: HOME | End: 2024-05-13
Payer: MEDICARE

## 2024-05-13 LAB
GLUCOSE BLD-MCNC: 132 MG/DL (ref 70–99)
GLUCOSE BLD-MCNC: 91 MG/DL (ref 70–99)

## 2024-05-13 PROCEDURE — 94625 PHY/QHP OP PULM RHB W/O MNTR: CPT

## 2024-05-13 PROCEDURE — 82962 GLUCOSE BLOOD TEST: CPT

## 2024-05-16 ENCOUNTER — HOSPITAL ENCOUNTER (OUTPATIENT)
Dept: CARDIAC REHAB | Age: 74
Setting detail: THERAPIES SERIES
Discharge: HOME | End: 2024-05-16
Payer: MEDICARE

## 2024-05-16 LAB
GLUCOSE BLD-MCNC: 128 MG/DL (ref 70–99)
GLUCOSE BLD-MCNC: 98 MG/DL (ref 70–99)

## 2024-05-16 PROCEDURE — 82962 GLUCOSE BLOOD TEST: CPT

## 2024-05-16 PROCEDURE — 94625 PHY/QHP OP PULM RHB W/O MNTR: CPT

## 2024-05-17 ENCOUNTER — OFFICE VISIT (OUTPATIENT)
Dept: INTERNAL MEDICINE CLINIC | Age: 74
End: 2024-05-17

## 2024-05-17 VITALS
SYSTOLIC BLOOD PRESSURE: 121 MMHG | HEART RATE: 88 BPM | RESPIRATION RATE: 16 BRPM | BODY MASS INDEX: 30.82 KG/M2 | WEIGHT: 261 LBS | HEIGHT: 77 IN | OXYGEN SATURATION: 94 % | DIASTOLIC BLOOD PRESSURE: 77 MMHG

## 2024-05-17 DIAGNOSIS — R06.09 DOE (DYSPNEA ON EXERTION): ICD-10-CM

## 2024-05-17 DIAGNOSIS — I27.20 PULMONARY HYPERTENSION (HCC): ICD-10-CM

## 2024-05-17 DIAGNOSIS — I42.0 DILATED CARDIOMYOPATHY (HCC): Primary | ICD-10-CM

## 2024-05-17 RX ORDER — SPIRONOLACTONE AND HYDROCHLOROTHIAZIDE 25; 25 MG/1; MG/1
1 TABLET ORAL DAILY
Qty: 90 TABLET | Refills: 1 | Status: SHIPPED | OUTPATIENT
Start: 2024-05-17

## 2024-05-17 NOTE — PROGRESS NOTES
therapy and to watch his weights closely. I will defer managing his CHF treatment to cardiology. At this point, I doubt that his COPD is severe enough to be a major contributor to his pulmonary hypertension, but if he continues to have marked exertional dyspnea with the above treatment program, then getting a formal opinion from Dr. Corby Buck of pulmonary medicine regarding possible vasodilator therapy, will be considered.   --  --  HE HAS HX OF DIL CARDIOMYOPATHY.    Also fr prior RHCath Dr Ely tried bumex but no change. In sx, has pul HTN as noted.     He may need f'u w Pul Speciliast for vasodilator tx as above, Dr Buck?      Sob is temporarily better w inhalers.  Did note his wt is up.  #5.    Started cardiac rehab,     Some stress w sister passing, had heart dz and bad lungs.    OBJECTIVE:    /77   Pulse 88   Resp 16   Ht 1.956 m (6' 5.01\")   Wt 118.4 kg (261 lb)   SpO2 94%   BMI 30.94 kg/m²     Physical Exam  Vitals reviewed.   Constitutional:       General: He is not in acute distress.     Appearance: He is well-developed.   HENT:      Head: Normocephalic and atraumatic.      Mouth/Throat:      Mouth: Mucous membranes are moist.      Pharynx: Oropharynx is clear. No oropharyngeal exudate or posterior oropharyngeal erythema.   Eyes:      General: No scleral icterus.        Right eye: No discharge.         Left eye: No discharge.      Conjunctiva/sclera: Conjunctivae normal.      Pupils: Pupils are equal, round, and reactive to light.   Neck:      Thyroid: No thyromegaly.      Vascular: No JVD.      Trachea: No tracheal deviation.   Cardiovascular:      Rate and Rhythm: Normal rate. Rhythm irregular.      Heart sounds: Normal heart sounds. No murmur heard.     No friction rub. No gallop.   Pulmonary:      Effort: Pulmonary effort is normal. No respiratory distress.      Breath sounds: Normal breath sounds. No wheezing or rales.   Abdominal:      General: Bowel sounds are normal. There is no

## 2024-05-20 ENCOUNTER — HOSPITAL ENCOUNTER (OUTPATIENT)
Dept: CARDIAC REHAB | Age: 74
Setting detail: THERAPIES SERIES
Discharge: HOME | End: 2024-05-20
Payer: MEDICARE

## 2024-05-20 LAB
GLUCOSE BLD-MCNC: 178 MG/DL (ref 70–99)
GLUCOSE BLD-MCNC: 94 MG/DL (ref 70–99)

## 2024-05-20 PROCEDURE — 94625 PHY/QHP OP PULM RHB W/O MNTR: CPT

## 2024-05-20 PROCEDURE — 82962 GLUCOSE BLOOD TEST: CPT

## 2024-05-23 LAB
B-TYPE NATRIURETIC PEPTIDE: 828 PG/ML (ref 0–125)
BUN / CREAT RATIO: 23 (ref 7–25)
BUN BLDV-MCNC: 35 MG/DL (ref 3–29)
CALCIUM SERPL-MCNC: 9.5 MG/DL (ref 8.5–10.5)
CHLORIDE BLD-SCNC: 96 MEQ/L (ref 96–110)
CO2: 28 MEQ/L (ref 19–32)
CREAT SERPL-MCNC: 1.5 MG/DL (ref 0.5–1.2)
ESTIMATED GLOMERULAR FILTRATION RATE CREATININE EQUATION: 48 MLS/MIN/1.73M2
FASTING STATUS: ABNORMAL
GLUCOSE BLD-MCNC: 123 MG/DL (ref 70–99)
MAGNESIUM: 1.9 MG/DL (ref 1.4–2.5)
POTASSIUM SERPL-SCNC: 3.5 MEQ/L (ref 3.4–5.3)
SODIUM BLD-SCNC: 138 MEQ/L (ref 135–148)

## 2024-05-24 ENCOUNTER — TELEPHONE (OUTPATIENT)
Dept: INTERNAL MEDICINE CLINIC | Age: 74
End: 2024-05-24

## 2024-05-24 DIAGNOSIS — I10 HYPERTENSION, UNSPECIFIED TYPE: ICD-10-CM

## 2024-05-24 DIAGNOSIS — I15.9 SECONDARY HYPERTENSION: Primary | ICD-10-CM

## 2024-05-24 DIAGNOSIS — I50.9 CONGESTIVE HEART FAILURE, UNSPECIFIED HF CHRONICITY, UNSPECIFIED HEART FAILURE TYPE (HCC): ICD-10-CM

## 2024-05-24 NOTE — TELEPHONE ENCOUNTER
----- Message from Gerardo Elder MD sent at 5/23/2024  1:38 PM EDT -----  Please call pt, his lab suggests he may have some underlying congestive heart failure.  Ask if had any improvement w the additional med aldactazide?  Is potassium and magnesium levels are ok on medication but appears to now be mildly dehydrated w this so rec to now decr the aldactazide/= spironolactone/hydrochlorothizide to 1/2 tab daily.  Then we also should f/u w him in one month to assess response--- pls ask him to recheck BMP and BNP just prior to this appt, dx HTN and CHF.

## 2024-05-27 ENCOUNTER — APPOINTMENT (OUTPATIENT)
Dept: CARDIAC REHAB | Age: 74
End: 2024-05-27
Payer: MEDICARE

## 2024-05-30 ENCOUNTER — APPOINTMENT (OUTPATIENT)
Dept: CARDIAC REHAB | Age: 74
End: 2024-05-30
Payer: MEDICARE

## 2024-06-19 DIAGNOSIS — I10 HYPERTENSION, UNSPECIFIED TYPE: ICD-10-CM

## 2024-06-19 DIAGNOSIS — I50.9 CONGESTIVE HEART FAILURE, UNSPECIFIED HF CHRONICITY, UNSPECIFIED HEART FAILURE TYPE (HCC): ICD-10-CM

## 2024-06-19 PROCEDURE — 36415 COLL VENOUS BLD VENIPUNCTURE: CPT | Performed by: INTERNAL MEDICINE

## 2024-06-20 LAB
ANION GAP SERPL CALCULATED.3IONS-SCNC: 9 MMOL/L (ref 3–16)
BUN SERPL-MCNC: 22 MG/DL (ref 7–20)
CALCIUM SERPL-MCNC: 9.4 MG/DL (ref 8.3–10.6)
CHLORIDE SERPL-SCNC: 99 MMOL/L (ref 99–110)
CO2 SERPL-SCNC: 29 MMOL/L (ref 21–32)
CREAT SERPL-MCNC: 1.3 MG/DL (ref 0.8–1.3)
GFR SERPLBLD CREATININE-BSD FMLA CKD-EPI: 57 ML/MIN/{1.73_M2}
GLUCOSE SERPL-MCNC: 108 MG/DL (ref 70–99)
NT-PROBNP SERPL-MCNC: 1044 PG/ML (ref 0–449)
POTASSIUM SERPL-SCNC: 3.8 MMOL/L (ref 3.5–5.1)
SODIUM SERPL-SCNC: 137 MMOL/L (ref 136–145)

## 2024-06-20 NOTE — RESULT ENCOUNTER NOTE
Please call pt, Sergei'S KIDNEY FXN IS STABLE ON THE NEW WATER PILL BUT ALSO ASK IF HIS SOB IS ANY BETTER?  HIS HEART FAILURE # THE BNP IS STILL ELEVATED.  IF HE'S DOING BETTER THEN CONT CURRENT MEDS BUT IF NO BETTER THEN FIRST CONFIRM IF HE'S STILL ON BUMEX/BUMETANIDE TOO?  IF SO THEN REC TO TAKE AN ADDITIONAL 1/2 TAB DAILY FOR THE NEXT THREE DAYS THEN BACK TO 1/DAY AND WE'LL REASSESS NEXT WEEK.

## 2024-06-24 ENCOUNTER — OFFICE VISIT (OUTPATIENT)
Dept: INTERNAL MEDICINE CLINIC | Age: 74
End: 2024-06-24

## 2024-06-24 VITALS
HEART RATE: 68 BPM | BODY MASS INDEX: 31.67 KG/M2 | SYSTOLIC BLOOD PRESSURE: 120 MMHG | WEIGHT: 268.2 LBS | HEIGHT: 77 IN | OXYGEN SATURATION: 90 % | DIASTOLIC BLOOD PRESSURE: 82 MMHG

## 2024-06-24 DIAGNOSIS — D50.9 IRON DEFICIENCY ANEMIA, UNSPECIFIED IRON DEFICIENCY ANEMIA TYPE: ICD-10-CM

## 2024-06-24 DIAGNOSIS — D68.69 SECONDARY HYPERCOAGULABLE STATE (HCC): ICD-10-CM

## 2024-06-24 DIAGNOSIS — I50.21 ACUTE SYSTOLIC CONGESTIVE HEART FAILURE (HCC): ICD-10-CM

## 2024-06-24 DIAGNOSIS — E11.21 CONTROLLED TYPE 2 DIABETES MELLITUS WITH DIABETIC NEPHROPATHY, WITHOUT LONG-TERM CURRENT USE OF INSULIN (HCC): ICD-10-CM

## 2024-06-24 DIAGNOSIS — I42.0 DILATED CARDIOMYOPATHY (HCC): ICD-10-CM

## 2024-06-24 DIAGNOSIS — D50.9 IRON DEFICIENCY ANEMIA, UNSPECIFIED IRON DEFICIENCY ANEMIA TYPE: Primary | ICD-10-CM

## 2024-06-24 DIAGNOSIS — R06.09 DOE (DYSPNEA ON EXERTION): ICD-10-CM

## 2024-06-24 DIAGNOSIS — I48.20 CHRONIC ATRIAL FIBRILLATION (HCC): ICD-10-CM

## 2024-06-24 LAB
DEPRECATED RDW RBC AUTO: 17.4 % (ref 12.4–15.4)
FERRITIN SERPL IA-MCNC: 14.7 NG/ML (ref 30–400)
HCT VFR BLD AUTO: 28.6 % (ref 40.5–52.5)
HGB BLD-MCNC: 9.2 G/DL (ref 13.5–17.5)
MCH RBC QN AUTO: 25.3 PG (ref 26–34)
MCHC RBC AUTO-ENTMCNC: 32.2 G/DL (ref 31–36)
MCV RBC AUTO: 78.4 FL (ref 80–100)
PLATELET # BLD AUTO: 195 K/UL (ref 135–450)
PMV BLD AUTO: 7.9 FL (ref 5–10.5)
RBC # BLD AUTO: 3.64 M/UL (ref 4.2–5.9)
WBC # BLD AUTO: 4.9 K/UL (ref 4–11)

## 2024-06-24 PROCEDURE — 36415 COLL VENOUS BLD VENIPUNCTURE: CPT | Performed by: INTERNAL MEDICINE

## 2024-06-24 RX ORDER — BUMETANIDE 2 MG/1
TABLET ORAL
Qty: 1 TABLET | Refills: 0
Start: 2024-06-24

## 2024-06-24 NOTE — PROGRESS NOTES
Og [place]  1950 06/24/24    SUBJECTIVE:    Was lt headed on higher dose aldactazide, now rocio lower dose    Has f/u cardio Dr Stevens q3mo now    Sees pul Dr Salazar next week, for his astthma COPD    BNP for CHF stable ~1000.    WE DISCUSSED ANEMIA AS POSSIBLE CONTRIBUTOR TO HIS SOB, WE'LL RECHECK AND SINCE DID NOT ROCIO ORAL IRON, MAY NEED AN INFUSION IF STILL LOW    DM- SUGARS HAVE BEEN STABLE ON REGIMEN  Lab Results   Component Value Date    LABA1C 6.1 04/15/2024    LABA1C 5.4 01/15/2024    LABA1C 5.5 04/17/2023     Lab Results   Component Value Date    GLUF 107 (H) 01/06/2017    MALBCR see below 04/17/2023    CREATININE 1.3 06/19/2024         OBJECTIVE:    /82 (Site: Left Upper Arm, Position: Sitting, Cuff Size: Medium Adult)   Pulse 68   Ht 1.956 m (6' 5.01\")   Wt 121.7 kg (268 lb 3.2 oz)   SpO2 90%   BMI 31.80 kg/m²     Physical Exam  Vitals reviewed.   Constitutional:       General: He is not in acute distress.     Appearance: He is well-developed.   HENT:      Head: Normocephalic and atraumatic.      Mouth/Throat:      Mouth: Mucous membranes are moist.      Pharynx: Oropharynx is clear. No oropharyngeal exudate or posterior oropharyngeal erythema.   Eyes:      General: No scleral icterus.        Right eye: No discharge.         Left eye: No discharge.      Conjunctiva/sclera: Conjunctivae normal.      Pupils: Pupils are equal, round, and reactive to light.   Neck:      Thyroid: No thyromegaly.      Vascular: No JVD.      Trachea: No tracheal deviation.   Cardiovascular:      Rate and Rhythm: Normal rate. Rhythm irregular.      Heart sounds: Normal heart sounds. No murmur heard.     No friction rub. No gallop.   Pulmonary:      Effort: Pulmonary effort is normal. No respiratory distress.      Breath sounds: Normal breath sounds. No wheezing or rales.   Abdominal:      General: Bowel sounds are normal. There is no distension.      Palpations: Abdomen is soft. There is no mass.

## 2024-06-25 DIAGNOSIS — D50.9 IRON DEFICIENCY ANEMIA, UNSPECIFIED IRON DEFICIENCY ANEMIA TYPE: Primary | ICD-10-CM

## 2024-06-25 DIAGNOSIS — K21.9 GASTROESOPHAGEAL REFLUX DISEASE WITHOUT ESOPHAGITIS: ICD-10-CM

## 2024-06-25 LAB
IRON SATN MFR SERPL: 7 % (ref 20–50)
IRON SERPL-MCNC: 27 UG/DL (ref 59–158)
TIBC SERPL-MCNC: 376 UG/DL (ref 260–445)

## 2024-06-25 RX ORDER — OMEPRAZOLE 40 MG/1
40 CAPSULE, DELAYED RELEASE ORAL DAILY
Qty: 90 CAPSULE | Refills: 1 | Status: SHIPPED | OUTPATIENT
Start: 2024-06-25

## 2024-06-25 NOTE — RESULT ENCOUNTER NOTE
Please call pt, he has iron deficiency anemia.  Pls arrange for an ** IV iron infusion** at the hospital, if ok w him we do need referral to GI Dr Garcia, I'll also incr his prilosec/omeprazole from 20-40mg daily, new script sent but can double the omeprazole 20mg tabs to 2/day till gone.    As best he can tolerate, would rec to retry ferrous sulfate 325mg daily.  Lastly, pls change f/u appt w me to one month.

## 2024-06-26 DIAGNOSIS — D64.9 ANEMIA, UNSPECIFIED TYPE: ICD-10-CM

## 2024-06-26 DIAGNOSIS — E61.1 IRON DEFICIENCY: Primary | ICD-10-CM

## 2024-06-26 RX ORDER — SODIUM CHLORIDE 9 MG/ML
INJECTION, SOLUTION INTRAVENOUS CONTINUOUS
OUTPATIENT
Start: 2024-06-27

## 2024-06-26 RX ORDER — FAMOTIDINE 10 MG/ML
20 INJECTION, SOLUTION INTRAVENOUS
OUTPATIENT
Start: 2024-06-27

## 2024-06-26 RX ORDER — DIPHENHYDRAMINE HYDROCHLORIDE 50 MG/ML
25 INJECTION INTRAMUSCULAR; INTRAVENOUS ONCE
Start: 2024-06-27 | End: 2024-06-27

## 2024-06-26 RX ORDER — EPINEPHRINE 1 MG/ML
0.3 INJECTION, SOLUTION INTRAMUSCULAR; SUBCUTANEOUS PRN
OUTPATIENT
Start: 2024-06-27

## 2024-06-26 RX ORDER — ONDANSETRON 2 MG/ML
8 INJECTION INTRAMUSCULAR; INTRAVENOUS
OUTPATIENT
Start: 2024-06-27

## 2024-06-26 RX ORDER — SODIUM CHLORIDE 0.9 % (FLUSH) 0.9 %
5-40 SYRINGE (ML) INJECTION PRN
OUTPATIENT
Start: 2024-06-27

## 2024-06-26 RX ORDER — ALBUTEROL SULFATE 90 UG/1
4 AEROSOL, METERED RESPIRATORY (INHALATION) PRN
OUTPATIENT
Start: 2024-06-27

## 2024-06-26 RX ORDER — MEPERIDINE HYDROCHLORIDE 25 MG/ML
12.5 INJECTION INTRAMUSCULAR; INTRAVENOUS; SUBCUTANEOUS PRN
OUTPATIENT
Start: 2024-06-27

## 2024-06-26 RX ORDER — DIPHENHYDRAMINE HYDROCHLORIDE 50 MG/ML
50 INJECTION INTRAMUSCULAR; INTRAVENOUS
OUTPATIENT
Start: 2024-06-27

## 2024-06-26 RX ORDER — ACETAMINOPHEN 325 MG/1
650 TABLET ORAL
OUTPATIENT
Start: 2024-06-27

## 2024-07-09 ENCOUNTER — TELEPHONE (OUTPATIENT)
Dept: INFUSION THERAPY | Age: 74
End: 2024-07-09

## 2024-07-09 NOTE — TELEPHONE ENCOUNTER
CALLED PT TO SCHEDULE INFED INFUSION PER DR. CROUCH. PT STATES HE IS GOING TO \"PASS\" ON INFUSION AT THIS TIME

## 2024-07-25 ENCOUNTER — OFFICE VISIT (OUTPATIENT)
Dept: INTERNAL MEDICINE CLINIC | Age: 74
End: 2024-07-25

## 2024-07-25 VITALS
HEART RATE: 74 BPM | SYSTOLIC BLOOD PRESSURE: 122 MMHG | RESPIRATION RATE: 16 BRPM | WEIGHT: 262 LBS | BODY MASS INDEX: 30.94 KG/M2 | OXYGEN SATURATION: 91 % | HEIGHT: 77 IN | DIASTOLIC BLOOD PRESSURE: 70 MMHG

## 2024-07-25 DIAGNOSIS — E11.21 CONTROLLED TYPE 2 DIABETES MELLITUS WITH DIABETIC NEPHROPATHY, WITHOUT LONG-TERM CURRENT USE OF INSULIN (HCC): ICD-10-CM

## 2024-07-25 DIAGNOSIS — I15.9 SECONDARY HYPERTENSION: ICD-10-CM

## 2024-07-25 DIAGNOSIS — I50.21 ACUTE SYSTOLIC CONGESTIVE HEART FAILURE (HCC): Primary | ICD-10-CM

## 2024-07-25 DIAGNOSIS — E79.0 ELEVATED URIC ACID IN BLOOD: ICD-10-CM

## 2024-07-25 DIAGNOSIS — D50.9 IRON DEFICIENCY ANEMIA, UNSPECIFIED IRON DEFICIENCY ANEMIA TYPE: ICD-10-CM

## 2024-07-25 DIAGNOSIS — C61 ADENOCARCINOMA OF PROSTATE (HCC): ICD-10-CM

## 2024-07-25 DIAGNOSIS — J45.20 MILD INTERMITTENT ASTHMA WITHOUT COMPLICATION: ICD-10-CM

## 2024-07-25 DIAGNOSIS — I48.20 CHRONIC ATRIAL FIBRILLATION (HCC): ICD-10-CM

## 2024-07-25 DIAGNOSIS — I50.21 ACUTE SYSTOLIC CONGESTIVE HEART FAILURE (HCC): ICD-10-CM

## 2024-07-25 LAB
ALBUMIN SERPL-MCNC: 4.4 G/DL (ref 3.4–5)
ALBUMIN/GLOB SERPL: 2 {RATIO} (ref 1.1–2.2)
ALP SERPL-CCNC: 75 U/L (ref 40–129)
ALT SERPL-CCNC: 10 U/L (ref 10–40)
ANION GAP SERPL CALCULATED.3IONS-SCNC: 14 MMOL/L (ref 3–16)
AST SERPL-CCNC: 16 U/L (ref 15–37)
BILIRUB SERPL-MCNC: 0.5 MG/DL (ref 0–1)
BUN SERPL-MCNC: 34 MG/DL (ref 7–20)
CALCIUM SERPL-MCNC: 9.6 MG/DL (ref 8.3–10.6)
CHLORIDE SERPL-SCNC: 97 MMOL/L (ref 99–110)
CHOLEST SERPL-MCNC: 120 MG/DL (ref 0–199)
CO2 SERPL-SCNC: 27 MMOL/L (ref 21–32)
CREAT SERPL-MCNC: 1.6 MG/DL (ref 0.8–1.3)
DEPRECATED RDW RBC AUTO: 17.3 % (ref 12.4–15.4)
FERRITIN SERPL IA-MCNC: 16.3 NG/ML (ref 30–400)
GFR SERPLBLD CREATININE-BSD FMLA CKD-EPI: 44 ML/MIN/{1.73_M2}
GLUCOSE SERPL-MCNC: 107 MG/DL (ref 70–99)
HCT VFR BLD AUTO: 28.9 % (ref 40.5–52.5)
HDLC SERPL-MCNC: 44 MG/DL (ref 40–60)
HGB BLD-MCNC: 9.3 G/DL (ref 13.5–17.5)
IRON SATN MFR SERPL: 7 % (ref 20–50)
IRON SERPL-MCNC: 30 UG/DL (ref 59–158)
LDLC SERPL CALC-MCNC: 59 MG/DL
MAGNESIUM SERPL-MCNC: 2.3 MG/DL (ref 1.8–2.4)
MCH RBC QN AUTO: 24.2 PG (ref 26–34)
MCHC RBC AUTO-ENTMCNC: 32.4 G/DL (ref 31–36)
MCV RBC AUTO: 74.7 FL (ref 80–100)
NT-PROBNP SERPL-MCNC: 819 PG/ML (ref 0–449)
PLATELET # BLD AUTO: 193 K/UL (ref 135–450)
PMV BLD AUTO: 7.6 FL (ref 5–10.5)
POTASSIUM SERPL-SCNC: 4.4 MMOL/L (ref 3.5–5.1)
PROT SERPL-MCNC: 6.6 G/DL (ref 6.4–8.2)
RBC # BLD AUTO: 3.86 M/UL (ref 4.2–5.9)
SODIUM SERPL-SCNC: 138 MMOL/L (ref 136–145)
TIBC SERPL-MCNC: 414 UG/DL (ref 260–445)
TRIGL SERPL-MCNC: 86 MG/DL (ref 0–150)
URATE SERPL-MCNC: 6.2 MG/DL (ref 3.5–7.2)
VLDLC SERPL CALC-MCNC: 17 MG/DL
WBC # BLD AUTO: 5.2 K/UL (ref 4–11)

## 2024-07-25 PROCEDURE — 36415 COLL VENOUS BLD VENIPUNCTURE: CPT | Performed by: INTERNAL MEDICINE

## 2024-07-25 RX ORDER — BUMETANIDE 2 MG/1
TABLET ORAL
Qty: 1 TABLET | Refills: 0
Start: 2024-07-25

## 2024-07-25 NOTE — PROGRESS NOTES
last in March and f/u is for Sept.   Wt is down on the higher dose bumex.    Iron defic anemia, was too long for the iron infusion so now on incr dose BID. We'll recheck lab    OBJECTIVE:    /70 (Site: Left Upper Arm, Position: Sitting, Cuff Size: Medium Adult)   Pulse 74   Resp 16   Ht 1.956 m (6' 5\")   Wt 118.8 kg (262 lb)   SpO2 91%   BMI 31.07 kg/m²     Physical Exam  Constitutional:       Appearance: Normal appearance.   HENT:      Head: Normocephalic and atraumatic.   Eyes:      Extraocular Movements: Extraocular movements intact.      Conjunctiva/sclera: Conjunctivae normal.      Pupils: Pupils are equal, round, and reactive to light.   Cardiovascular:      Rate and Rhythm: Normal rate and regular rhythm.      Heart sounds: Normal heart sounds. No murmur heard.  Pulmonary:      Effort: Pulmonary effort is normal. No respiratory distress.      Breath sounds: Normal breath sounds.   Abdominal:      General: Abdomen is flat. Bowel sounds are normal. There is no distension.      Palpations: Abdomen is soft. There is no mass.      Tenderness: There is no abdominal tenderness.      Hernia: No hernia is present.   Musculoskeletal:      Cervical back: Neck supple.      Right lower leg: No edema.      Left lower leg: No edema.   Neurological:      Mental Status: He is alert.   Psychiatric:         Mood and Affect: Mood normal.         ASSESSMENT:    1. Acute systolic congestive heart failure (HCC)    2. Iron deficiency anemia, unspecified iron deficiency anemia type    3. Controlled type 2 diabetes mellitus with diabetic nephropathy, without long-term current use of insulin (HCC)    4. Chronic atrial fibrillation (HCC)    5. Secondary hypertension    6. Elevated uric acid in blood    7. Mild intermittent asthma without complication    8. Adenocarcinoma of prostate (HCC)        PLAN:    Og was seen today for 1 month follow-up.    Diagnoses and all orders for this visit:    Acute systolic congestive heart

## 2024-07-26 ENCOUNTER — TELEPHONE (OUTPATIENT)
Dept: INTERNAL MEDICINE CLINIC | Age: 74
End: 2024-07-26

## 2024-07-26 DIAGNOSIS — D64.9 ANEMIA, UNSPECIFIED TYPE: ICD-10-CM

## 2024-07-26 DIAGNOSIS — D50.9 IRON DEFICIENCY ANEMIA, UNSPECIFIED IRON DEFICIENCY ANEMIA TYPE: Primary | ICD-10-CM

## 2024-07-26 RX ORDER — SODIUM CHLORIDE 9 MG/ML
INJECTION, SOLUTION INTRAVENOUS CONTINUOUS
OUTPATIENT
Start: 2024-07-29

## 2024-07-26 RX ORDER — MEPERIDINE HYDROCHLORIDE 25 MG/ML
12.5 INJECTION INTRAMUSCULAR; INTRAVENOUS; SUBCUTANEOUS PRN
OUTPATIENT
Start: 2024-07-29

## 2024-07-26 RX ORDER — FERROUS SULFATE 325(65) MG
325 TABLET, DELAYED RELEASE (ENTERIC COATED) ORAL
Qty: 90 TABLET | Refills: 5 | COMMUNITY
Start: 2024-07-26

## 2024-07-26 RX ORDER — DIPHENHYDRAMINE HYDROCHLORIDE 50 MG/ML
25 INJECTION INTRAMUSCULAR; INTRAVENOUS EVERY 6 HOURS PRN
Start: 2024-07-29

## 2024-07-26 RX ORDER — FAMOTIDINE 10 MG/ML
20 INJECTION, SOLUTION INTRAVENOUS
OUTPATIENT
Start: 2024-07-29

## 2024-07-26 RX ORDER — DIPHENHYDRAMINE HYDROCHLORIDE 50 MG/ML
50 INJECTION INTRAMUSCULAR; INTRAVENOUS
OUTPATIENT
Start: 2024-07-29

## 2024-07-26 RX ORDER — ALBUTEROL SULFATE 90 UG/1
4 AEROSOL, METERED RESPIRATORY (INHALATION) PRN
OUTPATIENT
Start: 2024-07-29

## 2024-07-26 RX ORDER — ACETAMINOPHEN 325 MG/1
650 TABLET ORAL
OUTPATIENT
Start: 2024-07-29

## 2024-07-26 RX ORDER — ONDANSETRON 2 MG/ML
8 INJECTION INTRAMUSCULAR; INTRAVENOUS
OUTPATIENT
Start: 2024-07-29

## 2024-07-26 RX ORDER — EPINEPHRINE 1 MG/ML
0.3 INJECTION, SOLUTION INTRAMUSCULAR; SUBCUTANEOUS PRN
OUTPATIENT
Start: 2024-07-29

## 2024-07-26 RX ORDER — SODIUM CHLORIDE 0.9 % (FLUSH) 0.9 %
5-40 SYRINGE (ML) INJECTION PRN
OUTPATIENT
Start: 2024-07-29

## 2024-07-26 NOTE — RESULT ENCOUNTER NOTE
Please call pt, lab ok incl chol level., thyroid fxn, also heart failure # the BNP is improved w incr bumex down from 1044-819.  HOWEVER, REMAINS SIGNIFICANTLY ANEMIC AND W LOW IRON, DESPITE THE ORAL SUPPLEMENT.   REC 1- IRON INFUSION AT [facility], NEED TO FAX ORDER SHEET.  2, INCR FERROUS SULFATE TO 3X/DAY.  3, NEED RECHECK CBC, IRON, FERRITIN IN 3 WEEKS.  DX IRON DEFIC ANEMIA.  - IF IRON LEVEL IMPROVES WE'LL THEN DECR BACK TO BID BUT IF NO BETTER MAY NEED GI W/U.

## 2024-07-26 NOTE — TELEPHONE ENCOUNTER
----- Message from Gerardo Elder MD sent at 7/26/2024  7:19 AM EDT -----  Please call pt, lab ok incl chol level., thyroid fxn, also heart failure # the BNP is improved w incr bumex down from 1044-819.  HOWEVER, REMAINS SIGNIFICANTLY ANEMIC AND W LOW IRON, DESPITE THE ORAL SUPPLEMENT.   REC 1- IRON INFUSION AT [facility], NEED TO FAX ORDER SHEET.  2, INCR FERROUS SULFATE TO 3X/DAY.  3, NEED RECHECK CBC, IRON, FERRITIN IN 3 WEEKS.  DX IRON DEFIC ANEMIA.  - IF IRON LEVEL IMPROVES WE'LL THEN DECR BACK TO BID BUT IF NO BETTER MAY NEED GI W/U.

## 2024-07-30 ENCOUNTER — TELEMEDICINE (OUTPATIENT)
Dept: INTERNAL MEDICINE CLINIC | Age: 74
End: 2024-07-30

## 2024-07-30 DIAGNOSIS — Z00.00 MEDICARE ANNUAL WELLNESS VISIT, SUBSEQUENT: Primary | ICD-10-CM

## 2024-07-30 ASSESSMENT — LIFESTYLE VARIABLES
HOW OFTEN DO YOU HAVE A DRINK CONTAINING ALCOHOL: 2-3 TIMES A WEEK
HOW MANY STANDARD DRINKS CONTAINING ALCOHOL DO YOU HAVE ON A TYPICAL DAY: 1 OR 2

## 2024-07-30 ASSESSMENT — PATIENT HEALTH QUESTIONNAIRE - PHQ9
SUM OF ALL RESPONSES TO PHQ QUESTIONS 1-9: 0
1. LITTLE INTEREST OR PLEASURE IN DOING THINGS: NOT AT ALL
2. FEELING DOWN, DEPRESSED OR HOPELESS: NOT AT ALL
SUM OF ALL RESPONSES TO PHQ QUESTIONS 1-9: 0
SUM OF ALL RESPONSES TO PHQ9 QUESTIONS 1 & 2: 0
SUM OF ALL RESPONSES TO PHQ QUESTIONS 1-9: 0
SUM OF ALL RESPONSES TO PHQ QUESTIONS 1-9: 0

## 2024-07-30 NOTE — PROGRESS NOTES
Medicare Annual Wellness Visit    Og Adair is here for Medicare AWV    Assessment & Plan   Medicare annual wellness visit, subsequent  Recommendations for Preventive Services Due: see orders and patient instructions/AVS.  Recommended screening schedule for the next 5-10 years is provided to the patient in written form: see Patient Instructions/AVS.     Return in 1 year (on 7/30/2025) for Medicare AWV.     Subjective       Patient's complete Health Risk Assessment and screening values have been reviewed and are found in Flowsheets. The following problems were reviewed today and where indicated follow up appointments were made and/or referrals ordered.    Positive Risk Factor Screenings with Interventions:                  Abnormal BMI (obese):  There is no height or weight on file to calculate BMI. (!) Abnormal  Interventions:  Patient declines any further evaluation or treatment                                   Objective    Patient-Reported Vitals  No data recorded     Unable to obtain 3 vital signs due to patient not having equipment to take blood pressure/temperature.                Allergies   Allergen Reactions    Lisinopril      Chest tightness    Simvastatin Other (See Comments)     Muscle cramps     Prior to Visit Medications    Medication Sig Taking? Authorizing Provider   ferrous sulfate (FE TABS 325) 325 (65 Fe) MG EC tablet Take 1 tablet by mouth 3 times daily (with meals) Yes Gerardo Elder MD   bumetanide (BUMEX) 2 MG tablet TAKES 2 IN AM AND 2 IN PM, PER CARDIOLOGY Yes Gerardo Elder MD   omeprazole (PRILOSEC) 40 MG delayed release capsule Take 1 capsule by mouth daily Yes Gerardo Elder MD   spironolactone-hydroCHLOROthiazide (ALDACTAZIDE) 25-25 MG per tablet Take 1 tablet by mouth daily  Patient taking differently: Take 1 tablet by mouth daily Taking 50 mg daily Yes Gerardo Elder MD   traZODone (DESYREL) 50 MG tablet TAKE TWO TABLETS BY MOUTH ONCE NIGHTLY Yes Jerrod

## 2024-07-30 NOTE — PATIENT INSTRUCTIONS
weight-loss plan that meets your needs.  You don't have to make a lot of big changes at once. A better idea might be to focus on small changes and stick with them. When those changes become habit, you can add a few more changes.  Some people find it helpful to take an exercise or nutrition class. If you have questions, ask your doctor about seeing a registered dietitian or an exercise specialist. You might also think about joining a weight-loss support group.  If you're not ready to make changes right now, try to pick a date in the future. Then make an appointment with your doctor to talk about when and how you'll get started with a plan.  Follow-up care is a key part of your treatment and safety. Be sure to make and go to all appointments, and call your doctor if you are having problems. It's also a good idea to know your test results and keep a list of the medicines you take.  How can you care for yourself at home?  Set realistic goals. Many people expect to lose much more weight than is likely. A weight loss of 5% to 10% of your body weight may be enough to improve your health.  Get family and friends involved to provide support. Talk to them about why you are trying to lose weight, and ask them to help. They can help by participating in exercise and having meals with you, even if they may be eating something different.  Find what works best for you. If you do not have time or do not like to cook, a program that offers meal replacement bars or shakes may be better for you. Or if you like to prepare meals, finding a plan that includes daily menus and recipes may be best.  Ask your doctor about other health professionals who can help you achieve your weight loss goals.  A dietitian can help you make healthy changes in your diet.  An exercise specialist or  can help you develop a safe and effective exercise program.  A counselor or psychiatrist can help you cope with issues such as depression,

## 2024-07-31 DIAGNOSIS — I48.19 PERSISTENT ATRIAL FIBRILLATION (HCC): ICD-10-CM

## 2024-09-07 DIAGNOSIS — J45.909 MILD ASTHMA WITHOUT COMPLICATION, UNSPECIFIED WHETHER PERSISTENT: ICD-10-CM

## 2024-09-09 RX ORDER — MONTELUKAST SODIUM 10 MG/1
10 TABLET ORAL DAILY
Qty: 90 TABLET | Refills: 3 | Status: SHIPPED | OUTPATIENT
Start: 2024-09-09

## 2024-09-26 DIAGNOSIS — I42.0 DILATED CARDIOMYOPATHY (HCC): ICD-10-CM

## 2024-09-26 RX ORDER — LANOLIN ALCOHOL/MO/W.PET/CERES
400 CREAM (GRAM) TOPICAL DAILY
Qty: 90 TABLET | Refills: 1 | Status: SHIPPED | OUTPATIENT
Start: 2024-09-26

## 2024-10-24 NOTE — PROGRESS NOTES
Og [place]  1950  10/24/24    SUBJECTIVE:    History of Present Illness  The patient is a 74-year-old male who presents for follow-up.    He reports experiencing anemia and stomach discomfort. He is currently taking iron supplements twice daily. He mentions that his stomach issues began after radiation treatment. He continues to take Prilosec.    His cardiologist, Dr. Cathleen Stevens, has been managing his heart condition- ATR FIB AND HX OF TAVR. He has undergone two tests, including a nuclear scan with radiation, both of which returned negative results- HAD TEST TO SCR FOR CARDIAC AMYLOIDOSIS. He also had blood work done.    He regularly performs breathing tests as part of his lung care. He is on Trelegy and albuterol, which he reports have been beneficial.    He wears a diaper daily due to fear of accidents. He has been discharged from his urologist's care. His spironolactone dosage has been increased to 50 mg.  OBJECTIVE:    /64 (Site: Left Upper Arm, Position: Sitting, Cuff Size: Medium Adult)   Pulse 72   Ht 1.956 m (6' 5.01\")   Wt 120.4 kg (265 lb 6.4 oz)   SpO2 96%   BMI 31.47 kg/m²     Physical Exam  Constitutional:       Appearance: Normal appearance.   HENT:      Head: Normocephalic and atraumatic.   Eyes:      Extraocular Movements: Extraocular movements intact.      Conjunctiva/sclera: Conjunctivae normal.      Pupils: Pupils are equal, round, and reactive to light.   Cardiovascular:      Rate and Rhythm: Normal rate. Rhythm irregular.      Heart sounds: Normal heart sounds. No murmur heard.  Pulmonary:      Effort: Pulmonary effort is normal. No respiratory distress.      Breath sounds: Normal breath sounds.   Abdominal:      General: Abdomen is flat. Bowel sounds are normal. There is no distension.      Palpations: Abdomen is soft. There is no mass.      Tenderness: There is no abdominal tenderness.      Hernia: No hernia is present.   Musculoskeletal:      Cervical back: Neck supple.

## 2024-10-25 ENCOUNTER — OFFICE VISIT (OUTPATIENT)
Dept: INTERNAL MEDICINE CLINIC | Age: 74
End: 2024-10-25

## 2024-10-25 VITALS
BODY MASS INDEX: 31.34 KG/M2 | HEIGHT: 77 IN | WEIGHT: 265.4 LBS | DIASTOLIC BLOOD PRESSURE: 64 MMHG | HEART RATE: 72 BPM | OXYGEN SATURATION: 96 % | SYSTOLIC BLOOD PRESSURE: 120 MMHG

## 2024-10-25 DIAGNOSIS — E79.0 ELEVATED URIC ACID IN BLOOD: ICD-10-CM

## 2024-10-25 DIAGNOSIS — J45.20 MILD INTERMITTENT ASTHMA WITHOUT COMPLICATION: ICD-10-CM

## 2024-10-25 DIAGNOSIS — E11.21 CONTROLLED TYPE 2 DIABETES MELLITUS WITH DIABETIC NEPHROPATHY, WITHOUT LONG-TERM CURRENT USE OF INSULIN (HCC): Primary | ICD-10-CM

## 2024-10-25 DIAGNOSIS — I42.0 DILATED CARDIOMYOPATHY (HCC): ICD-10-CM

## 2024-10-25 DIAGNOSIS — I27.20 PULMONARY HYPERTENSION (HCC): ICD-10-CM

## 2024-10-25 DIAGNOSIS — K21.9 GASTROESOPHAGEAL REFLUX DISEASE WITHOUT ESOPHAGITIS: ICD-10-CM

## 2024-10-25 DIAGNOSIS — D50.9 IRON DEFICIENCY ANEMIA, UNSPECIFIED IRON DEFICIENCY ANEMIA TYPE: ICD-10-CM

## 2024-10-25 DIAGNOSIS — F51.01 PRIMARY INSOMNIA: ICD-10-CM

## 2024-10-25 RX ORDER — POTASSIUM CHLORIDE 1500 MG/1
40 TABLET, EXTENDED RELEASE ORAL DAILY
Qty: 180 TABLET | Refills: 1 | Status: SHIPPED | OUTPATIENT
Start: 2024-10-25

## 2024-10-25 RX ORDER — ALLOPURINOL 100 MG/1
100 TABLET ORAL DAILY
Qty: 90 TABLET | Refills: 1 | Status: SHIPPED | OUTPATIENT
Start: 2024-10-25

## 2024-10-25 RX ORDER — FLUTICASONE FUROATE, UMECLIDINIUM BROMIDE AND VILANTEROL TRIFENATATE 200; 62.5; 25 UG/1; UG/1; UG/1
1 POWDER RESPIRATORY (INHALATION) DAILY
Qty: 1 EACH | Refills: 5 | Status: SHIPPED | OUTPATIENT
Start: 2024-10-25

## 2024-10-25 RX ORDER — OMEPRAZOLE 40 MG/1
40 CAPSULE, DELAYED RELEASE ORAL DAILY
Qty: 90 CAPSULE | Refills: 1 | Status: SHIPPED | OUTPATIENT
Start: 2024-10-25

## 2024-10-25 RX ORDER — SPIRONOLACTONE 50 MG/1
50 TABLET, FILM COATED ORAL DAILY
COMMUNITY
Start: 2024-07-17

## 2024-10-25 RX ORDER — TRAZODONE HYDROCHLORIDE 50 MG/1
TABLET, FILM COATED ORAL
Qty: 180 TABLET | Refills: 1 | Status: SHIPPED | OUTPATIENT
Start: 2024-10-25

## 2024-10-28 LAB
BASOPHILS ABSOLUTE: 0 /ΜL
BASOPHILS RELATIVE PERCENT: 1 %
BUN BLDV-MCNC: 25 MG/DL
CALCIUM SERPL-MCNC: 9.4 MG/DL
CHLORIDE BLD-SCNC: 99 MMOL/L
CO2: 27 MMOL/L
CREAT SERPL-MCNC: 1.44 MG/DL
EGFR: 50
EOSINOPHILS ABSOLUTE: 0.2 /ΜL
EOSINOPHILS RELATIVE PERCENT: 3 %
FERRITIN: 15 NG/ML (ref 18–300)
GLUCOSE BLD-MCNC: 113 MG/DL
HCT VFR BLD CALC: 30.2 % (ref 41–53)
HEMOGLOBIN: 9.1 G/DL (ref 13.5–17.5)
IRON: 24
LYMPHOCYTES ABSOLUTE: 0.3 /ΜL
LYMPHOCYTES RELATIVE PERCENT: 7 %
MCH RBC QN AUTO: 24.7 PG
MCHC RBC AUTO-ENTMCNC: 30.1 G/DL
MCV RBC AUTO: 82 FL
MONOCYTES ABSOLUTE: 0.5 /ΜL
MONOCYTES RELATIVE PERCENT: 11 %
NEUTROPHILS ABSOLUTE: 3.4 /ΜL
NEUTROPHILS RELATIVE PERCENT: 78 %
PDW BLD-RTO: 16.3 %
PLATELET # BLD: 178 K/ΜL
PMV BLD AUTO: ABNORMAL FL
POTASSIUM SERPL-SCNC: 4.1 MMOL/L
RBC # BLD: 3.69 10^6/ΜL
SODIUM BLD-SCNC: 139 MMOL/L
TOTAL IRON BINDING CAPACITY: 352
WBC # BLD: 4.4 10^3/ML

## 2024-10-29 DIAGNOSIS — D50.9 IRON DEFICIENCY ANEMIA, UNSPECIFIED IRON DEFICIENCY ANEMIA TYPE: Primary | ICD-10-CM

## 2024-10-29 DIAGNOSIS — I42.0 DILATED CARDIOMYOPATHY (HCC): ICD-10-CM

## 2024-10-29 DIAGNOSIS — D50.9 IRON DEFICIENCY ANEMIA, UNSPECIFIED IRON DEFICIENCY ANEMIA TYPE: ICD-10-CM

## 2024-10-29 DIAGNOSIS — I27.20 PULMONARY HYPERTENSION (HCC): ICD-10-CM

## 2024-11-24 NOTE — PROGRESS NOTES
radiation proctitis, the brain and steroid suppository was too expensive at nearly $600, we will try generic and after 2-3 weeks recheck to see if the anemia is improving further.    For reflux continue PPI, symptoms are currently stable.    For anemia, continue PPI and check laboratory as noted, treating radiation proctitis as noted above.    No symptoms of gout, continue allopurinol    For atrial fibrillation is continuing anticoagulation on Eliquis with refill given today.    He does see cardiology, given his cardiac issues could also have component of congestive heart failure, we will screen BNP prior to his visit with cardiology.    For diabetes, otherwise sugars have been stable and we will check laboratory including A1c.  Diagnoses and all orders for this visit:    Radiation proctitis  -     hydrocortisone (ANUSOL-HC) 25 MG suppository; Place 1 suppository rectally 2 times daily as needed for Hemorrhoids    Gastroesophageal reflux disease without esophagitis  -     omeprazole (PRILOSEC) 40 MG delayed release capsule; Take 1 capsule by mouth daily    Iron deficiency anemia, unspecified iron deficiency anemia type  -     omeprazole (PRILOSEC) 40 MG delayed release capsule; Take 1 capsule by mouth daily  -     Iron and TIBC; Future  -     Ferritin; Future  -     CBC with Auto Differential; Future    Elevated uric acid in blood  -     allopurinol (ZYLOPRIM) 100 MG tablet; Take 1 tablet by mouth daily    Persistent atrial fibrillation (HCC)  -     apixaban (ELIQUIS) 5 MG TABS tablet; TAKE 1 TABLET BY MOUTH TWICE A DAY  -     TSH; Future    Dilated cardiomyopathy (HCC)  -     Brain Natriuretic Peptide; Future    Controlled type 2 diabetes mellitus with diabetic nephropathy, without long-term current use of insulin (HCC)  -     Comprehensive Metabolic Panel; Future  -     Hemoglobin A1C; Future    Dyspnea, unspecified type  -     Brain Natriuretic Peptide; Future    Other orders  -     zoster recombinant adjuvanted

## 2024-11-25 ENCOUNTER — OFFICE VISIT (OUTPATIENT)
Dept: INTERNAL MEDICINE CLINIC | Age: 74
End: 2024-11-25

## 2024-11-25 VITALS
BODY MASS INDEX: 30.94 KG/M2 | RESPIRATION RATE: 16 BRPM | SYSTOLIC BLOOD PRESSURE: 116 MMHG | OXYGEN SATURATION: 91 % | HEIGHT: 77 IN | WEIGHT: 262 LBS | HEART RATE: 97 BPM | DIASTOLIC BLOOD PRESSURE: 70 MMHG

## 2024-11-25 DIAGNOSIS — I42.0 DILATED CARDIOMYOPATHY (HCC): ICD-10-CM

## 2024-11-25 DIAGNOSIS — K62.7 RADIATION PROCTITIS: Primary | ICD-10-CM

## 2024-11-25 DIAGNOSIS — I48.19 PERSISTENT ATRIAL FIBRILLATION (HCC): ICD-10-CM

## 2024-11-25 DIAGNOSIS — E79.0 ELEVATED URIC ACID IN BLOOD: ICD-10-CM

## 2024-11-25 DIAGNOSIS — K21.9 GASTROESOPHAGEAL REFLUX DISEASE WITHOUT ESOPHAGITIS: ICD-10-CM

## 2024-11-25 DIAGNOSIS — E11.21 CONTROLLED TYPE 2 DIABETES MELLITUS WITH DIABETIC NEPHROPATHY, WITHOUT LONG-TERM CURRENT USE OF INSULIN (HCC): ICD-10-CM

## 2024-11-25 DIAGNOSIS — D50.9 IRON DEFICIENCY ANEMIA, UNSPECIFIED IRON DEFICIENCY ANEMIA TYPE: ICD-10-CM

## 2024-11-25 DIAGNOSIS — R06.00 DYSPNEA, UNSPECIFIED TYPE: ICD-10-CM

## 2024-11-25 RX ORDER — ALLOPURINOL 100 MG/1
100 TABLET ORAL DAILY
Qty: 90 TABLET | Refills: 1 | Status: SHIPPED | OUTPATIENT
Start: 2024-11-25

## 2024-11-25 RX ORDER — HYDROCORTISONE ACETATE 25 MG/1
25 SUPPOSITORY RECTAL 2 TIMES DAILY PRN
Qty: 12 SUPPOSITORY | Refills: 2 | Status: SHIPPED | OUTPATIENT
Start: 2024-11-25

## 2024-11-25 RX ORDER — OMEPRAZOLE 40 MG/1
40 CAPSULE, DELAYED RELEASE ORAL DAILY
Qty: 90 CAPSULE | Refills: 1 | Status: SHIPPED | OUTPATIENT
Start: 2024-11-25

## 2024-11-25 NOTE — PATIENT INSTRUCTIONS
Yes, get RSV vaccine now.      Consider the newer shingles vaccine shingrix in 2-3 weeks.  Need one, then repeat 3-6 months.    Let cardiology know your lab should be available on [facility].

## 2024-11-26 LAB
ALBUMIN: 4.1 G/DL
ALP BLD-CCNC: 70 U/L
ALT SERPL-CCNC: 9 U/L
ANION GAP SERPL CALCULATED.3IONS-SCNC: ABNORMAL MMOL/L
AST SERPL-CCNC: 14 U/L
B-TYPE NATRIURETIC PEPTIDE: 142.5 PG/ML
BASOPHILS ABSOLUTE: 0 /ΜL
BASOPHILS RELATIVE PERCENT: 1 %
BILIRUB SERPL-MCNC: 0.4 MG/DL (ref 0.1–1.4)
BUN BLDV-MCNC: 29 MG/DL
CALCIUM SERPL-MCNC: 9.4 MG/DL
CHLORIDE BLD-SCNC: 99 MMOL/L
CO2: 26 MMOL/L
CREAT SERPL-MCNC: 1.6 MG/DL
EOSINOPHILS ABSOLUTE: 0.1 /ΜL
EOSINOPHILS RELATIVE PERCENT: 3 %
ESTIMATED AVERAGE GLUCOSE: NORMAL
FERRITIN: 17 NG/ML (ref 18–300)
GFR, ESTIMATED: 44
GLUCOSE BLD-MCNC: 120 MG/DL
HBA1C MFR BLD: 6.3 %
HCT VFR BLD CALC: 30.4 % (ref 41–53)
HEMOGLOBIN: 9.1 G/DL (ref 13.5–17.5)
IRON: 24
LYMPHOCYTES ABSOLUTE: 0.7 /ΜL
LYMPHOCYTES RELATIVE PERCENT: 13 %
MCH RBC QN AUTO: 24.3 PG
MCHC RBC AUTO-ENTMCNC: 29.9 G/DL
MCV RBC AUTO: 81 FL
MONOCYTES ABSOLUTE: 0.6 /ΜL
MONOCYTES RELATIVE PERCENT: 12 %
NEUTROPHILS ABSOLUTE: 3.8 /ΜL
NEUTROPHILS RELATIVE PERCENT: 71 %
PDW BLD-RTO: 15.8 %
PLATELET # BLD: 217 K/ΜL
PMV BLD AUTO: ABNORMAL FL
POTASSIUM SERPL-SCNC: 3.8 MMOL/L
RBC # BLD: 3.75 10^6/ΜL
SODIUM BLD-SCNC: 138 MMOL/L
TOTAL IRON BINDING CAPACITY: 376
TOTAL PROTEIN: 6.1 G/DL (ref 6.4–8.2)
TSH SERPL DL<=0.05 MIU/L-ACNC: 0.97 UIU/ML
WBC # BLD: 5.3 10^3/ML

## 2024-11-27 ENCOUNTER — TELEPHONE (OUTPATIENT)
Dept: INTERNAL MEDICINE CLINIC | Age: 74
End: 2024-11-27

## 2024-11-27 DIAGNOSIS — E11.21 CONTROLLED TYPE 2 DIABETES MELLITUS WITH DIABETIC NEPHROPATHY, WITHOUT LONG-TERM CURRENT USE OF INSULIN (HCC): ICD-10-CM

## 2024-11-27 DIAGNOSIS — I48.19 PERSISTENT ATRIAL FIBRILLATION (HCC): ICD-10-CM

## 2024-11-27 DIAGNOSIS — D50.9 IRON DEFICIENCY ANEMIA, UNSPECIFIED IRON DEFICIENCY ANEMIA TYPE: ICD-10-CM

## 2024-11-27 DIAGNOSIS — R06.00 DYSPNEA, UNSPECIFIED TYPE: ICD-10-CM

## 2024-11-27 DIAGNOSIS — D50.9 IRON DEFICIENCY ANEMIA, UNSPECIFIED IRON DEFICIENCY ANEMIA TYPE: Primary | ICD-10-CM

## 2024-11-27 DIAGNOSIS — I42.0 DILATED CARDIOMYOPATHY (HCC): ICD-10-CM

## 2024-11-27 DIAGNOSIS — D64.9 ANEMIA, UNSPECIFIED TYPE: Primary | ICD-10-CM

## 2024-11-27 NOTE — TELEPHONE ENCOUNTER
----- Message from Dr. Gerardo Elder MD sent at 11/27/2024 12:26 PM EST -----  Please call pt, he remains sl anemic w lower iron storage.  CHECK IF IS ON IRON CURRENTLY?  Rec ferrous sulfate otc 325mg daily, then let's recheck CBC and iron, ferritin in 3 weeks, dx anemia.  Sugar and thyroid fxn are ok.

## 2024-11-27 NOTE — RESULT ENCOUNTER NOTE
Please call pt, his prior elevated heart failure # the BNP is much better from 828--142 and now nl range

## 2024-11-27 NOTE — RESULT ENCOUNTER NOTE
Please call pt, he remains sl anemic w lower iron storage.  CHECK IF IS ON IRON CURRENTLY?  Rec ferrous sulfate otc 325mg daily, then let's recheck CBC and iron, ferritin in 3 weeks, dx anemia.  Sugar and thyroid fxn are ok.

## 2025-01-24 SDOH — ECONOMIC STABILITY: INCOME INSECURITY: IN THE LAST 12 MONTHS, WAS THERE A TIME WHEN YOU WERE NOT ABLE TO PAY THE MORTGAGE OR RENT ON TIME?: NO

## 2025-01-24 SDOH — ECONOMIC STABILITY: FOOD INSECURITY: WITHIN THE PAST 12 MONTHS, YOU WORRIED THAT YOUR FOOD WOULD RUN OUT BEFORE YOU GOT MONEY TO BUY MORE.: NEVER TRUE

## 2025-01-24 SDOH — ECONOMIC STABILITY: FOOD INSECURITY: WITHIN THE PAST 12 MONTHS, THE FOOD YOU BOUGHT JUST DIDN'T LAST AND YOU DIDN'T HAVE MONEY TO GET MORE.: NEVER TRUE

## 2025-01-24 SDOH — ECONOMIC STABILITY: TRANSPORTATION INSECURITY
IN THE PAST 12 MONTHS, HAS THE LACK OF TRANSPORTATION KEPT YOU FROM MEDICAL APPOINTMENTS OR FROM GETTING MEDICATIONS?: NO

## 2025-01-24 ASSESSMENT — PATIENT HEALTH QUESTIONNAIRE - PHQ9
SUM OF ALL RESPONSES TO PHQ9 QUESTIONS 1 & 2: 0
1. LITTLE INTEREST OR PLEASURE IN DOING THINGS: NOT AT ALL
1. LITTLE INTEREST OR PLEASURE IN DOING THINGS: NOT AT ALL
2. FEELING DOWN, DEPRESSED OR HOPELESS: NOT AT ALL
SUM OF ALL RESPONSES TO PHQ9 QUESTIONS 1 & 2: 0
SUM OF ALL RESPONSES TO PHQ QUESTIONS 1-9: 0
2. FEELING DOWN, DEPRESSED OR HOPELESS: NOT AT ALL

## 2025-01-25 NOTE — PROGRESS NOTES
Og Adair  1950 01/27/25    SUBJECTIVE:    Some persistent, chr SOB, has COPD and also underlying CV dz, afib.  Last EF on echo in July was 42%.    HAS HAD HX OF CHR ANEMIA TOO, WE'LL RECHECK COUNTS, IRON STORES.    CHR DIARRHEA, BETTER W DAILY QUESTRAN, INCR MORE CAUSES CONSTIPATION    RADIATION PROCTITIS, NO BLOOD IN STOOLS.    HYPOTENSION W STANDING, WE'LL SCR CORTISOL LEVEL TOO.    OBJECTIVE:    /76 (Site: Left Upper Arm, Position: Sitting, Cuff Size: Medium Adult)   Pulse 66   Ht 1.956 m (6' 5\")   Wt 119.5 kg (263 lb 6.4 oz)   SpO2 96%   BMI 31.23 kg/m²     Physical Exam  Constitutional:       Appearance: Normal appearance.   HENT:      Head: Normocephalic and atraumatic.   Eyes:      Extraocular Movements: Extraocular movements intact.      Conjunctiva/sclera: Conjunctivae normal.      Pupils: Pupils are equal, round, and reactive to light.   Cardiovascular:      Rate and Rhythm: Normal rate. Rhythm irregular.      Heart sounds: Normal heart sounds. No murmur heard.  Pulmonary:      Effort: Pulmonary effort is normal. No respiratory distress.      Breath sounds: Normal breath sounds.   Abdominal:      General: Abdomen is flat. Bowel sounds are normal. There is no distension.      Palpations: Abdomen is soft. There is no mass.      Tenderness: There is no abdominal tenderness.      Hernia: No hernia is present.   Musculoskeletal:      Cervical back: Neck supple.      Right lower leg: No edema.      Left lower leg: No edema.   Neurological:      Mental Status: He is alert.   Psychiatric:         Mood and Affect: Mood normal.         ASSESSMENT:    1. Controlled type 2 diabetes mellitus with diabetic nephropathy, without long-term current use of insulin (HCC)    2. Dilated cardiomyopathy (HCC)    3. Persistent atrial fibrillation (HCC)    4. Orthostatic hypotension    5. Iron deficiency anemia, unspecified iron deficiency anemia type    6. Radiation proctitis    7. Mild intermittent asthma

## 2025-01-27 ENCOUNTER — OFFICE VISIT (OUTPATIENT)
Dept: INTERNAL MEDICINE CLINIC | Age: 75
End: 2025-01-27

## 2025-01-27 VITALS
OXYGEN SATURATION: 96 % | SYSTOLIC BLOOD PRESSURE: 118 MMHG | DIASTOLIC BLOOD PRESSURE: 76 MMHG | WEIGHT: 263.4 LBS | HEART RATE: 66 BPM | BODY MASS INDEX: 31.1 KG/M2 | HEIGHT: 77 IN

## 2025-01-27 DIAGNOSIS — E11.21 CONTROLLED TYPE 2 DIABETES MELLITUS WITH DIABETIC NEPHROPATHY, WITHOUT LONG-TERM CURRENT USE OF INSULIN (HCC): Primary | ICD-10-CM

## 2025-01-27 DIAGNOSIS — J45.20 MILD INTERMITTENT ASTHMA WITHOUT COMPLICATION: ICD-10-CM

## 2025-01-27 DIAGNOSIS — C61 ADENOCARCINOMA OF PROSTATE (HCC): ICD-10-CM

## 2025-01-27 DIAGNOSIS — D50.9 IRON DEFICIENCY ANEMIA, UNSPECIFIED IRON DEFICIENCY ANEMIA TYPE: ICD-10-CM

## 2025-01-27 DIAGNOSIS — I48.20 CHRONIC ATRIAL FIBRILLATION (HCC): ICD-10-CM

## 2025-01-27 DIAGNOSIS — K62.7 RADIATION PROCTITIS: ICD-10-CM

## 2025-01-27 DIAGNOSIS — I95.1 ORTHOSTATIC HYPOTENSION: ICD-10-CM

## 2025-01-27 DIAGNOSIS — I27.20 PULMONARY HYPERTENSION (HCC): ICD-10-CM

## 2025-01-27 DIAGNOSIS — J44.89 OTHER SPECIFIED CHRONIC OBSTRUCTIVE PULMONARY DISEASE (HCC): ICD-10-CM

## 2025-01-27 DIAGNOSIS — I48.19 PERSISTENT ATRIAL FIBRILLATION (HCC): ICD-10-CM

## 2025-01-27 DIAGNOSIS — I50.21 ACUTE SYSTOLIC CONGESTIVE HEART FAILURE (HCC): ICD-10-CM

## 2025-01-27 DIAGNOSIS — I42.0 DILATED CARDIOMYOPATHY (HCC): ICD-10-CM

## 2025-01-27 RX ORDER — CHOLESTYRAMINE 4 G/9G
1 POWDER, FOR SUSPENSION ORAL DAILY
COMMUNITY
Start: 2025-01-13

## 2025-01-27 RX ORDER — LANOLIN ALCOHOL/MO/W.PET/CERES
400 CREAM (GRAM) TOPICAL DAILY
Qty: 90 TABLET | Refills: 1 | Status: SHIPPED | OUTPATIENT
Start: 2025-01-27

## 2025-02-12 LAB
ALBUMIN: 4.3 G/DL (ref 3.8–4.8)
ALP BLD-CCNC: 69 IU/L (ref 44–121)
ALT SERPL-CCNC: 11 IU/L (ref 0–44)
AST SERPL-CCNC: 16 IU/L (ref 0–40)
B-TYPE NATRIURETIC PEPTIDE: 147 PG/ML (ref 0–100)
BASOPHILS ABSOLUTE: 0 X10E3/UL (ref 0–0.2)
BASOPHILS RELATIVE PERCENT: 1 %
BILIRUB SERPL-MCNC: 0.5 MG/DL (ref 0–1.2)
BUN / CREAT RATIO: 18 (ref 10–24)
BUN BLDV-MCNC: 28 MG/DL (ref 8–27)
CALCIUM SERPL-MCNC: 9.5 MG/DL (ref 8.6–10.2)
CHLORIDE BLD-SCNC: 97 MMOL/L (ref 96–106)
CHOLESTEROL, TOTAL: 119 MG/DL (ref 100–199)
CO2: 27 MMOL/L (ref 20–29)
CORTISOL - AM: 12.3 UG/DL (ref 6.2–19.4)
CREAT SERPL-MCNC: 1.56 MG/DL (ref 0.76–1.27)
EOSINOPHILS ABSOLUTE: 0.2 X10E3/UL (ref 0–0.4)
EOSINOPHILS RELATIVE PERCENT: 3 %
ESTIMATED GLOMERULAR FILTRATION RATE CREATININE EQUATION: 46 ML/MIN/1.73
FERRITIN: 25 NG/ML (ref 30–400)
GLOBULIN: 2.2 G/DL (ref 1.5–4.5)
GLUCOSE BLD-MCNC: 113 MG/DL (ref 70–99)
HBA1C MFR BLD: 6.3 % (ref 4.8–5.6)
HCT VFR BLD CALC: 33.1 % (ref 37.5–51)
HDLC SERPL-MCNC: 43 MG/DL
HEMOGLOBIN: 10.2 G/DL (ref 13–17.7)
IMMATURE GRANS (ABS): 0 X10E3/UL (ref 0–0.1)
IMMATURE GRANULOCYTES %: 0 %
IRON % SATURATION: 12 % (ref 15–55)
IRON: 43 UG/DL (ref 38–169)
LDL CHOLESTEROL: 58 MG/DL (ref 0–99)
LYMPHOCYTES ABSOLUTE: 0.4 X10E3/UL (ref 0.7–3.1)
LYMPHOCYTES RELATIVE PERCENT: 9 %
MCH RBC QN AUTO: 26.3 PG (ref 26.6–33)
MCHC RBC AUTO-ENTMCNC: 30.8 G/DL (ref 31.5–35.7)
MCV RBC AUTO: 85 FL (ref 79–97)
MONOCYTES ABSOLUTE: 0.6 X10E3/UL (ref 0.1–0.9)
MONOCYTES RELATIVE PERCENT: 11 %
NEUTROPHILS ABSOLUTE: 3.8 X10E3/UL (ref 1.4–7)
NEUTROPHILS RELATIVE PERCENT: 76 %
PDW BLD-RTO: 16.7 % (ref 11.6–15.4)
PLATELET # BLD: 183 X10E3/UL (ref 150–450)
POTASSIUM SERPL-SCNC: 4.4 MMOL/L (ref 3.5–5.2)
RBC # BLD: 3.88 X10E6/UL (ref 4.14–5.8)
SODIUM BLD-SCNC: 139 MMOL/L (ref 134–144)
TOTAL IRON BINDING CAPACITY: 367 UG/DL (ref 250–450)
TOTAL PROTEIN: 6.5 G/DL (ref 6–8.5)
TRIGL SERPL-MCNC: 95 MG/DL (ref 0–149)
TSH SERPL DL<=0.05 MIU/L-ACNC: 0.87 UIU/ML (ref 0.45–4.5)
UNSATURATED IRON BINDING CAPACITY: 324 UG/DL (ref 111–343)
VLDLC SERPL CALC-MCNC: 18 MG/DL (ref 5–40)
WBC # BLD: 5 X10E3/UL (ref 3.4–10.8)

## 2025-02-14 ENCOUNTER — TELEMEDICINE (OUTPATIENT)
Dept: INTERNAL MEDICINE CLINIC | Age: 75
End: 2025-02-14

## 2025-02-14 DIAGNOSIS — J44.1 COPD EXACERBATION (HCC): Primary | ICD-10-CM

## 2025-02-14 RX ORDER — BENZONATATE 200 MG/1
200 CAPSULE ORAL 3 TIMES DAILY PRN
Qty: 30 CAPSULE | Refills: 0 | Status: SHIPPED | OUTPATIENT
Start: 2025-02-14 | End: 2025-02-24

## 2025-02-14 RX ORDER — PREDNISONE 10 MG/1
TABLET ORAL
Qty: 20 TABLET | Refills: 0 | Status: SHIPPED | OUTPATIENT
Start: 2025-02-14

## 2025-02-14 RX ORDER — DOXYCYCLINE HYCLATE 100 MG
100 TABLET ORAL 2 TIMES DAILY
Qty: 14 TABLET | Refills: 0 | Status: SHIPPED | OUTPATIENT
Start: 2025-02-14 | End: 2025-02-21

## 2025-02-14 ASSESSMENT — ENCOUNTER SYMPTOMS
CONSTIPATION: 0
CHEST TIGHTNESS: 0
WHEEZING: 1
NAUSEA: 0
SINUS PAIN: 0
COLOR CHANGE: 0
SHORTNESS OF BREATH: 1
RHINORRHEA: 1
SORE THROAT: 0
EYES NEGATIVE: 1
COUGH: 1
ABDOMINAL DISTENTION: 0
DIARRHEA: 0
SINUS PRESSURE: 1
ABDOMINAL PAIN: 0

## 2025-02-14 NOTE — PROGRESS NOTES
2025    TELEHEALTH EVALUATION -- Audio/Visual    HPI:    Og Adair (:  1950) has requested an audio/video evaluation for the following concern(s):    Mr Adair is a current patient of Dr Elder who presents via VV this morning for c/o worsening cough, chest congestion, nasal congestion, and wheezing over the last 2 days. He has not been taking OTC medications for this issue so far. Denies fevers, chills, or any GI distress.     Review of Systems   Constitutional:  Negative for activity change, appetite change, fatigue and fever.   HENT:  Positive for congestion, rhinorrhea and sinus pressure. Negative for sinus pain and sore throat.    Eyes: Negative.    Respiratory:  Positive for cough, shortness of breath and wheezing. Negative for chest tightness.    Cardiovascular:  Negative for chest pain and palpitations.   Gastrointestinal:  Negative for abdominal distention, abdominal pain, constipation, diarrhea and nausea.   Genitourinary:  Negative for difficulty urinating, dysuria, flank pain, frequency and hematuria.   Musculoskeletal:  Negative for arthralgias, gait problem, joint swelling and myalgias.   Skin:  Negative for color change and rash.   Neurological:  Negative for dizziness, light-headedness and numbness.   Hematological: Negative.    Psychiatric/Behavioral: Negative.         Prior to Visit Medications    Medication Sig Taking? Authorizing Provider   predniSONE (DELTASONE) 10 MG tablet Take 4 tablets daily for 2 days, then 3 tablets for 2 days, 2 tablets for 2 days, then 1 tablet for 2 days Yes Tomas Cochran APRN - CNP   doxycycline hyclate (VIBRA-TABS) 100 MG tablet Take 1 tablet by mouth 2 times daily for 7 days Yes Tomas Cochran APRN - CNP   benzonatate (TESSALON) 200 MG capsule Take 1 capsule by mouth 3 times daily as needed for Cough Yes Tomas Cochran APRN - CNP   magnesium oxide (MAG-OX) 400 (240 Mg) MG tablet Take 1 tablet by mouth daily Yes Gerardo Elder MD   apixaban

## 2025-02-25 LAB — DIABETIC RETINOPATHY: NEGATIVE

## 2025-04-21 DIAGNOSIS — F51.01 PRIMARY INSOMNIA: ICD-10-CM

## 2025-04-21 DIAGNOSIS — I42.0 DILATED CARDIOMYOPATHY (HCC): ICD-10-CM

## 2025-04-21 RX ORDER — POTASSIUM CHLORIDE 1500 MG/1
40 TABLET, EXTENDED RELEASE ORAL DAILY
Qty: 180 TABLET | Refills: 1 | Status: SHIPPED | OUTPATIENT
Start: 2025-04-21

## 2025-04-21 RX ORDER — TRAZODONE HYDROCHLORIDE 50 MG/1
TABLET ORAL
Qty: 180 TABLET | Refills: 1 | Status: SHIPPED | OUTPATIENT
Start: 2025-04-21

## 2025-04-28 NOTE — PROGRESS NOTES
gO Adair  1950 04/28/25    SUBJECTIVE:   Chr dyspnea, chr CHF, in March/Apr was seen at [facility] by both cardio and pul, his demedex was incr' to max 100mg/day but now off torsemide, has been on the toprol at 200mg/day,    He is very certain leg aches started after on the incr diuretic dose.      CKD, creat also down/GFR lower on med adjustments.    We discussed retry prior dose and hold demedex for this coming month then reassess    Prior meds:  3/7/2025   8:15 AM   [place] Living with Heart Failure Questionnaire Score (MLHFQ)   MLHFQ Score 39 29     Patient-reported       Prior to Admission medications   Medication Sig Start Date End Date Taking? Authorizing Provider   albuterol (PROVENTIL) 2.5 mg /3 mL (0.083 %) nebulizer solution Inhale 3 mL (2.5 mg total) every 6 (six) hours as needed . 4/8/22 Yes Simeon Orta MD   albuterol 90 mcg/actuation inhaler Inhale 2 (two) puffs every 4 (four) hours as needed . 10/15/24 Yes Donny Salazar MD   allopurinoL (ZYLOPRIM) 100 MG tablet Take 1 (one) tablet (100 mg total) by mouth daily . 7/11/22 Yes Simeon Orta MD   apixaban (ELIQUIS) 5 mg Tab Take 1 (one) tablet (5 mg total) by mouth 2 (two) times a day . 4/23/22 Yes Simeon Orta MD   bumetanide (BUMEX) 1 MG tablet Take 3 (three) tablets (3 mg total) by mouth every morning AND 2 (two) tablets (2 mg total) daily in the afternoon. 9/4/24 Yes Tabitha Stevens,    calcium-vitamin D (OS-MARCUS +D) 500 mg-5 mcg (200 unit) per tablet Take 1 (one) tablet by mouth daily . Yes Simeon Orta MD   Farxiga 10 mg tablet Take 1 (one) tablet (10 mg total) by mouth daily . 12/3/24 Yes Kaur Haji MD   ferrous sulfate 325 (65 FE) MG EC tablet Take 1 (one) tablet (325 mg total) by mouth 3 (three) times a day with meals . 7/26/24 Yes Provider, MD Simeon   fluticasone-umeclidin-vilanter (Trelegy Ellipta) 200-62.5-25 mcg DsDv Inhale 1 (one) Inhalation. every morning .

## 2025-04-29 ENCOUNTER — OFFICE VISIT (OUTPATIENT)
Dept: INTERNAL MEDICINE CLINIC | Age: 75
End: 2025-04-29
Payer: MEDICARE

## 2025-04-29 VITALS
WEIGHT: 265 LBS | SYSTOLIC BLOOD PRESSURE: 108 MMHG | HEART RATE: 82 BPM | HEIGHT: 77 IN | BODY MASS INDEX: 31.29 KG/M2 | OXYGEN SATURATION: 96 % | RESPIRATION RATE: 16 BRPM | DIASTOLIC BLOOD PRESSURE: 72 MMHG

## 2025-04-29 DIAGNOSIS — E79.0 ELEVATED URIC ACID IN BLOOD: ICD-10-CM

## 2025-04-29 DIAGNOSIS — F51.01 PRIMARY INSOMNIA: ICD-10-CM

## 2025-04-29 DIAGNOSIS — D50.9 IRON DEFICIENCY ANEMIA, UNSPECIFIED IRON DEFICIENCY ANEMIA TYPE: ICD-10-CM

## 2025-04-29 DIAGNOSIS — K21.9 GASTROESOPHAGEAL REFLUX DISEASE WITHOUT ESOPHAGITIS: ICD-10-CM

## 2025-04-29 DIAGNOSIS — I42.0 DILATED CARDIOMYOPATHY (HCC): ICD-10-CM

## 2025-04-29 DIAGNOSIS — I50.21 ACUTE SYSTOLIC CONGESTIVE HEART FAILURE (HCC): Primary | ICD-10-CM

## 2025-04-29 PROCEDURE — 1123F ACP DISCUSS/DSCN MKR DOCD: CPT | Performed by: INTERNAL MEDICINE

## 2025-04-29 PROCEDURE — G8417 CALC BMI ABV UP PARAM F/U: HCPCS | Performed by: INTERNAL MEDICINE

## 2025-04-29 PROCEDURE — 1160F RVW MEDS BY RX/DR IN RCRD: CPT | Performed by: INTERNAL MEDICINE

## 2025-04-29 PROCEDURE — 99214 OFFICE O/P EST MOD 30 MIN: CPT | Performed by: INTERNAL MEDICINE

## 2025-04-29 PROCEDURE — 1036F TOBACCO NON-USER: CPT | Performed by: INTERNAL MEDICINE

## 2025-04-29 PROCEDURE — G2211 COMPLEX E/M VISIT ADD ON: HCPCS | Performed by: INTERNAL MEDICINE

## 2025-04-29 PROCEDURE — 1159F MED LIST DOCD IN RCRD: CPT | Performed by: INTERNAL MEDICINE

## 2025-04-29 PROCEDURE — G8427 DOCREV CUR MEDS BY ELIG CLIN: HCPCS | Performed by: INTERNAL MEDICINE

## 2025-04-29 RX ORDER — POTASSIUM CHLORIDE 1500 MG/1
40 TABLET, EXTENDED RELEASE ORAL DAILY
Qty: 180 TABLET | Refills: 1 | Status: SHIPPED | OUTPATIENT
Start: 2025-04-29

## 2025-04-29 RX ORDER — ALLOPURINOL 100 MG/1
100 TABLET ORAL DAILY
Qty: 90 TABLET | Refills: 1 | Status: SHIPPED | OUTPATIENT
Start: 2025-04-29

## 2025-04-29 RX ORDER — TRAZODONE HYDROCHLORIDE 50 MG/1
TABLET ORAL
Qty: 180 TABLET | Refills: 1 | Status: SHIPPED | OUTPATIENT
Start: 2025-04-29

## 2025-04-29 RX ORDER — TORSEMIDE 20 MG/1
TABLET ORAL
COMMUNITY
Start: 2025-04-07 | End: 2025-04-29

## 2025-04-29 RX ORDER — OMEPRAZOLE 40 MG/1
40 CAPSULE, DELAYED RELEASE ORAL DAILY
Qty: 90 CAPSULE | Refills: 1 | Status: SHIPPED | OUTPATIENT
Start: 2025-04-29

## 2025-04-29 RX ORDER — TORSEMIDE 100 MG/1
100 TABLET ORAL 2 TIMES DAILY
COMMUNITY
Start: 2025-04-10 | End: 2025-04-29

## 2025-04-29 RX ORDER — METOPROLOL SUCCINATE 200 MG/1
200 TABLET, EXTENDED RELEASE ORAL DAILY
COMMUNITY
Start: 2025-04-22

## 2025-04-29 RX ORDER — BUMETANIDE 1 MG/1
TABLET ORAL
Qty: 150 TABLET | Refills: 3
Start: 2025-04-29

## 2025-04-30 ENCOUNTER — RESULTS FOLLOW-UP (OUTPATIENT)
Dept: INTERNAL MEDICINE CLINIC | Age: 75
End: 2025-04-30

## 2025-04-30 LAB
BUN / CREAT RATIO: 19 (ref 10–24)
BUN BLDV-MCNC: 39 MG/DL (ref 8–27)
CALCIUM SERPL-MCNC: 9.9 MG/DL (ref 8.6–10.2)
CHLORIDE BLD-SCNC: 96 MMOL/L (ref 96–106)
CO2: 23 MMOL/L (ref 20–29)
CREAT SERPL-MCNC: 2.06 MG/DL (ref 0.76–1.27)
ESTIMATED GLOMERULAR FILTRATION RATE CREATININE EQUATION: 33 ML/MIN/1.73
GLUCOSE BLD-MCNC: 116 MG/DL (ref 70–99)
MAGNESIUM: 2.2 MG/DL (ref 1.6–2.3)
POTASSIUM SERPL-SCNC: 5 MMOL/L (ref 3.5–5.2)
SODIUM BLD-SCNC: 139 MMOL/L (ref 134–144)

## 2025-04-30 NOTE — RESULT ENCOUNTER NOTE
Please CALL, Ramy's magnesium is normal, his creatinine is 2.06.  we'll see how he responds clinically after retrying the bumex instead of the demedex, hoping also creatinine will come down.

## 2025-05-01 DIAGNOSIS — J45.20 MILD INTERMITTENT ASTHMA WITHOUT COMPLICATION: ICD-10-CM

## 2025-05-01 RX ORDER — FLUTICASONE FUROATE, UMECLIDINIUM BROMIDE AND VILANTEROL TRIFENATATE 200; 62.5; 25 UG/1; UG/1; UG/1
POWDER RESPIRATORY (INHALATION)
Qty: 1 EACH | Refills: 0 | Status: SHIPPED | OUTPATIENT
Start: 2025-05-01

## 2025-05-20 LAB
BUN / CREAT RATIO: 16 (ref 10–24)
BUN BLDV-MCNC: 33 MG/DL (ref 8–27)
CALCIUM SERPL-MCNC: 9.1 MG/DL (ref 8.6–10.2)
CHLORIDE BLD-SCNC: 99 MMOL/L (ref 96–106)
CO2: 22 MMOL/L (ref 20–29)
CREAT SERPL-MCNC: 2.12 MG/DL (ref 0.76–1.27)
ESTIMATED GLOMERULAR FILTRATION RATE CREATININE EQUATION: 32 ML/MIN/1.73
GLUCOSE BLD-MCNC: 115 MG/DL (ref 70–99)
POTASSIUM SERPL-SCNC: 4.1 MMOL/L (ref 3.5–5.2)
SODIUM BLD-SCNC: 138 MMOL/L (ref 134–144)

## 2025-05-28 NOTE — PROGRESS NOTES
Og Adair  1950 05/28/25    SUBJECTIVE:   After last visit we did try to lower diuretic dose but legs swelled worse and gained 12#.  Back on bumex 2mg 3am and 2pm.  Very SOB, echo to be done in Sept    History of atrial fibrillation and chronically irregular, last ejection fraction in July in [place] had indicated lower levels at 42%, we discussed that given his refractory congestive heart failure and concerned that this may actually now be lower.  He does state when he is short of breath he can have some neck tightness, neck pain and discomfort is not related to any movement of his neck so this could also be related to cardiac compromise- chf.    OBJECTIVE:    /62   Pulse 74   Ht 1.956 m (6' 5.01\")   Wt 125.7 kg (277 lb 3.2 oz)   SpO2 96%   BMI 32.86 kg/m²     Physical Exam  Constitutional:       Appearance: Normal appearance.   Cardiovascular:      Rate and Rhythm: Normal rate. Rhythm irregular.      Heart sounds: No murmur heard.     No gallop.   Pulmonary:      Effort: No respiratory distress.      Breath sounds: No wheezing.   Abdominal:      General: Abdomen is flat. Bowel sounds are normal. There is no distension.      Palpations: Abdomen is soft. There is no mass.      Tenderness: There is no abdominal tenderness.      Hernia: No hernia is present.   Musculoskeletal:      Right lower leg: Edema present.      Left lower leg: Edema present.   Neurological:      Mental Status: He is alert.   Psychiatric:         Mood and Affect: Mood normal.         ASSESSMENT:    1. Acute systolic congestive heart failure (HCC)    2. Dilated cardiomyopathy (HCC)    3. Gastroesophageal reflux disease without esophagitis    4. Iron deficiency anemia, unspecified iron deficiency anemia type    5. Persistent atrial fibrillation (HCC)    6. Elevated uric acid in blood    7. Mild intermittent asthma without complication        PLAN:  Assessment & Plan    Weight is up, suspect some decompensation with worsening

## 2025-05-29 ENCOUNTER — OFFICE VISIT (OUTPATIENT)
Dept: INTERNAL MEDICINE CLINIC | Age: 75
End: 2025-05-29
Payer: MEDICARE

## 2025-05-29 VITALS
HEIGHT: 77 IN | SYSTOLIC BLOOD PRESSURE: 120 MMHG | BODY MASS INDEX: 32.73 KG/M2 | WEIGHT: 277.2 LBS | HEART RATE: 74 BPM | DIASTOLIC BLOOD PRESSURE: 62 MMHG | OXYGEN SATURATION: 96 %

## 2025-05-29 DIAGNOSIS — K21.9 GASTROESOPHAGEAL REFLUX DISEASE WITHOUT ESOPHAGITIS: ICD-10-CM

## 2025-05-29 DIAGNOSIS — E79.0 ELEVATED URIC ACID IN BLOOD: ICD-10-CM

## 2025-05-29 DIAGNOSIS — I42.0 DILATED CARDIOMYOPATHY (HCC): ICD-10-CM

## 2025-05-29 DIAGNOSIS — D50.9 IRON DEFICIENCY ANEMIA, UNSPECIFIED IRON DEFICIENCY ANEMIA TYPE: ICD-10-CM

## 2025-05-29 DIAGNOSIS — I50.21 ACUTE SYSTOLIC CONGESTIVE HEART FAILURE (HCC): Primary | ICD-10-CM

## 2025-05-29 DIAGNOSIS — I48.19 PERSISTENT ATRIAL FIBRILLATION (HCC): ICD-10-CM

## 2025-05-29 DIAGNOSIS — J45.20 MILD INTERMITTENT ASTHMA WITHOUT COMPLICATION: ICD-10-CM

## 2025-05-29 PROCEDURE — G8417 CALC BMI ABV UP PARAM F/U: HCPCS | Performed by: INTERNAL MEDICINE

## 2025-05-29 PROCEDURE — 1036F TOBACCO NON-USER: CPT | Performed by: INTERNAL MEDICINE

## 2025-05-29 PROCEDURE — G2211 COMPLEX E/M VISIT ADD ON: HCPCS | Performed by: INTERNAL MEDICINE

## 2025-05-29 PROCEDURE — 1160F RVW MEDS BY RX/DR IN RCRD: CPT | Performed by: INTERNAL MEDICINE

## 2025-05-29 PROCEDURE — 1123F ACP DISCUSS/DSCN MKR DOCD: CPT | Performed by: INTERNAL MEDICINE

## 2025-05-29 PROCEDURE — 99214 OFFICE O/P EST MOD 30 MIN: CPT | Performed by: INTERNAL MEDICINE

## 2025-05-29 PROCEDURE — 1159F MED LIST DOCD IN RCRD: CPT | Performed by: INTERNAL MEDICINE

## 2025-05-29 PROCEDURE — G8427 DOCREV CUR MEDS BY ELIG CLIN: HCPCS | Performed by: INTERNAL MEDICINE

## 2025-05-29 RX ORDER — FLUTICASONE FUROATE, UMECLIDINIUM BROMIDE AND VILANTEROL TRIFENATATE 200; 62.5; 25 UG/1; UG/1; UG/1
1 POWDER RESPIRATORY (INHALATION) DAILY
Qty: 3 EACH | Refills: 1 | Status: SHIPPED | OUTPATIENT
Start: 2025-05-29

## 2025-05-29 RX ORDER — POTASSIUM CHLORIDE 1500 MG/1
40 TABLET, EXTENDED RELEASE ORAL DAILY
Qty: 180 TABLET | Refills: 1 | Status: SHIPPED | OUTPATIENT
Start: 2025-05-29

## 2025-05-29 RX ORDER — OMEPRAZOLE 40 MG/1
40 CAPSULE, DELAYED RELEASE ORAL DAILY
Qty: 90 CAPSULE | Refills: 1 | Status: SHIPPED | OUTPATIENT
Start: 2025-05-29

## 2025-05-29 RX ORDER — METOPROLOL SUCCINATE 100 MG/1
200 TABLET, EXTENDED RELEASE ORAL DAILY
Qty: 180 TABLET | Refills: 1 | Status: SHIPPED | OUTPATIENT
Start: 2025-05-29

## 2025-05-29 RX ORDER — BUMETANIDE 2 MG/1
TABLET ORAL
Qty: 150 TABLET | Refills: 1
Start: 2025-05-29

## 2025-05-29 RX ORDER — ALLOPURINOL 100 MG/1
100 TABLET ORAL DAILY
Qty: 90 TABLET | Refills: 1 | Status: SHIPPED | OUTPATIENT
Start: 2025-05-29

## 2025-05-29 NOTE — PATIENT INSTRUCTIONS
Very important to track sodium also, should be max of 4000mg/day, better if <2000mg/day.    For one week incr bumetanide to 3 tabs am and pm, then back to 3am and 2pm.  Check lab in 2 weeks

## 2025-05-31 DIAGNOSIS — J45.20 MILD INTERMITTENT ASTHMA WITHOUT COMPLICATION: ICD-10-CM

## 2025-06-02 RX ORDER — FLUTICASONE FUROATE, UMECLIDINIUM BROMIDE AND VILANTEROL TRIFENATATE 200; 62.5; 25 UG/1; UG/1; UG/1
POWDER RESPIRATORY (INHALATION)
OUTPATIENT
Start: 2025-06-02

## 2025-06-04 ENCOUNTER — HOSPITAL ENCOUNTER (INPATIENT)
Age: 75
LOS: 3 days | Discharge: HOME | DRG: 291 | End: 2025-06-08
Attending: EMERGENCY MEDICINE | Admitting: STUDENT IN AN ORGANIZED HEALTH CARE EDUCATION/TRAINING PROGRAM
Payer: MEDICARE

## 2025-06-04 DIAGNOSIS — Z86.79 HISTORY OF CHF (CONGESTIVE HEART FAILURE): ICD-10-CM

## 2025-06-04 DIAGNOSIS — D64.9 ANEMIA, UNSPECIFIED TYPE: ICD-10-CM

## 2025-06-04 DIAGNOSIS — I50.9 ACUTE ON CHRONIC CONGESTIVE HEART FAILURE, UNSPECIFIED HEART FAILURE TYPE (HCC): ICD-10-CM

## 2025-06-04 DIAGNOSIS — I50.23 ACUTE ON CHRONIC SYSTOLIC CONGESTIVE HEART FAILURE (HCC): ICD-10-CM

## 2025-06-04 DIAGNOSIS — D64.9 ACUTE ON CHRONIC ANEMIA: ICD-10-CM

## 2025-06-04 DIAGNOSIS — K92.2 GASTROINTESTINAL HEMORRHAGE, UNSPECIFIED GASTROINTESTINAL HEMORRHAGE TYPE: ICD-10-CM

## 2025-06-04 DIAGNOSIS — N18.9 ACUTE KIDNEY INJURY SUPERIMPOSED ON CHRONIC KIDNEY DISEASE: ICD-10-CM

## 2025-06-04 DIAGNOSIS — N17.9 ACUTE KIDNEY INJURY SUPERIMPOSED ON CHRONIC KIDNEY DISEASE: ICD-10-CM

## 2025-06-04 DIAGNOSIS — I50.83 HIGH OUTPUT HEART FAILURE (HCC): Primary | ICD-10-CM

## 2025-06-04 PROCEDURE — 82248 BILIRUBIN DIRECT: CPT

## 2025-06-04 PROCEDURE — 83880 ASSAY OF NATRIURETIC PEPTIDE: CPT

## 2025-06-04 PROCEDURE — 80053 COMPREHEN METABOLIC PANEL: CPT

## 2025-06-04 PROCEDURE — 85730 THROMBOPLASTIN TIME PARTIAL: CPT

## 2025-06-04 PROCEDURE — 93005 ELECTROCARDIOGRAM TRACING: CPT | Performed by: EMERGENCY MEDICINE

## 2025-06-04 PROCEDURE — 83735 ASSAY OF MAGNESIUM: CPT

## 2025-06-04 PROCEDURE — 82962 GLUCOSE BLOOD TEST: CPT

## 2025-06-04 PROCEDURE — 99285 EMERGENCY DEPT VISIT HI MDM: CPT

## 2025-06-04 PROCEDURE — 85025 COMPLETE CBC W/AUTO DIFF WBC: CPT

## 2025-06-04 PROCEDURE — 85610 PROTHROMBIN TIME: CPT

## 2025-06-04 PROCEDURE — 84484 ASSAY OF TROPONIN QUANT: CPT

## 2025-06-04 ASSESSMENT — PAIN - FUNCTIONAL ASSESSMENT: PAIN_FUNCTIONAL_ASSESSMENT: NONE - DENIES PAIN

## 2025-06-05 ENCOUNTER — APPOINTMENT (OUTPATIENT)
Dept: NON INVASIVE DIAGNOSTICS | Age: 75
DRG: 291 | End: 2025-06-05
Attending: STUDENT IN AN ORGANIZED HEALTH CARE EDUCATION/TRAINING PROGRAM
Payer: MEDICARE

## 2025-06-05 ENCOUNTER — APPOINTMENT (OUTPATIENT)
Dept: GENERAL RADIOLOGY | Age: 75
DRG: 291 | End: 2025-06-05
Payer: MEDICARE

## 2025-06-05 PROBLEM — N17.9 ACUTE KIDNEY INJURY SUPERIMPOSED ON CKD: Status: ACTIVE | Noted: 2025-06-05

## 2025-06-05 PROBLEM — N18.9 ACUTE KIDNEY INJURY SUPERIMPOSED ON CKD: Status: ACTIVE | Noted: 2025-06-05

## 2025-06-05 LAB
ALBUMIN SERPL-MCNC: 3.9 G/DL (ref 3.4–5)
ALBUMIN/GLOB SERPL: 1.8 {RATIO} (ref 1.1–2.2)
ALP SERPL-CCNC: 64 U/L (ref 40–129)
ALT SERPL-CCNC: 9 U/L (ref 10–40)
ANION GAP SERPL CALCULATED.3IONS-SCNC: 11 MMOL/L (ref 9–17)
ANION GAP SERPL CALCULATED.3IONS-SCNC: 12 MMOL/L (ref 9–17)
AST SERPL-CCNC: 15 U/L (ref 15–37)
BASOPHILS # BLD: 0.02 K/UL
BASOPHILS NFR BLD: 0 % (ref 0–1)
BILIRUB DIRECT SERPL-MCNC: 0.3 MG/DL (ref 0–0.3)
BILIRUB INDIRECT SERPL-MCNC: 0.2 MG/DL (ref 0–0.7)
BILIRUB SERPL-MCNC: 0.6 MG/DL (ref 0–1)
BILIRUB UR QL STRIP: NEGATIVE
BNP SERPL-MCNC: 1943 PG/ML (ref 0–450)
BUN SERPL-MCNC: 52 MG/DL (ref 7–20)
BUN SERPL-MCNC: 57 MG/DL (ref 7–20)
CALCIUM SERPL-MCNC: 9 MG/DL (ref 8.3–10.6)
CALCIUM SERPL-MCNC: 9.1 MG/DL (ref 8.3–10.6)
CHLORIDE SERPL-SCNC: 98 MMOL/L (ref 99–110)
CHLORIDE SERPL-SCNC: 98 MMOL/L (ref 99–110)
CLARITY UR: CLEAR
CO2 SERPL-SCNC: 24 MMOL/L (ref 21–32)
CO2 SERPL-SCNC: 24 MMOL/L (ref 21–32)
COLOR UR: YELLOW
COMMENT: ABNORMAL
CREAT SERPL-MCNC: 2.5 MG/DL (ref 0.8–1.3)
CREAT SERPL-MCNC: 3.1 MG/DL (ref 0.8–1.3)
ECHO AO ROOT DIAM: 3.6 CM
ECHO AO ROOT INDEX: 1.4 CM/M2
ECHO AV AREA PEAK VELOCITY: 1.4 CM2
ECHO AV AREA VTI: 1.5 CM2
ECHO AV AREA/BSA PEAK VELOCITY: 0.5 CM2/M2
ECHO AV AREA/BSA VTI: 0.6 CM2/M2
ECHO AV MEAN GRADIENT: 11 MMHG
ECHO AV MEAN VELOCITY: 1.6 M/S
ECHO AV PEAK GRADIENT: 18 MMHG
ECHO AV PEAK VELOCITY: 2.1 M/S
ECHO AV VELOCITY RATIO: 0.43
ECHO AV VTI: 42.8 CM
ECHO BSA: 2.62 M2
ECHO EST RA PRESSURE: 3 MMHG
ECHO IVC PROX: 2.4 CM
ECHO LA AREA 4C: 54.6 CM2
ECHO LA DIAMETER INDEX: 2.72 CM/M2
ECHO LA DIAMETER: 7 CM
ECHO LA MAJOR AXIS: 9.9 CM
ECHO LA TO AORTIC ROOT RATIO: 1.94
ECHO LA VOL MOD A4C: 250 ML (ref 18–58)
ECHO LA VOLUME INDEX MOD A4C: 97 ML/M2 (ref 16–34)
ECHO LV E' LATERAL VELOCITY: 17.5 CM/S
ECHO LV E' SEPTAL VELOCITY: 16 CM/S
ECHO LV EDV A4C: 142 ML
ECHO LV EDV INDEX A4C: 55 ML/M2
ECHO LV EF PHYSICIAN: 60 %
ECHO LV EJECTION FRACTION A4C: 56 %
ECHO LV ESV A4C: 62 ML
ECHO LV ESV INDEX A4C: 24 ML/M2
ECHO LV FRACTIONAL SHORTENING: 36 % (ref 28–44)
ECHO LV INTERNAL DIMENSION DIASTOLE INDEX: 2.14 CM/M2
ECHO LV INTERNAL DIMENSION DIASTOLIC: 5.5 CM (ref 4.2–5.9)
ECHO LV INTERNAL DIMENSION SYSTOLIC INDEX: 1.36 CM/M2
ECHO LV INTERNAL DIMENSION SYSTOLIC: 3.5 CM
ECHO LV IVSD: 1.2 CM (ref 0.6–1)
ECHO LV MASS 2D: 337.9 G (ref 88–224)
ECHO LV MASS INDEX 2D: 131.5 G/M2 (ref 49–115)
ECHO LV POSTERIOR WALL DIASTOLIC: 1.6 CM (ref 0.6–1)
ECHO LV RELATIVE WALL THICKNESS RATIO: 0.58
ECHO LVOT AREA: 3.5 CM2
ECHO LVOT AV VTI INDEX: 0.44
ECHO LVOT DIAM: 2.1 CM
ECHO LVOT MEAN GRADIENT: 2 MMHG
ECHO LVOT PEAK GRADIENT: 3 MMHG
ECHO LVOT PEAK VELOCITY: 0.9 M/S
ECHO LVOT STROKE VOLUME INDEX: 25.5 ML/M2
ECHO LVOT SV: 65.4 ML
ECHO LVOT VTI: 18.9 CM
ECHO MV A VELOCITY: 0.42 M/S
ECHO MV E VELOCITY: 1.49 M/S
ECHO MV E/A RATIO: 3.55
ECHO MV E/E' LATERAL: 8.51
ECHO MV E/E' RATIO (AVERAGED): 8.91
ECHO MV E/E' SEPTAL: 9.31
ECHO RIGHT VENTRICULAR SYSTOLIC PRESSURE (RVSP): 43 MMHG
ECHO RV MID DIMENSION: 4.4 CM
ECHO TV REGURGITANT MAX VELOCITY: 3.18 M/S
ECHO TV REGURGITANT PEAK GRADIENT: 40 MMHG
EKG DIAGNOSIS: NORMAL
EKG Q-T INTERVAL: 438 MS
EKG QRS DURATION: 134 MS
EKG QTC CALCULATION (BAZETT): 499 MS
EKG R AXIS: -40 DEGREES
EKG T AXIS: 96 DEGREES
EKG VENTRICULAR RATE: 78 BPM
EOSINOPHIL # BLD: 0.11 K/UL
EOSINOPHILS RELATIVE PERCENT: 2 % (ref 0–3)
ERYTHROCYTE [DISTWIDTH] IN BLOOD BY AUTOMATED COUNT: 14.8 % (ref 11.7–14.9)
FERRITIN SERPL-MCNC: 12 NG/ML (ref 30–400)
GFR, ESTIMATED: 20 ML/MIN/1.73M2
GFR, ESTIMATED: 25 ML/MIN/1.73M2
GLUCOSE BLD-MCNC: 105 MG/DL (ref 74–99)
GLUCOSE SERPL-MCNC: 111 MG/DL (ref 74–99)
GLUCOSE SERPL-MCNC: 126 MG/DL (ref 74–99)
GLUCOSE UR STRIP-MCNC: NEGATIVE MG/DL
HCT VFR BLD AUTO: 20.5 % (ref 42–52)
HCT VFR BLD AUTO: 20.9 % (ref 42–52)
HCT VFR BLD AUTO: 24.8 % (ref 42–52)
HCT VFR BLD AUTO: 25.2 % (ref 42–52)
HCT VFR BLD AUTO: 26.2 % (ref 42–52)
HGB BLD-MCNC: 6 G/DL (ref 13.5–18)
HGB BLD-MCNC: 6.1 G/DL (ref 13.5–18)
HGB BLD-MCNC: 7.5 G/DL (ref 13.5–18)
HGB BLD-MCNC: 7.6 G/DL (ref 13.5–18)
HGB BLD-MCNC: 8 G/DL (ref 13.5–18)
HGB UR QL STRIP.AUTO: NEGATIVE
IMM GRANULOCYTES # BLD AUTO: 0.02 K/UL
IMM GRANULOCYTES NFR BLD: 0 %
INR PPP: 1.7
IRON SATN MFR SERPL: 4 % (ref 15–50)
IRON SERPL-MCNC: 15 UG/DL (ref 59–158)
KETONES UR STRIP-MCNC: NEGATIVE MG/DL
LEUKOCYTE ESTERASE UR QL STRIP: NEGATIVE
LYMPHOCYTES NFR BLD: 0.46 K/UL
LYMPHOCYTES RELATIVE PERCENT: 9 % (ref 24–44)
MAGNESIUM SERPL-MCNC: 2.1 MG/DL (ref 1.8–2.4)
MAGNESIUM SERPL-MCNC: 2.3 MG/DL (ref 1.8–2.4)
MCH RBC QN AUTO: 25.1 PG (ref 27–31)
MCHC RBC AUTO-ENTMCNC: 29.2 G/DL (ref 32–36)
MCV RBC AUTO: 86 FL (ref 78–100)
MONOCYTES NFR BLD: 0.65 K/UL
MONOCYTES NFR BLD: 13 % (ref 0–5)
NEUTROPHILS NFR BLD: 75 % (ref 36–66)
NEUTS SEG NFR BLD: 3.87 K/UL
NITRITE UR QL STRIP: NEGATIVE
PARTIAL THROMBOPLASTIN TIME: 38.8 SEC (ref 25.1–37.1)
PH UR STRIP: 6 [PH] (ref 5–8)
PLATELET # BLD AUTO: 163 K/UL (ref 140–440)
PMV BLD AUTO: 9.8 FL (ref 7.5–11.1)
POTASSIUM SERPL-SCNC: 4.2 MMOL/L (ref 3.5–5.1)
POTASSIUM SERPL-SCNC: 4.9 MMOL/L (ref 3.5–5.1)
PROT SERPL-MCNC: 6 G/DL (ref 6.4–8.2)
PROT UR STRIP-MCNC: NEGATIVE MG/DL
PROTHROMBIN TIME: 20.1 SEC (ref 11.7–14.5)
RBC # BLD AUTO: 2.43 M/UL (ref 4.6–6.2)
SODIUM SERPL-SCNC: 134 MMOL/L (ref 136–145)
SODIUM SERPL-SCNC: 134 MMOL/L (ref 136–145)
SODIUM UR-SCNC: <20 MMOL/L (ref 40–220)
SP GR UR STRIP: <1.005 (ref 1–1.03)
TIBC SERPL-MCNC: 357 UG/DL (ref 260–445)
TROPONIN I SERPL HS-MCNC: 42 NG/L (ref 0–22)
TROPONIN I SERPL HS-MCNC: 46 NG/L (ref 0–22)
UNSATURATED IRON BINDING CAPACITY: 342 UG/DL (ref 110–370)
UROBILINOGEN UR STRIP-ACNC: 0.2 EU/DL (ref 0–1)
WBC OTHER # BLD: 5.1 K/UL (ref 4–10.5)

## 2025-06-05 PROCEDURE — 36415 COLL VENOUS BLD VENIPUNCTURE: CPT

## 2025-06-05 PROCEDURE — 82728 ASSAY OF FERRITIN: CPT

## 2025-06-05 PROCEDURE — P9016 RBC LEUKOCYTES REDUCED: HCPCS

## 2025-06-05 PROCEDURE — 6360000002 HC RX W HCPCS: Performed by: EMERGENCY MEDICINE

## 2025-06-05 PROCEDURE — 6370000000 HC RX 637 (ALT 250 FOR IP): Performed by: STUDENT IN AN ORGANIZED HEALTH CARE EDUCATION/TRAINING PROGRAM

## 2025-06-05 PROCEDURE — 85014 HEMATOCRIT: CPT

## 2025-06-05 PROCEDURE — 86850 RBC ANTIBODY SCREEN: CPT

## 2025-06-05 PROCEDURE — 30233N1 TRANSFUSION OF NONAUTOLOGOUS RED BLOOD CELLS INTO PERIPHERAL VEIN, PERCUTANEOUS APPROACH: ICD-10-PCS | Performed by: STUDENT IN AN ORGANIZED HEALTH CARE EDUCATION/TRAINING PROGRAM

## 2025-06-05 PROCEDURE — 84484 ASSAY OF TROPONIN QUANT: CPT

## 2025-06-05 PROCEDURE — 83540 ASSAY OF IRON: CPT

## 2025-06-05 PROCEDURE — 93306 TTE W/DOPPLER COMPLETE: CPT

## 2025-06-05 PROCEDURE — 80048 BASIC METABOLIC PNL TOTAL CA: CPT

## 2025-06-05 PROCEDURE — 94640 AIRWAY INHALATION TREATMENT: CPT

## 2025-06-05 PROCEDURE — 84300 ASSAY OF URINE SODIUM: CPT

## 2025-06-05 PROCEDURE — 51798 US URINE CAPACITY MEASURE: CPT

## 2025-06-05 PROCEDURE — 86920 COMPATIBILITY TEST SPIN: CPT

## 2025-06-05 PROCEDURE — 93306 TTE W/DOPPLER COMPLETE: CPT | Performed by: INTERNAL MEDICINE

## 2025-06-05 PROCEDURE — 36430 TRANSFUSION BLD/BLD COMPNT: CPT

## 2025-06-05 PROCEDURE — 86901 BLOOD TYPING SEROLOGIC RH(D): CPT

## 2025-06-05 PROCEDURE — 71045 X-RAY EXAM CHEST 1 VIEW: CPT

## 2025-06-05 PROCEDURE — 86900 BLOOD TYPING SEROLOGIC ABO: CPT

## 2025-06-05 PROCEDURE — 85018 HEMOGLOBIN: CPT

## 2025-06-05 PROCEDURE — 83550 IRON BINDING TEST: CPT

## 2025-06-05 PROCEDURE — 2580000003 HC RX 258: Performed by: STUDENT IN AN ORGANIZED HEALTH CARE EDUCATION/TRAINING PROGRAM

## 2025-06-05 PROCEDURE — 93010 ELECTROCARDIOGRAM REPORT: CPT | Performed by: INTERNAL MEDICINE

## 2025-06-05 PROCEDURE — 1200000000 HC SEMI PRIVATE

## 2025-06-05 PROCEDURE — 94010 BREATHING CAPACITY TEST: CPT

## 2025-06-05 PROCEDURE — 81003 URINALYSIS AUTO W/O SCOPE: CPT

## 2025-06-05 PROCEDURE — 6360000002 HC RX W HCPCS: Performed by: STUDENT IN AN ORGANIZED HEALTH CARE EDUCATION/TRAINING PROGRAM

## 2025-06-05 RX ORDER — BUMETANIDE 0.25 MG/ML
0.5 INJECTION, SOLUTION INTRAMUSCULAR; INTRAVENOUS ONCE
Status: COMPLETED | OUTPATIENT
Start: 2025-06-05 | End: 2025-06-05

## 2025-06-05 RX ORDER — METOPROLOL SUCCINATE 50 MG/1
200 TABLET, EXTENDED RELEASE ORAL DAILY
Status: DISCONTINUED | OUTPATIENT
Start: 2025-06-05 | End: 2025-06-08 | Stop reason: HOSPADM

## 2025-06-05 RX ORDER — ALBUTEROL SULFATE 90 UG/1
2 INHALANT RESPIRATORY (INHALATION) EVERY 6 HOURS PRN
Status: DISCONTINUED | OUTPATIENT
Start: 2025-06-05 | End: 2025-06-08 | Stop reason: HOSPADM

## 2025-06-05 RX ORDER — ACETAMINOPHEN 325 MG/1
650 TABLET ORAL EVERY 6 HOURS PRN
Status: DISCONTINUED | OUTPATIENT
Start: 2025-06-05 | End: 2025-06-08 | Stop reason: HOSPADM

## 2025-06-05 RX ORDER — SODIUM CHLORIDE 9 MG/ML
INJECTION, SOLUTION INTRAVENOUS PRN
Status: COMPLETED | OUTPATIENT
Start: 2025-06-05 | End: 2025-06-07

## 2025-06-05 RX ORDER — MONTELUKAST SODIUM 10 MG/1
10 TABLET ORAL DAILY
Status: DISCONTINUED | OUTPATIENT
Start: 2025-06-05 | End: 2025-06-08 | Stop reason: HOSPADM

## 2025-06-05 RX ORDER — BUMETANIDE 0.25 MG/ML
2 INJECTION, SOLUTION INTRAMUSCULAR; INTRAVENOUS 2 TIMES DAILY
Status: DISCONTINUED | OUTPATIENT
Start: 2025-06-05 | End: 2025-06-08 | Stop reason: HOSPADM

## 2025-06-05 RX ORDER — ALLOPURINOL 100 MG/1
100 TABLET ORAL DAILY
Status: DISCONTINUED | OUTPATIENT
Start: 2025-06-05 | End: 2025-06-08 | Stop reason: HOSPADM

## 2025-06-05 RX ORDER — TRAZODONE HYDROCHLORIDE 50 MG/1
50 TABLET ORAL NIGHTLY PRN
Status: DISCONTINUED | OUTPATIENT
Start: 2025-06-05 | End: 2025-06-08 | Stop reason: HOSPADM

## 2025-06-05 RX ORDER — ALBUTEROL SULFATE 0.83 MG/ML
2.5 SOLUTION RESPIRATORY (INHALATION) EVERY 6 HOURS PRN
Status: DISCONTINUED | OUTPATIENT
Start: 2025-06-05 | End: 2025-06-08 | Stop reason: HOSPADM

## 2025-06-05 RX ORDER — ACETAMINOPHEN 650 MG/1
650 SUPPOSITORY RECTAL EVERY 6 HOURS PRN
Status: DISCONTINUED | OUTPATIENT
Start: 2025-06-05 | End: 2025-06-08 | Stop reason: HOSPADM

## 2025-06-05 RX ORDER — BUDESONIDE AND FORMOTEROL FUMARATE DIHYDRATE 160; 4.5 UG/1; UG/1
2 AEROSOL RESPIRATORY (INHALATION)
Status: DISCONTINUED | OUTPATIENT
Start: 2025-06-05 | End: 2025-06-08 | Stop reason: HOSPADM

## 2025-06-05 RX ORDER — PANTOPRAZOLE SODIUM 40 MG/1
40 TABLET, DELAYED RELEASE ORAL
Status: DISCONTINUED | OUTPATIENT
Start: 2025-06-05 | End: 2025-06-08 | Stop reason: HOSPADM

## 2025-06-05 RX ORDER — LOSARTAN POTASSIUM 25 MG/1
25 TABLET ORAL DAILY
Status: DISCONTINUED | OUTPATIENT
Start: 2025-06-05 | End: 2025-06-08 | Stop reason: HOSPADM

## 2025-06-05 RX ADMIN — PANTOPRAZOLE SODIUM 40 MG: 40 TABLET, DELAYED RELEASE ORAL at 06:42

## 2025-06-05 RX ADMIN — MONTELUKAST 10 MG: 10 TABLET, FILM COATED ORAL at 13:05

## 2025-06-05 RX ADMIN — METOPROLOL SUCCINATE 200 MG: 50 TABLET, EXTENDED RELEASE ORAL at 07:58

## 2025-06-05 RX ADMIN — BUDESONIDE AND FORMOTEROL FUMARATE DIHYDRATE 2 PUFF: 160; 4.5 AEROSOL RESPIRATORY (INHALATION) at 20:11

## 2025-06-05 RX ADMIN — BUMETANIDE 2 MG: 0.25 INJECTION INTRAMUSCULAR; INTRAVENOUS at 08:37

## 2025-06-05 RX ADMIN — BUMETANIDE 2 MG: 0.25 INJECTION INTRAMUSCULAR; INTRAVENOUS at 17:58

## 2025-06-05 RX ADMIN — BUMETANIDE 0.5 MG: 0.25 INJECTION INTRAMUSCULAR; INTRAVENOUS at 05:10

## 2025-06-05 RX ADMIN — IRON SUCROSE 300 MG: 20 INJECTION, SOLUTION INTRAVENOUS at 08:52

## 2025-06-05 RX ADMIN — ALLOPURINOL 100 MG: 100 TABLET ORAL at 07:59

## 2025-06-05 ASSESSMENT — COPD QUESTIONNAIRES
GOLD_GRADE: 2
QUESTION6_LEAVINGHOUSE: 4
QUESTION4_WALKINCLINE: 4
QUESTION8_ENERGYLEVEL: 3
GOLD_GROUP: GROUP B
QUESTION3_CHESTTIGHTNESS: 0
CAT_TOTALSCORE: 24
QUESTION1_COUGHFREQUENCY: 4
QUESTION5_HOMEACTIVITIES: 4
TOTAL_EXACERBATIONS_PASTYEAR: 0
QUESTION2_CHESTPHLEGM: 1
QUESTION7_SLEEPQUALITY: 4

## 2025-06-05 ASSESSMENT — PULMONARY FUNCTION TESTS
POST BRONCHODILATOR FEV1/FVC: 54
PIF_VALUE: 100
FEV1 (%PREDICTED): 53

## 2025-06-05 NOTE — CARE COORDINATION
06/05/25 0929   Service Assessment   Patient Orientation Alert and Oriented;Person;Place;Situation   Cognition Alert   History Provided By Patient;Spouse   Support Systems Spouse/Significant Other   Patient's Healthcare Decision Maker is: Legal Next of Kin   PCP Verified by CM Yes   Last Visit to PCP Within last 3 months   Prior Functional Level Assistance with the following:;Mobility  (wife is primary  for pt and provides transportation for pt to appointments)   Current Functional Level Assistance with the following:;Bathing;Mobility   Can patient return to prior living arrangement Yes   Ability to make needs known: Good   Family able to assist with home care needs: Yes   Would you like for me to discuss the discharge plan with any other family members/significant others, and if so, who? Yes  (permission to speak with pts spouse in room)   Social/Functional History   Lives With Spouse   Type of Home House   Home Layout Able to Live on Main level with bedroom/bathroom;Two level   Condition of Participation: Discharge Planning   The Patient and/or Patient Representative was provided with a Choice of Provider? Patient   The Patient and/Or Patient Representative agree with the Discharge Plan? Yes     CM into see pt to initiate a safe discharge plan. Pt wife in room and permission to speak in her presence. Cm introduced self and explained role of CM. Pt is kind, alert and oriented. Pt lives with spouse in a two story home but able to stay one level. Pt has a ramped entrance. Wife is able to drive. Pt is able to shower independently. Pt uses home O2 at 2.5 lmin per [facility]. Pt has all diabetic supplies if needed. Pt has a PCP and insurance and able to afford his medication. Wife provides groceries/ makes meals and able to take pt to appointments. Pt has two adult children and supportive. Pt offered home care and pt declined at this time.   CM provided card and encouraged to call for any needs or concern.    CM is available if any needs arise.

## 2025-06-05 NOTE — ED NOTES
The following labs were labeled with appropriate pt sticker and tubed to lab:     [x] Blue     [x] Lavender   [] on ice  [x] Green/yellow x2  [] Green/black [] on ice  [x] Grey  [x] on ice  [] Yellow  [x] Red  [] Pink  [] Type/ Screen  [] ABG  [] VBG    [] COVID-19 swab    [] Rapid  [] PCR  [] Flu swab  [] Peds Viral Panel     [] Urine Sample  [] Fecal Sample  [] Pelvic Cultures  [] Blood Cultures  [] X 2  [] STREP Cultures  [] Wound Cultures

## 2025-06-05 NOTE — PROGRESS NOTES
Updated attending provider Silva about patient's post-residual volume of 390 mL. Straight cath parameters in system, no details about frequency of scans prior to cath. No new orders in system.

## 2025-06-05 NOTE — PROGRESS NOTES
Pt is 6'5\". He requested the foot of his bed be removed and no bed extender placed. This RN unable to place bed alarm on pt and personal alarm placed on pt at this time for safety. Educated pt and he verbalized understanding.        Tatum Anthony RN

## 2025-06-05 NOTE — PLAN OF CARE
Problem: Chronic Conditions and Co-morbidities  Goal: Patient's chronic conditions and co-morbidity symptoms are monitored and maintained or improved  Outcome: Progressing     Problem: Discharge Planning  Goal: Discharge to home or other facility with appropriate resources  Outcome: Progressing     Problem: Safety - Adult  Goal: Free from fall injury  Outcome: Progressing     Problem: Respiratory - Adult  Goal: Achieves optimal ventilation and oxygenation  Outcome: Progressing     Problem: Cardiovascular - Adult  Goal: Maintains optimal cardiac output and hemodynamic stability  Outcome: Progressing  Goal: Absence of cardiac dysrhythmias or at baseline  Outcome: Progressing     Problem: Skin/Tissue Integrity - Adult  Goal: Skin integrity remains intact  Outcome: Progressing     Problem: Musculoskeletal - Adult  Goal: Return mobility to safest level of function  Outcome: Progressing  Goal: Maintain proper alignment of affected body part  Outcome: Progressing  Goal: Return ADL status to a safe level of function  Outcome: Progressing     Problem: Hematologic - Adult  Goal: Maintains hematologic stability  Outcome: Progressing

## 2025-06-05 NOTE — ED PROVIDER NOTES
CHIEF COMPLAINT    Chief Complaint   Patient presents with    Hypotension     Pt present in ED for c/o hypotension (B/P reads 111/66 in ED) and dizziness, pt LKW @ 1330. Pt is A x O times 4     HPI  Og Adair is a 74 y.o. male with history of CHF, atrial fibrillation, chronic anticoagulation, diabetes, TAVR, chronic kidney disease who presents to the ED with complaints of lightheadedness, shortness of breath, peripheral edema and lower blood pressure.  Patient states that today he began to experience some worsening shortness of breath with exertion as well as with lying flat and had some worsening swelling to bilateral lower extremities.  He states that he feels lightheaded with positional change when standing or walking.  He checked his blood pressure at home and found systolic blood pressure to be in the 90s which had him very worried.  He therefore called EMS and was brought here for further evaluation.  He rates his symptoms as moderate in severity and exacerbated with exertion as well as with laying flat.  Patient does have history of CHF and states that he has been compliant with his Bumex therapy.  He follows with Dr. Stevens of cardiology and is actually supposed to have appointment on 06/06 with cardiology with repeat echocardiogram.  His last echocardiogram in July 2024 demonstrated systolic ejection fraction of 42%.  Denies fevers, chills, chest pain, melena, hematochezia      REVIEW OF SYSTEMS  Constitutional: No fever, chills  Eye: No visual changes  HENT: No earache or sore throat.  Resp: Complains of shortness of breath.  No cough.  Cardio: No chest pain or palpitations.  Complains of peripheral edema  GI: No abdominal pain, nausea, vomiting, constipation or diarrhea. No melena.  : No dysuria, urgency or frequency.  Endocrine: No heat intolerance, no cold intolerance, no polydipsia   Lymphatics: No adenopathy  Musculoskeletal: No new muscle aches or joint pain.  Neuro: No headaches.  Complains of  Profesionals : Dr. Rodriguez of hospitalist team       Social Determinants : None       I am the Primary Clinician of Record.    ?  New Prescriptions    No medications on file     FINAL IMPRESSION  1. High output heart failure (HCC)    2. Acute on chronic congestive heart failure, unspecified heart failure type (HCC)    3. Acute on chronic anemia    4. History of CHF (congestive heart failure)    5. Acute kidney injury superimposed on chronic kidney disease        Electronically signed by: Bryn Chand DO, 6/5/2025         Bryn Chand DO  06/05/25 0317

## 2025-06-05 NOTE — ED TRIAGE NOTES
Pt present in ED for complaints of dizziness and low blood pressure and states that he \"can't get around anymore.\"

## 2025-06-05 NOTE — PLAN OF CARE
Problem: Chronic Conditions and Co-morbidities  Goal: Patient's chronic conditions and co-morbidity symptoms are monitored and maintained or improved  6/5/2025 0855 by Nancy Underwood RN  Outcome: Progressing  6/5/2025 0627 by Tatum Anthony RN  Outcome: Progressing     Problem: Discharge Planning  Goal: Discharge to home or other facility with appropriate resources  6/5/2025 0855 by Nancy Underwood RN  Outcome: Progressing  6/5/2025 0627 by Tatum Anthony RN  Outcome: Progressing     Problem: Safety - Adult  Goal: Free from fall injury  6/5/2025 0855 by Nancy Underwood RN  Outcome: Progressing  6/5/2025 0627 by Tatum Anthony RN  Outcome: Progressing     Problem: Respiratory - Adult  Goal: Achieves optimal ventilation and oxygenation  6/5/2025 0855 by Nancy Underwood RN  Outcome: Progressing  6/5/2025 0627 by Tatum Anthony RN  Outcome: Progressing     Problem: Cardiovascular - Adult  Goal: Maintains optimal cardiac output and hemodynamic stability  6/5/2025 0855 by Nancy Underwood RN  Outcome: Progressing  6/5/2025 0627 by Tatum Anthony RN  Outcome: Progressing  Goal: Absence of cardiac dysrhythmias or at baseline  6/5/2025 0855 by Nancy Underwood RN  Outcome: Progressing  6/5/2025 0627 by Tatum Anthony RN  Outcome: Progressing     Problem: Skin/Tissue Integrity - Adult  Goal: Skin integrity remains intact  6/5/2025 0855 by Nancy Underwood RN  Outcome: Progressing  6/5/2025 0627 by Tatum Anthony RN  Outcome: Progressing     Problem: Musculoskeletal - Adult  Goal: Return mobility to safest level of function  6/5/2025 0855 by Nancy Underwood RN  Outcome: Progressing  6/5/2025 0627 by Tatum Anthony RN  Outcome: Progressing  Goal: Maintain proper alignment of affected body part  6/5/2025 0855 by Nancy Underwood RN  Outcome: Progressing  6/5/2025 0627 by Tatum Anthony RN  Outcome: Progressing  Goal: Return ADL status to a safe level of  function  6/5/2025 0855 by Nancy Underwood RN  Outcome: Progressing  6/5/2025 0627 by Tatum Anthony RN  Outcome: Progressing     Problem: Hematologic - Adult  Goal: Maintains hematologic stability  6/5/2025 0855 by Nancy Underwood RN  Outcome: Progressing  6/5/2025 0627 by Tatum Anthony RN  Outcome: Progressing

## 2025-06-05 NOTE — ED NOTES
Blood consent form completed by patient and Saji Dorado.   Blood administration checklist completed by this RN and Saji Dorado.

## 2025-06-05 NOTE — CONSULTS
[facility] [address] [place] [address]                              CONSULTATION      PATIENT NAME: CAT IQBAL                 : 1950  MED REC NO: [id number]                      ROOM: [id number]  ACCOUNT NO: [id number]                       ADMIT DATE: 2025  PROVIDER: Ирина Garcia MD      CONSULT DATE: 2025    CHIEF COMPLAINT:  History of severe anemia, rule out GI bleeding.    HISTORY OF PRESENT ILLNESS:  The patient is a 74-year-old white male patient known to me very well with past medical history significant for hypertension, diabetes mellitus, hyperlipidemia, coronary artery disease/dilated cardiomyopathy; atrial fibrillation, on Eliquis; history of aortic stenosis, status post TAVR; history of ventricular tachycardia, COPD, chronic kidney disease, gastroesophageal reflux disease, gout, and anemia, who presented to the emergency room on 2025 with shortness of breath, lightheadedness, fatigue, and swelling of the lower extremities for the past 1 week or so.    The patient denied abdominal pain, nausea, vomiting, hematemesis, or melena, however patient does c/o intermittent hematochezia  In the emergency room, the patient was evaluated and had a chem profile drawn, which showed a BUN of 57, creatinine was 3.1, and the patient's LFTs were within normal limits, and the CBC showed WBC count of 5.1, hemoglobin 6.1, platelet count 163,000.  The patient was transfused with 2 units of packed RBC.  Repeat hemoglobin is 8 g%.  The patient's INR is 1.7.    Clinically, there is no evidence of gross GI bleeding.  The patient was started on Protonix and was admitted for further workup and management    The patient has not had an EGD done in the past, but did have at least 7 colonoscopies done.  The 1st colonoscopy was by Dr. Mark in 1998, and then subsequently, he had 6 colonoscopies done by me, on September 10, 2002,

## 2025-06-05 NOTE — PROGRESS NOTES
This nurse offered to get patient a bed extender, pt declined at this time. This nurse took patient a bariatric chair and transferred pt to it. Pt very appreciative and is resting comfortably in chair with call light in reach.     This nurse also informed pt of order to straight cath if bladder scan is over 250ml. Pt refused to be straight cathed at this time. This nurse educated pt and pt still refused to be straight cathed at this time.

## 2025-06-05 NOTE — PROGRESS NOTES
Second Unit of blood initiated by this RN at 0617 and Verified with NIXON Sapp. Pt observed for 15 minutes, no issues noted at this time. VSS obtained.  Blood initiated at 60mL/hour for the first 15 minutes per protocol. Rate changed to 191 per orders and protocol at 0632.    Tatum Anthony RN

## 2025-06-05 NOTE — PROGRESS NOTES
Blood handoff with this RN and ANNE Arellano and Mercedes. NIXON. Blood is running at 191 at this time.      Tatum Anthoyn RN

## 2025-06-05 NOTE — H&P
History and Physical      Name:  Og Adair /Age/Sex: 1950  (74 y.o. male)   MRN & CSN:  [id number] & [id number] Encounter Date/Time: 2025 2:40 AM EDT   [facility]       Assessment and Plan:   Og Adari is a 74 y.o. male with chronic systolic heart failure, hypertension, hyperlipidemia, GERD, COPD, aortic stenosis status post TAVR, persistent atrial fibrillation, pulmonary hypertension, CKD stage IIIb, Hx Prostate cancer, obesity presented from home with lightheadedness and fatigue    Acute on chronic anemia  Iron deficiency anemia secondary to hematochezia  History of prostate cancer with radiation and ensuing radiation proctitis  Hb 6.0 (10.2 in February) status post 2 unit PRBC in ER  IV Iron infusion monitor HH and consult GI    ROMAIN on CKD stage IIIb, baseline creatinine around 2  Creatinine on presentation 3.1 probably related to BP variation/anemia  UA Na less than 20 bland otherwise  Bladder scan for retention, avoid nephrotoxic agents monitor RFTs    Acute on chronic systolic heart failure  Secondary to profound anemia/ High output  NT Pro BNP  CXR with evidence of pulmonary edema and cardiomegaly, weight 278   IV Bumex 2 mg BID monitor I/O measure daily weight (target weight 250-260lb)  Hold Losartan and continue Toprol XL  Recheck Echocardiogram    Elevated Troponin 46>42  Chest pain free EKG non ischemic unable to review most recent outpatient EKG    Persistent Atrial Fibrillation  Continue Toprol XL and hold Eliquis due to anemia    COPD on chronic nocturnal O2  Symbicort and Spiriva as needed albuterol    Inpatient MedSurg telemetry  Full code    Disposition:     Current Living situation: Home  Expected Disposition: Home  Estimated D/C: 2-3 days    Diet ADULT DIET; Regular; No Added Salt (3-4 gm)   DVT Prophylaxis None   Code Status Full Code   Surrogate Decision Maker/ HERMINIO Reinoso      Personally reviewed Lab Studies and Imaging        Medications:   Medications:    allopurinol  100 mg Oral Daily    budesonide-formoterol  2 puff Inhalation BID RT    And    tiotropium  2 puff Inhalation Daily RT    [Held by provider] losartan  25 mg Oral Daily    metoprolol succinate  200 mg Oral Daily    pantoprazole  40 mg Oral QAM AC    [Held by provider] apixaban  5 mg Oral BID    bumetanide  2 mg IntraVENous BID    iron sucrose  300 mg IntraVENous Q24H        Infusions:    sodium chloride       PRN Meds: sodium chloride, , PRN  acetaminophen, 650 mg, Q6H PRN   Or  acetaminophen, 650 mg, Q6H PRN  albuterol, 2.5 mg, Q6H PRN  albuterol sulfate HFA, 2 puff, Q6H PRN  traZODone, 50 mg, Nightly PRN        Labs      CBC:   Recent Labs     06/04/25 2353 06/05/25  0124   WBC 5.1  --    HGB 6.1* 6.0*     --      BMP:    Recent Labs     06/04/25  2353   *   K 4.9   CL 98*   CO2 24   BUN 57*   CREATININE 3.1*   GLUCOSE 111*     BNP:   Recent Labs     06/04/25  2353   PROBNP 1,943*     Imaging/Diagnostics Last 24 Hours     XR CHEST PORTABLE  Result Date: 6/5/2025  XR CHEST PORTABLE 6/5/2025 0:13 History: Shortness of breath COMPARISON: May 8 Left base opacity and atelectasis, aspiration or infection. Moderate cardiomegaly. Diffuse interstitial prominence throughout both lungs. No acute osseous abnormalities. IMPRESSION: 1.  Left base opacity and atelectasis, aspiration or infection. Moderate cardiomegaly. Diffuse interstitial prominence throughout both lungs.  Dictated and Electronically Signed By: Tomas Padron [facility] 6/5/2025 0:20            Electronically signed by Ahmet Rodriguez MD on 6/5/2025 at 5:50 AM

## 2025-06-05 NOTE — PROGRESS NOTES
4 Eyes Skin Assessment     NAME:  Og Adair  YOB: 1950  MEDICAL RECORD NUMBER:  [id number]    The patient is being assessed for  Admission    I agree that at least one RN has performed a thorough Head to Toe Skin Assessment on the patient. ALL assessment sites listed below have been assessed.      Areas assessed by both nurses:    Head, Face, Ears, Shoulders, Back, Chest, Arms, Elbows, Hands, Sacrum. Buttock, Coccyx, Ischium, and Legs. Feet and Heels        Does the Patient have a Wound? No noted wound(s)       Kris Prevention initiated by RN: Yes  Wound Care Orders initiated by RN: No    Pressure Injury (Stage 3,4, Unstageable, DTI, NWPT, and Complex wounds) if present, place Wound referral order by RN under : No    New Ostomies, if present place, Ostomy referral order under : No     Nurse 1 eSignature: Electronically signed by Tatum Anthony RN on 6/5/25 at 6:19 AM EDT    **SHARE this note so that the co-signing nurse can place an eSignature**    Nurse 2 eSignature: Electronically signed by Erin Lennon RN on 6/5/25 at 6:47 AM EDT

## 2025-06-05 NOTE — PROGRESS NOTES
Patient admitted earlier this morning by my partner.  History and physical reviewed.  Agree with history and physical.  Hemoglobin 8 post 2 unit pRBC transfusion, await GI recommendations,     Tram Silva MD  Hospitalist

## 2025-06-05 NOTE — ED NOTES
ED TO INPATIENT SBAR HANDOFF    Patient Name: Og Adair   :  1950  74 y.o.   Preferred Name  Ramy    Family/Caregiver Present yes (wife at bedside).   Restraints no   C-SSRS: Risk of Suicide: No Risk  Sitter no   Sepsis Risk Score Sepsis V2 Risk Score: 30.1      Situation  Chief Complaint   Patient presents with    Hypotension     Pt present in ED for c/o hypotension (B/P reads 111/66 in ED) and dizziness, pt LKW @ 1330. Pt is A x O times 4     Brief Description of Patient's Condition: Pt present in ED for complaints of dizziness and low blood pressure and states that he \"can't get around anymore.\"   Mental Status: oriented, alert, coherent, logical, thought processes intact, and able to concentrate and follow conversation  Arrived from: home    Imaging:   XR CHEST PORTABLE   Final Result        Abnormal labs:   Abnormal Labs Reviewed   CBC WITH AUTO DIFFERENTIAL - Abnormal; Notable for the following components:       Result Value    RBC 2.43 (*)     Hemoglobin 6.1 (*)     Hematocrit 20.9 (*)     MCH 25.1 (*)     MCHC 29.2 (*)     Neutrophils % 75 (*)     Lymphocytes % 9 (*)     Monocytes % 13 (*)     All other components within normal limits   BASIC METABOLIC PANEL - Abnormal; Notable for the following components:    Sodium 134 (*)     Chloride 98 (*)     Glucose 111 (*)     BUN 57 (*)     Creatinine 3.1 (*)     Est, Glom Filt Rate 20 (*)     All other components within normal limits   TROPONIN - Abnormal; Notable for the following components:    Troponin, High Sensitivity 46 (*)     All other components within normal limits   BRAIN NATRIURETIC PEPTIDE - Abnormal; Notable for the following components:    NT Pro-BNP 1,943 (*)     All other components within normal limits   PROTIME-INR - Abnormal; Notable for the following components:    Protime 20.1 (*)     All other components within normal limits   APTT - Abnormal; Notable for the following components:    APTT 38.8 (*)     All other components within  heafilycalcified    History of total right hip arthroplasty     Dr Patrick 9/2018- does get antibiotic prophylaxis w dental work- Dr Parker/dentist    Hyperlipidemia     Hypertension     IFG (impaired fasting glucose)     Jan 2019- a1c 6.1 but fasting glc 131.    Knee pain, bilateral     Long-term (current) use of anticoagulants     Peptic ulcer disease     S/P colonoscopy 11/06/2024    Dr Garcia, radiation proctitis and int hemorrhoids    S/P TAVR (transcatheter aortic valve replacement) 10/17/2022    Dr Haji -Anita- now following    Testosterone deficiency dx'd 9/2015    Ventricular tachycardia (HCC)     mentioned by Dr Rodriguez 11/2019 note-who monitors him    VHD (valvular heart disease) 11/29/2018    Mild aortic stenosis by echo November 2018       Assessment    Vitals: MEWS Score: 0  Level of Consciousness: Alert (0)   Vitals:    06/05/25 0307 06/05/25 0314 06/05/25 0315 06/05/25 0330   BP: 120/68 120/68 (!) 120/104 112/72   Pulse: 72 70 76 75   Resp: 15 18 18 15   Temp: 98.3 °F (36.8 °C)   97.8 °F (36.6 °C)   TempSrc: Oral   Oral   SpO2: 98% 97% 97% 98%       PO Status: Nothing by Mouth    O2 Flow Rate: O2 Device: Nasal cannula O2 Flow Rate (L/min): 2.5 L/min (2.5 L @ bedtime)    Cardiac Rhythm: irregular HR     Last documented pain medication administered: N/A        NIH Score: NIH NIH Stroke Scale  NIH Stroke Scale Assessed: No     Active LDA's:   Peripheral IV 06/04/25 Left Antecubital (Active)       Pertinent or High Risk Medications/Drips: no   If Yes, please provide details:       Blood Product Administration: no  If Yes, please provide details:     Recommendation    Incomplete orders   Additional Comments:   Per Dr. Chand, do not administer BUMEX until first unit of blood completed.   If any further questions, please call Sending RN at [phone]      Electronically signed by: Electronically signed by Saji Bills RN on 6/5/2025 at 4:17 AM

## 2025-06-05 NOTE — CONSENT
Informed Consent for Blood Component Transfusion Note    I have discussed with the patient the rationale for blood component transfusion; its benefits in treating or preventing fatigue, organ damage, or death; and its risk which includes mild transfusion reactions, rare risk of blood borne infection, or more serious but rare reactions. I have discussed the alternatives to transfusion, including the risk and consequences of not receiving transfusion. The patient had an opportunity to ask questions and had agreed to proceed with transfusion of blood components.    Electronically signed by Bryn Chand DO on 6/5/25 at 2:08 AM EDT

## 2025-06-05 NOTE — PROGRESS NOTES
Current GOLD classification       GOLD Stage:    Group:    B  Recorded domestic exacerbations past 12 months:  0  Current recorded COPD Assessment Tool (CAT) score of  24  Current eosinophil count: 0.4           Inhaler Device   Acceptable for Use   Respimat  Not Breath Actuated Yes   MDI  Not Breath Actuated Yes           DPI  Observed PIF   using  In-Check Meter   Optimal PIF   Acceptable for Use   HANDIHALER 100 >30 y   Pressair 100 >45 y   NEOHALER 100 >50 y   Diskus 100 >60 y   ELLIPTA 100 >60 y     Records show this patient was using Trelegy as his maintenance therapy prior to admission.        LONG-ACTING (LABA)   Arformoterol (Brovana) NEBULIZER   Indacaterol (Arcapta) NEOHALER   Olodaterol (Striverdi) Respimat   Salmeterol (Serevent) MDI, DISKUS   LONG-ACTING (LAMA)   Aclidinium bromide (Tudorza) PRESSAIR   Glycopyrronium bromide (Seebri) NEOHALER   Tiotropium (Spiriva) Respimat, HANDIHALER   Umeclidinium (Incruse) ELLIPTA   (LABA/LAMA)   Formoterol/glycopyrronium (Bevespi) MDI   Indacaterol/glycopyrronium (Utibron) NEOHALER   Vilanterol/umeclidinium (Anoro) ELLIPTA   Olodaterol/tiotropium (Stiolto) Respimat   (LABA/ICS)   Formoterol/budesomide (Symbicort) MDI   Formoterol/mometasone (Dulera) MDI   Salmeterol/fluticasone (Advair) MDI, DISKUS   Vilanterol/fluticasone (Breo) ELLIPTA   (LABA/LAMA/ICS)   Fluticasone/umeclidinium/vilanterol (Trelegy) ELLIPTA   Budesonide/glycopyrrolate/formoterol fumarate (Beztri) aerosphere     COPD Spot Light and QR Code education given to patient. _____   06/05/25 1300   Spirometry Assessment   FEV1 (%PRED) 53   Post Bronchodilator FEV/FVC 54   COPD Exacerbations in last year 0    L/min   COPD Assessment (CAT Score)   Cough Assessment 4   Phlegm Assessment 1   Chest tightness 0   Walking on an incline 4   Home Activities 4   Confident Leaving The Home 4   Sleeping Soundly 4   Have Energy 3   Assessment Score 24   $RT COPD Assessment Yes   GOLD Staging   Gold Grade 2    Group Group B

## 2025-06-06 ENCOUNTER — ANESTHESIA EVENT (OUTPATIENT)
Dept: ENDOSCOPY | Age: 75
End: 2025-06-06
Payer: MEDICARE

## 2025-06-06 LAB
ABO/RH: NORMAL
ALBUMIN SERPL-MCNC: 3.8 G/DL (ref 3.4–5)
ALBUMIN/GLOB SERPL: 1.8 {RATIO} (ref 1.1–2.2)
ALP SERPL-CCNC: 63 U/L (ref 40–129)
ALT SERPL-CCNC: 10 U/L (ref 10–40)
ANION GAP SERPL CALCULATED.3IONS-SCNC: 12 MMOL/L (ref 9–17)
ANTIBODY SCREEN: NEGATIVE
AST SERPL-CCNC: 15 U/L (ref 15–37)
BASOPHILS # BLD: 0.04 K/UL
BASOPHILS NFR BLD: 1 % (ref 0–1)
BILIRUB SERPL-MCNC: 0.4 MG/DL (ref 0–1)
BLOOD BANK BLOOD PRODUCT EXPIRATION DATE: NORMAL
BLOOD BANK BLOOD PRODUCT EXPIRATION DATE: NORMAL
BLOOD BANK DISPENSE STATUS: NORMAL
BLOOD BANK DISPENSE STATUS: NORMAL
BLOOD BANK ISBT PRODUCT BLOOD TYPE: 6200
BLOOD BANK ISBT PRODUCT BLOOD TYPE: 6200
BLOOD BANK PRODUCT CODE: NORMAL
BLOOD BANK PRODUCT CODE: NORMAL
BLOOD BANK SAMPLE EXPIRATION: NORMAL
BLOOD BANK UNIT TYPE AND RH: NORMAL
BLOOD BANK UNIT TYPE AND RH: NORMAL
BPU ID: NORMAL
BPU ID: NORMAL
BUN SERPL-MCNC: 47 MG/DL (ref 7–20)
CALCIUM SERPL-MCNC: 9.2 MG/DL (ref 8.3–10.6)
CHLORIDE SERPL-SCNC: 101 MMOL/L (ref 99–110)
CO2 SERPL-SCNC: 24 MMOL/L (ref 21–32)
COMPONENT: NORMAL
COMPONENT: NORMAL
CREAT SERPL-MCNC: 2.1 MG/DL (ref 0.8–1.3)
CROSSMATCH RESULT: NORMAL
CROSSMATCH RESULT: NORMAL
EOSINOPHIL # BLD: 0.15 K/UL
EOSINOPHILS RELATIVE PERCENT: 3 % (ref 0–3)
ERYTHROCYTE [DISTWIDTH] IN BLOOD BY AUTOMATED COUNT: 14.8 % (ref 11.7–14.9)
GFR, ESTIMATED: 31 ML/MIN/1.73M2
GLUCOSE SERPL-MCNC: 113 MG/DL (ref 74–99)
HCT VFR BLD AUTO: 26.1 % (ref 42–52)
HGB BLD-MCNC: 7.8 G/DL (ref 13.5–18)
IMM GRANULOCYTES # BLD AUTO: 0.01 K/UL
IMM GRANULOCYTES NFR BLD: 0 %
INR PPP: 1.4
INR PPP: 1.5
LIPASE SERPL-CCNC: 35 U/L (ref 13–60)
LYMPHOCYTES NFR BLD: 0.42 K/UL
LYMPHOCYTES RELATIVE PERCENT: 7 % (ref 24–44)
MAGNESIUM SERPL-MCNC: 2.2 MG/DL (ref 1.8–2.4)
MCH RBC QN AUTO: 25.6 PG (ref 27–31)
MCHC RBC AUTO-ENTMCNC: 29.9 G/DL (ref 32–36)
MCV RBC AUTO: 85.6 FL (ref 78–100)
MONOCYTES NFR BLD: 0.7 K/UL
MONOCYTES NFR BLD: 12 % (ref 0–5)
NEUTROPHILS NFR BLD: 77 % (ref 36–66)
NEUTS SEG NFR BLD: 4.43 K/UL
PLATELET # BLD AUTO: 175 K/UL (ref 140–440)
PMV BLD AUTO: 9.6 FL (ref 7.5–11.1)
POTASSIUM SERPL-SCNC: 3.9 MMOL/L (ref 3.5–5.1)
PROT SERPL-MCNC: 5.9 G/DL (ref 6.4–8.2)
PROTHROMBIN TIME: 17.1 SEC (ref 11.7–14.5)
PROTHROMBIN TIME: 17.8 SEC (ref 11.7–14.5)
RBC # BLD AUTO: 3.05 M/UL (ref 4.6–6.2)
SODIUM SERPL-SCNC: 136 MMOL/L (ref 136–145)
TRANSFUSION STATUS: NORMAL
TRANSFUSION STATUS: NORMAL
UNIT DIVISION: 0
UNIT DIVISION: 0
UNIT ISSUE DATE/TIME: NORMAL
UNIT ISSUE DATE/TIME: NORMAL
WBC OTHER # BLD: 5.8 K/UL (ref 4–10.5)

## 2025-06-06 PROCEDURE — 6370000000 HC RX 637 (ALT 250 FOR IP): Performed by: STUDENT IN AN ORGANIZED HEALTH CARE EDUCATION/TRAINING PROGRAM

## 2025-06-06 PROCEDURE — 2580000003 HC RX 258: Performed by: STUDENT IN AN ORGANIZED HEALTH CARE EDUCATION/TRAINING PROGRAM

## 2025-06-06 PROCEDURE — 1200000000 HC SEMI PRIVATE

## 2025-06-06 PROCEDURE — 94761 N-INVAS EAR/PLS OXIMETRY MLT: CPT

## 2025-06-06 PROCEDURE — 36415 COLL VENOUS BLD VENIPUNCTURE: CPT

## 2025-06-06 PROCEDURE — 85025 COMPLETE CBC W/AUTO DIFF WBC: CPT

## 2025-06-06 PROCEDURE — 85610 PROTHROMBIN TIME: CPT

## 2025-06-06 PROCEDURE — 83690 ASSAY OF LIPASE: CPT

## 2025-06-06 PROCEDURE — 83735 ASSAY OF MAGNESIUM: CPT

## 2025-06-06 PROCEDURE — 6370000000 HC RX 637 (ALT 250 FOR IP): Performed by: SPECIALIST

## 2025-06-06 PROCEDURE — 6360000002 HC RX W HCPCS: Performed by: STUDENT IN AN ORGANIZED HEALTH CARE EDUCATION/TRAINING PROGRAM

## 2025-06-06 PROCEDURE — 80053 COMPREHEN METABOLIC PANEL: CPT

## 2025-06-06 PROCEDURE — 2700000000 HC OXYGEN THERAPY PER DAY

## 2025-06-06 PROCEDURE — 94640 AIRWAY INHALATION TREATMENT: CPT

## 2025-06-06 RX ADMIN — ALLOPURINOL 100 MG: 100 TABLET ORAL at 08:36

## 2025-06-06 RX ADMIN — MONTELUKAST 10 MG: 10 TABLET, FILM COATED ORAL at 08:36

## 2025-06-06 RX ADMIN — BUMETANIDE 2 MG: 0.25 INJECTION INTRAMUSCULAR; INTRAVENOUS at 08:35

## 2025-06-06 RX ADMIN — PANTOPRAZOLE SODIUM 40 MG: 40 TABLET, DELAYED RELEASE ORAL at 05:51

## 2025-06-06 RX ADMIN — BUDESONIDE AND FORMOTEROL FUMARATE DIHYDRATE 2 PUFF: 160; 4.5 AEROSOL RESPIRATORY (INHALATION) at 07:16

## 2025-06-06 RX ADMIN — TIOTROPIUM BROMIDE INHALATION SPRAY 2 PUFF: 3.12 SPRAY, METERED RESPIRATORY (INHALATION) at 07:16

## 2025-06-06 RX ADMIN — BUMETANIDE 2 MG: 0.25 INJECTION INTRAMUSCULAR; INTRAVENOUS at 17:58

## 2025-06-06 RX ADMIN — POLYETHYLENE GLYCOL 3350, SODIUM SULFATE ANHYDROUS, SODIUM BICARBONATE, SODIUM CHLORIDE, POTASSIUM CHLORIDE 4000 ML: 236; 22.74; 6.74; 5.86; 2.97 POWDER, FOR SOLUTION ORAL at 18:04

## 2025-06-06 NOTE — PLAN OF CARE
Problem: Chronic Conditions and Co-morbidities  Goal: Patient's chronic conditions and co-morbidity symptoms are monitored and maintained or improved  6/6/2025 1348 by Romelia Robert RN  Outcome: Progressing  6/6/2025 0156 by Brittany Esteban LPN  Outcome: Progressing     Problem: Discharge Planning  Goal: Discharge to home or other facility with appropriate resources  6/6/2025 1348 by Romelia Robert RN  Outcome: Progressing  6/6/2025 0156 by Brittany Esteban LPN  Outcome: Progressing     Problem: Safety - Adult  Goal: Free from fall injury  6/6/2025 1348 by Romelia Robert RN  Outcome: Progressing  6/6/2025 0156 by Brittany Esteban LPN  Outcome: Progressing     Problem: Respiratory - Adult  Goal: Achieves optimal ventilation and oxygenation  6/6/2025 1348 by Romelia Robert RN  Outcome: Progressing  6/6/2025 0156 by Brittany Esteban LPN  Outcome: Progressing     Problem: Cardiovascular - Adult  Goal: Maintains optimal cardiac output and hemodynamic stability  6/6/2025 1348 by Romelia Robert RN  Outcome: Progressing  6/6/2025 0156 by Brittany Esteban LPN  Outcome: Progressing  Goal: Absence of cardiac dysrhythmias or at baseline  6/6/2025 1348 by Romelia Robert RN  Outcome: Progressing  6/6/2025 0156 by Brittany Esteban LPN  Outcome: Progressing     Problem: Skin/Tissue Integrity - Adult  Goal: Skin integrity remains intact  Description: 1.  Monitor for areas of redness and/or skin breakdown2.  Assess vascular access sites hourly3.  Every 4-6 hours minimum:  Change oxygen saturation probe site4.  Every 4-6 hours:  If on nasal continuous positive airway pressure, respiratory therapy assess nares and determine need for appliance change or resting period  6/6/2025 1348 by Romelia Robert RN  Outcome: Progressing  Flowsheets (Taken 6/6/2025 1347)  Skin Integrity Remains Intact: Monitor for areas of redness and/or skin breakdown  6/6/2025 0156 by Brittany Esteban LPN  Outcome: Progressing     Problem: Musculoskeletal - Adult  Goal: Return mobility to safest level of

## 2025-06-06 NOTE — PROGRESS NOTES
DOING WELL UNEVENTFUL NIGHT NO BM NO RECTAL BLEEDING SINCE ADMISSION C/O INTERMITTENT HEMATOCHEZIA AS OUTPT  VITALS STABLE   LABS NOTED HB 7.8 AFTER TRANSFUSION  WILL PROCEED WITH COLONOSCOPY IN AM  D/W PT AND RN

## 2025-06-06 NOTE — PROGRESS NOTES
Patient's iv was beeping. This nurse came into room and noticed that the area of the iv had started to swell. Stopped infusion and contacted pharmacy to see if any special treatment needed done in regards to the venofer which had infiltrated. Told to leave cannula in place and aspirate, which I did. Hospitalist notified. Patient denies any discomfort.

## 2025-06-06 NOTE — PROGRESS NOTES
Met with patient and introduced myself as the Heart failure education R.N. Patient is familiar with heart failure measures.   He is waiting for colonoscopy due to GI blood loss.    Admitting diagnosis-Acute kidney injury  Cardiologist- [facility]   Heart Failure Education Nurse consulted- yes   Ejection fraction  45-50% as of 6/5/25  Pro BNP- 1,943 on 6/4  Hospital follow up appt-  patient will reschedule appt with [facility] cardiology.  Smoking Cessation information and referral-N/A   Pneumonia vaccine- up to date   PCP- [facility]   Patient has a digital scale?has scale   Transportation-has transportation    Able to obtain medications without difficulty?- Yes- Patient denies difficulty in obtaining or taking medications.     Heart failure specific Medications-  Losartan, Metoprolol, Bumex      Reviewed Heart failure patient education book with patient. Heart failure education included: Type of heart failure, How it is diagnosed, causes, symptoms, medications, diet, daily weights, exercise and fluid control.   Questions answered.      Patient's heart failure medications reviewed and information given on each.     Reviewed the Stop Light Handout with patient and instructed when to call his provider.   Patient was engaged and attentive during education session.    The following handouts were reviewed with patient and patient was given a copy of the following : Heart Failure education booklet, the 'Stop Light' Handout,  a link to the [employer] Association's Healthier Living with Heart Failure Interactive workbook and a list of heart failure related education available on the hospital TV and how to access it.

## 2025-06-06 NOTE — CONSULTS
Consult completed. Procedure/rationale explained to pt & consent obtained. #20ga Nexiva extra-long, 1.75\" PIV initiated without difficulty/complications to LUE anterior upper forearm. Pt tolerated well and no other c/o or needs noted or reported. RN notified.

## 2025-06-06 NOTE — PROGRESS NOTES
[place]  [facility] [place]      Name:  Og Adari /Age/Sex: 1950  (74 y.o. male)   MRN & CSN:  [id number] & [id number] Encounter Date/Time: 2025 11:42 AM EDT    [facility] PCP: Gerardo Elder MD       Hospital Day: 3    Assessment and Plan:   Og Adair is a 74 y.o. male with pmh of, hypertension, hyperlipidemia, GERD, COPD, aortic stenosis post-TAVR, persistent A-fib, pulmonary hypertension, CKD stage D, history of pressure ulcer, obesity who presents with Acute kidney injury superimposed on CKD      Plan:  Acute on chronic anemia  Iron deficiency anemia secondary to hematochezia  History of prostate cancer with radiation and ensuing radiation proctitis  Hb 6.0 (10.2 in February) status post 2 unit PRBC in ER, hemoglobin 7.8 in the AM  IV Iron infusion consult GI, plan for colonoscopy tomorrow     ROMAIN on CKD stage IIIb, baseline creatinine around 2  Creatinine on presentation 3.1 probably related to BP variation/anemia  UA Na less than 20 bland otherwise  Improving      Acute on chronic systolic heart failure  Secondary to profound anemia/ High output  NT Pro BNP  CXR with evidence of pulmonary edema and cardiomegaly, weight 278   IV Bumex 2 mg BID monitor I/O measure daily weight (target weight 250-260lb)  Hold Losartan and continue Toprol XL  Echocardiogram with EF 45-50%     Elevated Troponin 46>42  Chest pain free EKG non ischemic unable to review most recent outpatient EKG     Persistent Atrial Fibrillation  Continue Toprol XL and hold Eliquis due to anemia     COPD on chronic nocturnal O2  Symbicort and Spiriva as needed albuterol    Diet ADULT DIET; Clear Liquid  Diet NPO   DVT Prophylaxis [] Lovenox, []  Heparin, [] SCDs, [] Ambulation,  [] Eliquis, [] Xarelto  [] Coumadin   Code Status Full Code   Disposition From: Home  Expected Disposition: same  Estimated Date of Discharge: 2 days  Patient requires continued admission due to Lower GI bleed weakness  Hematocrit 25.2 (L) 42.0 - 52.0 %   Magnesium    Collection Time: 06/06/25  1:54 AM   Result Value Ref Range    Magnesium 2.2 1.8 - 2.4 mg/dL   Comprehensive Metabolic Panel    Collection Time: 06/06/25  1:54 AM   Result Value Ref Range    Sodium 136 136 - 145 mmol/L    Potassium 3.9 3.5 - 5.1 mmol/L    Chloride 101 99 - 110 mmol/L    CO2 24 21 - 32 mmol/L    Anion Gap 12 9 - 17 mmol/L    Glucose 113 (H) 74 - 99 mg/dL    BUN 47 (H) 7 - 20 mg/dL    Creatinine 2.1 (H) 0.8 - 1.3 mg/dL    Est, Glom Filt Rate 31 (L) >60 mL/min/1.73m2    Calcium 9.2 8.3 - 10.6 mg/dL    Total Protein 5.9 (L) 6.4 - 8.2 g/dL    Albumin 3.8 3.4 - 5.0 g/dL    Albumin/Globulin Ratio 1.8 1.1 - 2.2    Total Bilirubin 0.4 0.0 - 1.0 mg/dL    Alkaline Phosphatase 63 40 - 129 U/L    ALT 10 10 - 40 U/L    AST 15 15 - 37 U/L   CBC with Auto Differential    Collection Time: 06/06/25  1:54 AM   Result Value Ref Range    WBC 5.8 4.0 - 10.5 k/uL    RBC 3.05 (L) 4.60 - 6.20 m/uL    Hemoglobin 7.8 (L) 13.5 - 18.0 g/dL    Hematocrit 26.1 (L) 42.0 - 52.0 %    MCV 85.6 78.0 - 100.0 fL    MCH 25.6 (L) 27.0 - 31.0 pg    MCHC 29.9 (L) 32.0 - 36.0 g/dL    RDW 14.8 11.7 - 14.9 %    Platelets 175 140 - 440 k/uL    MPV 9.6 7.5 - 11.1 fL    Neutrophils % 77 (H) 36 - 66 %    Lymphocytes % 7 (L) 24 - 44 %    Monocytes % 12 (H) 0 - 5 %    Eosinophils % 3 0.0 - 3.0 %    Basophils % 1 0 - 1 %    Immature Granulocytes % 0 0 %    Neutrophils Absolute 4.43 k/uL    Lymphocytes Absolute 0.42 k/uL    Monocytes Absolute 0.70 k/uL    Eosinophils Absolute 0.15 k/uL    Basophils Absolute 0.04 k/uL    Immature Granulocytes Absolute 0.01 k/uL   Lipase    Collection Time: 06/06/25  1:54 AM   Result Value Ref Range    Lipase 35 13 - 60 U/L   Protime-INR    Collection Time: 06/06/25  1:54 AM   Result Value Ref Range    Protime 17.1 (H) 11.7 - 14.5 sec    INR 1.4    Protime-INR    Collection Time: 06/06/25  8:14 AM   Result Value Ref Range    Protime 17.8 (H) 11.7 - 14.5 sec    INR 1.5

## 2025-06-07 ENCOUNTER — ANESTHESIA (OUTPATIENT)
Dept: ENDOSCOPY | Age: 75
End: 2025-06-07
Payer: MEDICARE

## 2025-06-07 LAB
ALBUMIN SERPL-MCNC: 4 G/DL (ref 3.4–5)
ALBUMIN/GLOB SERPL: 1.9 {RATIO} (ref 1.1–2.2)
ALP SERPL-CCNC: 70 U/L (ref 40–129)
ALT SERPL-CCNC: 10 U/L (ref 10–40)
ANION GAP SERPL CALCULATED.3IONS-SCNC: 12 MMOL/L (ref 9–17)
AST SERPL-CCNC: 17 U/L (ref 15–37)
BASOPHILS # BLD: 0.05 K/UL
BASOPHILS NFR BLD: 1 % (ref 0–1)
BILIRUB SERPL-MCNC: 0.6 MG/DL (ref 0–1)
BUN SERPL-MCNC: 28 MG/DL (ref 7–20)
CALCIUM SERPL-MCNC: 9.4 MG/DL (ref 8.3–10.6)
CHLORIDE SERPL-SCNC: 101 MMOL/L (ref 99–110)
CO2 SERPL-SCNC: 29 MMOL/L (ref 21–32)
CREAT SERPL-MCNC: 1.5 MG/DL (ref 0.8–1.3)
EOSINOPHIL # BLD: 0.13 K/UL
EOSINOPHILS RELATIVE PERCENT: 2 % (ref 0–3)
ERYTHROCYTE [DISTWIDTH] IN BLOOD BY AUTOMATED COUNT: 14.7 % (ref 11.7–14.9)
GFR, ESTIMATED: 44 ML/MIN/1.73M2
GLUCOSE BLD-MCNC: 123 MG/DL (ref 74–99)
GLUCOSE SERPL-MCNC: 109 MG/DL (ref 74–99)
HCT VFR BLD AUTO: 27.1 % (ref 42–52)
HGB BLD-MCNC: 8.2 G/DL (ref 13.5–18)
IMM GRANULOCYTES # BLD AUTO: 0.02 K/UL
IMM GRANULOCYTES NFR BLD: 0 %
LYMPHOCYTES NFR BLD: 0.45 K/UL
LYMPHOCYTES RELATIVE PERCENT: 7 % (ref 24–44)
MAGNESIUM SERPL-MCNC: 2.1 MG/DL (ref 1.8–2.4)
MCH RBC QN AUTO: 26 PG (ref 27–31)
MCHC RBC AUTO-ENTMCNC: 30.3 G/DL (ref 32–36)
MCV RBC AUTO: 86 FL (ref 78–100)
MONOCYTES NFR BLD: 0.76 K/UL
MONOCYTES NFR BLD: 12 % (ref 0–5)
NEUTROPHILS NFR BLD: 77 % (ref 36–66)
NEUTS SEG NFR BLD: 4.72 K/UL
PLATELET # BLD AUTO: 188 K/UL (ref 140–440)
PMV BLD AUTO: 9.1 FL (ref 7.5–11.1)
POTASSIUM SERPL-SCNC: 3.7 MMOL/L (ref 3.5–5.1)
PROT SERPL-MCNC: 6.1 G/DL (ref 6.4–8.2)
RBC # BLD AUTO: 3.15 M/UL (ref 4.6–6.2)
SODIUM SERPL-SCNC: 141 MMOL/L (ref 136–145)
WBC OTHER # BLD: 6.1 K/UL (ref 4–10.5)

## 2025-06-07 PROCEDURE — 83735 ASSAY OF MAGNESIUM: CPT

## 2025-06-07 PROCEDURE — 3700000000 HC ANESTHESIA ATTENDED CARE: Performed by: SPECIALIST

## 2025-06-07 PROCEDURE — 88305 TISSUE EXAM BY PATHOLOGIST: CPT | Performed by: PATHOLOGY

## 2025-06-07 PROCEDURE — 0W3P8ZZ CONTROL BLEEDING IN GASTROINTESTINAL TRACT, VIA NATURAL OR ARTIFICIAL OPENING ENDOSCOPIC: ICD-10-PCS | Performed by: SPECIALIST

## 2025-06-07 PROCEDURE — 85025 COMPLETE CBC W/AUTO DIFF WBC: CPT

## 2025-06-07 PROCEDURE — 6360000002 HC RX W HCPCS: Performed by: STUDENT IN AN ORGANIZED HEALTH CARE EDUCATION/TRAINING PROGRAM

## 2025-06-07 PROCEDURE — 6370000000 HC RX 637 (ALT 250 FOR IP): Performed by: STUDENT IN AN ORGANIZED HEALTH CARE EDUCATION/TRAINING PROGRAM

## 2025-06-07 PROCEDURE — 94761 N-INVAS EAR/PLS OXIMETRY MLT: CPT

## 2025-06-07 PROCEDURE — 51798 US URINE CAPACITY MEASURE: CPT

## 2025-06-07 PROCEDURE — 88341 IMHCHEM/IMCYTCHM EA ADD ANTB: CPT | Performed by: PATHOLOGY

## 2025-06-07 PROCEDURE — 94640 AIRWAY INHALATION TREATMENT: CPT

## 2025-06-07 PROCEDURE — 80053 COMPREHEN METABOLIC PANEL: CPT

## 2025-06-07 PROCEDURE — 3609009900 HC COLONOSCOPY W/CONTROL BLEEDING ANY METHOD: Performed by: SPECIALIST

## 2025-06-07 PROCEDURE — 6360000002 HC RX W HCPCS: Performed by: NURSE ANESTHETIST, CERTIFIED REGISTERED

## 2025-06-07 PROCEDURE — 36415 COLL VENOUS BLD VENIPUNCTURE: CPT

## 2025-06-07 PROCEDURE — 88342 IMHCHEM/IMCYTCHM 1ST ANTB: CPT | Performed by: PATHOLOGY

## 2025-06-07 PROCEDURE — 2709999900 HC NON-CHARGEABLE SUPPLY: Performed by: SPECIALIST

## 2025-06-07 PROCEDURE — 1200000000 HC SEMI PRIVATE

## 2025-06-07 PROCEDURE — 2580000003 HC RX 258: Performed by: EMERGENCY MEDICINE

## 2025-06-07 PROCEDURE — 2720000010 HC SURG SUPPLY STERILE: Performed by: SPECIALIST

## 2025-06-07 PROCEDURE — 0DBE8ZZ EXCISION OF LARGE INTESTINE, VIA NATURAL OR ARTIFICIAL OPENING ENDOSCOPIC: ICD-10-PCS | Performed by: SPECIALIST

## 2025-06-07 PROCEDURE — 3700000001 HC ADD 15 MINUTES (ANESTHESIA): Performed by: SPECIALIST

## 2025-06-07 PROCEDURE — 2700000000 HC OXYGEN THERAPY PER DAY

## 2025-06-07 PROCEDURE — 2580000003 HC RX 258: Performed by: STUDENT IN AN ORGANIZED HEALTH CARE EDUCATION/TRAINING PROGRAM

## 2025-06-07 PROCEDURE — 82962 GLUCOSE BLOOD TEST: CPT

## 2025-06-07 PROCEDURE — 2580000003 HC RX 258: Performed by: ANESTHESIOLOGY

## 2025-06-07 RX ORDER — SODIUM CHLORIDE, SODIUM LACTATE, POTASSIUM CHLORIDE, CALCIUM CHLORIDE 600; 310; 30; 20 MG/100ML; MG/100ML; MG/100ML; MG/100ML
INJECTION, SOLUTION INTRAVENOUS ONCE
Status: COMPLETED | OUTPATIENT
Start: 2025-06-07 | End: 2025-06-07

## 2025-06-07 RX ORDER — PROPOFOL 10 MG/ML
INJECTION, EMULSION INTRAVENOUS
Status: DISCONTINUED | OUTPATIENT
Start: 2025-06-07 | End: 2025-06-07 | Stop reason: SDUPTHER

## 2025-06-07 RX ORDER — LIDOCAINE HYDROCHLORIDE 20 MG/ML
INJECTION, SOLUTION EPIDURAL; INFILTRATION; INTRACAUDAL; PERINEURAL
Status: DISCONTINUED | OUTPATIENT
Start: 2025-06-07 | End: 2025-06-07 | Stop reason: SDUPTHER

## 2025-06-07 RX ADMIN — PROPOFOL 40 MG: 10 INJECTION, EMULSION INTRAVENOUS at 08:59

## 2025-06-07 RX ADMIN — BUDESONIDE AND FORMOTEROL FUMARATE DIHYDRATE 2 PUFF: 160; 4.5 AEROSOL RESPIRATORY (INHALATION) at 07:23

## 2025-06-07 RX ADMIN — PROPOFOL 50 MG: 10 INJECTION, EMULSION INTRAVENOUS at 08:40

## 2025-06-07 RX ADMIN — ACETAMINOPHEN 650 MG: 325 TABLET ORAL at 10:07

## 2025-06-07 RX ADMIN — LIDOCAINE HYDROCHLORIDE 100 MG: 20 INJECTION, SOLUTION EPIDURAL; INFILTRATION; INTRACAUDAL; PERINEURAL at 08:40

## 2025-06-07 RX ADMIN — ACETAMINOPHEN 650 MG: 325 TABLET ORAL at 16:50

## 2025-06-07 RX ADMIN — PROPOFOL 50 MG: 10 INJECTION, EMULSION INTRAVENOUS at 08:43

## 2025-06-07 RX ADMIN — PROPOFOL 30 MG: 10 INJECTION, EMULSION INTRAVENOUS at 09:15

## 2025-06-07 RX ADMIN — PROPOFOL 40 MG: 10 INJECTION, EMULSION INTRAVENOUS at 08:51

## 2025-06-07 RX ADMIN — PROPOFOL 40 MG: 10 INJECTION, EMULSION INTRAVENOUS at 08:45

## 2025-06-07 RX ADMIN — PROPOFOL 50 MG: 10 INJECTION, EMULSION INTRAVENOUS at 09:04

## 2025-06-07 RX ADMIN — SODIUM CHLORIDE, SODIUM LACTATE, POTASSIUM CHLORIDE, AND CALCIUM CHLORIDE: .6; .31; .03; .02 INJECTION, SOLUTION INTRAVENOUS at 08:13

## 2025-06-07 RX ADMIN — METOPROLOL SUCCINATE 200 MG: 50 TABLET, EXTENDED RELEASE ORAL at 10:08

## 2025-06-07 RX ADMIN — PROPOFOL 40 MG: 10 INJECTION, EMULSION INTRAVENOUS at 09:08

## 2025-06-07 RX ADMIN — PROPOFOL 30 MG: 10 INJECTION, EMULSION INTRAVENOUS at 09:11

## 2025-06-07 RX ADMIN — BUMETANIDE 2 MG: 0.25 INJECTION INTRAMUSCULAR; INTRAVENOUS at 10:09

## 2025-06-07 RX ADMIN — ALLOPURINOL 100 MG: 100 TABLET ORAL at 10:08

## 2025-06-07 RX ADMIN — BUDESONIDE AND FORMOTEROL FUMARATE DIHYDRATE 2 PUFF: 160; 4.5 AEROSOL RESPIRATORY (INHALATION) at 21:34

## 2025-06-07 RX ADMIN — SODIUM CHLORIDE: 9 INJECTION, SOLUTION INTRAVENOUS at 08:34

## 2025-06-07 RX ADMIN — MONTELUKAST 10 MG: 10 TABLET, FILM COATED ORAL at 10:08

## 2025-06-07 RX ADMIN — TIOTROPIUM BROMIDE INHALATION SPRAY 2 PUFF: 3.12 SPRAY, METERED RESPIRATORY (INHALATION) at 07:23

## 2025-06-07 RX ADMIN — PROPOFOL 50 MG: 10 INJECTION, EMULSION INTRAVENOUS at 08:55

## 2025-06-07 RX ADMIN — PHENYLEPHRINE HYDROCHLORIDE 100 MCG: 10 INJECTION INTRAVENOUS at 09:08

## 2025-06-07 RX ADMIN — PHENYLEPHRINE HYDROCHLORIDE 100 MCG: 10 INJECTION INTRAVENOUS at 08:57

## 2025-06-07 RX ADMIN — PHENYLEPHRINE HYDROCHLORIDE 100 MCG: 10 INJECTION INTRAVENOUS at 09:02

## 2025-06-07 RX ADMIN — PHENYLEPHRINE HYDROCHLORIDE 100 MCG: 10 INJECTION INTRAVENOUS at 09:22

## 2025-06-07 RX ADMIN — IRON SUCROSE 300 MG: 20 INJECTION, SOLUTION INTRAVENOUS at 05:36

## 2025-06-07 RX ADMIN — BUMETANIDE 2 MG: 0.25 INJECTION INTRAMUSCULAR; INTRAVENOUS at 18:41

## 2025-06-07 ASSESSMENT — PAIN SCALES - WONG BAKER: WONGBAKER_NUMERICALRESPONSE: NO HURT

## 2025-06-07 ASSESSMENT — PAIN DESCRIPTION - LOCATION
LOCATION: NECK
LOCATION: BACK

## 2025-06-07 ASSESSMENT — ENCOUNTER SYMPTOMS: SHORTNESS OF BREATH: 1

## 2025-06-07 ASSESSMENT — PAIN SCALES - GENERAL
PAINLEVEL_OUTOF10: 7
PAINLEVEL_OUTOF10: 5
PAINLEVEL_OUTOF10: 2

## 2025-06-07 ASSESSMENT — PAIN DESCRIPTION - ORIENTATION
ORIENTATION: UPPER
ORIENTATION: POSTERIOR;MID

## 2025-06-07 ASSESSMENT — COPD QUESTIONNAIRES: CAT_SEVERITY: MILD

## 2025-06-07 ASSESSMENT — PAIN DESCRIPTION - DESCRIPTORS: DESCRIPTORS: DISCOMFORT

## 2025-06-07 NOTE — PROCEDURES
PROCEDURE NOTE  Date: 6/7/2025   Name: Og Adair  YOB: 1950    ProceduresCOLONOSCOPY REPORT DICTATED #[id number]

## 2025-06-07 NOTE — ANESTHESIA POSTPROCEDURE EVALUATION
Department of Anesthesiology  Postprocedure Note    Patient: Og Adair  MRN: [id number]  YOB: 1950  Date of evaluation: 6/7/2025    Procedure Summary       Date: 06/07/25 Room / Location: [place] [address] / [facility]    Anesthesia Start: 0834 Anesthesia Stop: 0949    Procedure: COLONOSCOPY CONTROL HEMORRHAGE WITH APC COAGULATION TO PROMINENT RECTAL VESSELS, POLYPECTOMY BYCOLD SNARE Diagnosis:       Anemia, unspecified type      Gastrointestinal hemorrhage, unspecified gastrointestinal hemorrhage type      (Anemia, unspecified type [D64.9])      (Gastrointestinal hemorrhage, unspecified gastrointestinal hemorrhage type [K92.2])    Surgeons: Ирина Garcia MD Responsible Provider: Lance Montes De Oca MD    Anesthesia Type: MAC ASA Status: 4            Anesthesia Type: No value filed.    Aarti Phase I:      Aarti Phase II:      Anesthesia Post Evaluation    Patient location during evaluation: bedside  Patient participation: complete - patient participated  Level of consciousness: awake  Airway patency: patent  Nausea & Vomiting: no nausea  Cardiovascular status: blood pressure returned to baseline  Respiratory status: acceptable  Hydration status: euvolemic  Pain management: adequate    No notable events documented.

## 2025-06-07 NOTE — BRIEF OP NOTE
Brief Postoperative Note      Og Adair is a 74 y.o. male     Pre-operative Diagnosis: ANEMIA /  RECTAL BLEEDING    Post-operative Diagnosis:  POST SURGICAL CHANGES RIGHT COLON /  HDS   / D. COLI /  COLON POLYP / RADIATION PROCTITIS    Procedure: COLONOSCOPY WITH POLYPECTOMY AND ELECTROCOAGULATION WITH APC    Anesthesia: MAC    Surgeons/Assistants:Ирина Garcia MD     Estimated Blood Loss: NIL    Complications: None    Specimens: were obtained  REC-- CPM / F/U H/H / RESUME ELIQUIS FROM TOMORROW    Ирина Garcia MD   6/7/2025   9:26 AM

## 2025-06-07 NOTE — PROCEDURES
[place] [address] [facility] [place] [id number]                               COLONOSCOPY      PATIENT NAME: CAT IQBAL                 : 1950  MED REC NO: [id number]                      ROOM: 4112  ACCOUNT NO: [id number]                       ADMIT DATE: 2025  PROVIDER: Ирина Garcia MD      DATE OF PROCEDURE: 2025    SURGEON:  Ирина Garcia MD    The patient is an inpatient.    PROCEDURES PERFORMED:  Colonoscopy with polypectomy and electrocoagulation with argon plasma coagulation probe.    CHIEF COMPLAINT:  History of anemia with rectal bleeding and; rule out lower GI bleeding lesion.    PREMEDICATIONS:  Please refer to the anesthesiologist's notes.    DESCRIPTION OF PROCEDURE:  The patient was placed in the left lateral decubitus position and rectal examination was performed.    Rectal examination:  Rectal examination revealed evidence of external hemorrhoids, but no fissures or fistulas were seen, and the rectal mucosa felt was unremarkable.    The video Olympus colonoscope was subsequently introduced into the rectum and was advanced all the way up to the ileocolonic anastomosis.  Postsurgical changes were noted from previous right colon resection.    A small amount of liquid and semi-solid stool were noted in different portions of the colon, which was flushed with water and suctioned out.    Number of diverticula were noted scattered in the colon.    A 5 mm sessile polyp was noted close to the hepatic flexure and; a cold polypectomy was performed with a snare and the polyp was completely resected and retrieved.    A prominent telangiectatic blood vessels were noted in the distal rectum above the anorectal junction from prior radiation therapy.  No active bleeding was noted and electrocoagulation was carried out with a 6.9 circumferential fire APC probe and really small prominent telangiectatic blood vessels were electrocoagulated.

## 2025-06-07 NOTE — PROGRESS NOTES
V2.0  [facility] [place] Hospitalist Progress Note      Name:  Og Adair /Age/Sex: 1950  (74 y.o. male)   MRN & CSN:  [id number] & [id number] Encounter Date/Time: 2025 11:42 AM EDT    Location:  [facility]/41[facility]-A PCP: Gerardo Elder MD       Hospital Day: 4    Assessment and Plan:   Og Adair is a 74 y.o. male with pmh of, hypertension, hyperlipidemia, GERD, COPD, aortic stenosis post-TAVR, persistent A-fib, pulmonary hypertension, CKD stage D, history of pressure ulcer, obesity who presents with Acute kidney injury superimposed on CKD      Plan:  Acute on chronic anemia  Iron deficiency anemia secondary to hematochezia  History of prostate cancer with radiation and ensuing radiation proctitis  Hb 6.0 (10.2 in February) status post 2 unit PRBC in ER, hemoglobin 7.8 in the AM  IV Iron infusion consult GI, underwent colonoscopy, with polypectomy and electrocoagulation with APC    ROMAIN on CKD stage IIIb, baseline creatinine around 2  Creatinine on presentation 3.1 probably related to BP variation/anemia  UA Na less than 20 bland otherwise  Improving      Acute on chronic systolic heart failure  Secondary to profound anemia/ High output  NT Pro BNP  CXR with evidence of pulmonary edema and cardiomegaly, weight 278   IV Bumex 2 mg BID monitor I/O measure daily weight (target weight 260-265 lb)  Hold Losartan and continue Toprol XL  Echocardiogram with EF 45-50%     Elevated Troponin 46>42  Chest pain free EKG non ischemic unable to review most recent outpatient EKG     Persistent Atrial Fibrillation  Continue Toprol XL and resume Eliquis from tomorrow    COPD on chronic nocturnal O2  Symbicort and Spiriva as needed albuterol    Diet ADULT DIET; Regular; 4 carb choices (60 gm/meal); No Added Salt (3-4 gm)   DVT Prophylaxis [] Lovenox, []  Heparin, [] SCDs, [] Ambulation,  [] Eliquis, [] Xarelto  [] Coumadin   Code Status Full Code   Disposition From: Home  Expected Disposition: same  Estimated Date of  septal bounce.   Aortic Valve: Hx of AVR on 10/17/22  (29mm Barba Denzel valve); mean PmmHg. No paravalvular regurgitation.   Mitral Valve: There is annular calcification noted. Mild to moderate regurgitation.   Tricuspid Valve: Mild regurgitation. RVSP is 43 mmHg.   Left Atrium: Left atrium is severely dilated.   Right Atrium: Right atrium is moderately dilated.   Pericardium: No pericardial effusion.     XR CHEST PORTABLE  Result Date: 2025  XR CHEST PORTABLE 2025 0:13 History: Shortness of breath COMPARISON: May 8 Left base opacity and atelectasis, aspiration or infection. Moderate cardiomegaly. Diffuse interstitial prominence throughout both lungs. No acute osseous abnormalities. IMPRESSION: 1.  Left base opacity and atelectasis, aspiration or infection. Moderate cardiomegaly. Diffuse interstitial prominence throughout both lungs.  Dictated and Electronically Signed By: [facility] 2025 0:20          Electronically signed by Tram Silva MD on 2025 at 10:20 AM

## 2025-06-07 NOTE — PLAN OF CARE
Problem: Chronic Conditions and Co-morbidities  Goal: Patient's chronic conditions and co-morbidity symptoms are monitored and maintained or improved  6/7/2025 0255 by Sarah Kelley RN  Outcome: Progressing  6/6/2025 1348 by Romelia Robert RN  Outcome: Progressing     Problem: Discharge Planning  Goal: Discharge to home or other facility with appropriate resources  6/7/2025 0255 by Sarah Kelley RN  Outcome: Progressing  6/6/2025 1348 by Romelia Robert RN  Outcome: Progressing     Problem: Safety - Adult  Goal: Free from fall injury  6/7/2025 0255 by Sarah Kelley RN  Outcome: Progressing  6/6/2025 1348 by Romelia Robert RN  Outcome: Progressing     Problem: Respiratory - Adult  Goal: Achieves optimal ventilation and oxygenation  6/7/2025 0255 by Sarah Kelley RN  Outcome: Progressing  6/6/2025 1348 by Romelia Robert RN  Outcome: Progressing     Problem: Cardiovascular - Adult  Goal: Maintains optimal cardiac output and hemodynamic stability  6/7/2025 0255 by Sarah Kelley RN  Outcome: Progressing  6/6/2025 1348 by Romelia Robert RN  Outcome: Progressing  Goal: Absence of cardiac dysrhythmias or at baseline  6/7/2025 0255 by Sarah Kelley RN  Outcome: Progressing  6/6/2025 1348 by Romelia Robert RN  Outcome: Progressing     Problem: Skin/Tissue Integrity - Adult  Goal: Skin integrity remains intact  Description: 1.  Monitor for areas of redness and/or skin breakdown2.  Assess vascular access sites hourly3.  Every 4-6 hours minimum:  Change oxygen saturation probe site4.  Every 4-6 hours:  If on nasal continuous positive airway pressure, respiratory therapy assess nares and determine need for appliance change or resting period  6/7/2025 0255 by Sarah Kelley RN  Outcome: Progressing  6/6/2025 1348 by Romelia Robert RN  Outcome: Progressing  Flowsheets (Taken 6/6/2025 1347)  Skin Integrity Remains Intact: Monitor for areas of redness and/or skin breakdown     Problem: Musculoskeletal - Adult  Goal: Return mobility to safest level of

## 2025-06-08 VITALS
DIASTOLIC BLOOD PRESSURE: 62 MMHG | HEIGHT: 77 IN | BODY MASS INDEX: 32.19 KG/M2 | TEMPERATURE: 98.2 F | HEART RATE: 92 BPM | RESPIRATION RATE: 18 BRPM | SYSTOLIC BLOOD PRESSURE: 122 MMHG | OXYGEN SATURATION: 92 % | WEIGHT: 272.6 LBS

## 2025-06-08 LAB
ANION GAP SERPL CALCULATED.3IONS-SCNC: 11 MMOL/L (ref 9–17)
BASOPHILS # BLD: 0.04 K/UL
BASOPHILS NFR BLD: 1 % (ref 0–1)
BUN SERPL-MCNC: 22 MG/DL (ref 7–20)
CALCIUM SERPL-MCNC: 9.3 MG/DL (ref 8.3–10.6)
CHLORIDE SERPL-SCNC: 103 MMOL/L (ref 99–110)
CO2 SERPL-SCNC: 27 MMOL/L (ref 21–32)
CREAT SERPL-MCNC: 1.4 MG/DL (ref 0.8–1.3)
EOSINOPHIL # BLD: 0.12 K/UL
EOSINOPHILS RELATIVE PERCENT: 2 % (ref 0–3)
ERYTHROCYTE [DISTWIDTH] IN BLOOD BY AUTOMATED COUNT: 15.5 % (ref 11.7–14.9)
GFR, ESTIMATED: 51 ML/MIN/1.73M2
GLUCOSE SERPL-MCNC: 127 MG/DL (ref 74–99)
HCT VFR BLD AUTO: 26.8 % (ref 42–52)
HGB BLD-MCNC: 8 G/DL (ref 13.5–18)
IMM GRANULOCYTES # BLD AUTO: 0.05 K/UL
IMM GRANULOCYTES NFR BLD: 1 %
LYMPHOCYTES NFR BLD: 0.39 K/UL
LYMPHOCYTES RELATIVE PERCENT: 6 % (ref 24–44)
MAGNESIUM SERPL-MCNC: 1.8 MG/DL (ref 1.8–2.4)
MCH RBC QN AUTO: 25.7 PG (ref 27–31)
MCHC RBC AUTO-ENTMCNC: 29.9 G/DL (ref 32–36)
MCV RBC AUTO: 86.2 FL (ref 78–100)
MONOCYTES NFR BLD: 0.73 K/UL
MONOCYTES NFR BLD: 12 % (ref 0–5)
NEUTROPHILS NFR BLD: 79 % (ref 36–66)
NEUTS SEG NFR BLD: 5.02 K/UL
PLATELET # BLD AUTO: 174 K/UL (ref 140–440)
PMV BLD AUTO: 9 FL (ref 7.5–11.1)
POTASSIUM SERPL-SCNC: 3.5 MMOL/L (ref 3.5–5.1)
RBC # BLD AUTO: 3.11 M/UL (ref 4.6–6.2)
SODIUM SERPL-SCNC: 141 MMOL/L (ref 136–145)
WBC OTHER # BLD: 6.4 K/UL (ref 4–10.5)

## 2025-06-08 PROCEDURE — 6370000000 HC RX 637 (ALT 250 FOR IP): Performed by: STUDENT IN AN ORGANIZED HEALTH CARE EDUCATION/TRAINING PROGRAM

## 2025-06-08 PROCEDURE — 6360000002 HC RX W HCPCS: Performed by: STUDENT IN AN ORGANIZED HEALTH CARE EDUCATION/TRAINING PROGRAM

## 2025-06-08 PROCEDURE — 80048 BASIC METABOLIC PNL TOTAL CA: CPT

## 2025-06-08 PROCEDURE — 94640 AIRWAY INHALATION TREATMENT: CPT

## 2025-06-08 PROCEDURE — 83735 ASSAY OF MAGNESIUM: CPT

## 2025-06-08 PROCEDURE — 2700000000 HC OXYGEN THERAPY PER DAY

## 2025-06-08 PROCEDURE — 85025 COMPLETE CBC W/AUTO DIFF WBC: CPT

## 2025-06-08 PROCEDURE — 94761 N-INVAS EAR/PLS OXIMETRY MLT: CPT

## 2025-06-08 PROCEDURE — 36415 COLL VENOUS BLD VENIPUNCTURE: CPT

## 2025-06-08 RX ADMIN — MONTELUKAST 10 MG: 10 TABLET, FILM COATED ORAL at 09:40

## 2025-06-08 RX ADMIN — PANTOPRAZOLE SODIUM 40 MG: 40 TABLET, DELAYED RELEASE ORAL at 05:07

## 2025-06-08 RX ADMIN — ACETAMINOPHEN 650 MG: 325 TABLET ORAL at 05:07

## 2025-06-08 RX ADMIN — ALLOPURINOL 100 MG: 100 TABLET ORAL at 09:40

## 2025-06-08 RX ADMIN — METOPROLOL SUCCINATE 200 MG: 50 TABLET, EXTENDED RELEASE ORAL at 09:40

## 2025-06-08 RX ADMIN — BUDESONIDE AND FORMOTEROL FUMARATE DIHYDRATE 2 PUFF: 160; 4.5 AEROSOL RESPIRATORY (INHALATION) at 08:19

## 2025-06-08 RX ADMIN — TIOTROPIUM BROMIDE INHALATION SPRAY 2 PUFF: 3.12 SPRAY, METERED RESPIRATORY (INHALATION) at 08:19

## 2025-06-08 ASSESSMENT — PAIN DESCRIPTION - DESCRIPTORS: DESCRIPTORS: ACHING

## 2025-06-08 ASSESSMENT — PAIN DESCRIPTION - LOCATION: LOCATION: HEAD

## 2025-06-08 ASSESSMENT — PAIN SCALES - GENERAL: PAINLEVEL_OUTOF10: 7

## 2025-06-08 ASSESSMENT — PAIN - FUNCTIONAL ASSESSMENT: PAIN_FUNCTIONAL_ASSESSMENT: ACTIVITIES ARE NOT PREVENTED

## 2025-06-08 ASSESSMENT — PAIN DESCRIPTION - ORIENTATION: ORIENTATION: RIGHT;LEFT

## 2025-06-08 NOTE — DISCHARGE INSTRUCTIONS
Take 6 mg bumex in the morning and 4 mg in the evening for next 3 days  Check your weight every day  Follow up with PCP this week

## 2025-06-08 NOTE — DISCHARGE SUMMARY
V2.0  Discharge Summary    Name:  Og Adair /Age/Sex: 1950 (74 y.o. male)   Admit Date: 2025  Discharge Date: 25    MRN & CSN:  [id number] & [id number] Encounter Date and Time 25 6:32 PM EDT    Attending:  No att. providers found Discharging Provider: Tram Silva MD       Hospital Course:     Brief HPI: Og Adair is a 74 y.o. male with chronic systolic heart failure, hypertension, hyperlipidemia, GERD, COPD, aortic stenosis status post TAVR, persistent atrial fibrillation, pulmonary hypertension, CKD stage IIIb, Hx Prostate cancer, obesity presented from home with lightheadedness and fatigue. Patient states that for the past week has been having generalized weakness associated with intermittent light headedness when he stands from a sitting position. Checked his BP and SBP was in 90s. He additionally mentions bilateral lower extremity edema, SOB/ENRIQUE and orthopnea. States normally weighs somewhere between 250-260lbs. During my evaluation in ER patient was alert oriented x 3, hemodynamically stable denies chest pain LOC headache fall fever night sweats or chills     Brief Problem Based Course:   Acute on chronic anemia  Iron deficiency anemia secondary to hematochezia  History of prostate cancer with radiation and ensuing radiation proctitis  Hb 6.0 (10.2 in February) status post 2 unit PRBC hemoglobin remained stabe during hospital course  IV Iron infusion consult GI, underwent colonoscopy, with polypectomy and electrocoagulation with APC eliquis was resumed on discharge     ROMAIN on CKD stage IIIb, baseline creatinine around 2  Creatinine on presentation 3.1 probably related to BP variation/anemia  UA Na less than 20 bland otherwise  Improving back to baseline on discharge     Acute on chronic systolic heart failure  Secondary to profound anemia/ High output  NT Pro BNP  CXR with evidence of pulmonary edema and cardiomegaly, weight 272 lbs on discharge patient's dry weight around 265  HDL 43 02/10/2025 08:34 AM    HDL 41 03/30/2012 09:38 AM    TRIG 95 02/10/2025 08:34 AM     Hemoglobin A1C:   Lab Results   Component Value Date/Time    LABA1C 6.3 02/10/2025 08:34 AM     TSH:   Lab Results   Component Value Date/Time    TSH 0.868 02/10/2025 08:34 AM    TSH 1.08 01/28/2019 08:21 AM     Troponin:   Lab Results   Component Value Date/Time    TROPONINT <0.010 04/06/2022 11:27 AM    TROPONINT <0.010 04/06/2022 08:11 AM    TROPONINT <0.010 12/17/2020 02:42 PM     Lactic Acid: No results for input(s): \"LACTA\" in the last 72 hours.  BNP: No results for input(s): \"PROBNP\" in the last 72 hours.  UA:  Lab Results   Component Value Date/Time    NITRU NEGATIVE 06/05/2025 03:03 AM    COLORU Yellow 06/05/2025 03:03 AM    PHUR 6.0 06/05/2025 03:03 AM    PHUR 7.5 11/15/2021 09:10 AM    WBCUA NONE SEEN 04/06/2022 08:45 AM    RBCUA NONE SEEN 04/06/2022 08:45 AM    MUCUS 1+ 10/18/2018 11:50 AM    TRICHOMONAS NONE SEEN 04/06/2022 08:45 AM    BACTERIA NEGATIVE 04/06/2022 08:45 AM    CLARITYU Clear 08/15/2022 03:32 PM    LEUKOCYTESUR NEGATIVE 06/05/2025 03:03 AM    UROBILINOGEN 0.2 06/05/2025 03:03 AM    BILIRUBINUR NEGATIVE 06/05/2025 03:03 AM    BLOODU NEGATIVE 04/06/2022 08:45 AM    GLUCOSEU NEGATIVE 06/05/2025 03:03 AM    KETUA NEGATIVE 06/05/2025 03:03 AM     Urine Cultures: No results found for: \"LABURIN\"  Blood Cultures: No results found for: \"BC\"  No results found for: \"BLOODCULT2\"  Organism: No results found for: \"ORG\"    Time Spent Discharging patient 58 minutes    Electronically signed by Tram Silva MD on 6/8/2025 at 6:32 PM

## 2025-06-09 ENCOUNTER — CARE COORDINATION (OUTPATIENT)
Dept: CASE MANAGEMENT | Age: 75
End: 2025-06-09

## 2025-06-09 ENCOUNTER — TELEPHONE (OUTPATIENT)
Dept: INTERNAL MEDICINE CLINIC | Age: 75
End: 2025-06-09

## 2025-06-09 DIAGNOSIS — D64.9 ACUTE ON CHRONIC ANEMIA: Primary | ICD-10-CM

## 2025-06-09 PROCEDURE — 1111F DSCHRG MED/CURRENT MED MERGE: CPT | Performed by: INTERNAL MEDICINE

## 2025-06-09 NOTE — CARE COORDINATION
trips to avoid urgency. \  Reviewed CHF mgt including monitor weight daily in am (before eating, after void/bm, same clothing), keep written log of weights , notify MD  of weight gain 3# overnight or 5# in a week . Avoid foods high in sodium : canned/boxed/processed foods, frozen meals, soups, cheeses, breads, chips, soda; do not add salt to food. Call MD with increased sob, increase edema, weight gain, chest pain/tightness, increased/wet cough.  Meds reviewed w/ no discrepancies noted.   Pt and spouse deny blood in stool,, hematemesis, increase SOB, CP, increase weakness /fatigue or dizziness. Message routed to PCP for closer HFU than 06/24. Spouse encouraged to administered meds as instructed and call MD/911 fi symptoms present or increase in severity. Spouse agreeable to additional calls and CTN program   Care Transition Nurse reviewed discharge instructions, medical action plan, and red flags with spouse/partner. The spouse/partner was given an opportunity to ask questions; all questions answered at this time.. The spouse/partner verbalized understanding.   Were discharge instructions available to patient? Yes.   Reviewed appropriate site of care based on symptoms and resources available to patient including: PCP  Specialist  Urgent care clinics  When to call 911  [employer]. The spouse/partner agrees to contact the primary care provider and/or specialist office for questions related to their healthcare.      Advance Care Planning:   Does patient have an Advance Directive: reviewed and current.    Medication Review:  Medication review was performed with spouse/partner,1111F entered: yes.     Remote Patient Monitoring:  Offered patient enrollment in the Remote Patient Monitoring (RPM) program for in-home monitoring: Patient is not eligible for RPM program because: affiliate provider.    Assessments:  Care Transitions 24 Hour Call    Schedule Follow Up Appointment with PCP: Completed  Do you have any

## 2025-06-09 NOTE — TELEPHONE ENCOUNTER
Care Transitions Initial Follow Up Call    Outreach made within 2 business days of discharge: Yes    Patient: Og Adair Patient : 1950   MRN: [id number]  Discharge Date: 25       Spoke with: Ramy    Discharge department/facility: [facility] Interactive Patient Contact:  Was patient able to fill all prescriptions: Yes  Was patient instructed to bring all medications to the follow-up visit: Yes  Is patient taking all medications as directed in the discharge summary? Yes  Does patient understand their discharge instructions: Yes  Does patient have questions or concerns that need addressed prior to 7-14 day follow up office visit: no        Scheduled appointment with PCP within 7-14 days    Follow Up  Future Appointments   Date Time Provider Department Center   2025  1:15 PM Gerardo Elder MD [facility]   2025 11:00 AM Gerardo Elder MD [facility]       Cody Vazquez MA

## 2025-06-11 LAB — SURGICAL PATHOLOGY REPORT: NORMAL

## 2025-06-16 ENCOUNTER — CARE COORDINATION (OUTPATIENT)
Dept: CASE MANAGEMENT | Age: 75
End: 2025-06-16

## 2025-06-16 NOTE — CARE COORDINATION
Care Transitions Note    Follow Up Call     Reason for Admission: DX: acute on chronic anemia     Patient Current Location:  Home: [address] [place], [place] [id number]    [facility] Care Coordinator contacted the patient by telephone. Verified name and  as identifiers.    Additional needs identified to be addressed with provider   No needs identified                 Method of communication with provider: none.    Care Summary Note: Spoke with Og Adair who reported that he was doing good. Patient stated that he continue with sob but at his baseline no more or less. Patient stated that he will see his doctor tomorrow. Patient denied cp,cough, dizziness, headache, n/v, diarrhea, abdominal pains, fever, or chills. Patient report that appetite and fluid intake is good and denied any problems with bowel or bladder. Patient reported that he is taking all medications as ordered. Patient denied any other needs at this time. Patient instructed to continue to monitor s/s, reporting any that may present to MD immediately for early intervention. Patient is agreeable to f/u calls.     Plan of care updates since last contact:          Advance Care Planning   The patient has the following advanced directives on file:  Advance Directives       Power of  Living Will ACP-Advance Directive ACP-Power of     Not on File Not on File Filed Not on File            The patient has appointed the following active healthcare agents:    Primary Decision Maker: Arleth Adair - Spouse - [phone]    Secondary Decision Maker: Vikas Adair MD - Child - [phone]    Medication Review:  No changes since last call.     Remote Patient Monitoring:  Offered patient enrollment in the Remote Patient Monitoring (RPM) program for in-home monitoring: Patient is not eligible for RPM program because: affiliate provider.    Assessments:  Care Transitions Subsequent and Final Call    Subsequent and Final Calls  Do you have any ongoing

## 2025-06-24 ENCOUNTER — CARE COORDINATION (OUTPATIENT)
Dept: CASE MANAGEMENT | Age: 75
End: 2025-06-24

## 2025-06-24 ENCOUNTER — OFFICE VISIT (OUTPATIENT)
Dept: INTERNAL MEDICINE CLINIC | Age: 75
End: 2025-06-24

## 2025-06-24 VITALS
SYSTOLIC BLOOD PRESSURE: 102 MMHG | HEART RATE: 82 BPM | OXYGEN SATURATION: 94 % | BODY MASS INDEX: 30.88 KG/M2 | WEIGHT: 260.4 LBS | DIASTOLIC BLOOD PRESSURE: 54 MMHG

## 2025-06-24 DIAGNOSIS — I48.20 CHRONIC ATRIAL FIBRILLATION (HCC): ICD-10-CM

## 2025-06-24 DIAGNOSIS — I50.21 ACUTE SYSTOLIC CHF (CONGESTIVE HEART FAILURE) (HCC): Primary | ICD-10-CM

## 2025-06-24 DIAGNOSIS — D50.0 BLOOD LOSS ANEMIA: ICD-10-CM

## 2025-06-24 DIAGNOSIS — I50.21 ACUTE SYSTOLIC CONGESTIVE HEART FAILURE (HCC): ICD-10-CM

## 2025-06-24 DIAGNOSIS — K92.2 LOWER GI BLEED: ICD-10-CM

## 2025-06-24 DIAGNOSIS — J45.909 MILD ASTHMA WITHOUT COMPLICATION, UNSPECIFIED WHETHER PERSISTENT: ICD-10-CM

## 2025-06-24 DIAGNOSIS — Z09 HOSPITAL DISCHARGE FOLLOW-UP: ICD-10-CM

## 2025-06-24 RX ORDER — BUMETANIDE 2 MG/1
TABLET ORAL
Qty: 150 TABLET | Refills: 1
Start: 2025-06-24

## 2025-06-24 RX ORDER — MONTELUKAST SODIUM 10 MG/1
10 TABLET ORAL DAILY
Qty: 90 TABLET | Refills: 3 | Status: SHIPPED | OUTPATIENT
Start: 2025-06-24

## 2025-06-24 NOTE — PROGRESS NOTES
Post-Discharge Transitional Care  Follow Up      Og Adair   YOB: 1950    Date of Office Visit:  6/24/2025  Date of Hospital Admission: 6/4/25  Date of Hospital Discharge: 6/8/25  Risk of hospital readmission (high >=14%. Medium >=10%) :Readmission Risk Score: 16.3      Care management risk score Rising risk (score 2-5) and Complex Care (Scores >=6): No Risk Score On File     Non face to face  following discharge, date last encounter closed (first attempt may have been earlier): NOT DONE    Call initiated 2 business days of discharge: NOT DONE      Ramy has had a recent significant medical course.  Was first admitted to our local hospital [facility] for anemia and was stabilized after coagulation of the previous: Site of radiation proctitis, also a polyp was removed.  Later at cardiology follow-up had readmission for volume overload and underwent significant diuresis combined with improvement and stabilization of his acute congestive heart failure.  Now is back on Bumex, is continuing close follow-up with cardiology.  Medications reviewed.  He now feels much better, will plan also follow-up of labs including blood counts, electrolytes and kidney function, BNP in 1 month.  He has follow-up with cardiology of heart failure specialist next week.  ASSESSMENT/PLAN:   Chronic atrial fibrillation (HCC)  Acute systolic CHF (congestive heart failure) ([facility])  -     Comprehensive Metabolic Panel; Future  -     Brain Natriuretic Peptide; Future  Blood loss anemia  Mild asthma without complication, unspecified whether persistent  -     montelukast (SINGULAIR) 10 MG tablet; Take 1 tablet by mouth daily, Disp-90 tablet, R-3Normal  Acute systolic congestive heart failure (HCC)  -     bumetanide (BUMEX) 2 MG tablet; 4 mg am, 4mg noon and 2mg pm., Disp-150 tablet, R-1NO PRINT  -     Comprehensive Metabolic Panel; Future  Lower GI bleed  -     CBC with Auto Differential; Future  Hospital discharge follow-up  -

## 2025-06-24 NOTE — CARE COORDINATION
Care Transitions Note    Follow Up Call     Attempted to reach patient for transitions of care follow up.  Unable to reach patient.      Outreach Attempts:   HIPAA compliant voicemail left for patient.     Care Summary Note: UTR    Follow Up Appointment:   Future Appointments         Provider Specialty Dept Phone    7/31/2025 11:00 AM Gerardo Elder MD Internal Medicine [phone]            Plan for follow-up on next business day.  based on severity of symptoms and risk factors. Plan for next call: PCP update , recent hospitalization    Harmony Gupta RN

## 2025-06-25 ENCOUNTER — CARE COORDINATION (OUTPATIENT)
Dept: CASE MANAGEMENT | Age: 75
End: 2025-06-25

## 2025-06-25 NOTE — CARE COORDINATION
Care Transitions Note    Follow Up Call     Attempted to reach patient for transitions of care follow up.  Unable to reach patient.      Outreach Attempts:   HIPAA compliant voicemail left for patient.     Care Summary Note: UTR    Follow Up Appointment:   Future Appointments         Provider Specialty Dept Phone    7/31/2025 11:00 AM Gerardo Elder MD Internal Medicine [phone]            CTN program closed per protocol based on severity of symptoms and risk factors. Plan for next call: TRISTIN Gupta RN

## 2025-07-07 ENCOUNTER — TELEMEDICINE (OUTPATIENT)
Dept: INTERNAL MEDICINE CLINIC | Age: 75
End: 2025-07-07

## 2025-07-07 DIAGNOSIS — J45.21 MILD INTERMITTENT ASTHMA WITH ACUTE EXACERBATION: Primary | ICD-10-CM

## 2025-07-07 RX ORDER — PREDNISONE 5 MG/1
TABLET ORAL
Qty: 21 TABLET | Refills: 0 | Status: SHIPPED | OUTPATIENT
Start: 2025-07-07

## 2025-07-07 RX ORDER — AZITHROMYCIN 250 MG/1
250 TABLET, FILM COATED ORAL SEE ADMIN INSTRUCTIONS
Qty: 6 TABLET | Refills: 0 | Status: SHIPPED | OUTPATIENT
Start: 2025-07-07 | End: 2025-07-12

## 2025-07-07 NOTE — PROGRESS NOTES
2025    TELEHEALTH EVALUATION -- Audio/Visual    HPI:    Og Adair (:  1950) has requested an audio/video evaluation for the following concern(s):    4D HX OF COUGH AND CHEST CONGESTION, NO SOB, WHEEZING, FEVER, CHILLS.  SPUTUM CLEAR.  WT STABLE, NO ENRIQUE, NO DARK OR TARRY STOOLS.  HAS HAD BENEFIT FR BRONCHODILATORS    Review of Systems    Prior to Visit Medications    Medication Sig Taking? Authorizing Provider   montelukast (SINGULAIR) 10 MG tablet Take 1 tablet by mouth daily Yes Gerardo Elder MD   bumetanide (BUMEX) 2 MG tablet 4 mg am, 4mg noon and 2mg pm. Yes Gerardo Elder MD   potassium chloride (KLOR-CON M20) 20 MEQ extended release tablet Take 2 tablets by mouth daily Yes Gerardo Elder MD   omeprazole (PRILOSEC) 40 MG delayed release capsule Take 1 capsule by mouth daily Yes Gerardo Elder MD   apixaban (ELIQUIS) 5 MG TABS tablet TAKE 1 TABLET BY MOUTH TWICE A DAY Yes Gerardo Elder MD   allopurinol (ZYLOPRIM) 100 MG tablet Take 1 tablet by mouth daily Yes Gerardo Elder MD   fluticasone-umeclidin-vilant (TRELEGY ELLIPTA) 200-62.5-25 MCG/ACT AEPB inhaler Inhale 1 puff into the lungs daily Yes Gerardo Elder MD   metoprolol succinate (TOPROL XL) 100 MG extended release tablet Take 2 tablets by mouth daily Yes Gerardo Elder MD   traZODone (DESYREL) 50 MG tablet TAKE 2 TABLETS BY MOUTH ONCE NIGHTLY Yes Gerardo Elder MD   magnesium oxide (MAG-OX) 400 (240 Mg) MG tablet Take 1 tablet by mouth daily Yes Gerardo Elder MD   cholestyramine (QUESTRAN) 4 g packet Take 1 packet by mouth daily  Patient taking differently: Take 1 packet by mouth daily Taking as needed. Yes ProviderSimeon MD   spironolactone (ALDACTONE) 50 MG tablet Take 1 tablet by mouth daily Yes Simeon Orta MD   ferrous sulfate (FE TABS 325) 325 (65 Fe) MG EC tablet Take 1 tablet by mouth 3 times daily (with meals) Yes Gerardo Elder MD   albuterol

## 2025-07-15 ENCOUNTER — OFFICE VISIT (OUTPATIENT)
Dept: INTERNAL MEDICINE CLINIC | Age: 75
End: 2025-07-15

## 2025-07-15 ENCOUNTER — TELEPHONE (OUTPATIENT)
Dept: INTERNAL MEDICINE CLINIC | Age: 75
End: 2025-07-15

## 2025-07-15 VITALS
WEIGHT: 254.8 LBS | SYSTOLIC BLOOD PRESSURE: 100 MMHG | HEART RATE: 78 BPM | BODY MASS INDEX: 30.21 KG/M2 | DIASTOLIC BLOOD PRESSURE: 60 MMHG | OXYGEN SATURATION: 93 %

## 2025-07-15 DIAGNOSIS — J45.21 MILD INTERMITTENT ASTHMA WITH ACUTE EXACERBATION: ICD-10-CM

## 2025-07-15 DIAGNOSIS — I50.21 ACUTE SYSTOLIC CHF (CONGESTIVE HEART FAILURE) (HCC): ICD-10-CM

## 2025-07-15 DIAGNOSIS — J18.9 ATYPICAL PNEUMONIA: Primary | ICD-10-CM

## 2025-07-15 RX ORDER — LEVOFLOXACIN 500 MG/1
500 TABLET, FILM COATED ORAL DAILY
Qty: 10 TABLET | Refills: 0 | Status: SHIPPED | OUTPATIENT
Start: 2025-07-15 | End: 2025-07-25

## 2025-07-15 RX ORDER — DEXAMETHASONE 4 MG/1
4 TABLET ORAL
Qty: 7 TABLET | Refills: 0 | Status: SHIPPED | OUTPATIENT
Start: 2025-07-15 | End: 2025-07-22

## 2025-07-15 NOTE — TELEPHONE ENCOUNTER
Spoke with patient regarding his symptoms of chest tightness wheezing, and lightheadedness. Patient was advised to go to the ED due to his symptoms and patient refused to go to the ED and stated that he would rather come to the office. I explained to the patient the ED could do more for him and be a little quicker but he still refused.

## 2025-07-15 NOTE — PATIENT INSTRUCTIONS
Decadron, take 2/day for 2d then one/day till gone.    Take levaquin daily for 10d.    If no improvement by Friday am check lab and cxr.

## 2025-07-17 ENCOUNTER — HOSPITAL ENCOUNTER (OUTPATIENT)
Age: 75
Discharge: HOME | End: 2025-07-17
Payer: MEDICARE

## 2025-07-17 ENCOUNTER — RESULTS FOLLOW-UP (OUTPATIENT)
Dept: INTERNAL MEDICINE CLINIC | Age: 75
End: 2025-07-17

## 2025-07-17 ENCOUNTER — HOSPITAL ENCOUNTER (OUTPATIENT)
Dept: GENERAL RADIOLOGY | Age: 75
Discharge: HOME | End: 2025-07-17
Payer: MEDICARE

## 2025-07-17 DIAGNOSIS — J45.21 MILD INTERMITTENT ASTHMA WITH ACUTE EXACERBATION: ICD-10-CM

## 2025-07-17 DIAGNOSIS — J18.9 ATYPICAL PNEUMONIA: ICD-10-CM

## 2025-07-17 DIAGNOSIS — I50.21 ACUTE SYSTOLIC CHF (CONGESTIVE HEART FAILURE) (HCC): ICD-10-CM

## 2025-07-17 DIAGNOSIS — I50.9 CONGESTIVE HEART FAILURE, UNSPECIFIED HF CHRONICITY, UNSPECIFIED HEART FAILURE TYPE (HCC): ICD-10-CM

## 2025-07-17 DIAGNOSIS — R06.00 DYSPNEA, UNSPECIFIED TYPE: ICD-10-CM

## 2025-07-17 DIAGNOSIS — I15.9 SECONDARY HYPERTENSION: Primary | ICD-10-CM

## 2025-07-17 LAB
ANION GAP SERPL CALCULATED.3IONS-SCNC: 13 MMOL/L (ref 9–17)
ANION GAP SERPL CALCULATED.3IONS-SCNC: 14 MMOL/L (ref 9–17)
BASOPHILS # BLD: 0.01 K/UL
BASOPHILS NFR BLD: 0 % (ref 0–1)
BNP SERPL-MCNC: 3087 PG/ML (ref 0–450)
BUN SERPL-MCNC: 64 MG/DL (ref 7–20)
BUN SERPL-MCNC: 64 MG/DL (ref 7–20)
CALCIUM SERPL-MCNC: 10.4 MG/DL (ref 8.3–10.6)
CALCIUM SERPL-MCNC: 9.9 MG/DL (ref 8.3–10.6)
CHLORIDE SERPL-SCNC: 94 MMOL/L (ref 99–110)
CHLORIDE SERPL-SCNC: 95 MMOL/L (ref 99–110)
CO2 SERPL-SCNC: 26 MMOL/L (ref 21–32)
CO2 SERPL-SCNC: 27 MMOL/L (ref 21–32)
CREAT SERPL-MCNC: 2.5 MG/DL (ref 0.8–1.3)
CREAT SERPL-MCNC: 2.6 MG/DL (ref 0.8–1.3)
EOSINOPHIL # BLD: 0.01 K/UL
EOSINOPHILS RELATIVE PERCENT: 0 % (ref 0–3)
ERYTHROCYTE [DISTWIDTH] IN BLOOD BY AUTOMATED COUNT: 21.3 % (ref 11.7–14.9)
GFR, ESTIMATED: 25 ML/MIN/1.73M2
GFR, ESTIMATED: 25 ML/MIN/1.73M2
GLUCOSE SERPL-MCNC: 127 MG/DL (ref 74–99)
GLUCOSE SERPL-MCNC: 130 MG/DL (ref 74–99)
HCT VFR BLD AUTO: 33.5 % (ref 42–52)
HGB BLD-MCNC: 10.7 G/DL (ref 13.5–18)
IMM GRANULOCYTES # BLD AUTO: 0.05 K/UL
IMM GRANULOCYTES NFR BLD: 0 %
LYMPHOCYTES NFR BLD: 0.37 K/UL
LYMPHOCYTES RELATIVE PERCENT: 3 % (ref 24–44)
MCH RBC QN AUTO: 28.2 PG (ref 27–31)
MCHC RBC AUTO-ENTMCNC: 31.9 G/DL (ref 32–36)
MCV RBC AUTO: 88.2 FL (ref 78–100)
MONOCYTES NFR BLD: 0.6 K/UL
MONOCYTES NFR BLD: 5 % (ref 0–5)
NEUTROPHILS NFR BLD: 91 % (ref 36–66)
NEUTS SEG NFR BLD: 10.57 K/UL
PLATELET # BLD AUTO: 164 K/UL (ref 140–440)
PMV BLD AUTO: 10.7 FL (ref 7.5–11.1)
POTASSIUM SERPL-SCNC: 4.4 MMOL/L (ref 3.5–5.1)
POTASSIUM SERPL-SCNC: 4.4 MMOL/L (ref 3.5–5.1)
RBC # BLD AUTO: 3.8 M/UL (ref 4.6–6.2)
SODIUM SERPL-SCNC: 134 MMOL/L (ref 136–145)
SODIUM SERPL-SCNC: 134 MMOL/L (ref 136–145)
WBC OTHER # BLD: 11.6 K/UL (ref 4–10.5)

## 2025-07-17 PROCEDURE — 83880 ASSAY OF NATRIURETIC PEPTIDE: CPT

## 2025-07-17 PROCEDURE — 80048 BASIC METABOLIC PNL TOTAL CA: CPT

## 2025-07-17 PROCEDURE — 71046 X-RAY EXAM CHEST 2 VIEWS: CPT

## 2025-07-17 PROCEDURE — 85025 COMPLETE CBC W/AUTO DIFF WBC: CPT

## 2025-07-18 NOTE — RESULT ENCOUNTER NOTE
Please CALL - first ask how his SOB is doing.  If improved this is ok, but if still very SOB we'll need to consider other intervention.  His hemoglobin and prior anemia are better- 10.7, but his heart failure # is higher and also kidney fxn is down indicating some dehydration.  Recommend he also get in contact w his cardiologist so they are aware of his BNP at 3000 from prior 2000, and also creatinine rising from 1.4--2.6.  **for now- please decrease bumetanide just a little, so current dose 4mg am and noon, 2mg in pm, to 4mg am, 3mg noon, 2mg pm.**  --Lastly let's recheck bmp and bnp on TUE, dx HTN and dyspnea, CHF.  If still sob we'll need f/u w him on WED but if is improving, just keep appt for 7/31.  FAX THESE LABS AND THIS NOTE ALSO TO DR STEVENS.    [place]   [address] [place] [address] [id number]   [place] [address]   [phone] (Work)   [phone] (Fax

## 2025-07-18 NOTE — TELEPHONE ENCOUNTER
I Spoke with Dr Elder regarding patients Bumex prescription. Dr elder wants the patient to stay on current dose of bumex and make sure the patient is drinking 3 glasses water per day and do follow up labs in one week to check kidney function.

## 2025-07-29 DIAGNOSIS — N17.9 ACUTE KIDNEY INJURY SUPERIMPOSED ON CKD: Primary | ICD-10-CM

## 2025-07-29 DIAGNOSIS — N18.9 ACUTE KIDNEY INJURY SUPERIMPOSED ON CKD: Primary | ICD-10-CM

## 2025-07-29 LAB
B-TYPE NATRIURETIC PEPTIDE: 2044 PG/ML (ref 0–486)
BUN / CREAT RATIO: 31 (ref 10–24)
BUN BLDV-MCNC: 86 MG/DL (ref 8–27)
CALCIUM SERPL-MCNC: 9.4 MG/DL (ref 8.6–10.2)
CHLORIDE BLD-SCNC: 98 MMOL/L (ref 96–106)
CO2: 23 MMOL/L (ref 20–29)
CREAT SERPL-MCNC: 2.74 MG/DL (ref 0.76–1.27)
ESTIMATED GLOMERULAR FILTRATION RATE CREATININE EQUATION: 23 ML/MIN/1.73
GLUCOSE BLD-MCNC: 119 MG/DL (ref 70–99)
POTASSIUM SERPL-SCNC: 4.4 MMOL/L (ref 3.5–5.2)
SODIUM BLD-SCNC: 137 MMOL/L (ref 134–144)

## 2025-07-31 ENCOUNTER — OFFICE VISIT (OUTPATIENT)
Dept: INTERNAL MEDICINE CLINIC | Age: 75
End: 2025-07-31

## 2025-07-31 VITALS
OXYGEN SATURATION: 93 % | HEART RATE: 69 BPM | SYSTOLIC BLOOD PRESSURE: 120 MMHG | BODY MASS INDEX: 29.85 KG/M2 | WEIGHT: 252.8 LBS | DIASTOLIC BLOOD PRESSURE: 60 MMHG | HEIGHT: 77 IN

## 2025-07-31 DIAGNOSIS — K62.5 RECTAL BLEEDING: Primary | ICD-10-CM

## 2025-07-31 DIAGNOSIS — D50.0 BLOOD LOSS ANEMIA: ICD-10-CM

## 2025-07-31 DIAGNOSIS — I50.21 ACUTE SYSTOLIC CHF (CONGESTIVE HEART FAILURE) (HCC): ICD-10-CM

## 2025-07-31 DIAGNOSIS — N17.9 ACUTE KIDNEY INJURY: ICD-10-CM

## 2025-07-31 NOTE — PATIENT INSTRUCTIONS
For rectal bleeding, hold eliquis for 3d then restart at 2.5mg twice/day.    Decr bumetanide to 4mg am and 2mg pm.    Recheck lab MON or TUE

## 2025-07-31 NOTE — PROGRESS NOTES
Og Adair  1950 07/31/25    SUBJECTIVE:   Has had some blood clots w stool/BMs the past 10d, some BRB noted too.  Saw Dr Garcia, for signmoidoscopy to be done soon, not yet scheduled  Has prior hx of radiation proctitis.      Atr fib, still on eliquis.  Rec to hold this for 3d then resume at 2.5mg BID till endoscopy.    Chf, less SOB now, wt is down.    ROMAIN, creat and BUN significantly elevated.  Been on bumex 4mg BID since Dr Stevens last mo June 20.  We discussed also now decr to 4mg am and 2mg pm.      OBJECTIVE:    /60   Pulse 69   Ht 1.956 m (6' 5.01\")   Wt 114.7 kg (252 lb 12.8 oz)   SpO2 93%   BMI 29.97 kg/m²     Physical Exam  Constitutional:       Appearance: Normal appearance.   Cardiovascular:      Rate and Rhythm: Normal rate. Rhythm irregular.      Heart sounds: No murmur heard.     No gallop.   Pulmonary:      Effort: No respiratory distress.      Breath sounds: No wheezing.   Abdominal:      General: Abdomen is flat. Bowel sounds are normal. There is no distension.      Palpations: Abdomen is soft. There is no mass.      Tenderness: There is no abdominal tenderness.      Hernia: No hernia is present.   Musculoskeletal:      Right lower leg: Edema present.      Left lower leg: Edema present.   Neurological:      Mental Status: He is alert.   Psychiatric:         Mood and Affect: Mood normal.         ASSESSMENT:    1. Rectal bleeding    2. Acute systolic CHF (congestive heart failure) (HCC)    3. Blood loss anemia    4. Acute kidney injury        PLAN:  Assessment & Plan    Lungs are clear, prior dyspnea improved.  We'll rec to decr bumex from 4mg BID to 4mg am and 2mg pm to address his pre renal azotemia.  F/u BNP, clinically not in ac CHF but has chr CHF.    His blood clots w BMs is very likely another exacerbation of his radiation proctitis, Dr Garcia is planning for colonoscopy and may need laser coagulation.  Rec hold eliquis for 3d then resume at lower dosing 2.5mg BID.  F/u CBC w lab

## 2025-08-01 DIAGNOSIS — I50.21 ACUTE SYSTOLIC CHF (CONGESTIVE HEART FAILURE) (HCC): ICD-10-CM

## 2025-08-01 DIAGNOSIS — N17.9 ACUTE KIDNEY INJURY: ICD-10-CM

## 2025-08-01 LAB
BUN / CREAT RATIO: 28 (ref 10–24)
BUN BLDV-MCNC: 81 MG/DL
BUN BLDV-MCNC: 81 MG/DL (ref 8–27)
CALCIUM SERPL-MCNC: 9 MG/DL
CALCIUM SERPL-MCNC: 9 MG/DL (ref 8.6–10.2)
CHLORIDE BLD-SCNC: 95 MMOL/L
CHLORIDE BLD-SCNC: 95 MMOL/L (ref 96–106)
CO2: 22 MMOL/L
CO2: 22 MMOL/L (ref 20–29)
CREAT SERPL-MCNC: 2.94 MG/DL
CREAT SERPL-MCNC: 2.94 MG/DL (ref 0.76–1.27)
EGFR: 21
ESTIMATED GLOMERULAR FILTRATION RATE CREATININE EQUATION: 21 ML/MIN/1.73
GLUCOSE BLD-MCNC: 112 MG/DL
GLUCOSE BLD-MCNC: 112 MG/DL (ref 70–99)
POTASSIUM SERPL-SCNC: 4.8 MMOL/L
POTASSIUM SERPL-SCNC: 4.8 MMOL/L (ref 3.5–5.2)
SODIUM BLD-SCNC: 134 MMOL/L
SODIUM BLD-SCNC: 134 MMOL/L (ref 134–144)

## 2025-08-05 LAB
B-TYPE NATRIURETIC PEPTIDE: 174 PG/ML (ref 0–100)
BASOPHILS ABSOLUTE: 0 X10E3/UL (ref 0–0.2)
BASOPHILS RELATIVE PERCENT: 1 %
EOSINOPHILS ABSOLUTE: 0.2 X10E3/UL (ref 0–0.4)
EOSINOPHILS RELATIVE PERCENT: 5 %
HCT VFR BLD CALC: 29.5 % (ref 37.5–51)
HEMOGLOBIN: 9.3 G/DL (ref 13–17.7)
IMMATURE GRANS (ABS): 0 X10E3/UL (ref 0–0.1)
IMMATURE GRANULOCYTES %: 1 %
LYMPHOCYTES ABSOLUTE: 0.4 X10E3/UL (ref 0.7–3.1)
LYMPHOCYTES RELATIVE PERCENT: 9 %
MCH RBC QN AUTO: 28.7 PG (ref 26.6–33)
MCHC RBC AUTO-ENTMCNC: 31.5 G/DL (ref 31.5–35.7)
MCV RBC AUTO: 91 FL (ref 79–97)
MONOCYTES ABSOLUTE: 0.4 X10E3/UL (ref 0.1–0.9)
MONOCYTES RELATIVE PERCENT: 9 %
NEUTROPHILS ABSOLUTE: 3.2 X10E3/UL (ref 1.4–7)
NEUTROPHILS RELATIVE PERCENT: 75 %
PDW BLD-RTO: 20.5 % (ref 11.6–15.4)
PLATELET # BLD: 165 X10E3/UL (ref 150–450)
RBC # BLD: 3.24 X10E6/UL (ref 4.14–5.8)
WBC # BLD: 4.3 X10E3/UL (ref 3.4–10.8)

## 2025-08-06 ENCOUNTER — ANESTHESIA EVENT (OUTPATIENT)
Dept: ENDOSCOPY | Age: 75
End: 2025-08-06
Payer: MEDICARE

## 2025-08-06 DIAGNOSIS — I50.21 ACUTE SYSTOLIC CHF (CONGESTIVE HEART FAILURE) (HCC): Primary | ICD-10-CM

## 2025-08-06 DIAGNOSIS — K62.5 RECTAL BLEEDING: ICD-10-CM

## 2025-08-06 DIAGNOSIS — N17.9 ACUTE KIDNEY INJURY: ICD-10-CM

## 2025-08-06 ASSESSMENT — COPD QUESTIONNAIRES: CAT_SEVERITY: MILD

## 2025-08-06 ASSESSMENT — ENCOUNTER SYMPTOMS: SHORTNESS OF BREATH: 1

## 2025-08-07 ENCOUNTER — ANESTHESIA (OUTPATIENT)
Dept: ENDOSCOPY | Age: 75
End: 2025-08-07
Payer: MEDICARE

## 2025-08-07 ENCOUNTER — HOSPITAL ENCOUNTER (OUTPATIENT)
Age: 75
Setting detail: OUTPATIENT SURGERY
Discharge: HOME | End: 2025-08-07
Attending: SPECIALIST | Admitting: SPECIALIST
Payer: MEDICARE

## 2025-08-07 VITALS
DIASTOLIC BLOOD PRESSURE: 62 MMHG | OXYGEN SATURATION: 97 % | HEIGHT: 77 IN | WEIGHT: 252 LBS | SYSTOLIC BLOOD PRESSURE: 111 MMHG | BODY MASS INDEX: 29.76 KG/M2 | TEMPERATURE: 97.4 F | HEART RATE: 74 BPM | RESPIRATION RATE: 18 BRPM

## 2025-08-07 DIAGNOSIS — K62.5 RECTAL BLEEDING: ICD-10-CM

## 2025-08-07 DIAGNOSIS — I50.21 ACUTE SYSTOLIC CHF (CONGESTIVE HEART FAILURE) (HCC): ICD-10-CM

## 2025-08-07 DIAGNOSIS — N17.9 ACUTE KIDNEY INJURY: ICD-10-CM

## 2025-08-07 LAB
ANION GAP SERPL CALCULATED.3IONS-SCNC: 11 MMOL/L (ref 9–17)
BASOPHILS # BLD: 0.02 K/UL
BASOPHILS NFR BLD: 1 % (ref 0–1)
BUN SERPL-MCNC: 54 MG/DL (ref 7–20)
CALCIUM SERPL-MCNC: 9.5 MG/DL (ref 8.3–10.6)
CHLORIDE SERPL-SCNC: 93 MMOL/L (ref 99–110)
CO2 SERPL-SCNC: 28 MMOL/L (ref 21–32)
CREAT SERPL-MCNC: 2.7 MG/DL (ref 0.8–1.3)
EOSINOPHIL # BLD: 0.17 K/UL
EOSINOPHILS RELATIVE PERCENT: 4 % (ref 0–3)
ERYTHROCYTE [DISTWIDTH] IN BLOOD BY AUTOMATED COUNT: 22 % (ref 11.7–14.9)
GFR, ESTIMATED: 23 ML/MIN/1.73M2
GLUCOSE BLD-MCNC: 121 MG/DL (ref 74–99)
GLUCOSE SERPL-MCNC: 120 MG/DL (ref 74–99)
HCT VFR BLD AUTO: 28 % (ref 42–52)
HGB BLD-MCNC: 9.2 G/DL (ref 13.5–18)
IMM GRANULOCYTES # BLD AUTO: 0.02 K/UL
IMM GRANULOCYTES NFR BLD: 1 %
LYMPHOCYTES NFR BLD: 0.28 K/UL
LYMPHOCYTES RELATIVE PERCENT: 6 % (ref 24–44)
MCH RBC QN AUTO: 29.2 PG (ref 27–31)
MCHC RBC AUTO-ENTMCNC: 32.9 G/DL (ref 32–36)
MCV RBC AUTO: 88.9 FL (ref 78–100)
MONOCYTES NFR BLD: 0.43 K/UL
MONOCYTES NFR BLD: 10 % (ref 0–5)
NEUTROPHILS NFR BLD: 79 % (ref 36–66)
NEUTS SEG NFR BLD: 3.44 K/UL
PLATELET # BLD AUTO: 195 K/UL (ref 140–440)
PMV BLD AUTO: 9.4 FL (ref 7.5–11.1)
POTASSIUM SERPL-SCNC: 5 MMOL/L (ref 3.5–5.1)
RBC # BLD AUTO: 3.15 M/UL (ref 4.6–6.2)
SODIUM SERPL-SCNC: 132 MMOL/L (ref 136–145)
WBC OTHER # BLD: 4.4 K/UL (ref 4–10.5)

## 2025-08-07 PROCEDURE — 7100000011 HC PHASE II RECOVERY - ADDTL 15 MIN: Performed by: SPECIALIST

## 2025-08-07 PROCEDURE — 80048 BASIC METABOLIC PNL TOTAL CA: CPT

## 2025-08-07 PROCEDURE — 85025 COMPLETE CBC W/AUTO DIFF WBC: CPT

## 2025-08-07 PROCEDURE — 82962 GLUCOSE BLOOD TEST: CPT

## 2025-08-07 PROCEDURE — 2580000003 HC RX 258: Performed by: ANESTHESIOLOGY

## 2025-08-07 PROCEDURE — 6360000002 HC RX W HCPCS

## 2025-08-07 PROCEDURE — 7100000010 HC PHASE II RECOVERY - FIRST 15 MIN: Performed by: SPECIALIST

## 2025-08-07 PROCEDURE — 3700000000 HC ANESTHESIA ATTENDED CARE: Performed by: SPECIALIST

## 2025-08-07 PROCEDURE — 2720000010 HC SURG SUPPLY STERILE: Performed by: SPECIALIST

## 2025-08-07 PROCEDURE — 3700000001 HC ADD 15 MINUTES (ANESTHESIA): Performed by: SPECIALIST

## 2025-08-07 PROCEDURE — 3609008600 HC SIGMOIDOSCOPY CONTROL HEMORRHAGE: Performed by: SPECIALIST

## 2025-08-07 PROCEDURE — 2709999900 HC NON-CHARGEABLE SUPPLY: Performed by: SPECIALIST

## 2025-08-07 RX ORDER — SODIUM CHLORIDE 0.9 % (FLUSH) 0.9 %
5-40 SYRINGE (ML) INJECTION EVERY 12 HOURS SCHEDULED
Status: DISCONTINUED | OUTPATIENT
Start: 2025-08-07 | End: 2025-08-07 | Stop reason: HOSPADM

## 2025-08-07 RX ORDER — SODIUM CHLORIDE 9 MG/ML
INJECTION, SOLUTION INTRAVENOUS PRN
Status: DISCONTINUED | OUTPATIENT
Start: 2025-08-07 | End: 2025-08-07 | Stop reason: HOSPADM

## 2025-08-07 RX ORDER — SODIUM CHLORIDE 0.9 % (FLUSH) 0.9 %
5-40 SYRINGE (ML) INJECTION PRN
Status: DISCONTINUED | OUTPATIENT
Start: 2025-08-07 | End: 2025-08-07 | Stop reason: HOSPADM

## 2025-08-07 RX ORDER — PROPOFOL 10 MG/ML
INJECTION, EMULSION INTRAVENOUS
Status: DISCONTINUED | OUTPATIENT
Start: 2025-08-07 | End: 2025-08-07 | Stop reason: SDUPTHER

## 2025-08-07 RX ORDER — LIDOCAINE HYDROCHLORIDE 20 MG/ML
INJECTION, SOLUTION INFILTRATION; PERINEURAL
Status: DISCONTINUED | OUTPATIENT
Start: 2025-08-07 | End: 2025-08-07 | Stop reason: SDUPTHER

## 2025-08-07 RX ORDER — SODIUM CHLORIDE, SODIUM LACTATE, POTASSIUM CHLORIDE, CALCIUM CHLORIDE 600; 310; 30; 20 MG/100ML; MG/100ML; MG/100ML; MG/100ML
INJECTION, SOLUTION INTRAVENOUS CONTINUOUS
Status: DISCONTINUED | OUTPATIENT
Start: 2025-08-07 | End: 2025-08-07 | Stop reason: HOSPADM

## 2025-08-07 RX ADMIN — PROPOFOL 100 MG: 10 INJECTION, EMULSION INTRAVENOUS at 09:12

## 2025-08-07 RX ADMIN — LIDOCAINE HYDROCHLORIDE 100 MG: 20 INJECTION, SOLUTION INFILTRATION; PERINEURAL at 09:12

## 2025-08-07 RX ADMIN — SODIUM CHLORIDE, POTASSIUM CHLORIDE, SODIUM LACTATE AND CALCIUM CHLORIDE: 600; 310; 30; 20 INJECTION, SOLUTION INTRAVENOUS at 09:01

## 2025-08-07 ASSESSMENT — PAIN - FUNCTIONAL ASSESSMENT: PAIN_FUNCTIONAL_ASSESSMENT: 0-10

## 2025-08-07 ASSESSMENT — PAIN SCALES - GENERAL: PAINLEVEL_OUTOF10: 0

## 2025-08-11 ENCOUNTER — OFFICE VISIT (OUTPATIENT)
Dept: INTERNAL MEDICINE CLINIC | Age: 75
End: 2025-08-11
Payer: MEDICARE

## 2025-08-11 ENCOUNTER — HOSPITAL ENCOUNTER (OUTPATIENT)
Dept: LAB | Age: 75
Discharge: HOME | End: 2025-08-11
Payer: MEDICARE

## 2025-08-11 VITALS
OXYGEN SATURATION: 94 % | BODY MASS INDEX: 29.71 KG/M2 | SYSTOLIC BLOOD PRESSURE: 96 MMHG | DIASTOLIC BLOOD PRESSURE: 52 MMHG | HEIGHT: 77 IN | WEIGHT: 251.6 LBS | HEART RATE: 77 BPM

## 2025-08-11 DIAGNOSIS — K62.5 RECTAL BLEEDING: ICD-10-CM

## 2025-08-11 DIAGNOSIS — N17.9 ACUTE KIDNEY INJURY: ICD-10-CM

## 2025-08-11 DIAGNOSIS — I50.21 ACUTE SYSTOLIC CHF (CONGESTIVE HEART FAILURE) (HCC): ICD-10-CM

## 2025-08-11 DIAGNOSIS — D50.0 BLOOD LOSS ANEMIA: ICD-10-CM

## 2025-08-11 DIAGNOSIS — I50.21 ACUTE SYSTOLIC CHF (CONGESTIVE HEART FAILURE) (HCC): Primary | ICD-10-CM

## 2025-08-11 LAB
ANION GAP SERPL CALCULATED.3IONS-SCNC: 13 MMOL/L (ref 9–17)
BASOPHILS # BLD: 0.02 K/UL
BASOPHILS NFR BLD: 1 % (ref 0–1)
BNP SERPL-MCNC: 2092 PG/ML (ref 0–450)
BUN SERPL-MCNC: 64 MG/DL (ref 7–20)
CALCIUM SERPL-MCNC: 9.3 MG/DL (ref 8.3–10.6)
CHLORIDE SERPL-SCNC: 94 MMOL/L (ref 99–110)
CO2 SERPL-SCNC: 26 MMOL/L (ref 21–32)
CREAT SERPL-MCNC: 3.3 MG/DL (ref 0.8–1.3)
EOSINOPHIL # BLD: 0.18 K/UL
EOSINOPHILS RELATIVE PERCENT: 5 % (ref 0–3)
ERYTHROCYTE [DISTWIDTH] IN BLOOD BY AUTOMATED COUNT: 21.3 % (ref 11.7–14.9)
GFR, ESTIMATED: 18 ML/MIN/1.73M2
GLUCOSE SERPL-MCNC: 114 MG/DL (ref 74–99)
HCT VFR BLD AUTO: 27.8 % (ref 42–52)
HGB BLD-MCNC: 8.9 G/DL (ref 13.5–18)
IMM GRANULOCYTES # BLD AUTO: 0.02 K/UL
IMM GRANULOCYTES NFR BLD: 1 %
LYMPHOCYTES NFR BLD: 0.27 K/UL
LYMPHOCYTES RELATIVE PERCENT: 7 % (ref 24–44)
MCH RBC QN AUTO: 29.3 PG (ref 27–31)
MCHC RBC AUTO-ENTMCNC: 32 G/DL (ref 32–36)
MCV RBC AUTO: 91.4 FL (ref 78–100)
MONOCYTES NFR BLD: 0.48 K/UL
MONOCYTES NFR BLD: 13 % (ref 0–5)
NEUTROPHILS NFR BLD: 74 % (ref 36–66)
NEUTS SEG NFR BLD: 2.81 K/UL
PLATELET # BLD AUTO: 188 K/UL (ref 140–440)
PMV BLD AUTO: 9.3 FL (ref 7.5–11.1)
POTASSIUM SERPL-SCNC: 4.9 MMOL/L (ref 3.5–5.1)
RBC # BLD AUTO: 3.04 M/UL (ref 4.6–6.2)
SODIUM SERPL-SCNC: 133 MMOL/L (ref 136–145)
WBC OTHER # BLD: 3.8 K/UL (ref 4–10.5)

## 2025-08-11 PROCEDURE — G8417 CALC BMI ABV UP PARAM F/U: HCPCS | Performed by: INTERNAL MEDICINE

## 2025-08-11 PROCEDURE — 99213 OFFICE O/P EST LOW 20 MIN: CPT | Performed by: INTERNAL MEDICINE

## 2025-08-11 PROCEDURE — 1159F MED LIST DOCD IN RCRD: CPT | Performed by: INTERNAL MEDICINE

## 2025-08-11 PROCEDURE — 1160F RVW MEDS BY RX/DR IN RCRD: CPT | Performed by: INTERNAL MEDICINE

## 2025-08-11 PROCEDURE — G8427 DOCREV CUR MEDS BY ELIG CLIN: HCPCS | Performed by: INTERNAL MEDICINE

## 2025-08-11 PROCEDURE — 85025 COMPLETE CBC W/AUTO DIFF WBC: CPT

## 2025-08-11 PROCEDURE — 83880 ASSAY OF NATRIURETIC PEPTIDE: CPT

## 2025-08-11 PROCEDURE — 1123F ACP DISCUSS/DSCN MKR DOCD: CPT | Performed by: INTERNAL MEDICINE

## 2025-08-11 PROCEDURE — 1036F TOBACCO NON-USER: CPT | Performed by: INTERNAL MEDICINE

## 2025-08-11 PROCEDURE — 80048 BASIC METABOLIC PNL TOTAL CA: CPT

## 2025-08-14 ENCOUNTER — HOSPITAL ENCOUNTER (OUTPATIENT)
Dept: LAB | Age: 75
Discharge: HOME | End: 2025-08-14
Payer: MEDICARE

## 2025-08-14 DIAGNOSIS — I50.21 ACUTE SYSTOLIC CHF (CONGESTIVE HEART FAILURE) (HCC): ICD-10-CM

## 2025-08-14 DIAGNOSIS — K62.5 RECTAL BLEEDING: ICD-10-CM

## 2025-08-14 DIAGNOSIS — D50.0 BLOOD LOSS ANEMIA: ICD-10-CM

## 2025-08-14 DIAGNOSIS — N17.9 ACUTE KIDNEY INJURY: ICD-10-CM

## 2025-08-14 LAB
ANION GAP SERPL CALCULATED.3IONS-SCNC: 12 MMOL/L (ref 9–17)
BASOPHILS # BLD: 0.03 K/UL
BASOPHILS NFR BLD: 1 % (ref 0–1)
BNP SERPL-MCNC: 2367 PG/ML (ref 0–450)
BUN SERPL-MCNC: 59 MG/DL (ref 7–20)
CALCIUM SERPL-MCNC: 9.6 MG/DL (ref 8.3–10.6)
CHLORIDE SERPL-SCNC: 95 MMOL/L (ref 99–110)
CO2 SERPL-SCNC: 28 MMOL/L (ref 21–32)
CREAT SERPL-MCNC: 2.4 MG/DL (ref 0.8–1.3)
EOSINOPHIL # BLD: 0.09 K/UL
EOSINOPHILS RELATIVE PERCENT: 2 % (ref 0–3)
ERYTHROCYTE [DISTWIDTH] IN BLOOD BY AUTOMATED COUNT: 21.2 % (ref 11.7–14.9)
FERRITIN SERPL-MCNC: 80 NG/ML (ref 30–400)
GFR, ESTIMATED: 26 ML/MIN/1.73M2
GLUCOSE SERPL-MCNC: 123 MG/DL (ref 74–99)
HCT VFR BLD AUTO: 29.1 % (ref 42–52)
HGB BLD-MCNC: 9.3 G/DL (ref 13.5–18)
IMM GRANULOCYTES # BLD AUTO: 0.03 K/UL
IMM GRANULOCYTES NFR BLD: 1 %
IRON SATN MFR SERPL: 17 % (ref 15–50)
IRON SERPL-MCNC: 55 UG/DL (ref 59–158)
LYMPHOCYTES NFR BLD: 0.35 K/UL
LYMPHOCYTES RELATIVE PERCENT: 9 % (ref 24–44)
MCH RBC QN AUTO: 29.5 PG (ref 27–31)
MCHC RBC AUTO-ENTMCNC: 32 G/DL (ref 32–36)
MCV RBC AUTO: 92.4 FL (ref 78–100)
MONOCYTES NFR BLD: 0.49 K/UL
MONOCYTES NFR BLD: 12 % (ref 0–5)
NEUTROPHILS NFR BLD: 76 % (ref 36–66)
NEUTS SEG NFR BLD: 3.02 K/UL
PLATELET # BLD AUTO: 190 K/UL (ref 140–440)
PMV BLD AUTO: 9.7 FL (ref 7.5–11.1)
POTASSIUM SERPL-SCNC: 4.4 MMOL/L (ref 3.5–5.1)
RBC # BLD AUTO: 3.15 M/UL (ref 4.6–6.2)
SODIUM SERPL-SCNC: 135 MMOL/L (ref 136–145)
TIBC SERPL-MCNC: 321 UG/DL (ref 260–445)
UNSATURATED IRON BINDING CAPACITY: 266 UG/DL (ref 110–370)
WBC OTHER # BLD: 4 K/UL (ref 4–10.5)

## 2025-08-14 PROCEDURE — 82728 ASSAY OF FERRITIN: CPT

## 2025-08-14 PROCEDURE — 83880 ASSAY OF NATRIURETIC PEPTIDE: CPT

## 2025-08-14 PROCEDURE — 83540 ASSAY OF IRON: CPT

## 2025-08-14 PROCEDURE — 80048 BASIC METABOLIC PNL TOTAL CA: CPT

## 2025-08-14 PROCEDURE — 83550 IRON BINDING TEST: CPT

## 2025-08-14 PROCEDURE — 85025 COMPLETE CBC W/AUTO DIFF WBC: CPT

## 2025-08-15 ENCOUNTER — OFFICE VISIT (OUTPATIENT)
Dept: INTERNAL MEDICINE CLINIC | Age: 75
End: 2025-08-15

## 2025-08-15 VITALS
SYSTOLIC BLOOD PRESSURE: 106 MMHG | HEART RATE: 85 BPM | HEIGHT: 77 IN | OXYGEN SATURATION: 95 % | BODY MASS INDEX: 28.9 KG/M2 | WEIGHT: 244.8 LBS | DIASTOLIC BLOOD PRESSURE: 60 MMHG

## 2025-08-15 DIAGNOSIS — J45.21 MILD INTERMITTENT ASTHMA WITH ACUTE EXACERBATION: ICD-10-CM

## 2025-08-15 DIAGNOSIS — N17.9 ACUTE KIDNEY INJURY: ICD-10-CM

## 2025-08-15 DIAGNOSIS — E11.21 CONTROLLED TYPE 2 DIABETES MELLITUS WITH DIABETIC NEPHROPATHY, WITHOUT LONG-TERM CURRENT USE OF INSULIN (HCC): ICD-10-CM

## 2025-08-15 DIAGNOSIS — K62.5 RECTAL BLEEDING: ICD-10-CM

## 2025-08-15 DIAGNOSIS — D50.0 BLOOD LOSS ANEMIA: Primary | ICD-10-CM

## 2025-08-15 RX ORDER — ALBUTEROL SULFATE 0.83 MG/ML
2.5 SOLUTION RESPIRATORY (INHALATION) EVERY 6 HOURS PRN
Qty: 120 EACH | Refills: 3 | Status: SHIPPED | OUTPATIENT
Start: 2025-08-15

## 2025-08-15 RX ORDER — HYDROCORTISONE ACETATE 25 MG/1
25 SUPPOSITORY RECTAL 2 TIMES DAILY PRN
Qty: 24 SUPPOSITORY | Refills: 1 | Status: SHIPPED | OUTPATIENT
Start: 2025-08-15

## 2025-08-18 ENCOUNTER — TELEPHONE (OUTPATIENT)
Dept: INTERNAL MEDICINE CLINIC | Age: 75
End: 2025-08-18

## 2025-08-18 ENCOUNTER — OFFICE VISIT (OUTPATIENT)
Dept: INTERNAL MEDICINE CLINIC | Age: 75
End: 2025-08-18

## 2025-08-18 VITALS — HEART RATE: 77 BPM | SYSTOLIC BLOOD PRESSURE: 88 MMHG | OXYGEN SATURATION: 92 % | DIASTOLIC BLOOD PRESSURE: 50 MMHG

## 2025-08-18 DIAGNOSIS — N17.9 ACUTE KIDNEY INJURY: ICD-10-CM

## 2025-08-18 DIAGNOSIS — I50.21 ACUTE SYSTOLIC CHF (CONGESTIVE HEART FAILURE) (HCC): ICD-10-CM

## 2025-08-18 DIAGNOSIS — D50.0 BLOOD LOSS ANEMIA: ICD-10-CM

## 2025-08-18 DIAGNOSIS — I95.9 HYPOTENSION, UNSPECIFIED HYPOTENSION TYPE: Primary | ICD-10-CM

## 2025-08-19 ENCOUNTER — HOSPITAL ENCOUNTER (OUTPATIENT)
Dept: LAB | Age: 75
Discharge: HOME | End: 2025-08-19
Payer: MEDICARE

## 2025-08-19 ENCOUNTER — TELEPHONE (OUTPATIENT)
Dept: INTERNAL MEDICINE CLINIC | Age: 75
End: 2025-08-19

## 2025-08-19 DIAGNOSIS — D50.0 BLOOD LOSS ANEMIA: ICD-10-CM

## 2025-08-19 DIAGNOSIS — I50.21 ACUTE SYSTOLIC CHF (CONGESTIVE HEART FAILURE) (HCC): ICD-10-CM

## 2025-08-19 DIAGNOSIS — I95.9 HYPOTENSION, UNSPECIFIED HYPOTENSION TYPE: ICD-10-CM

## 2025-08-19 DIAGNOSIS — N17.9 ACUTE KIDNEY INJURY: ICD-10-CM

## 2025-08-19 LAB
ANION GAP SERPL CALCULATED.3IONS-SCNC: 14 MMOL/L (ref 9–17)
BASOPHILS # BLD: 0.04 K/UL
BASOPHILS NFR BLD: 1 % (ref 0–1)
BNP SERPL-MCNC: 1984 PG/ML (ref 0–450)
BUN SERPL-MCNC: 53 MG/DL (ref 7–20)
CALCIUM SERPL-MCNC: 9.5 MG/DL (ref 8.3–10.6)
CHLORIDE SERPL-SCNC: 92 MMOL/L (ref 99–110)
CO2 SERPL-SCNC: 27 MMOL/L (ref 21–32)
CREAT SERPL-MCNC: 3.3 MG/DL (ref 0.8–1.3)
EOSINOPHIL # BLD: 0.11 K/UL
EOSINOPHILS RELATIVE PERCENT: 2 % (ref 0–3)
ERYTHROCYTE [DISTWIDTH] IN BLOOD BY AUTOMATED COUNT: 20.5 % (ref 11.7–14.9)
GFR, ESTIMATED: 18 ML/MIN/1.73M2
GLUCOSE SERPL-MCNC: 114 MG/DL (ref 74–99)
HCT VFR BLD AUTO: 29.2 % (ref 42–52)
HGB BLD-MCNC: 9.6 G/DL (ref 13.5–18)
IMM GRANULOCYTES # BLD AUTO: 0.06 K/UL
IMM GRANULOCYTES NFR BLD: 1 %
LYMPHOCYTES NFR BLD: 0.38 K/UL
LYMPHOCYTES RELATIVE PERCENT: 5 % (ref 24–44)
MCH RBC QN AUTO: 29.8 PG (ref 27–31)
MCHC RBC AUTO-ENTMCNC: 32.9 G/DL (ref 32–36)
MCV RBC AUTO: 90.7 FL (ref 78–100)
MONOCYTES NFR BLD: 0.75 K/UL
MONOCYTES NFR BLD: 11 % (ref 0–5)
NEUTROPHILS NFR BLD: 81 % (ref 36–66)
NEUTS SEG NFR BLD: 5.74 K/UL
PLATELET # BLD AUTO: 184 K/UL (ref 140–440)
PMV BLD AUTO: 9.5 FL (ref 7.5–11.1)
POTASSIUM SERPL-SCNC: 4.5 MMOL/L (ref 3.5–5.1)
RBC # BLD AUTO: 3.22 M/UL (ref 4.6–6.2)
SODIUM SERPL-SCNC: 133 MMOL/L (ref 136–145)
WBC OTHER # BLD: 7.1 K/UL (ref 4–10.5)

## 2025-08-19 PROCEDURE — 85025 COMPLETE CBC W/AUTO DIFF WBC: CPT

## 2025-08-19 PROCEDURE — 80048 BASIC METABOLIC PNL TOTAL CA: CPT

## 2025-08-19 PROCEDURE — 83880 ASSAY OF NATRIURETIC PEPTIDE: CPT

## 2025-08-22 ENCOUNTER — HOSPITAL ENCOUNTER (OUTPATIENT)
Dept: LAB | Age: 75
Discharge: HOME | End: 2025-08-22
Payer: MEDICARE

## 2025-08-22 DIAGNOSIS — N17.9 ACUTE KIDNEY INJURY: ICD-10-CM

## 2025-08-22 LAB
ANION GAP SERPL CALCULATED.3IONS-SCNC: 10 MMOL/L (ref 9–17)
BUN SERPL-MCNC: 43 MG/DL (ref 7–20)
CALCIUM SERPL-MCNC: 9.7 MG/DL (ref 8.3–10.6)
CHLORIDE SERPL-SCNC: 96 MMOL/L (ref 99–110)
CO2 SERPL-SCNC: 30 MMOL/L (ref 21–32)
CREAT SERPL-MCNC: 2.3 MG/DL (ref 0.8–1.3)
GFR, ESTIMATED: 28 ML/MIN/1.73M2
GLUCOSE SERPL-MCNC: 129 MG/DL (ref 74–99)
POTASSIUM SERPL-SCNC: 4.1 MMOL/L (ref 3.5–5.1)
SODIUM SERPL-SCNC: 137 MMOL/L (ref 136–145)

## 2025-08-22 PROCEDURE — 80048 BASIC METABOLIC PNL TOTAL CA: CPT

## (undated) DEVICE — BANDAGE,GAUZE,BULKEE II,4.5"X4.1YD,STRL: Brand: MEDLINE

## (undated) DEVICE — ENDOSCOPIC KIT 1.1+ OP4 CA DE 2 GWN AAMI LEVEL 3

## (undated) DEVICE — SPONGE GZ W4XL8IN COT WVN 12 PLY

## (undated) DEVICE — Z CONVERTED USE 2271390 BANDAGE COMPR ELASTIC 3 YDX4 IN ESMRK LF

## (undated) DEVICE — SUTURE VCRL SZ 2-0 L27IN ABSRB UD L26MM SH 1/2 CIR J417H

## (undated) DEVICE — TOWEL,OR,DSP,ST,BLUE,STD,6/PK,12PK/CS: Brand: MEDLINE

## (undated) DEVICE — SYRINGE MED 10ML LUERLOCK TIP W/O SFTY DISP

## (undated) DEVICE — FIAPC® PROBE W/ FILTER 2200 C OD 2.3MM/6.9FR; L 2.2M/7.2FT: Brand: ERBE

## (undated) DEVICE — DRAPE,EXTREMITY,89X128,STERILE: Brand: MEDLINE

## (undated) DEVICE — SUTURE VCRL SZ 4-0 L18IN ABSRB UD L13MM P-3 3/8 CIR PRIM J494H

## (undated) DEVICE — PENCIL,CAUTERY,ROCKER,PTFE,15'CORD: Brand: MEDLINE INDUSTRIES, INC.

## (undated) DEVICE — SPONGE LAP W18XL18IN WHT COT 4 PLY FLD STRUNG RADPQ DISP ST

## (undated) DEVICE — GLOVE ORANGE PI 8   MSG9080

## (undated) DEVICE — YANKAUER,FLEXIBLE HANDLE,REGLR CAPACITY: Brand: MEDLINE INDUSTRIES, INC.

## (undated) DEVICE — SNARE ENDOSCP CLD 230 CM 10 MM 2.3 MM ROTATABLE LESIONHUNTER

## (undated) DEVICE — GAUZE,SPONGE,4"X4",16PLY,XRAY,STRL,LF: Brand: MEDLINE

## (undated) DEVICE — PACK,BASIC,IX: Brand: MEDLINE

## (undated) DEVICE — MARKER,SKIN,WI/RULER AND LABELS: Brand: MEDLINE

## (undated) DEVICE — SYRINGE IRRIG 60ML SFT PLIABLE BLB EZ TO GRP 1 HND USE W/

## (undated) DEVICE — CHLORAPREP 26ML ORANGE

## (undated) DEVICE — SUPPLEMENT DIGESTIVE H2O SOL GI-EASE

## (undated) DEVICE — GLOVE ORANGE PI 7   MSG9070

## (undated) DEVICE — BLADE SURG NO15 C STL SHRP PREM

## (undated) DEVICE — BANDAGE COMPR W4INXL5YD WHT BGE POLY COT M E WRP WV HK AND

## (undated) DEVICE — SUTURE CHROMIC GUT SZ 2-0 L27IN ABSRB BRN L26MM SH 1/2 CIR G123H

## (undated) DEVICE — PENCIL ES CRD L10FT HND SWCHING ROCK SWCH W/ EDGE COAT BLDE

## (undated) DEVICE — SUTURE ETHLN SZ 3-0 L30IN NONABSORBABLE BLK FS-1 L24MM 3/8 669H

## (undated) DEVICE — CURITY NON-ADHERENT STRIPS: Brand: CURITY

## (undated) DEVICE — 1840 FOAM BLOCK NEEDLE COUNTER: Brand: DEVON

## (undated) DEVICE — GOWN,ECLIPSE,POLYRNF,BRTHSLV,XL,30/CS: Brand: MEDLINE

## (undated) DEVICE — BANDAGE,ELASTIC,ESMARK,STERILE,4"X9',LF: Brand: MEDLINE

## (undated) DEVICE — TUBING, SUCTION, 1/4" X 10', STRAIGHT: Brand: MEDLINE

## (undated) DEVICE — JELLY,LUBE,STERILE,FLIP TOP,TUBE,2-OZ: Brand: MEDLINE

## (undated) DEVICE — SHEET,DRAPE,53X77,STERILE: Brand: MEDLINE

## (undated) DEVICE — PRECISION THIN (5.5 X 0.38 X 18.0MM)

## (undated) DEVICE — SOLUTION PREP POVIDONE IOD FOR SKIN MUCOUS MEM PRIOR TO

## (undated) DEVICE — SPONGE 6.75X6IN ABS WVN LINT FR CTTN STRL

## (undated) DEVICE — SYRINGE, LUER LOCK, 10ML: Brand: MEDLINE

## (undated) DEVICE — APPLICATOR MEDICATED 26 CC SOLUTION HI LT ORNG CHLORAPREP

## (undated) DEVICE — SPONGE,LAP,18"X18",DLX,XR,ST,5/PK,40/PK: Brand: MEDLINE

## (undated) DEVICE — NEEDLE HYPO 25GA L1.5IN BLU POLYPR HUB S STL REG BVL STR

## (undated) DEVICE — ANESTHESIA CIRCUIT ADULT-LF: Brand: MEDLINE INDUSTRIES, INC.

## (undated) DEVICE — BANDAGE,SELF ADHRNT,COFLEX,4"X5YD,STRL: Brand: COLABEL

## (undated) DEVICE — COUNTER NDL 30 COUNT FOAM STRP SGL MAG

## (undated) DEVICE — SUTURE NONABSORBABLE MONOFILAMENT 4-0 P-3 18 IN ETHILON 699H

## (undated) DEVICE — CONVERTORS STOCKINETTE: Brand: CONVERTORS

## (undated) DEVICE — SHOE POSTOP XL M 12.5+ WOM 13.5+ SQUARED OPN TOE CLS HEEL

## (undated) DEVICE — COVER,MAYO STAND,STERILE: Brand: MEDLINE

## (undated) DEVICE — ZIMMER® STERILE DISPOSABLE TOURNIQUET CUFF WITH PLC, DUAL PORT, SINGLE BLADDER, 18 IN. (46 CM)

## (undated) DEVICE — GLOVE SURG SZ 65 THK91MIL LTX FREE SYN POLYISOPRENE

## (undated) DEVICE — LINER SUCT CANSTR 1500CC SEMI RIG W/ POR HYDROPHOBIC SHUT

## (undated) DEVICE — DRESSING PETRO W3XL3IN OIL EMUL N ADH GZ KNIT IMPREG CELOS

## (undated) DEVICE — CORD ES L15FT PT RET REUSE VALLEYLAB REM

## (undated) DEVICE — PREMIUM WET SKIN PREP TRAY: Brand: MEDLINE INDUSTRIES, INC.

## (undated) DEVICE — Device

## (undated) DEVICE — STERILE LATEX POWDER-FREE SURGICAL GLOVESWITH NITRILE COATING: Brand: PROTEXIS

## (undated) DEVICE — MARKER SURG SKIN UTIL REGULAR/FINE 2 TIP W/ RUL AND 9 LBL

## (undated) DEVICE — ELECTRODE ES AD CRDLSS PT RET REM POLYHESIVE

## (undated) DEVICE — SUTURE VCRL SZ 3-0 L27IN ABSRB UD L24MM FS-1 3/8 CIR REV J442H